# Patient Record
Sex: FEMALE | Race: BLACK OR AFRICAN AMERICAN | NOT HISPANIC OR LATINO | Employment: OTHER | URBAN - METROPOLITAN AREA
[De-identification: names, ages, dates, MRNs, and addresses within clinical notes are randomized per-mention and may not be internally consistent; named-entity substitution may affect disease eponyms.]

---

## 2017-01-17 ENCOUNTER — CLINICAL SUPPORT (OUTPATIENT)
Dept: CARDIOLOGY | Facility: CLINIC | Age: 67
End: 2017-01-17
Payer: MEDICARE

## 2017-01-17 DIAGNOSIS — I73.9 PERIPHERAL VASCULAR DISEASE, UNSPECIFIED: ICD-10-CM

## 2017-01-17 DIAGNOSIS — I73.9 PERIPHERAL VASCULAR DISEASE, UNSPECIFIED: Primary | ICD-10-CM

## 2017-01-17 LAB — VASCULAR ANKLE BRACHIAL INDEX (ABI) RIGHT: 1.11 (ref 0.9–1.2)

## 2017-01-17 PROCEDURE — 93925 LOWER EXTREMITY STUDY: CPT | Mod: S$GLB,,, | Performed by: INTERNAL MEDICINE

## 2017-01-17 PROCEDURE — 93922 UPR/L XTREMITY ART 2 LEVELS: CPT | Mod: S$GLB,,, | Performed by: INTERNAL MEDICINE

## 2017-01-18 ENCOUNTER — HOSPITAL ENCOUNTER (OUTPATIENT)
Dept: RADIOLOGY | Facility: HOSPITAL | Age: 67
Discharge: HOME | End: 2017-01-18
Attending: FAMILY MEDICINE

## 2017-01-18 ENCOUNTER — TELEPHONE (OUTPATIENT)
Dept: INTERNAL MEDICINE | Facility: CLINIC | Age: 67
End: 2017-01-18

## 2017-01-18 DIAGNOSIS — F17.200 SMOKER: ICD-10-CM

## 2017-01-18 DIAGNOSIS — R91.1 PULMONARY NODULE: Primary | ICD-10-CM

## 2017-01-18 PROCEDURE — 76497 UNLISTED CT PROCEDURE: CPT | Mod: TC,PO

## 2017-01-18 NOTE — TELEPHONE ENCOUNTER
Please notify her there is a new small lung nodule that will likely need a biopsy to make sure not a cancer  Refer pulm

## 2017-01-19 ENCOUNTER — TELEPHONE (OUTPATIENT)
Dept: CARDIOLOGY | Facility: CLINIC | Age: 67
End: 2017-01-19

## 2017-01-19 NOTE — TELEPHONE ENCOUNTER
----- Message from GLENDY Trivedi sent at 1/19/2017 12:29 PM CST -----  Please phone patient. Leg studies reviewed, AKHIL WNL. Good blow flow bilaterally. Continue current meds. Please make sure she has been placed in recall for 6 month f/u with Dr. Rosas    THanks

## 2017-01-19 NOTE — TELEPHONE ENCOUNTER
I have attempted without success to contact this patient by phone left message for pt to call back.

## 2017-01-25 ENCOUNTER — OFFICE VISIT (OUTPATIENT)
Dept: INTERNAL MEDICINE | Facility: CLINIC | Age: 67
End: 2017-01-25
Payer: MEDICARE

## 2017-01-25 VITALS
DIASTOLIC BLOOD PRESSURE: 62 MMHG | WEIGHT: 160.69 LBS | TEMPERATURE: 97 F | HEART RATE: 100 BPM | BODY MASS INDEX: 30.34 KG/M2 | HEIGHT: 61 IN | SYSTOLIC BLOOD PRESSURE: 132 MMHG

## 2017-01-25 DIAGNOSIS — I10 ESSENTIAL HYPERTENSION: Primary | Chronic | ICD-10-CM

## 2017-01-25 DIAGNOSIS — M81.0 OSTEOPOROSIS: ICD-10-CM

## 2017-01-25 DIAGNOSIS — I70.0 ATHEROSCLEROSIS OF AORTA: ICD-10-CM

## 2017-01-25 DIAGNOSIS — I70.219 ATHEROSCLEROTIC PVD WITH INTERMITTENT CLAUDICATION: ICD-10-CM

## 2017-01-25 PROCEDURE — 99499 UNLISTED E&M SERVICE: CPT | Mod: S$GLB,,, | Performed by: FAMILY MEDICINE

## 2017-01-25 PROCEDURE — 3075F SYST BP GE 130 - 139MM HG: CPT | Mod: S$GLB,,, | Performed by: FAMILY MEDICINE

## 2017-01-25 PROCEDURE — 99397 PER PM REEVAL EST PAT 65+ YR: CPT | Mod: S$GLB,,, | Performed by: FAMILY MEDICINE

## 2017-01-25 PROCEDURE — 3078F DIAST BP <80 MM HG: CPT | Mod: S$GLB,,, | Performed by: FAMILY MEDICINE

## 2017-01-25 PROCEDURE — 99999 PR PBB SHADOW E&M-EST. PATIENT-LVL III: CPT | Mod: PBBFAC,,, | Performed by: FAMILY MEDICINE

## 2017-01-25 RX ORDER — ALENDRONATE SODIUM 70 MG/1
TABLET ORAL
Qty: 12 TABLET | Refills: 3 | Status: SHIPPED | OUTPATIENT
Start: 2017-01-25 | End: 2018-02-16 | Stop reason: SDUPTHER

## 2017-01-25 RX ORDER — HYDROCODONE BITARTRATE AND ACETAMINOPHEN 10; 325 MG/1; MG/1
TABLET ORAL
COMMUNITY
Start: 2017-01-20 | End: 2018-08-22

## 2017-01-25 RX ORDER — MELOXICAM 7.5 MG/1
7.5 TABLET ORAL DAILY PRN
Qty: 90 TABLET | Refills: 3 | Status: SHIPPED | OUTPATIENT
Start: 2017-01-25 | End: 2017-02-15

## 2017-01-25 RX ORDER — MELOXICAM 15 MG/1
TABLET ORAL
Qty: 90 TABLET | Refills: 3 | Status: CANCELLED | OUTPATIENT
Start: 2017-01-25

## 2017-01-25 NOTE — MR AVS SNAPSHOT
[facility]  [address] [facility] [address] [place] [address]  Phone: [phone]  Fax: [phone]                  Lucia Barlwo   2017 10:20 AM   Office Visit    Description:  Female : 1950   Provider:  Rex Tejeda MD   Department:  [facility] - Internal Medicine           Diagnoses this Visit        Comments    Essential hypertension    -  Primary     Osteoporosis         Lung nodule < 6cm on CT         Atherosclerotic PVD with intermittent claudication         Atherosclerosis of aorta                To Do List           Future Appointments        Provider Department Dept Phone    2/15/2017 2:00 PM Ramiro Aleman MD [facility] [phone]    2018 10:40 AM Rex Tejeda MD [facility] [phone]      Goals (5 Years of Data)     None      Follow-Up and Disposition     Return in about 1 year (around 2018).    Follow-up and Disposition History       These Medications        Disp Refills Start End    alendronate (FOSAMAX) 70 MG tablet 12 tablet 3 2017     TAKE 1 TAB ONCE WEEKLY IN MORNING WITH FULL GLASS WATER ON EMPTY STOMACH. DO NOT EAT OR LIE DOWN X AT LEAST 30MIN AFTERWARDS    Pharmacy: [facility] - [facility] [address] [facility] Ph #: [phone]       meloxicam (MOBIC) 7.5 MG tablet 90 tablet 3 2017     Take 1 tablet (7.5 mg total) by mouth daily as needed for Pain. - Oral    Pharmacy: [facility] - [facility] [address] [facility] Ph #: [phone]         [facility]     [facility] On Call Nurse Care Line -  Assistance  Registered nurses in the [facility] On Call Center provide clinical advisement, health education, appointment booking, and other advisory services.  Call for this free service at [phone].             Medications           Message regarding Medications     Verify the changes and/or additions to your medication regime listed below are the same as  discussed with your clinician today.  If any of these changes or additions are incorrect, please notify your healthcare provider.        START taking these NEW medications        Refills    meloxicam (MOBIC) 7.5 MG tablet 3    Sig: Take 1 tablet (7.5 mg total) by mouth daily as needed for Pain.    Class: Normal    Route: Oral      STOP taking these medications     fluticasone (FLONASE) 50 mcg/actuation nasal spray USE 2 SPRAYS IN EACH NOSTRIL EVERY DAY           Verify that the below list of medications is an accurate representation of the medications you are currently taking.  If none reported, the list may be blank. If incorrect, please contact your healthcare provider. Carry this list with you in case of emergency.           Current Medications     alendronate (FOSAMAX) 70 MG tablet TAKE 1 TAB ONCE WEEKLY IN MORNING WITH FULL GLASS WATER ON EMPTY STOMACH. DO NOT EAT OR LIE DOWN X AT LEAST 30MIN AFTERWARDS    aspirin (ECOTRIN) 81 MG EC tablet Take 1 tablet (81 mg total) by mouth once daily.    ezetimibe (ZETIA) 10 mg tablet TAKE 1 TABLET ONE TIME DAILY SUBSTITUTED FOR EZETIMIBE    hydrocodone-acetaminophen 10-325mg (NORCO)  mg Tab     losartan-hydrochlorothiazide 50-12.5 mg (HYZAAR) 50-12.5 mg per tablet Take 1 tablet by mouth once daily.    methocarbamol (ROBAXIN) 750 MG Tab Take 750 mg by mouth.    rosuvastatin (CRESTOR) 20 MG tablet TAKE 1 TABLET ONE TIME DAILY    sodium,potassium,mag sulfates (SUPREP BOWEL PREP KIT) 17.5-3.13-1.6 gram SolR Take 1 Units by mouth as directed.    triamcinolone acetonide 0.1% (KENALOG) 0.1 % cream Apply topically 2 (two) times daily.    meloxicam (MOBIC) 7.5 MG tablet Take 1 tablet (7.5 mg total) by mouth daily as needed for Pain.           Clinical Reference Information           Vital Signs - Last Recorded  Most recent update: 1/25/2017 10:27 AM by Teresa Anaya LPN    BP Pulse Temp    132/62 (BP Location: Right arm, Patient Position: Sitting, BP Method: Manual) 100  "97.4 °F (36.3 °C) (Tympanic)    Ht Wt BMI    5' 1" (1.549 m) 72.9 kg (160 lb 11.5 oz) 30.37 kg/m2      Blood Pressure          Most Recent Value    BP  132/62      Allergies as of 1/25/2017     Skin Staples [Surgical Stainless Steel]    Egg Derived    Iodine And Iodide Containing Products    Latex, Natural Rubber    Nickel    Pravastatin    Shellfish Containing Products      Immunizations Administered on Date of Encounter - 1/25/2017     None      "

## 2017-01-25 NOTE — PROGRESS NOTES
Subjective:       Patient ID: Lucia Barlow is a . female.    Chief Complaint: Multiple issues see below        HPI Hypertension: blood pressures Home bps 130s; no med today;Tolerating medicine.   Hypercholesterolemia:  controlled. better Tolerating medicine.  pvd /Atherosclerosis of aorta utd cardiol  Smoker. hx Smoking at least 30 pk yrs; since youth; quit jan 16  osteopor on fosmax    New lung nodule concerning. Discussed. Has appt pulm    Past Medical History   Diagnosis Date    Hyperlipidemia     Hypertension     -exsmoker     PVD (peripheral vascular disease)     Osteoporosis     Atherosclerotic PVD with intermittent claudication 1/17/2014    S/P peripheral artery angioplasty 2/7/2014     Past Surgical History   Procedure Laterality Date    Colectomy      Carpal tunnel release  2003     Henrry    Hysterectomy       no cancer    Angioplasty       Family History   Problem Relation Age of Onset    Stroke Father     Stroke Sister     Asthma Daughter     Diabetes Daughter     Asthma Son            Review of Systems   Cardiovascular: no chest pain  Chest: no shortness of breath  Abd: no abd pain  Remainder review of systems negative        Objective:      Physical Exam   Constitutional: She is oriented to person, place, and time. She appears well-developed and well-nourished.   HENT: bilat nasal allison  Head: Normocephalic and atraumatic.   Eyes: Conjunctivae and EOM are normal. Pupils are equal, round, and reactive to light.   Neck: Normal range of motion. Neck supple. Carotid bruit is not present.   Cardiovascular: Normal rate and regular rhythm.    Pulmonary/Chest: Effort normal and breath sounds normal.   Abdominal: Soft. Bowel sounds are normal. She exhibits no distension and no mass. There is no tenderness. There is no rebound and no guarding.   Musculoskeletal: She exhibits no edema.   Neurological: She is alert and oriented to person, place, and time.   Skin: Skin is warm and dry.   Psychiatric:  She has a normal mood and affect. Her behavior is normal. Judgment and thought content normal.       Assessment:       htn  1. Hypercholesteremia      pvd  exSmoker     Lung nodule  Osteoporosis  Atherosclerosis of aorta   Plan:     F/u one yr  Declines flu shot  F/u cardiol when due    Dr jackson sched soon

## 2017-02-14 ENCOUNTER — TELEPHONE (OUTPATIENT)
Dept: PULMONOLOGY | Facility: CLINIC | Age: 67
End: 2017-02-14

## 2017-02-15 ENCOUNTER — OFFICE VISIT (OUTPATIENT)
Dept: PULMONOLOGY | Facility: CLINIC | Age: 67
End: 2017-02-15
Payer: MEDICARE

## 2017-02-15 VITALS
SYSTOLIC BLOOD PRESSURE: 136 MMHG | HEART RATE: 92 BPM | OXYGEN SATURATION: 96 % | DIASTOLIC BLOOD PRESSURE: 72 MMHG | WEIGHT: 161.81 LBS | HEIGHT: 61 IN | BODY MASS INDEX: 30.55 KG/M2

## 2017-02-15 DIAGNOSIS — R91.1 PULMONARY NODULE, LEFT: Primary | ICD-10-CM

## 2017-02-15 PROCEDURE — 1126F AMNT PAIN NOTED NONE PRSNT: CPT | Mod: S$GLB,,, | Performed by: INTERNAL MEDICINE

## 2017-02-15 PROCEDURE — 1160F RVW MEDS BY RX/DR IN RCRD: CPT | Mod: S$GLB,,, | Performed by: INTERNAL MEDICINE

## 2017-02-15 PROCEDURE — 1157F ADVNC CARE PLAN IN RCRD: CPT | Mod: S$GLB,,, | Performed by: INTERNAL MEDICINE

## 2017-02-15 PROCEDURE — 99205 OFFICE O/P NEW HI 60 MIN: CPT | Mod: S$GLB,,, | Performed by: INTERNAL MEDICINE

## 2017-02-15 PROCEDURE — 3078F DIAST BP <80 MM HG: CPT | Mod: S$GLB,,, | Performed by: INTERNAL MEDICINE

## 2017-02-15 PROCEDURE — 99499 UNLISTED E&M SERVICE: CPT | Mod: S$GLB,,, | Performed by: INTERNAL MEDICINE

## 2017-02-15 PROCEDURE — 1159F MED LIST DOCD IN RCRD: CPT | Mod: S$GLB,,, | Performed by: INTERNAL MEDICINE

## 2017-02-15 PROCEDURE — 99999 PR PBB SHADOW E&M-EST. PATIENT-LVL IV: CPT | Mod: PBBFAC,,, | Performed by: INTERNAL MEDICINE

## 2017-02-15 PROCEDURE — 3075F SYST BP GE 130 - 139MM HG: CPT | Mod: S$GLB,,, | Performed by: INTERNAL MEDICINE

## 2017-02-15 NOTE — MR AVS SNAPSHOT
[facility]  [address] [place] [address]  Phone: [phone]  Fax: [phone]                  Lucia Barlow   2/15/2017 2:00 PM   Office Visit    Description:  Female : 1950   Provider:  Ramiro Aleman MD   Department:  [facility] - Pulmonary Services           Reason for Visit     Pulmonary Nodules           Diagnoses this Visit        Comments    Pulmonary nodule, left    -  Primary            To Do List           Future Appointments        Provider Department Dept Phone    3/10/2017 8:00 AM [facility] CT1 LIMIT 500 LBS [facility] -  [phone]    3/16/2017 2:20 PM Ramiro Aleman MD [facility] [phone]    2018 10:40 AM Rex Tejeda MD [facility] [phone]      Goals (5 Years of Data)     None      Follow-Up and Disposition     Return in about 4 weeks (around 3/15/2017).      [facility]     [facility] Nurse Care Line - 24/7 Assistance  Registered nurses in the [facility] Center provide clinical advisement, health education, appointment booking, and other advisory services.  Call for this free service at [phone].             Medications           Message regarding Medications     Verify the changes and/or additions to your medication regime listed below are the same as discussed with your clinician today.  If any of these changes or additions are incorrect, please notify your healthcare provider.        STOP taking these medications     aspirin (ECOTRIN) 81 MG EC tablet Take 1 tablet (81 mg total) by mouth once daily.    meloxicam (MOBIC) 7.5 MG tablet Take 1 tablet (7.5 mg total) by mouth daily as needed for Pain.           Verify that the below list of medications is an accurate representation of the medications you are currently taking.  If none reported, the list may be blank. If incorrect, please contact your healthcare provider. Carry this list with you in case of emergency.          "  Current Medications     alendronate (FOSAMAX) 70 MG tablet TAKE 1 TAB ONCE WEEKLY IN MORNING WITH FULL GLASS WATER ON EMPTY STOMACH. DO NOT EAT OR LIE DOWN X AT LEAST 30MIN AFTERWARDS    ezetimibe (ZETIA) 10 mg tablet TAKE 1 TABLET ONE TIME DAILY SUBSTITUTED FOR EZETIMIBE    hydrocodone-acetaminophen 10-325mg (NORCO)  mg Tab     losartan-hydrochlorothiazide 50-12.5 mg (HYZAAR) 50-12.5 mg per tablet Take 1 tablet by mouth once daily.    methocarbamol (ROBAXIN) 750 MG Tab Take 750 mg by mouth.    rosuvastatin (CRESTOR) 20 MG tablet TAKE 1 TABLET ONE TIME DAILY    sodium,potassium,mag sulfates (SUPREP BOWEL PREP KIT) 17.5-3.13-1.6 gram SolR Take 1 Units by mouth as directed.    triamcinolone acetonide 0.1% (KENALOG) 0.1 % cream Apply topically 2 (two) times daily.           Clinical Reference Information           Your Vitals Were     BP Pulse Height Weight SpO2 BMI    136/72 92 5' 1" (1.549 m) 73.4 kg (161 lb 13.1 oz) 96% 30.58 kg/m2      Blood Pressure          Most Recent Value    BP  136/72      Allergies as of 2/15/2017     Skin Staples [Surgical Stainless Steel]    Egg Derived    Iodine And Iodide Containing Products    Latex, Natural Rubber    Nickel    Pravastatin    Shellfish Containing Products      Immunizations Administered on Date of Encounter - 2/15/2017     None      Orders Placed During Today's Visit     Future Labs/Procedures Expected by Expires    CT Biopsy Lung (xpd)  2/15/2017 2/15/2018      Instructions      Percutaneous Diagnosis of Chest, Lung Problems  Youve been told you need a percutaneous procedure to diagnose a problem in your chest or lung. This procedure allows the doctor to remove tissue or fluid (biopsy) from the chest or lung. For these procedures, the skin of the chest is numbed. Then a needle is passed through the skin.     With FNA, a thin needle is used to take a tissue sample from a mass.         Thoracentesis is used to remove fluid from the pleural space.      Fine " Needle Aspiration  Fine needle aspiration (FNA) is a procedure used for taking a tissue sample from a mass. First, a CT scan is done. This helps the doctor locate the mass. Then, a thin needle is inserted through the skin of the chest into the mass. Another CT scan is taken to ensure the needle is placed properly. Once the needle is in place, a small amount of tissue is drawn (aspirated) into the needle. The tissue sample is then sent for testing.   Thoracentesis  Thoracentesis is used to drain abnormal buildup of fluid between the lungs and chest wall (the pleural space). For the procedure, a needle is put through the skin of the chest into the pleural space. Fluid is then drawn into the needle and later tested for cancer and other problems.   Preparing for the Procedure  Before your procedure, do the following:  · Follow your doctors instructions about eating and drinking.  · Tell your doctor about the medications you take. You may need to stop taking certain medications before the procedure, especially aspirin, Coumadin, or other blood thinners.  · Talk with your doctor about any allergies and health problems you may have.  · Tell your doctor if you are pregnant.  During the Procedure  You receive local anesthesia (numbing medication) to keep you from feeling pain. The area where the needle goes in is numbed. If your doctor uses ultrasound to guide the needle, you will also have cool gel placed on your skin in the area to help the ultrasound probe move around. Medication to help you relax (sedation) may also be given through an intravenous (IV) line.  After the Procedure  You may have some pain after the procedure. You will be given medication to help ease any pain. You can go home after you recover from anesthesia, usually the same day as the procedure. If you received sedation, an adult family member or friend will need to drive you home. If a tube was placed in your chest to drain fluid, you will stay in the  hospital for 1 day(s). Your doctor will tell you more.   Risks and Complications  · Bleeding  · Infection  · Injury to other structures in the chest  · Pneumothorax (collapsed lung)       When to Call the Doctor  · Coughing up blood  · Shortness of breath  · Chest pain  · Fever of 100.4ºF or higher   Date Last Reviewed: 8/4/2014 © 2000-2016 [employer]. [address] [place] [address], [place] [address]. All rights reserved. This information is not intended as a substitute for professional medical care. Always follow your healthcare professional's instructions.      Please read Warning before using.    USE ONLY AS DIRECTED, IF SYMPTOMS PERSIST SEE YOUR DOCTOR/HEALTHCARE PROFESSIONAL. ALWAYS READ THE LABEL. IMPORTANT: [employer]® SINUS RINSE Mixture Packets should be used with [employer]Med® 240 mL (8 fl oz) NASAFLO® to achieve the best results. You may use NeilMed® packets with other irrigation devices, as long as you mix with the correct volume of water. Our recommendation is to replace Neti Pot every three months.  Step 1  Please wash your hands and rinse the device. Fill the NASAFLO® with 240 mL (8 fl oz) of lukewarm distilled, filtered or previously boiled water. Please do not use tap or faucet water to dissolve the mixture unless it has been previously boiled and cooled down. You may warm the water in a microwave, but we recommend that you warm it in increments of 5 to 10 seconds to avoid overheating, damaging the device or scalding your nasal passage. Step 2  Cut the SINUS RINSE mixture packet at the corner and pour contents into the pot. Tighten the lid on the device securely. Place one finger over the hole of the cap and shake the device gently to dissolve the mixture. Step 3  Standing in front of a sink, bend forward to your comfort level and tilt your head to one side. Keeping your mouth open, and without holding your breath, apply the tip of the device snugly against your nasal passage and ALLOW THE  SOLUTION TO GENTLY FLOW until the solution starts draining from the opposite nasal passage. Use roughly half the solution in the NASAFLO® (120 mL / 4 fl oz).  It should not enter your mouth unless you are tilting your head backwards. To adjust or stop the flow, you may place your finger over the hole of the cap, and depending on the seal, you may be able to control the flow. Step 4  Blow your nose very gently, without pinching nose completely to avoid pressure on eardrums. If tolerable, sniff in gently any residual solution remaining in the nasal passage once or twice, because this may clean out the posterior nasopharyngeal area, which is the area at the back of your nasal passage. At times, some solution will reach the back of your throat, so please spit it out.  For NASAFLO® users, to help drain any residual solution, blow your nose gently while tilting your head forward and to the same side of the nasal passage you just rinsed. Step 5  Now repeat steps 3 & 4 on your other nasal passage.  If there is any solution left over, please discard it. We recommend you make a fresh solution each time you rinse. Rinse once or twice daily OR as directed by your physician. Saline is considered safe.  The U.S. FDA is recommending that common cough and cold over the counter medicines not be given to babies and toddlers, and that antihistamines should not be given to children under age 6. NeilMed® NASAFLO®: Please clean with soap & water and let air dry Please read Warning before using.    Our recommendation is to replace Neti Pot every three months.                                                [facility]  [address] [place] [address]  (off [address])    Lung Biopsy Appointment      Is on (day) __________ (date) _____________ (time) ____________    This test is done in the Radiology Department on the 1st floor of [facility]    Lung Biopsy is performed to diagnose or  determine the level of lung disease that cant be determined by regular lab testing.    Prior to Lung Biopsy: (VERY IMPORTANT-PLEASE READ)   You must be fasting: nothing to eat or drink after midnight the night before the test.   You must have a responsible adult to drive you home after procedure and stay with you the rest of the day.   Plan to stay approximately 4 hours after procedure is completed.   If you are on Coumadin, Heparin, Plavix, Aspirin or NSAIDS must be stopped 7 days prior to the procedure. (YOU MUST LET YOUR PHYSICIAN KNOW)   The ordering physician should order lab work to be done within 7 days of the procedure, to determine your blood clotting factor.   Diabetic patients: If you take oral glucose pills, dont take it the morning of the biopsy.    If you take insulin, take your normal dose as usual.   Blood pressure medications are to be taken the morning of procedure with a small sip of water at least 2 hours before biopsy.   Based on your medical condition, your physician may request other specific preparation.     During the test you will lie on your back with your right hand behind your head. You will need to lie completely still while the best spot to insert the biopsy needle is determine. Ultrasound, MRI, or CT may also be used to locate a specific spot in the lung. Then, your skin will be cleaned and a local anesthetic (numbing) will be applied to the skin. You will also be given a light sedation. A biopsy needle will be inserted and advanced to the surface of the lung, where a tissue sample is obtained. After the biopsy you will remain lying on your right side for 2 hours. You may experience some pain around the site or in the shoulder. This isnt uncommon. Then, you will change position to lying on your back for 2-3 hours. You will be monitored closely during this time in recovery. You will be able to drink 2 hours and eat 4 hours after the procedure. The biopsy site will be  dressed. A small amount of blood on dressing is normal.    What to expect when home for Lung Biopsy:   Bed rest for the rest of the day and evening.   Blood thinning medications can be resumed when instructed by prescribing physician.   Avoid heavy lifting and strenuous exercise for the next 2-3 days following the procedure.   You can resume your normal diet.   You wont be able to travel by airplane for up to 5 days after biopsy.    Contact doctors office or go to ER if you experience:   Excessive bleeding saturated dressing or bleeding that will not stop by biopsy site   Fever or chills   Severe pain in the abdomen   Difficulty breathing    If your biopsy needs to be rescheduled, please contact the Radiology Department at [phone].         Language Assistance Services     ATTENTION: Language assistance services are available, free of charge. Please call [phone].      ATENCIÓN: Si habla español, tiene a rowland disposición servicios gratuitos de asistencia lingüística. Llame al [phone].     CHÚ Ý: N?u b?n nói Ti?ng Vi?t, có các d?ch v? h? tr? ngôn ng? mi?n phí dành cho b?n. G?i s? [phone].         [facility] complies with applicable Federal civil rights laws and does not discriminate on the basis of race, color, national origin, age, disability, or sex.

## 2017-02-15 NOTE — PROGRESS NOTES
Subjective:       Patient ID: Lucia Barlow is a 67 y.o. female.    Chief Complaint: She       Pulmonary Nodules    HPI       Lung Nodule  She presents for evaluation and treatment of a lung nodule. The patient had an abnormal imaging study but reports experiencing no current or past pulmonary symptoms. The patient denies other associated symptoms. She quit smoking approximately 1 year ago. The patient has no known exposure to tuberculosis and no history of PPD placement. The patient does not have a history of cancer.        Past Medical History   Diagnosis Date    Atherosclerosis of native coronary artery of native heart without angina pectoris 11/18/2016    Atherosclerotic PVD with intermittent claudication 1/17/2014    Ex-smoker     Hyperlipidemia     Hypertension     Osteoporosis 1/16 rosita 1/18    PVD (peripheral vascular disease)     S/P peripheral artery angioplasty 2/7/2014    Tobacco dependence      Past Surgical History   Procedure Laterality Date    Carpal tunnel release  2003     [facility]    Hysterectomy       no cancer    Angioplasty Bilateral 01/24/2014     aortoiliac stenting     Colonoscopy N/A 11/9/2016     Procedure: COLONOSCOPY;  Surgeon: Dmitri Sterling MD;  Location: [facility];  Service: Endoscopy;  Laterality: N/A;    Colectomy  approximate 2005     pt states 1in colon -dx with benign mass removed-states no colon cancer     Social History     Social History    Marital status:      Spouse name: N/A    Number of children: 4    Years of education: N/A     Occupational History          Social History Main Topics    Smoking status: Former Smoker     Packs/day: 1.00     Years: 50.00     Quit date: 1/11/2016    Smokeless tobacco: Never Used    Alcohol use No    Drug use: No    Sexual activity: No     Other Topics Concern    Not on file     Social History Narrative     Review of Systems   Constitutional: Negative for fever and fatigue.   HENT: Negative for  postnasal drip, rhinorrhea, sinus pressure and congestion.    Respiratory: Positive for shortness of breath. Negative for cough, hemoptysis, sputum production, chest tightness, wheezing, orthopnea, asthma nighttime symptoms and use of rescue inhaler.    Cardiovascular: Negative for chest pain and palpitations.   Musculoskeletal: Positive for arthralgias.   Skin: Negative for rash.   Gastrointestinal: Negative for nausea.   Neurological: Negative for dizziness and weakness.   Hematological: Negative for adenopathy.   Psychiatric/Behavioral: Negative for sleep disturbance. The patient is not nervous/anxious.        Objective:      Physical Exam   Constitutional: She is oriented to person, place, and time. She appears well-developed and well-nourished.   HENT:   Head: Normocephalic and atraumatic.   Eyes: Conjunctivae are normal. Pupils are equal, round, and reactive to light.   Neck: Neck supple. No JVD present. No tracheal deviation present. No thyromegaly present.   Cardiovascular: Normal rate, regular rhythm and normal heart sounds.    Pulmonary/Chest: Effort normal and breath sounds normal. No respiratory distress. She has no wheezes. She has no rales. She exhibits no tenderness.   Abdominal: Soft. Bowel sounds are normal.   Musculoskeletal: Normal range of motion. She exhibits edema.   Lymphadenopathy:     She has no cervical adenopathy.   Neurological: She is alert and oriented to person, place, and time.   Skin: Skin is warm and dry.   Nursing note and vitals reviewed.    Personal Diagnostic Review    Radiology Result       Name:  :  Patient MRN:   Lucia Barlow [id number]   Account Number: Room & Bed Accession Number:   [id number]   [id number]   Authorizing Physician: Patient Class: Diagnosis:   Rex Tejeda OP- Outpatient Diagnostic Testing Smoker [F17.200 (ICD-10-CM)]   Procedure: Exam Date: Reason for Exam:   CT Chest Lung Screening Low Dose 2017 None Specified           RESULTS:  Technique: CT of the thorax was performed with low dose, lung screening protocol. No contrast was administered. Sagittal and coronal reconstructions were obtained.     Comparison: Study from 01/08/2016.     Findings:     Lungs: There is a new minimally spiculated non-calcified nodule within the left lower lobe measures 9.7 mm. This nodule it appears to be minimally spiculated. This nodule is best seen on series 3 image 67 and series 604 image 112. There is a stable less than 4 mm subpleural density within the left lower lobe. The lungs demonstrate minimal emphysematous changes.     Pleura: No effusion.     Mediastinum: No definite pathologic mediastinal or hilar adenopathy.     Heart and pericardium: Normal size without effusion.     Aorta and vasculature: Atherosclerosis including coronary arteries.     Chest wall and skeletal structures: Unremarkable except age-appropriate degenerative changes.     Upper abdomen: Vascular calcifications noted in the upper abdomen..  IMPRESSION:         1. New 9.7 mm very minimally spiculated noncalcified pulmonary nodule within the left lower lobe. Biopsy is recommended..     2. Subpleural nodule within the left lower lobe measuring less than 4 mm stable when compared to the prior exam.     Other findings as above.        Electronically signed by: CAPO HICKS D.O.       No flowsheet data found.      Assessment:       1. Pulmonary nodule, left        Outpatient Encounter Prescriptions as of 2/15/2017   Medication Sig Dispense Refill    alendronate (FOSAMAX) 70 MG tablet TAKE 1 TAB ONCE WEEKLY IN MORNING WITH FULL GLASS WATER ON EMPTY STOMACH. DO NOT EAT OR LIE DOWN X AT LEAST 30MIN AFTERWARDS 12 tablet 3    ezetimibe (ZETIA) 10 mg tablet TAKE 1 TABLET ONE TIME DAILY SUBSTITUTED FOR EZETIMIBE 90 tablet 3    hydrocodone-acetaminophen 10-325mg (NORCO)  mg Tab       losartan-hydrochlorothiazide 50-12.5 mg (HYZAAR) 50-12.5 mg per tablet Take 1 tablet by mouth  once daily. 90 tablet 3    methocarbamol (ROBAXIN) 750 MG Tab Take 750 mg by mouth.      rosuvastatin (CRESTOR) 20 MG tablet TAKE 1 TABLET ONE TIME DAILY 90 tablet 3    sodium,potassium,mag sulfates (SUPREP BOWEL PREP KIT) 17.5-3.13-1.6 gram SolR Take 1 Units by mouth as directed. 1 Bottle 0    triamcinolone acetonide 0.1% (KENALOG) 0.1 % cream Apply topically 2 (two) times daily. 80 g 0    [DISCONTINUED] aspirin (ECOTRIN) 81 MG EC tablet Take 1 tablet (81 mg total) by mouth once daily. 30 tablet 6    [DISCONTINUED] meloxicam (MOBIC) 7.5 MG tablet Take 1 tablet (7.5 mg total) by mouth daily as needed for Pain. 90 tablet 3     No facility-administered encounter medications on file as of 2/15/2017.      Orders Placed This Encounter   Procedures    CT Biopsy Lung (xpd)     Standing Status:   Future     Standing Expiration Date:   2/15/2018     Scheduling Instructions:      Please call patient to confirm date and time     Order Specific Question:   Reason for Exam:     Answer:   pulmonary nodule     Plan:       Requested Prescriptions      No prescriptions requested or ordered in this encounter     Pulmonary nodule, left  -     CT Biopsy Lung (xpd); Future; Expected date: 2/15/17           Return in about 4 weeks (around 3/15/2017).    Risks, benefits of CT guided  lung biopsy discussed in detail with patient. Possible complications of procedure including but not limited to collapse of lung, bleeding into lung, respiratory failure and side effects discussed in detail. Alternatives to procedure discussed. Questions asked and answered.  Printed information and instructions given to patient.    Medical Decision Making: Moderate to High complexity.  Overall, the multiple problems listed are of moderate to high severity that may impact quality of life and activities of daily living. Side effects of medications, treatment plan as well as options and alternatives reviewed and discussed with patient. Orders for lung  biopsy initiated.There was counseling of patient concerning these issues. Time spent 60 minutes.        MEDICAL DECISION MAKING: Moderate to high complexity.  Overall, the multiple problems listed are of moderate to high severity that may impact quality of life and activities of daily living. Side effects of medications, treatment plan as well as options and alternatives reviewed and discussed with patient. There was counseling of patient concerning these issues.    Total time spent in face to face counseling and coordination of care - 60  minutes over 50% of time was used in discussion of prognosis, risks, benefits of treatment, instructions and compliance with regimen . Discussion with other physicians or health care providers (DME, NP, pharmacy, respiratory therapy) occurred.

## 2017-02-15 NOTE — PATIENT INSTRUCTIONS
Percutaneous Diagnosis of Chest, Lung Problems  Youve been told you need a percutaneous procedure to diagnose a problem in your chest or lung. This procedure allows the doctor to remove tissue or fluid (biopsy) from the chest or lung. For these procedures, the skin of the chest is numbed. Then a needle is passed through the skin.     With FNA, a thin needle is used to take a tissue sample from a mass.         Thoracentesis is used to remove fluid from the pleural space.      Fine Needle Aspiration  Fine needle aspiration (FNA) is a procedure used for taking a tissue sample from a mass. First, a CT scan is done. This helps the doctor locate the mass. Then, a thin needle is inserted through the skin of the chest into the mass. Another CT scan is taken to ensure the needle is placed properly. Once the needle is in place, a small amount of tissue is drawn (aspirated) into the needle. The tissue sample is then sent for testing.   Thoracentesis  Thoracentesis is used to drain abnormal buildup of fluid between the lungs and chest wall (the pleural space). For the procedure, a needle is put through the skin of the chest into the pleural space. Fluid is then drawn into the needle and later tested for cancer and other problems.   Preparing for the Procedure  Before your procedure, do the following:  · Follow your doctors instructions about eating and drinking.  · Tell your doctor about the medications you take. You may need to stop taking certain medications before the procedure, especially aspirin, Coumadin, or other blood thinners.  · Talk with your doctor about any allergies and health problems you may have.  · Tell your doctor if you are pregnant.  During the Procedure  You receive local anesthesia (numbing medication) to keep you from feeling pain. The area where the needle goes in is numbed. If your doctor uses ultrasound to guide the needle, you will also have cool gel placed on your skin in the area to help the  ultrasound probe move around. Medication to help you relax (sedation) may also be given through an intravenous (IV) line.  After the Procedure  You may have some pain after the procedure. You will be given medication to help ease any pain. You can go home after you recover from anesthesia, usually the same day as the procedure. If you received sedation, an adult family member or friend will need to drive you home. If a tube was placed in your chest to drain fluid, you will stay in the hospital for 1 day(s). Your doctor will tell you more.   Risks and Complications  · Bleeding  · Infection  · Injury to other structures in the chest  · Pneumothorax (collapsed lung)       When to Call the Doctor  · Coughing up blood  · Shortness of breath  · Chest pain  · Fever of 100.4ºF or higher   Date Last Reviewed: 8/4/2014  © 1904-4031 [employer]. [address] [place] [address]. All rights reserved. This information is not intended as a substitute for professional medical care. Always follow your healthcare professional's instructions.      Please read Warning before using.    USE ONLY AS DIRECTED, IF SYMPTOMS PERSIST SEE YOUR DOCTOR/HEALTHCARE PROFESSIONAL. ALWAYS READ THE LABEL. IMPORTANT: NeilMed® SINUS RINSE Mixture Packets should be used with NeilMed® 240 mL (8 fl oz) NASAFLO® to achieve the best results. You may use NeilMed® packets with other irrigation devices, as long as you mix with the correct volume of water. Our recommendation is to replace Neti Pot every three months.  Step 1  Please wash your hands and rinse the device. Fill the NASAFLO® with 240 mL (8 fl oz) of lukewarm distilled, filtered or previously boiled water. Please do not use tap or faucet water to dissolve the mixture unless it has been previously boiled and cooled down. You may warm the water in a microwave, but we recommend that you warm it in increments of 5 to 10 seconds to avoid overheating, damaging the device or scalding  your nasal passage. Step 2  Cut the SINUS RINSE mixture packet at the corner and pour contents into the pot. Tighten the lid on the device securely. Place one finger over the hole of the cap and shake the device gently to dissolve the mixture. Step 3  Standing in front of a sink, bend forward to your comfort level and tilt your head to one side. Keeping your mouth open, and without holding your breath, apply the tip of the device snugly against your nasal passage and ALLOW THE SOLUTION TO GENTLY FLOW until the solution starts draining from the opposite nasal passage. Use roughly half the solution in the NASAFLO® (120 mL / 4 fl oz).  It should not enter your mouth unless you are tilting your head backwards. To adjust or stop the flow, you may place your finger over the hole of the cap, and depending on the seal, you may be able to control the flow. Step 4  Blow your nose very gently, without pinching nose completely to avoid pressure on eardrums. If tolerable, sniff in gently any residual solution remaining in the nasal passage once or twice, because this may clean out the posterior nasopharyngeal area, which is the area at the back of your nasal passage. At times, some solution will reach the back of your throat, so please spit it out.  For NASAFLO® users, to help drain any residual solution, blow your nose gently while tilting your head forward and to the same side of the nasal passage you just rinsed. Step 5  Now repeat steps 3 & 4 on your other nasal passage.  If there is any solution left over, please discard it. We recommend you make a fresh solution each time you rinse. Rinse once or twice daily OR as directed by your physician. Saline is considered safe.  The [place] FDA is recommending that common cough and cold over the counter medicines not be given to babies and toddlers, and that antihistamines should not be given to children under age 6. NeilMed® NASAFLO®: Please clean with soap & water and let air dry  Please read Warning before using.    Our recommendation is to replace Neti Pot every three months.                                                [facility]  [address] [place] [address]  (off [address])    Lung Biopsy Appointment      Is on (day) __________ (date) _____________ (time) ____________    This test is done in the Radiology Department on the 1st floor of [facility]    Lung Biopsy is performed to diagnose or determine the level of lung disease that cant be determined by regular lab testing.    Prior to Lung Biopsy: (VERY IMPORTANT-PLEASE READ)   You must be fasting: nothing to eat or drink after midnight the night before the test.   You must have a responsible adult to drive you home after procedure and stay with you the rest of the day.   Plan to stay approximately 4 hours after procedure is completed.   If you are on Coumadin, Heparin, Plavix, Aspirin or NSAIDS must be stopped 7 days prior to the procedure. (YOU MUST LET YOUR PHYSICIAN KNOW)   The ordering physician should order lab work to be done within 7 days of the procedure, to determine your blood clotting factor.   Diabetic patients: If you take oral glucose pills, dont take it the morning of the biopsy.    If you take insulin, take your normal dose as usual.   Blood pressure medications are to be taken the morning of procedure with a small sip of water at least 2 hours before biopsy.   Based on your medical condition, your physician may request other specific preparation.     During the test you will lie on your back with your right hand behind your head. You will need to lie completely still while the best spot to insert the biopsy needle is determine. Ultrasound, MRI, or CT may also be used to locate a specific spot in the lung. Then, your skin will be cleaned and a local anesthetic (numbing) will be applied to the skin. You will also be given a light sedation. A biopsy needle  will be inserted and advanced to the surface of the lung, where a tissue sample is obtained. After the biopsy you will remain lying on your right side for 2 hours. You may experience some pain around the site or in the shoulder. This isnt uncommon. Then, you will change position to lying on your back for 2-3 hours. You will be monitored closely during this time in recovery. You will be able to drink 2 hours and eat 4 hours after the procedure. The biopsy site will be dressed. A small amount of blood on dressing is normal.    What to expect when home for Lung Biopsy:   Bed rest for the rest of the day and evening.   Blood thinning medications can be resumed when instructed by prescribing physician.   Avoid heavy lifting and strenuous exercise for the next 2-3 days following the procedure.   You can resume your normal diet.   You wont be able to travel by airplane for up to 5 days after biopsy.    Contact doctors office or go to ER if you experience:   Excessive bleeding saturated dressing or bleeding that will not stop by biopsy site   Fever or chills   Severe pain in the abdomen   Difficulty breathing    If your biopsy needs to be rescheduled, please contact the Radiology Department at [phone].

## 2017-03-09 ENCOUNTER — TELEPHONE (OUTPATIENT)
Dept: RADIOLOGY | Facility: HOSPITAL | Age: 67
End: 2017-03-09

## 2017-03-09 ENCOUNTER — TELEPHONE (OUTPATIENT)
Dept: PULMONOLOGY | Facility: CLINIC | Age: 67
End: 2017-03-09

## 2017-03-09 DIAGNOSIS — R91.1 PULMONARY NODULE, LEFT: Primary | ICD-10-CM

## 2017-03-09 DIAGNOSIS — R91.1 LUNG NODULE: ICD-10-CM

## 2017-03-09 DIAGNOSIS — I70.90 ATHEROSCLEROSIS OF ARTERY: ICD-10-CM

## 2017-03-09 DIAGNOSIS — Z01.818 PREOP TESTING: ICD-10-CM

## 2017-03-09 DIAGNOSIS — I25.10 CORONARY ATHEROSCLEROSIS OF UNSPECIFIED TYPE OF VESSEL, NATIVE OR GRAFT: ICD-10-CM

## 2017-03-09 DIAGNOSIS — I35.0 NONRHEUMATIC AORTIC VALVE STENOSIS: ICD-10-CM

## 2017-03-09 DIAGNOSIS — Z98.890 HISTORY OF LUNG BIOPSY: ICD-10-CM

## 2017-03-09 DIAGNOSIS — M81.0 OSTEOPOROSIS: ICD-10-CM

## 2017-03-09 DIAGNOSIS — Z86.010 HISTORY OF ADENOMATOUS POLYP OF COLON: ICD-10-CM

## 2017-03-09 DIAGNOSIS — I25.119 ATHEROSCLEROSIS OF NATIVE CORONARY ARTERY OF NATIVE HEART WITH ANGINA PECTORIS: ICD-10-CM

## 2017-03-09 DIAGNOSIS — I15.9 SECONDARY HYPERTENSION: ICD-10-CM

## 2017-03-09 DIAGNOSIS — R91.1 LUNG NODULE: Primary | ICD-10-CM

## 2017-03-09 DIAGNOSIS — E78.00 HYPERCHOLESTEREMIA: ICD-10-CM

## 2017-03-10 ENCOUNTER — HOSPITAL ENCOUNTER (OUTPATIENT)
Dept: RADIOLOGY | Facility: HOSPITAL | Age: 67
Discharge: HOME | End: 2017-03-10
Attending: RADIOLOGY
Payer: MEDICARE

## 2017-03-10 ENCOUNTER — HOSPITAL ENCOUNTER (OUTPATIENT)
Dept: RADIOLOGY | Facility: HOSPITAL | Age: 67
Discharge: HOME | End: 2017-03-10
Attending: INTERNAL MEDICINE
Payer: MEDICARE

## 2017-03-10 VITALS
SYSTOLIC BLOOD PRESSURE: 132 MMHG | HEART RATE: 76 BPM | RESPIRATION RATE: 16 BRPM | WEIGHT: 159 LBS | DIASTOLIC BLOOD PRESSURE: 45 MMHG | OXYGEN SATURATION: 98 % | HEIGHT: 62 IN | BODY MASS INDEX: 29.26 KG/M2

## 2017-03-10 DIAGNOSIS — R91.1 PULMONARY NODULE, LEFT: ICD-10-CM

## 2017-03-10 PROCEDURE — 88342 IMHCHEM/IMCYTCHM 1ST ANTB: CPT | Mod: 26,,, | Performed by: PATHOLOGY

## 2017-03-10 PROCEDURE — 71010 XR CHEST AP PORTABLE: CPT | Mod: TC

## 2017-03-10 PROCEDURE — 71010 XR CHEST 1 VIEW: CPT | Mod: TC

## 2017-03-10 PROCEDURE — 63600175 PHARM REV CODE 636 W HCPCS: Performed by: RADIOLOGY

## 2017-03-10 PROCEDURE — 88305 TISSUE EXAM BY PATHOLOGIST: CPT | Mod: 26,,, | Performed by: PATHOLOGY

## 2017-03-10 PROCEDURE — 88312 SPECIAL STAINS GROUP 1: CPT | Mod: 26,,, | Performed by: PATHOLOGY

## 2017-03-10 PROCEDURE — 77012 CT SCAN FOR NEEDLE BIOPSY: CPT | Mod: TC,LT

## 2017-03-10 PROCEDURE — 88305 TISSUE EXAM BY PATHOLOGIST: CPT | Performed by: PATHOLOGY

## 2017-03-10 RX ORDER — FENTANYL CITRATE 50 UG/ML
INJECTION, SOLUTION INTRAMUSCULAR; INTRAVENOUS CODE/TRAUMA/SEDATION MEDICATION
Status: COMPLETED | OUTPATIENT
Start: 2017-03-10 | End: 2017-03-10

## 2017-03-10 RX ORDER — MIDAZOLAM HYDROCHLORIDE 1 MG/ML
INJECTION, SOLUTION INTRAMUSCULAR; INTRAVENOUS CODE/TRAUMA/SEDATION MEDICATION
Status: COMPLETED | OUTPATIENT
Start: 2017-03-10 | End: 2017-03-10

## 2017-03-10 RX ADMIN — MIDAZOLAM HYDROCHLORIDE 1 MG: 1 INJECTION, SOLUTION INTRAMUSCULAR; INTRAVENOUS at 09:03

## 2017-03-10 RX ADMIN — FENTANYL CITRATE 50 MCG: 50 INJECTION, SOLUTION INTRAMUSCULAR; INTRAVENOUS at 09:03

## 2017-03-10 NOTE — SEDATION DOCUMENTATION
Pt in ct on table prone with bilateral arms above head.  Pt and daughter verbalized understanding of procedure.

## 2017-03-10 NOTE — IP AVS SNAPSHOT
[facility]  [id number] [place] [address] [address] [place] [address]           Patient Discharge Instructions     Our goal is to set you up for success. This packet includes information on your condition, medications, and your home care. It will help you to care for yourself so you don't get sicker and need to go back to the hospital.     Please ask your nurse if you have any questions.        There are many details to remember when preparing to leave the hospital. Here is what you will need to do:    1. Take your medicine. If you are prescribed medications, review your Medication List in the following pages. You may have new medications to  at the pharmacy and others that you'll need to stop taking. Review the instructions for how and when to take your medications. Talk with your doctor or nurses if you are unsure of what to do.     2. Go to your follow-up appointments. Specific follow-up information is listed in the following pages. Your may be contacted by a transition nurse or clinical provider about future appointments. Be sure we have all of the phone numbers to reach you, if needed. Please contact your provider's office if you are unable to make an appointment.     3. Watch for warning signs. Your doctor or nurse will give you detailed warning signs to watch for and when to call for assistance. These instructions may also include educational information about your condition. If you experience any of warning signs to your health, call your doctor.               [facility]  Unless otherwise directed by your provider, please contact [facility], our nurse care line that is available for 24/7 assistance.     [phone] (toll-free)    Registered nurses in the [facility] provide clinical advisement, health education, appointment booking, and other advisory services.                    ** Verify the list of medication(s) below is accurate and up to date. Carry this with you  in case of emergency. If your medications have changed, please notify your healthcare provider.             Medication List      TAKE these medications        Additional Info                      alendronate 70 MG tablet   Commonly known as:  FOSAMAX   Quantity:  12 tablet   Refills:  3    Instructions:  TAKE 1 TAB ONCE WEEKLY IN MORNING WITH FULL GLASS WATER ON EMPTY STOMACH. DO NOT EAT OR LIE DOWN X AT LEAST 30MIN AFTERWARDS     Begin Date    AM    Noon    PM    Bedtime       ezetimibe 10 mg tablet   Commonly known as:  ZETIA   Quantity:  90 tablet   Refills:  3    Instructions:  TAKE 1 TABLET ONE TIME DAILY SUBSTITUTED FOR EZETIMIBE     Begin Date    AM    Noon    PM    Bedtime       hydrocodone-acetaminophen 10-325mg  mg Tab   Commonly known as:  NORCO   Refills:  0      Begin Date    AM    Noon    PM    Bedtime       losartan-hydrochlorothiazide 50-12.5 mg 50-12.5 mg per tablet   Commonly known as:  HYZAAR   Quantity:  90 tablet   Refills:  3   Dose:  1 tablet    Instructions:  Take 1 tablet by mouth once daily.     Begin Date    AM    Noon    PM    Bedtime       methocarbamol 750 MG Tab   Commonly known as:  ROBAXIN   Refills:  0   Dose:  750 mg    Instructions:  Take 750 mg by mouth.     Begin Date    AM    Noon    PM    Bedtime       rosuvastatin 20 MG tablet   Commonly known as:  CRESTOR   Quantity:  90 tablet   Refills:  3    Instructions:  TAKE 1 TABLET ONE TIME DAILY     Begin Date    AM    Noon    PM    Bedtime       sodium,potassium,mag sulfates 17.5-3.13-1.6 gram Solr   Commonly known as:  SUPREP BOWEL PREP KIT   Quantity:  1 Bottle   Refills:  0   Dose:  1 Units    Instructions:  Take 1 Units by mouth as directed.     Begin Date    AM    Noon    PM    Bedtime       triamcinolone acetonide 0.1% 0.1 % cream   Commonly known as:  KENALOG   Quantity:  80 g   Refills:  0    Instructions:  Apply topically 2 (two) times daily.     Begin Date    AM    Noon    PM    Bedtime                  Please bring  to all follow up appointments:    1. A copy of your discharge instructions.  2. All medicines you are currently taking in their original bottles.  3. Identification and insurance card.    Please arrive 15 minutes ahead of scheduled appointment time.    Please call 24 hours in advance if you must reschedule your appointment and/or time.        Your Scheduled Appointments     Mar 16, 2017  2:20 PM CDT   Established Patient Visit with Ramiro Aleman MD   [facility]    [address] [facility] [address]   [phone]            Jan 25, 2018 10:40 AM CST   Established Patient Visit with Rex Tejeda MD   [facility] ([facility])    [address] [facility] [address]   [phone]                  Discharge Instructions         CT-Guided Lung Biopsy  CT-guided lung biopsy is a procedure to collect small tissue samples from an abnormal area in your lung. During the procedure, an imaging method called CT (computed tomography) is used to show live pictures of your lung. Then a thin needle is used to remove the tissue samples. The samples are tested in a lab for cancer and other problems.     For the biopsy, a thin needle is used to remove samples of tissue from an abnormal area in the lung.   Getting ready for your procedure  Follow any instructions from your healthcare provider.  Tell your provider about any medicines you are taking. It is important for your provider to know if you are taking any blood-thinning medicines or have a bleeding disorder.  You may need to stop taking all or some medicine before the procedure. This includes:  · All prescription medicines  · Blood-thinning medicines (anticoagulants)  · Over-the-counter medicines such as aspirin or ibuprofen  · Street drugs  · Herbs, vitamins, and other supplements  Also tell your provider if you:  · Are pregnant or think you may be pregnant  · Are breastfeeding  · Are allergic to or have  intolerances to any medicines  · Have a chronic cough, new cough, or other illness  · Use oxygen therapy at home  · Smoke or drink alcohol on a regular basis  Follow any directions youre given for not eating or drinking before the procedure.  The day of your procedure  The procedure takes about 60 minutes. The entire procedure (including time to prepare and recover) takes several hours. Youll likely go home the same day.  Before the procedure begins:  · An IV (intravenous) line may be put into a vein in your hand or arm. This line supplies fluids and medicines.  · To keep you free of pain during the procedure, you may be given anesthesia. Depending on the type of anesthesia used, you may be awake, drowsy, or in a deep sleep for the procedure.  During the procedure:  · Youll lie on a CT scan table. You may be on your back, side, or stomach. Pictures of your lung are then taken using the CT scanner. This helps your provider find the best place to position the needle in your lungs.  · A mickey is made on your skin where the needle will be inserted (biopsy site). The site is injected with numbing medicine.  · Using the CT pictures as a guide, the needle is passed through the numbed skin between your ribs and into your lung. Samples of tissue are then removed from the abnormal area in your lung. The samples are sent to a lab to be checked for problems.  · When the procedure is complete, the needle is removed. Pressure is applied to the biopsy site to help stop any bleeding. The site is then bandaged.  After the procedure:  · Youll be taken to a room to rest until the anesthesia wears off.  · A chest X-ray may be done. This is to make sure there was no damage to your lungs or the area where the needle was placed.  · When its time for you to go home, have an adult family member or friend ready to drive you.  Recovering at home  You may cough up a small amount of blood shortly after the procedure. Later, you may also  have some soreness around the biopsy site. Once at home, follow any instructions youre given. Be sure to:  · Take all medicines as directed.  · Care for the biopsy site as instructed.  · Check for signs of infection at the biopsy site (see below).  · Do not bathe or shower until your provider says it's OK. If you wish, you may wash with a sponge or washcloth.  · Avoid heavy lifting and other strenuous activities as directed.  · If you plan any air travel, ask your provider when you can do so. You may be told not to fly for a few weeks. This is because pressure changes may affect your lungs.  When to call your provider  Call 911 or your local emergency number if you have sudden, severe difficulty breathing. This could be a life-threatening emergency.  Call your healthcare provider for less severe symptoms that are still concerning. If you are unable to speak with your provider, go to the emergency room if you have any of the following symptoms:  · Fever of 100.4°F (38°C) or higher, or as directed by your provider  · Sudden chest pain, shortness of breath, or fainting  · Coughing up increasing amounts of blood  · Signs of infection at the biopsy site, such as increased redness or swelling, warmth, more pain, bleeding, or bad-smelling drainage   Follow-up  The provider who ordered your test will discuss the biopsy results with you during a follow-up visit. Results are usually ready within 1 to 2 weeks. If more tests or treatments are needed, your provider will discuss these with you.  Risks and possible complications  All procedures have some risk. Possible risks of this procedure include:  · An air leak in your lung (pneumothorax). This may require a stay in the hospital and treatment to re-inflate the lung.  · Bleeding into or around the lung  · Infection in the skin or lung  · Injury to other structures in the chest  · Risks of anesthesia. This will be discussed with you before the procedure.   Date Last Reviewed:  "6/11/2015  © 4615-2506 curated.by. [address] [place] [address], [place] [address]. All rights reserved. This information is not intended as a substitute for professional medical care. Always follow your healthcare professional's instructions.            Admission Information     Date & Time Provider Department CSN    3/10/2017  8:00 AM Ramiro Aleman MD [facility] -  [id number]      Care Providers     Provider Role Specialty Primary office phone    Ramiro Aleman MD Attending Provider Pulmonary Disease [phone]      Your Vitals Were     BP Pulse Resp Height Weight SpO2    125/57 81 14 5' 2" (1.575 m) 72.1 kg (159 lb) 100%    BMI                29.08 kg/m2          Recent Lab Values     No lab values to display.      Pending Labs     Order Current Status    Tissue Specimen To Pathology, Radiology Collected (03/10/17 0850)      Allergies as of 3/10/2017        Reactions    Skin Staples [Surgical Stainless Steel] Swelling    Egg Derived     Shortness breath, lip swelling    Iodine And Iodide Containing Products Swelling    Latex, Natural Rubber Swelling    Nickel     Pravastatin     40 mg causes nausea vomitting but 20 mg ok    Shellfish Containing Products Swelling      Advance Directives     An advance directive is a document which, in the event you are no longer able to make decisions for yourself, tells your healthcare team what kind of treatment you do or do not want to receive, or who you would like to make those decisions for you.  If you do not currently have an advance directive, [facility] encourages you to create one.  For more information call:  [phone] ([phone]), [phone] ([phone]),  or log on to [email or web address].        Language Assistance Services     ATTENTION: Language assistance services are available, free of charge. Please call [phone].      ATENCIÓN: Si habla español, tiene a rowland disposición servicios gratuitos de asistencia lingüística. " Nam turner [phone].     JAMES Ý: N?u b?n nói Ti?ng Vi?t, có các d?ch v? h? tr? ngôn ng? mi?n phí dành cho b?n. G?i s? [phone].         [facility] -  complies with applicable Federal civil rights laws and does not discriminate on the basis of race, color, national origin, age, disability, or sex.

## 2017-03-10 NOTE — PLAN OF CARE
Problem: Patient Care Overview  Goal: Plan of Care Review  Outcome: Outcome(s) achieved Date Met:  03/10/17  Ok to discharge to home with family per Dr. Frye.  Pt d/c home in stable condition via wheelchair with ride.  Verbalized understanding of d/c instructions.  Pt voiced no complaints at this time. Pt stood at side of bed, walked steps with no new motor or sensory deficits.  Neurologically intact.

## 2017-03-10 NOTE — PLAN OF CARE
Problem: Patient Care Overview  Goal: Individualization & Mutuality  Outcome: Ongoing (interventions implemented as appropriate)  Lung biopsy.  Pt daughter at bedside.

## 2017-03-10 NOTE — DISCHARGE INSTRUCTIONS
CT-Guided Lung Biopsy  CT-guided lung biopsy is a procedure to collect small tissue samples from an abnormal area in your lung. During the procedure, an imaging method called CT (computed tomography) is used to show live pictures of your lung. Then a thin needle is used to remove the tissue samples. The samples are tested in a lab for cancer and other problems.     For the biopsy, a thin needle is used to remove samples of tissue from an abnormal area in the lung.   Getting ready for your procedure  Follow any instructions from your healthcare provider.  Tell your provider about any medicines you are taking. It is important for your provider to know if you are taking any blood-thinning medicines or have a bleeding disorder.  You may need to stop taking all or some medicine before the procedure. This includes:  · All prescription medicines  · Blood-thinning medicines (anticoagulants)  · Over-the-counter medicines such as aspirin or ibuprofen  · Street drugs  · Herbs, vitamins, and other supplements  Also tell your provider if you:  · Are pregnant or think you may be pregnant  · Are breastfeeding  · Are allergic to or have intolerances to any medicines  · Have a chronic cough, new cough, or other illness  · Use oxygen therapy at home  · Smoke or drink alcohol on a regular basis  Follow any directions youre given for not eating or drinking before the procedure.  The day of your procedure  The procedure takes about 60 minutes. The entire procedure (including time to prepare and recover) takes several hours. Youll likely go home the same day.  Before the procedure begins:  · An IV (intravenous) line may be put into a vein in your hand or arm. This line supplies fluids and medicines.  · To keep you free of pain during the procedure, you may be given anesthesia. Depending on the type of anesthesia used, you may be awake, drowsy, or in a deep sleep for the procedure.  During the procedure:  · Youll lie on a CT scan  table. You may be on your back, side, or stomach. Pictures of your lung are then taken using the CT scanner. This helps your provider find the best place to position the needle in your lungs.  · A mickey is made on your skin where the needle will be inserted (biopsy site). The site is injected with numbing medicine.  · Using the CT pictures as a guide, the needle is passed through the numbed skin between your ribs and into your lung. Samples of tissue are then removed from the abnormal area in your lung. The samples are sent to a lab to be checked for problems.  · When the procedure is complete, the needle is removed. Pressure is applied to the biopsy site to help stop any bleeding. The site is then bandaged.  After the procedure:  · Youll be taken to a room to rest until the anesthesia wears off.  · A chest X-ray may be done. This is to make sure there was no damage to your lungs or the area where the needle was placed.  · When its time for you to go home, have an adult family member or friend ready to drive you.  Recovering at home  You may cough up a small amount of blood shortly after the procedure. Later, you may also have some soreness around the biopsy site. Once at home, follow any instructions youre given. Be sure to:  · Take all medicines as directed.  · Care for the biopsy site as instructed.  · Check for signs of infection at the biopsy site (see below).  · Do not bathe or shower until your provider says it's OK. If you wish, you may wash with a sponge or washcloth.  · Avoid heavy lifting and other strenuous activities as directed.  · If you plan any air travel, ask your provider when you can do so. You may be told not to fly for a few weeks. This is because pressure changes may affect your lungs.  When to call your provider  Call 911 or your local emergency number if you have sudden, severe difficulty breathing. This could be a life-threatening emergency.  Call your healthcare provider for less severe  symptoms that are still concerning. If you are unable to speak with your provider, go to the emergency room if you have any of the following symptoms:  · Fever of 100.4°F (38°C) or higher, or as directed by your provider  · Sudden chest pain, shortness of breath, or fainting  · Coughing up increasing amounts of blood  · Signs of infection at the biopsy site, such as increased redness or swelling, warmth, more pain, bleeding, or bad-smelling drainage   Follow-up  The provider who ordered your test will discuss the biopsy results with you during a follow-up visit. Results are usually ready within 1 to 2 weeks. If more tests or treatments are needed, your provider will discuss these with you.  Risks and possible complications  All procedures have some risk. Possible risks of this procedure include:  · An air leak in your lung (pneumothorax). This may require a stay in the hospital and treatment to re-inflate the lung.  · Bleeding into or around the lung  · Infection in the skin or lung  · Injury to other structures in the chest  · Risks of anesthesia. This will be discussed with you before the procedure.   Date Last Reviewed: 6/11/2015  © 9204-1814 [employer]. [address] [place] [address], [place] [address]. All rights reserved. This information is not intended as a substitute for professional medical care. Always follow your healthcare professional's instructions.

## 2017-03-10 NOTE — DISCHARGE SUMMARY
Procedure was performed by the radiologist.  Sterile technique was performed in the posterior left thorax, lidocaine was used as a local anesthetic.  Multiple samples taken from left lung nodule.  Pt tolerated the procedure well with small pneumothorax.  CXRs to follow.  Please see radiologist report for details. F/u with PCP and/or ordering physician.

## 2017-03-10 NOTE — H&P (VIEW-ONLY)
Subjective:       Patient ID: Lucia Barlow is a 67 y.o. female.    Chief Complaint: She       Pulmonary Nodules    HPI       Lung Nodule  She presents for evaluation and treatment of a lung nodule. The patient had an abnormal imaging study but reports experiencing no current or past pulmonary symptoms. The patient denies other associated symptoms. She quit smoking approximately 1 year ago. The patient has no known exposure to tuberculosis and no history of PPD placement. The patient does not have a history of cancer.        Past Medical History   Diagnosis Date    Atherosclerosis of native coronary artery of native heart without angina pectoris 11/18/2016    Atherosclerotic PVD with intermittent claudication 1/17/2014    Ex-smoker     Hyperlipidemia     Hypertension     Osteoporosis 1/16 rosita 1/18    PVD (peripheral vascular disease)     S/P peripheral artery angioplasty 2/7/2014    Tobacco dependence      Past Surgical History   Procedure Laterality Date    Carpal tunnel release  2003     [facility]    Hysterectomy       no cancer    Angioplasty Bilateral 01/24/2014     aortoiliac stenting     Colonoscopy N/A 11/9/2016     Procedure: COLONOSCOPY;  Surgeon: Dmitri Sterling MD;  Location: [facility];  Service: Endoscopy;  Laterality: N/A;    Colectomy  approximate 2005     pt states 1in colon -dx with benign mass removed-states no colon cancer     Social History     Social History    Marital status:      Spouse name: N/A    Number of children: 4    Years of education: N/A     Occupational History          Social History Main Topics    Smoking status: Former Smoker     Packs/day: 1.00     Years: 50.00     Quit date: 1/11/2016    Smokeless tobacco: Never Used    Alcohol use No    Drug use: No    Sexual activity: No     Other Topics Concern    Not on file     Social History Narrative     Review of Systems   Constitutional: Negative for fever and fatigue.   HENT: Negative for  postnasal drip, rhinorrhea, sinus pressure and congestion.    Respiratory: Positive for shortness of breath. Negative for cough, hemoptysis, sputum production, chest tightness, wheezing, orthopnea, asthma nighttime symptoms and use of rescue inhaler.    Cardiovascular: Negative for chest pain and palpitations.   Musculoskeletal: Positive for arthralgias.   Skin: Negative for rash.   Gastrointestinal: Negative for nausea.   Neurological: Negative for dizziness and weakness.   Hematological: Negative for adenopathy.   Psychiatric/Behavioral: Negative for sleep disturbance. The patient is not nervous/anxious.        Objective:      Physical Exam   Constitutional: She is oriented to person, place, and time. She appears well-developed and well-nourished.   HENT:   Head: Normocephalic and atraumatic.   Eyes: Conjunctivae are normal. Pupils are equal, round, and reactive to light.   Neck: Neck supple. No JVD present. No tracheal deviation present. No thyromegaly present.   Cardiovascular: Normal rate, regular rhythm and normal heart sounds.    Pulmonary/Chest: Effort normal and breath sounds normal. No respiratory distress. She has no wheezes. She has no rales. She exhibits no tenderness.   Abdominal: Soft. Bowel sounds are normal.   Musculoskeletal: Normal range of motion. She exhibits edema.   Lymphadenopathy:     She has no cervical adenopathy.   Neurological: She is alert and oriented to person, place, and time.   Skin: Skin is warm and dry.   Nursing note and vitals reviewed.    Personal Diagnostic Review    Radiology Result       Name:  :  Patient MRN:   Lucia Barlow [id number]   Account Number: Room & Bed Accession Number:   [id number]   [id number]   Authorizing Physician: Patient Class: Diagnosis:   [facility] OP- Outpatient Diagnostic Testing Smoker [F17.200 (ICD-10-CM)]   Procedure: Exam Date: Reason for Exam:   CT Chest Lung Screening Low Dose 2017 None Specified           RESULTS:  Technique: CT of the thorax was performed with low dose, lung screening protocol. No contrast was administered. Sagittal and coronal reconstructions were obtained.     Comparison: Study from 01/08/2016.     Findings:     Lungs: There is a new minimally spiculated non-calcified nodule within the left lower lobe measures 9.7 mm. This nodule it appears to be minimally spiculated. This nodule is best seen on series 3 image 67 and series 604 image 112. There is a stable less than 4 mm subpleural density within the left lower lobe. The lungs demonstrate minimal emphysematous changes.     Pleura: No effusion.     Mediastinum: No definite pathologic mediastinal or hilar adenopathy.     Heart and pericardium: Normal size without effusion.     Aorta and vasculature: Atherosclerosis including coronary arteries.     Chest wall and skeletal structures: Unremarkable except age-appropriate degenerative changes.     Upper abdomen: Vascular calcifications noted in the upper abdomen..  IMPRESSION:         1. New 9.7 mm very minimally spiculated noncalcified pulmonary nodule within the left lower lobe. Biopsy is recommended..     2. Subpleural nodule within the left lower lobe measuring less than 4 mm stable when compared to the prior exam.     Other findings as above.        Electronically signed by: CAPO HICKS D.O.       No flowsheet data found.      Assessment:       1. Pulmonary nodule, left        Outpatient Encounter Prescriptions as of 2/15/2017   Medication Sig Dispense Refill    alendronate (FOSAMAX) 70 MG tablet TAKE 1 TAB ONCE WEEKLY IN MORNING WITH FULL GLASS WATER ON EMPTY STOMACH. DO NOT EAT OR LIE DOWN X AT LEAST 30MIN AFTERWARDS 12 tablet 3    ezetimibe (ZETIA) 10 mg tablet TAKE 1 TABLET ONE TIME DAILY SUBSTITUTED FOR EZETIMIBE 90 tablet 3    hydrocodone-acetaminophen 10-325mg (NORCO)  mg Tab       losartan-hydrochlorothiazide 50-12.5 mg (HYZAAR) 50-12.5 mg per tablet Take 1 tablet by mouth  once daily. 90 tablet 3    methocarbamol (ROBAXIN) 750 MG Tab Take 750 mg by mouth.      rosuvastatin (CRESTOR) 20 MG tablet TAKE 1 TABLET ONE TIME DAILY 90 tablet 3    sodium,potassium,mag sulfates (SUPREP BOWEL PREP KIT) 17.5-3.13-1.6 gram SolR Take 1 Units by mouth as directed. 1 Bottle 0    triamcinolone acetonide 0.1% (KENALOG) 0.1 % cream Apply topically 2 (two) times daily. 80 g 0    [DISCONTINUED] aspirin (ECOTRIN) 81 MG EC tablet Take 1 tablet (81 mg total) by mouth once daily. 30 tablet 6    [DISCONTINUED] meloxicam (MOBIC) 7.5 MG tablet Take 1 tablet (7.5 mg total) by mouth daily as needed for Pain. 90 tablet 3     No facility-administered encounter medications on file as of 2/15/2017.      Orders Placed This Encounter   Procedures    CT Biopsy Lung (xpd)     Standing Status:   Future     Standing Expiration Date:   2/15/2018     Scheduling Instructions:      Please call patient to confirm date and time     Order Specific Question:   Reason for Exam:     Answer:   pulmonary nodule     Plan:       Requested Prescriptions      No prescriptions requested or ordered in this encounter     Pulmonary nodule, left  -     CT Biopsy Lung (xpd); Future; Expected date: 2/15/17           Return in about 4 weeks (around 3/15/2017).    Risks, benefits of CT guided  lung biopsy discussed in detail with patient. Possible complications of procedure including but not limited to collapse of lung, bleeding into lung, respiratory failure and side effects discussed in detail. Alternatives to procedure discussed. Questions asked and answered.  Printed information and instructions given to patient.    Medical Decision Making: Moderate to High complexity.  Overall, the multiple problems listed are of moderate to high severity that may impact quality of life and activities of daily living. Side effects of medications, treatment plan as well as options and alternatives reviewed and discussed with patient. Orders for lung  biopsy initiated.There was counseling of patient concerning these issues. Time spent 60 minutes.        MEDICAL DECISION MAKING: Moderate to high complexity.  Overall, the multiple problems listed are of moderate to high severity that may impact quality of life and activities of daily living. Side effects of medications, treatment plan as well as options and alternatives reviewed and discussed with patient. There was counseling of patient concerning these issues.    Total time spent in face to face counseling and coordination of care - 60  minutes over 50% of time was used in discussion of prognosis, risks, benefits of treatment, instructions and compliance with regimen . Discussion with other physicians or health care providers (DME, NP, pharmacy, respiratory therapy) occurred.

## 2017-03-16 ENCOUNTER — OFFICE VISIT (OUTPATIENT)
Dept: PULMONOLOGY | Facility: CLINIC | Age: 67
End: 2017-03-16
Payer: MEDICARE

## 2017-03-16 VITALS
BODY MASS INDEX: 29.66 KG/M2 | HEIGHT: 62 IN | OXYGEN SATURATION: 95 % | WEIGHT: 161.19 LBS | SYSTOLIC BLOOD PRESSURE: 138 MMHG | HEART RATE: 100 BPM | DIASTOLIC BLOOD PRESSURE: 80 MMHG

## 2017-03-16 DIAGNOSIS — J30.89 PERENNIAL ALLERGIC RHINITIS, UNSPECIFIED ALLERGIC RHINITIS TRIGGER: ICD-10-CM

## 2017-03-16 DIAGNOSIS — J44.1 COPD EXACERBATION: Primary | ICD-10-CM

## 2017-03-16 DIAGNOSIS — R91.1 PULMONARY NODULE, LEFT: ICD-10-CM

## 2017-03-16 PROCEDURE — 1126F AMNT PAIN NOTED NONE PRSNT: CPT | Mod: S$GLB,,, | Performed by: INTERNAL MEDICINE

## 2017-03-16 PROCEDURE — 1157F ADVNC CARE PLAN IN RCRD: CPT | Mod: S$GLB,,, | Performed by: INTERNAL MEDICINE

## 2017-03-16 PROCEDURE — 99499 UNLISTED E&M SERVICE: CPT | Mod: S$GLB,,, | Performed by: INTERNAL MEDICINE

## 2017-03-16 PROCEDURE — 3079F DIAST BP 80-89 MM HG: CPT | Mod: S$GLB,,, | Performed by: INTERNAL MEDICINE

## 2017-03-16 PROCEDURE — 99999 PR PBB SHADOW E&M-EST. PATIENT-LVL IV: CPT | Mod: PBBFAC,,, | Performed by: INTERNAL MEDICINE

## 2017-03-16 PROCEDURE — 3075F SYST BP GE 130 - 139MM HG: CPT | Mod: S$GLB,,, | Performed by: INTERNAL MEDICINE

## 2017-03-16 PROCEDURE — 99215 OFFICE O/P EST HI 40 MIN: CPT | Mod: 25,S$GLB,, | Performed by: INTERNAL MEDICINE

## 2017-03-16 PROCEDURE — 1159F MED LIST DOCD IN RCRD: CPT | Mod: S$GLB,,, | Performed by: INTERNAL MEDICINE

## 2017-03-16 PROCEDURE — 1160F RVW MEDS BY RX/DR IN RCRD: CPT | Mod: S$GLB,,, | Performed by: INTERNAL MEDICINE

## 2017-03-16 RX ORDER — ALBUTEROL SULFATE 90 UG/1
2 AEROSOL, METERED RESPIRATORY (INHALATION) EVERY 6 HOURS
Qty: 1 INHALER | Refills: 12 | Status: SHIPPED | OUTPATIENT
Start: 2017-03-16 | End: 2017-11-15 | Stop reason: SDUPTHER

## 2017-03-16 RX ORDER — METHYLPREDNISOLONE 4 MG/1
TABLET ORAL
Qty: 1 PACKAGE | Refills: 1 | Status: SHIPPED | OUTPATIENT
Start: 2017-03-16 | End: 2017-04-06

## 2017-03-16 RX ORDER — AZITHROMYCIN 250 MG/1
250 TABLET, FILM COATED ORAL DAILY
Qty: 6 TABLET | Refills: 1 | Status: SHIPPED | OUTPATIENT
Start: 2017-03-16 | End: 2017-08-02 | Stop reason: ALTCHOICE

## 2017-03-16 RX ORDER — LORATADINE 10 MG/1
10 TABLET ORAL DAILY
COMMUNITY
End: 2018-01-30

## 2017-03-16 RX ORDER — IPRATROPIUM BROMIDE 42 UG/1
2 SPRAY, METERED NASAL 4 TIMES DAILY
Qty: 15 ML | Refills: 11 | Status: SHIPPED | OUTPATIENT
Start: 2017-03-16 | End: 2018-11-07

## 2017-03-16 NOTE — PROGRESS NOTES
Subjective:       Patient ID: Lucia Barlow is a 67 y.o. female.    Chief Complaint: She       Pulmonary Nodules (review CT)    HPI     She   presents for evaluation and treatment of pulmonary nodule after being discharged from the hospital  6  days ago - following needle biopsy. Since discharge symptoms have gradually worsening course since that time. Patient denies fever or chills or chest pain . Symptoms are aggravated by aactivity. Symptoms improve with reast.  Respiratory: positive for cough, dyspnea on exertion and sputum; Cardiovascular: no chest pain or palpitations.  Patient currently is not on home oxygen therapy..      Lung Nodule  She presents for evaluation and treatment of a lung nodule. The patient had an abnormal imaging study but reports experiencing no current or past pulmonary symptoms. The patient denies other associated symptoms. She quit smoking approximately 1 year ago. The patient has no known exposure to tuberculosis and no history of PPD placement. The patient does not have a history of cancer.     COPD  She presents for evaluation and treatment of COPD. The patient is currently having symptoms / an exacerbation. Current symptoms include chronic dyspnea, cough productive of white sputum in small amounts and wheezing. Symptoms have been present since several months ago and have been unchanged. She denies chills and fever. Associated symptoms include fatigue and shortness of breath.  This episode appears to have been triggered by no identifiable factor. Treatments tried for the current exacerbation: none. The patient has been having similar episodes for approximately 1 years. She uses 1 pillows at night. Patient currently is not on home oxygen therapy.. The patient is having no constitutional symptoms, denying fever, chills, anorexia, or weight loss. The patient has not been hospitalized for this condition before. She quit smoking approximately 1 year ago. The patient is experiencing  exercise intolerance (difficulty walking 3 blocks on flat ground and difficulty climbing 1 flights of stairs).    Past Medical History:   Diagnosis Date    Atherosclerosis of native coronary artery of native heart without angina pectoris 11/18/2016    Atherosclerotic PVD with intermittent claudication 1/17/2014    Ex-smoker     Hyperlipidemia     Hypertension     Osteoporosis 1/16 rosita 1/18    PVD (peripheral vascular disease)     S/P peripheral artery angioplasty 2/7/2014    Tobacco dependence      Past Surgical History:   Procedure Laterality Date    ANGIOPLASTY Bilateral 01/24/2014    aortoiliac stenting     CARPAL TUNNEL RELEASE  2003    [facility]    COLECTOMY  approximate 2005    pt states 1in colon -dx with benign mass removed-states no colon cancer    COLONOSCOPY N/A 11/9/2016    Procedure: COLONOSCOPY;  Surgeon: Dmitri Sterling MD;  Location: [facility];  Service: Endoscopy;  Laterality: N/A;    HYSTERECTOMY      no cancer     Social History     Social History    Marital status:      Spouse name: N/A    Number of children: 4    Years of education: N/A     Occupational History          Social History Main Topics    Smoking status: Current Every Day Smoker     Packs/day: 0.25     Years: 50.00     Types: Cigarettes     Last attempt to quit: 1/11/2016    Smokeless tobacco: Never Used    Alcohol use No    Drug use: No    Sexual activity: No     Other Topics Concern    Not on file     Social History Narrative     Review of Systems   Constitutional: Positive for fatigue. Negative for fever.   HENT: Positive for postnasal drip and rhinorrhea. Negative for congestion.    Respiratory: Positive for cough, sputum production, shortness of breath, dyspnea on extertion, use of rescue inhaler and Paroxysmal Nocturnal Dyspnea.    Cardiovascular: Negative for chest pain, palpitations and leg swelling.   Skin: Negative for rash.   Gastrointestinal: Negative for nausea and abdominal pain.    Neurological: Negative for dizziness, syncope, weakness and light-headedness.   Hematological: Negative for adenopathy. Does not bruise/bleed easily.   Psychiatric/Behavioral: Negative for sleep disturbance. The patient is not nervous/anxious.        Objective:      Physical Exam   Constitutional: She is oriented to person, place, and time. She appears well-developed and well-nourished.   HENT:   Head: Normocephalic and atraumatic.   Mouth/Throat: Oropharyngeal exudate present.   Eyes: Conjunctivae are normal. Pupils are equal, round, and reactive to light.   Neck: Neck supple. No JVD present. No tracheal deviation present. No thyromegaly present.   Cardiovascular: Normal rate, regular rhythm and normal heart sounds.    Pulmonary/Chest: Effort normal. No respiratory distress. She has decreased breath sounds. She has wheezes in the right lower field and the left lower field. She has no rhonchi. She has no rales. She exhibits no tenderness.   Abdominal: Soft. Bowel sounds are normal.   Musculoskeletal: Normal range of motion. She exhibits no edema.   Lymphadenopathy:     She has no cervical adenopathy.   Neurological: She is alert and oriented to person, place, and time.   Skin: Skin is warm and dry.   Nursing note and vitals reviewed.    Personal Diagnostic Review  CT of chest performed on 2/2017 without contrast revealed pulmonary nodule.    No flowsheet data found.    Lung Biopsy - No malignancy  Verbal report from Dr. Jean  Chronic inflammation  Lymphocytic inflammation  Written report pending        Assessment:       1. COPD exacerbation    2. Perennial allergic rhinitis, unspecified allergic rhinitis trigger    3. Pulmonary nodule, left        Outpatient Encounter Prescriptions as of 3/16/2017   Medication Sig Dispense Refill    alendronate (FOSAMAX) 70 MG tablet TAKE 1 TAB ONCE WEEKLY IN MORNING WITH FULL GLASS WATER ON EMPTY STOMACH. DO NOT EAT OR LIE DOWN X AT LEAST 30MIN AFTERWARDS 12 tablet 3     ezetimibe (ZETIA) 10 mg tablet TAKE 1 TABLET ONE TIME DAILY SUBSTITUTED FOR EZETIMIBE 90 tablet 3    hydrocodone-acetaminophen 10-325mg (NORCO)  mg Tab       loratadine (CLARITIN) 10 mg tablet Take 10 mg by mouth once daily.      losartan-hydrochlorothiazide 50-12.5 mg (HYZAAR) 50-12.5 mg per tablet Take 1 tablet by mouth once daily. 90 tablet 3    methocarbamol (ROBAXIN) 750 MG Tab Take 750 mg by mouth.      rosuvastatin (CRESTOR) 20 MG tablet TAKE 1 TABLET ONE TIME DAILY 90 tablet 3    triamcinolone acetonide 0.1% (KENALOG) 0.1 % cream Apply topically 2 (two) times daily. 80 g 0    albuterol 90 mcg/actuation inhaler Inhale 2 puffs into the lungs every 6 (six) hours. 1 Inhaler 12    azithromycin (ZITHROMAX Z-TORRES) 250 MG tablet Take 1 tablet (250 mg total) by mouth once daily. 500 mg on day 1 (two tablets) followed by 250 mg once daily on days 2-5 6 tablet 1    ipratropium (ATROVENT) 0.06 % nasal spray 2 sprays by Nasal route 4 (four) times daily. As needed for rhinitis 15 mL 11    methylPREDNISolone (MEDROL DOSEPACK) 4 mg tablet use as directed 1 Package 1    [DISCONTINUED] sodium,potassium,mag sulfates (SUPREP BOWEL PREP KIT) 17.5-3.13-1.6 gram SolR Take 1 Units by mouth as directed. 1 Bottle 0     No facility-administered encounter medications on file as of 3/16/2017.      Orders Placed This Encounter   Procedures    CT Chest Without Contrast     Standing Status:   Future     Standing Expiration Date:   3/16/2018     Order Specific Question:   May the Radiologist modify the order per protocol to meet the clinical needs of the patient?     Answer:   Yes     Plan:       Requested Prescriptions     Signed Prescriptions Disp Refills    ipratropium (ATROVENT) 0.06 % nasal spray 15 mL 11     Si sprays by Nasal route 4 (four) times daily. As needed for rhinitis    azithromycin (ZITHROMAX Z-TORRES) 250 MG tablet 6 tablet 1     Sig: Take 1 tablet (250 mg total) by mouth once daily. 500 mg on day 1  (two tablets) followed by 250 mg once daily on days 2-5    methylPREDNISolone (MEDROL DOSEPACK) 4 mg tablet 1 Package 1     Sig: use as directed    albuterol 90 mcg/actuation inhaler 1 Inhaler 12     Sig: Inhale 2 puffs into the lungs every 6 (six) hours.     COPD exacerbation  -     azithromycin (ZITHROMAX Z-TORRES) 250 MG tablet; Take 1 tablet (250 mg total) by mouth once daily. 500 mg on day 1 (two tablets) followed by 250 mg once daily on days 2-5  Dispense: 6 tablet; Refill: 1  -     methylPREDNISolone (MEDROL DOSEPACK) 4 mg tablet; use as directed  Dispense: 1 Package; Refill: 1  -     albuterol 90 mcg/actuation inhaler; Inhale 2 puffs into the lungs every 6 (six) hours.  Dispense: 1 Inhaler; Refill: 12    Perennial allergic rhinitis, unspecified allergic rhinitis trigger  -     ipratropium (ATROVENT) 0.06 % nasal spray; 2 sprays by Nasal route 4 (four) times daily. As needed for rhinitis  Dispense: 15 mL; Refill: 11    Pulmonary nodule, left  -     CT Chest Without Contrast; Future; Expected date: 3/16/17           Return in about 6 months (around 9/16/2017) for CT scan in 6 months.    MEDICAL DECISION MAKING: Moderate to high complexity.  Overall, the multiple problems listed are of moderate to high severity that may impact quality of life and activities of daily living. Side effects of medications, treatment plan as well as options and alternatives reviewed and discussed with patient. There was counseling of patient concerning these issues.    Total time spent in face to face counseling and coordination of care - 40  minutes over 50% of time was used in discussion of prognosis, risks, benefits of treatment, instructions and compliance with regimen . Discussion with other physicians or health care providers (DME, NP, pharmacy, respiratory therapy) occurred.

## 2017-03-16 NOTE — PATIENT INSTRUCTIONS
Albuterol inhalation aerosol  What is this medicine?  ALBUTEROL (al BYOO ter ole) is a bronchodilator. It helps open up the airways in your lungs to make it easier to breathe. This medicine is used to treat and to prevent bronchospasm.  How should I use this medicine?  This medicine is for inhalation through the mouth. Follow the directions on your prescription label. Take your medicine at regular intervals. Do not use more often than directed. Make sure that you are using your inhaler correctly. Ask you doctor or health care provider if you have any questions.  Talk to your pediatrician regarding the use of this medicine in children. Special care may be needed.  What side effects may I notice from receiving this medicine?  Side effects that you should report to your doctor or health care professional as soon as possible:  · allergic reactions like skin rash, itching or hives, swelling of the face, lips, or tongue  · breathing problems  · chest pain  · feeling faint or lightheaded, falls  · high blood pressure  · irregular heartbeat  · fever  · muscle cramps or weakness  · pain, tingling, numbness in the hands or feet  · vomiting  Side effects that usually do not require medical attention (report to your doctor or health care professional if they continue or are bothersome):  · cough  · difficulty sleeping  · headache  · nervousness or trembling  · stomach upset  · stuffy or runny nose  · throat irritation  · unusual taste  What may interact with this medicine?  · anti-infectives like chloroquine and pentamidine  · caffeine  · cisapride  · diuretics  · medicines for colds  · medicines for depression or for emotional or psychotic conditions  · medicines for weight loss including some herbal products  · methadone  · some antibiotics like clarithromycin, erythromycin, levofloxacin, and linezolid  · some heart medicines  · steroid hormones like dexamethasone, cortisone, hydrocortisone  · theophylline  · thyroid  hormones  What if I miss a dose?  If you miss a dose, use it as soon as you can. If it is almost time for your next dose, use only that dose. Do not use double or extra doses.  Where should I keep my medicine?  Keep out of the reach of children.  Store at room temperature between 15 and 30 degrees C (59 and 86 degrees F). The contents are under pressure and may burst when exposed to heat or flame. Do not freeze. This medicine does not work as well if it is too cold. Throw away any unused medicine after the expiration date. Inhalers need to be thrown away after the labeled number of puffs have been used or by the expiration date; whichever comes first. Ventolin HFA should be thrown away 12 months after removing from foil pouch. Check the instructions that come with your medicine.  What should I tell my health care provider before I take this medicine?  They need to know if you have any of the following conditions:  · diabetes  · heart disease or irregular heartbeat  · high blood pressure  · pheochromocytoma  · seizures  · thyroid disease  · an unusual or allergic reaction to albuterol, levalbuterol, sulfites, other medicines, foods, dyes, or preservatives  · pregnant or trying to get pregnant  · breast-feeding  What should I watch for while using this medicine?  Tell your doctor or health care professional if your symptoms do not improve. Do not use extra albuterol. If your asthma or bronchitis gets worse while you are using this medicine, call your doctor right away.  If your mouth gets dry try chewing sugarless gum or sucking hard candy. Drink water as directed.  Date Last Reviewed:   NOTE:This sheet is a summary. It may not cover all possible information. If you have questions about this medicine, talk to your doctor, pharmacist, or health care provider. Copyright© 2016 [employer]        Using an Inhaler  Your healthcare provider may prescribe medicine that you breathe in using a metered-dose inhaler (MDI). An  "inhaler sends a measured amount of medicine in a fine mist.  Step 1:  · Shake the inhaler and remove the cap.  · Take a deep breath and let it out.  Step 2:  · Close your lips around the end of the inhaler mouthpiece. Or if you were told to use the "open-mouth" method, hold the inhaler 1 to 2 inches from your mouth.  Step 3:  · Breathe in slowly and deeply as you press down on the inhaler to release the medicine.  · Inhale fully.  Step 4:  · Hold your breath for a count of 10, or as long as you can comfortably.  · Then breathe out slowly through your mouth.  · Repeat these steps for each puff of medicine prescribed.             Important  · If the inhaler is being used for the first time or has not been used for a while, prime it as directed by the product maker. You can find important information about the medicine in the package insert. This is the paper that comes with the medicine.  · If you use more than one inhaler, make sure you know which one to use first.  · Your healthcare provider or pharmacist can show you how to use your inhaler the right way. Even if you think you are using it the right way, it is still a good idea to check.   Date Last Reviewed: 10/1/2016  © 6574-6359 [employer] [address] [place] [address], [place] [address]. All rights reserved. This information is not intended as a substitute for professional medical care. Always follow your healthcare professional's instructions.        Azithromycin tablets  What is this medicine?  AZITHROMYCIN (az ith [place] MYE sin) is a macrolide antibiotic. It is used to treat or prevent certain kinds of bacterial infections. It will not work for colds, flu, or other viral infections.  How should I use this medicine?  Take this medicine by mouth with a full glass of water. Follow the directions on the prescription label. The tablets can be taken with food or on an empty stomach. If the medicine upsets your stomach, take it with food. Take your medicine " at regular intervals. Do not take your medicine more often than directed. Take all of your medicine as directed even if you think your are better. Do not skip doses or stop your medicine early. Talk to your pediatrician regarding the use of this medicine in children. Special care may be needed.  What side effects may I notice from receiving this medicine?  Side effects that you should report to your doctor or health care professional as soon as possible:  · allergic reactions like skin rash, itching or hives, swelling of the face, lips, or tongue  · confusion, nightmares or hallucinations  · dark urine  · difficulty breathing  · hearing loss  · irregular heartbeat or chest pain  · pain or difficulty passing urine  · redness, blistering, peeling or loosening of the skin, including inside the mouth  · white patches or sores in the mouth  · yellowing of the eyes or skin  Side effects that usually do not require medical attention (report to your doctor or health care professional if they continue or are bothersome):  · diarrhea  · dizziness, drowsiness  · headache  · stomach upset or vomiting  · tooth discoloration  · vaginal irritation  What may interact with this medicine?  Do not take this medicine with any of the following medications:  · lincomycin  This medicine may also interact with the following medications:  · amiodarone  · antacids  · birth control pills  · cyclosporine  · digoxin  · magnesium  · nelfinavir  · phenytoin  · warfarin  What if I miss a dose?  If you miss a dose, take it as soon as you can. If it is almost time for your next dose, take only that dose. Do not take double or extra doses.  Where should I keep my medicine?  Keep out of the reach of children.  Store at room temperature between 15 and 30 degrees C (59 and 86 degrees F). Throw away any unused medicine after the expiration date.  What should I tell my health care provider before I take this medicine?  They need to know if you have any of  these conditions:  · kidney disease  · liver disease  · irregular heartbeat or heart disease  · an unusual or allergic reaction to azithromycin, erythromycin, other macrolide antibiotics, foods, dyes, or preservatives  · pregnant or trying to get pregnant  · breast-feeding  What should I watch for while using this medicine?  Tell your doctor or health care professional if your symptoms do not improve.  Do not treat diarrhea with over the counter products. Contact your doctor if you have diarrhea that lasts more than 2 days or if it is severe and watery.  This medicine can make you more sensitive to the sun. Keep out of the sun. If you cannot avoid being in the sun, wear protective clothing and use sunscreen. Do not use sun lamps or tanning beds/booths.  Date Last Reviewed:   NOTE:This sheet is a summary. It may not cover all possible information. If you have questions about this medicine, talk to your doctor, pharmacist, or health care provider. Copyright© 2016 [employer]        Methylprednisolone tablets  What is this medicine?  METHYLPREDNISOLONE (meth ill pred NISS oh lone) is a corticosteroid. It is commonly used to treat inflammation of the skin, joints, lungs, and other organs. Common conditions treated include asthma, allergies, and arthritis. It is also used for other conditions, such as blood disorders and diseases of the adrenal glands.  How should I use this medicine?  Take this medicine by mouth with a drink of water. Follow the directions on the prescription label. Take it with food or milk to avoid stomach upset. If you are taking this medicine once a day, take it in the morning. Do not take more medicine than you are told to take. Do not suddenly stop taking your medicine because you may develop a severe reaction. Your doctor will tell you how much medicine to take. If your doctor wants you to stop the medicine, the dose may be slowly lowered over time to avoid any side effects.  Talk to your  pediatrician regarding the use of this medicine in children. Special care may be needed.  What side effects may I notice from receiving this medicine?  Side effects that you should report to your doctor or health care professional as soon as possible:  · allergic reactions like skin rash, itching or hives, swelling of the face, lips, or tongue  · eye pain, decreased or blurred vision, or bulging eyes  · fever, sore throat, sneezing, cough, or other signs of infection, wounds that will not heal  · increased thirst  · mental depression, mood swings, mistaken feelings of self importance or of being mistreated  · pain in hips, back, ribs, arms, shoulders, or legs  · swelling of the ankles, feet, hands  · trouble passing urine or change in the amount of urine  Side effects that usually do not require medical attention (report to your doctor or health care professional if they continue or are bothersome):  · confusion, excitement, restlessness  · headache  · nausea, vomiting  · skin problems, acne, thin and shiny skin  · weight gain  What may interact with this medicine?  Do not take this medicine with any of the following medications:  · mifepristone  This medicine may also interact with the following medications:  · tacrolimus  · vaccines  · warfarin  What if I miss a dose?  If you miss a dose, take it as soon as you can. If it is almost time for your next dose, talk to your doctor or health care professional. You may need to miss a dose or take an extra dose. Do not take double or extra doses without advice.  Where should I keep my medicine?  Keep out of the reach of children.  Store at room temperature between 20 and 25 degrees C (68 and 77 degrees F). Throw away any unused medicine after the expiration date.  What should I tell my health care provider before I take this medicine?  They need to know if you have any of these conditions:  · Cushing's syndrome  · diabetes  · glaucoma  · heart problems or disease  · high  blood pressure  · infection such as herpes, measles, tuberculosis, or chickenpox  · kidney disease  · liver disease  · mental problems  · myasthenia gravis  · osteoporosis  · seizures  · stomach ulcer or intestine disease including colitis and diverticulitis  · thyroid problem  · an unusual or allergic reaction to lactose, methylprednisolone, other medicines, foods, dyes, or preservatives  · pregnant or trying to get pregnant  · breast-feeding  What should I watch for while using this medicine?  Visit your doctor or health care professional for regular checks on your progress. If you are taking this medicine for a long time, carry an identification card with your name and address, the type and dose of your medicine, and your doctor's name and address.  The medicine may increase your risk of getting an infection. Stay away from people who are sick. Tell your doctor or health care professional if you are around anyone with measles or chickenpox.  If you are going to have surgery, tell your doctor or health care professional that you have taken this medicine within the last twelve months.  Ask your doctor or health care professional about your diet. You may need to lower the amount of salt you eat.  The medicine can increase your blood sugar. If you are a diabetic check with your doctor if you need help adjusting the dose of your diabetic medicine.  Date Last Reviewed:   NOTE:This sheet is a summary. It may not cover all possible information. If you have questions about this medicine, talk to your doctor, pharmacist, or health care provider. Copyright© 2016 [employer]

## 2017-03-16 NOTE — MR AVS SNAPSHOT
[facility]  [address] [facility] [address]  Phone: [phone]  Fax: [phone]                  Lucia Barlow   3/16/2017 2:20 PM   Office Visit    Description:  Female : 1950   Provider:  Ramiro Aleman MD   Department:  [facility]           Reason for Visit     Pulmonary Nodules           Diagnoses this Visit        Comments    COPD exacerbation    -  Primary     Perennial allergic rhinitis, unspecified allergic rhinitis trigger         Pulmonary nodule, left                To Do List           Future Appointments        Provider Department Dept Phone    3/16/2017 2:20 PM Ramiro Aleman MD [facility] [phone]    2017 1:30 PM [facility] CT1 LIMIT [address] [facility] [phone]    2017 2:00 PM Ramiro Aleman MD [facility] [phone]    2018 10:40 AM Rex Tejeda MD [facility] - Internal Medicine [phone]      Goals (5 Years of Data)     None      Follow-Up and Disposition     Return in about 6 months (around 2017) for CT scan in 6 months.       These Medications        Disp Refills Start End    ipratropium (ATROVENT) 0.06 % nasal spray 15 mL 11 3/16/2017     2 sprays by Nasal route 4 (four) times daily. As needed for rhinitis - Nasal    Pharmacy: [facility] [address] [place] [address] [address] [place] [address] Ph #: [phone]       azithromycin (ZITHROMAX Z-TORRES) 250 MG tablet 6 tablet 1 3/16/2017     Take 1 tablet (250 mg total) by mouth once daily. 500 mg on day 1 (two tablets) followed by 250 mg once daily on days 2-5 - Oral    Pharmacy: [address] [facility] Ph #: [phone]       methylPREDNISolone (MEDROL DOSEPACK) 4 mg tablet 1 Package 1 3/16/2017 2017    use as directed    Pharmacy: [address] [facility] Ph #: [phone]       albuterol 90 mcg/actuation inhaler 1  Inhaler 12 3/16/2017 3/16/2018    Inhale 2 puffs into the lungs every 6 (six) hours. - Inhalation    Pharmacy: [address] #: [phone]         [facility] On Call     [facility] On Call Nurse Care Line -  Assistance  Registered nurses in the [facility] provide clinical advisement, health education, appointment booking, and other advisory services.  Call for this free service at [phone].             Medications           Message regarding Medications     Verify the changes and/or additions to your medication regime listed below are the same as discussed with your clinician today.  If any of these changes or additions are incorrect, please notify your healthcare provider.        START taking these NEW medications        Refills    ipratropium (ATROVENT) 0.06 % nasal spray 11    Si sprays by Nasal route 4 (four) times daily. As needed for rhinitis    Class: Normal    Route: Nasal    azithromycin (ZITHROMAX Z-TORRES) 250 MG tablet 1    Sig: Take 1 tablet (250 mg total) by mouth once daily. 500 mg on day 1 (two tablets) followed by 250 mg once daily on days 2-5    Class: Normal    Route: Oral    methylPREDNISolone (MEDROL DOSEPACK) 4 mg tablet 1    Sig: use as directed    Class: Normal    albuterol 90 mcg/actuation inhaler 12    Sig: Inhale 2 puffs into the lungs every 6 (six) hours.    Class: Normal    Route: Inhalation      STOP taking these medications     sodium,potassium,mag sulfates (SUPREP BOWEL PREP KIT) 17.5-3.13-1.6 gram SolR Take 1 Units by mouth as directed.           Verify that the below list of medications is an accurate representation of the medications you are currently taking.  If none reported, the list may be blank. If incorrect, please contact your healthcare provider. Carry this list with you in case of emergency.           Current Medications     alendronate (FOSAMAX) 70 MG tablet TAKE 1 TAB ONCE WEEKLY IN MORNING WITH FULL  "GLASS WATER ON EMPTY STOMACH. DO NOT EAT OR LIE DOWN X AT LEAST 30MIN AFTERWARDS    ezetimibe (ZETIA) 10 mg tablet TAKE 1 TABLET ONE TIME DAILY SUBSTITUTED FOR EZETIMIBE    hydrocodone-acetaminophen 10-325mg (NORCO)  mg Tab     loratadine (CLARITIN) 10 mg tablet Take 10 mg by mouth once daily.    losartan-hydrochlorothiazide 50-12.5 mg (HYZAAR) 50-12.5 mg per tablet Take 1 tablet by mouth once daily.    methocarbamol (ROBAXIN) 750 MG Tab Take 750 mg by mouth.    rosuvastatin (CRESTOR) 20 MG tablet TAKE 1 TABLET ONE TIME DAILY    triamcinolone acetonide 0.1% (KENALOG) 0.1 % cream Apply topically 2 (two) times daily.    albuterol 90 mcg/actuation inhaler Inhale 2 puffs into the lungs every 6 (six) hours.    azithromycin (ZITHROMAX Z-TORRES) 250 MG tablet Take 1 tablet (250 mg total) by mouth once daily. 500 mg on day 1 (two tablets) followed by 250 mg once daily on days 2-5    ipratropium (ATROVENT) 0.06 % nasal spray 2 sprays by Nasal route 4 (four) times daily. As needed for rhinitis    methylPREDNISolone (MEDROL DOSEPACK) 4 mg tablet use as directed           Clinical Reference Information           Your Vitals Were     BP Pulse Height Weight SpO2 BMI    138/80 100 5' 2" (1.575 m) 73.1 kg (161 lb 2.5 oz) 95% 29.48 kg/m2      Blood Pressure          Most Recent Value    BP  138/80      Allergies as of 3/16/2017     Skin Staples [Surgical Stainless Steel]    Egg Derived    Iodine And Iodide Containing Products    Latex, Natural Rubber    Nickel    Pravastatin    Shellfish Containing Products      Immunizations Administered on Date of Encounter - 3/16/2017     None      Orders Placed During Today's Visit     Future Labs/Procedures Expected by Expires    CT Chest Without Contrast  3/16/2017 3/16/2018      Instructions      Albuterol inhalation aerosol  What is this medicine?  ALBUTEROL (al BYOO ter ole) is a bronchodilator. It helps open up the airways in your lungs to make it easier to breathe. This medicine is " used to treat and to prevent bronchospasm.  How should I use this medicine?  This medicine is for inhalation through the mouth. Follow the directions on your prescription label. Take your medicine at regular intervals. Do not use more often than directed. Make sure that you are using your inhaler correctly. Ask you doctor or health care provider if you have any questions.  Talk to your pediatrician regarding the use of this medicine in children. Special care may be needed.  What side effects may I notice from receiving this medicine?  Side effects that you should report to your doctor or health care professional as soon as possible:  · allergic reactions like skin rash, itching or hives, swelling of the face, lips, or tongue  · breathing problems  · chest pain  · feeling faint or lightheaded, falls  · high blood pressure  · irregular heartbeat  · fever  · muscle cramps or weakness  · pain, tingling, numbness in the hands or feet  · vomiting  Side effects that usually do not require medical attention (report to your doctor or health care professional if they continue or are bothersome):  · cough  · difficulty sleeping  · headache  · nervousness or trembling  · stomach upset  · stuffy or runny nose  · throat irritation  · unusual taste  What may interact with this medicine?  · anti-infectives like chloroquine and pentamidine  · caffeine  · cisapride  · diuretics  · medicines for colds  · medicines for depression or for emotional or psychotic conditions  · medicines for weight loss including some herbal products  · methadone  · some antibiotics like clarithromycin, erythromycin, levofloxacin, and linezolid  · some heart medicines  · steroid hormones like dexamethasone, cortisone, hydrocortisone  · theophylline  · thyroid hormones  What if I miss a dose?  If you miss a dose, use it as soon as you can. If it is almost time for your next dose, use only that dose. Do not use double or extra doses.  Where should I keep my  medicine?  Keep out of the reach of children.  Store at room temperature between 15 and 30 degrees C (59 and 86 degrees F). The contents are under pressure and may burst when exposed to heat or flame. Do not freeze. This medicine does not work as well if it is too cold. Throw away any unused medicine after the expiration date. Inhalers need to be thrown away after the labeled number of puffs have been used or by the expiration date; whichever comes first. Ventolin HFA should be thrown away 12 months after removing from foil pouch. Check the instructions that come with your medicine.  What should I tell my health care provider before I take this medicine?  They need to know if you have any of the following conditions:  · diabetes  · heart disease or irregular heartbeat  · high blood pressure  · pheochromocytoma  · seizures  · thyroid disease  · an unusual or allergic reaction to albuterol, levalbuterol, sulfites, other medicines, foods, dyes, or preservatives  · pregnant or trying to get pregnant  · breast-feeding  What should I watch for while using this medicine?  Tell your doctor or health care professional if your symptoms do not improve. Do not use extra albuterol. If your asthma or bronchitis gets worse while you are using this medicine, call your doctor right away.  If your mouth gets dry try chewing sugarless gum or sucking hard candy. Drink water as directed.  Date Last Reviewed:   NOTE:This sheet is a summary. It may not cover all possible information. If you have questions about this medicine, talk to your doctor, pharmacist, or health care provider. Copyright© 2016 [employer]        Using an Inhaler  Your healthcare provider may prescribe medicine that you breathe in using a metered-dose inhaler (MDI). An inhaler sends a measured amount of medicine in a fine mist.  Step 1:  · Shake the inhaler and remove the cap.  · Take a deep breath and let it out.  Step 2:  · Close your lips around the end of the  "inhaler mouthpiece. Or if you were told to use the "open-mouth" method, hold the inhaler 1 to 2 inches from your mouth.  Step 3:  · Breathe in slowly and deeply as you press down on the inhaler to release the medicine.  · Inhale fully.  Step 4:  · Hold your breath for a count of 10, or as long as you can comfortably.  · Then breathe out slowly through your mouth.  · Repeat these steps for each puff of medicine prescribed.             Important  · If the inhaler is being used for the first time or has not been used for a while, prime it as directed by the product maker. You can find important information about the medicine in the package insert. This is the paper that comes with the medicine.  · If you use more than one inhaler, make sure you know which one to use first.  · Your healthcare provider or pharmacist can show you how to use your inhaler the right way. Even if you think you are using it the right way, it is still a good idea to check.   Date Last Reviewed: 10/1/2016  © 2451-9256 [employer]. [address] [place] [address]. All rights reserved. This information is not intended as a substitute for professional medical care. Always follow your healthcare professional's instructions.        Azithromycin tablets  What is this medicine?  AZITHROMYCIN (az ith [place] MYE sin) is a macrolide antibiotic. It is used to treat or prevent certain kinds of bacterial infections. It will not work for colds, flu, or other viral infections.  How should I use this medicine?  Take this medicine by mouth with a full glass of water. Follow the directions on the prescription label. The tablets can be taken with food or on an empty stomach. If the medicine upsets your stomach, take it with food. Take your medicine at regular intervals. Do not take your medicine more often than directed. Take all of your medicine as directed even if you think your are better. Do not skip doses or stop your medicine early. Talk " to your pediatrician regarding the use of this medicine in children. Special care may be needed.  What side effects may I notice from receiving this medicine?  Side effects that you should report to your doctor or health care professional as soon as possible:  · allergic reactions like skin rash, itching or hives, swelling of the face, lips, or tongue  · confusion, nightmares or hallucinations  · dark urine  · difficulty breathing  · hearing loss  · irregular heartbeat or chest pain  · pain or difficulty passing urine  · redness, blistering, peeling or loosening of the skin, including inside the mouth  · white patches or sores in the mouth  · yellowing of the eyes or skin  Side effects that usually do not require medical attention (report to your doctor or health care professional if they continue or are bothersome):  · diarrhea  · dizziness, drowsiness  · headache  · stomach upset or vomiting  · tooth discoloration  · vaginal irritation  What may interact with this medicine?  Do not take this medicine with any of the following medications:  · lincomycin  This medicine may also interact with the following medications:  · amiodarone  · antacids  · birth control pills  · cyclosporine  · digoxin  · magnesium  · nelfinavir  · phenytoin  · warfarin  What if I miss a dose?  If you miss a dose, take it as soon as you can. If it is almost time for your next dose, take only that dose. Do not take double or extra doses.  Where should I keep my medicine?  Keep out of the reach of children.  Store at room temperature between 15 and 30 degrees C (59 and 86 degrees F). Throw away any unused medicine after the expiration date.  What should I tell my health care provider before I take this medicine?  They need to know if you have any of these conditions:  · kidney disease  · liver disease  · irregular heartbeat or heart disease  · an unusual or allergic reaction to azithromycin, erythromycin, other macrolide antibiotics, foods,  dyes, or preservatives  · pregnant or trying to get pregnant  · breast-feeding  What should I watch for while using this medicine?  Tell your doctor or health care professional if your symptoms do not improve.  Do not treat diarrhea with over the counter products. Contact your doctor if you have diarrhea that lasts more than 2 days or if it is severe and watery.  This medicine can make you more sensitive to the sun. Keep out of the sun. If you cannot avoid being in the sun, wear protective clothing and use sunscreen. Do not use sun lamps or tanning beds/booths.  Date Last Reviewed:   NOTE:This sheet is a summary. It may not cover all possible information. If you have questions about this medicine, talk to your doctor, pharmacist, or health care provider. Copyright© 2016 [employer]        Methylprednisolone tablets  What is this medicine?  METHYLPREDNISOLONE (meth ill pred NISS oh lone) is a corticosteroid. It is commonly used to treat inflammation of the skin, joints, lungs, and other organs. Common conditions treated include asthma, allergies, and arthritis. It is also used for other conditions, such as blood disorders and diseases of the adrenal glands.  How should I use this medicine?  Take this medicine by mouth with a drink of water. Follow the directions on the prescription label. Take it with food or milk to avoid stomach upset. If you are taking this medicine once a day, take it in the morning. Do not take more medicine than you are told to take. Do not suddenly stop taking your medicine because you may develop a severe reaction. Your doctor will tell you how much medicine to take. If your doctor wants you to stop the medicine, the dose may be slowly lowered over time to avoid any side effects.  Talk to your pediatrician regarding the use of this medicine in children. Special care may be needed.  What side effects may I notice from receiving this medicine?  Side effects that you should report to your  doctor or health care professional as soon as possible:  · allergic reactions like skin rash, itching or hives, swelling of the face, lips, or tongue  · eye pain, decreased or blurred vision, or bulging eyes  · fever, sore throat, sneezing, cough, or other signs of infection, wounds that will not heal  · increased thirst  · mental depression, mood swings, mistaken feelings of self importance or of being mistreated  · pain in hips, back, ribs, arms, shoulders, or legs  · swelling of the ankles, feet, hands  · trouble passing urine or change in the amount of urine  Side effects that usually do not require medical attention (report to your doctor or health care professional if they continue or are bothersome):  · confusion, excitement, restlessness  · headache  · nausea, vomiting  · skin problems, acne, thin and shiny skin  · weight gain  What may interact with this medicine?  Do not take this medicine with any of the following medications:  · mifepristone  This medicine may also interact with the following medications:  · tacrolimus  · vaccines  · warfarin  What if I miss a dose?  If you miss a dose, take it as soon as you can. If it is almost time for your next dose, talk to your doctor or health care professional. You may need to miss a dose or take an extra dose. Do not take double or extra doses without advice.  Where should I keep my medicine?  Keep out of the reach of children.  Store at room temperature between 20 and 25 degrees C (68 and 77 degrees F). Throw away any unused medicine after the expiration date.  What should I tell my health care provider before I take this medicine?  They need to know if you have any of these conditions:  · Cushing's syndrome  · diabetes  · glaucoma  · heart problems or disease  · high blood pressure  · infection such as herpes, measles, tuberculosis, or chickenpox  · kidney disease  · liver disease  · mental problems  · myasthenia gravis  · osteoporosis  · seizures  · stomach  ulcer or intestine disease including colitis and diverticulitis  · thyroid problem  · an unusual or allergic reaction to lactose, methylprednisolone, other medicines, foods, dyes, or preservatives  · pregnant or trying to get pregnant  · breast-feeding  What should I watch for while using this medicine?  Visit your doctor or health care professional for regular checks on your progress. If you are taking this medicine for a long time, carry an identification card with your name and address, the type and dose of your medicine, and your doctor's name and address.  The medicine may increase your risk of getting an infection. Stay away from people who are sick. Tell your doctor or health care professional if you are around anyone with measles or chickenpox.  If you are going to have surgery, tell your doctor or health care professional that you have taken this medicine within the last twelve months.  Ask your doctor or health care professional about your diet. You may need to lower the amount of salt you eat.  The medicine can increase your blood sugar. If you are a diabetic check with your doctor if you need help adjusting the dose of your diabetic medicine.  Date Last Reviewed:   NOTE:This sheet is a summary. It may not cover all possible information. If you have questions about this medicine, talk to your doctor, pharmacist, or health care provider. Copyright© 2016 [employer]             Smoking Cessation     If you would like to quit smoking:   You may be eligible for free services if you are a [place] resident and started smoking cigarettes before September 1, 1988.  Call the Smoking Cessation [employer] (SCT) toll free at (254) 300-4108 or (213) 340-1330.   Call 8-800-QUIT-NOW if you do not meet the above criteria.            Language Assistance Services     ATTENTION: Language assistance services are available, free of charge. Please call [phone].      ATENCIÓN: mellissa Landis rowland disposición  servicios gratuitos de asistencia lingüística. Nam turner [phone].     [facility] Ý: N?u b?n nói Ti?ng Vi?t, có các d?ch v? h? tr? ngôn ng? mi?n phí dành cho b?n. G?i s? [phone].         [facility] complies with applicable Federal civil rights laws and does not discriminate on the basis of race, color, national origin, age, disability, or sex.

## 2017-06-12 ENCOUNTER — TELEPHONE (OUTPATIENT)
Dept: SMOKING CESSATION | Facility: CLINIC | Age: 67
End: 2017-06-12

## 2017-06-13 ENCOUNTER — TELEPHONE (OUTPATIENT)
Dept: SMOKING CESSATION | Facility: CLINIC | Age: 67
End: 2017-06-13

## 2017-06-14 ENCOUNTER — TELEPHONE (OUTPATIENT)
Dept: SMOKING CESSATION | Facility: CLINIC | Age: 67
End: 2017-06-14

## 2017-07-26 ENCOUNTER — LAB VISIT (OUTPATIENT)
Dept: LAB | Facility: HOSPITAL | Age: 67
End: 2017-07-26
Attending: INTERNAL MEDICINE
Payer: MEDICARE

## 2017-07-26 DIAGNOSIS — I10 ESSENTIAL HYPERTENSION: Chronic | ICD-10-CM

## 2017-07-26 DIAGNOSIS — I70.219 ATHEROSCLEROTIC PVD WITH INTERMITTENT CLAUDICATION: ICD-10-CM

## 2017-07-26 DIAGNOSIS — E78.00 HYPERCHOLESTEREMIA: Chronic | ICD-10-CM

## 2017-07-26 LAB
ALBUMIN SERPL BCP-MCNC: 3.3 G/DL
ALP SERPL-CCNC: 96 U/L
ALT SERPL W/O P-5'-P-CCNC: 12 U/L
ANION GAP SERPL CALC-SCNC: 10 MMOL/L
AST SERPL-CCNC: 16 U/L
BILIRUB SERPL-MCNC: 0.3 MG/DL
BUN SERPL-MCNC: 21 MG/DL
CALCIUM SERPL-MCNC: 9.3 MG/DL
CHLORIDE SERPL-SCNC: 101 MMOL/L
CHOLEST/HDLC SERPL: 2.6 {RATIO}
CO2 SERPL-SCNC: 28 MMOL/L
CREAT SERPL-MCNC: 1.1 MG/DL
EST. GFR  (AFRICAN AMERICAN): 59.2 ML/MIN/1.73 M^2
EST. GFR  (NON AFRICAN AMERICAN): 51.3 ML/MIN/1.73 M^2
GLUCOSE SERPL-MCNC: 103 MG/DL
HDL/CHOLESTEROL RATIO: 38.8 %
HDLC SERPL-MCNC: 152 MG/DL
HDLC SERPL-MCNC: 59 MG/DL
LDLC SERPL CALC-MCNC: 77.8 MG/DL
NONHDLC SERPL-MCNC: 93 MG/DL
POTASSIUM SERPL-SCNC: 4 MMOL/L
PROT SERPL-MCNC: 7.1 G/DL
SODIUM SERPL-SCNC: 139 MMOL/L
TRIGL SERPL-MCNC: 76 MG/DL

## 2017-07-26 PROCEDURE — 80053 COMPREHEN METABOLIC PANEL: CPT

## 2017-07-26 PROCEDURE — 36415 COLL VENOUS BLD VENIPUNCTURE: CPT | Mod: PO

## 2017-07-26 PROCEDURE — 80061 LIPID PANEL: CPT

## 2017-08-02 ENCOUNTER — CLINICAL SUPPORT (OUTPATIENT)
Dept: CARDIOLOGY | Facility: CLINIC | Age: 67
End: 2017-08-02
Payer: MEDICARE

## 2017-08-02 ENCOUNTER — OFFICE VISIT (OUTPATIENT)
Dept: CARDIOLOGY | Facility: CLINIC | Age: 67
End: 2017-08-02
Payer: MEDICARE

## 2017-08-02 VITALS
HEART RATE: 91 BPM | SYSTOLIC BLOOD PRESSURE: 150 MMHG | HEIGHT: 62 IN | DIASTOLIC BLOOD PRESSURE: 64 MMHG | BODY MASS INDEX: 28.71 KG/M2 | WEIGHT: 156 LBS

## 2017-08-02 DIAGNOSIS — Z87.891 HISTORY OF TOBACCO ABUSE: ICD-10-CM

## 2017-08-02 DIAGNOSIS — E78.00 HYPERCHOLESTEREMIA: Chronic | ICD-10-CM

## 2017-08-02 DIAGNOSIS — I25.10 ATHEROSCLEROSIS OF NATIVE CORONARY ARTERY OF NATIVE HEART WITHOUT ANGINA PECTORIS: ICD-10-CM

## 2017-08-02 DIAGNOSIS — I70.0 ATHEROSCLEROSIS OF AORTA: ICD-10-CM

## 2017-08-02 DIAGNOSIS — I10 ESSENTIAL HYPERTENSION: Primary | Chronic | ICD-10-CM

## 2017-08-02 DIAGNOSIS — I10 ESSENTIAL HYPERTENSION: Chronic | ICD-10-CM

## 2017-08-02 DIAGNOSIS — I70.219 ATHEROSCLEROTIC PVD WITH INTERMITTENT CLAUDICATION: ICD-10-CM

## 2017-08-02 DIAGNOSIS — Z98.62 S/P PERIPHERAL ARTERY ANGIOPLASTY: ICD-10-CM

## 2017-08-02 DIAGNOSIS — I35.0 NONRHEUMATIC AORTIC VALVE STENOSIS: ICD-10-CM

## 2017-08-02 PROCEDURE — 93000 ELECTROCARDIOGRAM COMPLETE: CPT | Mod: S$GLB,,, | Performed by: INTERNAL MEDICINE

## 2017-08-02 PROCEDURE — 99499 UNLISTED E&M SERVICE: CPT | Mod: S$GLB,,, | Performed by: NURSE PRACTITIONER

## 2017-08-02 PROCEDURE — 99214 OFFICE O/P EST MOD 30 MIN: CPT | Mod: S$GLB,,, | Performed by: NURSE PRACTITIONER

## 2017-08-02 PROCEDURE — 99999 PR PBB SHADOW E&M-EST. PATIENT-LVL III: CPT | Mod: PBBFAC,,, | Performed by: NURSE PRACTITIONER

## 2017-08-02 PROCEDURE — 1159F MED LIST DOCD IN RCRD: CPT | Mod: S$GLB,,, | Performed by: NURSE PRACTITIONER

## 2017-08-02 RX ORDER — EZETIMIBE 10 MG/1
TABLET ORAL
Qty: 90 TABLET | Refills: 4 | Status: SHIPPED | OUTPATIENT
Start: 2017-08-02 | End: 2018-08-04 | Stop reason: SDUPTHER

## 2017-08-02 RX ORDER — ROSUVASTATIN CALCIUM 20 MG/1
TABLET, COATED ORAL
Qty: 90 TABLET | Refills: 4 | Status: SHIPPED | OUTPATIENT
Start: 2017-08-02 | End: 2018-08-04 | Stop reason: SDUPTHER

## 2017-08-02 RX ORDER — LOSARTAN POTASSIUM AND HYDROCHLOROTHIAZIDE 12.5; 5 MG/1; MG/1
1 TABLET ORAL DAILY
Qty: 90 TABLET | Refills: 4 | Status: SHIPPED | OUTPATIENT
Start: 2017-08-02 | End: 2018-02-20

## 2017-08-02 NOTE — PROGRESS NOTES
Subjective:   Patient ID:  Lucia Barlow is a 67 y.o. female who presents for follow up of Peripheral Vascular Disease (Patient presents to clinic today for 6 month follow up.) and Hypertension      HPI  Patient presents to clinic today for follow up and management of HTN, HLP and PVD. Patient states that she has been doing well since last visit. Patient is  s/p aortoiliac stenting. She has no claudication symptoms. Has no chest pain or angina symptoms. She has no symptoms to suggest CHF . NO CNS symptoms to suggest CVA or TIA. Her only complaint today is allergy/sinus issues. Is a former smoker. Stopped over 1.5 years ago. Has good activity tolerance. Works in her yard cutting grass and in her garden and feels great. Her BP is up today in clinic, but hasn't had her meds today. CMP and lipids look good.       Past Medical History:   Diagnosis Date    Atherosclerosis of native coronary artery of native heart without angina pectoris 11/18/2016    Atherosclerotic PVD with intermittent claudication 1/17/2014    Ex-smoker     Hyperlipidemia     Hypertension     Osteoporosis 1/16 rosita 1/18    PVD (peripheral vascular disease)     S/P peripheral artery angioplasty 2/7/2014    Tobacco dependence        Past Surgical History:   Procedure Laterality Date    ANGIOPLASTY Bilateral 01/24/2014    aortoiliac stenting     CARPAL TUNNEL RELEASE  2003    [facility]    COLECTOMY  approximate 2005    pt states 1in colon -dx with benign mass removed-states no colon cancer    COLONOSCOPY N/A 11/9/2016    Procedure: COLONOSCOPY;  Surgeon: Dmitri Sterling MD;  Location: [facility];  Service: Endoscopy;  Laterality: N/A;    HYSTERECTOMY      no cancer       Social History   Substance Use Topics    Smoking status: Current Every Day Smoker     Packs/day: 0.25     Years: 50.00     Types: Cigarettes     Last attempt to quit: 1/11/2016    Smokeless tobacco: Never Used    Alcohol use No       Family History   Problem Relation Age  of Onset    Stroke Father     Stroke Sister     Asthma Daughter     Diabetes Daughter     Asthma Son        Current Outpatient Prescriptions   Medication Sig    albuterol 90 mcg/actuation inhaler Inhale 2 puffs into the lungs every 6 (six) hours.    alendronate (FOSAMAX) 70 MG tablet TAKE 1 TAB ONCE WEEKLY IN MORNING WITH FULL GLASS WATER ON EMPTY STOMACH. DO NOT EAT OR LIE DOWN X AT LEAST 30MIN AFTERWARDS    ezetimibe (ZETIA) 10 mg tablet TAKE 1 TABLET ONE TIME DAILY SUBSTITUTED FOR EZETIMIBE    hydrocodone-acetaminophen 10-325mg (NORCO)  mg Tab     ipratropium (ATROVENT) 0.06 % nasal spray 2 sprays by Nasal route 4 (four) times daily. As needed for rhinitis    loratadine (CLARITIN) 10 mg tablet Take 10 mg by mouth once daily.    losartan-hydrochlorothiazide 50-12.5 mg (HYZAAR) 50-12.5 mg per tablet Take 1 tablet by mouth once daily.    methocarbamol (ROBAXIN) 750 MG Tab Take 750 mg by mouth.    rosuvastatin (CRESTOR) 20 MG tablet TAKE 1 TABLET ONE TIME DAILY    triamcinolone acetonide 0.1% (KENALOG) 0.1 % cream Apply topically 2 (two) times daily.     No current facility-administered medications for this visit.      Current Outpatient Prescriptions on File Prior to Visit   Medication Sig    albuterol 90 mcg/actuation inhaler Inhale 2 puffs into the lungs every 6 (six) hours.    alendronate (FOSAMAX) 70 MG tablet TAKE 1 TAB ONCE WEEKLY IN MORNING WITH FULL GLASS WATER ON EMPTY STOMACH. DO NOT EAT OR LIE DOWN X AT LEAST 30MIN AFTERWARDS    ezetimibe (ZETIA) 10 mg tablet TAKE 1 TABLET ONE TIME DAILY SUBSTITUTED FOR EZETIMIBE    hydrocodone-acetaminophen 10-325mg (NORCO)  mg Tab     ipratropium (ATROVENT) 0.06 % nasal spray 2 sprays by Nasal route 4 (four) times daily. As needed for rhinitis    loratadine (CLARITIN) 10 mg tablet Take 10 mg by mouth once daily.    losartan-hydrochlorothiazide 50-12.5 mg (HYZAAR) 50-12.5 mg per tablet Take 1 tablet by mouth once daily.    methocarbamol  (ROBAXIN) 750 MG Tab Take 750 mg by mouth.    rosuvastatin (CRESTOR) 20 MG tablet TAKE 1 TABLET ONE TIME DAILY    triamcinolone acetonide 0.1% (KENALOG) 0.1 % cream Apply topically 2 (two) times daily.    [DISCONTINUED] azithromycin (ZITHROMAX Z-TORRES) 250 MG tablet Take 1 tablet (250 mg total) by mouth once daily. 500 mg on day 1 (two tablets) followed by 250 mg once daily on days 2-5     No current facility-administered medications on file prior to visit.        Review of Systems   Constitution: Negative for decreased appetite, weakness, malaise/fatigue, weight gain and weight loss.   HENT: Negative for nosebleeds.    Cardiovascular: Positive for leg swelling. Negative for chest pain, claudication, dyspnea on exertion, irregular heartbeat, near-syncope, orthopnea, palpitations, paroxysmal nocturnal dyspnea and syncope.   Respiratory: Negative for cough, shortness of breath, sleep disturbances due to breathing, snoring and wheezing.    Hematologic/Lymphatic: Negative for bleeding problem. Does not bruise/bleed easily.   Skin: Negative for rash.   Musculoskeletal: Negative for arthritis, back pain, falls, joint pain, joint swelling, muscle cramps, muscle weakness and myalgias.   Gastrointestinal: Negative for bloating, abdominal pain, constipation, diarrhea, heartburn, nausea and vomiting.   Genitourinary: Negative for dysuria, hematuria and nocturia.   Neurological: Negative for excessive daytime sleepiness, dizziness, light-headedness, loss of balance, numbness, paresthesias and vertigo.       Objective:   Physical Exam   Constitutional: She is oriented to person, place, and time. She appears well-developed and well-nourished. No distress.   HENT:   Head: Normocephalic and atraumatic.   Eyes: Pupils are equal, round, and reactive to light. Right eye exhibits no discharge. Left eye exhibits no discharge.   Neck: Neck supple. No JVD present. No tracheal deviation present. No thyromegaly present.   Cardiovascular:  "Normal rate, regular rhythm, S1 normal, S2 normal, normal heart sounds and intact distal pulses.  PMI is not displaced.  Exam reveals no gallop, no S3, no S4 and no friction rub.    No murmur heard.  Pulses:       Radial pulses are 2+ on the right side, and 2+ on the left side.        Femoral pulses are 3+ on the right side with bruit, and 3+ on the left side with bruit.       Popliteal pulses are 3+ on the right side, and 3+ on the left side.        Dorsalis pedis pulses are 3+ on the right side, and 3+ on the left side.        Posterior tibial pulses are 3+ on the right side, and 3+ on the left side.   Pulmonary/Chest: Effort normal and breath sounds normal. No respiratory distress. She has no wheezes. She has no rales.   Abdominal: Soft. She exhibits no distension. There is no tenderness. There is no rebound.   Musculoskeletal: She exhibits edema (1+ BLE +).   Neurological: She is alert and oriented to person, place, and time.   Skin: Skin is warm and dry. She is not diaphoretic. No erythema.   Psychiatric: She has a normal mood and affect. Her behavior is normal. Thought content normal.   Nursing note and vitals reviewed.    Vitals:    08/02/17 0930   BP: (!) 150/64   Pulse: 91   Weight: 70.8 kg (156 lb)   Height: 5' 2" (1.575 m)     Lab Results   Component Value Date    CHOL 152 07/26/2017    CHOL 238 (H) 11/18/2016    CHOL 168 06/08/2016     Lab Results   Component Value Date    HDL 59 07/26/2017    HDL 56 11/18/2016    HDL 60 06/08/2016     Lab Results   Component Value Date    LDLCALC 77.8 07/26/2017    LDLCALC 157.2 11/18/2016    LDLCALC 91.2 06/08/2016     Lab Results   Component Value Date    TRIG 76 07/26/2017    TRIG 124 11/18/2016    TRIG 84 06/08/2016     Lab Results   Component Value Date    CHOLHDL 38.8 07/26/2017    CHOLHDL 23.5 11/18/2016    CHOLHDL 35.7 06/08/2016       Chemistry        Component Value Date/Time     07/26/2017 0742    K 4.0 07/26/2017 0742     07/26/2017 0742    CO2 " 28 07/26/2017 0742    BUN 21 07/26/2017 0742    CREATININE 1.1 07/26/2017 0742     07/26/2017 0742        Component Value Date/Time    CALCIUM 9.3 07/26/2017 0742    ALKPHOS 96 07/26/2017 0742    AST 16 07/26/2017 0742    ALT 12 07/26/2017 0742    BILITOT 0.3 07/26/2017 0742    ESTGFRAFRICA 59.2 (A) 07/26/2017 0742    EGFRNONAA 51.3 (A) 07/26/2017 0742          Lab Results   Component Value Date    TSH 1.352 06/05/2013     Lab Results   Component Value Date    INR 0.9 03/10/2017    INR 1.0 01/17/2014     Lab Results   Component Value Date    WBC 6.90 03/10/2017    HGB 15.1 03/10/2017    HCT 45.3 03/10/2017    MCV 91 03/10/2017     03/10/2017     BMP  Sodium   Date Value Ref Range Status   07/26/2017 139 136 - 145 mmol/L Final     Potassium   Date Value Ref Range Status   07/26/2017 4.0 3.5 - 5.1 mmol/L Final     Chloride   Date Value Ref Range Status   07/26/2017 101 95 - 110 mmol/L Final     CO2   Date Value Ref Range Status   07/26/2017 28 23 - 29 mmol/L Final     BUN, Bld   Date Value Ref Range Status   07/26/2017 21 8 - 23 mg/dL Final     Creatinine   Date Value Ref Range Status   07/26/2017 1.1 0.5 - 1.4 mg/dL Final     Calcium   Date Value Ref Range Status   07/26/2017 9.3 8.7 - 10.5 mg/dL Final     Anion Gap   Date Value Ref Range Status   07/26/2017 10 8 - 16 mmol/L Final     eGFR if    Date Value Ref Range Status   07/26/2017 59.2 (A) >60 mL/min/1.73 m^2 Final     eGFR if non    Date Value Ref Range Status   07/26/2017 51.3 (A) >60 mL/min/1.73 m^2 Final     Comment:     Calculation used to obtain the estimated glomerular filtration  rate (eGFR) is the CKD-EPI equation. Since race is unknown   in our information system, the eGFR values for   -American and Non--American patients are given   for each creatinine result.       CrCl cannot be calculated (Patient's most recent sCr result is older than the maximum 7 days allowed.).    PRE-TEST DATA    Resting AKHIL:    The right ankle brachial index was 1.11 which is normal.   The left ankle brachial index was 1.14 which is normal.   The right TBI is 0.84.   The left TBI is 0.92.   Waveform analysis are compatible with the above findings.             This document has been electronically    SIGNED BY: Amrit Chavez MD On: 01/17/2017 14:47              The left AKHIL is 1.1      Triphasic waveforms B LE.  20 - 49% R ostial SFA.  20 - 49% L CFA, prox to mid SFA.    Normal AKHIL B LE.     Assessment:     1. Essential hypertension    2. Atherosclerotic PVD with intermittent claudication    3. Nonrheumatic aortic valve stenosis    4. Atherosclerosis of aorta    5. Atherosclerosis of native coronary artery of native heart without angina pectoris    6. Hypercholesteremia    7. S/P peripheral artery angioplasty    8. History of tobacco abuse      BP is up today is elevated, but hasn't had her meds today.   No CNS complaints to suggest  TIA or CVA  No evidence of CHF on exam  Not smoking   Stable PVD- no claudication    Normal leg studies    Plan:     Continue current medical management  Remain active  Heart healthy diet  RTC in 6 months with lipids and CMP

## 2017-09-11 DIAGNOSIS — Z12.31 OTHER SCREENING MAMMOGRAM: ICD-10-CM

## 2017-09-13 ENCOUNTER — TELEPHONE (OUTPATIENT)
Dept: RADIOLOGY | Facility: HOSPITAL | Age: 67
End: 2017-09-13

## 2017-11-15 ENCOUNTER — OFFICE VISIT (OUTPATIENT)
Dept: INTERNAL MEDICINE | Facility: CLINIC | Age: 67
End: 2017-11-15
Payer: MEDICARE

## 2017-11-15 VITALS
BODY MASS INDEX: 29.82 KG/M2 | WEIGHT: 162.06 LBS | OXYGEN SATURATION: 94 % | HEIGHT: 62 IN | SYSTOLIC BLOOD PRESSURE: 160 MMHG | HEART RATE: 102 BPM | TEMPERATURE: 98 F | DIASTOLIC BLOOD PRESSURE: 74 MMHG

## 2017-11-15 DIAGNOSIS — J01.00 ACUTE NON-RECURRENT MAXILLARY SINUSITIS: Primary | ICD-10-CM

## 2017-11-15 DIAGNOSIS — I10 ESSENTIAL HYPERTENSION: Chronic | ICD-10-CM

## 2017-11-15 DIAGNOSIS — J20.9 ACUTE BRONCHITIS, UNSPECIFIED ORGANISM: ICD-10-CM

## 2017-11-15 DIAGNOSIS — J44.1 COPD EXACERBATION: ICD-10-CM

## 2017-11-15 PROCEDURE — 99499 UNLISTED E&M SERVICE: CPT | Mod: S$GLB,,, | Performed by: INTERNAL MEDICINE

## 2017-11-15 PROCEDURE — 99999 PR PBB SHADOW E&M-EST. PATIENT-LVL III: CPT | Mod: PBBFAC,,, | Performed by: INTERNAL MEDICINE

## 2017-11-15 PROCEDURE — 99213 OFFICE O/P EST LOW 20 MIN: CPT | Mod: S$GLB,,, | Performed by: INTERNAL MEDICINE

## 2017-11-15 RX ORDER — PREDNISONE 20 MG/1
40 TABLET ORAL DAILY
Qty: 8 TABLET | Refills: 0 | Status: SHIPPED | OUTPATIENT
Start: 2017-11-15 | End: 2017-11-19

## 2017-11-15 RX ORDER — ALBUTEROL SULFATE 90 UG/1
2 AEROSOL, METERED RESPIRATORY (INHALATION) EVERY 6 HOURS
Qty: 1 INHALER | Refills: 12 | Status: SHIPPED | OUTPATIENT
Start: 2017-11-15 | End: 2018-11-15

## 2017-11-15 RX ORDER — AZITHROMYCIN 500 MG/1
500 TABLET, FILM COATED ORAL DAILY
Qty: 5 TABLET | Refills: 0 | Status: SHIPPED | OUTPATIENT
Start: 2017-11-15 | End: 2017-11-25

## 2017-11-15 NOTE — PROGRESS NOTES
"Subjective:       Patient ID: Lucia Barlow is a 67 y.o. female.    Chief Complaint: Shortness of Breath    Here for 2 days of worsening sinus congestion and coughing.  Hard coughing, wakes her up.  Fever and chills.  No n/v/d.  Frontal head pressure.  Coughing up green.  Not better with advil and claritin.      Review of Systems    Objective:   BP (!) 160/74 (BP Location: Right arm, Patient Position: Sitting)   Pulse 102   Temp 97.6 °F (36.4 °C) (Tympanic)   Ht 5' 2" (1.575 m)   Wt 73.5 kg (162 lb 0.6 oz)   SpO2 (!) 94%   BMI 29.64 kg/m²     Physical Exam   Constitutional: She is oriented to person, place, and time. She appears well-developed.   HENT:   Right Ear: External ear normal. Tympanic membrane is not injected.   Left Ear: External ear normal. Tympanic membrane is not injected.   Nose: Right sinus exhibits maxillary sinus tenderness and frontal sinus tenderness. Left sinus exhibits maxillary sinus tenderness and frontal sinus tenderness.   Mouth/Throat: Oropharynx is clear and moist.   Eyes: Conjunctivae are normal.   Neck: Neck supple. Carotid bruit is not present. No thyroid mass and no thyromegaly present.   Cardiovascular: Normal rate, regular rhythm and intact distal pulses.  Exam reveals no gallop and no friction rub.    No murmur heard.  Pulmonary/Chest: Effort normal. She has wheezes. She has no rales.   Abdominal: Soft. Bowel sounds are normal. She exhibits no mass. There is no hepatosplenomegaly. There is no tenderness.   Musculoskeletal: She exhibits no edema.   Lymphadenopathy:     She has no cervical adenopathy.   Neurological: She is alert and oriented to person, place, and time.   Psychiatric: She has a normal mood and affect.       Assessment:       1. Acute non-recurrent maxillary sinusitis    2. Acute bronchitis, unspecified organism    3. Essential hypertension        Plan:       Lucia was seen today for shortness of breath.    Diagnoses and all orders for this visit:    Acute " non-recurrent maxillary sinusitis/ Acute bronchitis, unspecified organism  -     predniSONE (DELTASONE) 20 MG tablet; Take 2 tablets (40 mg total) by mouth once daily.  -     azithromycin (ZITHROMAX) 500 MG tablet; Take 1 tablet (500 mg total) by mouth once daily.    Essential hypertension- take meds daily.    F/u with Dr. Tejeda.

## 2018-01-30 ENCOUNTER — OFFICE VISIT (OUTPATIENT)
Dept: INTERNAL MEDICINE | Facility: CLINIC | Age: 68
End: 2018-01-30
Payer: MEDICARE

## 2018-01-30 VITALS
OXYGEN SATURATION: 95 % | DIASTOLIC BLOOD PRESSURE: 56 MMHG | SYSTOLIC BLOOD PRESSURE: 142 MMHG | TEMPERATURE: 99 F | WEIGHT: 159.81 LBS | HEIGHT: 60 IN | HEART RATE: 99 BPM | BODY MASS INDEX: 31.38 KG/M2

## 2018-01-30 DIAGNOSIS — J44.9 CHRONIC OBSTRUCTIVE PULMONARY DISEASE, UNSPECIFIED COPD TYPE: ICD-10-CM

## 2018-01-30 DIAGNOSIS — I70.219 ATHEROSCLEROTIC PVD WITH INTERMITTENT CLAUDICATION: ICD-10-CM

## 2018-01-30 DIAGNOSIS — Z00.00 ROUTINE HEALTH MAINTENANCE: Primary | ICD-10-CM

## 2018-01-30 DIAGNOSIS — I70.0 ATHEROSCLEROSIS OF AORTA: ICD-10-CM

## 2018-01-30 DIAGNOSIS — M81.0 OSTEOPOROSIS, UNSPECIFIED OSTEOPOROSIS TYPE, UNSPECIFIED PATHOLOGICAL FRACTURE PRESENCE: ICD-10-CM

## 2018-01-30 DIAGNOSIS — I10 ESSENTIAL HYPERTENSION: Chronic | ICD-10-CM

## 2018-01-30 DIAGNOSIS — Z12.31 ENCOUNTER FOR SCREENING MAMMOGRAM FOR MALIGNANT NEOPLASM OF BREAST: ICD-10-CM

## 2018-01-30 PROCEDURE — 99999 PR PBB SHADOW E&M-EST. PATIENT-LVL III: CPT | Mod: PBBFAC,,, | Performed by: FAMILY MEDICINE

## 2018-01-30 PROCEDURE — 99397 PER PM REEVAL EST PAT 65+ YR: CPT | Mod: S$GLB,,, | Performed by: FAMILY MEDICINE

## 2018-01-30 PROCEDURE — 99499 UNLISTED E&M SERVICE: CPT | Mod: S$GLB,,, | Performed by: FAMILY MEDICINE

## 2018-01-30 RX ORDER — MONTELUKAST SODIUM 10 MG/1
10 TABLET ORAL DAILY PRN
Qty: 30 TABLET | Refills: 11 | Status: SHIPPED | OUTPATIENT
Start: 2018-01-30 | End: 2018-10-19 | Stop reason: SDUPTHER

## 2018-01-30 RX ORDER — PHENYLPROPANOLAMINE/CLEMASTINE 75-1.34MG
1 TABLET, EXTENDED RELEASE ORAL
COMMUNITY
Start: 2017-11-29 | End: 2018-12-28

## 2018-01-30 NOTE — PROGRESS NOTES
Subjective:       Patient ID: Lucia Barlow is a 68 y.o. female.    Chief Complaint: here for physical examination and issues  below              HPI Hypertension: blood pressures elev but doesn't take med on day visit. Tolerating medicine.   Copd Pulmonary nodule utd dr jackson  pvd atherosc aorta utd cad  osteopor fosmax due rosita 1/19  Chronic clear nasal drainage. flonase causes nose bleeds;claritin no help; allegra no help; declines allergist ent for now    Past Medical History:   Diagnosis Date    Atherosclerosis of native coronary artery of native heart without angina pectoris 11/18/2016    Atherosclerotic PVD with intermittent claudication 1/17/2014    COPD (chronic obstructive pulmonary disease)     Ex-smoker     Hyperlipidemia     Hypertension     Osteoporosis 1/16 rosita 1/18    PVD (peripheral vascular disease)     S/P peripheral artery angioplasty 2/7/2014    Tobacco dependence      Past Surgical History:   Procedure Laterality Date    ANGIOPLASTY Bilateral 01/24/2014    aortoiliac stenting     CARPAL TUNNEL RELEASE  2003    [facility]    COLECTOMY  approximate 2005    pt states 1in colon -dx with benign mass removed-states no colon cancer    COLONOSCOPY N/A 11/9/2016    Procedure: COLONOSCOPY;  Surgeon: Dmitri Sterling MD;  [facility];  Service: Endoscopy;  Laterality: N/A;    HYSTERECTOMY      no cancer     Family History   Problem Relation Age of Onset    Stroke Father     Stroke Sister     Asthma Daughter     Diabetes Daughter     Asthma Son      Social History     Social History    Marital status:      Spouse name: N/A    Number of children: 4    Years of education: N/A     Occupational History          Social History Main Topics    Smoking status: Current Every Day Smoker     Packs/day: 0.25     Years: 50.00     Types: Cigarettes     Last attempt to quit: 1/11/2016    Smokeless tobacco: Never Used    Alcohol use No    Drug use: No    Sexual  activity: No     Other Topics Concern    None     Social History Narrative    None       Review of Systems  Cardiovascular: no chest pain  Chest: chronc shortness of breath bit more [place]   Abd: no abd pain  Remainder review of systems negative    Objective:      Physical Exam   Constitutional: She is oriented to person, place, and time. She appears well-developed and well-nourished. No distress.   HENT:   Head: Atraumatic.   Right Ear: External ear normal.   Left Ear: External ear normal.   Nose: Nose normal.   Mouth/Throat: Oropharynx is clear and moist. No oropharyngeal exudate.   bilat tms nl   Eyes: Conjunctivae and EOM are normal. Pupils are equal, round, and reactive to light. No scleral icterus.   Neck: Normal range of motion. Neck supple. No thyromegaly present.   Cardiovascular: Normal rate, regular rhythm and normal heart sounds.    No murmur heard.  Pulmonary/Chest: Effort normal and breath sounds normal. No respiratory distress. She has no wheezes. She has no rales.   Abdominal: Soft. Bowel sounds are normal. She exhibits no distension and no mass. There is no hepatosplenomegaly. There is no tenderness. There is no rebound and no guarding.   Musculoskeletal: Normal range of motion. She exhibits no edema or tenderness.   Lymphadenopathy:     She has no cervical adenopathy.   Neurological: She is alert and oriented to person, place, and time. No cranial nerve deficit. She exhibits normal muscle tone. Coordination normal.   Skin: Skin is warm. No rash noted. No erythema. No pallor.   Psychiatric: She has a normal mood and affect. Her behavior is normal. Judgment and thought content normal.   Nursing note and vitals reviewed.      Assessment:       1. Routine health maintenance    2. Essential hypertension    3. Osteoporosis, unspecified osteoporosis type, unspecified pathological fracture presence    4. Atherosclerosis of aorta    5. Lung nodule < 6cm on CT    6. Atherosclerotic PVD with intermittent  claudication    7. Chronic obstructive pulmonary disease, unspecified COPD type        Plan:       *dexa due rosita 1/19**    F/u pulm , card when due  F/u one year  nurse visit another day soon when convenient for her  Try singular for all rhin instead of mucinex d   F/u card due  Routine health maintenance    Essential hypertension    Osteoporosis, unspecified osteoporosis type, unspecified pathological fracture presence    Atherosclerosis of aorta    Lung nodule < 6cm on CT    Atherosclerotic PVD with intermittent claudication    Chronic obstructive pulmonary disease, unspecified COPD type    Encounter for screening mammogram for malignant neoplasm of breast  -     Mammo Digital Screening Bilat with CAD; Future; Expected date: 01/30/2018    Other orders  -     montelukast (SINGULAIR) 10 mg tablet; Take 1 tablet (10 mg total) by mouth daily as needed.  Dispense: 30 tablet; Refill: 11

## 2018-02-14 ENCOUNTER — TELEPHONE (OUTPATIENT)
Dept: INTERNAL MEDICINE | Facility: CLINIC | Age: 68
End: 2018-02-14

## 2018-02-14 ENCOUNTER — HOSPITAL ENCOUNTER (OUTPATIENT)
Dept: RADIOLOGY | Facility: HOSPITAL | Age: 68
Discharge: HOME | End: 2018-02-14
Attending: FAMILY MEDICINE
Payer: MEDICARE

## 2018-02-14 ENCOUNTER — CLINICAL SUPPORT (OUTPATIENT)
Dept: INTERNAL MEDICINE | Facility: CLINIC | Age: 68
End: 2018-02-14
Payer: MEDICARE

## 2018-02-14 VITALS — BODY MASS INDEX: 31.22 KG/M2 | HEIGHT: 60 IN | WEIGHT: 159 LBS

## 2018-02-14 VITALS — OXYGEN SATURATION: 95 % | SYSTOLIC BLOOD PRESSURE: 132 MMHG | DIASTOLIC BLOOD PRESSURE: 32 MMHG | HEART RATE: 102 BPM

## 2018-02-14 DIAGNOSIS — Z12.31 ENCOUNTER FOR SCREENING MAMMOGRAM FOR MALIGNANT NEOPLASM OF BREAST: ICD-10-CM

## 2018-02-14 DIAGNOSIS — I10 ESSENTIAL HYPERTENSION: Primary | Chronic | ICD-10-CM

## 2018-02-14 PROCEDURE — 77067 SCR MAMMO BI INCL CAD: CPT | Mod: TC,PO

## 2018-02-14 PROCEDURE — 99499 UNLISTED E&M SERVICE: CPT | Mod: HCNC,S$GLB,, | Performed by: FAMILY MEDICINE

## 2018-02-14 PROCEDURE — 99999 PR PBB SHADOW E&M-EST. PATIENT-LVL II: ICD-10-PCS | Mod: PBBFAC,HCNC,,

## 2018-02-14 PROCEDURE — 99999 PR PBB SHADOW E&M-EST. PATIENT-LVL II: CPT | Mod: PBBFAC,HCNC,,

## 2018-02-14 PROCEDURE — 77067 SCR MAMMO BI INCL CAD: CPT | Mod: 26,,, | Performed by: RADIOLOGY

## 2018-02-14 PROCEDURE — 77063 BREAST TOMOSYNTHESIS BI: CPT | Mod: 26,,, | Performed by: RADIOLOGY

## 2018-02-14 PROCEDURE — 99499 NO LOS: ICD-10-PCS | Mod: HCNC,S$GLB,, | Performed by: FAMILY MEDICINE

## 2018-02-14 NOTE — PROGRESS NOTES
Subjective:       Patient ID: Lucia Barlow is a 69 y.o. female.    Chief Complaint: No chief complaint on file.    Patient here for blood pressure check.  Diastolic is low.  Not symptomatic.  Reports home blood pressure is 139/69 before taking blood pressure medications.  Will have patient come back in 2 days for blood pressure check.  Instructed to check blood pressure twice a day at home (before and after medications) and bring in readings.  Will have MA follow up with her today for afternoon blood pressure.        Review of Systems    Objective:      Physical Exam    Assessment:       1. Essential hypertension        Plan:

## 2018-02-16 ENCOUNTER — CLINICAL SUPPORT (OUTPATIENT)
Dept: INTERNAL MEDICINE | Facility: CLINIC | Age: 68
End: 2018-02-16
Payer: MEDICARE

## 2018-02-16 VITALS — SYSTOLIC BLOOD PRESSURE: 140 MMHG | DIASTOLIC BLOOD PRESSURE: 60 MMHG

## 2018-02-16 DIAGNOSIS — I10 HYPERTENSION, UNSPECIFIED TYPE: Primary | ICD-10-CM

## 2018-02-19 RX ORDER — ALENDRONATE SODIUM 70 MG/1
TABLET ORAL
Qty: 12 TABLET | Refills: 3 | Status: SHIPPED | OUTPATIENT
Start: 2018-02-19 | End: 2018-12-23 | Stop reason: SDUPTHER

## 2018-02-19 RX ORDER — MELOXICAM 7.5 MG/1
TABLET ORAL
Qty: 90 TABLET | Refills: 3 | Status: SHIPPED | OUTPATIENT
Start: 2018-02-19 | End: 2018-12-23 | Stop reason: SDUPTHER

## 2018-02-20 ENCOUNTER — TELEPHONE (OUTPATIENT)
Dept: INTERNAL MEDICINE | Facility: CLINIC | Age: 68
End: 2018-02-20

## 2018-02-20 DIAGNOSIS — I10 ESSENTIAL HYPERTENSION: Chronic | ICD-10-CM

## 2018-02-20 RX ORDER — LOSARTAN POTASSIUM AND HYDROCHLOROTHIAZIDE 12.5; 1 MG/1; MG/1
1 TABLET ORAL DAILY
Qty: 90 TABLET | Refills: 3 | Status: SHIPPED | OUTPATIENT
Start: 2018-02-20 | End: 2018-08-22

## 2018-02-20 NOTE — TELEPHONE ENCOUNTER
----- Message from Claudia Benson LPN sent at 2/16/2018  8:30 AM CST -----  Patient here for BP check.  Reading 140/60 right arm.

## 2018-02-21 NOTE — TELEPHONE ENCOUNTER
Spoke iwht pt and informed her bp running too high and Dr. Tejeda  Recommend increase losar/hctz from 50/12.5 to 100/12.5 daily (erxd to mail order)   notifyif any lightheadedness develops   F/u pauly few weeks for bp f/u   Pt verbalized understanding

## 2018-03-16 ENCOUNTER — TELEPHONE (OUTPATIENT)
Dept: CARDIOLOGY | Facility: CLINIC | Age: 68
End: 2018-03-16

## 2018-03-16 NOTE — TELEPHONE ENCOUNTER
----- Message from Ta Adorno sent at 3/16/2018  2:13 PM CDT -----  Contact: Pt   Please give pt a call at .[phone] (home) she was returning the nurse call.

## 2018-03-16 NOTE — TELEPHONE ENCOUNTER
----- Message from Patricia Thompson sent at 3/16/2018  3:00 PM CDT -----  Contact: pt  She's calling in regards to missed call pls call pt back at [phone] (home)

## 2018-03-16 NOTE — TELEPHONE ENCOUNTER
Returned pt call states she missed some lab work and needed to reschedule labs. I have rescheduled pt labs for next week pt notified.

## 2018-03-21 ENCOUNTER — LAB VISIT (OUTPATIENT)
Dept: LAB | Facility: HOSPITAL | Age: 68
End: 2018-03-21
Attending: NURSE PRACTITIONER
Payer: MEDICARE

## 2018-03-21 DIAGNOSIS — I25.10 ATHEROSCLEROSIS OF NATIVE CORONARY ARTERY OF NATIVE HEART WITHOUT ANGINA PECTORIS: ICD-10-CM

## 2018-03-21 DIAGNOSIS — I10 ESSENTIAL HYPERTENSION: Chronic | ICD-10-CM

## 2018-03-21 DIAGNOSIS — I70.219 ATHEROSCLEROTIC PVD WITH INTERMITTENT CLAUDICATION: ICD-10-CM

## 2018-03-21 DIAGNOSIS — I70.0 ATHEROSCLEROSIS OF AORTA: ICD-10-CM

## 2018-03-21 DIAGNOSIS — E78.00 HYPERCHOLESTEREMIA: Chronic | ICD-10-CM

## 2018-03-21 DIAGNOSIS — Z98.62 S/P PERIPHERAL ARTERY ANGIOPLASTY: ICD-10-CM

## 2018-03-21 LAB
ALBUMIN SERPL BCP-MCNC: 3.4 G/DL
ALP SERPL-CCNC: 101 U/L
ALT SERPL W/O P-5'-P-CCNC: 11 U/L
ANION GAP SERPL CALC-SCNC: 7 MMOL/L
AST SERPL-CCNC: 16 U/L
BILIRUB SERPL-MCNC: 0.4 MG/DL
BUN SERPL-MCNC: 21 MG/DL
CALCIUM SERPL-MCNC: 9.7 MG/DL
CHLORIDE SERPL-SCNC: 103 MMOL/L
CHOLEST SERPL-MCNC: 150 MG/DL
CHOLEST/HDLC SERPL: 2.5 {RATIO}
CO2 SERPL-SCNC: 29 MMOL/L
CREAT SERPL-MCNC: 1.1 MG/DL
EST. GFR  (AFRICAN AMERICAN): 58.8 ML/MIN/1.73 M^2
EST. GFR  (NON AFRICAN AMERICAN): 51 ML/MIN/1.73 M^2
GLUCOSE SERPL-MCNC: 176 MG/DL
HDLC SERPL-MCNC: 61 MG/DL
HDLC SERPL: 40.7 %
LDLC SERPL CALC-MCNC: 71.6 MG/DL
NONHDLC SERPL-MCNC: 89 MG/DL
POTASSIUM SERPL-SCNC: 4.5 MMOL/L
PROT SERPL-MCNC: 7.1 G/DL
SODIUM SERPL-SCNC: 139 MMOL/L
TRIGL SERPL-MCNC: 87 MG/DL

## 2018-03-21 PROCEDURE — 80053 COMPREHEN METABOLIC PANEL: CPT

## 2018-03-21 PROCEDURE — 80061 LIPID PANEL: CPT

## 2018-03-21 PROCEDURE — 36415 COLL VENOUS BLD VENIPUNCTURE: CPT | Mod: PO

## 2018-03-22 ENCOUNTER — PES CALL (OUTPATIENT)
Dept: ADMINISTRATIVE | Facility: CLINIC | Age: 68
End: 2018-03-22

## 2018-04-27 ENCOUNTER — TELEPHONE (OUTPATIENT)
Dept: INTERNAL MEDICINE | Facility: CLINIC | Age: 68
End: 2018-04-27

## 2018-04-27 ENCOUNTER — HOSPITAL ENCOUNTER (OUTPATIENT)
Dept: RADIOLOGY | Facility: HOSPITAL | Age: 68
Discharge: HOME | End: 2018-04-27
Attending: NURSE PRACTITIONER
Payer: MEDICARE

## 2018-04-27 ENCOUNTER — OFFICE VISIT (OUTPATIENT)
Dept: INTERNAL MEDICINE | Facility: CLINIC | Age: 68
End: 2018-04-27
Payer: MEDICARE

## 2018-04-27 VITALS
TEMPERATURE: 99 F | HEART RATE: 115 BPM | DIASTOLIC BLOOD PRESSURE: 62 MMHG | SYSTOLIC BLOOD PRESSURE: 140 MMHG | WEIGHT: 158.31 LBS | BODY MASS INDEX: 30.91 KG/M2

## 2018-04-27 DIAGNOSIS — J44.1 CHRONIC OBSTRUCTIVE PULMONARY DISEASE WITH ACUTE EXACERBATION: ICD-10-CM

## 2018-04-27 DIAGNOSIS — J02.9 SORE THROAT: Primary | ICD-10-CM

## 2018-04-27 DIAGNOSIS — Z87.891 HISTORY OF TOBACCO ABUSE: ICD-10-CM

## 2018-04-27 DIAGNOSIS — I10 ESSENTIAL HYPERTENSION: Chronic | ICD-10-CM

## 2018-04-27 DIAGNOSIS — E78.00 HYPERCHOLESTEREMIA: Chronic | ICD-10-CM

## 2018-04-27 LAB
DEPRECATED S PYO AG THROAT QL EIA: NEGATIVE
FLUAV AG SPEC QL IA: NEGATIVE
FLUBV AG SPEC QL IA: NEGATIVE
SPECIMEN SOURCE: NORMAL

## 2018-04-27 PROCEDURE — 99499 UNLISTED E&M SERVICE: CPT | Mod: S$PBB,,, | Performed by: NURSE PRACTITIONER

## 2018-04-27 PROCEDURE — 87400 INFLUENZA A/B EACH AG IA: CPT | Mod: PO

## 2018-04-27 PROCEDURE — 71046 X-RAY EXAM CHEST 2 VIEWS: CPT | Mod: TC,FY,PO

## 2018-04-27 PROCEDURE — 99214 OFFICE O/P EST MOD 30 MIN: CPT | Mod: S$GLB,,, | Performed by: NURSE PRACTITIONER

## 2018-04-27 PROCEDURE — 71046 X-RAY EXAM CHEST 2 VIEWS: CPT | Mod: 26,,, | Performed by: RADIOLOGY

## 2018-04-27 PROCEDURE — 99999 PR PBB SHADOW E&M-EST. PATIENT-LVL IV: CPT | Mod: PBBFAC,,, | Performed by: NURSE PRACTITIONER

## 2018-04-27 PROCEDURE — 87880 STREP A ASSAY W/OPTIC: CPT | Mod: PO

## 2018-04-27 PROCEDURE — 87081 CULTURE SCREEN ONLY: CPT

## 2018-04-27 RX ORDER — BENZONATATE 200 MG/1
200 CAPSULE ORAL 2 TIMES DAILY PRN
Qty: 20 CAPSULE | Refills: 0 | Status: SHIPPED | OUTPATIENT
Start: 2018-04-27 | End: 2018-05-04

## 2018-04-27 RX ORDER — PREDNISONE 20 MG/1
20 TABLET ORAL 2 TIMES DAILY
Qty: 10 TABLET | Refills: 0 | Status: SHIPPED | OUTPATIENT
Start: 2018-04-27 | End: 2018-05-02

## 2018-04-27 NOTE — TELEPHONE ENCOUNTER
----- Message from Timoteo Samuels sent at 4/27/2018 10:14 AM CDT -----  Contact: pt  She's calling in regards to a missed call, [phone] (home)

## 2018-04-27 NOTE — PROGRESS NOTES
Subjective:       Patient ID: Lucia Barlow is a 68 y.o. female.    Chief Complaint: Chills; Cough; and Generalized Body Aches    URI    This is a new problem. The current episode started yesterday. The problem has been rapidly worsening. The maximum temperature recorded prior to her arrival was 101 - 101.9 F. The fever has been present for less than 1 day. Associated symptoms include coughing, headaches and rhinorrhea. Pertinent negatives include no abdominal pain, chest pain, congestion, diarrhea, dysuria, ear pain, joint pain, joint swelling, nausea, neck pain, plugged ear sensation, rash, sinus pain, sneezing, sore throat, swollen glands, vomiting or wheezing. She has tried nothing for the symptoms. The treatment provided no relief.     Review of Systems   Constitutional: Negative for activity change, appetite change, chills, diaphoresis, fatigue, fever and unexpected weight change.   HENT: Positive for rhinorrhea. Negative for congestion, ear pain, postnasal drip, sinus pain, sinus pressure, sneezing, sore throat, tinnitus, trouble swallowing and voice change.    Eyes: Negative for photophobia, pain and visual disturbance.   Respiratory: Positive for cough and shortness of breath. Negative for chest tightness and wheezing.    Cardiovascular: Negative for chest pain, palpitations and leg swelling.   Gastrointestinal: Negative for abdominal distention, abdominal pain, blood in stool, constipation, diarrhea, nausea and vomiting.   Genitourinary: Negative for dysuria and frequency.   Musculoskeletal: Negative for arthralgias, back pain, joint pain, joint swelling, neck pain and neck stiffness.   Skin: Negative for rash.   Neurological: Positive for headaches. Negative for dizziness, tremors, seizures, syncope, facial asymmetry, speech difficulty, weakness, light-headedness and numbness.   Psychiatric/Behavioral: Negative for confusion and sleep disturbance.       Objective:      Physical Exam   Constitutional:  She is oriented to person, place, and time.   HENT:   Right Ear: Tympanic membrane normal.   Left Ear: Tympanic membrane normal.   Nose: Mucosal edema and rhinorrhea present.   Mouth/Throat: Uvula is midline, oropharynx is clear and moist and mucous membranes are normal.   Neck: Normal range of motion. Neck supple.   Cardiovascular: Normal rate, regular rhythm and normal heart sounds.    Pulmonary/Chest: No accessory muscle usage. No tachypnea and no bradypnea. No respiratory distress. She has decreased breath sounds in the right upper field, the right lower field, the left upper field and the left lower field. She has wheezes in the right upper field, the right lower field, the left upper field and the left lower field.   Musculoskeletal: Normal range of motion.   Neurological: She is alert and oriented to person, place, and time.   Skin: Skin is warm and dry.       Assessment:       1. Sore throat    2. Chronic obstructive pulmonary disease with acute exacerbation    3. Essential hypertension    4. History of tobacco abuse    5. Hypercholesteremia        Plan:   Sore throat  -     Influenza antigen Nasopharyngeal Swab; Future; Expected date: 04/27/2018  -     Throat Screen, Rapid    Chronic obstructive pulmonary disease with acute exacerbation  -     X-Ray Chest PA And Lateral; Future; Expected date: 04/27/2018    Essential hypertension    History of tobacco abuse    Hypercholesteremia    Other orders  -     Strep A culture, throat      As above  Will determine txment based on above results  Fluids, rest  Continue current regime for all other chronic issues

## 2018-04-29 LAB — BACTERIA THROAT CULT: NORMAL

## 2018-05-04 ENCOUNTER — OFFICE VISIT (OUTPATIENT)
Dept: INTERNAL MEDICINE | Facility: CLINIC | Age: 68
End: 2018-05-04
Payer: MEDICARE

## 2018-05-04 VITALS
DIASTOLIC BLOOD PRESSURE: 82 MMHG | BODY MASS INDEX: 31.55 KG/M2 | OXYGEN SATURATION: 95 % | TEMPERATURE: 99 F | HEIGHT: 60 IN | WEIGHT: 160.69 LBS | HEART RATE: 96 BPM | SYSTOLIC BLOOD PRESSURE: 180 MMHG

## 2018-05-04 DIAGNOSIS — J44.0 CHRONIC OBSTRUCTIVE PULMONARY DISEASE WITH ACUTE LOWER RESPIRATORY INFECTION: Primary | ICD-10-CM

## 2018-05-04 PROCEDURE — 3079F DIAST BP 80-89 MM HG: CPT | Mod: CPTII,S$GLB,, | Performed by: NURSE PRACTITIONER

## 2018-05-04 PROCEDURE — 99499 UNLISTED E&M SERVICE: CPT | Mod: S$PBB,,, | Performed by: NURSE PRACTITIONER

## 2018-05-04 PROCEDURE — 99213 OFFICE O/P EST LOW 20 MIN: CPT | Mod: S$GLB,,, | Performed by: NURSE PRACTITIONER

## 2018-05-04 PROCEDURE — 3077F SYST BP >= 140 MM HG: CPT | Mod: CPTII,S$GLB,, | Performed by: NURSE PRACTITIONER

## 2018-05-04 PROCEDURE — 99999 PR PBB SHADOW E&M-EST. PATIENT-LVL IV: CPT | Mod: PBBFAC,,, | Performed by: NURSE PRACTITIONER

## 2018-05-04 RX ORDER — DOXYCYCLINE HYCLATE 100 MG
100 TABLET ORAL 2 TIMES DAILY
Qty: 20 TABLET | Refills: 0 | Status: SHIPPED | OUTPATIENT
Start: 2018-05-04 | End: 2018-05-14

## 2018-05-04 RX ORDER — PROMETHAZINE HYDROCHLORIDE AND DEXTROMETHORPHAN HYDROBROMIDE 6.25; 15 MG/5ML; MG/5ML
5 SYRUP ORAL NIGHTLY PRN
Qty: 118 ML | Refills: 0 | Status: SHIPPED | OUTPATIENT
Start: 2018-05-04 | End: 2018-10-19

## 2018-05-04 NOTE — PROGRESS NOTES
Subjective:       Patient ID: Lucia Barlow is a 68 y.o. female.    Chief Complaint: Follow-up (states she is not better ); Sore Throat; Cough; and Nasal Congestion    Pt presents with c/o worsening symptoms- cough, sob, and fatigue. Was seen about a week ago- strep, flu, and cxr normal. Was given prednisone and tessalon perles. She has also been using her inhalers as ordered. No fevers, dizziness, severe headaches, abd pain, n/v/d, syncope, or cp.       Review of Systems   Constitutional: Negative for activity change, appetite change, chills, diaphoresis, fatigue, fever and unexpected weight change.   HENT: Positive for postnasal drip, sore throat and voice change. Negative for congestion, ear pain, rhinorrhea, sinus pain, sinus pressure, sneezing, tinnitus and trouble swallowing.    Eyes: Negative for photophobia, pain and visual disturbance.   Respiratory: Positive for cough, chest tightness, shortness of breath and wheezing.    Cardiovascular: Negative for chest pain, palpitations and leg swelling.   Gastrointestinal: Negative for abdominal distention, abdominal pain, blood in stool, constipation, diarrhea, nausea and vomiting.   Genitourinary: Negative for dysuria and frequency.   Musculoskeletal: Negative for arthralgias, back pain, joint swelling, neck pain and neck stiffness.   Neurological: Negative for dizziness, tremors, seizures, syncope, facial asymmetry, speech difficulty, weakness, light-headedness, numbness and headaches.   Psychiatric/Behavioral: Negative for confusion and sleep disturbance.       Objective:      Physical Exam   Constitutional: She is oriented to person, place, and time.   HENT:   Right Ear: Tympanic membrane is erythematous.   Left Ear: Tympanic membrane is erythematous.   Nose: Mucosal edema and rhinorrhea present.   Mouth/Throat: Uvula is midline and mucous membranes are normal. Posterior oropharyngeal erythema present.   Cardiovascular: Normal rate, regular rhythm and normal  heart sounds.    Pulmonary/Chest: She has decreased breath sounds in the right upper field, the right lower field, the left upper field and the left lower field. She has wheezes in the right upper field, the right lower field, the left upper field and the left lower field.   Musculoskeletal: Normal range of motion.   Neurological: She is alert and oriented to person, place, and time.   Skin: Skin is warm and dry. Capillary refill takes less than 2 seconds.       Assessment:       1. Chronic obstructive pulmonary disease with acute lower respiratory infection        Plan:   Chronic obstructive pulmonary disease with acute lower respiratory infection    Other orders  -     doxycycline (VIBRA-TABS) 100 MG tablet; Take 1 tablet (100 mg total) by mouth 2 (two) times daily.  Dispense: 20 tablet; Refill: 0  -     promethazine-dextromethorphan (PROMETHAZINE-DM) 6.25-15 mg/5 mL Syrp; Take 5 mLs by mouth nightly as needed (Cough).  Dispense: 118 mL; Refill: 0      txment failure with prednisone and tessalon  Start doxy BID x 10 days and cough syrup   F/u with PCP if no improvement

## 2018-08-04 RX ORDER — ROSUVASTATIN CALCIUM 20 MG/1
TABLET, COATED ORAL
Qty: 90 TABLET | Refills: 4 | Status: SHIPPED | OUTPATIENT
Start: 2018-08-04 | End: 2019-08-16 | Stop reason: SDUPTHER

## 2018-08-04 RX ORDER — EZETIMIBE 10 MG/1
TABLET ORAL
Qty: 90 TABLET | Refills: 4 | Status: SHIPPED | OUTPATIENT
Start: 2018-08-04 | End: 2020-01-16 | Stop reason: SDUPTHER

## 2018-08-22 ENCOUNTER — OFFICE VISIT (OUTPATIENT)
Dept: CARDIOLOGY | Facility: CLINIC | Age: 68
End: 2018-08-22
Payer: MEDICARE

## 2018-08-22 ENCOUNTER — LAB VISIT (OUTPATIENT)
Dept: LAB | Facility: HOSPITAL | Age: 68
End: 2018-08-22
Attending: INTERNAL MEDICINE
Payer: MEDICARE

## 2018-08-22 ENCOUNTER — CLINICAL SUPPORT (OUTPATIENT)
Dept: CARDIOLOGY | Facility: CLINIC | Age: 68
End: 2018-08-22
Payer: MEDICARE

## 2018-08-22 VITALS
HEART RATE: 102 BPM | SYSTOLIC BLOOD PRESSURE: 134 MMHG | BODY MASS INDEX: 31.02 KG/M2 | WEIGHT: 158 LBS | DIASTOLIC BLOOD PRESSURE: 66 MMHG | HEIGHT: 60 IN

## 2018-08-22 DIAGNOSIS — E78.00 HYPERCHOLESTEREMIA: Chronic | ICD-10-CM

## 2018-08-22 DIAGNOSIS — I73.9 PVD (PERIPHERAL VASCULAR DISEASE): ICD-10-CM

## 2018-08-22 DIAGNOSIS — I70.0 ATHEROSCLEROSIS OF AORTA: ICD-10-CM

## 2018-08-22 DIAGNOSIS — J44.0 CHRONIC OBSTRUCTIVE PULMONARY DISEASE WITH ACUTE LOWER RESPIRATORY INFECTION: ICD-10-CM

## 2018-08-22 DIAGNOSIS — Z87.891 HISTORY OF TOBACCO ABUSE: ICD-10-CM

## 2018-08-22 DIAGNOSIS — I10 ESSENTIAL HYPERTENSION, MALIGNANT: ICD-10-CM

## 2018-08-22 DIAGNOSIS — Z98.62 S/P PERIPHERAL ARTERY ANGIOPLASTY: ICD-10-CM

## 2018-08-22 DIAGNOSIS — R06.00 DYSPNEA, UNSPECIFIED TYPE: ICD-10-CM

## 2018-08-22 DIAGNOSIS — I10 ESSENTIAL HYPERTENSION: Chronic | ICD-10-CM

## 2018-08-22 DIAGNOSIS — R94.31 ABNORMAL ELECTROCARDIOGRAM: ICD-10-CM

## 2018-08-22 DIAGNOSIS — I35.0 NONRHEUMATIC AORTIC VALVE STENOSIS: ICD-10-CM

## 2018-08-22 DIAGNOSIS — I10 ESSENTIAL HYPERTENSION: Primary | Chronic | ICD-10-CM

## 2018-08-22 DIAGNOSIS — I10 ESSENTIAL HYPERTENSION, MALIGNANT: Primary | ICD-10-CM

## 2018-08-22 LAB — BNP SERPL-MCNC: 11 PG/ML

## 2018-08-22 PROCEDURE — 3078F DIAST BP <80 MM HG: CPT | Mod: CPTII,S$GLB,, | Performed by: INTERNAL MEDICINE

## 2018-08-22 PROCEDURE — 99214 OFFICE O/P EST MOD 30 MIN: CPT | Mod: S$GLB,,, | Performed by: INTERNAL MEDICINE

## 2018-08-22 PROCEDURE — 3075F SYST BP GE 130 - 139MM HG: CPT | Mod: CPTII,S$GLB,, | Performed by: INTERNAL MEDICINE

## 2018-08-22 PROCEDURE — 99999 PR PBB SHADOW E&M-EST. PATIENT-LVL III: CPT | Mod: PBBFAC,,, | Performed by: INTERNAL MEDICINE

## 2018-08-22 PROCEDURE — 36415 COLL VENOUS BLD VENIPUNCTURE: CPT | Mod: PO

## 2018-08-22 PROCEDURE — 83880 ASSAY OF NATRIURETIC PEPTIDE: CPT

## 2018-08-22 PROCEDURE — 93000 ELECTROCARDIOGRAM COMPLETE: CPT | Mod: S$GLB,,, | Performed by: INTERNAL MEDICINE

## 2018-08-22 RX ORDER — ASPIRIN 81 MG/1
81 TABLET ORAL EVERY OTHER DAY
COMMUNITY
End: 2018-11-07

## 2018-08-22 RX ORDER — DIPHENHYDRAMINE HCL 25 MG
25 CAPSULE ORAL 2 TIMES DAILY
COMMUNITY
End: 2018-11-07

## 2018-08-22 NOTE — PROGRESS NOTES
Subjective:   Patient ID:  Lucia Barlow is a 68 y.o. female who presents for follow up of Peripheral Vascular Disease; Hypertension; Shortness of Breath; and Foot Swelling (bilateral ankle)      HPI  A 67 yo female with h/o pvd htn hlp is here for f/u she has complaints of bilateral low back pain and  numbness not related to exercise she has no headaches blurred vision has however sinus congestion get eye paina dn frontal headaches. Has shortness of breath with exertion this has been occurring since April no chest pain no syncope near syncope. Has heart burn has no claudication. She snores at nite never evaluated . Has no daytime sleepiness no naps. No leg swelling. She ahs ilaica dn aortic stents placed,. In .  Past Medical History:   Diagnosis Date    Atherosclerosis of native coronary artery of native heart without angina pectoris 2016    Atherosclerotic PVD with intermittent claudication 2014    COPD (chronic obstructive pulmonary disease)     Ex-smoker     Hyperlipidemia     Hypertension     Osteoporosis  rosita     PVD (peripheral vascular disease)     S/P peripheral artery angioplasty 2014    Tobacco dependence        Past Surgical History:   Procedure Laterality Date    ANGIOPLASTY Bilateral 2014    aortoiliac stenting     CARPAL TUNNEL RELEASE      Henrry    COLECTOMY  approximate     pt states 1in colon -dx with benign mass removed-states no colon cancer    HYSTERECTOMY      no cancer       Social History     Tobacco Use    Smoking status: Current Every Day Smoker     Packs/day: 0.25     Years: 50.00     Pack years: 12.50     Types: Cigarettes     Last attempt to quit: 2016     Years since quittin.6    Smokeless tobacco: Never Used   Substance Use Topics    Alcohol use: No     Alcohol/week: 0.0 oz    Drug use: No       Family History   Problem Relation Age of Onset    Stroke Father     Stroke Sister     Asthma Daughter     Diabetes  Daughter     Breast cancer Daughter     Asthma Son        Current Outpatient Medications   Medication Sig    albuterol 90 mcg/actuation inhaler Inhale 2 puffs into the lungs every 6 (six) hours.    alendronate (FOSAMAX) 70 MG tablet TAKE 1 TAB ONCE WEEKLY IN MORNING WITH FULL GLASS WATER ON EMPTY STOMACH. DO NOT EAT OR LIE DOWN X AT LEAST 30MIN AFTERWARDS    aspirin (ECOTRIN) 81 MG EC tablet Take 81 mg by mouth every other day.    diphenhydrAMINE (BENADRYL) 25 mg capsule Take 25 mg by mouth 2 (two) times daily.    ezetimibe (ZETIA) 10 mg tablet TAKE 1 TABLET ONE TIME DAILY    ibuprofen 200 mg Cap Take 1 tablet by mouth as needed.    ipratropium (ATROVENT) 0.06 % nasal spray 2 sprays by Nasal route 4 (four) times daily. As needed for rhinitis    meloxicam (MOBIC) 7.5 MG tablet TAKE 1 TABLET (7.5 MG TOTAL) BY MOUTH DAILY AS NEEDED FOR PAIN.    montelukast (SINGULAIR) 10 mg tablet Take 1 tablet (10 mg total) by mouth daily as needed.    promethazine-dextromethorphan (PROMETHAZINE-DM) 6.25-15 mg/5 mL Syrp Take 5 mLs by mouth nightly as needed (Cough).    rosuvastatin (CRESTOR) 20 MG tablet TAKE 1 TABLET EVERY DAY    triamcinolone acetonide 0.1% (KENALOG) 0.1 % cream Apply topically 2 (two) times daily.     No current facility-administered medications for this visit.      Current Outpatient Medications on File Prior to Visit   Medication Sig    albuterol 90 mcg/actuation inhaler Inhale 2 puffs into the lungs every 6 (six) hours.    alendronate (FOSAMAX) 70 MG tablet TAKE 1 TAB ONCE WEEKLY IN MORNING WITH FULL GLASS WATER ON EMPTY STOMACH. DO NOT EAT OR LIE DOWN X AT LEAST 30MIN AFTERWARDS    aspirin (ECOTRIN) 81 MG EC tablet Take 81 mg by mouth every other day.    diphenhydrAMINE (BENADRYL) 25 mg capsule Take 25 mg by mouth 2 (two) times daily.    ezetimibe (ZETIA) 10 mg tablet TAKE 1 TABLET ONE TIME DAILY    ibuprofen 200 mg Cap Take 1 tablet by mouth as needed.    ipratropium (ATROVENT) 0.06 %  nasal spray 2 sprays by Nasal route 4 (four) times daily. As needed for rhinitis    meloxicam (MOBIC) 7.5 MG tablet TAKE 1 TABLET (7.5 MG TOTAL) BY MOUTH DAILY AS NEEDED FOR PAIN.    montelukast (SINGULAIR) 10 mg tablet Take 1 tablet (10 mg total) by mouth daily as needed.    promethazine-dextromethorphan (PROMETHAZINE-DM) 6.25-15 mg/5 mL Syrp Take 5 mLs by mouth nightly as needed (Cough).    rosuvastatin (CRESTOR) 20 MG tablet TAKE 1 TABLET EVERY DAY    triamcinolone acetonide 0.1% (KENALOG) 0.1 % cream Apply topically 2 (two) times daily.    [DISCONTINUED] hydrocodone-acetaminophen 10-325mg (NORCO)  mg Tab     [DISCONTINUED] losartan-hydrochlorothiazide 100-12.5 mg (HYZAAR) 100-12.5 mg Tab Take 1 tablet by mouth once daily.    [DISCONTINUED] methocarbamol (ROBAXIN) 750 MG Tab Take 750 mg by mouth.     No current facility-administered medications on file prior to visit.      ,  Review of patient's allergies indicates:   Allergen Reactions    Skin staples [surgical stainless steel] Swelling    Egg derived      Shortness breath, lip swelling    Fish containing products     Iodine and iodide containing products Swelling    Latex, natural rubber Swelling    Nickel     Pravastatin      40 mg causes nausea vomitting but 20 mg ok    Shellfish containing products Swelling       Review of Systems   Constitution: Negative for diaphoresis, weakness, malaise/fatigue and weight gain.   HENT: Negative for hoarse voice.    Eyes: Negative for double vision and visual disturbance.   Cardiovascular: Positive for dyspnea on exertion. Negative for chest pain, claudication, cyanosis, irregular heartbeat, leg swelling, near-syncope, orthopnea, palpitations, paroxysmal nocturnal dyspnea and syncope.   Respiratory: Positive for shortness of breath. Negative for cough, hemoptysis and snoring.    Hematologic/Lymphatic: Negative for bleeding problem. Does not bruise/bleed easily.   Skin: Negative for color change and  poor wound healing.   Musculoskeletal: Positive for arthritis and back pain. Negative for muscle cramps, muscle weakness and myalgias.   Gastrointestinal: Negative for bloating, abdominal pain, change in bowel habit, diarrhea, heartburn, hematemesis, hematochezia, melena and nausea.   Neurological: Negative for excessive daytime sleepiness, dizziness, headaches, light-headedness, loss of balance and numbness.   Psychiatric/Behavioral: Negative for memory loss. The patient does not have insomnia.    Allergic/Immunologic: Negative for hives.       Objective:   Physical Exam   Constitutional: She is oriented to person, place, and time. She appears well-developed and well-nourished. She does not appear ill. No distress.   HENT:   Head: Normocephalic and atraumatic.   Eyes: EOM are normal. Pupils are equal, round, and reactive to light. No scleral icterus.   Neck: Normal range of motion. Neck supple. Normal carotid pulses, no hepatojugular reflux and no JVD present. Carotid bruit is not present. No tracheal deviation present. No thyromegaly present.   Cardiovascular: Normal rate, regular rhythm, normal heart sounds, intact distal pulses and normal pulses. Exam reveals no gallop and no friction rub.   No murmur heard.  Pulses:       Carotid pulses are 2+ on the right side, and 2+ on the left side.       Radial pulses are 2+ on the right side, and 2+ on the left side.        Femoral pulses are 2+ on the right side, and 2+ on the left side.       Popliteal pulses are 2+ on the right side, and 2+ on the left side.        Dorsalis pedis pulses are 2+ on the right side, and 2+ on the left side.        Posterior tibial pulses are 2+ on the right side, and 2+ on the left side.   Pulmonary/Chest: Effort normal and breath sounds normal. No respiratory distress. She has no wheezes. She has no rhonchi. She has no rales. She exhibits no tenderness.   Abdominal: Soft. Normal appearance, normal aorta and bowel sounds are normal. She  exhibits no abdominal bruit, no ascites and no pulsatile midline mass. There is no hepatomegaly. There is no tenderness.   Musculoskeletal: She exhibits no edema.        Right shoulder: She exhibits no deformity.   Neurological: She is alert and oriented to person, place, and time. She has normal strength. No cranial nerve deficit. Coordination normal.   Skin: Skin is warm and dry. No rash noted. She is not diaphoretic. No cyanosis or erythema. Nails show no clubbing.   Psychiatric: She has a normal mood and affect. Her speech is normal and behavior is normal.   Nursing note and vitals reviewed.    Vitals:    08/22/18 1431 08/22/18 1433   BP: 124/60 134/66   BP Location: Right arm Left arm   Patient Position: Sitting Sitting   BP Method: Medium (Manual) Medium (Manual)   Pulse: 102    Weight: 71.7 kg (158 lb)    Height: 5' (1.524 m)      Lab Results   Component Value Date    CHOL 150 03/21/2018    CHOL 152 07/26/2017    CHOL 238 (H) 11/18/2016     Lab Results   Component Value Date    HDL 61 03/21/2018    HDL 59 07/26/2017    HDL 56 11/18/2016     Lab Results   Component Value Date    LDLCALC 71.6 03/21/2018    LDLCALC 77.8 07/26/2017    LDLCALC 157.2 11/18/2016     Lab Results   Component Value Date    TRIG 87 03/21/2018    TRIG 76 07/26/2017    TRIG 124 11/18/2016     Lab Results   Component Value Date    CHOLHDL 40.7 03/21/2018    CHOLHDL 38.8 07/26/2017    CHOLHDL 23.5 11/18/2016       Chemistry        Component Value Date/Time     03/21/2018 0852    K 4.5 03/21/2018 0852     03/21/2018 0852    CO2 29 03/21/2018 0852    BUN 21 03/21/2018 0852    CREATININE 1.1 03/21/2018 0852     (H) 03/21/2018 0852        Component Value Date/Time    CALCIUM 9.7 03/21/2018 0852    ALKPHOS 101 03/21/2018 0852    AST 16 03/21/2018 0852    ALT 11 03/21/2018 0852    BILITOT 0.4 03/21/2018 0852    ESTGFRAFRICA 58.8 (A) 03/21/2018 0852    EGFRNONAA 51.0 (A) 03/21/2018 0852        No results found for: LABA1C,  HGBA1C    Lab Results   Component Value Date    TSH 1.352 06/05/2013     Lab Results   Component Value Date    INR 0.9 03/10/2017    INR 1.0 01/17/2014     Lab Results   Component Value Date    WBC 6.90 03/10/2017    HGB 15.1 03/10/2017    HCT 45.3 03/10/2017    MCV 91 03/10/2017     03/10/2017     BMP  Sodium   Date Value Ref Range Status   03/21/2018 139 136 - 145 mmol/L Final     Potassium   Date Value Ref Range Status   03/21/2018 4.5 3.5 - 5.1 mmol/L Final     Chloride   Date Value Ref Range Status   03/21/2018 103 95 - 110 mmol/L Final     CO2   Date Value Ref Range Status   03/21/2018 29 23 - 29 mmol/L Final     BUN, Bld   Date Value Ref Range Status   03/21/2018 21 8 - 23 mg/dL Final     Creatinine   Date Value Ref Range Status   03/21/2018 1.1 0.5 - 1.4 mg/dL Final     Calcium   Date Value Ref Range Status   03/21/2018 9.7 8.7 - 10.5 mg/dL Final     Anion Gap   Date Value Ref Range Status   03/21/2018 7 (L) 8 - 16 mmol/L Final     eGFR if    Date Value Ref Range Status   03/21/2018 58.8 (A) >60 mL/min/1.73 m^2 Final     eGFR if non    Date Value Ref Range Status   03/21/2018 51.0 (A) >60 mL/min/1.73 m^2 Final     Comment:     Calculation used to obtain the estimated glomerular filtration  rate (eGFR) is the CKD-EPI equation.        CrCl cannot be calculated (Patient's most recent lab result is older than the maximum 7 days allowed.).    Assessment:     1. Essential hypertension    2. Hypercholesteremia    3. History of tobacco abuse    4. S/P peripheral artery angioplasty    5. Nonrheumatic aortic valve stenosis    6. Atherosclerosis of aorta    7. Lung nodule < 6cm on CT    8. Chronic obstructive pulmonary disease with acute lower respiratory infection    9. PVD (peripheral vascular disease)      Has still complaints of shortness of breath that started in April needing eval with echo lexiscan .seh ahs copd that needs follow up also. Never had sleep eval by  report.  Plan:   Echo   lexiscan   bnp   Continue current therapy low salt low fat diet.  F/u in 6 months with lipid cmp  Follow  With pulmonary

## 2018-08-23 ENCOUNTER — TELEPHONE (OUTPATIENT)
Dept: CARDIOLOGY | Facility: CLINIC | Age: 68
End: 2018-08-23

## 2018-08-29 ENCOUNTER — CLINICAL SUPPORT (OUTPATIENT)
Dept: CARDIOLOGY | Facility: CLINIC | Age: 68
End: 2018-08-29
Attending: INTERNAL MEDICINE
Payer: MEDICARE

## 2018-08-29 ENCOUNTER — HOSPITAL ENCOUNTER (OUTPATIENT)
Dept: RADIOLOGY | Facility: HOSPITAL | Age: 68
Discharge: HOME | End: 2018-08-29
Attending: INTERNAL MEDICINE
Payer: MEDICARE

## 2018-08-29 DIAGNOSIS — I10 ESSENTIAL HYPERTENSION: Chronic | ICD-10-CM

## 2018-08-29 DIAGNOSIS — I73.9 PVD (PERIPHERAL VASCULAR DISEASE): ICD-10-CM

## 2018-08-29 DIAGNOSIS — E78.00 HYPERCHOLESTEREMIA: Chronic | ICD-10-CM

## 2018-08-29 DIAGNOSIS — R06.00 DYSPNEA, UNSPECIFIED TYPE: ICD-10-CM

## 2018-08-29 DIAGNOSIS — R94.31 ABNORMAL ELECTROCARDIOGRAM: ICD-10-CM

## 2018-08-29 DIAGNOSIS — I70.0 ATHEROSCLEROSIS OF AORTA: ICD-10-CM

## 2018-08-29 LAB
AORTIC VALVE STENOSIS: ABNORMAL
DIASTOLIC DYSFUNCTION: NO
ESTIMATED PA SYSTOLIC PRESSURE: 39.72
RETIRED EF AND QEF - SEE NOTES: 55 (ref 55–65)

## 2018-08-29 PROCEDURE — 93306 TTE W/DOPPLER COMPLETE: CPT | Mod: S$GLB,,, | Performed by: INTERNAL MEDICINE

## 2018-08-29 PROCEDURE — 78452 HT MUSCLE IMAGE SPECT MULT: CPT | Mod: 26,,, | Performed by: NUCLEAR MEDICINE

## 2018-08-29 PROCEDURE — 93015 CV STRESS TEST SUPVJ I&R: CPT | Mod: S$GLB,,, | Performed by: NUCLEAR MEDICINE

## 2018-08-29 PROCEDURE — A9502 TC99M TETROFOSMIN: HCPCS | Mod: PO

## 2018-08-30 ENCOUNTER — TELEPHONE (OUTPATIENT)
Dept: CARDIOLOGY | Facility: CLINIC | Age: 68
End: 2018-08-30

## 2018-08-30 NOTE — TELEPHONE ENCOUNTER
I have attempted without success to contact this patient by phone to give ECHO results.  Will call again.

## 2018-08-30 NOTE — TELEPHONE ENCOUNTER
Spoke with patient regarding normal Stress Test and ECHO results.  Patient denies any questions/concerns.  Instructed to notify office should any questions/concerns arise.  Patient verbalized understanding.

## 2018-10-17 ENCOUNTER — PES CALL (OUTPATIENT)
Dept: ADMINISTRATIVE | Facility: CLINIC | Age: 68
End: 2018-10-17

## 2018-10-19 ENCOUNTER — OFFICE VISIT (OUTPATIENT)
Dept: INTERNAL MEDICINE | Facility: CLINIC | Age: 68
End: 2018-10-19
Payer: MEDICARE

## 2018-10-19 ENCOUNTER — TELEPHONE (OUTPATIENT)
Dept: INTERNAL MEDICINE | Facility: CLINIC | Age: 68
End: 2018-10-19

## 2018-10-19 ENCOUNTER — HOSPITAL ENCOUNTER (OUTPATIENT)
Dept: RADIOLOGY | Facility: HOSPITAL | Age: 68
Discharge: HOME | End: 2018-10-19
Attending: NURSE PRACTITIONER
Payer: MEDICARE

## 2018-10-19 VITALS
OXYGEN SATURATION: 96 % | WEIGHT: 160.25 LBS | BODY MASS INDEX: 31.46 KG/M2 | HEART RATE: 96 BPM | TEMPERATURE: 97 F | HEIGHT: 60 IN | SYSTOLIC BLOOD PRESSURE: 162 MMHG | DIASTOLIC BLOOD PRESSURE: 84 MMHG

## 2018-10-19 DIAGNOSIS — I73.9 PVD (PERIPHERAL VASCULAR DISEASE): ICD-10-CM

## 2018-10-19 DIAGNOSIS — I35.0 NONRHEUMATIC AORTIC VALVE STENOSIS: ICD-10-CM

## 2018-10-19 DIAGNOSIS — E78.00 HYPERCHOLESTEREMIA: Chronic | ICD-10-CM

## 2018-10-19 DIAGNOSIS — I10 ESSENTIAL HYPERTENSION: Chronic | ICD-10-CM

## 2018-10-19 DIAGNOSIS — Z98.62 S/P PERIPHERAL ARTERY ANGIOPLASTY: ICD-10-CM

## 2018-10-19 DIAGNOSIS — Z00.00 ENCOUNTER FOR PREVENTIVE HEALTH EXAMINATION: Primary | ICD-10-CM

## 2018-10-19 DIAGNOSIS — I70.0 ATHEROSCLEROSIS OF AORTA: ICD-10-CM

## 2018-10-19 DIAGNOSIS — M81.0 OSTEOPOROSIS, UNSPECIFIED OSTEOPOROSIS TYPE, UNSPECIFIED PATHOLOGICAL FRACTURE PRESENCE: ICD-10-CM

## 2018-10-19 DIAGNOSIS — Z86.010 HISTORY OF ADENOMATOUS POLYP OF COLON: ICD-10-CM

## 2018-10-19 DIAGNOSIS — I25.10 ATHEROSCLEROSIS OF NATIVE CORONARY ARTERY OF NATIVE HEART WITHOUT ANGINA PECTORIS: ICD-10-CM

## 2018-10-19 DIAGNOSIS — Z87.891 HISTORY OF TOBACCO ABUSE: ICD-10-CM

## 2018-10-19 DIAGNOSIS — J44.0 CHRONIC OBSTRUCTIVE PULMONARY DISEASE WITH ACUTE LOWER RESPIRATORY INFECTION: ICD-10-CM

## 2018-10-19 DIAGNOSIS — J44.9 CHRONIC OBSTRUCTIVE PULMONARY DISEASE, UNSPECIFIED COPD TYPE: Primary | ICD-10-CM

## 2018-10-19 PROCEDURE — 99215 OFFICE O/P EST HI 40 MIN: CPT | Mod: PBBFAC,25,PO | Performed by: NURSE PRACTITIONER

## 2018-10-19 PROCEDURE — 71046 X-RAY EXAM CHEST 2 VIEWS: CPT | Mod: TC,FY,PO

## 2018-10-19 PROCEDURE — 3079F DIAST BP 80-89 MM HG: CPT | Mod: CPTII,S$GLB,, | Performed by: NURSE PRACTITIONER

## 2018-10-19 PROCEDURE — 99999 PR PBB SHADOW E&M-EST. PATIENT-LVL V: CPT | Mod: PBBFAC,,, | Performed by: NURSE PRACTITIONER

## 2018-10-19 PROCEDURE — G0439 PPPS, SUBSEQ VISIT: HCPCS | Mod: S$GLB,,, | Performed by: NURSE PRACTITIONER

## 2018-10-19 PROCEDURE — 71046 X-RAY EXAM CHEST 2 VIEWS: CPT | Mod: 26,,, | Performed by: RADIOLOGY

## 2018-10-19 PROCEDURE — 3077F SYST BP >= 140 MM HG: CPT | Mod: CPTII,S$GLB,, | Performed by: NURSE PRACTITIONER

## 2018-10-19 RX ORDER — FLUTICASONE PROPIONATE 50 MCG
2 SPRAY, SUSPENSION (ML) NASAL DAILY
Qty: 16 G | Refills: 5 | Status: SHIPPED | OUTPATIENT
Start: 2018-10-19 | End: 2019-09-03 | Stop reason: SDUPTHER

## 2018-10-19 RX ORDER — PREDNISONE 20 MG/1
20 TABLET ORAL 2 TIMES DAILY
Qty: 10 TABLET | Refills: 0 | Status: SHIPPED | OUTPATIENT
Start: 2018-10-19 | End: 2018-10-24

## 2018-10-19 RX ORDER — MONTELUKAST SODIUM 10 MG/1
10 TABLET ORAL DAILY PRN
Qty: 30 TABLET | Refills: 11 | Status: SHIPPED | OUTPATIENT
Start: 2018-10-19 | End: 2018-11-07

## 2018-10-19 NOTE — TELEPHONE ENCOUNTER
----- Message from Anjelica Flynn sent at 10/19/2018  3:12 PM CDT -----  Contact: Patient   Patient would like a call back at [phone], Regards to her x-ray results and also what location her medication was sent to.    Thanks  Td

## 2018-10-19 NOTE — PATIENT INSTRUCTIONS
Counseling and Referral of Other Preventative  (Italic type indicates deductible and co-insurance are waived)    Patient Name: Lucia Barlow  Today's Date: 10/19/2018    Health Maintenance       Date Due Completion Date    TETANUS VACCINE 01/30/1966 ---    Influenza Vaccine 10/19/2019 (Originally 8/1/2018) 1/30/2018 (Declined)    Override on 1/30/2018: Declined    Override on 11/18/2016: Declined (pt states the last 3 shot make her sick)    Override on 12/16/2015: Declined    Override on 10/2/2009: Done    DEXA SCAN 01/08/2019 1/8/2016    High Dose Statin 10/19/2019 10/19/2018    Colonoscopy 11/09/2019 11/9/2016    Mammogram 02/14/2020 2/14/2018    Lipid Panel 03/21/2023 3/21/2018        No orders of the defined types were placed in this encounter.    The following information is provided to all patients.  This information is to help you find resources for any of the problems found today that may be affecting your health:                Living healthy guide: www.[facility].[place].gov      Understanding Diabetes: www.diabetes.org      Eating healthy: www.cdc.gov/healthyweight      CDC home safety checklist: www.cdc.gov/steadi/patient.html      Agency on Aging: www.goea.[place].gov      Alcoholics anonymous (AA): www.aa.org      Physical Activity: www.elmer.nih.gov/yu8bcyn      Tobacco use: www.quitwithusla.org

## 2018-10-22 NOTE — PROGRESS NOTES
Lucia Barlow presented for a  Medicare AWV and comprehensive Health Risk Assessment today. The following components were reviewed and updated:    · Medical history  · Family History  · Social history  · Allergies and Current Medications  · Health Risk Assessment  · Health Maintenance  · Care Team     ** See Completed Assessments for Annual Wellness Visit within the encounter summary.**       The following assessments were completed:  · Living Situation  · CAGE  · Depression Screening  · Timed Get Up and Go  · Whisper Test  · Cognitive Function Screening  · Nutrition Screening  · ADL Screening  · PAQ Screening    Vitals:    10/19/18 0838   BP: (!) 162/84   BP Location: Left arm   Patient Position: Sitting   BP Method: Medium (Manual)   Pulse: 96   Temp: 97.3 °F (36.3 °C)   TempSrc: Tympanic   SpO2: 96%   Weight: 72.7 kg (160 lb 4.4 oz)   Height: 5' (1.524 m)     Body mass index is 31.3 kg/m².  Physical Exam   Constitutional: She is oriented to person, place, and time. Vital signs are normal. She appears well-developed and well-nourished.   HENT:   Head: Normocephalic and atraumatic.   Neck: Normal range of motion.   Cardiovascular: Normal rate and regular rhythm.   Pulmonary/Chest: Effort normal and breath sounds normal.   Abdominal: Soft. Bowel sounds are normal.   Musculoskeletal: Normal range of motion.   Neurological: She is alert and oriented to person, place, and time.   Skin: Skin is warm.   Psychiatric: She has a normal mood and affect. Her behavior is normal.             Diagnoses and health risks identified today and associated recommendations/orders:    1. Encounter for preventive health examination      2. Chronic obstructive pulmonary disease with acute lower respiratory infection  Stable.  Reports using rescue inhaler more often.  Will get CXR today.  Continue current treatment plan.   - montelukast (SINGULAIR) 10 mg tablet; Take 1 tablet (10 mg total) by mouth daily as needed.  Dispense: 30 tablet;  Refill: 11  - X-Ray Chest PA And Lateral; Future    3. Lung nodule < 6cm on CT  Stable.  Noted on CT Lung Screening 11/2017.  Biopsy-negative.  Will continue current treatment plan.     4. Atherosclerosis of aorta  Stable.  Will continue hypertension and cholesterol management.  Follow up as scheduled.     5. Atherosclerosis of native coronary artery of native heart without angina pectoris  Stable.  Will continue hypertension and cholesterol management.     6. Essential hypertension  Stable.  Slight elevated today.  Check at home daily and has normal readings.  Will continue current treatment plan.     7. Hypercholesteremia  Stable.  Will continue current treatment plan.   Component      Latest Ref Rng & Units 3/21/2018 7/26/2017              Cholesterol      120 - 199 mg/dL 150 152   Triglycerides      30 - 150 mg/dL 87 76   HDL      40 - 75 mg/dL 61 59   LDL Cholesterol      63.0 - 159.0 mg/dL 71.6 77.8   HDL/Chol Ratio      20.0 - 50.0 % 40.7 38.8   Total Cholesterol/HDL Ratio      2.0 - 5.0 2.5 2.6   Non-HDL Cholesterol      mg/dL 89 93       8. Nonrheumatic aortic valve stenosis  Stable.  Last ECHO 08/29/2018-stable.  Will continue current treatment plan.     9. PVD (peripheral vascular disease)  Stable.  Had angioplasty.  Will continue current treatment plan.    10. S/P peripheral artery angioplasty      11. History of adenomatous polyp of colon  Stable.  Last colonoscopy 11/2016.  Recommend screening in 3 years.     12. Osteoporosis, unspecified osteoporosis type, unspecified pathological fracture presence  Stable.  Last DEXA 11/2016.  Will continue current treatment plan.  Recommend screening in 3 years.     13. History of tobacco abuse        Provided Lucia with a 5-10 year written screening schedule and personal prevention plan. Recommendations were developed using the USPSTF age appropriate recommendations. Education, counseling, and referrals were provided as needed. After Visit Summary printed and  given to patient which includes a list of additional screenings\tests needed.    Follow-up for HRA.    Lola White NP

## 2018-11-05 ENCOUNTER — TELEPHONE (OUTPATIENT)
Dept: PULMONOLOGY | Facility: CLINIC | Age: 68
End: 2018-11-05

## 2018-11-05 DIAGNOSIS — R91.1 PULMONARY NODULE, LEFT: Primary | ICD-10-CM

## 2018-11-05 NOTE — TELEPHONE ENCOUNTER
Pt contacted to reschedule appointment due to Ash BAUMAN being unavailable.  Message left stating intentions.

## 2018-11-07 ENCOUNTER — OFFICE VISIT (OUTPATIENT)
Dept: OTOLARYNGOLOGY | Facility: CLINIC | Age: 68
End: 2018-11-07
Payer: MEDICARE

## 2018-11-07 VITALS
DIASTOLIC BLOOD PRESSURE: 77 MMHG | SYSTOLIC BLOOD PRESSURE: 181 MMHG | WEIGHT: 160.5 LBS | TEMPERATURE: 99 F | BODY MASS INDEX: 31.34 KG/M2 | HEART RATE: 98 BPM

## 2018-11-07 DIAGNOSIS — R42 DIZZINESS: ICD-10-CM

## 2018-11-07 DIAGNOSIS — J01.90 ACUTE NON-RECURRENT SINUSITIS, UNSPECIFIED LOCATION: Primary | ICD-10-CM

## 2018-11-07 PROCEDURE — 1101F PT FALLS ASSESS-DOCD LE1/YR: CPT | Mod: CPTII,S$GLB,, | Performed by: PHYSICIAN ASSISTANT

## 2018-11-07 PROCEDURE — 99204 OFFICE O/P NEW MOD 45 MIN: CPT | Mod: S$GLB,,, | Performed by: PHYSICIAN ASSISTANT

## 2018-11-07 PROCEDURE — 99999 PR PBB SHADOW E&M-EST. PATIENT-LVL III: CPT | Mod: PBBFAC,,, | Performed by: PHYSICIAN ASSISTANT

## 2018-11-07 PROCEDURE — 3078F DIAST BP <80 MM HG: CPT | Mod: CPTII,S$GLB,, | Performed by: PHYSICIAN ASSISTANT

## 2018-11-07 PROCEDURE — 3077F SYST BP >= 140 MM HG: CPT | Mod: CPTII,S$GLB,, | Performed by: PHYSICIAN ASSISTANT

## 2018-11-07 RX ORDER — AMOXICILLIN AND CLAVULANATE POTASSIUM 875; 125 MG/1; MG/1
1 TABLET, FILM COATED ORAL 2 TIMES DAILY
Qty: 20 TABLET | Refills: 0 | Status: SHIPPED | OUTPATIENT
Start: 2018-11-07 | End: 2018-11-17

## 2018-11-07 NOTE — PROGRESS NOTES
Subjective:       Patient ID: Lucia Barlow is a 68 y.o. female.    Chief Complaint: Sinus Problem    Patient is a very pleasant 68 year old female here to see me today for the first time for evaluation of sinus pressure.  She has hx of COPD.  She notes recent postnasal drip triggering productive cough.  Worsening over past 3 months.      MAJOR SYMPTOMS:  No  Purulent anterior nasal discharge  Yes  Purulent or discolored posterior nasal discharge  No  Nasal congestion or obstruction  Yes  Facial Congestion or fullness - tenderness over midface  No  Hyposmia or anosmia  No  Fever    MINOR SYMPTOMS:  Yes  Headache  Yes  Ear pain, pressure, or fullness  No  Halitosis  No  Dental pain  Yes  Fatigue    The diagnosis of acute sinusitis is based on the presence of at least 2 major or 1 major and at least 2 minor symptoms.  Lucia does meet this criteria.  Clinical Practice Guideline for Acute Bacterial Rhinosinusitis in Children and Adults. Clin Infect Dis 2012; 54:e72    Onset:  3 months ago  Exacerbating factors: Yes - bending over  Relieving factors: No (unchanged with Singulair, Flonase or Prednisone)  Timing:  worsening      She also complains that the sinus pressure is making her dizzy.  Says she feels woozy and occasional brief room-spinning if she bends over.  Denies ear pain but has pressure AU.  Denies hearing changes or tinnitus.  Denies ear drainage.  Has hx of smoking (1 pack per day for 40 years; quit last year).  Denies dysphagia or hoarseness.      Review of Systems   Constitutional: Positive for fatigue. Negative for activity change, appetite change and fever.   HENT: Positive for congestion, ear pain (pressure), facial swelling (eyes in AM), postnasal drip, rhinorrhea, sinus pressure and sinus pain. Negative for ear discharge, hearing loss, nosebleeds, sneezing, sore throat, trouble swallowing and voice change.    Eyes: Negative for discharge and visual disturbance.   Respiratory: Positive for cough and  shortness of breath (with exertion). Negative for wheezing.    Cardiovascular: Negative for chest pain and palpitations.   Gastrointestinal: Negative for diarrhea, nausea and vomiting.   Musculoskeletal: Negative for gait problem.   Allergic/Immunologic: Negative for food allergies.   Neurological: Positive for dizziness and headaches. Negative for light-headedness.   Hematological: Negative for adenopathy.   Psychiatric/Behavioral: Negative for confusion.       Objective:      Physical Exam   Constitutional: She is oriented to person, place, and time. She appears well-developed and well-nourished. She is cooperative. No distress.   HENT:   Head: Normocephalic and atraumatic.   Right Ear: Tympanic membrane, external ear and ear canal normal. Tympanic membrane is not erythematous. No middle ear effusion.   Left Ear: Tympanic membrane, external ear and ear canal normal. Tympanic membrane is not erythematous.  No middle ear effusion.   Nose: No mucosal edema (able to see middle turbinates bilaterally; dry mucosa), rhinorrhea, nasal deformity or septal deviation. No epistaxis. Right sinus exhibits maxillary sinus tenderness. Right sinus exhibits no frontal sinus tenderness. Left sinus exhibits maxillary sinus tenderness. Left sinus exhibits no frontal sinus tenderness.   Mouth/Throat: Uvula is midline, oropharynx is clear and moist and mucous membranes are normal. Mucous membranes are not pale and not dry. No trismus in the jaw. Normal dentition. No uvula swelling. No oropharyngeal exudate or posterior oropharyngeal erythema.   Eyes: Conjunctivae, EOM and lids are normal. Pupils are equal, round, and reactive to light. Right eye exhibits no chemosis. Left eye exhibits no chemosis. Right conjunctiva is not injected. Left conjunctiva is not injected. No scleral icterus. Right eye exhibits normal extraocular motion and no nystagmus. Left eye exhibits normal extraocular motion and no nystagmus.   Neck: Trachea normal and  phonation normal. No tracheal tenderness present. No tracheal deviation present. No thyroid mass and no thyromegaly present.   Cardiovascular: Intact distal pulses.   Pulmonary/Chest: Effort normal. No stridor. No respiratory distress.   Abdominal: She exhibits no distension.   Lymphadenopathy:        Head (right side): No submental, no submandibular, no preauricular and no posterior auricular adenopathy present.        Head (left side): No submental, no submandibular, no preauricular and no posterior auricular adenopathy present.     She has no cervical adenopathy.   Neurological: She is alert and oriented to person, place, and time. No cranial nerve deficit.   Skin: Skin is warm and dry. No rash noted. No erythema.   Psychiatric: She has a normal mood and affect. Her behavior is normal.       Assessment:       1. Acute non-recurrent sinusitis, unspecified location    2. Dizziness        Plan:       Recommend Augmentin x 10 days; Mucinex BID and frequent nasal saline spray.  No additional oral steroids at this time.  Instructed her to call with her progress and if no improvement in her symptoms with Augmentin, would recommend CT sinus for further evaluation.  Recommend she continue Flonase daily.   We discussed in detail the proper mechanism of use directing the spray away from the nasal septum.  In addition, we also discussed that it will take two to three weeks of daily use to achieve maximal effectiveness.      I had a long discussion with the patient regarding their symptoms.  Dizziness, vertigo and disequilibrium are common symptoms reported by adults during visits to their doctors. They are all symptoms that can result from a peripheral vestibular disorder (a dysfunction of the balance organs of the inner ear) or central vestibular disorder (a dysfunction of one or more parts of the central nervous system that help process balance and spatial information).  There are also non-vestibular causes of dizziness,  and dizziness can be linked to a wide array of problems such as blood-flow irregularities from cardiovascular problems and blood pressure fluctuations.  She feels strongly that her dizziness is due to her worsening sinus pressure.  I would recommend a VNG with audiogram for further diagnostic testing if persists after treatment for sinusitis.        VNG if dizziness persists after sinuses cleared up

## 2018-11-27 ENCOUNTER — OFFICE VISIT (OUTPATIENT)
Dept: PULMONOLOGY | Facility: CLINIC | Age: 68
End: 2018-11-27
Payer: MEDICARE

## 2018-11-27 ENCOUNTER — HOSPITAL ENCOUNTER (OUTPATIENT)
Dept: RADIOLOGY | Facility: HOSPITAL | Age: 68
Discharge: HOME | End: 2018-11-27
Attending: INTERNAL MEDICINE
Payer: MEDICARE

## 2018-11-27 VITALS
RESPIRATION RATE: 18 BRPM | SYSTOLIC BLOOD PRESSURE: 142 MMHG | HEIGHT: 60 IN | DIASTOLIC BLOOD PRESSURE: 60 MMHG | WEIGHT: 159.38 LBS | HEART RATE: 104 BPM | BODY MASS INDEX: 31.29 KG/M2 | OXYGEN SATURATION: 93 %

## 2018-11-27 DIAGNOSIS — R91.1 PULMONARY NODULE, LEFT: ICD-10-CM

## 2018-11-27 DIAGNOSIS — J44.1 COPD EXACERBATION: Primary | ICD-10-CM

## 2018-11-27 DIAGNOSIS — J44.9 CHRONIC OBSTRUCTIVE PULMONARY DISEASE, UNSPECIFIED COPD TYPE: ICD-10-CM

## 2018-11-27 PROCEDURE — 99215 OFFICE O/P EST HI 40 MIN: CPT | Mod: HCNC,S$GLB,, | Performed by: INTERNAL MEDICINE

## 2018-11-27 PROCEDURE — 99999 PR PBB SHADOW E&M-EST. PATIENT-LVL V: CPT | Mod: PBBFAC,HCNC,, | Performed by: INTERNAL MEDICINE

## 2018-11-27 PROCEDURE — 71250 CT THORAX DX C-: CPT | Mod: TC,HCNC,PO

## 2018-11-27 PROCEDURE — 3078F DIAST BP <80 MM HG: CPT | Mod: CPTII,HCNC,S$GLB, | Performed by: INTERNAL MEDICINE

## 2018-11-27 PROCEDURE — 1101F PT FALLS ASSESS-DOCD LE1/YR: CPT | Mod: CPTII,HCNC,S$GLB, | Performed by: INTERNAL MEDICINE

## 2018-11-27 PROCEDURE — 71250 CT THORAX DX C-: CPT | Mod: 26,HCNC,, | Performed by: RADIOLOGY

## 2018-11-27 PROCEDURE — 3077F SYST BP >= 140 MM HG: CPT | Mod: CPTII,HCNC,S$GLB, | Performed by: INTERNAL MEDICINE

## 2018-11-27 RX ORDER — LEVOFLOXACIN 500 MG/1
500 TABLET, FILM COATED ORAL DAILY
Qty: 10 TABLET | Refills: 1 | Status: SHIPPED | OUTPATIENT
Start: 2018-11-27 | End: 2018-12-28

## 2018-11-27 RX ORDER — PREDNISONE 20 MG/1
TABLET ORAL
Qty: 20 TABLET | Refills: 1 | Status: SHIPPED | OUTPATIENT
Start: 2018-11-27 | End: 2018-12-28

## 2018-11-27 RX ORDER — ALBUTEROL SULFATE 0.83 MG/ML
2.5 SOLUTION RESPIRATORY (INHALATION)
Qty: 360 ML | Refills: 12 | Status: SHIPPED | OUTPATIENT
Start: 2018-11-27 | End: 2018-12-28

## 2018-11-27 RX ORDER — GUAIFENESIN 600 MG/1
1200 TABLET, EXTENDED RELEASE ORAL 2 TIMES DAILY
Qty: 60 TABLET | Refills: 11 | Status: SHIPPED | OUTPATIENT
Start: 2018-11-27 | End: 2018-12-28

## 2018-11-27 NOTE — PROGRESS NOTES
Subjective:       Patient ID: Lucia Barlow is a 68 y.o. female.    Chief Complaint: She       COPD    HPI     Lung Nodule  She presents for evaluation and treatment of a lung nodule. The patient had an abnormal imaging study but reports experiencing no current or past pulmonary symptoms. The patient denies other associated symptoms. She quit smoking approximately 1 year ago. The patient has no known exposure to tuberculosis and no history of PPD placement. The patient does not have a history of cancer.     COPD  She presents for evaluation and treatment of COPD. The patient is currently having symptoms / an exacerbation. Current symptoms include chronic dyspnea, cough productive of white sputum in small amounts and wheezing. Symptoms have been present since several months ago and have been unchanged. She denies chills and fever. Associated symptoms include fatigue and shortness of breath.  This episode appears to have been triggered by no identifiable factor. Treatments tried for the current exacerbation: none. The patient has been having similar episodes for approximately 1 years. She uses 1 pillows at night. Patient currently is not on home oxygen therapy.. The patient is having no constitutional symptoms, denying fever, chills, anorexia, or weight loss. The patient has not been hospitalized for this condition before. She quit smoking approximately 1 year ago. The patient is experiencing exercise intolerance (difficulty walking 3 blocks on flat ground and difficulty climbing 1 flights of stairs).          Past Medical History:           Past Medical History:   Diagnosis Date    Atherosclerosis of native coronary artery of native heart without angina pectoris 11/18/2016    Atherosclerotic PVD with intermittent claudication 1/17/2014    COPD (chronic obstructive pulmonary disease)     Ex-smoker     Hyperlipidemia     Hypertension     Osteoporosis 1/16 rosita 1/18    PVD (peripheral vascular disease)      S/P peripheral artery angioplasty 2014    Tobacco dependence      Past Surgical History:   Procedure Laterality Date    ANGIOPLASTY Bilateral 2014    aortoiliac stenting     AORTOGRAM, WITH RUNOFF Bilateral 2014    Performed by [facility]as [facility] at [facility]    CARPAL TUNNEL RELEASE      Henrry    COLECTOMY  approximate     pt states 1in colon -dx with benign mass removed-states no colon cancer    COLONOSCOPY N/A 2016    Procedure: COLONOSCOPY;  Surgeon: Dmitri Sterling MD;  Location: [facility];  Service: Endoscopy;  Laterality: N/A;    COLONOSCOPY N/A 2016    Performed by [facility] at [facility]    COLONOSCOPY N/A 10/16/2013    Performed by Praneeth Diaz MD at [facility]    HYSTERECTOMY      no cancer     Social History     Socioeconomic History    Marital status:      Spouse name: Not on file    Number of children: 4    Years of education: Not on file    Highest education level: Not on file   Social Needs    Financial resource strain: Not on file    Food insecurity - worry: Not on file    Food insecurity - inability: Not on file    Transportation needs - medical: Not on file    Transportation needs - non-medical: Not on file   Occupational History    Occupation:    Tobacco Use    Smoking status: Current Some Day Smoker     Packs/day: 0.25     Years: 50.00     Pack years: 12.50     Types: Cigarettes     Last attempt to quit: 2016     Years since quittin.8    Smokeless tobacco: Never Used   Substance and Sexual Activity    Alcohol use: No     Alcohol/week: 0.0 oz    Drug use: No    Sexual activity: No   Other Topics Concern    Not on file   Social History Narrative    Not on file     Review of Systems   Constitutional: Positive for fatigue. Negative for fever.   HENT: Positive for postnasal drip, rhinorrhea and congestion.    Eyes: Negative for redness and itching.   Respiratory: Positive for cough, sputum  production, shortness of breath, dyspnea on extertion, use of rescue inhaler and Paroxysmal Nocturnal Dyspnea.    Cardiovascular: Negative for chest pain, palpitations and leg swelling.   Genitourinary: Negative for difficulty urinating and hematuria.   Endocrine: Negative for cold intolerance and heat intolerance.    Skin: Negative for rash.   Gastrointestinal: Negative for nausea and abdominal pain.   Neurological: Negative for dizziness, syncope, weakness and light-headedness.   Hematological: Negative for adenopathy. Does not bruise/bleed easily.   Psychiatric/Behavioral: Negative for sleep disturbance. The patient is not nervous/anxious.        Objective:      Physical Exam   Constitutional: She is oriented to person, place, and time. She appears well-developed and well-nourished.   HENT:   Head: Normocephalic and atraumatic.   Mouth/Throat: Oropharyngeal exudate present.   Eyes: Conjunctivae are normal. Pupils are equal, round, and reactive to light.   Neck: Neck supple. No JVD present. No tracheal deviation present. No thyromegaly present.   Cardiovascular: Normal rate, regular rhythm and normal heart sounds.   Pulmonary/Chest: Effort normal. No respiratory distress. She has decreased breath sounds. She has wheezes in the right lower field and the left lower field. She has no rhonchi. She has no rales. She exhibits no tenderness.   Abdominal: Soft. Bowel sounds are normal.   Musculoskeletal: Normal range of motion. She exhibits no edema.   Lymphadenopathy:     She has no cervical adenopathy.   Neurological: She is alert and oriented to person, place, and time.   Skin: Skin is warm and dry.   Nursing note and vitals reviewed.    Personal Diagnostic Review  Chest x-ray: hyperinflation    Office Spirometry Results:     No flowsheet data found.  Pulmonary Studies Review 11/27/2018   SpO2 93   Height 60.000   Weight 2550.28   BMI (Calculated) 31.2   Predicted Distance 275.21   Predicted Distance Meters  (Calculated) 418.4         Assessment:       1. COPD exacerbation    2. Chronic obstructive pulmonary disease, unspecified COPD type        Outpatient Encounter Medications as of 11/27/2018   Medication Sig Dispense Refill    alendronate (FOSAMAX) 70 MG tablet TAKE 1 TAB ONCE WEEKLY IN MORNING WITH FULL GLASS WATER ON EMPTY STOMACH. DO NOT EAT OR LIE DOWN X AT LEAST 30MIN AFTERWARDS 12 tablet 3    ezetimibe (ZETIA) 10 mg tablet TAKE 1 TABLET ONE TIME DAILY 90 tablet 4    fluticasone (FLONASE) 50 mcg/actuation nasal spray 2 sprays (100 mcg total) by Each Nare route once daily. 16 g 5    meloxicam (MOBIC) 7.5 MG tablet TAKE 1 TABLET (7.5 MG TOTAL) BY MOUTH DAILY AS NEEDED FOR PAIN. 90 tablet 3    rosuvastatin (CRESTOR) 20 MG tablet TAKE 1 TABLET EVERY DAY 90 tablet 4    triamcinolone acetonide 0.1% (KENALOG) 0.1 % cream Apply topically 2 (two) times daily. 80 g 0    albuterol (PROVENTIL) 2.5 mg /3 mL (0.083 %) nebulizer solution Take 3 mLs (2.5 mg total) by nebulization every 6 (six) hours while awake. 360 mL 12    albuterol 90 mcg/actuation inhaler Inhale 2 puffs into the lungs every 6 (six) hours. 1 Inhaler 12    guaiFENesin (MUCINEX) 600 mg 12 hr tablet Take 2 tablets (1,200 mg total) by mouth 2 (two) times daily. 60 tablet 11    ibuprofen 200 mg Cap Take 1 tablet by mouth as needed.      levoFLOXacin (LEVAQUIN) 500 MG tablet Take 1 tablet (500 mg total) by mouth once daily. 10 tablet 1    predniSONE (DELTASONE) 20 MG tablet Prednisone 60 mg/ day for 3 days, 40 mg/day for 3 days,20 mg/ day for 3 days, (1/2 tablet )10 mg a day for 3 days. 20 tablet 1     No facility-administered encounter medications on file as of 11/27/2018.      Orders Placed This Encounter   Procedures    NEBULIZER FOR HOME USE     [facility]/Nebulizer supplies.  Customer Service: [phone]  Call: [phone]  Fax: [phone]  Billing Inquiries: [phone] or [phone]       Order Specific Question:    Height:     Answer:   5' (1.524 m)     Order Specific Question:   Weight:     Answer:   72.3 kg (159 lb 6.3 oz)     Order Specific Question:   Length of need (1-99 months):     Answer:   99    NEBULIZER KIT (SUPPLIES) FOR HOME USE     Order Specific Question:   Height:     Answer:   5' (1.524 m)     Order Specific Question:   Weight:     Answer:   72.3 kg (159 lb 6.3 oz)     Order Specific Question:   Length of need (1-99 months):     Answer:   99     Order Specific Question:   Mask or Mouthpiece?     Answer:   Mouthpiece    X-Ray Chest PA And Lateral     Standing Status:   Future     Standing Expiration Date:   5/27/2020     Order Specific Question:   Reason for Exam:     Answer:   SOB    Spirometry with/without bronchodilator     Standing Status:   Future     Standing Expiration Date:   5/27/2020     Plan:       Requested Prescriptions     Signed Prescriptions Disp Refills    predniSONE (DELTASONE) 20 MG tablet 20 tablet 1     Sig: Prednisone 60 mg/ day for 3 days, 40 mg/day for 3 days,20 mg/ day for 3 days, (1/2 tablet )10 mg a day for 3 days.    levoFLOXacin (LEVAQUIN) 500 MG tablet 10 tablet 1     Sig: Take 1 tablet (500 mg total) by mouth once daily.    albuterol (PROVENTIL) 2.5 mg /3 mL (0.083 %) nebulizer solution 360 mL 12     Sig: Take 3 mLs (2.5 mg total) by nebulization every 6 (six) hours while awake.    guaiFENesin (MUCINEX) 600 mg 12 hr tablet 60 tablet 11     Sig: Take 2 tablets (1,200 mg total) by mouth 2 (two) times daily.     COPD exacerbation  -     predniSONE (DELTASONE) 20 MG tablet; Prednisone 60 mg/ day for 3 days, 40 mg/day for 3 days,20 mg/ day for 3 days, (1/2 tablet )10 mg a day for 3 days.  Dispense: 20 tablet; Refill: 1  -     levoFLOXacin (LEVAQUIN) 500 MG tablet; Take 1 tablet (500 mg total) by mouth once daily.  Dispense: 10 tablet; Refill: 1  -     NEBULIZER FOR HOME USE  -     NEBULIZER KIT (SUPPLIES) FOR HOME USE  -     albuterol (PROVENTIL) 2.5 mg /3 mL (0.083 %) nebulizer  solution; Take 3 mLs (2.5 mg total) by nebulization every 6 (six) hours while awake.  Dispense: 360 mL; Refill: 12  -     guaiFENesin (MUCINEX) 600 mg 12 hr tablet; Take 2 tablets (1,200 mg total) by mouth 2 (two) times daily.  Dispense: 60 tablet; Refill: 11    Chronic obstructive pulmonary disease, unspecified COPD type  -     Spirometry with/without bronchodilator; Future; Expected date: 05/30/2019  -     X-Ray Chest PA And Lateral; Future; Expected date: 05/30/2019           Follow-up in about 1 year (around 11/27/2019) for Review rick and CXR.    MEDICAL DECISION MAKING: Moderate to high complexity.  Overall, the multiple problems listed are of moderate to high severity that may impact quality of life and activities of daily living. Side effects of medications, treatment plan as well as options and alternatives reviewed and discussed with patient. There was counseling of patient concerning these issues.    Total time spent in face to face counseling and coordination of care - 45  minutes over 50% of time was used in discussion of prognosis, risks, benefits of treatment, instructions and compliance with regimen . Discussion with other physicians or health care providers (DME, NP, pharmacy, respiratory therapy) occurred.

## 2018-11-27 NOTE — PATIENT INSTRUCTIONS
All appointments after 1/09/2018 will be conducted in our [facility] at  [id number] [address] [facility] [place] [address]?    Levofloxacin tablets  What is this medicine?  LEVOFLOXACIN (donald voe FLOX a sin) is a quinolone antibiotic. It is used to treat certain kinds of bacterial infections. It will not work for colds, flu, or other viral infections.  How should I use this medicine?  Take this medicine by mouth with a full glass of water. Follow the directions on the prescription label. This medicine can be taken with or without food. Take your medicine at regular intervals. Do not take your medicine more often than directed. Do not skip doses or stop your medicine early even if you feel better. Do not stop taking except on your doctor's advice.  A special MedGuide will be given to you by the pharmacist with each prescription and refill. Be sure to read this information carefully each time.  Talk to your pediatrician regarding the use of this medicine in children. While this drug may be prescribed for children as young as 6 months for selected conditions, precautions do apply.  What side effects may I notice from receiving this medicine?  Side effects that you should report to your doctor or health care professional as soon as possible:  · allergic reactions like skin rash or hives, swelling of the face, lips, or tongue  · anxious  · confusion  · depressed mood  · diarrhea  · fast, irregular heartbeat  · hallucination, loss of contact with reality  · joint, muscle, or tendon pain or swelling  · pain, tingling, numbness in the hands or feet  · suicidal thoughts or other mood changes  · sunburn  · unusually weak or tired  Side effects that usually do not require medical attention (report to your doctor or health care professional if they continue or are bothersome):  · dry mouth  · headache  · nausea  · trouble sleeping  What may interact with this medicine?  Do not take this medicine with any of the following  medications:  · arsenic trioxide  · chloroquine  · droperidol  · medicines for irregular heart rhythm like amiodarone, disopyramide, dofetilide, flecainide, quinidine, procainamide, sotalol  · some medicines for depression or mental problems like phenothiazines, pimozide, and ziprasidone  This medicine may also interact with the following medications:  · amoxapine  · antacids  · birth control pills  · cisapride  · dairy products  · didanosine (ddI) buffered tablets or powder  · haloperidol  · multivitamins  · NSAIDS, medicines for pain and inflammation, like ibuprofen or naproxen  · retinoid products like tretinoin or isotretinoin  · risperidone  · some other antibiotics like clarithromycin or erythromycin  · sucralfate  · theophylline  · warfarin  What if I miss a dose?  If you miss a dose, take it as soon as you remember. If it is almost time for your next dose, take only that dose. Do not take double or extra doses.  Where should I keep my medicine?  Keep out of the reach of children.  Store at room temperature between 15 and 30 degrees C (59 and 86 degrees F). Keep in a tightly closed container. Throw away any unused medicine after the expiration date.  What should I tell my health care provider before I take this medicine?  They need to know if you have any of these conditions:  · bone problems  · cerebral disease  · history of low levels of potassium in the blood  · irregular heartbeat  · joint problems  · kidney disease  · myasthenia gravis  · seizures  · tendon problems  · tingling of the fingers or toes, or other nerve disorder  · an unusual or allergic reaction to levofloxacin, other quinolone antibiotics, foods, dyes, or preservatives  · pregnant or trying to get pregnant  · breast-feeding  What should I watch for while using this medicine?  Tell your doctor or health care professional if your symptoms do not improve or if they get worse. Drink several glasses of water a day and cut down on drinks that  contain caffeine. You must not get dehydrated while taking this medicine.  You may get drowsy or dizzy. Do not drive, use machinery, or do anything that needs mental alertness until you know how this medicine affects you. Do not sit or stand up quickly, especially if you are an older patient. This reduces the risk of dizzy or fainting spells.  This medicine can make you more sensitive to the sun. Keep out of the sun. If you cannot avoid being in the sun, wear protective clothing and use a sunscreen. Do not use sun lamps or tanning beds/booths. Contact your doctor if you get a sunburn.  If you are a diabetic monitor your blood glucose carefully. If you get an unusual reading stop taking this medicine and call your doctor right away.  Do not treat diarrhea with over-the-counter products. Contact your doctor if you have diarrhea that lasts more than 2 days or if the diarrhea is severe and watery.  Avoid antacids, calcium, iron, and zinc products for 2 hours before and 2 hours after taking a dose of this medicine.  NOTE:This sheet is a summary. It may not cover all possible information. If you have questions about this medicine, talk to your doctor, pharmacist, or health care provider. Copyright© 2017 [employer]        Using a Nebulizer (Adult)    A nebulizer turns medicine into a mist. You breathe the mist in through a mask or a mouthpiece. To use your nebulizer, follow the steps below.  With a mask  · Put the correct dose of medicine in the cup. Attach the top as directed.  · Connect one end of the tubing to the cup and the other end to the nebulizer.  · Attach the mask to the cup.  · Place the mask over your nose and mouth. Make sure it fits securely and comfortably.  · Turn on the nebulizer.  · Take slow, deep breaths, keeping the nebulizer upright, until all the medicine is gone. This takes 10-15 minutes.  Be sure to follow directions you are given for cleaning the nebulizer and the mask.   With a  mouthpiece    · Put the correct dose of medicine in the cup. Attach the top as directed.  · Connect one end of the tubing to the cup and the other end to the nebulizer.  · Attach the mouthpiece to the cup.  · Put the mouthpiece between your teeth and close your lips around it. Keep your tongue below the mouthpiece.  · Turn on the nebulizer.  · Take slow, deep breaths through the mouthpiece, keeping the nebulizer upright, until all the medicine is gone. This takes 10-15 minutes.  Be sure to follow directions you are given for cleaning the nebulizer and the mouthpiece.   Date Last Reviewed: 10/1/2016  © 4478-7153 [employer]. [address] [place] [address]. All rights reserved. This information is not intended as a substitute for professional medical care. Always follow your healthcare professional's instructions.        Step-by-Step  Using a Nebulizer     11 steps in using a nebulizer with a face mask     Date Last Reviewed: 3/1/2017  © 9374-8620 [employer]. [address] [place] [address]. All rights reserved. This information is not intended as a substitute for professional medical care. Always follow your healthcare professional's instructions.        Step-by-Step  Using a Nebulizer with a Mouthpiece    Date Last Reviewed: 5/29/2015  © 0177-2032 [employer]. [address] [place] [address]. All rights reserved. This information is not intended as a substitute for professional medical care. Always follow your healthcare professional's instructions.        Albuterol inhalation solution  What is this medicine?  ALBUTEROL (al BYOO ter ole) is a bronchodilator. It helps to open up the airways in your lungs to make it easier to breathe. This medicine is used to treat and to prevent bronchospasm.  How should I use this medicine?  This medicine is used in a nebulizer. Nebulizers make a liquid into an aerosol that you breathe in through your mouth or  your mouth and nose into your lungs. You will be taught how to use your nebulizer. Follow the directions on your prescription label. Take your medicine at regular intervals. Do not use more often than directed.  Talk to your pediatrician regarding the use of this medicine in children. Special care may be needed.  What side effects may I notice from receiving this medicine?  Side effects that you should report to your doctor or health care professional as soon as possible:  · allergic reactions like skin rash, itching or hives, swelling of the face, lips, or tongue  · breathing problems  · chest pain  · feeling faint or lightheaded, falls  · high blood pressure  · irregular heartbeat  · fever  · muscle cramps or weakness  · pain, tingling, numbness in the hands or feet  · vomiting  Side effects that usually do not require medical attention (report to your doctor or health care professional if they continue or are bothersome):  · cough  · difficulty sleeping  · headache  · nervousness, trembling  · stomach upset  · stuffy or runny nose  · throat irritation  · unusual taste  What may interact with this medicine?  · anti-infectives like chloroquine and pentamidine  · caffeine  · cisapride  · diuretics  · medicines for colds  · medicines for depression or emotional or psychotic conditions  · medicines for weight loss including some herbal products  · methadone  · some antibiotics like clarithromycin, erythromycin, levofloxacin, and linezolid  · some heart medicines  · steroid hormones like dexamethasone, cortisone, hydrocortisone  · theophylline  · thyroid hormones  What if I miss a dose?  If you miss a dose, use it as soon as you can. If it is almost time for your next dose, use only that dose. Do not use double or extra doses.  Where should I keep my medicine?  Keep out of the reach of children.  Store between 2 and 25 degrees C (36 and 77 degrees F). Do not freeze. Protect from light. Throw away any unused medicine  after the expiration date. Most products are kept in the foil package until time of use. Some products can be used up to 1 week after they are removed from the foil pouch. Check the instructions that come with your medicine.  What should I tell my health care provider before I take this medicine?  They need to know if you have any of the following conditions:  · diabetes  · heart disease or irregular heartbeat  · high blood pressure  · pheochromocytoma  · seizures  · thyroid disease  · an unusual or allergic reaction to albuterol, levalbuterol, sulfites, other medicines, foods, dyes, or preservatives  · pregnant or trying to get pregnant  · breast-feeding  What should I watch for while using this medicine?  Tell your doctor or health care professional if your symptoms do not improve. Do not use extra albuterol. Call your doctor right away if your asthma or bronchitis gets worse while you are using this medicine.  If your mouth gets dry try chewing sugarless gum or sucking hard candy. Drink water as directed.  NOTE:This sheet is a summary. It may not cover all possible information. If you have questions about this medicine, talk to your doctor, pharmacist, or health care provider. Copyright© 2017 [employer]

## 2018-12-16 ENCOUNTER — HOSPITAL ENCOUNTER (EMERGENCY)
Facility: HOSPITAL | Age: 68
Discharge: HOME | End: 2018-12-16
Attending: FAMILY MEDICINE
Payer: MEDICARE

## 2018-12-16 VITALS
WEIGHT: 159 LBS | BODY MASS INDEX: 29.26 KG/M2 | TEMPERATURE: 98 F | HEIGHT: 62 IN | HEART RATE: 79 BPM | DIASTOLIC BLOOD PRESSURE: 75 MMHG | SYSTOLIC BLOOD PRESSURE: 168 MMHG | OXYGEN SATURATION: 94 % | RESPIRATION RATE: 22 BRPM

## 2018-12-16 DIAGNOSIS — J44.1 COPD EXACERBATION: Primary | ICD-10-CM

## 2018-12-16 DIAGNOSIS — R06.02 SOB (SHORTNESS OF BREATH): ICD-10-CM

## 2018-12-16 LAB
ALBUMIN SERPL BCP-MCNC: 2.8 G/DL
ALP SERPL-CCNC: 79 U/L
ALT SERPL W/O P-5'-P-CCNC: 21 U/L
ANION GAP SERPL CALC-SCNC: 8 MMOL/L
APTT BLDCRRT: 32.4 SEC
AST SERPL-CCNC: 22 U/L
BACTERIA #/AREA URNS HPF: NORMAL /HPF
BASOPHILS # BLD AUTO: 0.02 K/UL
BASOPHILS NFR BLD: 0.2 %
BILIRUB SERPL-MCNC: 0.4 MG/DL
BILIRUB UR QL STRIP: NEGATIVE
BNP SERPL-MCNC: 40 PG/ML
BUN SERPL-MCNC: 15 MG/DL
CALCIUM SERPL-MCNC: 9.5 MG/DL
CHLORIDE SERPL-SCNC: 103 MMOL/L
CLARITY UR: CLEAR
CO2 SERPL-SCNC: 24 MMOL/L
COLOR UR: YELLOW
CREAT SERPL-MCNC: 0.8 MG/DL
DIFFERENTIAL METHOD: ABNORMAL
EOSINOPHIL # BLD AUTO: 0.6 K/UL
EOSINOPHIL NFR BLD: 7.4 %
ERYTHROCYTE [DISTWIDTH] IN BLOOD BY AUTOMATED COUNT: 16 %
EST. GFR  (AFRICAN AMERICAN): >60 ML/MIN/1.73 M^2
EST. GFR  (NON AFRICAN AMERICAN): >60 ML/MIN/1.73 M^2
GLUCOSE SERPL-MCNC: 164 MG/DL
GLUCOSE UR QL STRIP: NEGATIVE
HCT VFR BLD AUTO: 40.5 %
HGB BLD-MCNC: 13.4 G/DL
HGB UR QL STRIP: NEGATIVE
HYALINE CASTS #/AREA URNS LPF: 0 /LPF
INFLUENZA A, MOLECULAR: NEGATIVE
INFLUENZA B, MOLECULAR: NEGATIVE
INR PPP: 0.9
KETONES UR QL STRIP: NEGATIVE
LEUKOCYTE ESTERASE UR QL STRIP: NEGATIVE
LYMPHOCYTES # BLD AUTO: 0.9 K/UL
LYMPHOCYTES NFR BLD: 10.8 %
MAGNESIUM SERPL-MCNC: 2.1 MG/DL
MCH RBC QN AUTO: 30.5 PG
MCHC RBC AUTO-ENTMCNC: 33.1 G/DL
MCV RBC AUTO: 92 FL
MICROSCOPIC COMMENT: NORMAL
MONOCYTES # BLD AUTO: 0.3 K/UL
MONOCYTES NFR BLD: 3.8 %
NEUTROPHILS # BLD AUTO: 6.6 K/UL
NEUTROPHILS NFR BLD: 77.8 %
NITRITE UR QL STRIP: NEGATIVE
PH UR STRIP: 7 [PH] (ref 5–8)
PHOSPHATE SERPL-MCNC: 3.6 MG/DL
PLATELET # BLD AUTO: 219 K/UL
PMV BLD AUTO: 10.6 FL
POTASSIUM SERPL-SCNC: 4.9 MMOL/L
PROCALCITONIN SERPL IA-MCNC: 0.05 NG/ML
PROT SERPL-MCNC: 6.5 G/DL
PROT UR QL STRIP: ABNORMAL
PROTHROMBIN TIME: 9.3 SEC
RBC # BLD AUTO: 4.39 M/UL
RBC #/AREA URNS HPF: 0 /HPF (ref 0–4)
SODIUM SERPL-SCNC: 135 MMOL/L
SP GR UR STRIP: 1.01 (ref 1–1.03)
SPECIMEN SOURCE: NORMAL
TROPONIN I SERPL DL<=0.01 NG/ML-MCNC: <0.006 NG/ML
URN SPEC COLLECT METH UR: ABNORMAL
UROBILINOGEN UR STRIP-ACNC: NEGATIVE EU/DL
WBC # BLD AUTO: 8.42 K/UL
WBC #/AREA URNS HPF: 0 /HPF (ref 0–5)

## 2018-12-16 PROCEDURE — 85610 PROTHROMBIN TIME: CPT | Mod: HCNC

## 2018-12-16 PROCEDURE — 85025 COMPLETE CBC W/AUTO DIFF WBC: CPT | Mod: HCNC

## 2018-12-16 PROCEDURE — 93005 ELECTROCARDIOGRAM TRACING: CPT

## 2018-12-16 PROCEDURE — 87502 INFLUENZA DNA AMP PROBE: CPT | Mod: HCNC

## 2018-12-16 PROCEDURE — 94640 AIRWAY INHALATION TREATMENT: CPT | Mod: HCNC

## 2018-12-16 PROCEDURE — 83880 ASSAY OF NATRIURETIC PEPTIDE: CPT | Mod: HCNC

## 2018-12-16 PROCEDURE — 25000242 PHARM REV CODE 250 ALT 637 W/ HCPCS: Mod: HCNC | Performed by: FAMILY MEDICINE

## 2018-12-16 PROCEDURE — 96366 THER/PROPH/DIAG IV INF ADDON: CPT

## 2018-12-16 PROCEDURE — 25000003 PHARM REV CODE 250: Mod: HCNC | Performed by: FAMILY MEDICINE

## 2018-12-16 PROCEDURE — 96367 TX/PROPH/DG ADDL SEQ IV INF: CPT

## 2018-12-16 PROCEDURE — 85730 THROMBOPLASTIN TIME PARTIAL: CPT | Mod: HCNC

## 2018-12-16 PROCEDURE — 96365 THER/PROPH/DIAG IV INF INIT: CPT

## 2018-12-16 PROCEDURE — 87040 BLOOD CULTURE FOR BACTERIA: CPT | Mod: 59,HCNC

## 2018-12-16 PROCEDURE — 99285 EMERGENCY DEPT VISIT HI MDM: CPT | Mod: 25

## 2018-12-16 PROCEDURE — 81000 URINALYSIS NONAUTO W/SCOPE: CPT | Mod: HCNC

## 2018-12-16 PROCEDURE — 36415 COLL VENOUS BLD VENIPUNCTURE: CPT | Mod: HCNC

## 2018-12-16 PROCEDURE — 83735 ASSAY OF MAGNESIUM: CPT | Mod: HCNC

## 2018-12-16 PROCEDURE — 93010 ELECTROCARDIOGRAM REPORT: CPT | Mod: HCNC,,, | Performed by: INTERNAL MEDICINE

## 2018-12-16 PROCEDURE — 80053 COMPREHEN METABOLIC PANEL: CPT | Mod: HCNC

## 2018-12-16 PROCEDURE — 96375 TX/PRO/DX INJ NEW DRUG ADDON: CPT

## 2018-12-16 PROCEDURE — 84484 ASSAY OF TROPONIN QUANT: CPT | Mod: HCNC

## 2018-12-16 PROCEDURE — 84100 ASSAY OF PHOSPHORUS: CPT | Mod: HCNC

## 2018-12-16 PROCEDURE — 84145 PROCALCITONIN (PCT): CPT | Mod: HCNC

## 2018-12-16 PROCEDURE — 63600175 PHARM REV CODE 636 W HCPCS: Mod: HCNC | Performed by: FAMILY MEDICINE

## 2018-12-16 RX ORDER — IPRATROPIUM BROMIDE AND ALBUTEROL SULFATE 2.5; .5 MG/3ML; MG/3ML
3 SOLUTION RESPIRATORY (INHALATION) ONCE
Status: COMPLETED | OUTPATIENT
Start: 2018-12-16 | End: 2018-12-16

## 2018-12-16 RX ORDER — IPRATROPIUM BROMIDE AND ALBUTEROL SULFATE 2.5; .5 MG/3ML; MG/3ML
3 SOLUTION RESPIRATORY (INHALATION)
Status: DISPENSED | OUTPATIENT
Start: 2018-12-16 | End: 2018-12-16

## 2018-12-16 RX ORDER — AMOXICILLIN AND CLAVULANATE POTASSIUM 875; 125 MG/1; MG/1
1 TABLET, FILM COATED ORAL 2 TIMES DAILY
Qty: 14 TABLET | Refills: 0 | Status: SHIPPED | OUTPATIENT
Start: 2018-12-16 | End: 2018-12-28

## 2018-12-16 RX ORDER — IPRATROPIUM BROMIDE AND ALBUTEROL SULFATE 2.5; .5 MG/3ML; MG/3ML
3 SOLUTION RESPIRATORY (INHALATION)
Status: COMPLETED | OUTPATIENT
Start: 2018-12-16 | End: 2018-12-16

## 2018-12-16 RX ORDER — VANCOMYCIN HCL IN 5 % DEXTROSE 1G/250ML
1000 PLASTIC BAG, INJECTION (ML) INTRAVENOUS
Status: COMPLETED | OUTPATIENT
Start: 2018-12-16 | End: 2018-12-16

## 2018-12-16 RX ORDER — DOXYCYCLINE 100 MG/1
100 CAPSULE ORAL 2 TIMES DAILY
Qty: 14 CAPSULE | Refills: 0 | Status: SHIPPED | OUTPATIENT
Start: 2018-12-16 | End: 2018-12-23

## 2018-12-16 RX ORDER — METHYLPREDNISOLONE SOD SUCC 125 MG
125 VIAL (EA) INJECTION
Status: COMPLETED | OUTPATIENT
Start: 2018-12-16 | End: 2018-12-16

## 2018-12-16 RX ADMIN — IPRATROPIUM BROMIDE AND ALBUTEROL SULFATE 3 ML: .5; 3 SOLUTION RESPIRATORY (INHALATION) at 12:12

## 2018-12-16 RX ADMIN — METHYLPREDNISOLONE SODIUM SUCCINATE 125 MG: 125 INJECTION, POWDER, FOR SOLUTION INTRAMUSCULAR; INTRAVENOUS at 10:12

## 2018-12-16 RX ADMIN — VANCOMYCIN 1000 MG: 1 INJECTION, SOLUTION INTRAVENOUS at 10:12

## 2018-12-16 RX ADMIN — IPRATROPIUM BROMIDE AND ALBUTEROL SULFATE 3 ML: .5; 3 SOLUTION RESPIRATORY (INHALATION) at 10:12

## 2018-12-16 RX ADMIN — CEFEPIME HYDROCHLORIDE 2 G: 2 INJECTION, POWDER, FOR SOLUTION INTRAVENOUS at 12:12

## 2018-12-16 NOTE — ED PROVIDER NOTES
SCRIBE #1 NOTE: I, Patricia Werner, am scribing for, and in the presence of, Toshia Mathur MD. I have scribed the entire note.      History      Chief Complaint   Patient presents with    Shortness of Breath     x several weeks, productive cough       Review of patient's allergies indicates:   Allergen Reactions    Skin staples [surgical stainless steel] Swelling    Egg derived      Shortness breath, lip swelling    Fish containing products     Iodine and iodide containing products Swelling    Latex, natural rubber Swelling    Nickel     Pravastatin      40 mg causes nausea vomitting but 20 mg ok    Shellfish containing products Swelling        HPI   HPI    12/16/2018, 10:15 AM   History obtained from the patient      History of Present Illness: Lucia Barlow is a 68 y.o. female patient with a PMHx of PVD, HTN, HLD, COPD who presents to the Emergency Department for SOB which onset gradually 2-3 weeks PTA. Symptoms are constant and moderate in severity. No mitigating or exacerbating factors reported. Associated sxs include productive cough with clear sputum. Patient denies any fever, chills, CP, BLE edema/pain, n/v, extremity weakness/numbness, dizziness, recent travel/long car rides, and all other sxs at this time. Pt was evaluated by Dr. Aleman (Pulmonology) on 11/27 for the same sxs. She was prescribed guaifenesin, Levaquin, and a prednisone taper. She reports she has finished these medications and has had no relief of her sxs. She also reports she has been doing albuterol breathing Tx's at home with no relief. She has not received her flu shot. Pt still smokes. No further complaints or concerns at this time.       Arrival mode: Personal vehicle     PCP: Rex Tejeda MD       Past Medical History:  Past Medical History:   Diagnosis Date    Atherosclerosis of native coronary artery of native heart without angina pectoris 11/18/2016    Atherosclerotic PVD with intermittent claudication 1/17/2014     COPD (chronic obstructive pulmonary disease)     Ex-smoker     Hyperlipidemia     Hypertension     Osteoporosis  rosita     PVD (peripheral vascular disease)     S/P peripheral artery angioplasty 2014    Tobacco dependence        Past Surgical History:  Past Surgical History:   Procedure Laterality Date    ANGIOPLASTY Bilateral 2014    aortoiliac stenting     AORTOGRAM, WITH RUNOFF Bilateral 2014    Performed by [facility] at [facility] CATH LAB    CARPAL TUNNEL RELEASE  2003    Hen[facility]    COLECTOMY  approximate 2005    pt states 1in colon -dx with benign mass removed-states no colon cancer    COLONOSCOPY N/A 2016    Performed by Dmitri Sterling MD at [facility] ENDO    COLONOSCOPY N/A 10/16/2013    Performed by Praneeth Diaz MD at [facility] ENDO    HYSTERECTOMY      no cancer         Family History:  Family History   Problem Relation Age of Onset    Stroke Father     Stroke Sister     Asthma Daughter     Diabetes Daughter     Breast cancer Daughter     Asthma Son        Social History:  Social History     Tobacco Use    Smoking status: Current Some Day Smoker     Packs/day: 0.25     Years: 50.00     Pack years: 12.50     Types: Cigarettes     Last attempt to quit: 2016     Years since quittin.9    Smokeless tobacco: Never Used   Substance and Sexual Activity    Alcohol use: No     Alcohol/week: 0.0 oz    Drug use: No    Sexual activity: No       ROS   Review of Systems   Constitutional: Negative for chills, diaphoresis and fever.   HENT: Negative for congestion and sore throat.    Eyes: Negative for visual disturbance.   Respiratory: Positive for cough (productive) and shortness of breath.    Cardiovascular: Negative for chest pain, palpitations and leg swelling.   Gastrointestinal: Negative for nausea and vomiting.   Genitourinary: Negative for dysuria.   Musculoskeletal: Negative for back pain and myalgias.   Skin: Negative for rash.   Neurological: Negative  "for dizziness, weakness and numbness.   Hematological: Does not bruise/bleed easily.   All other systems reviewed and are negative.    Physical Exam      Initial Vitals   BP Pulse Resp Temp SpO2   12/16/18 1011 12/16/18 1011 12/16/18 1029 12/16/18 1011 12/16/18 1011   (!) 182/81 86 (!) 22 98 °F (36.7 °C) 95 %      MAP       --                 Physical Exam  Nursing Notes and Vital Signs Reviewed.  Constitutional: Patient is in no acute distress. Well-developed and well-nourished.  Head: Atraumatic. Normocephalic.  Eyes: PERRL. EOM intact. Conjunctivae are not pale. No scleral icterus.  ENT: Mucous membranes are moist. Oropharynx is clear and symmetric.    Neck: Supple. Full ROM. No lymphadenopathy.  Cardiovascular: Regular rate. Regular rhythm. No murmurs, rubs, or gallops. Distal pulses are 2+ and symmetric.  Pulmonary/Chest: Tachypnea. Decreased air entry bilaterally. Conversational dyspnea.  Abdominal: Soft and non-distended.  There is no tenderness.  No rebound, guarding, or rigidity.   Musculoskeletal: Moves all extremities. No obvious deformities. No edema. No calf tenderness.  Skin: Warm and dry.  Neurological:  Alert, awake, and appropriate.  Normal speech.  No acute focal neurological deficits are appreciated.  Psychiatric: Normal affect. Good eye contact. Appropriate in content.    ED Course    Procedures  ED Vital Signs:  Vitals:    12/16/18 1011 12/16/18 1029 12/16/18 1034 12/16/18 1040   BP: (!) 182/81      Pulse: 86 89 79 75   Resp:  (!) 22 (!) 22 20   Temp: 98 °F (36.7 °C)      TempSrc: Oral      SpO2: 95% 95% 95% 99%   Weight: 72.1 kg (159 lb)      Height: 5' 2" (1.575 m)       12/16/18 1052 12/16/18 1101 12/16/18 1132 12/16/18 1202   BP:  (!) 188/83 (!) 181/88 134/70   Pulse: 81 78 72 82   Resp:  (!) 22 (!) 25 20   Temp:       TempSrc:       SpO2:  (!) 94% (!) 93% (!) 93%   Weight:       Height:        12/16/18 1225 12/16/18 1226 12/16/18 1232 12/16/18 1235   BP:   (!) 192/76    Pulse: 68 68 72 76 "   Resp: (!) 26 (!) 26 20 (!) 24   Temp:       TempSrc:       SpO2: 98% 99% 100% 100%   Weight:       Height:        12/16/18 1240 12/16/18 1302   BP:  (!) 168/75   Pulse: 74 79   Resp: (!) 23 (!) 22   Temp:     TempSrc:     SpO2: 100% (!) 94%   Weight:     Height:         Abnormal Lab Results:  Labs Reviewed   CBC W/ AUTO DIFFERENTIAL - Abnormal; Notable for the following components:       Result Value    RDW 16.0 (*)     Lymph # 0.9 (*)     Eos # 0.6 (*)     Gran% 77.8 (*)     Lymph% 10.8 (*)     Mono% 3.8 (*)     All other components within normal limits   URINALYSIS, REFLEX TO URINE CULTURE - Abnormal; Notable for the following components:    Protein, UA 1+ (*)     All other components within normal limits    Narrative:     Preferred Collection Type->Urine, Clean Catch   APTT - Abnormal; Notable for the following components:    aPTT 32.4 (*)     All other components within normal limits   COMPREHENSIVE METABOLIC PANEL - Abnormal; Notable for the following components:    Sodium 135 (*)     Glucose 164 (*)     Albumin 2.8 (*)     All other components within normal limits   CULTURE, BLOOD    Narrative:     Aerobic and anaerobic   CULTURE, BLOOD    Narrative:     Aerobic and anaerobic   INFLUENZA A & B BY MOLECULAR   B-TYPE NATRIURETIC PEPTIDE   PROTIME-INR   PROCALCITONIN   MAGNESIUM   PHOSPHORUS   TROPONIN I   URINALYSIS MICROSCOPIC    Narrative:     Preferred Collection Type->Urine, Clean Catch        All Lab Results:  Results for orders placed or performed during the hospital encounter of 12/16/18   Blood culture x two cultures. Draw prior to antibiotics.   Result Value Ref Range    Blood Culture, Routine No Growth to date     Blood Culture, Routine No Growth to date     Blood Culture, Routine No Growth to date     Blood Culture, Routine No Growth to date     Blood Culture, Routine No Growth to date    Blood culture x two cultures. Draw prior to antibiotics.   Result Value Ref Range    Blood Culture, Routine No  Growth to date     Blood Culture, Routine No Growth to date     Blood Culture, Routine No Growth to date     Blood Culture, Routine No Growth to date     Blood Culture, Routine No Growth to date    Influenza A & B by Molecular   Result Value Ref Range    Influenza A, Molecular Negative Negative    Influenza B, Molecular Negative Negative    Flu A & B Source NP    CBC auto differential   Result Value Ref Range    WBC 8.42 3.90 - 12.70 K/uL    RBC 4.39 4.00 - 5.40 M/uL    Hemoglobin 13.4 12.0 - 16.0 g/dL    Hematocrit 40.5 37.0 - 48.5 %    MCV 92 82 - 98 fL    MCH 30.5 27.0 - 31.0 pg    MCHC 33.1 32.0 - 36.0 g/dL    RDW 16.0 (H) 11.5 - 14.5 %    Platelets 219 150 - 350 K/uL    MPV 10.6 9.2 - 12.9 fL    Gran # (ANC) 6.6 1.8 - 7.7 K/uL    Lymph # 0.9 (L) 1.0 - 4.8 K/uL    Mono # 0.3 0.3 - 1.0 K/uL    Eos # 0.6 (H) 0.0 - 0.5 K/uL    Baso # 0.02 0.00 - 0.20 K/uL    Gran% 77.8 (H) 38.0 - 73.0 %    Lymph% 10.8 (L) 18.0 - 48.0 %    Mono% 3.8 (L) 4.0 - 15.0 %    Eosinophil% 7.4 0.0 - 8.0 %    Basophil% 0.2 0.0 - 1.9 %    Differential Method Automated    Urinalysis, Reflex to Urine Culture Urine, Clean Catch   Result Value Ref Range    Specimen UA Urine, Clean Catch     Color, UA Yellow Yellow, Straw, Felicia    Appearance, UA Clear Clear    pH, UA 7.0 5.0 - 8.0    Specific Gravity, UA 1.010 1.005 - 1.030    Protein, UA 1+ (A) Negative    Glucose, UA Negative Negative    Ketones, UA Negative Negative    Bilirubin (UA) Negative Negative    Occult Blood UA Negative Negative    Nitrite, UA Negative Negative    Urobilinogen, UA Negative <2.0 EU/dL    Leukocytes, UA Negative Negative   Brain natriuretic peptide   Result Value Ref Range    BNP 40 0 - 99 pg/mL   APTT   Result Value Ref Range    aPTT 32.4 (H) 21.0 - 32.0 sec   Protime-INR   Result Value Ref Range    Prothrombin Time 9.3 9.0 - 12.5 sec    INR 0.9 0.8 - 1.2   Procalcitonin   Result Value Ref Range    Procalcitonin 0.05 <0.25 ng/mL   Comprehensive metabolic panel   Result  Value Ref Range    Sodium 135 (L) 136 - 145 mmol/L    Potassium 4.9 3.5 - 5.1 mmol/L    Chloride 103 95 - 110 mmol/L    CO2 24 23 - 29 mmol/L    Glucose 164 (H) 70 - 110 mg/dL    BUN, Bld 15 8 - 23 mg/dL    Creatinine 0.8 0.5 - 1.4 mg/dL    Calcium 9.5 8.7 - 10.5 mg/dL    Total Protein 6.5 6.0 - 8.4 g/dL    Albumin 2.8 (L) 3.5 - 5.2 g/dL    Total Bilirubin 0.4 0.1 - 1.0 mg/dL    Alkaline Phosphatase 79 55 - 135 U/L    AST 22 10 - 40 U/L    ALT 21 10 - 44 U/L    Anion Gap 8 8 - 16 mmol/L    eGFR if African American >60 >60 mL/min/1.73 m^2    eGFR if non African American >60 >60 mL/min/1.73 m^2   Magnesium   Result Value Ref Range    Magnesium 2.1 1.6 - 2.6 mg/dL   Phosphorus   Result Value Ref Range    Phosphorus 3.6 2.7 - 4.5 mg/dL   Troponin I   Result Value Ref Range    Troponin I <0.006 0.000 - 0.026 ng/mL   Urinalysis Microscopic   Result Value Ref Range    RBC, UA 0 0 - 4 /hpf    WBC, UA 0 0 - 5 /hpf    Bacteria, UA None None-Occ /hpf    Hyaline Casts, UA 0 0-1/lpf /lpf    Microscopic Comment SEE COMMENT        Imaging Results:  Imaging Results          X-Ray Chest AP Portable (Final result)  Result time 12/16/18 11:10:23    Final result by Ayaan Denton MD (12/16/18 11:10:23)                 Impression:      No acute findings.      Electronically signed by: Ayaan Denton MD  Date:    12/16/2018  Time:    11:10             Narrative:    EXAMINATION:  XR CHEST AP PORTABLE    CLINICAL HISTORY:  Respiratory distress., Sepsis;    COMPARISON:  10/19/2018.    FINDINGS:  Heart size is normal. The lung fields are clear. No acute cardiopulmonary infiltrate.                               The EKG was ordered, reviewed, and independently interpreted by the ED provider.  Interpretation time: 1052  Rate: 81 BPM  Rhythm: normal sinus rhythm  Interpretation: Low voltage QRS. Septal infarct. No STEMI.         The Emergency Provider reviewed the vital signs and test results, which are outlined above.    ED Discussion      12:25 PM: Re-evaluated pt. Pt reports she feels better. There is more air entry bilaterally. Will order another continuous duoneb.     1:10 PM: Reassessed pt at this time. Offered pt admission for observation, pt declined stating she wants to go home. Pt is AAOx3, in NAD, and VSS. Pt states her condition has improved at this time. Discussed with pt all pertinent ED information and results. Discussed pt dx and plan of tx. Gave pt all f/u and return to the ED instructions. All questions and concerns were addressed at this time. Pt expresses understanding of information and instructions, and is comfortable with plan to discharge. Pt is stable for discharge.    I discussed with patient and/or family/caretaker that evaluation in the ED does not suggest any emergent or life threatening medical conditions requiring immediate intervention beyond what was provided in the ED, and I believe patient is safe for discharge.  Regardless, an unremarkable evaluation in the ED does not preclude the development or presence of a serious of life threatening condition. As such, patient was instructed to return immediately for any worsening or change in current symptoms.    ED Medication(s):  Medications   albuterol-ipratropium 2.5 mg-0.5 mg/3 mL nebulizer solution 3 mL ( Nebulization Canceled Entry 12/16/18 1140)   methylPREDNISolone sodium succinate injection 125 mg (125 mg Intravenous Given 12/16/18 1048)   cefepime 2 g in dextrose 5% 50 mL IVPB (ready to mix system) (0 g Intravenous Stopped 12/16/18 1315)   vancomycin in dextrose 5 % 1 gram/250 mL IVPB 1,000 mg (0 mg Intravenous Stopped 12/16/18 1235)   albuterol-ipratropium 2.5 mg-0.5 mg/3 mL nebulizer solution 3 mL ( Nebulization Canceled Entry 12/16/18 1145)   albuterol-ipratropium 2.5 mg-0.5 mg/3 mL nebulizer solution 3 mL ( Nebulization Canceled Entry 12/16/18 1330)       Follow-up Information     Rex Tejeda MD. Schedule an appointment as soon as possible for a visit  in 3 days.    Specialty:  Family Medicine  Contact information:  [address]  [place] [address] [phone]                   Current Discharge Medication List      START taking these medications    Details   amoxicillin-clavulanate 875-125mg (AUGMENTIN) 875-125 mg per tablet Take 1 tablet by mouth 2 (two) times daily.  Qty: 14 tablet, Refills: 0      doxycycline (VIBRAMYCIN) 100 MG Cap Take 1 capsule (100 mg total) by mouth 2 (two) times daily. for 7 days  Qty: 14 capsule, Refills: 0             Medical Decision Making    Medical Decision Making:   Clinical Tests:   Lab Tests: Ordered and Reviewed  Radiological Study: Ordered and Reviewed  Medical Tests: Reviewed and Ordered     Additional MDM:     Well's Criteria Score:  -Clinical symptoms of DVT (leg swelling, pain with palpation) = 0.0  -Other diagnosis less likely than pulmonary embolism =            0.0  -Heart Rate >100 =   0.0  -Immobilization (= or > than 3 days) or surgery in the previous 4 weeks = 0.0  -Previous DVT/PE = 0.0  -Hemoptysis =          0.0  -Malignancy =           0.0  Well's Probability Score =    0             Scribe Attestation:   Scribe #1: I performed the above scribed service and the documentation accurately describes the services I performed. I attest to the accuracy of the note.    Attending:   Physician Attestation Statement for Scribe #1: I, Toshia Mathur MD, personally performed the services described in this documentation, as scribed by Patricia Werner, in my presence, and it is both accurate and complete.          Clinical Impression       ICD-10-CM ICD-9-CM   1. COPD exacerbation J44.1 491.21   2. SOB (shortness of breath) R06.02 786.05       Disposition:   Disposition: Discharged  Condition: Stable         Toshia Mathur MD  12/21/18 2718

## 2018-12-17 ENCOUNTER — TELEPHONE (OUTPATIENT)
Dept: INTERNAL MEDICINE | Facility: CLINIC | Age: 68
End: 2018-12-17

## 2018-12-17 NOTE — TELEPHONE ENCOUNTER
----- Message from Corrie Espinal sent at 12/17/2018 11:22 AM CST -----  Contact: Debora - Daughter  States the pt needs to be worked in this week for a hospital f/u appt, can be reached at [phone]///[email or web address]

## 2018-12-21 LAB
BACTERIA BLD CULT: NORMAL
BACTERIA BLD CULT: NORMAL

## 2018-12-23 RX ORDER — MELOXICAM 7.5 MG/1
TABLET ORAL
Qty: 90 TABLET | Refills: 3 | Status: SHIPPED | OUTPATIENT
Start: 2018-12-23 | End: 2018-12-28

## 2018-12-23 RX ORDER — ALENDRONATE SODIUM 70 MG/1
TABLET ORAL
Qty: 12 TABLET | Refills: 3 | Status: SHIPPED | OUTPATIENT
Start: 2018-12-23 | End: 2019-01-25 | Stop reason: ALTCHOICE

## 2018-12-28 ENCOUNTER — PATIENT MESSAGE (OUTPATIENT)
Dept: ADMINISTRATIVE | Facility: OTHER | Age: 68
End: 2018-12-28

## 2018-12-28 ENCOUNTER — OFFICE VISIT (OUTPATIENT)
Dept: INTERNAL MEDICINE | Facility: CLINIC | Age: 68
End: 2018-12-28
Payer: MEDICARE

## 2018-12-28 VITALS
SYSTOLIC BLOOD PRESSURE: 156 MMHG | WEIGHT: 164.44 LBS | DIASTOLIC BLOOD PRESSURE: 70 MMHG | BODY MASS INDEX: 30.26 KG/M2 | HEART RATE: 110 BPM | HEIGHT: 62 IN | TEMPERATURE: 98 F | OXYGEN SATURATION: 97 %

## 2018-12-28 DIAGNOSIS — I10 ESSENTIAL HYPERTENSION: Chronic | ICD-10-CM

## 2018-12-28 DIAGNOSIS — R73.09 ABNORMAL GLUCOSE: ICD-10-CM

## 2018-12-28 DIAGNOSIS — J44.1 CHRONIC OBSTRUCTIVE PULMONARY DISEASE WITH ACUTE EXACERBATION: Primary | ICD-10-CM

## 2018-12-28 DIAGNOSIS — I25.10 ATHEROSCLEROSIS OF NATIVE CORONARY ARTERY OF NATIVE HEART WITHOUT ANGINA PECTORIS: ICD-10-CM

## 2018-12-28 DIAGNOSIS — M81.0 AGE RELATED OSTEOPOROSIS, UNSPECIFIED PATHOLOGICAL FRACTURE PRESENCE: ICD-10-CM

## 2018-12-28 DIAGNOSIS — I70.203 ATHEROSCLEROSIS OF ARTERY OF BOTH LOWER EXTREMITIES: Chronic | ICD-10-CM

## 2018-12-28 PROCEDURE — 99999 PR PBB SHADOW E&M-EST. PATIENT-LVL IV: CPT | Mod: PBBFAC,HCNC,, | Performed by: FAMILY MEDICINE

## 2018-12-28 PROCEDURE — 99499 UNLISTED E&M SERVICE: CPT | Mod: HCNC,S$GLB,, | Performed by: FAMILY MEDICINE

## 2018-12-28 PROCEDURE — 3078F DIAST BP <80 MM HG: CPT | Mod: CPTII,HCNC,S$GLB, | Performed by: FAMILY MEDICINE

## 2018-12-28 PROCEDURE — 3077F SYST BP >= 140 MM HG: CPT | Mod: CPTII,HCNC,S$GLB, | Performed by: FAMILY MEDICINE

## 2018-12-28 PROCEDURE — 96372 THER/PROPH/DIAG INJ SC/IM: CPT | Mod: HCNC,S$GLB,, | Performed by: FAMILY MEDICINE

## 2018-12-28 PROCEDURE — 1101F PT FALLS ASSESS-DOCD LE1/YR: CPT | Mod: CPTII,HCNC,S$GLB, | Performed by: FAMILY MEDICINE

## 2018-12-28 PROCEDURE — 99214 OFFICE O/P EST MOD 30 MIN: CPT | Mod: 25,HCNC,S$GLB, | Performed by: FAMILY MEDICINE

## 2018-12-28 RX ORDER — AMOXICILLIN 500 MG/1
1000 TABLET, FILM COATED ORAL EVERY 12 HOURS
Qty: 28 TABLET | Refills: 0 | Status: SHIPPED | OUTPATIENT
Start: 2018-12-28 | End: 2019-01-04

## 2018-12-28 RX ORDER — METHYLPREDNISOLONE ACETATE 40 MG/ML
40 INJECTION, SUSPENSION INTRA-ARTICULAR; INTRALESIONAL; INTRAMUSCULAR; SOFT TISSUE
Status: COMPLETED | OUTPATIENT
Start: 2018-12-28 | End: 2018-12-28

## 2018-12-28 RX ORDER — PREDNISONE 20 MG/1
40 TABLET ORAL DAILY
Qty: 8 TABLET | Refills: 0 | Status: SHIPPED | OUTPATIENT
Start: 2018-12-28 | End: 2019-01-01

## 2018-12-28 RX ORDER — CHLORTHALIDONE 25 MG/1
25 TABLET ORAL DAILY
Qty: 30 TABLET | Refills: 1 | Status: SHIPPED | OUTPATIENT
Start: 2018-12-28 | End: 2019-01-25 | Stop reason: SDUPTHER

## 2018-12-28 RX ADMIN — METHYLPREDNISOLONE ACETATE 40 MG: 40 INJECTION, SUSPENSION INTRA-ARTICULAR; INTRALESIONAL; INTRAMUSCULAR; SOFT TISSUE at 08:12

## 2018-12-28 NOTE — PROGRESS NOTES
"She is requesting that I take over as her primary care physician.    SEE SEPARATE NOTE for details of history of present illness, review of systems, and physical exam.    CHIEF COMPLAINT: Hospital Follow Up       Problem List Items Addressed This Visit        Pulmonary    COPD (chronic obstructive pulmonary disease) - Primary    Relevant Medications    methylPREDNISolone acetate injection 40 mg (Completed)    predniSONE (DELTASONE) 20 MG tablet    amoxicillin (AMOXIL) 500 MG Tab       Cardiac/Vascular    Atherosclerosis of native coronary artery of native heart without angina pectoris    Overview     CT of chest 1/8/ 16 showed -Aorta and vasculature: Atherosclerosis including coronary arteries.         Essential hypertension (Chronic)    Relevant Medications    chlorthalidone (HYGROTEN) 25 MG Tab    Other Relevant Orders    Basic metabolic panel    Hypertension Digital Medicine (HDMP) Enrollment Order (Completed)    Atherosclerosis of artery of both lower extremities (Chronic)    Overview     Had stents done around 2015.            Endocrine    Abnormal glucose    Relevant Orders    Hemoglobin A1c       Orthopedic    Osteoporosis          has a current medication list which includes the following prescription(s): alendronate, ezetimibe, fluticasone, guaifenesin, rosuvastatin, triamcinolone acetonide 0.1%, amoxicillin, chlorthalidone, and prednisone.    Vitals:    12/28/18 0743   BP: (!) 156/70   BP Location: Right arm   Patient Position: Sitting   BP Method: Medium (Manual)   Pulse: 110   Temp: 98.4 °F (36.9 °C)   TempSrc: Oral   SpO2: 97%   Weight: 74.6 kg (164 lb 7.4 oz)   Height: 5' 2" (1.575 m)       Chronic obstructive pulmonary disease with acute exacerbation  -     methylPREDNISolone acetate injection 40 mg  -     predniSONE (DELTASONE) 20 MG tablet; Take 2 tablets (40 mg total) by mouth once daily. (START 12/29/2018) for 4 days  Dispense: 8 tablet; Refill: 0  -     amoxicillin (AMOXIL) 500 MG Tab; Take 2 " tablets (1,000 mg total) by mouth every 12 (twelve) hours. for 7 days  Dispense: 28 tablet; Refill: 0    Atherosclerosis of native coronary artery of native heart without angina pectoris    Atherosclerosis of artery of both lower extremities    Essential hypertension  -     chlorthalidone (HYGROTEN) 25 MG Tab; Take 1 tablet (25 mg total) by mouth once daily.  Dispense: 30 tablet; Refill: 1  -     Basic metabolic panel; Future; Expected date: 01/28/2019  -     Hypertension Digital Medicine (HDMP) Enrollment Order    Age related osteoporosis, unspecified pathological fracture presence    Abnormal glucose  -     Hemoglobin A1c; Future; Expected date: 01/28/2019        Medications Ordered This Encounter   Medications    amoxicillin (AMOXIL) 500 MG Tab     Sig: Take 2 tablets (1,000 mg total) by mouth every 12 (twelve) hours. for 7 days     Dispense:  28 tablet     Refill:  0    chlorthalidone (HYGROTEN) 25 MG Tab     Sig: Take 1 tablet (25 mg total) by mouth once daily.     Dispense:  30 tablet     Refill:  1    methylPREDNISolone acetate injection 40 mg    predniSONE (DELTASONE) 20 MG tablet     Sig: Take 2 tablets (40 mg total) by mouth once daily. (START 12/29/2018) for 4 days     Dispense:  8 tablet     Refill:  0       Medications Discontinued During This Encounter   Medication Reason    albuterol (PROVENTIL) 2.5 mg /3 mL (0.083 %) nebulizer solution Patient no longer taking    amoxicillin-clavulanate 875-125mg (AUGMENTIN) 875-125 mg per tablet Patient no longer taking    ibuprofen 200 mg Cap Patient no longer taking    levoFLOXacin (LEVAQUIN) 500 MG tablet Patient no longer taking    meloxicam (MOBIC) 7.5 MG tablet Patient no longer taking    predniSONE (DELTASONE) 20 MG tablet Patient no longer taking       Follow-up in about 4 weeks (around 1/25/2019) for re-evaluate problem(s) discussed today.    There are no Patient Instructions on file for this visit.    ABOUT THIS DOCUMENTATION:  · The order of the  "conditions listed in the HPI is one of convenience and does not necessarily reflect the chronology of the appointment, nor the relative importance of a condition.  · Documentation entered by me for this encounter may have been done in part using speech-recognition technology. Although I have made an effort to ensure accuracy, "sound like" errors may exist and should be interpreted in context.                        -VICK Sandoval MD     "

## 2018-12-28 NOTE — PROGRESS NOTES
SEE SEPARATE NOTE FOR ASSESSMENT AND PLAN    --------------------------------  HISTORY OF PRESENT ILLNESS  --------------------------------    PROBLEM/CONDITION: She comes in with symptoms consistent with acute exacerbation of chronic obstructive pulmonary disease. QUALITY described as increased cough with the change in QUALITY of sputum. ONSET reported as 2-3 weeks ago. ASSOCIATED SYMPTOMS include dyspnea on exertion. SEVER ITY of symptoms described as MODERATE tomorrow at least SEVERE. She reports no fever or hemoptysis. She was treated at emergency department with doxycycline, but it appears that she was not given corticosteroids. She still has some wheezing on exam today. We discussed risks and benefits of treatment options.     PROBLEM/CONDITION: Hypertension is asymptomatic, untreated, and uncontrolled. She was taken off previous blood pressure me dications due to medication recalls. It appears that she has never been on chlorthalidone before.     PROBLEM/CONDITION: She has history of atherosclerotic coronary artery disease, with evidence of prior septal  MI on EKG, but she reports no angina or angina equivalent symptoms.     PROBLEM/CONDITION: She reports no claudication since she had her lower extremity angioplasty a few years ago.     PROBLEM/CONDITION: She reports  that she continues to take her Fosamax for treatment of her osteoporosis, without esophagitis symptoms. We discussed risks of corticosteroids and osteoporosis.     PROBLEM/CONDITION: Previous labs have shown elevated glucose, without prior diagnosis of diabetes.    Management of prescription drugs was part of medical decision making for this encounter.    --------------------------------  REVIEW OF SYSTEMS  ------------ --------------------    CONSTITUTIONAL: No fever reported.  CARDIOVASCULAR: No chest pain reported.  PULMONARY: No hemoptysis reported.    --------------------------------  PHYSICAL  EXAM  --------------------------------    CONSTITUTIONAL: Vital signs noted. No apparent distress. Does not appear acutely ill or septic. Appears adequately hydrated.  HEENT: External ENT grossly unremarkable. Hearing grossly intact. Or opharynx moist.  PULM: Lungs with mild scattered wheezing. Breathing unlabored.  HEART: Auscultation reveals regular rate and rhythm without murmur, gallop or rub.  DERM: Skin warm and moist with normal turgor.  NEURO: There are no gross focal motor deficits or gross deficits of cranial nerves III-XII.  PSYCHIATRIC: Alert and oriented x 3. Mood is grossly neutral. Affect appropriate. Judgment and insight not grossly compromise d.

## 2018-12-28 NOTE — PROGRESS NOTES
Patient was given prednisone 40 mg IM as ordered by MD. Patient was advised to wait in the clinic 15 minutes after injection. Patient tolerated injection well. GABO ENGEL

## 2019-01-02 ENCOUNTER — PATIENT OUTREACH (OUTPATIENT)
Dept: OTHER | Facility: OTHER | Age: 69
End: 2019-01-02

## 2019-01-02 NOTE — LETTER
Kelsi Schrader, PharmD  [address] [place] [address]     Dear Lucia Barlow,    Welcome to the [facility]!           My name is Kelsi Schrader PharmD and I am your dedicated Digital Medicine clinician.  As an expert in medication management, I will help ensure that the medications you are taking continue to provide you with the intended benefits.        I am Jessica Ortiz and I will be your health  for the duration of the program.  My  job is to help you identify lifestyle changes to improve your blood pressure control.  We will talk about nutrition, exercise, and other ways that you may be able to adjust your current habits to better your health. Together, we will work to improve your overall health and encourage you to meet your goals for a healthier lifestyle.    What we expect from YOU:    You will need to take blood pressure readings multiple times a week and no less than one reading per week.   It is important that you take your measurements at different times during the day, when possible.     What you should expect from your Digital Medicine Care Team:   We will provide you with education about high blood pressure, including lifestyle changes that could help you to control your blood pressure.   We will review your weekly readings and provide you with monthly blood pressure progress reports after you have been in the program for more than 30 days.   We will send monthly progress reports on your blood pressure control to your physician so they can follow along with your progress as well.    You will be able to reach me by phone at [phone] or through your [facility] account by clicking my name under Care Team on the right side of the home screen.    I look forward to working with you to achieve your blood pressure goals!    Sincerely,  Kelsi Schrader PharmD  Your personal clinician    Please visit  [email or web address] to learn more about high blood pressure and what you can do lower your blood pressure.                                                                                           Lucia Barlow  [address] [facility] [address]

## 2019-01-02 NOTE — PROGRESS NOTES
1st attempt for enrollment call. Left voicemail.         Last 5 Patient Entered Readings                                      Current 30 Day Average: 174/91     Recent Readings 12/31/2018 12/28/2018 12/28/2018 12/28/2018 12/28/2018    SBP (mmHg) 180 167 141 233 156    DBP (mmHg) 79 78 78 150 70    Pulse 103 91 82 90 -

## 2019-01-07 NOTE — PROGRESS NOTES
2nd attempt for enrollment call. Left voicemail. Sent My Chart message.          Last 5 Patient Entered Readings                                      Current 30 Day Average: 165/88     Recent Readings 1/2/2019 12/31/2018 12/28/2018 12/28/2018 12/28/2018    SBP (mmHg) 147 180 167 141 233    DBP (mmHg) 75 79 78 78 150    Pulse 104 103 91 82 90

## 2019-01-14 ENCOUNTER — PATIENT OUTREACH (OUTPATIENT)
Dept: ADMINISTRATIVE | Facility: HOSPITAL | Age: 69
End: 2019-01-14

## 2019-01-18 ENCOUNTER — LAB VISIT (OUTPATIENT)
Dept: LAB | Facility: HOSPITAL | Age: 69
End: 2019-01-18
Attending: FAMILY MEDICINE
Payer: MEDICARE

## 2019-01-18 DIAGNOSIS — I10 ESSENTIAL HYPERTENSION: Chronic | ICD-10-CM

## 2019-01-18 DIAGNOSIS — R73.09 ABNORMAL GLUCOSE: ICD-10-CM

## 2019-01-18 LAB
ANION GAP SERPL CALC-SCNC: 11 MMOL/L
BUN SERPL-MCNC: 26 MG/DL
CALCIUM SERPL-MCNC: 9.7 MG/DL
CHLORIDE SERPL-SCNC: 100 MMOL/L
CO2 SERPL-SCNC: 29 MMOL/L
CREAT SERPL-MCNC: 1 MG/DL
EST. GFR  (AFRICAN AMERICAN): >60 ML/MIN/1.73 M^2
EST. GFR  (NON AFRICAN AMERICAN): 57.2 ML/MIN/1.73 M^2
GLUCOSE SERPL-MCNC: 138 MG/DL
POTASSIUM SERPL-SCNC: 3.9 MMOL/L
SODIUM SERPL-SCNC: 140 MMOL/L

## 2019-01-18 PROCEDURE — 36415 COLL VENOUS BLD VENIPUNCTURE: CPT | Mod: HCNC

## 2019-01-18 PROCEDURE — 80048 BASIC METABOLIC PNL TOTAL CA: CPT | Mod: HCNC

## 2019-01-18 PROCEDURE — 83036 HEMOGLOBIN GLYCOSYLATED A1C: CPT | Mod: HCNC

## 2019-01-19 LAB
ESTIMATED AVG GLUCOSE: 180 MG/DL
HBA1C MFR BLD HPLC: 7.9 %

## 2019-01-21 NOTE — PROGRESS NOTES
Digital Medicine Enrollment Call    Introduced Mrs. Lucia Barlow to Digital Medicine.     Discussed program expectations and requirements.    Introduced digital medicine care team.     Reviewed the importance of self-monitoring for digital medicine participation.     Reviewed that the Digital Medicine team is not available for emergencies and instructed the patient to call 911 or [facility] ([phone] or [phone]) if one arises.    Pt reports she takes her BP readings while sitting on her couch with BP cuff placed over her long sleeved shirts. Pt advised of importance of proper BP technique. Pt states going forward she will sit at kitchen table and will place cuff on bare skin.               Last 5 Patient Entered Readings                                      Current 30 Day Average: 157/85     Recent Readings 1/18/2019 1/18/2019 1/17/2019 1/17/2019 1/11/2019    SBP (mmHg) 150 141 129 157 167    DBP (mmHg) 75 75 72 94 86    Pulse 96 100 91 98 94

## 2019-01-23 ENCOUNTER — PATIENT OUTREACH (OUTPATIENT)
Dept: OTHER | Facility: OTHER | Age: 69
End: 2019-01-23

## 2019-01-23 DIAGNOSIS — I10 ESSENTIAL HYPERTENSION: Primary | Chronic | ICD-10-CM

## 2019-01-23 RX ORDER — AMLODIPINE BESYLATE 5 MG/1
5 TABLET ORAL DAILY
Qty: 30 TABLET | Refills: 3 | Status: SHIPPED | OUTPATIENT
Start: 2019-01-23 | End: 2019-01-25 | Stop reason: SDUPTHER

## 2019-01-23 NOTE — PROGRESS NOTES
Last 5 Patient Entered Readings                                      Current 30 Day Average: 155/83     Recent Readings 1/23/2019 1/18/2019 1/18/2019 1/17/2019 1/17/2019    SBP (mmHg) 146 150 141 129 157    DBP (mmHg) 67 75 75 72 94    Pulse 107 96 100 91 98      67 yo female with a PMH listed below.  Past Medical History:   Diagnosis Date    Atherosclerosis of artery of both lower extremities 1/17/2014    Atherosclerosis of native coronary artery of native heart without angina pectoris 11/18/2016    Atherosclerotic PVD with intermittent claudication 1/17/2014    COPD (chronic obstructive pulmonary disease)     Ex-smoker     Hyperlipidemia     Hypertension     Osteoporosis 1/16 rosita 1/18    PVD (peripheral vascular disease)     S/P peripheral artery angioplasty 2/7/2014    Tobacco dependence    Called patient to do her initial pharmacist call. Patient endorses adherence to medication regimen. Patient denies hypotensive s/sx (lightheadedness, dizziness, nausea, fatigue); patient denies hypertensive s/sx (SOB, CP, severe headaches, changes in vision, dizziness, fatigue, confusion, anxiety, nosebleeds). Instructed patient to seek medical care if BP > 180/110 and is accompanied by hypertensive s/sx associated, patient confirms understanding.     Assessment:  Reviewed recent readings. Per 2017 ACC/ AHA HTN guidelines (goal of BP < 130/80), current 30-day average needs to be addressed more thoroughly today. Additional medications are required since she started chlorthalidone a month ago. Her labs returned WNL since started. Patient does not know what is driving her pressure. Patient's goal for the program to reduce her blood pressure to goal.    Plan:  Patient encouraged to fill out her on boarding questionnaires.   Patient will start amlodipine 5mg daily 1/24.  Continue current medication regimen. I will continue to monitor regularly and will follow-up in 2 to 3 weeks, sooner if blood pressure begins to trend  upward or downward.     Current medication regimen:  Hypertension Medications             chlorthalidone (HYGROTEN) 25 MG Tab Take 1 tablet (25 mg total) by mouth once daily.        Patient denies having questions or concerns. Patient has my contact information and knows to call with any concerns or clinical changes.

## 2019-01-25 ENCOUNTER — LAB VISIT (OUTPATIENT)
Dept: LAB | Facility: HOSPITAL | Age: 69
End: 2019-01-25
Attending: FAMILY MEDICINE
Payer: MEDICARE

## 2019-01-25 ENCOUNTER — OFFICE VISIT (OUTPATIENT)
Dept: INTERNAL MEDICINE | Facility: CLINIC | Age: 69
End: 2019-01-25
Payer: MEDICARE

## 2019-01-25 VITALS
HEIGHT: 61 IN | OXYGEN SATURATION: 95 % | BODY MASS INDEX: 29.43 KG/M2 | SYSTOLIC BLOOD PRESSURE: 138 MMHG | WEIGHT: 155.88 LBS | TEMPERATURE: 98 F | HEART RATE: 109 BPM | DIASTOLIC BLOOD PRESSURE: 60 MMHG

## 2019-01-25 DIAGNOSIS — J44.1 CHRONIC OBSTRUCTIVE PULMONARY DISEASE WITH ACUTE EXACERBATION: ICD-10-CM

## 2019-01-25 DIAGNOSIS — I70.0 ATHEROSCLEROSIS OF AORTA: ICD-10-CM

## 2019-01-25 DIAGNOSIS — E11.69 DYSLIPIDEMIA ASSOCIATED WITH TYPE 2 DIABETES MELLITUS: Chronic | ICD-10-CM

## 2019-01-25 DIAGNOSIS — I70.203 ATHEROSCLEROSIS OF ARTERY OF BOTH LOWER EXTREMITIES: Chronic | ICD-10-CM

## 2019-01-25 DIAGNOSIS — K21.9 GASTROESOPHAGEAL REFLUX DISEASE WITHOUT ESOPHAGITIS: Chronic | ICD-10-CM

## 2019-01-25 DIAGNOSIS — M81.0 AGE-RELATED OSTEOPOROSIS WITHOUT CURRENT PATHOLOGICAL FRACTURE: ICD-10-CM

## 2019-01-25 DIAGNOSIS — E78.5 DYSLIPIDEMIA ASSOCIATED WITH TYPE 2 DIABETES MELLITUS: Chronic | ICD-10-CM

## 2019-01-25 DIAGNOSIS — I10 ESSENTIAL HYPERTENSION: Chronic | ICD-10-CM

## 2019-01-25 DIAGNOSIS — E11.51 TYPE 2 DIABETES MELLITUS WITH PERIPHERAL VASCULAR DISEASE: Chronic | ICD-10-CM

## 2019-01-25 DIAGNOSIS — E11.51 TYPE 2 DIABETES MELLITUS WITH PERIPHERAL VASCULAR DISEASE: Primary | Chronic | ICD-10-CM

## 2019-01-25 DIAGNOSIS — I25.10 ATHEROSCLEROSIS OF NATIVE CORONARY ARTERY OF NATIVE HEART WITHOUT ANGINA PECTORIS: ICD-10-CM

## 2019-01-25 LAB
ALBUMIN/CREAT UR: 556.5 UG/MG
CREAT UR-MCNC: 161 MG/DL
MICROALBUMIN UR DL<=1MG/L-MCNC: 896 UG/ML

## 2019-01-25 PROCEDURE — 99999 PR PBB SHADOW E&M-EST. PATIENT-LVL IV: ICD-10-PCS | Mod: PBBFAC,HCNC,, | Performed by: FAMILY MEDICINE

## 2019-01-25 PROCEDURE — 82043 UR ALBUMIN QUANTITATIVE: CPT | Mod: HCNC

## 2019-01-25 PROCEDURE — 3075F PR MOST RECENT SYSTOLIC BLOOD PRESS GE 130-139MM HG: ICD-10-PCS | Mod: HCNC,CPTII,S$GLB, | Performed by: FAMILY MEDICINE

## 2019-01-25 PROCEDURE — 3045F PR MOST RECENT HEMOGLOBIN A1C LEVEL 7.0-9.0%: CPT | Mod: HCNC,CPTII,S$GLB, | Performed by: FAMILY MEDICINE

## 2019-01-25 PROCEDURE — 1101F PR PT FALLS ASSESS DOC 0-1 FALLS W/OUT INJ PAST YR: ICD-10-PCS | Mod: HCNC,CPTII,S$GLB, | Performed by: FAMILY MEDICINE

## 2019-01-25 PROCEDURE — 99214 OFFICE O/P EST MOD 30 MIN: CPT | Mod: HCNC,S$GLB,, | Performed by: FAMILY MEDICINE

## 2019-01-25 PROCEDURE — 99499 RISK ADDL DX/OHS AUDIT: ICD-10-PCS | Mod: HCNC,S$GLB,, | Performed by: FAMILY MEDICINE

## 2019-01-25 PROCEDURE — 1101F PT FALLS ASSESS-DOCD LE1/YR: CPT | Mod: HCNC,CPTII,S$GLB, | Performed by: FAMILY MEDICINE

## 2019-01-25 PROCEDURE — 99499 UNLISTED E&M SERVICE: CPT | Mod: HCNC,S$GLB,, | Performed by: FAMILY MEDICINE

## 2019-01-25 PROCEDURE — 3075F SYST BP GE 130 - 139MM HG: CPT | Mod: HCNC,CPTII,S$GLB, | Performed by: FAMILY MEDICINE

## 2019-01-25 PROCEDURE — 3078F DIAST BP <80 MM HG: CPT | Mod: HCNC,CPTII,S$GLB, | Performed by: FAMILY MEDICINE

## 2019-01-25 PROCEDURE — 3045F PR MOST RECENT HEMOGLOBIN A1C LEVEL 7.0-9.0%: ICD-10-PCS | Mod: HCNC,CPTII,S$GLB, | Performed by: FAMILY MEDICINE

## 2019-01-25 PROCEDURE — 99214 PR OFFICE/OUTPT VISIT, EST, LEVL IV, 30-39 MIN: ICD-10-PCS | Mod: HCNC,S$GLB,, | Performed by: FAMILY MEDICINE

## 2019-01-25 PROCEDURE — 3078F PR MOST RECENT DIASTOLIC BLOOD PRESSURE < 80 MM HG: ICD-10-PCS | Mod: HCNC,CPTII,S$GLB, | Performed by: FAMILY MEDICINE

## 2019-01-25 PROCEDURE — 99999 PR PBB SHADOW E&M-EST. PATIENT-LVL IV: CPT | Mod: PBBFAC,HCNC,, | Performed by: FAMILY MEDICINE

## 2019-01-25 RX ORDER — METFORMIN HYDROCHLORIDE 500 MG/1
500 TABLET, EXTENDED RELEASE ORAL
Qty: 90 TABLET | Refills: 0 | Status: SHIPPED | OUTPATIENT
Start: 2019-01-25 | End: 2019-03-29 | Stop reason: SDUPTHER

## 2019-01-25 RX ORDER — PANTOPRAZOLE SODIUM 40 MG/1
40 TABLET, DELAYED RELEASE ORAL DAILY
Qty: 90 TABLET | Refills: 3 | Status: SHIPPED | OUTPATIENT
Start: 2019-01-25 | End: 2020-01-21

## 2019-01-25 RX ORDER — MULTIVITAMIN
2 TABLET ORAL DAILY
Qty: 180 TABLET | Refills: 4 | Status: SHIPPED | OUTPATIENT
Start: 2019-01-25 | End: 2020-08-19 | Stop reason: SDUPTHER

## 2019-01-25 RX ORDER — ASPIRIN 81 MG/1
81 TABLET ORAL DAILY
Qty: 90 TABLET | Refills: 3 | Status: SHIPPED | OUTPATIENT
Start: 2019-01-25 | End: 2020-08-19 | Stop reason: SDUPTHER

## 2019-01-25 RX ORDER — AMLODIPINE BESYLATE 5 MG/1
5 TABLET ORAL DAILY
Qty: 90 TABLET | Refills: 3 | Status: SHIPPED | OUTPATIENT
Start: 2019-01-25 | End: 2019-02-12 | Stop reason: DRUGHIGH

## 2019-01-25 RX ORDER — CHLORTHALIDONE 25 MG/1
25 TABLET ORAL DAILY
Qty: 90 TABLET | Refills: 3 | Status: SHIPPED | OUTPATIENT
Start: 2019-01-25 | End: 2019-08-16 | Stop reason: ALTCHOICE

## 2019-01-25 NOTE — ASSESSMENT & PLAN NOTE
Lab Results   Component Value Date    HGBA1C 7.9 (H) 01/18/2019    ESTGFRAFRICA >60.0 01/18/2019    EGFRNONAA 57.2 (A) 01/18/2019     BP Readings from Last 1 Encounters:   01/25/19 138/60     Wt Readings from Last 3 Encounters:   01/25/19 70.7 kg (155 lb 13.8 oz)   12/28/18 74.6 kg (164 lb 7.4 oz)   12/16/18 72.1 kg (159 lb)     Body mass index is 29.45 kg/m².    HEALTH MAINTENANCE: Diabetic health maintenance interventions reviewed and are up to date except for:      Topic    Eye Exam     Urine Protein Check      Current on no treatment.  We discussed treatment options.    DIABETIC FOOT EXAM: Inspection of feet reveals no open wounds, ulcerations, significant calluses, or evidence of arterial insufficiency. 10g monofilament sensation is intact in all 5 locations tested.

## 2019-01-25 NOTE — PROGRESS NOTES
CHIEF COMPLAINT  Follow-up (4 week)      HISTORY OF PRESENT ILLNESS      Problem List Items Addressed This Visit        Pulmonary    Lung nodule < 6cm on CT    Overview     CT Chest Without Contrast     Details       Reading Physician Reading Date Result Priority  Marko Bhatti MD 11/27/2018     Narrative    EXAMINATION:  CT CHEST WITHOUT CONTRAST    CLINICAL HISTORY:  Pulmonary Nodule; Solitary pulmonary nodule    TECHNIQUE:  Low dose axial images, sagittal and coronal reformations were obtained from the thoracic inlet to the lung bases. Contrast was not administered.    COMPARISON:  01/18/2017    FINDINGS:  Heart and Great vessels: Scattered atherosclerotic plaque noted involving the thoracic aorta and aortic branch vessels, including the coronary arteries.  No pericardial effusion.    Thoracic Adenopathy: None demonstrated.    Lungs: Previously biopsied solid noncalcified 1 cm pulmonary nodule in the left lower lobe is unchanged as seen on coronal image 109, series 601.  Additional sub 4 mm subpleural pulmonary nodules in the left lower lobe are unchanged as seen on axial images 54 and 63, series 3.  No new pulmonary nodules demonstrated.  No parenchymal consolidations or pleural effusions.    Upper Abdomen: Calcifications at the bilateral renal amelia are favored to be vascular in nature and appear unchanged from prior.    Bones: No acute or suspicious osseous findings.    Miscellaneous: None    Impression      Unchanged appearance of left lower lobe pulmonary nodules with the largest previously biopsied nodule measuring up to 1 cm         Current Assessment & Plan     This problem appears stable.         COPD (chronic obstructive pulmonary disease)    Current Assessment & Plan     Appears well controlled, compensated.            Cardiac/Vascular    Atherosclerosis of aorta    Overview     CT of chest 1/8/ 16 showed -Aorta and vasculature: Atherosclerosis including coronary arteries.         Current  Assessment & Plan     This problem is asymptomatic, stable.         Relevant Medications    aspirin (ECOTRIN) 81 MG EC tablet    Atherosclerosis of native coronary artery of native heart without angina pectoris    Overview     CT of chest 1/8/ 16 showed -Aorta and vasculature: Atherosclerosis including coronary arteries.         Current Assessment & Plan     This problem is asymptomatic, compensated, clinically stable.         Relevant Medications    aspirin (ECOTRIN) 81 MG EC tablet    Essential hypertension (Chronic)    Current Assessment & Plan     Well controlled, improved, asymptomatic.         Relevant Medications    amLODIPine (NORVASC) 5 MG tablet    chlorthalidone (HYGROTEN) 25 MG Tab    Other Relevant Orders    Potassium    Dyslipidemia associated with type 2 diabetes mellitus (Chronic)    Current Assessment & Plan     Lab Results   Component Value Date    CHOL 150 03/21/2018    CHOL 152 07/26/2017    TRIG 87 03/21/2018    TRIG 76 07/26/2017    HDL 61 03/21/2018    HDL 59 07/26/2017    LDLCALC 71.6 03/21/2018    LDLCALC 77.8 07/26/2017    NONHDLCHOL 89 03/21/2018    NONHDLCHOL 93 07/26/2017    AST 22 12/16/2018    ALT 21 12/16/2018     Wt Readings from Last 2 Encounters:   01/25/19 70.7 kg (155 lb 13.8 oz)   12/28/18 74.6 kg (164 lb 7.4 oz)     Body mass index is 29.45 kg/m².  The 10-year ASCVD risk score ([place] NEWBY Jr., et al., 2013) is: 42%    Values used to calculate the score:      Age: 68 years      Sex: Female      Is Non- : Yes      Diabetic: Yes      Tobacco smoker: Yes      Systolic Blood Pressure: 138 mmHg      Is BP treated: Yes      HDL Cholesterol: 61 mg/dL      Total Cholesterol: 150 mg/dL  She appears to be tolerating statin for dyslipidemia without apparent symptoms of myositis or hepatic dysfunction.          Relevant Medications    metFORMIN (GLUCOPHAGE-XR) 500 MG 24 hr tablet    Atherosclerosis of artery of both lower extremities (Chronic)    Overview     Had stents  done around 2015.         Current Assessment & Plan     This problem is asymptomatic, compensated, clinically stable.         Relevant Medications    aspirin (ECOTRIN) 81 MG EC tablet       Endocrine    Type 2 diabetes mellitus with peripheral vascular disease - Primary (Chronic)    Current Assessment & Plan     Lab Results   Component Value Date    HGBA1C 7.9 (H) 01/18/2019    ESTGFRAFRICA >60.0 01/18/2019    EGFRNONAA 57.2 (A) 01/18/2019     BP Readings from Last 1 Encounters:   01/25/19 138/60     Wt Readings from Last 3 Encounters:   01/25/19 70.7 kg (155 lb 13.8 oz)   12/28/18 74.6 kg (164 lb 7.4 oz)   12/16/18 72.1 kg (159 lb)     Body mass index is 29.45 kg/m².    HEALTH MAINTENANCE: Diabetic health maintenance interventions reviewed and are up to date except for:      Topic    Eye Exam     Urine Protein Check      Current on no treatment.  We discussed treatment options.    DIABETIC FOOT EXAM: Inspection of feet reveals no open wounds, ulcerations, significant calluses, or evidence of arterial insufficiency. 10g monofilament sensation is intact in all 5 locations tested.         Relevant Medications    metFORMIN (GLUCOPHAGE-XR) 500 MG 24 hr tablet    Other Relevant Orders    Ambulatory referral to Optometry    Microalbumin/creatinine urine ratio    Ambulatory Referral to Diabetes Education    Hemoglobin A1c       GI    Gastroesophageal reflux disease without esophagitis (Chronic)    Current Assessment & Plan     She reports typical GERD symptoms as follows. ONSET reported as several years ago. QUALITY described as burning dyspepsia. LOCATION reported as epigastric with radiation upwards. SEVERITY described as moderate. EXACERBATING FACTORS include recumbent positioning, certain foods, and aspirin. ALLEVIATING FACTORS include OTC antacids, which provide transient symptom relief. She reports no vomiting, blood in stool, or dark tarry stool.          Relevant Medications    pantoprazole (PROTONIX) 40 MG  "tablet       Orthopedic    Osteoporosis    Overview     Alendronate therapy from 1/8/2014-1/25/2019.         Current Assessment & Plan     She has been on alendronate for five years now, so we are discontinuing the medication.         Relevant Medications    calcium-vitamin D (CALCIUM WITH VITAMIN D) 600 mg(1,500mg) -400 unit Tab            REVIEW OF SYSTEMS  CONSTITUTIONAL: No fever reported.  CARDIOVASCULAR: No chest pain reported.  PULMONARY: No trouble breathing reported.     PHYSICAL EXAM  Vitals:    01/25/19 0748   BP: 138/60   BP Location: Right arm   Patient Position: Sitting   BP Method: Large (Manual)   Pulse: 109   Temp: 97.6 °F (36.4 °C)   TempSrc: Tympanic   SpO2: 95%   Weight: 70.7 kg (155 lb 13.8 oz)   Height: 5' 1" (1.549 m)     CONSTITUTIONAL: Vital signs noted. No apparent distress. Does not appear acutely ill or septic. Appears adequately hydrated.  ENT: Oropharynx moist.  PULM: Breathing unlabored.  CV: Heart regular.  DERM: Skin normothermic.  PSYCHIATRIC: Alert and conversant. Grossly oriented. Mood is grossly neutral. Affect appropriate. Judgment and insight not grossly compromised.     PRESCRIPTION MEDICATION MANAGEMENT  Medications Ordered This Encounter   Medications    amLODIPine (NORVASC) 5 MG tablet     Sig: Take 1 tablet (5 mg total) by mouth once daily.     Dispense:  90 tablet     Refill:  3    aspirin (ECOTRIN) 81 MG EC tablet     Sig: Take 1 tablet (81 mg total) by mouth once daily. (FOR HEART HEALTH)     Dispense:  90 tablet     Refill:  3    calcium-vitamin D (CALCIUM WITH VITAMIN D) 600 mg(1,500mg) -400 unit Tab     Sig: Take 2 tablets by mouth once daily.     Dispense:  180 tablet     Refill:  4     IMPORTANT!  This REPLACES alendronate. DISCONTINUE any existing prescription for alendronate.    chlorthalidone (HYGROTEN) 25 MG Tab     Sig: Take 1 tablet (25 mg total) by mouth once daily.     Dispense:  90 tablet     Refill:  3    metFORMIN (GLUCOPHAGE-XR) 500 MG 24 hr tablet " "    Sig: Take 1 tablet (500 mg total) by mouth daily with breakfast.     Dispense:  90 tablet     Refill:  0    pantoprazole (PROTONIX) 40 MG tablet     Sig: Take 1 tablet (40 mg total) by mouth once daily.     Dispense:  90 tablet     Refill:  3     Medications Discontinued During This Encounter   Medication Reason    amLODIPine (NORVASC) 5 MG tablet Reorder    chlorthalidone (HYGROTEN) 25 MG Tab Reorder    alendronate (FOSAMAX) 70 MG tablet Therapy completed       Follow-up in about 2 months (around 3/25/2019) for re-evaluate problem(s) discussed today.    Documentation entered by me for this encounter may have been done in part using speech-recognition technology. Although I have made an effort to ensure accuracy, "sound like" errors may exist and should be interpreted in context. -VICK Sandoval MD      There are no Patient Instructions on file for this visit.   "

## 2019-01-25 NOTE — ASSESSMENT & PLAN NOTE
She reports typical GERD symptoms as follows. ONSET reported as several years ago. QUALITY described as burning dyspepsia. LOCATION reported as epigastric with radiation upwards. SEVERITY described as moderate. EXACERBATING FACTORS include recumbent positioning, certain foods, and aspirin. ALLEVIATING FACTORS include OTC antacids, which provide transient symptom relief. She reports no vomiting, blood in stool, or dark tarry stool.

## 2019-01-25 NOTE — ASSESSMENT & PLAN NOTE
Lab Results   Component Value Date    CHOL 150 03/21/2018    CHOL 152 07/26/2017    TRIG 87 03/21/2018    TRIG 76 07/26/2017    HDL 61 03/21/2018    HDL 59 07/26/2017    LDLCALC 71.6 03/21/2018    LDLCALC 77.8 07/26/2017    NONHDLCHOL 89 03/21/2018    NONHDLCHOL 93 07/26/2017    AST 22 12/16/2018    ALT 21 12/16/2018     Wt Readings from Last 2 Encounters:   01/25/19 70.7 kg (155 lb 13.8 oz)   12/28/18 74.6 kg (164 lb 7.4 oz)     Body mass index is 29.45 kg/m².  The 10-year ASCVD risk score ([place] NEWBY Jr., et al., 2013) is: 42%    Values used to calculate the score:      Age: 68 years      Sex: Female      Is Non- : Yes      Diabetic: Yes      Tobacco smoker: Yes      Systolic Blood Pressure: 138 mmHg      Is BP treated: Yes      HDL Cholesterol: 61 mg/dL      Total Cholesterol: 150 mg/dL  She appears to be tolerating statin for dyslipidemia without apparent symptoms of myositis or hepatic dysfunction.

## 2019-01-28 ENCOUNTER — PATIENT OUTREACH (OUTPATIENT)
Dept: OTHER | Facility: OTHER | Age: 69
End: 2019-01-28

## 2019-01-28 NOTE — PROGRESS NOTES
Last 5 Patient Entered Readings                                      Current 30 Day Average: 154/77     Recent Readings 1/27/2019 1/24/2019 1/23/2019 1/18/2019 1/18/2019    SBP (mmHg) 160 153 146 150 141    DBP (mmHg) 73 71 67 75 75    Pulse 103 97 107 96 100          1/28: CNA LVM. Will try again later.

## 2019-01-29 NOTE — PROGRESS NOTES
"Last 5 Patient Entered Readings                                      Current 30 Day Average: 154/77     Recent Readings 1/27/2019 1/24/2019 1/23/2019 1/18/2019 1/18/2019    SBP (mmHg) 160 153 146 150 141    DBP (mmHg) 73 71 67 75 75    Pulse 103 97 107 96 100            Digital Medicine: Health  Introduction    Introduced Ms. Lucia Barlow to Digital Medicine. Discussed health  role and recommended lifestyle modifications.    Ms. Myers is very pleasant and happy to be in the HTN program.     Lifestyle Assessment:    Current Dietary Habits(i.e. low sodium, food labels, dining out):    Patient reports she eats "toast, sausage and juice" for breakfast, "Honey Baked ham sandwich and Coke" for lunch, and "rice and baked chicken" for dinner. Patient has a piece of fruit for snack every night at 8 PM. Patient drinks 2 Cokes every day. Informed patient of high sodium content in sausage, ham, and chicken as well as high levels of sugar in Coke. Reports that no one has every really explained sodium restriction to her. Will e-mail resources.     Exercise:    Patient reports that she stays "busy all day". Reports that she does not watch TV during the day and walks 1.5 miles every day. Patient has young great-grandchildren that "keep her busy". Enjoys gardening and cleaning house.    Alcohol/Tobacco: None.     Medication Adherence: has been compliant with the medicaiton regimen.  Reviewed proper BP technique. Patient has been taking her BP readings at the same time she takes her BP meds. Reinforced importance of waiting at least 1 hour and resting for 5 mins prior to taking readings. Patient verbalized understanding.     Reviewed AHA/AACE recommendations:  Limit sodium intake to <2000mg/day  Perform 150 minutes of physical activity per week    Reviewed the importance of self-monitoring, medication adherence, and that the health  can be used as a resource for lifestyle modifications to help reduce or maintain a " healthy lifestyle.  Reviewed that the Digital Medicine team is not available for emergencies and instructed the patient to call 911 or [facility] ([phone] or [phone]) if one arises.

## 2019-01-29 NOTE — PROGRESS NOTES
Last 5 Patient Entered Readings                                      Current 30 Day Average: 154/77     Recent Readings 1/27/2019 1/24/2019 1/23/2019 1/18/2019 1/18/2019    SBP (mmHg) 160 153 146 150 141    DBP (mmHg) 73 71 67 75 75    Pulse 103 97 107 96 100      1/29: Returning patient's call, no answer, LVM. Will try again later.

## 2019-02-01 ENCOUNTER — OFFICE VISIT (OUTPATIENT)
Dept: OPHTHALMOLOGY | Facility: CLINIC | Age: 69
End: 2019-02-01
Payer: MEDICARE

## 2019-02-01 DIAGNOSIS — H52.4 MYOPIA WITH ASTIGMATISM AND PRESBYOPIA, BILATERAL: ICD-10-CM

## 2019-02-01 DIAGNOSIS — H25.013 CORTICAL AGE-RELATED CATARACT OF BOTH EYES: ICD-10-CM

## 2019-02-01 DIAGNOSIS — H25.13 NUCLEAR SCLEROSIS, BILATERAL: ICD-10-CM

## 2019-02-01 DIAGNOSIS — E11.9 TYPE 2 DIABETES MELLITUS WITHOUT RETINOPATHY: Primary | ICD-10-CM

## 2019-02-01 DIAGNOSIS — H52.203 MYOPIA WITH ASTIGMATISM AND PRESBYOPIA, BILATERAL: ICD-10-CM

## 2019-02-01 DIAGNOSIS — H52.13 MYOPIA WITH ASTIGMATISM AND PRESBYOPIA, BILATERAL: ICD-10-CM

## 2019-02-01 PROCEDURE — 92015 DETERMINE REFRACTIVE STATE: CPT | Mod: HCNC,S$GLB,, | Performed by: OPTOMETRIST

## 2019-02-01 PROCEDURE — 92004 COMPRE OPH EXAM NEW PT 1/>: CPT | Mod: HCNC,S$GLB,, | Performed by: OPTOMETRIST

## 2019-02-01 PROCEDURE — 92004 PR EYE EXAM, NEW PATIENT,COMPREHESV: ICD-10-PCS | Mod: HCNC,S$GLB,, | Performed by: OPTOMETRIST

## 2019-02-01 PROCEDURE — 99999 PR PBB SHADOW E&M-EST. PATIENT-LVL II: ICD-10-PCS | Mod: PBBFAC,HCNC,, | Performed by: OPTOMETRIST

## 2019-02-01 PROCEDURE — 92015 PR REFRACTION: ICD-10-PCS | Mod: HCNC,S$GLB,, | Performed by: OPTOMETRIST

## 2019-02-01 PROCEDURE — 99999 PR PBB SHADOW E&M-EST. PATIENT-LVL II: CPT | Mod: PBBFAC,HCNC,, | Performed by: OPTOMETRIST

## 2019-02-01 NOTE — PROGRESS NOTES
HPI     Pts last exam was 2 years ago. PT c/o overall blurred va and wears otc   readers prn.   Diabetic eye exam  Diagnosed with diabetes in Jan 2019  Recent vision fluctuations no  Blood sugar: 7.9  HPI    Any vision changes since last exam: decreased overall va  Eye pain: no  Other ocular symptoms: watering OU    Do you wear currently wear glasses or contacts? otc readers    Interested in contacts today? no    Do you plan on getting new glasses today? If needed          Last edited by Kristen Manriquez MA on 2/1/2019  8:44 AM. (History)            Assessment /Plan     For exam results, see Encounter Report.    Type 2 diabetes mellitus without retinopathy  No diabetic retinopathy OD, OS  Continue close care with PCP  Monitor 12 months    Nuclear sclerosis, bilateral  Cortical age-related cataract of both eyes  Cataract accounts for change in vision. Patient is at the option stage. Cataract surgery will improve vision, but necessity is dependent on patient's symptoms. Patient prefers to wait on surgery at this time. Pt to call back if symptoms worsen before next appointment.    Myopia with astigmatism and presbyopia, bilateral  Eyeglass Final Rx     Eyeglass Final Rx       Sphere Cylinder Axis Dist VA Add    Right -1.00 +1.50 010 20/30 +2.50    Left -0.25 +1.00 170 20/30 +2.50    Expiration Date:  2/2/2020              RTC 1 yr for dilated eye exam or PRN if any problems.   Discussed above and answered questions.

## 2019-02-01 NOTE — LETTER
February 1, 2019      VICK Sandoval MD  [address] [facility] [address]           [facility]  [address]  [place] [address]  Phone: [phone]  Fax: [phone]          Patient: Lucia Barlow   MR Number: [id number]   YOB: 1950   Date of Visit: 2/1/2019       Dear Dr. VICK Sandoval:    Thank you for referring Lucia Barlow to me for evaluation. Attached you will find relevant portions of my assessment and plan of care.    If you have questions, please do not hesitate to call me. I look forward to following Lucia Barlow along with you.    Sincerely,    Keith Bhatti, OD    Enclosure  CC:  No Recipients    If you would like to receive this communication electronically, please contact [email or web address] or [phone] to request more information on [facility] Link access.    For providers and/or their staff who would like to refer a patient to [facility], please contact us through our one-stop-shop provider referral line, [facility], at [phone].    If you feel you have received this communication in error or would no longer like to receive these types of communications, please e-mail [email or web address]

## 2019-02-07 ENCOUNTER — PATIENT OUTREACH (OUTPATIENT)
Dept: OTHER | Facility: OTHER | Age: 69
End: 2019-02-07

## 2019-02-07 DIAGNOSIS — I10 ESSENTIAL HYPERTENSION: Primary | Chronic | ICD-10-CM

## 2019-02-07 NOTE — PROGRESS NOTES
Last 5 Patient Entered Readings                                      Current 30 Day Average: 150/77     Recent Readings 2/7/2019 2/4/2019 1/27/2019 1/24/2019 1/23/2019    SBP (mmHg) 151 144 160 153 146    DBP (mmHg) 71 76 73 71 67    Pulse 103 101 103 97 107          LVCB. Will call back next week

## 2019-02-07 NOTE — PROGRESS NOTES
Last 5 Patient Entered Readings                                      Current 30 Day Average: 150/77     Recent Readings 2/4/2019 1/27/2019 1/24/2019 1/23/2019 1/18/2019    SBP (mmHg) 144 160 153 146 150    DBP (mmHg) 76 73 71 67 75    Pulse 101 103 97 107 96          Patient picked up but unavailable at the time. Will try to call back today. May benefit from addition of ARB for kidney protection in DM. Previously on losartan/HCTZ but discontinued as patient no longer taking.

## 2019-02-12 RX ORDER — AMLODIPINE BESYLATE 10 MG/1
10 TABLET ORAL DAILY
Qty: 30 TABLET | Refills: 2 | Status: SHIPPED | OUTPATIENT
Start: 2019-02-12 | End: 2019-03-29 | Stop reason: SDUPTHER

## 2019-02-12 NOTE — PROGRESS NOTES
Last 5 Patient Entered Readings                                      Current 30 Day Average: 148/75     Recent Readings 2/7/2019 2/4/2019 1/27/2019 1/24/2019 1/23/2019    SBP (mmHg) 151 144 160 153 146    DBP (mmHg) 71 76 73 71 67    Pulse 103 101 103 97 107      I agree with the plan of care given by CATHERINE Castaneda

## 2019-02-12 NOTE — PROGRESS NOTES
Last 5 Patient Entered Readings                                      Current 30 Day Average: 148/75     Recent Readings 2/7/2019 2/4/2019 1/27/2019 1/24/2019 1/23/2019    SBP (mmHg) 151 144 160 153 146    DBP (mmHg) 71 76 73 71 67    Pulse 103 101 103 97 107          HPI:  Called patient to follow up on blood pressure readings. Patient endorses adherence to medication regimen; takes all medication at 7AM. Patient reports that she has to take care of her daughter, which has created additional stress. Patient would not like to add additional medication at this time. Patient reports itching with past use of losartan as well. Patient denies hypotensive s/sx (lightheadedness, dizziness, nausea, fatigue); patient denies hypertensive s/sx (SOB, CP, severe headaches, changes in vision).    Patient states there is 20-25 mmHg variation between her blood pressure measurements with different devices.     Assessment:  Reviewed recent readings. Per 2017 ACC/ AHA HTN guidelines (goal of BP < 130/80), current 30-day average needs to be addressed more thoroughly today.     Plan:  Increase amlodipine to 10 mg and continue chlorthalidone.  I will continue to monitor regularly and will follow-up in 2 to 3 weeks, sooner if blood pressure begins to trend upward or downward.     Current medication regimen:  Hypertension Medications             amLODIPine (NORVASC) 5 MG tablet Take 1 tablet (5 mg total) by mouth once daily.    chlorthalidone (HYGROTEN) 25 MG Tab Take 1 tablet (25 mg total) by mouth once daily.          Patient denies having questions or concerns. Patient has my contact information and knows to call with any concerns or clinical changes.

## 2019-02-21 ENCOUNTER — PATIENT OUTREACH (OUTPATIENT)
Dept: OTHER | Facility: OTHER | Age: 69
End: 2019-02-21

## 2019-02-21 NOTE — PROGRESS NOTES
Last 5 Patient Entered Readings                                      Current 30 Day Average: 149/72     Recent Readings 2/14/2019 2/7/2019 2/4/2019 1/27/2019 1/24/2019    SBP (mmHg) 141 151 144 160 153    DBP (mmHg) 71 71 76 73 71    Pulse 96 103 101 103 97          Patient recently had two family members pass away, requested hiatus for 1 week.

## 2019-02-27 ENCOUNTER — CLINICAL SUPPORT (OUTPATIENT)
Dept: DIABETES | Facility: CLINIC | Age: 69
End: 2019-02-27
Payer: MEDICARE

## 2019-02-27 ENCOUNTER — OFFICE VISIT (OUTPATIENT)
Dept: CARDIOLOGY | Facility: CLINIC | Age: 69
End: 2019-02-27
Payer: MEDICARE

## 2019-02-27 VITALS — WEIGHT: 154 LBS | BODY MASS INDEX: 29.07 KG/M2 | HEIGHT: 61 IN

## 2019-02-27 VITALS
HEART RATE: 96 BPM | HEIGHT: 61 IN | DIASTOLIC BLOOD PRESSURE: 62 MMHG | WEIGHT: 153 LBS | SYSTOLIC BLOOD PRESSURE: 140 MMHG | BODY MASS INDEX: 28.89 KG/M2

## 2019-02-27 DIAGNOSIS — J44.1 CHRONIC OBSTRUCTIVE PULMONARY DISEASE WITH ACUTE EXACERBATION: ICD-10-CM

## 2019-02-27 DIAGNOSIS — I10 ESSENTIAL HYPERTENSION: Chronic | ICD-10-CM

## 2019-02-27 DIAGNOSIS — E11.9 TYPE 2 DIABETES MELLITUS WITHOUT RETINOPATHY: ICD-10-CM

## 2019-02-27 DIAGNOSIS — I35.0 NONRHEUMATIC AORTIC VALVE STENOSIS: ICD-10-CM

## 2019-02-27 DIAGNOSIS — I70.203 ATHEROSCLEROSIS OF ARTERY OF BOTH LOWER EXTREMITIES: Primary | Chronic | ICD-10-CM

## 2019-02-27 DIAGNOSIS — I70.0 ATHEROSCLEROSIS OF AORTA: ICD-10-CM

## 2019-02-27 DIAGNOSIS — E11.69 DYSLIPIDEMIA ASSOCIATED WITH TYPE 2 DIABETES MELLITUS: Primary | Chronic | ICD-10-CM

## 2019-02-27 DIAGNOSIS — Z98.62 S/P PERIPHERAL ARTERY ANGIOPLASTY: ICD-10-CM

## 2019-02-27 DIAGNOSIS — I25.10 ATHEROSCLEROSIS OF NATIVE CORONARY ARTERY OF NATIVE HEART WITHOUT ANGINA PECTORIS: ICD-10-CM

## 2019-02-27 DIAGNOSIS — E11.51 TYPE 2 DIABETES MELLITUS WITH PERIPHERAL VASCULAR DISEASE: Chronic | ICD-10-CM

## 2019-02-27 DIAGNOSIS — E11.69 DYSLIPIDEMIA ASSOCIATED WITH TYPE 2 DIABETES MELLITUS: Chronic | ICD-10-CM

## 2019-02-27 DIAGNOSIS — E78.5 DYSLIPIDEMIA ASSOCIATED WITH TYPE 2 DIABETES MELLITUS: Chronic | ICD-10-CM

## 2019-02-27 DIAGNOSIS — E78.5 DYSLIPIDEMIA ASSOCIATED WITH TYPE 2 DIABETES MELLITUS: Primary | Chronic | ICD-10-CM

## 2019-02-27 DIAGNOSIS — Z87.891 HISTORY OF TOBACCO ABUSE: ICD-10-CM

## 2019-02-27 DIAGNOSIS — K21.9 GASTROESOPHAGEAL REFLUX DISEASE WITHOUT ESOPHAGITIS: Chronic | ICD-10-CM

## 2019-02-27 PROCEDURE — 3077F SYST BP >= 140 MM HG: CPT | Mod: HCNC,CPTII,S$GLB, | Performed by: INTERNAL MEDICINE

## 2019-02-27 PROCEDURE — 3078F DIAST BP <80 MM HG: CPT | Mod: HCNC,CPTII,S$GLB, | Performed by: INTERNAL MEDICINE

## 2019-02-27 PROCEDURE — 99214 OFFICE O/P EST MOD 30 MIN: CPT | Mod: HCNC,S$GLB,, | Performed by: INTERNAL MEDICINE

## 2019-02-27 PROCEDURE — G0108 DIAB MANAGE TRN  PER INDIV: HCPCS | Mod: HCNC,S$GLB,, | Performed by: DIETITIAN, REGISTERED

## 2019-02-27 PROCEDURE — 3045F PR MOST RECENT HEMOGLOBIN A1C LEVEL 7.0-9.0%: CPT | Mod: HCNC,CPTII,S$GLB, | Performed by: INTERNAL MEDICINE

## 2019-02-27 PROCEDURE — 99214 PR OFFICE/OUTPT VISIT, EST, LEVL IV, 30-39 MIN: ICD-10-PCS | Mod: HCNC,S$GLB,, | Performed by: INTERNAL MEDICINE

## 2019-02-27 PROCEDURE — G0108 PR DIAB MANAGE TRN  PER INDIV: ICD-10-PCS | Mod: HCNC,S$GLB,, | Performed by: DIETITIAN, REGISTERED

## 2019-02-27 PROCEDURE — 3078F PR MOST RECENT DIASTOLIC BLOOD PRESSURE < 80 MM HG: ICD-10-PCS | Mod: HCNC,CPTII,S$GLB, | Performed by: INTERNAL MEDICINE

## 2019-02-27 PROCEDURE — 3045F PR MOST RECENT HEMOGLOBIN A1C LEVEL 7.0-9.0%: ICD-10-PCS | Mod: HCNC,CPTII,S$GLB, | Performed by: INTERNAL MEDICINE

## 2019-02-27 PROCEDURE — 99999 PR PBB SHADOW E&M-EST. PATIENT-LVL III: CPT | Mod: PBBFAC,HCNC,, | Performed by: INTERNAL MEDICINE

## 2019-02-27 PROCEDURE — 1101F PT FALLS ASSESS-DOCD LE1/YR: CPT | Mod: HCNC,CPTII,S$GLB, | Performed by: INTERNAL MEDICINE

## 2019-02-27 PROCEDURE — 99999 PR PBB SHADOW E&M-EST. PATIENT-LVL III: CPT | Mod: PBBFAC,HCNC,, | Performed by: DIETITIAN, REGISTERED

## 2019-02-27 PROCEDURE — 99999 PR PBB SHADOW E&M-EST. PATIENT-LVL III: ICD-10-PCS | Mod: PBBFAC,HCNC,, | Performed by: DIETITIAN, REGISTERED

## 2019-02-27 PROCEDURE — 1101F PR PT FALLS ASSESS DOC 0-1 FALLS W/OUT INJ PAST YR: ICD-10-PCS | Mod: HCNC,CPTII,S$GLB, | Performed by: INTERNAL MEDICINE

## 2019-02-27 PROCEDURE — 99999 PR PBB SHADOW E&M-EST. PATIENT-LVL III: ICD-10-PCS | Mod: PBBFAC,HCNC,, | Performed by: INTERNAL MEDICINE

## 2019-02-27 PROCEDURE — 3077F PR MOST RECENT SYSTOLIC BLOOD PRESSURE >= 140 MM HG: ICD-10-PCS | Mod: HCNC,CPTII,S$GLB, | Performed by: INTERNAL MEDICINE

## 2019-02-27 NOTE — PROGRESS NOTES
Diabetes Education  Author: Tami Galvez RD, CDE  Date: 2/27/2019    Diabetes Care Management Summary  Diabetes Education Record Assessment/Progress: Initial  Current Diabetes Risk Level: Moderate     Last A1c:   Lab Results   Component Value Date    HGBA1C 7.9 (H) 01/18/2019     Last visit with Diabetes Educator: Last Education Visit: Not Found    Diabetes Type  Diabetes Type : Type II    Diabetes History  Diabetes Diagnosis: 0-1 year  Current Treatment: Oral Medication, Diet, Exercise(metformin 500mg 1tab bfst)  Reviewed Problem List with Patient: Yes  Noted pt active in digital BP medicine    Health Maintenance was reviewed today with patient. Discussed with patient importance of routine eye exams, foot exams/foot care, blood work (i.e.: A1c, microalbumin, and lipid), dental visits, yearly flu vaccine, and pneumonia vaccine as indicated by PCP. Patient verbalized understanding.     Health Maintenance Topics with due status: Not Due       Topic Last Completion Date    Colonoscopy 11/09/2016    Mammogram 02/14/2018    Lipid Panel 03/21/2018    Hemoglobin A1c 01/18/2019    Foot Exam 01/25/2019    Urine Microalbumin 01/25/2019    Eye Exam 02/01/2019    High Dose Statin 02/27/2019    Aspirin/Antiplatelet Therapy 02/27/2019     Health Maintenance Due   Topic Date Due    TETANUS VACCINE  01/30/1966    DEXA SCAN  01/08/2019       Nutrition  Meal Planning: (3032-8686 cals/d - excess carb, fat and sodium from regular gilda, white starch, snacks. Inadequate nonstarchy veg. )  Meal Plan 24 Hour Recall - Breakfast: scr egg, toast, apple jelly - milk  Meal Plan 24 Hour Recall - Lunch: bkd potato, chix nuggets, bbq - coke  Meal Plan 24 Hour Recall - Dinner: white beans, liver, rice - coke   Meal Plan 24 Hour Recall - Snack: candy    Monitoring   Self Monitoring : needs meter    Exercise   Exercise Type: use exercise equipment  Intensity: Moderate  Frequency: Daily  Duration: 30 min    Current Diabetes Treatment    Current Treatment: Oral Medication, Diet, Exercise(metformin 500mg 1tab bfst)    Social History  Preferred Learning Method: Face to Face  Primary Support: Self  Smoking Status: Current Smoker  Alcohol Use: Never      Barriers to Change  Barriers to Change: None  Learning Challenges : None    Readiness to Learn   Readiness to Learn : Eager    Cultural Influences  Cultural Influences: No    Diabetes Education Assessment/Progress  Diabetes Disease Process (diabetes disease process and treatment options): Discussion, Individual Session, Demonstrates Understanding/Competency(verbalizes/demonstrates), Written Materials Provided  Nutrition (Incorporating nutritional management into one's lifestyle): Discussion, Individual Session, Demonstrates Understanding/Competency (verbalizes/demonstrates), Written Materials Provided  Physical Activity (incorporating physical activity into one's lifestyle): Discussion, Individual Session, Demonstrates Understanding/Competency (verbalizes/demonstrates), Written Materials Provided  Medications (states correct name, dose, onset, peak, duration, side effects & timing of meds): Discussion, Individual Session, Demonstrates Understanding/Competency(verbalizes/demonstrates), Written Materials Provided  Monitoring (monitoring blood glucose/other parameters & using results): Discussion, Individual Session, Demonstrates Understanding/Competency (verbalizes/demonstrates), Written Materials Provided  Acute Complications (preventing, detecting, and treating acute complications): Discussion, Individual Session, Demonstrates Understanding/Competency (verbalizes/demonstrates), Written Materials Provided  Chronic Complications (preventing, detecting, and treating chronic complications): Discussion, Individual Session, Demonstrates Understanding/Competency (verbalizes/demonstrates), Written Materials Provided  Clinical (diabetes, other pertinent medical history, and relevant comorbidities reviewed  during visit): Discussion, Individual Session, Demonstrates Understanding/Competency (verbalizes/demonstrates)  Cognitive (knowledge of self-management skills, functional health literacy): Discussion, Individual Session, Demonstrates Understanding/Competency (verbalizes/demonstrates)  Psychosocial (emotional response to diabetes): Discussion, Individual Session, Demonstrates Understanding/Competency (verbalizes/demonstrates)  Diabetes Distress and Support Systems: Discussion, Individual Session, Demonstrates Understanding/Competency (verbalizes/demonstrates)  Behavioral (readiness for change, lifestyle practices, self-care behaviors): Discussion, Individual Session, Demonstrates Understanding/Competency (verbalizes/demonstrates)    Goals  Patient has selected/evaluated goals during today's session: Yes, selected  Healthy Eating: Set(use meal plan 90% - carb portions/spacing)  Start Date: 02/27/19  Target Date: 03/20/19  Physical Activity: Set(150min/wk)  Start Date: 02/27/19  Target Date: 03/20/19  Monitoring: Set(test BG 2x/d -fst, 2hr pp)  Start Date: 02/27/19  Target Date: 03/20/19     Diabetes Care Plan/Intervention  Education Plan/Intervention: Individual Follow-Up DSMT Will complete distress scale next visit; pt reports feeling A1C inaccurate and that she doesn't have diabetes, so time needed for counseling.    Diabetes Meal Plan  Restrictions: Low Fat, Low Sodium  Calories: 1200  Carbohydrate Per Meal: 20-30g  Carbohydrate Per Snack : 7-15g    Today's Self-Management Care Plan was developed with the patient's input and is based on barriers identified during today's assessment.    The long and short-term goals in the care plan were written with the patient/caregiver's input. The patient has agreed to work toward these goals to improve her overall diabetes control.      The patient received a copy of today's self-management plan and verbalized understanding of the care plan, goals, and all of today's  instructions.      The patient was encouraged to communicate with her physician and care team regarding her condition(s) and treatment.  I provided the patient with my contact information today and encouraged her to contact me via phone or patient portal as needed.     Education Units of Time   Time Spent: 60 min

## 2019-02-27 NOTE — PROGRESS NOTES
Subjective:   Patient ID:  Lucia Barlow is a 69 y.o. female who presents for follow up of Peripheral Vascular Disease; Hyperlipidemia; and Hypertension      HPI  A 68 yo female with copd shortness of breath pvd s/p aortoiliac stents in 2014 htn diabetes with retinopathy is here for f/u she is not smoking her shortness of breath is stable has seen pulmonary has cardiac w/u that was negative for ischemia has no definite claudication no discoloration in feet. Compliance with diet is an issue no regular exercise. No chf no headaches or blurred vision.no angina no tia. Her last a1c is 7.9 has no recent lipids.   Past Medical History:   Diagnosis Date    Atherosclerosis of artery of both lower extremities 1/17/2014    Atherosclerosis of native coronary artery of native heart without angina pectoris 11/18/2016    Atherosclerotic PVD with intermittent claudication 1/17/2014    COPD (chronic obstructive pulmonary disease)     Dyslipidemia associated with type 2 diabetes mellitus 6/14/2013    Dyslipidemia associated with type 2 diabetes mellitus     Ex-smoker     Gastroesophageal reflux disease without esophagitis 1/25/2019    Hyperlipidemia     Hypertension     Osteoporosis 1/16 rosita 1/18    PVD (peripheral vascular disease)     S/P peripheral artery angioplasty 2/7/2014    Tobacco dependence     Type 2 diabetes mellitus with peripheral vascular disease     Type 2 diabetes mellitus without retinopathy 2/1/2019       Past Surgical History:   Procedure Laterality Date    ANGIOPLASTY Bilateral 01/24/2014    aortoiliac stenting     AORTOGRAM, WITH RUNOFF Bilateral 1/24/2014    [facility] CATH LAB    CARPAL TUNNEL RELEASE  2003    [facility]    COLECTOMY  approximate 2005    pt states 1in colon -dx with benign mass removed-states no colon cancer    COLONOSCOPY N/A 11/9/2016    Performed by Dmitri Sterling [facility] at [facility] ENDO    COLONOSCOPY N/A 10/16/2013    [facility] Praeneth JOINER  [facility] END[facility]    HYSTERECTOMY      no cancer       Social History     Tobacco Use    Smoking status: Current Some Day Smoker     Packs/day: 0.25     Years: 50.00     Pack years: 12.50     Types: Cigarettes     Last attempt to quit: 1/11/2016     Years since quitting: 3.1    Smokeless tobacco: Never Used   Substance Use Topics    Alcohol use: No     Alcohol/week: 0.0 oz    Drug use: No       Family History   Problem Relation Age of Onset    Stroke Father     Stroke Sister     Asthma Daughter     Diabetes Daughter     Breast cancer Daughter     Asthma Son        Current Outpatient Medications   Medication Sig    amLODIPine (NORVASC) 10 MG tablet Take 1 tablet (10 mg total) by mouth once daily.    aspirin (ECOTRIN) 81 MG EC tablet Take 1 tablet (81 mg total) by mouth once daily. (FOR HEART HEALTH)    calcium-vitamin D (CALCIUM WITH VITAMIN D) 600 mg(1,500mg) -400 unit Tab Take 2 tablets by mouth once daily.    chlorthalidone (HYGROTEN) 25 MG Tab Take 1 tablet (25 mg total) by mouth once daily.    ezetimibe (ZETIA) 10 mg tablet TAKE 1 TABLET ONE TIME DAILY    fluticasone (FLONASE) 50 mcg/actuation nasal spray 2 sprays (100 mcg total) by Each Nare route once daily.    metFORMIN (GLUCOPHAGE-XR) 500 MG 24 hr tablet Take 1 tablet (500 mg total) by mouth daily with breakfast.    pantoprazole (PROTONIX) 40 MG tablet Take 1 tablet (40 mg total) by mouth once daily.    rosuvastatin (CRESTOR) 20 MG tablet TAKE 1 TABLET EVERY DAY    triamcinolone acetonide 0.1% (KENALOG) 0.1 % cream Apply topically 2 (two) times daily.     No current facility-administered medications for this visit.      Current Outpatient Medications on File Prior to Visit   Medication Sig    amLODIPine (NORVASC) 10 MG tablet Take 1 tablet (10 mg total) by mouth once daily.    aspirin (ECOTRIN) 81 MG EC tablet Take 1 tablet (81 mg total) by mouth once daily. (FOR HEART HEALTH)    calcium-vitamin D (CALCIUM WITH VITAMIN D) 600  mg(1,500mg) -400 unit Tab Take 2 tablets by mouth once daily.    chlorthalidone (HYGROTEN) 25 MG Tab Take 1 tablet (25 mg total) by mouth once daily.    ezetimibe (ZETIA) 10 mg tablet TAKE 1 TABLET ONE TIME DAILY    fluticasone (FLONASE) 50 mcg/actuation nasal spray 2 sprays (100 mcg total) by Each Nare route once daily.    metFORMIN (GLUCOPHAGE-XR) 500 MG 24 hr tablet Take 1 tablet (500 mg total) by mouth daily with breakfast.    pantoprazole (PROTONIX) 40 MG tablet Take 1 tablet (40 mg total) by mouth once daily.    rosuvastatin (CRESTOR) 20 MG tablet TAKE 1 TABLET EVERY DAY    triamcinolone acetonide 0.1% (KENALOG) 0.1 % cream Apply topically 2 (two) times daily.     No current facility-administered medications on file prior to visit.      Review of patient's allergies indicates:   Allergen Reactions    Skin staples [surgical stainless steel] Swelling    Egg derived      Shortness breath, lip swelling    Fish containing products     Iodine and iodide containing products Swelling    Latex, natural rubber Swelling    Losartan Itching    Nickel     Pravastatin      40 mg causes nausea vomitting but 20 mg ok    Shellfish containing products Swelling       Review of Systems   Constitution: Negative for diaphoresis, weakness, malaise/fatigue and weight gain.   HENT: Negative for hoarse voice.    Eyes: Negative for double vision and visual disturbance.   Cardiovascular: Positive for dyspnea on exertion. Negative for chest pain, claudication, cyanosis, irregular heartbeat, leg swelling, near-syncope, orthopnea, palpitations, paroxysmal nocturnal dyspnea and syncope.   Respiratory: Positive for shortness of breath. Negative for cough, hemoptysis and snoring.    Hematologic/Lymphatic: Negative for bleeding problem. Does not bruise/bleed easily.   Skin: Negative for color change and poor wound healing.   Musculoskeletal: Positive for arthritis and back pain. Negative for muscle cramps, muscle weakness and  myalgias.   Gastrointestinal: Negative for bloating, abdominal pain, change in bowel habit, diarrhea, heartburn, hematemesis, hematochezia, melena and nausea.   Neurological: Negative for excessive daytime sleepiness, dizziness, headaches, light-headedness, loss of balance and numbness.   Psychiatric/Behavioral: Negative for memory loss. The patient does not have insomnia.    Allergic/Immunologic: Negative for hives.       Objective:   Physical Exam   Constitutional: She is oriented to person, place, and time. She appears well-developed and well-nourished. She does not appear ill. No distress.   HENT:   Head: Normocephalic and atraumatic.   Eyes: EOM are normal. Pupils are equal, round, and reactive to light. No scleral icterus.   Neck: Normal range of motion. Neck supple. Normal carotid pulses, no hepatojugular reflux and no JVD present. Carotid bruit is not present. No tracheal deviation present. No thyromegaly present.   Cardiovascular: Normal rate, regular rhythm, normal heart sounds, intact distal pulses and normal pulses. Exam reveals no gallop and no friction rub.   No murmur heard.  Pulses:       Carotid pulses are 2+ on the right side, and 2+ on the left side.       Radial pulses are 2+ on the right side, and 2+ on the left side.        Femoral pulses are 2+ on the right side, and 2+ on the left side.       Popliteal pulses are 2+ on the right side, and 2+ on the left side.        Dorsalis pedis pulses are 2+ on the right side, and 2+ on the left side.        Posterior tibial pulses are 2+ on the right side, and 2+ on the left side.   Pulmonary/Chest: Effort normal and breath sounds normal. No respiratory distress. She has no wheezes. She has no rhonchi. She has no rales. She exhibits no tenderness.   Abdominal: Soft. Normal appearance, normal aorta and bowel sounds are normal. She exhibits no abdominal bruit, no ascites and no pulsatile midline mass. There is no hepatomegaly. There is no tenderness.  "  Musculoskeletal: She exhibits no edema.        Right shoulder: She exhibits no deformity.   Neurological: She is alert and oriented to person, place, and time. She has normal strength. No cranial nerve deficit. Coordination normal.   Skin: Skin is warm and dry. No rash noted. She is not diaphoretic. No cyanosis or erythema. Nails show no clubbing.   Psychiatric: She has a normal mood and affect. Her speech is normal and behavior is normal.   Nursing note and vitals reviewed.    Vitals:    02/27/19 0825   BP: (!) 140/62   BP Location: Left arm   Patient Position: Sitting   BP Method: Medium (Manual)   Pulse: 96   Weight: 69.4 kg (152 lb 16 oz)   Height: 5' 1" (1.549 m)     Lab Results   Component Value Date    CHOL 150 03/21/2018    CHOL 152 07/26/2017    CHOL 238 (H) 11/18/2016     Lab Results   Component Value Date    HDL 61 03/21/2018    HDL 59 07/26/2017    HDL 56 11/18/2016     Lab Results   Component Value Date    LDLCALC 71.6 03/21/2018    LDLCALC 77.8 07/26/2017    LDLCALC 157.2 11/18/2016     Lab Results   Component Value Date    TRIG 87 03/21/2018    TRIG 76 07/26/2017    TRIG 124 11/18/2016     Lab Results   Component Value Date    CHOLHDL 40.7 03/21/2018    CHOLHDL 38.8 07/26/2017    CHOLHDL 23.5 11/18/2016       Chemistry        Component Value Date/Time     01/18/2019 0848    K 3.9 01/18/2019 0848     01/18/2019 0848    CO2 29 01/18/2019 0848    BUN 26 (H) 01/18/2019 0848    CREATININE 1.0 01/18/2019 0848     (H) 01/18/2019 0848        Component Value Date/Time    CALCIUM 9.7 01/18/2019 0848    ALKPHOS 79 12/16/2018 1119    AST 22 12/16/2018 1119    ALT 21 12/16/2018 1119    BILITOT 0.4 12/16/2018 1119    ESTGFRAFRICA >60.0 01/18/2019 0848    EGFRNONAA 57.2 (A) 01/18/2019 0848        Lab Results   Component Value Date    HGBA1C 7.9 (H) 01/18/2019       Lab Results   Component Value Date    TSH 1.352 06/05/2013     Lab Results   Component Value Date    INR 0.9 12/16/2018    INR 0.9 " 03/10/2017    INR 1.0 01/17/2014     Lab Results   Component Value Date    WBC 8.42 12/16/2018    HGB 13.4 12/16/2018    HCT 40.5 12/16/2018    MCV 92 12/16/2018     12/16/2018     BMP  Sodium   Date Value Ref Range Status   01/18/2019 140 136 - 145 mmol/L Final     Potassium   Date Value Ref Range Status   01/18/2019 3.9 3.5 - 5.1 mmol/L Final     Chloride   Date Value Ref Range Status   01/18/2019 100 95 - 110 mmol/L Final     CO2   Date Value Ref Range Status   01/18/2019 29 23 - 29 mmol/L Final     BUN, Bld   Date Value Ref Range Status   01/18/2019 26 (H) 8 - 23 mg/dL Final     Creatinine   Date Value Ref Range Status   01/18/2019 1.0 0.5 - 1.4 mg/dL Final     Calcium   Date Value Ref Range Status   01/18/2019 9.7 8.7 - 10.5 mg/dL Final     Anion Gap   Date Value Ref Range Status   01/18/2019 11 8 - 16 mmol/L Final     eGFR if    Date Value Ref Range Status   01/18/2019 >60.0 >60 mL/min/1.73 m^2 Final     eGFR if non    Date Value Ref Range Status   01/18/2019 57.2 (A) >60 mL/min/1.73 m^2 Final     Comment:     Calculation used to obtain the estimated glomerular filtration  rate (eGFR) is the CKD-EPI equation.        CrCl cannot be calculated (Patient's most recent lab result is older than the maximum 7 days allowed.).    Assessment:     1. Atherosclerosis of artery of both lower extremities    2. Essential hypertension    3. Dyslipidemia associated with type 2 diabetes mellitus    4. Type 2 diabetes mellitus with peripheral vascular disease    5. Gastroesophageal reflux disease without esophagitis    6. S/P peripheral artery angioplasty    7. History of tobacco abuse    8. Nonrheumatic aortic valve stenosis    9. Atherosclerosis of aorta    10. Atherosclerosis of native coronary artery of native heart without angina pectoris    11. Chronic obstructive pulmonary disease with acute exacerbation    12. Type 2 diabetes mellitus without retinopathy    stable clinically however  compliance with diet exercise is an issue affecting bp and diabetes. She was counseled she understands. Will need repeat lipids./     Plan:   Lipids  Continue current therapy  Cardiac low salt diet.  Risk factor modification and excercise program.  F/u in 6 months with lipid cmp a1c.

## 2019-02-27 NOTE — LETTER
February 27, 2019        VICK Sandoval MD  [address]  [place] [address]          [facility]  [address] [place] [address]  Phone: [phone]  Fax: [phone]   Patient: Lucia Barlow   MR Number: [id number]   YOB: 1950   Date of Visit: 2/27/2019       Dear Dr. Sandoval:    Thank you for referring Lucia Barlow to me for evaluation. Below are the relevant portions of my assessment and plan of care.    If you have questions, please do not hesitate to call me. I look forward to following Lucia along with you.    Sincerely,      Tami Galvez RD, CDE           CC  No Recipients

## 2019-02-28 ENCOUNTER — PATIENT OUTREACH (OUTPATIENT)
Dept: OTHER | Facility: OTHER | Age: 69
End: 2019-02-28

## 2019-02-28 RX ORDER — INSULIN PUMP SYRINGE, 3 ML
EACH MISCELLANEOUS
Qty: 1 EACH | Refills: 0 | Status: SHIPPED | OUTPATIENT
Start: 2019-02-28 | End: 2023-04-19

## 2019-02-28 NOTE — LETTER
March 20, 2019     Lucia Barlow  [address] [address]       Dear Lucia,    Thank you for enrolling in [facility]s Digital Medicine Program. To participate, we ask that you submit information at least once weekly through your [facility] account and maintain regular contact with your Care Team. We have not received any data or heard from you in some time.     The Digital Medicine Care Team has attempted to reach you on multiple occasions to determine if you would like to continue participating in the program. While we encourage you to continue participating fully, we understand that circumstances may change.     To continue participating in the program, please contact me at [phone]. If we do not hear back, you will be un-enrolled, and your physician will be notified of your decision.    If you have submitted data and believe you are receiving this letter in error, please call the Digital Medicine Patient Support Line at [phone] for troubleshooting.      We look forward to hearing from you soon.    Sincerely,     Jessica Ortiz  [facility]

## 2019-02-28 NOTE — PROGRESS NOTES
Last 5 Patient Entered Readings                                      Current 30 Day Average: 145/71     Recent Readings 2/27/2019 2/14/2019 2/7/2019 2/4/2019 1/27/2019    SBP (mmHg) 142 141 151 144 160    DBP (mmHg) 66 71 71 76 73    Pulse 95 96 103 101 103        LMFCB to discuss how she is tolerating amlodipine and discuss adding an ARB.

## 2019-03-01 ENCOUNTER — LAB VISIT (OUTPATIENT)
Dept: LAB | Facility: HOSPITAL | Age: 69
End: 2019-03-01
Attending: INTERNAL MEDICINE
Payer: MEDICARE

## 2019-03-01 ENCOUNTER — PATIENT OUTREACH (OUTPATIENT)
Dept: OTHER | Facility: OTHER | Age: 69
End: 2019-03-01

## 2019-03-01 DIAGNOSIS — E11.51 TYPE 2 DIABETES MELLITUS WITH PERIPHERAL VASCULAR DISEASE: Chronic | ICD-10-CM

## 2019-03-01 DIAGNOSIS — E78.5 DYSLIPIDEMIA ASSOCIATED WITH TYPE 2 DIABETES MELLITUS: Chronic | ICD-10-CM

## 2019-03-01 DIAGNOSIS — E11.69 DYSLIPIDEMIA ASSOCIATED WITH TYPE 2 DIABETES MELLITUS: Chronic | ICD-10-CM

## 2019-03-01 LAB
CHOLEST SERPL-MCNC: 186 MG/DL
CHOLEST/HDLC SERPL: 2.4 {RATIO}
HDLC SERPL-MCNC: 76 MG/DL
HDLC SERPL: 40.9 %
LDLC SERPL CALC-MCNC: 94.4 MG/DL
NONHDLC SERPL-MCNC: 110 MG/DL
TRIGL SERPL-MCNC: 78 MG/DL

## 2019-03-01 PROCEDURE — 36415 COLL VENOUS BLD VENIPUNCTURE: CPT | Mod: HCNC

## 2019-03-01 PROCEDURE — 80061 LIPID PANEL: CPT | Mod: HCNC

## 2019-03-01 NOTE — PROGRESS NOTES
Last 5 Patient Entered Readings                                      Current 30 Day Average: 145/71     Recent Readings 2/27/2019 2/14/2019 2/7/2019 2/4/2019 1/27/2019    SBP (mmHg) 142 141 151 144 160    DBP (mmHg) 66 71 71 76 73    Pulse 95 96 103 101 103          Digital Medicine: Health  Follow Up    Left voicemail to follow up with Ms. Lucia Barlow.  Current BP average 145/71 mmHg is not at goal, 130/80.

## 2019-03-08 NOTE — PROGRESS NOTES
Last 5 Patient Entered Readings                                      Current 30 Day Average: 145/69     Recent Readings 2/27/2019 2/14/2019 2/7/2019 2/4/2019 1/27/2019    SBP (mmHg) 142 141 151 144 160    DBP (mmHg) 66 71 71 76 73    Pulse 95 96 103 101 103          3/15: DM pharmacist attempting to reach patient. Pushed next HC outreach 1 week.       Digital Medicine: Health  Follow Up    Left voicemail to follow up with Ms. Lucia Barlow.  Current BP average 145/69 mmHg is not at goal, 130/80.

## 2019-03-15 NOTE — PROGRESS NOTES
"Last 5 Patient Entered Readings                                      Current 30 Day Average: 145/70     Recent Readings 3/13/2019 3/12/2019 3/11/2019 2/27/2019 2/14/2019    SBP (mmHg) 142 149 152 142 141    DBP (mmHg) 73 71 69 66 71    Pulse 95 102 97 95 96        Digital Medicine: Health  Follow Up    Lifestyle Modifications:    1.Dietary Modifications (Sodium intake <2,000mg/day, food labels, dining out): Patient is currently working on reducing soda intake. Typical meals consist of the following:    Breakfast: peanut butter with milk   Lunch: chicken salad sandwich with chips and Coke   Dinner: baked chicken with green beans and broccoli and cheese    Snacks: gum   Beverages: milk, 2 cokes, 6 bottles of water     2.Physical Activity: Patient reports she has been exercising "too much", walking with friends 9 miles per day, and riding her home bike while watching TV. Encouraged patient to continue active lifestyle.     3.Medication Therapy: Patient has been compliant with the medication regimen.    4.Patient has the following medication side effects/concerns: None    Follow up with Ms. Lucia Barlow completed. No further questions or concerns. Will continue to follow up to achieve health goals.    "

## 2019-03-27 ENCOUNTER — LAB VISIT (OUTPATIENT)
Dept: LAB | Facility: HOSPITAL | Age: 69
End: 2019-03-27
Attending: FAMILY MEDICINE
Payer: MEDICARE

## 2019-03-27 DIAGNOSIS — E11.51 TYPE 2 DIABETES MELLITUS WITH PERIPHERAL VASCULAR DISEASE: Chronic | ICD-10-CM

## 2019-03-27 DIAGNOSIS — I10 ESSENTIAL HYPERTENSION: Chronic | ICD-10-CM

## 2019-03-27 LAB
ESTIMATED AVG GLUCOSE: 171 MG/DL (ref 68–131)
HBA1C MFR BLD HPLC: 7.6 % (ref 4–5.6)
POTASSIUM SERPL-SCNC: 3.7 MMOL/L (ref 3.5–5.1)

## 2019-03-27 PROCEDURE — 84132 ASSAY OF SERUM POTASSIUM: CPT | Mod: HCNC

## 2019-03-27 PROCEDURE — 83036 HEMOGLOBIN GLYCOSYLATED A1C: CPT | Mod: HCNC

## 2019-03-27 PROCEDURE — 36415 COLL VENOUS BLD VENIPUNCTURE: CPT | Mod: HCNC

## 2019-03-29 ENCOUNTER — OFFICE VISIT (OUTPATIENT)
Dept: INTERNAL MEDICINE | Facility: CLINIC | Age: 69
End: 2019-03-29
Payer: MEDICARE

## 2019-03-29 ENCOUNTER — PATIENT MESSAGE (OUTPATIENT)
Dept: ADMINISTRATIVE | Facility: OTHER | Age: 69
End: 2019-03-29

## 2019-03-29 VITALS
BODY MASS INDEX: 28.84 KG/M2 | SYSTOLIC BLOOD PRESSURE: 122 MMHG | TEMPERATURE: 98 F | HEART RATE: 102 BPM | OXYGEN SATURATION: 96 % | WEIGHT: 152.75 LBS | DIASTOLIC BLOOD PRESSURE: 60 MMHG | HEIGHT: 61 IN

## 2019-03-29 DIAGNOSIS — R80.9 TYPE 2 DIABETES MELLITUS WITH MICROALBUMINURIA, WITHOUT LONG-TERM CURRENT USE OF INSULIN: Chronic | ICD-10-CM

## 2019-03-29 DIAGNOSIS — M94.9 DISORDER OF BONE AND ARTICULAR CARTILAGE: ICD-10-CM

## 2019-03-29 DIAGNOSIS — J41.0 SIMPLE CHRONIC BRONCHITIS: ICD-10-CM

## 2019-03-29 DIAGNOSIS — E11.9 TYPE 2 DIABETES MELLITUS: ICD-10-CM

## 2019-03-29 DIAGNOSIS — M89.9 DISORDER OF BONE AND ARTICULAR CARTILAGE: ICD-10-CM

## 2019-03-29 DIAGNOSIS — I10 ESSENTIAL HYPERTENSION: Chronic | ICD-10-CM

## 2019-03-29 DIAGNOSIS — E11.51 TYPE 2 DIABETES MELLITUS WITH PERIPHERAL VASCULAR DISEASE: Primary | Chronic | ICD-10-CM

## 2019-03-29 DIAGNOSIS — E11.22 TYPE 2 DIABETES MELLITUS WITH STAGE 3 CHRONIC KIDNEY DISEASE, WITHOUT LONG-TERM CURRENT USE OF INSULIN: Chronic | ICD-10-CM

## 2019-03-29 DIAGNOSIS — N18.30 TYPE 2 DIABETES MELLITUS WITH STAGE 3 CHRONIC KIDNEY DISEASE, WITHOUT LONG-TERM CURRENT USE OF INSULIN: Chronic | ICD-10-CM

## 2019-03-29 DIAGNOSIS — E11.29 TYPE 2 DIABETES MELLITUS WITH MICROALBUMINURIA, WITHOUT LONG-TERM CURRENT USE OF INSULIN: Chronic | ICD-10-CM

## 2019-03-29 PROCEDURE — 3078F PR MOST RECENT DIASTOLIC BLOOD PRESSURE < 80 MM HG: ICD-10-PCS | Mod: HCNC,CPTII,S$GLB, | Performed by: FAMILY MEDICINE

## 2019-03-29 PROCEDURE — 3045F PR MOST RECENT HEMOGLOBIN A1C LEVEL 7.0-9.0%: ICD-10-PCS | Mod: HCNC,CPTII,S$GLB, | Performed by: FAMILY MEDICINE

## 2019-03-29 PROCEDURE — 99214 PR OFFICE/OUTPT VISIT, EST, LEVL IV, 30-39 MIN: ICD-10-PCS | Mod: HCNC,S$GLB,, | Performed by: FAMILY MEDICINE

## 2019-03-29 PROCEDURE — 3078F DIAST BP <80 MM HG: CPT | Mod: HCNC,CPTII,S$GLB, | Performed by: FAMILY MEDICINE

## 2019-03-29 PROCEDURE — 99999 PR PBB SHADOW E&M-EST. PATIENT-LVL IV: ICD-10-PCS | Mod: PBBFAC,HCNC,, | Performed by: FAMILY MEDICINE

## 2019-03-29 PROCEDURE — 3045F PR MOST RECENT HEMOGLOBIN A1C LEVEL 7.0-9.0%: CPT | Mod: HCNC,CPTII,S$GLB, | Performed by: FAMILY MEDICINE

## 2019-03-29 PROCEDURE — 3074F SYST BP LT 130 MM HG: CPT | Mod: HCNC,CPTII,S$GLB, | Performed by: FAMILY MEDICINE

## 2019-03-29 PROCEDURE — 99999 PR PBB SHADOW E&M-EST. PATIENT-LVL IV: CPT | Mod: PBBFAC,HCNC,, | Performed by: FAMILY MEDICINE

## 2019-03-29 PROCEDURE — 1101F PT FALLS ASSESS-DOCD LE1/YR: CPT | Mod: HCNC,CPTII,S$GLB, | Performed by: FAMILY MEDICINE

## 2019-03-29 PROCEDURE — 99499 RISK ADDL DX/OHS AUDIT: ICD-10-PCS | Mod: HCNC,S$GLB,, | Performed by: FAMILY MEDICINE

## 2019-03-29 PROCEDURE — 99499 UNLISTED E&M SERVICE: CPT | Mod: HCNC,S$GLB,, | Performed by: FAMILY MEDICINE

## 2019-03-29 PROCEDURE — 99214 OFFICE O/P EST MOD 30 MIN: CPT | Mod: HCNC,S$GLB,, | Performed by: FAMILY MEDICINE

## 2019-03-29 PROCEDURE — 1101F PR PT FALLS ASSESS DOC 0-1 FALLS W/OUT INJ PAST YR: ICD-10-PCS | Mod: HCNC,CPTII,S$GLB, | Performed by: FAMILY MEDICINE

## 2019-03-29 PROCEDURE — 3074F PR MOST RECENT SYSTOLIC BLOOD PRESSURE < 130 MM HG: ICD-10-PCS | Mod: HCNC,CPTII,S$GLB, | Performed by: FAMILY MEDICINE

## 2019-03-29 RX ORDER — AMLODIPINE BESYLATE 10 MG/1
10 TABLET ORAL DAILY
Qty: 90 TABLET | Refills: 3 | Status: SHIPPED | OUTPATIENT
Start: 2019-03-29 | End: 2019-08-16 | Stop reason: ALTCHOICE

## 2019-03-29 RX ORDER — METFORMIN HYDROCHLORIDE 500 MG/1
1000 TABLET, EXTENDED RELEASE ORAL 2 TIMES DAILY WITH MEALS
Qty: 90 TABLET | Refills: 0 | Status: SHIPPED | OUTPATIENT
Start: 2019-03-29 | End: 2019-05-02 | Stop reason: SDUPTHER

## 2019-03-29 RX ORDER — LANCETS 33 GAUGE
EACH MISCELLANEOUS
COMMUNITY
Start: 2019-03-05 | End: 2021-05-21

## 2019-03-29 NOTE — ASSESSMENT & PLAN NOTE
Diabetes Management Status    Statin: Taking  ACE/ARB: Not taking    Screening or Prevention Patient's value Goal Complete/Controlled?   HgA1C Testing and Control   Lab Results   Component Value Date    HGBA1C 7.6 (H) 03/27/2019      Annually/Less than 8% Yes   Lipid profile : 03/01/2019 Annually Yes   LDL control Lab Results   Component Value Date    LDLCALC 94.4 03/01/2019    Annually/Less than 100 mg/dl  Yes   Nephropathy screening Lab Results   Component Value Date    LABMICR 896.0 01/25/2019     Lab Results   Component Value Date    PROTEINUA 1+ (A) 12/16/2018    Annually Yes   Blood pressure BP Readings from Last 1 Encounters:   03/29/19 122/60    Less than 140/90 Yes   Dilated retinal exam : 02/01/2019 Annually Yes   Foot exam   : 01/25/2019 Annually Yes     Lab Results   Component Value Date    HGBA1C 7.6 (H) 03/27/2019    HGBA1C 7.9 (H) 01/18/2019    ESTGFRAFRICA >60.0 01/18/2019    EGFRNONAA 57.2 (A) 01/18/2019    MICALBCREAT 556.5 (H) 01/25/2019

## 2019-03-29 NOTE — PROGRESS NOTES
CHIEF COMPLAINT  Hypertension and Diabetes      ENCOUNTER DIAGNOSES  1. Type 2 diabetes mellitus with peripheral vascular disease    2. Essential hypertension    3. Type 2 diabetes mellitus with stage 3 chronic kidney disease, without long-term current use of insulin    4. Type 2 diabetes mellitus with microalbuminuria, without long-term current use of insulin    5. Disorder of bone and articular cartilage    6. Simple chronic bronchitis        HISTORY, ASSESSMENT, and PLAN    Type 2 diabetes mellitus is improved but still not optimally controlled.  No symptomatic hypoglycemia reported.  We discussed treatment options.  It was agreed that she would continue working on therapeutic lifestyle changes and we would increase her dose of metformin.  (Risks discussed regarding chronic kidney disease and metformin.  Still believed best option for her.)  We will recheck A1c in two months.    Previous labs showed proteinuria.  I have ordering repeat lab testing to evaluate renal function and proteinuria in two months.    Chronic bronchitis is stable/compensated.    Hypertension is controlled based on measurement in office today, but home blood pressure readings are mildly elevated.  She recently increased her amlodipine dose from 5 mg to 10 mg.  She is tolerating her medications well without adverse effect.    Problem List Items Addressed This Visit        Pulmonary    Simple chronic bronchitis       Cardiac/Vascular    Essential hypertension (Chronic)    Relevant Medications    amLODIPine (NORVASC) 10 MG tablet    Other Relevant Orders    Ambulatory Referral to Outpatient Case Management       Endocrine    Type 2 diabetes mellitus with peripheral vascular disease - Primary (Chronic)    Current Assessment & Plan     Diabetes Management Status    Statin: Taking  ACE/ARB: Not taking    Screening or Prevention Patient's value Goal Complete/Controlled?   HgA1C Testing and Control   Lab Results   Component Value Date    HGBA1C 7.6  (H) 03/27/2019      Annually/Less than 8% Yes   Lipid profile : 03/01/2019 Annually Yes   LDL control Lab Results   Component Value Date    LDLCALC 94.4 03/01/2019    Annually/Less than 100 mg/dl  Yes   Nephropathy screening Lab Results   Component Value Date    LABMICR 896.0 01/25/2019     Lab Results   Component Value Date    PROTEINUA 1+ (A) 12/16/2018    Annually Yes   Blood pressure BP Readings from Last 1 Encounters:   03/29/19 122/60    Less than 140/90 Yes   Dilated retinal exam : 02/01/2019 Annually Yes   Foot exam   : 01/25/2019 Annually Yes     Lab Results   Component Value Date    HGBA1C 7.6 (H) 03/27/2019    HGBA1C 7.9 (H) 01/18/2019    ESTGFRAFRICA >60.0 01/18/2019    EGFRNONAA 57.2 (A) 01/18/2019    MICALBCREAT 556.5 (H) 01/25/2019            Relevant Medications    metFORMIN (GLUCOPHAGE-XR) 500 MG 24 hr tablet    Other Relevant Orders    Diabetes Digital Medicine (DSTLD) Enrollment Order (Completed)    Diabetes Just Between Friends Medicine (DDeZWay): Assign Onboarding Questionnaires (Completed)    Diabetes Digital Medicine (DDeZWay) Enrollment Order (Completed)    Ambulatory Referral to Outpatient Case Management    Renal function panel    Protein / creatinine ratio, urine    Hemoglobin A1c    Type 2 diabetes mellitus with stage 3 chronic kidney disease, without long-term current use of insulin (Chronic)    Current Assessment & Plan     Diabetes Management Status    Statin: Taking  ACE/ARB: Not taking    Screening or Prevention Patient's value Goal Complete/Controlled?   HgA1C Testing and Control   Lab Results   Component Value Date    HGBA1C 7.6 (H) 03/27/2019      Annually/Less than 8% Yes   Lipid profile : 03/01/2019 Annually Yes   LDL control Lab Results   Component Value Date    LDLCALC 94.4 03/01/2019    Annually/Less than 100 mg/dl  Yes   Nephropathy screening Lab Results   Component Value Date    LABMICR 896.0 01/25/2019     Lab Results   Component Value Date    PROTEINUA 1+ (A) 12/16/2018    Annually Yes    Blood pressure BP Readings from Last 1 Encounters:   03/29/19 122/60    Less than 140/90 Yes   Dilated retinal exam : 02/01/2019 Annually Yes   Foot exam   : 01/25/2019 Annually Yes     Lab Results   Component Value Date    HGBA1C 7.6 (H) 03/27/2019    HGBA1C 7.9 (H) 01/18/2019    ESTGFRAFRICA >60.0 01/18/2019    EGFRNONAA 57.2 (A) 01/18/2019    MICALBCREAT 556.5 (H) 01/25/2019              Relevant Medications    metFORMIN (GLUCOPHAGE-XR) 500 MG 24 hr tablet    Other Relevant Orders    Diabetes Digital Medicine (DDMP) Enrollment Order (Completed)    Ambulatory Referral to Outpatient Case Management    Renal function panel    Protein / creatinine ratio, urine    Hemoglobin A1c    Type 2 diabetes mellitus with microalbuminuria, without long-term current use of insulin (Chronic)    Current Assessment & Plan     Diabetes Management Status    Statin: Taking  ACE/ARB: Not taking    Screening or Prevention Patient's value Goal Complete/Controlled?   HgA1C Testing and Control   Lab Results   Component Value Date    HGBA1C 7.6 (H) 03/27/2019      Annually/Less than 8% Yes   Lipid profile : 03/01/2019 Annually Yes   LDL control Lab Results   Component Value Date    LDLCALC 94.4 03/01/2019    Annually/Less than 100 mg/dl  Yes   Nephropathy screening Lab Results   Component Value Date    LABMICR 896.0 01/25/2019     Lab Results   Component Value Date    PROTEINUA 1+ (A) 12/16/2018    Annually Yes   Blood pressure BP Readings from Last 1 Encounters:   03/29/19 122/60    Less than 140/90 Yes   Dilated retinal exam : 02/01/2019 Annually Yes   Foot exam   : 01/25/2019 Annually Yes     Lab Results   Component Value Date    HGBA1C 7.6 (H) 03/27/2019    HGBA1C 7.9 (H) 01/18/2019    ESTGFRAFRICA >60.0 01/18/2019    EGFRNONAA 57.2 (A) 01/18/2019    MICALBCREAT 556.5 (H) 01/25/2019              Relevant Medications    metFORMIN (GLUCOPHAGE-XR) 500 MG 24 hr tablet    Other Relevant Orders    Diabetes Digital Medicine (DDMP) Enrollment  "Order (Completed)    Ambulatory Referral to Outpatient Case Management    Renal function panel    Protein / creatinine ratio, urine    Hemoglobin A1c      Other Visit Diagnoses     Disorder of bone and articular cartilage        Relevant Orders    DXA Bone Density Spine And Hip          Medications Discontinued During This Encounter   Medication Reason    metFORMIN (GLUCOPHAGE-XR) 500 MG 24 hr tablet Reorder    amLODIPine (NORVASC) 10 MG tablet Reorder      COMMENT: Unless ndicated otherwise, current treatments (including prescription medications) are to be continued.    REVIEW OF SYSTEMS  CONSTITUTIONAL: No fever reported.  CARDIOVASCULAR: No chest pain reported.  PULMONARY: No trouble breathing reported.   ENDOCRINE: No polyuria or polydipsia reported.     PHYSICAL EXAM  Vitals:    03/29/19 0734   BP: 122/60   BP Location: Left arm   Patient Position: Sitting   Pulse: 102   Temp: 97.7 °F (36.5 °C)   TempSrc: Tympanic   SpO2: 96%   Weight: 69.3 kg (152 lb 12.5 oz)   Height: 5' 0.75" (1.543 m)     CONSTITUTIONAL: Vital signs noted. No apparent distress. Does not appear acutely ill or septic. Appears adequately hydrated.  HEENT: External ENT grossly unremarkable. Hearing grossly intact. Oropharynx moist.  PULM: Lungs clear. Breathing unlabored.  HEART: Auscultation reveals regular rate and rhythm without murmur, gallop or rub.  DERM: Skin warm and moist with normal turgor.  NEURO: There are no gross focal motor deficits or gross deficits of cranial nerves III-XII.  PSYCHIATRIC: Alert and conversant. Mood grossly neutral. Judgment and insight not grossly compromised.     Follow up in about 2 months (around 6/7/2019) for review test results and discuss treatment plan, re-evaluate problem(s) discussed today.    Documentation entered by me for this encounter may have been done in part using speech-recognition technology. Although I have made an effort to ensure accuracy, "sound like" errors may exist and should be " interpreted in context. ELIAN Sandoval MD

## 2019-03-29 NOTE — PATIENT INSTRUCTIONS
To get help with your digital medicine diabetes equipment, stop by the O-bar in the 1st floor lobby of [facility]

## 2019-04-03 NOTE — PROGRESS NOTES
HPI:  Patient contacted to discuss how she is tolerating amlodipine and welcome her to the DM program.  Patient is tolerating amlodipine and denies edema.  Patient was diagnosed with DM in January 2019.  Last 5 Patient Entered Readings                                      Current 30 Day Average: 149/72     Recent Readings 4/3/2019 4/3/2019 4/2/2019 4/2/2019 4/1/2019    SBP (mmHg) 152 152 157 157 149    DBP (mmHg) 72 72 72 72 67    Pulse 88 88 95 95 100        Patient denies s/s of hypotension (lightheadedness, dizziness, nausea, fatigue) associated with low readings. Instructed patient to inform me if this occurs, patient confirms understanding.    Patient denies s/s of hypertension (SOB, CP, severe headaches, changes in vision) associated with high readings. Instructed patient to go to the ED if BP >180/110 and accompanied by hypertensive s/s, patient confirms understanding.    Last 6 Patient Entered Readings                                          Most Recent A1c: 7.6% on 3/27/2019  (Goal: 7%)     Recent Readings 4/3/2019 4/1/2019 3/30/2019 3/29/2019 3/29/2019    Blood Glucose (mg/dL) 113 153 199 185 339        Patient denies s/s or episodes of hypoglycemia (weakness, dizziness, hunger, shakiness, nausea, headache, heart palpitations, sweating, fatigue, anxiety, etc.).    Patient denies s/s of hyperglycemia (headache, increased thirst, increased urination, fatigue, blurred vision).    Assessment:  Patient's current 30-day average is not goal of <130/80 mmHg based on ACC/AHA HTN guidelines. She stated she feels technique isn't accurate.    Diabetes is not based on patient's last A1C. SMBG readings reviewed and are elevated. She increased metformin to 1000mg BID 3/29.    Health maintenance is up to date.    Patient mentioned not utilizing the appropriate technique.     Plan:  Continue current regimen.   Patient will go to the O-bar sometime this week to confirm the accuracy of her technique.  Patients health  , Jessica Ortiz, will be following up every 3-4 weeks.   I will continue to monitor regularly and will follow-up in 2 weeks, sooner if blood pressure or blood glucose begins to trend upward or downward.     Current medication regimen:  Hypertension Medications             amLODIPine (NORVASC) 10 MG tablet Take 1 tablet (10 mg total) by mouth once daily.    chlorthalidone (HYGROTEN) 25 MG Tab Take 1 tablet (25 mg total) by mouth once daily.        Diabetes Medications             metFORMIN (GLUCOPHAGE-XR) 500 MG 24 hr tablet Take 2 tablets (1,000 mg total) by mouth 2 (two) times daily with meals.        Patient has my contact information and knows to call with any concerns or clinical changes.

## 2019-04-08 ENCOUNTER — OUTPATIENT CASE MANAGEMENT (OUTPATIENT)
Dept: ADMINISTRATIVE | Facility: OTHER | Age: 69
End: 2019-04-08

## 2019-04-08 NOTE — LETTER
April 11, 2019    Lucia Barlow  [address] [place] [address]             [facility] [address] [place] [address]        Welcome to [facility]s Complex Care Management Program.  It was a pleasure talking with you today.  My name is Herlinda Zamorano and I look forward to being your Care Manager.  My goal is to help you function at the healthiest and highest level possible.  You can contact me directly at [phone].    As an [facility] patient with [employer], some of the services we may be able to provide include:      Development of an individualized care plan with a Registered Nurse    Connection with a    Connection with available resources and services     Coordinate communication among your care team members    Provide coaching and education    Help you understand your doctors treatment plan   Help you obtain information about your insurance coverage.     All services provided by [facility]s Complex Care Managers and other care team members are coordinated with and communicated to your primary care team.    As part of your enrollment, you will be receiving education materials and more information about these services in your [facility] account, by phone or through the mail.  If you do not wish to participate or receive information, please contact our office at [phone].      Sincerely,    Herlinda Zamorano, RN   [facility]   Out-patient RN Complex Care Manager

## 2019-04-10 ENCOUNTER — PATIENT OUTREACH (OUTPATIENT)
Dept: OTHER | Facility: OTHER | Age: 69
End: 2019-04-10

## 2019-04-10 NOTE — PROGRESS NOTES
Last 5 Patient Entered Readings                                      Current 30 Day Average: 142/71     Recent Readings 4/10/2019 4/10/2019 4/9/2019 4/9/2019 4/7/2019    SBP (mmHg) 138 138 146 146 137    DBP (mmHg) 71 71 63 63 69    Pulse 101 101 96 96 92          Last 6 Patient Entered Readings                                          Most Recent A1c: 7.6% on 3/27/2019  (Goal: 7%)     Recent Readings 4/10/2019 4/9/2019 4/7/2019 4/5/2019 4/4/2019    Blood Glucose (mg/dL) 190 210 136 125 121        Digital Medicine: Health  Follow Up    Lifestyle Modifications:    1.Dietary Modifications (Sodium intake <2,000mg/day, food labels, dining out): Reports no change to overall eating habits. Still drinking 1-2 sodas per day, verbalized intent to reduce intake. Drinking about 5 bottles of water per day. Will discuss sodium intake next call, current diastolic average at goal.     2.Physical Activity: Reduction in exercise due to daily diarrhea. Will discuss further next call.     3.Medication Therapy: Patient has been compliant with the medication regimen.    4.Patient has the following medication side effects/concerns: Reports experiencing daily diarrhea due to metformin. Instructed patient to increase water intake. Reports she is waiting 6 weeks to allow body to adjust. Will inform DM clinician.     Follow up with Mrs. Lucia Barlow completed. No further questions or concerns. Will continue to follow up to achieve health goals.

## 2019-04-11 NOTE — PROGRESS NOTES
Summary:  Initial and RN Assessments completed with patient on the phone today. Patient has PMH of Diabetes, HTN, Hyperlipidemia, PVD and GERD. Patient states she is totally independent with all of her ADLs. Patient states she lives alone in the house she owns. Patient states she does drive but does not have a car at this time so one of her children brings her to her MD appointment and other errands. Patient states she gets most of her medications through [facility] mail order pharmacy and takes all of her medications as directed. Patient states she is ambulatory without any assistive devices and has not fallen in the last year. We did however discuss fall prevention and home safety like good lighting, clear pathways, no throw rugs and no electrical cords and patient voiced understanding. Patient states she has recently signed up for the [facility] Digital HTN and Diabetic programs. Patient states she checks her blood sugar daily as this is what her MD instructed her to do. Patient states here CBG averages between 100 and 200 daily. Patient states she does not want to attend DM education classes. Patient states she does not eat cakes, cookies or candy. Patient states she does drink a regular coke daily and drinks fruit juices. We had discussions on counting carbohydrates and portions sizes and eliminating sugary liquids. We discussed preferred DM foods like whole wheat cereals, breads, pasta, brown rice and fresh fruits and vegetables. We discussed protein choices like lean meats, chicken, fish, yogurt, beans and limiting luncheon meats, butter and fast foods and patient voiced understanding/agreement. Patient states she would like information on Commodities in her area and the lunches at the [facility] in her area. Encouraged patient to communicate with physician and call this RN if having any questions/concerns. OPCM will continue to follow. Patient is agreeable to follow up call in 2 weeks at same time. GALO Manzano  OPCM     Interventions:  Mailed educational materials on Diabetic food choices, Heart Healthy food choices  Referral to LCSW for COA/Commodities, [facility] meals information, Advance Directives and Advanced Care Planning  Referral to Financial assistance for help with co pays    Plan:  F/u on receipt of educational materials. Continue education on Diabetic diet, Heart Healthy food choices  F/u on Referral to LCSW for COA/Commodities, [facility] meals information, Advance Directives and Advanced Care Planning  F/u on Referral to Financial assistance for help with co pays  Discuss A1c goal    Todays OPCM Self-Management Care Plan was developed with the patients/caregivers input and was based on identified barriers from todays assessment.  Goals were written today with the patient/caregiver and the patient has agreed to work towards these goals to improve his/her overall well-being. Patient verbalized understanding of the care plan, goals, and all of today's instructions. Encouraged patient/caregiver to communicate with his/her physician and health care team about health conditions and the treatment plan.  Provided my contact information today and encouraged patient/caregiver to call me with any questions as needed.

## 2019-04-11 NOTE — PATIENT INSTRUCTIONS
Established High Blood Pressure    High blood pressure (hypertension) is a chronic disease. Often, healthcare providers dont know what causes it. But it can be caused by certain health conditions and medicines.  If you have high blood pressure, you may not have any symptoms. If you do have symptoms, they may include headache, dizziness, changes in your vision, chest pain, and shortness of breath. But even without symptoms, high blood pressure thats not treated raises your risk for heart attack and stroke. High blood pressure is a serious health risk and shouldnt be ignored.  A blood pressure reading is made up of two numbers: a higher number over a lower number. The top number is the systolic pressure. The bottom number is the diastolic pressure. A normal blood pressure is a systolic pressure of  less than 120 over a diastolic pressure of less than 80. You will see your blood pressure readings written together. For example, a person with a systolic pressure of 188 and a diastolic pressure of 78 will have 118/78 written in the medical record.  High blood pressure is when either the top number is 140 or higher, or the bottom number is 90 or higher. This must be the result when taking your blood pressure a number of times. The blood pressures between normal and high are called prehypertension.  Home care  If you have high blood pressure, you should do what is listed below to lower your blood pressure. If you are taking medicines for high blood pressure, these methods may reduce or end your need for medicines in the future.  · Begin a weight-loss program if you are overweight.  · Cut back on how much salt you get in your diet. Heres how to do this:  ¨ Dont eat foods that have a lot of salt. These include olives, pickles, smoked meats, and salted potato chips.  ¨ Dont add salt to your food at the table.  ¨ Use only small amounts of salt when cooking.  · Start an exercise program. Talk with your healthcare  provider about the type of exercise program that would be best for you. It doesn't have to be hard. Even brisk walking for 20 minutes 3 times a week is a good form of exercise.  · Dont take medicines that stimulate the heart. This includes many over-the-counter cold and sinus decongestant pills and sprays, as well as diet pills. Check the warnings about hypertension on the label. Before buying any over-the-counter medicines or supplements, always ask the pharmacist about the product's potential interaction with your high blood pressure and your high blood pressure medicines.  · Stimulants such as amphetamine or cocaine could be deadly for someone with high blood pressure. Never take these.  · Limit how much caffeine you get in your diet. Switch to caffeine-free products.  · Stop smoking. If you are a long-time smoker, this can be hard. Talk to your healthcare provider about medicines and nicotine replacement options to help you. Also, enroll in a stop-smoking program to make it more likely that you will quit for good.  · Learn how to handle stress. This is an important part of any program to lower blood pressure. Learn about relaxation methods like meditation, yoga, or biofeedback.  · If your provider prescribed medicines, take them exactly as directed. Missing doses may cause your blood pressure get out of control.  · If you miss a dose or doses, check with your healthcare provider or pharmacist about what to do.  · Consider buying an automatic blood pressure machine. Ask your provider for a recommendation. You can get one of these at most pharmacies.     The American Heart Association recommends the following guidelines for home blood pressure monitoring:  · Don't smoke or drink coffee for 30 minutes before taking your blood pressure.  · Go to the bathroom before the test.  · Relax for 5 minutes before taking the measurement.  · Sit with your back supported (don't sit on a couch or soft chair); keep your feet on  the floor uncrossed. Place your arm on a solid flat surface (like a table) with the upper part of the arm at heart level. Place the middle of the cuff directly above the eye of the elbow. Check the monitor's instruction manual for an illustration.  · Take multiple readings. When you measure, take 2 to 3 readings one minute apart and record all of the results.  · Take your blood pressure at the same time every day, or as your healthcare provider recommends.  · Record the date, time, and blood pressure reading.  · Take the record with you to your next medical appointment. If your blood pressure monitor has a built-in memory, simply take the monitor with you to your next appointment.  · Call your provider if you have several high readings. Don't be frightened by a single high blood pressure reading, but if you get several high readings, check in with your healthcare provider.  · Note: When blood pressure reaches a systolic (top number) of 180 or higher OR diastolic (bottom number) of 110 or higher, seek emergency medical treatment.  Follow-up care  You will need to see your healthcare provider regularly. This is to check your blood pressure and to make changes to your medicines. Make a follow-up appointment as directed. Bring the record of your home blood pressure readings to the appointment.  When to seek medical advice  Call your healthcare provider right away if any of these occur:  · Blood pressure reaches a systolic (upper number) of 180 or higher OR a diastolic (bottom number) of 110 or higher  · Chest pain or shortness of breath  · Severe headache  · Throbbing or rushing sound in the ears  · Nosebleed  · Sudden severe pain in your belly (abdomen)  · Extreme drowsiness, confusion, or fainting  · Dizziness or spinning sensation (vertigo)  · Weakness of an arm or leg or one side of the face  · You have problems speaking or seeing   Date Last Reviewed: 12/1/2016  © 6467-3277 [employer]. [address] [place] [address]  [facility], [place] [address]. All rights reserved. This information is not intended as a substitute for professional medical care. Always follow your healthcare professional's instructions.        Hypoglycemia (Low Blood Sugar)     Fast-acting sugar includes a cup of nonfat milk.     Too little sugar (glucose) in your blood is called hypoglycemia or low blood sugar. Low blood sugar usually means anything lower than 70 mg/dL. Talk with your healthcare provider about your target range and what level is too low for you. Diabetes itself doesnt cause low blood sugar. But some of the treatments for diabetes, such as pills or insulin, may raise your risk for it. Low blood sugar may cause you to pass out or have a seizure. So always treat low blood sugar right away, but don't overeat.  Special note: Always carry a source of fast-acting sugar and a snack in case of hypoglycemia.   What you may notice  If you have low blood sugar, you may have one or more of these symptoms:  · Shakiness or dizziness  · Cold, clammy skin or sweating  · Feelings of hunger  · Headache  · Nervousness  · A hard, fast heartbeat  · Weakness  · Confusion or irritability  · Blurred vision  · Having nightmares or waking up confused or sweating  · Numbness or tingling in the lips or tongue  What you should do  Here are tips to follow if you have hypoglycemia:   · First check your blood sugar. If it is too low (out of your target range), eat or drink 15 to 20 grams of fast-acting sugar. This may be 3 to 4 glucose tablets, 4 ounces (half a cup) of fruit juice or regular (nondiet) soda, 8 ounces (1 cup) of fat-free milk, or 1 tablespoon of honey. Dont take more than this, or your blood sugar may go too high.  · Wait 15 minutes. Then recheck your blood sugar if you can.  · If your blood sugar is still too low, repeat the steps above and check your blood sugar again. If your blood sugar still has not returned to your target range, contact your healthcare  provider or seek emergency care.  · Once your blood sugar returns to target range, eat a snack or meal.  Preventing low blood sugar  Things you can do include the following:   · If your condition needs a strict treatment plan, eat your meals and snacks at the same times each day. Dont skip meals!  · If your treatment plan lets you change when you eat and what you eat, learn how to change the time and dose of your rapid-acting insulin to match this.   · Ask your healthcare provider if it is safe for you to drink alcohol. Never drink on an empty stomach.  · Take your medicine at the prescribed times.  · Always carry a source of fast-acting sugar and a snack when youre away from home.  Other things to do  Additional tips include the following:  · Carry a medical ID card, a compact USB drive, or wear a medical alert bracelet or necklace. It should say that you have diabetes. It should also say what to do if you pass out or have a seizure.  · Make sure your family, friends, and coworkers know the signs of low blood sugar. Tell them what to do if your blood sugar falls very low and you cant treat yourself.  · Keep a glucagon emergency kit handy. Be sure your family, friends, and coworkers know how and when to use it. Check it regularly and replace the glucagon before it expires.  · Talk with your health care team about other things you can do to prevent low blood sugar.     If you have unexplained hypoglycemia or hypoglycemia several times, call your healthcare provider.   Date Last Reviewed: 5/1/2016  © 2331-9018 [employer]. [address] [place] [address], [place] [address]. All rights reserved. This information is not intended as a substitute for professional medical care. Always follow your healthcare professional's instructions.        Eating Out When You Have Diabetes  Eating right is an important part of keeping your blood sugar in your target range. You just need to make healthy choices.    Tips for  restaurant meals  When you eat away from home try these tips:  · Try to schedule your dining-out meal at your normal meal time. Make a reservation if possible, so you don't have to wait to eat. If you can't make a reservation, try to arrive at the restaurant at a less-busy time to cut down your wait time. Eat a small fruit or starch snack at your regular mealtime if your restaurant meal is going to be later than usual.   · Call ahead to see if the restaurant can meet your dietary needs if you've never been there before. Or you can go online to see the menu ahead of time.  · Carry some crackers with you in case the restaurant needs you to wait until you can be served.  · Ask how foods are prepared before you order.  · Instead of fried, sautéed, or breaded foods, choose ones that are broiled, steamed, grilled, or baked.  · Ask for sauces, gravies, and dressings on the side.  · Only eat an amount that fits your meal plan. Remember: You can take home the leftovers.  · Save dessert for special occasions. Then choose a small dessert or share one with a friend or family member.  Make healthy choices  Fast food  · Garden salad with light dressing on the side  · Baked potato with vegetables or herbs  · Broiled, roasted, or grilled chicken sandwich  · Sliced turkey or lean roast beef sandwich  Mexican  · Chicken enchilada, without cheese or sour cream   · Small burrito with whole beans and chicken  · Whole beans (not refried) and rice  · Chicken or fish fajitas  Steakhouse  · Grilled or broiled lean cuts of beef  · Baked potato with vegetables or herbs  · Broiled or baked chicken. Dont eat the skin.  · Steamed vegetables  Asian  · Steamed dumplings or potstickers  · Broiled, boiled, or steamed meats or fish  · Sushi or sashimi  · Steamed rice or boiled noodles. One serving is equal to 1/3 cup.  Date Last Reviewed: 6/1/2016  © 6101-8203 [employer]. [address] [place] [address], [place] [address]. All rights reserved.  This information is not intended as a substitute for professional medical care. Always follow your healthcare professional's instructions.        Healthy Meals for Diabetes     A healthcare provider will help you develop a meal plan that fits your needs.   Ask your healthcare team to help you make a meal plan that fits your needs. Your meal plan tells you when to eat your meals and snacks, what kinds of foods to eat, and how much of each food to eat. You dont have to give up all the foods you like. But you do need to follow some guidelines.  Choose healthy carbohydrates  Starches, sugars, and fiber are all types of carbohydrates. Fiber can help lower your cholesterol and triglycerides. Fiber is also healthy for your heart. You should have 20 to 35 grams of total fiber each day. Fiber-rich foods include:  · Whole-grain breads and cereals  · Bulgur wheat  · Brown rice     · Whole-wheat pasta  · Fruits and vegetables  · Dry beans, and peas   Keep track of the amount of carbohydrates you eat. This can help you keep the right balance of physical activity and medicine. The amount of carbohydrates needed will vary for each person. It depends on many things such as your health, the medicines you take, and how active you are. Your healthcare team will help you figure out the right amount of carbohydrates for you. You may start with around 45 to 60 grams of carbohydrates per meal, depending on your situation.   Here are some examples of foods containing about 15 grams of carbohydrates (1 serving of carbohydrates):  · 1/2 cup of canned or frozen fruit  · A small piece of fresh fruit (4 ounces)  · 1 slice of bread  · 1/2 cup of oatmeal  · 1/3 cup of rice  · 4 to 6 crackers  · 1/2 English muffin  · 1/2 cup of black beans  · 1/4 of a large baked potato (3 ounces)  · 2/3 cup of plain fat-free yogurt  · 1 cup of soup  · 1/2 cup of casserole  · 6 chicken nuggets  · 2-inch-square brownie or cake without frosting  · 2 small  cookies  · 1/2 cup of ice cream or sherbet  Choose healthy protein foods  Eating protein that is low in fat can help you control your weight. It also helps keep your heart healthy. Low-fat protein foods include:  · Fish  · Plant proteins, such as dry beans and peas, nuts, and soy products like tofu and soymilk  · Lean meat with all visible fat removed  · Poultry with the skin removed  · Low-fat or nonfat milk, cheese, and yogurt  Limit unhealthy fats and sugar  Saturated and trans fats are unhealthy for your heart. They raise LDL (bad) cholesterol. Fat is also high in calories, so it can make you gain weight. To cut down on unhealthy fats and sugar, limit these foods:  · Butter or margarine  · Palm and palm kernel oils and coconut oil  · Cream  · Cheese  · Lewis  · Lunch meats     · Ice cream  · Sweet bakery goods such as pies, muffins, and donuts  · Jams and jellies  · Candy bars  · Regular sodas   How much to eat  The amount of food you eat affects your blood sugar. It also affects your weight. Your healthcare team will tell you how much of each type of food you should eat.  · Use measuring cups and spoons and a food scale to measure serving sizes.  · Learn what a correct serving size looks like on your plate. This will help when you are away from home and cant measure your servings.  · Eat only the number of servings given on your meal plan for each food. Dont take seconds.  · Learn to read food labels. Be sure to look at serving size, total carbohydrates, fiber, calories, sugar, and saturated and trans fats. Look for healthier alternatives to foods that have added sugar.  · Plan ahead for parties so you can still have a good time without going overboard with unhealthy food choices. Set a good example yourself by bringing a healthy dish to pot bennettks.   Choose healthy snacks  When it comes to snacks, we usually think about foods with added sugar and fats. But there are many other options for healthier snack  choices. Here are a few snack ideas to choose from:  Snacks with less than 5 grams of carbohydrates  · 1 piece of string cheese  · 3 celery sticks plus 1 tablespoon of peanut butter  · 5 cherry tomatoes plus 1 tablespoon of ranch dressing  · 1 hard-boiled egg  · 1/4 cup of fresh blueberries  ·  5 baby carrots  · 1 cup of light popcorn  · 1/2 cup of sugar-free gelatin  · 15 almonds  Snacks with about 10 to 20 grams of carbohydrates  · 1/3 cup of hummus plus 1 cup of fresh cut nonstarchy vegetables (carrots, green peppers, broccoli, celery, or a combination)  · 1/2 cup of fresh or canned fruit plus 1/4 cup of cottage cheese  · 1/2 cup of tuna salad with 4 crackers  · 2 rice cakes and a tablespoon of peanut butter  · 1 small apple or orange  · 3 cups light popcorn  · 1/2 of a turkey sandwich (1 slice of whole-wheat bread, 2 ounces of turkey, and mustard)  Portion sizes are important to controlling your blood sugar and staying at a healthy weight. Stock up on healthy snack items so you always have them on hand.  When to eat  Your meal plan will likely include breakfast, lunch, dinner, and some snacks.  · Try to eat your meals and snacks at about the same times each day.  · Eat all your meals and snacks. Skipping a meal or snack can make your blood sugar drop too low. It can also cause you to eat too much at the next meal or snack. Then your blood sugar could get too high.  Date Last Reviewed: 7/1/2016  © 5641-3976 [employer]. [address] [place] [address]. All rights reserved. This information is not intended as a substitute for professional medical care. Always follow your healthcare professional's instructions.        Long-Term Complications of Diabetes    Diabetes can cause health problems over time. These are called complications. They are more likely to happen if your blood sugar is often too high. Over time, high blood sugar can damage blood vessels in your body. It is important to keep  your blood sugar in your target range. This can help prevent or delay complications from diabetes.  Possible complications  Complications of diabetes include:  · Eye problems, including damage to the blood vessels in the eyes (retinopathy), pressure in the eye (glaucoma), and clouding of the eyes lens (a cataract). Eye problems can eventually lead to irreversible blindness.   · Tooth and gum problems (periodontal disease), causing loss of teeth and bone  · Blood vessel (vascular) disease leading to circulation problems, heart attack or stroke, or a need for amputation of a limb   · Problems with sexual function leading to erectile dysfunction in men and sexual discomfort in women   · Kidney disease (nephropathy) can eventually lead to kidney failure, which may require dialysis or kidney transplant   · Nerve problems (neuropathy), causing pain or loss of feeling in your feet and other parts of your body, potentially leading to an amputation of a limb   · High blood pressure (hypertension), putting strain on your heart and blood vessels  · Serious infections, possibly leading to loss of toes, feet, or limbs  How to avoid complications  The serious consequences of these complications may be avoidable for most people with diabetes by managing your blood glucose, blood pressure, and cholesterol levels. This can help you feel better and stay healthy. You can manage diabetes by tracking your blood sugar. You can also eat healthy and exercise to avoid gaining weight. And you should take medicine if directed by your healthcare provider.  Date Last Reviewed: 5/1/2016 © 2000-2017 [employer]. [address] [place] [id number]. All rights reserved. This information is not intended as a substitute for professional medical care. Always follow your healthcare professional's instructions.        Diabetes: Meal Planning    You can help keep your blood sugar level in your target range by eating healthy foods.  Your healthcare team can help you create a low-fat, nutritious meal plan. Take an active role in your diabetes management by following your meal plan and working with your healthcare team.  Make your meal plan  A meal plan gives guidelines for the types and amounts of food you should eat. The goal is to balance food and insulin (or other diabetes medications) so your blood sugars will be in your target range. Your dietitian will help you make a flexible meal plan that includes many foods that you like.  Watch serving sizes  Your meal plan will group foods by servings. To learn how much a serving is, start by measuring food portions at each meal. Soon youll know what a serving looks like on your plate. Ask your healthcare provider about how to balance servings of different foods.  Eat from all the food groups  The basis of a healthy meal plan is variety (eating lots of different foods). Choose lean meats, fresh fruits and vegetables, whole grains, and low-fat or nonfat dairy products. Eating a wide variety of foods provides the nutrients your body needs. It can also keep you from getting bored with your meal plan.  Learn about carbohydrates, fats, and protein  · Carbohydrates are starches, sugars, and fiber. They are found in many foods, including fruit, bread, pasta, milk, and sweets. Of all the foods you eat, carbohydrates have the most effect on your blood sugar. Your dietitian may teach you about carb counting, a way to figure out the number of carbohydrates in a meal.  · Fats have the most calories. They also have the most effect on your weight and your risk of heart disease. When you have diabetes, its important to control your weight and protect your heart. Foods that are high in fat include whole milk, cheese, snack foods, and desserts.  · Protein is important for building and repairing muscles and bones. Choose low-fat protein sources, such as fish, egg whites, and skinless chicken.  Reduce liquid  sugars  Extra calories from sodas, sports drinks, and fruit drinks make it hard to keep blood sugar in range. Cut as many liquid sugars from your meal plan as you can.  This includes most fruit juices, which are often high in natural or added sugar. Instead, drink plenty of water and other sugar-free beverages.  Eat less fat  If you need to lose weight, try to reduce the amount of fat in your diet. This can also help lower your cholesterol level to keep blood vessels healthier. Cut fat by using only small amounts of liquid oil for cooking. Read food labels carefully to avoid foods with unhealthy trans fats.  Timing your meals  When it comes to blood sugar control, when you eat is as important as what you eat. You may need to eat several small meals spaced evenly throughout the day to stay in your target range. So dont skip breakfast or wait until late in the day to get most of your calories. Doing so can cause your blood sugar to rise too high or fall too low.   Date Last Reviewed: 3/1/2016  © 6214-0408 [employer]. [address] [place] [address]. All rights reserved. This information is not intended as a substitute for professional medical care. Always follow your healthcare professional's instructions.        Diabetes: Learning About Serving and Portion Sizes     A good rule of thumb: Devote half your plate to vegetables and green salad. Split the other half between protein and starchy carbohydrates. Fruit makes a good dessert.     Servings and portions. Whats the difference? These terms can be very confusing. But learning to measure serving sizes can help you figure out how many carbohydrates (carbs) and other foods you eat each day. They are also powerful tools for managing your weight.  Servings and portions  Many different words are used to describe amounts of food. If your health care provider uses a term youre not sure of, dont be afraid to ask. It helps to know the difference  between servings and portions:  · A serving size is a fixed size. Food producers use this term to describe their products. For example, the label on a cereal box could say that 1 cup of dry cereal = 1 serving.  · A portion (also called a helping) is how much you eat or how much you put on your plate at a meal. For example, you might eat 2 cups of cereal at breakfast.  Using serving information  The portion you choose to eat (such as 2 cups of cereal) may be more than one serving as listed on the food label (such as 1 cup of cereal). Thats why it helps to measure or weigh the food you eat. Because the food label values are based on servings, youll need to know how many servings you eat at one sitting.     Ounces: 2 to 3 ounces is about the size of your palm.       1 Cup: 1 cup (or a medium-sized piece) is about the size of your fist.       1/2 Cup: 1/2 cup is about the size of your cupped hand.      One tablespoon is about the size of your thumb.  One teaspoon is about the size of the tip of your thumb.  Keeping track of serving sizes  When youre planning for a snack or a meal, keep servings in mind. If you dont have measuring cups or a scale handy, there are ways to eyeball serving sizes, such as comparing your food to the size of your hand (see pictures above).  Managing portion sizes  If your weight is a concern, reducing your portions can help. You can eat more than one serving of a food at once. But to keep from eating too much at one meal, learn how to manage your portions. A portion is the amount of each type of food on your plate. See the plate diagram for an example of balanced portions.  Date Last Reviewed: 3/1/2016  © 4285-8247 [employer]. [address] [place] [address], [place] [address]. All rights reserved. This information is not intended as a substitute for professional medical care. Always follow your healthcare professional's instructions.        Diabetes: Caring for Your Body    When  you have diabetes, your body needs special care. This care helps you stay healthy and prevent complications. Exercise and healthy eating are a part of this. You can also protect yourself by taking special care of your feet, skin, and teeth.  Caring for your feet  Follow these tips to help keep your feet healthy:  · Check your feet every day for redness, blisters, cracks, dry skin, or numbness. Use a mirror to inspect the bottoms of your feet, if needed. Or, ask for help.  · Wash your feet in warm (not hot) water. Dont soak them.  · Use an emery board to keep your toenails even with the ends of your toes. File away sharp edges. A podiatrist (foot doctor) may need to cut your toenails for you.  · Keep your skin soft and smooth by placing a thin layer of skin lotion on the tops and bottoms of your feet. Do not put lotion in between your toes.  · Always wear shoes or slippers, even inside your home. Make sure that shoes are properly fitted. Change your socks daily.  · Call your healthcare provider right away if your feet are numb or painful, or if a cut or sore doesnt heal within a few days.  Preventing skin infections  To prevent skin infections, bathe every day. Dry yourself well, especially between your toes. Wash any cuts with warm, soapy water and cover with a sterile bandage. Call your healthcare provider if a cut or sore doesn't heal in a few days, feels warm, itches, or has a bad odor.  Caring for your teeth  Follow these guidelines for healthy teeth:  · Brush your teeth twice daily.  · Floss your teeth daily.  · See your dentist at least twice yearly.  If you smoke, quit  Smoking is dangerous for everyone, especially people with diabetes. It can harm the blood vessels in your eyes, kidneys, and heart. It raises blood pressure. Smoking can also slow healing, so infections are more likely. Ask your healthcare provider about programs to help you stop smoking.   Date Last Reviewed: 3/1/2016  © 4062-8548 [employer] [address] [place] [address], [place] [address]. All rights reserved. This information is not intended as a substitute for professional medical care. Always follow your healthcare professional's instructions.        Diabetes: Keeping Feet Healthy     Have your feet checked every time you see your healthcare provider, and at least once a year.     Diabetes can damage nerves in your feet and cause neuropathy. This condition makes it hard for you to feel injuries or sore spots. Diabetes can also change blood flow, making it harder for small problems, like a blister, to heal properly. In fact, minor injuries can quickly become serious infections that send you to the hospital. Practice self-care to protect your feet and keep them healthy.   Take special care  Here are tips for taking special care of your feet:  · Inspect your feet daily for problems such as redness, blisters, cracks, dry skin, or numbness. Use a mirror to see the bottoms of your feet. Or, ask for help.  · Manage your diabetes. Monitor and control your blood sugar. Take all your medicines as prescribed.  · Avoid walking barefoot, even indoors. Always wear socks inside your shoes.   · Wash your feet with warm water and mild soap. Dry well, especially between toes.  · Dont treat corns or calluses yourself. Talk to your healthcare provider or podiatrist (a healthcare provider who specializes in foot care) if you need assistance trimming your toenails.  · Use moisturizing cream or lotion if you have dry skin, but dont use it between toes.  · Dont use heating pads on your feet. If you have neuropathy, you could get a burn and not feel it.  · Stop smoking. Smoking restricts blood flow and can make it harder for wounds to heal.  · Don't use sharp blades to trim your nails. Use a nail clipper and file instead.   Have regular checkups  Foot problems can develop quickly. So be sure to follow your healthcare teams schedule for regular checkups.  During office visits, take off your shoes and socks as soon as you get in the exam room. Ask your healthcare provider to examine your feet for problems. This will make it easier to find and treat small skin irritations before they get worse. Regular checkups can also help keep track of the blood flow and feeling in your feet. If you have neuropathy, you may need to have checkups more often.  Wear proper footwear  Wearing proper footwear is very important. If areas of your feet have been damaged by too much pressure, your healthcare provider may recommend changing your footwear. In some cases, avoiding high heels or tight work boots may be all thats needed. Or, your healthcare provider may recommend special shoes or custom inserts. These help protect your feet and keep existing irritations from getting worse. If you need special footwear, ask your healthcare provider if you qualify for Medicare's custom-molded and extra-depth diabetic shoe and insert program.   Make sure shoes and socks fit  Any pair of shoes--new or old--should feel comfortable as soon as you put them on. There shouldnt be any rubbing when you walk. Wear the right shoe for any activity. For instance, a running shoe is designed to keep your feet injury-free while jogging. Buy shoes at the end of the day, when your feet are larger. Make sure they provide support without feeling too loose. Make sure your socks fit, too. Wear soft, seamless, well-padded socks for activity. Cotton or microfiber socks are best to help to absorb sweat. To protect your feet, avoid shoes that are open-toed or open-heeled. If you have questions about what kinds of shoes and socks are best, talk to your healthcare team.  Get regular exercise  Regular exercise improves blood flow in your feet. It also increases foot strength and flexibility. Gentle exercises, like walking or riding a stationary bicycle, are best. You can also do special foot exercises. Just be sure to talk  "with your healthcare provider before starting any exercise program. Also mention if any exercise causes pain, redness, or other signs of foot problems.     Note: If you have any kind of break in the skin of your foot or ankle, keep the area clean. Then call your healthcare provider--especially if the area doesnt appear to be healing.   Date Last Reviewed: 6/1/2016  © 7318-8311 [employer]. [address] [place] [address], [place] [address]. All rights reserved. This information is not intended as a substitute for professional medical care. Always follow your healthcare professional's instructions.        Diabetes: Shopping for and Preparing Meals    Having diabetes doesnt mean you have to shop in a special aisle or look for special foods. But you will need to make choices. By comparing items and reading food labels, you can find the healthiest foods for you and your family.  Comparing items  When you shop, compare items to find the best ones for your needs. Keep these facts in mind:  · No sugar added does not mean a product is sugar-free.  · "Sugar-free" means less than 1/2 gram (g) of sugar per serving.  · Fat free means less than 1/2 g of fat per serving. This does not necessarily mean the product is low in calories.  · Low fat means 3 g fat or less per serving. Reduced fat or less fat means 25% less fat than the regular version. Some of this fat may be saturated or trans fat. And calories per serving may be similar to the regular version.  Making small changes  Dont try to change all of your eating habits at once. Here are some ideas to start with:  · Try fat-free or low-fat cheese, milk, and yogurt. Also try leaner cuts of meat. This will help you cut down on saturated fat.  · Try whole-grain breads, brown rice, and whole-wheat pasta.  · Load up on fresh or frozen vegetables. If you buy canned, choose low-sodium vegetables.  · Avoid processed foods as much as possible. They tend to be low in " fiber and high in trans fats and sodium.  · Try tofu, soymilk, or meat substitutes.?They can help you cut cholesterol and saturated fat out of your diet.  Learning to read food labels  To find healthy foods that help you control blood sugar, learn how to read food labels. Look for the Nutrition Facts label on packaged foods. It will tell you how much carbohydrate, sugar, fat, and fiber is in each serving. Then, you can decide whether or not the food fits into your meal plan.  Using the food label  So, once you have the food label, what do you do with it? The food label helps in many ways. Use it to:  · Compare items and decide which is the best for your health needs.  · Track the number of carbohydrates in your portions.  · Figure out how many servings of a food you can have and still stay within the number of carbohydrates for that meal.  Planning meals  For good blood sugar control, plan what and when youll eat. Start by creating a meal plan that includes all the food groups. Then, time your meals to help keep your blood sugar level steady. You may need to adjust your plan for special situations.  Eat from all the food groups  The basis of a healthy meal plan is variety (eating many different types of foods). Look for lean meats, fresh fruits and vegetables, whole grains, and low-fat or non-fat dairy products. Eating a wide variety of foods provides the nutrients your body needs. It can also keep you from getting bored with your meal plan.  Reduce liquid sugars  Extra calories from sodas, sports drinks, and fruit drinks make it hard to keep blood sugar in range. Cut as many liquid sugars from your meal plan as you can. This includes most fruit juices, which are often high in natural or added sugar. Instead, drink plenty of water and other sugar-free beverages.  Eat less fat  If you need to lose some weight, try to reduce the amount of fat in your diet. This can also help lower your cholesterol level to keep blood  vessels healthier. Cut fat by using only small amounts of liquid oil for cooking. Read food labels carefully to avoid foods with unhealthy trans fats.  Timing your meals  When it comes to blood sugar control, when you eat is as important as what you eat. You may need to eat several small meals spaced evenly throughout the day to stay in your target range. So dont skip breakfast or wait until late in the day to get most of your calories. Doing so can cause your blood sugar to rise too high or fall too low.  Cooking wisely  · Broil, steam, bake, or grill meats and vegetables, instead of frying.  · Instead of cream-based sauces or sugary glazes, flavor foods with vegetable purée, lemon or lime juice, or herb seasonings.  · Remove skin from chicken and turkey before serving.  · Look in cookbooks for easy, low-fat, low-sugar recipes. When making your usual recipes, cut sugar by 1/2 and fat by 1/3.  Date Last Reviewed: 8/1/2016 © 2000-2017 [employer]. [address] [place] [address]. All rights reserved. This information is not intended as a substitute for professional medical care. Always follow your healthcare professional's instructions.        Diabetes: Sick-Day Plan  Infections, the flu, and even a cold, can cause your blood sugar to rise. And, eating less, nausea, and vomiting may cause your blood glucose to fall (hypoglycemia). Ask your healthcare provider to help you develop a sick-day plan. The following information can help.    Donts  Don'ts include the following:  · Diabetes medicines. Dont stop taking your diabetes medicine.  · Other medicines. Dont take other medicines, such as those for colds or the flu, without checking with your healthcare provider.  Dos  Do's include the following:  · Eating. Stick to your meal plan. If you cant eat, try fruit juice, regular gelatin, or frozen juice bars as directed by your healthcare provider.  · Drinking. Drink at least 1 glass of liquid  every hour. If youre eating, these liquids should be sugar-free.  · Blood glucose. Check your blood sugar as often as directed by your healthcare provider. You may need to check it more often than usual.  · Ketones. Check your blood or urine for ketones. Ketones are the waste from burning fat instead of glucose for energy. Ketones are a warning sign of ketoacidosis. Ketoacidosis is a medical emergency. Ketoacidosis can happen to anyone with diabetes, but it's very rare in type 2 diabetes. It is usually only an issue if you have type 1 diabetes.  · Diabetes medicines.  ¨ Adjust your insulin according to your sick-day plan. Don't skip insulin. You need insulin even if you can't eat your normal meals.  ¨ If you take pills for diabetes (oral medicines), take your normal dose unless your healthcare provider tells you something different.  · Sugar-free medicines. Look for sugar-free cough drops and other medicines. Ask your healthcare provider if its OK for you to take these.  · Getting help. If you're alone, ask someone to check on you several times a day.  Try to get all these supplies together before you need them.  Call your healthcare provider  Call your healthcare provider if you have any of the following:  · You vomit or have diarrhea for more than 6 hours.  · Your blood glucose level is higher than usual or more than 250 mg/dL after you have taken extra insulin (if recommended in your sick-day plan).  · You take oral medicine for diabetes, and your blood sugar is higher than usual or over 250 mg/dL, before a meal and stays that high for more than 24 hours.  · Your blood glucose is lower than usual or less than 70 mg/dL  · You have moderate to large amounts of ketones in your blood or urine.  · You arent better after 2 days.  Date Last Reviewed: 6/1/2016  © 5942-7170 [employer]. [address] [place] [address], [place] [address]. All rights reserved. This information is not intended as a substitute for  professional medical care. Always follow your healthcare professional's instructions.        Diabetic Retinopathy: Evaluating Your Eyes     Your eye healthcare provider examines your eyes with a slit lamp.   Diabetic retinopathy is a condition that happens when diabetes damages blood vessels in the rear of the eye. It can lead to vision loss. To help catch it early, have a complete dilated eye exam at least once a year. During the exam, the eye healthcare provider will review your medical history, examine your eyes, and check your vision.  Women who are pregnant and have pre-existing type 1 or type 2 diabetes have an increased risk of retinopathy. Women with diabetes should have an eye exam before pregnancy or in the first trimester. They should continue to be monitored every trimester and for 1 year after delivery, depending on the severity of the retinopathy.  Your medical history  Your eye healthcare provider may ask about the following:  · Your diabetes type, history, treatments (such as insulin), and how you monitor your blood sugar level  · Your familys health, including whether any relative has had diabetes or diabetic retinopathy  · Any diseases, surgeries, or other medical procedures youve had  · Any medicines, herbs, or supplements you use, including those you buy over the counter  · Any problems with your vision you may be having, such as blurred or double vision, problems seeing at night, or flashes or floaters   Your eye exam  Your eye healthcare provider uses an eye chart and other tools to check your vision. Then he or she examines your eyes for signs of disease. You are given eye drops to widen (dilate) your pupils. You may have one or more of the following tests:  · Tonometry to measure fluid pressure inside the eye.  · Slit lamp exam to allow the healthcare provider to view the structures of your eye.  · Ultrasound to create an image of the eye using sound waves. Ultrasound may be used if blood  is found in the clear gel that fills the eye (vitreous).  · Ocular coherence tomography (OCT) to create an image of the retina using light waves. This shows if there is fluid leaking into certain parts of the eye. It can also measure the thickness of the retina.  Fluorescein angiography  This test may be done to check the health of the inside lining of the eye (retina). It also checks the tiny blood vessels (capillaries) that carry blood to the retina. During the test:  · Photographs are taken of the retina.  · A dye is then injected into the bloodstream through the arm or hand. The dye travels to the capillaries in the eye.  · More photographs are taken of the retina. The dye causes the capillaries to stand out on the photographs.  You may feel brief nausea during the procedure. For a few hours after the test, your skin, eyes, and urine may appear yellow. Talk with your healthcare provider for more information about this test.   Date Last Reviewed: 6/1/2016  © 1040-0353 [employer]. [address] [place] [address]. All rights reserved. This information is not intended as a substitute for professional medical care. Always follow your healthcare professional's instructions.        High Blood Sugar (Hyperglycemia)     When you have hyperglycemia, drink plenty of water or other sugar-free, caffeine-free liquids.   Too much glucose (sugar) in your blood is called hyperglycemia or high blood sugar. High blood sugar can lead to a dangerous condition called ketoacidosis. In severe cases, it can lead to dehydration and coma.  Possible causes of hyperglycemia  · Inadequate treatment plan for diabetes   · Being sick  · Being under stress  · Taking certain medicines, such as steroids  · Eating too much food, especially carbohydrates  · Being less active than usual  · Not taking enough diabetes medicine  Symptoms of hyperglycemia  Hyperglycemia may not cause symptoms. If you do have symptoms, they may  include:  · Thirst  · Frequent need to urinate  · Feeling tired  · Nausea and vomiting  · Itchy, dry skin  · Blurry vision  · Fast breathing and breath that smells fruity   · Weakness  · Dizziness  · Wounds or skin infections that dont heal  · Unexplained weight loss if hyperglycemia lasts for more than a few days   What you should do  Make sure you do the following:  · Check your blood sugar.  · Drink plenty of sugar-free, caffeine-free liquids such as water. Dont drink fruit juice.  · Check your blood sugar again every 4 hours. If you take insulin or diabetes medicines, follow your sick-day plan for taking medicine. Call your healthcare provider if you are not able to eat.  · Check your blood or urine for ketones as directed.  · Call your healthcare provider if your blood sugar and ketones do not return to your target range.  Preventing high blood sugar  To help keep your blood sugar from getting too high:  · Control stress.  · When you're ill, follow your sick-day plan.   · Follow your meal plan. Eat only the amount of food on your meal plan  · Follow your exercise plan.  · Take your insulin or diabetes medicines as directed by your healthcare team. Also test your blood sugar as directed. If the plan is not working for you, discuss it with your healthcare provider.  Other things to do  · Carry a medical ID card, a compact USB drive, or wear a medical alert bracelet or necklace. It should say that you have diabetes. It should also say what to do in case you pass out or go into a coma.  · Make sure family, friends, and coworkers know the signs of high blood sugar. Tell them what to do if your blood sugar gets very high and you cant help yourself.  · Talk to your healthcare team about other things you can do to prevent high blood sugar.   Special note: Drink plenty of sugar-free and caffeine-free liquids when you feel symptoms of hyperglycemia. Call your healthcare provider if you keep having episodes of  hyperglycemia.   Date Last Reviewed: 5/1/2016  © 8992-3543 [employer]. [address] [place] [address], [place] [address]. All rights reserved. This information is not intended as a substitute for professional medical care. Always follow your healthcare professional's instructions.        Understanding Carbohydrates    A car needs the right type of fuel to run. And you need the right kind of food to function. To keep your energy level up, your body needs food that has carbohydrates. But carbohydrates raise blood sugar levels higher and faster than other kinds of food. Your dietitian will work with you to figure out the amount of carbohydrates you need.  Starches  Starches are found in grains, some vegetables, and beans. Grain products include bread, pasta, cereal, and tortillas. Starchy vegetables include potatoes, peas, corn, lima beans, yams, and squash. Kidney beans, salvador beans, black beans, garbanzo beans, and lentils also contain starches.  Sugars  Sugars are found naturally in many foods. Or sugar can be added. Foods that contain natural sugar include fruits and fruit juices, dairy products, honey, and molasses. Added sugars are found in most desserts, processed foods, candy, regular soda, and fruit drinks. These are very helpful for treating low blood sugar, or hypoglycemia. They provide sugar quickly. Try to keep at least 15 to 20 grams of these simple sugars with you at all times. Eat this if you begin to have low blood sugar symptoms.  Fiber  Fiber comes from plant foods. Most fiber isnt digested by the body. Instead of raising blood sugar levels like other carbohydrates, it actually stops blood sugar from rising too fast. Fiber is found in fruits, vegetables, whole grains, beans, peas, and many nuts.  Carb counting  Keep track of the amount of carbohydrates you eat. This can help you keep the right balance of physical activity and medicine. The amount of carbohydrates needed will vary for each  person. It depends on many things such as your health, the medicines you take, and how active you are. Your healthcare team will help you figure out the right amount of carbohydrates for you. You may start with around 45 to 60 grams of carbohydrate per meal, depending on your situation. Carb counting is a system that helps you keep track of the carbohydrates you eat at each meal.  Carbohydrates come from a variety of foods. These include grains, starchy vegetables, fruit, milk, beans, and snack foods. You can either count carbohydrate grams or carbohydrate servings. When you count carbohydrate servings, 1 carbohydrate serving = 15 grams of carbohydrates.  Here are some examples of foods containing about 15 grams of carbohydrates (1 serving of carbohydrates):  · 1/2 cup of canned or frozen fruit  · A small piece of fresh fruit (4 ounces)  · 1 slice of bread  · 1/2 cup of oatmeal  · 1/3 cup of rice  · 4 to 6 crackers  · 1/2 English muffin  · 1/2 cup of black beans  · 1/4 of a large baked potato (3 ounces)  · 2/3 cup of plain fat-free yogurt  · 1 cup of soup  · 1/2 cup of casserole  · 6 chicken nuggets  · 2-inch-square brownie or cake without frosting  · 2 small cookies  · 1/2 cup of ice cream or sherbet  Carb counting is easier when food labels are available. Look at the label to see how many grams of total carbohydrates the food contains. Then you can figure out how much you should eat.  Two very important lines to look at on the label are the serving size and the total carbohydrate amount. Here are some tips for using food labels to count your carbohydrate intake:  · Check the serving size. The information on the label is based on that serving size. If you eat more than the listed serving size, you may have to double or triple the other information on the label.   · Check the total grams of carbohydrates. Total carbohydrate from the label includes sugar, starch, and fiber. Be sure to use the total carbohydrate  number and not sugar alone.  · Know how many grams of carbohydrates you can have.  Be familiar with the matching portion sizes.  · Compare labels. Compare the labels of different products, looking at serving sizes and total carbohydrates to find the products that work best for you.   · Don't forget protein and fat. With all the focus on carb counting, it might be easy to forget protein and fat in your meals. Don't forget to include sources of protein and healthy fat to balance your meals.  Its also important to be consistent with the amount and time you eat when taking a fixed dose of diabetes medicine. Work with your healthcare provider or dietitian if you need additional help. He or she can help you keep track of your carbohydrate intake. He or she can also help you figure out how many grams of carbohydrates you should have.  Date Last Reviewed: 7/1/2016 © 2000-2017 [employer]. [address] [place] [address]. All rights reserved. This information is not intended as a substitute for professional medical care. Always follow your healthcare professional's instructions.        4 Steps for Eating Healthier  Changing the way you eat can improve your health. It can lower your cholesterol and blood pressure, and help you stay at a healthy weight. Your diet doesnt have to be bland and boring to be healthy. Just watch your calories and follow these steps:    1. Eat fewer unhealthy fats  · Choose more fish and lean meats instead of fatty cuts of meat.  · Skip butter and lard, and use less margarine.  · Pass on foods that have palm, coconut, or hydrogenated oils.  · Eat fewer high-fat dairy foods like cheese, ice cream, and whole milk.  · Get a heart-healthy cookbook and try some low-fat recipes.  2. Go light on salt  · Keep the saltshaker off the table.  · Limit high-salt ingredients, such as soy sauce, bouillon, and garlic salt.  · Instead of adding salt when cooking, season your food with herbs  and flavorings. Try lemon, garlic, and onion.  · Limit convenience foods, such as boxed or canned foods and restaurant food.  · Read food labels and choose lower-sodium options.  3. Limit sugar  · Pause before you add sugars to pancakes, cereal, coffee, or tea. This includes white and brown table sugar, syrup, honey, and molasses. Cut your usual amount by half.  · Use non-sugar sweeteners. Stevia, aspartame, and sucralose can satisfy a sweet tooth without adding calories.  · Swap out sugar-filled soda and other drinks. Buy sugar-free or low-calorie beverages. Remember water is always the best choice.  · Read labels and choose foods with less added sugar. Keep in mind that dairy foods and foods with fruit will have some natural sugar.  · Cut the sugar in recipes by 1/3 to 1/2. Boost the flavor with extracts like almond, vanilla, or orange. Or add spices such as cinnamon or nutmeg.  4. Eat more fiber  · Eat fresh fruits and vegetables every day.  · Boost your diet with whole grains. Go for oats, whole-grain rice, and bran.  · Add beans and lentils to your meals.  · Drink more water to match your fiber increase. This is to help prevent constipation.  Date Last Reviewed: 5/11/2015  © 3385-4005 [employer]. [address] [place] [address]. All rights reserved. This information is not intended as a substitute for professional medical care. Always follow your healthcare professional's instructions.        4 Steps for Eating Healthier  Changing the way you eat can improve your health. It can lower your cholesterol and blood pressure, and help you stay at a healthy weight. Your diet doesnt have to be bland and boring to be healthy. Just watch your calories and follow these steps:    1. Eat fewer unhealthy fats  · Choose more fish and lean meats instead of fatty cuts of meat.  · Skip butter and lard, and use less margarine.  · Pass on foods that have palm, coconut, or hydrogenated oils.  · Eat fewer  high-fat dairy foods like cheese, ice cream, and whole milk.  · Get a heart-healthy cookbook and try some low-fat recipes.  2. Go light on salt  · Keep the saltshaker off the table.  · Limit high-salt ingredients, such as soy sauce, bouillon, and garlic salt.  · Instead of adding salt when cooking, season your food with herbs and flavorings. Try lemon, garlic, and onion.  · Limit convenience foods, such as boxed or canned foods and restaurant food.  · Read food labels and choose lower-sodium options.  3. Limit sugar  · Pause before you add sugars to pancakes, cereal, coffee, or tea. This includes white and brown table sugar, syrup, honey, and molasses. Cut your usual amount by half.  · Use non-sugar sweeteners. Stevia, aspartame, and sucralose can satisfy a sweet tooth without adding calories.  · Swap out sugar-filled soda and other drinks. Buy sugar-free or low-calorie beverages. Remember water is always the best choice.  · Read labels and choose foods with less added sugar. Keep in mind that dairy foods and foods with fruit will have some natural sugar.  · Cut the sugar in recipes by 1/3 to 1/2. Boost the flavor with extracts like almond, vanilla, or orange. Or add spices such as cinnamon or nutmeg.  4. Eat more fiber  · Eat fresh fruits and vegetables every day.  · Boost your diet with whole grains. Go for oats, whole-grain rice, and bran.  · Add beans and lentils to your meals.  · Drink more water to match your fiber increase. This is to help prevent constipation.  Date Last Reviewed: 5/11/2015  © 6519-4818 [employer]. [address] [place] [address], [place] [address]. All rights reserved. This information is not intended as a substitute for professional medical care. Always follow your healthcare professional's instructions.        Cholesterol  Does this test have other names?  Total blood cholesterol, serum cholesterol  What is this test?  This test measures the amount of cholesterol in your blood. This  helps your healthcare provider figure out your risk for heart disease.  Cholesterol is a substance found in all of your body's cells, where it plays an important role. But your body can have too much cholesterol if you eat the wrong types of foods. These include fried foods and foods with saturated or trans fats. Some health conditions can also make your cholesterol level too high.  If you have too much cholesterol in your blood, it can stick to the walls of the arteries in your heart. This can cause heart disease. Cholesterol can also stick to the walls of arteries elsewhere in your body. This can cause other artery diseases. The extra cholesterol can make your blood vessels narrower (atherosclerosis). This narrowing makes it harder to get enough blood through your blood vessels. If your heart muscles don't get enough blood, you may be at risk for a heart attack.  The American Heart Association recommends that all adults ages 20 and older have their cholesterol checked every 4 to 6 years.  Why do I need this test?  You may have this test as part of your regular health checkup. You may have this test done more often if you are at risk for heart disease or have other health problems caused by high cholesterol.  Here are some common reasons for the test:  · You have risk factors for heart disease. These include older age, obesity, family history of heart disease, high blood pressure, smoking, and diabetes.  · You have a condition that may be closely linked to high cholesterol. This includes diabetes, alcoholism, and thyroid, liver, or kidney disease.  · You eat a diet high in cholesterol and fats.  You may also have this test if you had high or borderline cholesterol on an earlier blood test. Your healthcare provider may want to check to see whether medicine, diet, exercise, and other lifestyle changes are helping lower your cholesterol.  What other tests might I have along with this test?  Your healthcare provider  "may also order other blood tests to find out your HDL ("good") cholesterol, LDL ("bad") cholesterol, and triglyceride levels. This combination of blood tests is called a lipoprotein profile or a lipid profile.  What do my test results mean?  Many things may affect your lab test results. These include the method each lab uses to do the test. Even if your test results are different from the normal value, you may not have a problem. To learn what the results mean for you, talk with your healthcare provider.  Total cholesterol is measured in milligrams per deciliter (mg/dL). This is what your cholesterol number may mean:  · Less than 200 mg/dL is a good number. Your risk of heart disease may be low.  · 200 mg/dL to 239 mg/dL is borderline high. You may be at some risk for heart disease.  · 240 mg/dL or higher means your cholesterol is high. Your risk for heart disease may be higher than that of a person with normal cholesterol.  Keep in mind that your risk for heart disease based on your total cholesterol greatly depends on your HDL and LDL cholesterol levels and other risk factors.  High cholesterol may be linked to these conditions:  · Inherited diseases that cause high cholesterol  · Gallbladder stones  · Kidney failure  · Cancer of the pancreas or prostate  · Low thyroid hormone  · Diabetes  · Obesity  Cholesterol levels below 140 mg/dL may happen with:  · Very healthy lifestyle and diet  · Severe liver disease  · High thyroid hormone  · Severe burns  · White blood cell cancers  · Poor nutrition  · Some types of anemia  · Short-term illness or infection  · Chronic lung disease  How is this test done?  The test requires a blood sample, which is drawn through a needle from a vein in your arm.  Does this test pose any risks?  Taking a blood sample with a needle carries risks that include bleeding, infection, bruising, or feeling dizzy. When the needle pricks your arm, you may feel a slight stinging sensation or pain. " Afterward, the site may be slightly sore.  What might affect my test results?  Your diet, age, alcohol use, and other lifestyle choices may affect your results. Many medicines may also affect your results. Pregnancy may also affect your results. Having a heart attack in the last 3 months will also affect your results.  How do I get ready for this test?  You should keep to your regular diet and avoid alcohol for at least 2 days before this test. You will be asked to not eat or drink anything but water for a certain amount of time before the test. This test is usually done in the morning after you fast overnight. If you take medicines in the morning, ask your healthcare provider whether you should take your pills.  In addition, be sure your healthcare provider knows about all medicines, herbs, vitamins, and supplements you are taking. This includes medicines that don't need a prescription and any illicit drugs you may use.  © 1782-7472 [employer]. [address] [place] [address]. All rights reserved. This information is not intended as a substitute for professional medical care. Always follow your healthcare professional's instructions.        Quick, Healthy Ways to Cook  Here are some tips for quick and nutritious food preparation. These methods will save you time and help to cut down on fat.  Stir-frying  If you dont have a wok, use a cast-iron or non-stick skillet. Most dishes can be cooked with just a tablespoon of oil if you heat the pan first. Buy pre-cut vegetables to reduce preparation time. Try stir-frying sliced lean beef or boneless, skinless chicken and ready-cut broccoli. Add a dash of soy sauce and some slices of fresh baljinder root.  Microwaving  Microwaves cook very quickly. So most of the nutrients in the foods youre cooking dont have time to escape. Read the cooking directions carefully. Its easy to overcook foods. Use the microwave to cook baked potatoes or winter  squash. You can also reheat leftovers and soup. Fish filets can be microwaved in minutes. Just add seasoning and a dash of milk. Then cover with wax paper and cook.  Crock pots  This handy kitchen appliance cooks food slowly at low temperatures. Set it up in the morning. Dinner will be ready and waiting for you when you get home. Soups, stews, and pot roasts all make great crock pot meals. Be sure to trim fat from meat before cooking. Extra-lean, less marbled cuts of meat become tender and juicy when cooked in a crock pot. Add more flavor by using different types of canned tomatoes, herbs, and spices.  Baking, broiling, and grilling  Bake, broil, or grill foods on a rack to drain fats away during cooking. This is a healthier way to eat. And its delicious as well. [place] meat and vegetables, too. Add bell peppers, tomatoes, onions, and mushrooms to kebobs. Or put vegetables in foil-wrapped packets.  Steaming  Steaming can be done in a microwave or on the stove top. Either way, it keeps in nutrients and flavor without adding fat. Ready-cut broccoli, cauliflower, and baby carrots can go right from the bag to the steamer.  Pressure cooking  By using steam, pressure cookers can cook a pound of potatoes in just 4 minutes. Or a chicken stew in less than half an hour. A pressure cooker can also turn the toughest cut of meat into a tender main course. Dont over-season foods. Pressure cooking uses very little liquid, so flavors are stronger.  Poaching  In poaching, the food is covered with liquid. This could be broth, milk, or wine. The food is then gently simmered until done. Poaching uses less liquid than steaming or boiling. This means that delicate flavors are less diluted. Poaching works well for fish or eggs.  Date Last Reviewed: 6/8/2015  © 0135-0459 [employer]. [address] [place] [address], [place] [address]. All rights reserved. This information is not intended as a substitute for professional medical care.  Always follow your healthcare professional's instructions.        Low-Cholesterol Diet  Your body needs cholesterol to build new cells and create certain hormones. There are 2 kinds of cholesterol in your blood:     · HDL (good) cholesterol. This prevents fat deposits (plaque) from building up in your arteries. In this way it protects against heart disease and stroke.  · LDL (bad) cholesterol. This stays in your body and sticks to artery walls. Over time it may block blood flow to the heart and brain. This can cause a heart attack or stroke.  The cholesterol in your blood comes from 2 sources: cholesterol in food that you eat and cholesterol that your liver makes. You should limit the amount of cholesterol in your diet. But the cholesterol that your body makes has the greatest disease risk. And your body makes more cholesterol when your diet is high in bad fats (saturated and trans fats). There are 2 kinds of fats you can eat:  · Good fats, or unsaturated fats (mono-unsaturated and poly-unsaturated). They raise the level of good cholesterol and lower the level of bad cholesterol. Good fats are found in vegetable oils such as olive, sunflower, corn, and soybean oils, and in nuts and seeds.  · Bad fats, or saturated fats (including foods high in cholesterol) and trans fats. These raise your risk of disease. They lower the good cholesterol and raise the level of bad cholesterol. Bad fats are found in animal products, including meat, whole-milk dairy products, and butter. Some plants are also high in bad fats (coconut and palm plants). Trans fats are found in hard (stick) margarines. They are also in many fast foods and commercially baked goods. Soft margarine sold in tubs has fewer trans fats and is safer to use.  High blood cholesterol is usually due to a diet high in saturated fat, along with not being physically active. In some cases, genetics plays a role in causing high cholesterol. The tips below will help  you create healthy eating habits that will help lower your blood cholesterol level.  Create a diet high in good fats, low in bad fats (and low in cholesterol)  The following steps will help you create a diet high in good fats and low in bad fats:  · Talk with your doctor before starting a low cholesterol diet or weight loss program.  · Learn to read nutrition labels and select appropriate portion sizes.  · When cooking, use plant-based unsaturated vegetable oils (sunflower, corn, soybean, canola, peanut, and olive oils).  · Avoid saturated fats found in animal products such as meat, dairy (whole-milk, cheese and ice cream), poultry skin, and egg yolks. Plants high in saturated oils include coconut oil, palm oil, and palm kernel oil.  · If you eat meat, choose smaller portions and lean cuts, such as round, alexis, sirloin, or loin. Eat more meatless meals.  · Replace meat with fish at least 2 times a week. Fish is an important source of the unsaturated fat called omega-3 fatty acids. This fat has potential to lower the risk of heart disease.  · Replace whole-milk dairy products with low-fat or nonfat products. Try soy products. Soy helps to reduce total cholesterol.  · Supplement your diet with protective fibers. Eat nuts, seeds, and whole grains rather than white rice and bread. These foods lower both cholesterol and triglyceride levels. (Triglycerides are another fat found in the blood.) Walnuts are one of the best sources of omega-3 fatty acids.  · Eat plenty of fresh fruits and vegetables daily.  · Avoid fast foods and commercial baked goods. Assume they contain saturated fat unless labeled otherwise.  Date Last Reviewed: 8/1/2016 © 2000-2017 [employer]. [address] [place] [id number]. All rights reserved. This information is not intended as a substitute for professional medical care. Always follow your healthcare professional's instructions.        Facts About Dietary Fat     Olive oil  "is a good source of unsaturated fat.     Eating less saturated and trans fat is one of the best things you can do for your heart. Start by finding out which fats are better to use. Then always try to use as little "bad" fat as you can.  Why eat less fat?  · Cutting down on the fat you eat can lower your blood cholesterol levels. This may help prevent clogged arteries from buildup of plaque.  · A low-fat diet can help you lose excess weight. Doing so can lower your blood pressure and reduce your chances of getting diabetes.  · A low-fat diet reduces your risk for stroke and for some cancers.  Unsaturated fat is most healthy  · When you must add fat, use unsaturated fat.  · Unsaturated fats come from plants. They include olive, canola, peanut, corn, avocado, safflower, and sunflower oils.  · Liquid (squeezable) margarine is also mostly unsaturated fat.  · In moderate amounts, unsaturated fat can even be good for your heart.  Saturated fat is less healthy  · Avoid eating saturated fat because it raises your blood cholesterol levels.  · Most saturated fat comes from animals. Foods such as butter, lard, cheese, cream, whole milk, and fatty cuts of meat are high in saturated fat.  · Some oils, such as palm and coconut oils, are also saturated fats.  Trans fat is least healthy  · Also avoid trans fat whenever possible. Even if it's not listed on the food label, look for it in the ingredients in the form of hydrogenated or partially hydrogenated oils.  · This is found in snack foods, shortening, french fries, and stick margarines.  Add flavor without fat  · Sprinkle herbs on fish, chicken, and meat, and in soups.  · Try herbs, lemon juice, or flavored vinegar on vegetables.  · Add chopped onions, garlic, and peppers to flavor beans and rice.   Date Last Reviewed: 5/11/2015  © 1424-9226 [employer]. [address] [place] [address], [place] [address]. All rights reserved. This information is not intended as a substitute " for professional medical care. Always follow your healthcare professional's instructions.        Eating Heart-Healthy Foods  Eating has a big impact on your heart health. In fact, eating healthier can improve several of your heart risks at once. For instance, it helps you manage weight, cholesterol, and blood pressure. Here are ideas to help you make heart-healthy changes without giving up all the foods and flavors you love.  Getting started  · Talk with your health care provider about eating plans, such as the DASH or Mediterranean diet. You may also be referred to a dietitian.  · Change a few things at a time. Give yourself time to get used to a few eating changes before adding more.  · Work to create a tasty, healthy eating plan that you can stick to for the rest of your life.    Goals for healthy eating  Below are some tips to improve your eating habits:  · Limit saturated fats and trans fats. Saturated fats raise your levels of cholesterol, so keep these fats to a minimum. They are found in foods such as fatty meats, whole milk, cheese, and palm and coconut oils. Avoid trans fats because they lower good cholesterol as well as raise bad cholesterol. Trans fats are most often found in processed foods.  · Reduce sodium (salt) intake. Eating too much salt may increase your blood pressure. Limit your sodium intake to 2,300 milligrams (mg) per day, or less if your health care provider recommends it. Dining out less often and eating fewer processed foods are two great ways to decrease the amount of salt you consume.  · Managing calories. A calorie is a unit of energy. Your body burns calories for fuel, but if you eat more calories than your body burns, the extras are stored as fat. Your health care provider can help you create a diet plan to manage your calories. This will likely include eating healthier foods as well as exercising regularly. To help you track your progress, keep a diary to record what you eat and how  often you exercise.  Choose the right foods  Aim to make these foods staples of your diet. If you have diabetes, you may have different recommendations than what is listed here:  · Fruits and vegetable provide plenty of nutrients without a lot of calories. At meals, fill half your plate with these foods. Split the other half of your plate between whole grains and lean protein.  · Whole grains are high in fiber and rich in vitamins and nutrients. Good choices include whole-wheat bread, pasta, and brown rice.  · Lean proteins give you nutrition with less fat. Good choices include fish, skinless chicken, and beans.  · Low-fat or nonfat dairy provides nutrients without a lot of fat. Try low-fat or nonfat milk, cheese, or yogurt.  · Healthy fats can be good for you in small amounts. These are unsaturated fats, such as olive oil, nuts, and fish. Try to have at least 2 servings per week of fatty fish such as salmon, sardines, mackerel, rainbow trout, and albacore tuna. These contain omega-3 fatty acids, which are good for your heart. Flaxseed is another source of a heart-healthy fat.  More on heart healthy eating    Read food labels  Healthy eating starts at the grocery store. Be sure to pay attention to food labels on packaged foods. Look for products that are high in fiber and protein, and low in saturated fat, cholesterol, and sodium. Avoid products that contain trans fat. And pay close attention to serving size. For instance, if you plan to eat two servings, double all the numbers on the label.  Prepare food right  A key part of healthy cooking is cutting down on added fat and salt. Look on the internet for lower-fat, lower-sodium recipes. Also, try these tips:  · Remove fat from meat and skin from poultry before cooking.  · Skim fat from the surface of soups and sauces.  · Broil, boil, bake, steam, grill, and microwave food without added fats.  · Choose ingredients that spice up your food without adding calories, fat,  or sodium. Try these items: horseradish, hot sauce, lemon, mustard, nonfat salad dressings, and vinegar. For salt-free herbs and spices, try basil, cilantro, cinnamon, pepper, and rosemary.  Date Last Reviewed: 6/25/2015  © 7865-2633 [employer]. [address] [place] [address], [place] [address]. All rights reserved. This information is not intended as a substitute for professional medical care. Always follow your healthcare professional's instructions.        Eating Heart-Healthy Food: Using the DASH Plan    Eating for your heart doesnt have to be hard or boring. You just need to know how to make healthier choices. The DASH eating plan has been developed to help you do just that. DASH stands for Dietary Approaches to Stop Hypertension. It is a plan that has been proven to be healthier for your heart and to lower your risk for high blood pressure. It can also help lower your risk for cancer, heart disease, osteoporosis, and diabetes.  Choosing from each food group  Choose foods from each of the food groups below each day. Try to get the recommended number of servings for each food group. The serving numbers are based on a diet of 2,000 calories a day. Talk to your doctor if youre unsure about your calorie needs. Along with getting the correct servings, the DASH plan also recommends a sodium intake less than 2,300 mg per day.        Grains  Servings: 6 to 8 a day  A serving is:  · 1 slice bread  · 1 ounce dry cereal  · Half a cup cooked rice, pasta or cereal  Best choices: Whole grains and any grains high in fiber. Vegetables  Servings: 4 to 5 a day  A serving is:  · 1 cup raw leafy vegetable  · Half a cup cut-up raw or cooked vegetable  · Half a cup vegetable juice  Best choices: Fresh or frozen vegetables prepared without added salt or fat.   Fruits  Servings: 4 to 5 a day  A serving is:  · 1 medium fruit  · One-quarter cup dried fruit  · Half a cup fresh, frozen, or canned fruit  · Half a cup of 100% fruit  juices  Best choices: A variety of fresh fruits of different colors. Whole fruits are a better choice than fruit juices. Low-fat or fat-free dairy  Servings: 2 to 3 a day  A serving is:  · 1 cup milk  · 1 cup yogurt  · One and a half ounces cheese  Best choices: Skim or 1% milk, low-fat or fat-free yogurt or buttermilk, and low-fat cheeses.         Lean meats, poultry, fish  Servings: 6 or fewer a day  A serving is:  · 1 ounce cooked meats, poultry, or fish  · 1 egg  Best choices: Lean poultry and fish. Trim away visible fat. Broil, grill, roast, or boil instead of frying. Remove skin from poultry before eating. Limit how much red meat you eat.  Nuts, seeds, beans  Servings: 4 to 5 a week  A serving is:  · One-third cup nuts (one and a half ounces)  · 2 tablespoons nut butter or seeds  · Half a cup cooked dry beans or legumes  Best choices: Dry roasted nuts with no salt added, lentils, kidney beans, garbanzo beans, and whole salvador beans.   Fats and oils  Servings: 2 to 3 a day  A serving is:  · 1 teaspoon vegetable oil  · 1 teaspoon soft margarine  · 1 tablespoon mayonnaise  · 2 tablespoons salad dressing  Best choices: Nut and vegetable oils (nontropical vegetable oils), such as olive and canola oil. Sweets  Servings: 5 a week or fewer  A serving is:  · 1 tablespoon sugar, maple syrup, or honey  · 1 tablespoon jam or jelly  · 1 half-ounce jelly beans (about 15)  · 1 cup lemonade  Best choices: Dried fruit can be a satisfying sweet. Choose low-fat sweets. And watch your serving sizes!      For more on the DASH eating plan, visit:  www.nhlbi.nih.gov/health/health-topics/topics/dash   Date Last Reviewed: 6/1/2016  © 7332-3281 [employer]. [address] [place] [address], [place] [address]. All rights reserved. This information is not intended as a substitute for professional medical care. Always follow your healthcare professional's instructions.

## 2019-04-17 ENCOUNTER — PATIENT OUTREACH (OUTPATIENT)
Dept: OTHER | Facility: OTHER | Age: 69
End: 2019-04-17

## 2019-04-17 DIAGNOSIS — E11.01 TYPE 2 DIABETES MELLITUS WITH HYPEROSMOLAR COMA, WITHOUT LONG-TERM CURRENT USE OF INSULIN: Primary | ICD-10-CM

## 2019-04-17 DIAGNOSIS — I10 ESSENTIAL HYPERTENSION: ICD-10-CM

## 2019-04-17 RX ORDER — LISINOPRIL 10 MG/1
10 TABLET ORAL DAILY
Qty: 90 TABLET | Refills: 0 | Status: SHIPPED | OUTPATIENT
Start: 2019-04-17 | End: 2019-06-29 | Stop reason: SDUPTHER

## 2019-04-17 NOTE — PROGRESS NOTES
Last 5 Patient Entered Readings                                      Current 30 Day Average: 143/70     Recent Readings 4/17/2019 4/16/2019 4/14/2019 4/13/2019 4/12/2019    SBP (mmHg) 149 147 147 144 146    DBP (mmHg) 71 65 68 71 74    Pulse 90 87 94 104 90        HPI:  Called patient to follow up. Patient endorses adherence to medication regimen. Patient denies hypotensive s/sx (lightheadedness, dizziness, nausea, fatigue); patient denies hypertensive s/sx (SOB, CP, severe headaches, changes in vision). Instructed patient to seek medical care if BP > 180/110 and is accompanied by hypertensive s/sx associated, patient confirms understanding. Confirmed with patient that losartan made her breakout and made her itchy.       Assessment:  Reviewed recent readings. Per 2017 ACC/ AHA HTN guidelines (goal of BP < 130/80), current 30-day average needs to be addressed more thoroughly today.   Plan:  Initiate lisinopril 10 mg po daily and vary timing of readings. I will continue to monitor regularly and will follow-up in 2 to 3 weeks, sooner if blood pressure begins to trend upward or downward.     Current medication regimen:  Hypertension Medications             amLODIPine (NORVASC) 10 MG tablet Take 1 tablet (10 mg total) by mouth once daily.    chlorthalidone (HYGROTEN) 25 MG Tab Take 1 tablet (25 mg total) by mouth once daily.    lisinopril 10 MG tablet Take 1 tablet (10 mg total) by mouth once daily.          Patient denies having questions or concerns. Patient has my contact information and knows to call with any concerns or clinical changes.    Last 6 Patient Entered Readings                                          Most Recent A1c: 7.6% on 3/27/2019  (Goal: 7%)     Recent Readings 4/17/2019 4/16/2019 4/13/2019 4/12/2019 4/11/2019    Blood Glucose (mg/dL) 119 87 154 134 111        HPI:  Called patient to follow up regarding the DDMP (Diabetes Digital Medicine Program).  Patient endorses adherence to medication regimen.  Patient denies hypoglycemic s/sx (dizziness, extreme hunger, headaches, confusion, trouble concentrating, sweating, shaking, blurred vision, personality changes); patient denies hyperglycemic s/sx (increased thirst, increased urination, headaches, trouble concentrating, blurred vision, fatigue).      Assessment:  Reviewed recent readings. Per AACE/ACE guidelines (goal A1C < 7%), DM needs to be addressed more thoroughly today. Asked Mrs. Barlow about elevated readings of 154 on 4/13, 190 on 4/10, and 210 on 4/9. She cannot recall what caused her reading on 4/13 but she attributes her reading on 4/10  to having strawberries dusted with powdered sugar and her reading on 4/13 to chocolate candy her grandchild gave her. Counseled her on the importance of monitoring and limiting carbohydrate intake when you have diabetes. She also reports loose bowel movements with metformin, she is willing to wait 2 more weeks to see if this will improve. Verified with patient that she is taking metformin with food. Counseled her on the importance of staying hydrated if she is having diarrhea.        Plan:  Continue current medication regimen. During next call, ask about loose bowel movements and plan to initiate dulaglutide 0.75 mg SQ q7days. I will continue to monitor regularly and will follow-up in 3 to 4  weeks, sooner if blood sugar begins to trend upward or downward.     Current Medication Regimen:  Diabetes Medications             metFORMIN (GLUCOPHAGE-XR) 500 MG 24 hr tablet Take 2 tablets (1,000 mg total) by mouth 2 (two) times daily with meals.            Patient has my contact information and knows to call with any concerns or clinical changes.

## 2019-04-24 NOTE — PROGRESS NOTES
Last 5 Patient Entered Readings                                      Current 30 Day Average: 142/70     Recent Readings 4/19/2019 4/17/2019 4/16/2019 4/14/2019 4/13/2019    SBP (mmHg) 141 149 147 147 144    DBP (mmHg) 71 71 65 68 71    Pulse 92 90 87 94 104          Last 6 Patient Entered Readings                                          Most Recent A1c: 7.6% on 3/27/2019  (Goal: 7%)     Recent Readings 4/19/2019 4/17/2019 4/16/2019 4/13/2019 4/12/2019    Blood Glucose (mg/dL) 93 119 87 154 134        I agree with the plan of care given by CATERINA Frances.

## 2019-04-25 ENCOUNTER — OUTPATIENT CASE MANAGEMENT (OUTPATIENT)
Dept: ADMINISTRATIVE | Facility: OTHER | Age: 69
End: 2019-04-25

## 2019-05-02 ENCOUNTER — TELEPHONE (OUTPATIENT)
Dept: INTERNAL MEDICINE | Facility: CLINIC | Age: 69
End: 2019-05-02

## 2019-05-02 ENCOUNTER — OUTPATIENT CASE MANAGEMENT (OUTPATIENT)
Dept: ADMINISTRATIVE | Facility: OTHER | Age: 69
End: 2019-05-02

## 2019-05-02 DIAGNOSIS — E11.51 TYPE 2 DIABETES MELLITUS WITH PERIPHERAL VASCULAR DISEASE: Chronic | ICD-10-CM

## 2019-05-02 RX ORDER — METFORMIN HYDROCHLORIDE 500 MG/1
1000 TABLET, EXTENDED RELEASE ORAL 2 TIMES DAILY WITH MEALS
Qty: 120 TABLET | Refills: 1 | Status: SHIPPED | OUTPATIENT
Start: 2019-05-02 | End: 2019-07-09 | Stop reason: SDUPTHER

## 2019-05-02 RX ORDER — METFORMIN HYDROCHLORIDE 500 MG/1
1000 TABLET, EXTENDED RELEASE ORAL 2 TIMES DAILY WITH MEALS
Qty: 360 TABLET | Refills: 3 | Status: SHIPPED | OUTPATIENT
Start: 2019-05-02 | End: 2019-08-16 | Stop reason: DRUGHIGH

## 2019-05-02 NOTE — TELEPHONE ENCOUNTER
----- Message from Herlinda Zamorano RN sent at 5/2/2019  3:40 PM CDT -----  Hi, this is Herlinda Zamorano RN with [facility] team. I spoke with patient today on the telephone.    Patient states yo recently increased her Metformin dose to 500 mg 2 BID. Patient states she is getting low on the amount she has. Patient gets her medications through mail order from [facility]. Was a new prescription sent to [facility] pharmacy with the increased dosage?     Please notify patient of your recommendations.     Thank you,  Herlinda Zamorano RN

## 2019-05-02 NOTE — PROGRESS NOTES
Summary:  Contacted patient by phone today for follow up visit. Patient states her blood sugar today was 132. Patient is participating in the digital DM and HTN programs. We continued our discussions on counting carbohydrates and portions sizes and eliminating sugary liquids. Patient states she did received educational materials on the same. Patient did receive a call from [facility] with information on Commodities in her area. We discussed limiting fast foods, fried foods and foods high in sodium as most of patients recent blood pressure readings have been systolic 140s. We discussed the importance of reading food labels for sodium content of foods and patient voiced understanding/agreement. Reminded patient of upcoming appointment on 5/29 for her repeat A1c and she is aware. We discussed the goal of 7 for her future A1c. Encouraged patient to communicate with physician and call this RN if having any questions/concerns. OPCM will continue to follow. Patient is agreeable to follow up call in 2 weeks at same time. GALO Manzano OPCM      Interventions:  In basket message to Dr. Brodie Sandoval regarding if new prescription for patients Metformin were sent to [facility] pharmacy as he increased the dose at her last visit.   Continued education on Diabetic food choices, Heart Healthy food choices     Plan:  Continue education on Diabetic diet, Heart Healthy food choices  F/u on Referral to Financial assistance for help with co pays    OPCM Self-Management Care Plan reviewed with patient/Caregiver. Goals were reviewed with patient/Caregiver and the patient/Caregiver has agreed to work towards these goals to improve his/her overall well-being. Patient/Caregiver verbalized understanding of the care plan, goals, and all of today's instructions. Encouraged patient/Caregiver to communicate with his/her physician and health care team about health conditions and the treatment plan. Provided my contact information today and encouraged  patient/Caregiver to call me with any questions as needed.

## 2019-05-02 NOTE — PROGRESS NOTES
Summary: LCSW received referral from OPCM RN for commodities, senior center, and advanced directives. Phoned patient, completed social assessment, PHQ9 (score = 0), and plan of care. Patient is a 68 yo  with primary diagnoses of DM, CKD, and HTN. Patient's   8 years ago. She has 4 children who have moved back home to be near her and assist her. Patient's monthly income is $1200. She stated that she had received commodities in the past and would like to reapply for the service. She owns her home and stated that she has learned to care for her home; however, she stated that she needs to replace her windows.   Patient identified that she has dental needs. Discussed resources for dentists.   Patient does not have a written advanced care plan; however, she reports that she has discussed her wishes in detail with her children and have identified two of her daughters as her decision makers. Patient's youngest daughter is deaf. She lives with her sister. Patient stated that her family are very supportive of her needs.   Discussed going to Senior Center. She stated that she attended a local senior center and found the activities to not be stimulating. She remains active, enjoys her family and gardening.     Interventions:  · Completed social assessment, PHQ9, and plan of care.   · Collaborated with OPCM RN for delivery of services.   · Provided dental resources.   · Provided home weatherization services. Mailed [facility] weatherization program information.   · Provided food resources. Mailed commodity information.   · Provided advanced care planning information. Mailed Advanced Directive forms.     Plan:Follow up in two weeks to ensure receipt of information and to assist with applications, as needed.     Today[facility] Self-Management Care Plan was developed with the patients/caregivers input and was based on identified barriers from todays assessment.  Goals were written today with the  patient/caregiver and the patient has agreed to work towards these goals to improve his/her overall well-being. Patient verbalized understanding of the care plan, goals, and all of today's instructions. Encouraged patient/caregiver to communicate with his/her physician and health care team about health conditions and the treatment plan.  Provided my contact information today and encouraged patient/caregiver to call me with any questions as needed.

## 2019-05-02 NOTE — LETTER
May 2, 2019    Lucia Barlow  [address] [place] [address]             [facility] [address] [place] [address] Dear Mrs. Barlow:    I have enclosed dental resource, weatherization resource, commodities resource and advanced directives as discussed via telephone today. I am also including Senior Services Guide.  I hope this will be helpful.    If any additional needs arise, please contact me at [phone].    Sincerely,      Lizbet Mckeon LCSW    Outpatient Complex Care Management

## 2019-05-02 NOTE — TELEPHONE ENCOUNTER
Please notify patient:      Medications Ordered This Encounter    metFORMIN (GLUCOPHAGE-XR) 500 MG 24 hr tablet  Sig: Take 2 tablets (1,000 mg total) by mouth 2 (two) times daily with meals.  Dispense:  120 tablet  Refill:  1  SENT TO: [facility]      metFORMIN (GLUCOPHAGE-XR) 500 MG 24 hr tablet  Sig: Take 2 tablets (1,000 mg total) by mouth 2 (two) times daily with meals.  Dispense:  360 tablet  Refill:  3  SENT TO: [facility]

## 2019-05-03 ENCOUNTER — PATIENT OUTREACH (OUTPATIENT)
Dept: OTHER | Facility: OTHER | Age: 69
End: 2019-05-03

## 2019-05-03 NOTE — PROGRESS NOTES
HPI:  Last 5 Patient Entered Readings                                      Current 30 Day Average: 139/69     Recent Readings 4/29/2019 4/19/2019 4/17/2019 4/16/2019 4/14/2019    SBP (mmHg) 124 141 149 147 147    DBP (mmHg) 62 71 71 65 68    Pulse 98 92 90 87 94        Patient denies s/s of hypotension (lightheadedness, dizziness, nausea, fatigue) associated with low readings. Instructed patient to inform me if this occurs, patient confirms understanding.    Patient denies s/s of hypertension (SOB, CP, severe headaches, changes in vision) associated with high readings. Instructed patient to go to the ED if BP >180/110 and accompanied by hypertensive s/s, patient confirms understanding.    Patient is tolerating lisinopril without any issues.    Last 6 Patient Entered Readings                                          Most Recent A1c: 7.6% on 3/27/2019  (Goal: 7%)     Recent Readings 4/29/2019 4/19/2019 4/17/2019 4/16/2019 4/13/2019    Blood Glucose (mg/dL) 132 93 119 87 154        Patient denies s/s or episodes of hypoglycemia (weakness, dizziness, hunger, shakiness, nausea, headache, heart palpitations, sweating, fatigue, anxiety, etc.).    Patient denies s/s of hyperglycemia (headache, increased thirst, increased urination, fatigue, blurred vision).    Assessment:  Patient's current 30-day average is at goal of <130/80 mmHg based on ACC/AHA HTN guidelines.     Diabetes is not controlled based on patient's last A1C. SMBG readings reviewed and are controlled.     Health maintenance is up to date.    Plan:  Continue current regimen.  Increase the frequency of your blood pressure readings.  Patients health , Jessica Ortiz, will be following up every 3-4 weeks.   I will continue to monitor regularly and will follow-up in 2 weeks, sooner if blood pressure or blood glucose begins to trend upward or downward.     Current medication regimen:  Hypertension Medications             amLODIPine (NORVASC) 10 MG tablet Take 1  tablet (10 mg total) by mouth once daily.    chlorthalidone (HYGROTEN) 25 MG Tab Take 1 tablet (25 mg total) by mouth once daily.    lisinopril 10 MG tablet Take 1 tablet (10 mg total) by mouth once daily.        Diabetes Medications             metFORMIN (GLUCOPHAGE-XR) 500 MG 24 hr tablet Take 2 tablets (1,000 mg total) by mouth 2 (two) times daily with meals.             Patient has my contact information and knows to call with any concerns or clinical changes.

## 2019-05-03 NOTE — TELEPHONE ENCOUNTER
Called and informed patient of her Metformin refills per Dr. Sandoval, she verbalized understanding.

## 2019-05-16 ENCOUNTER — OUTPATIENT CASE MANAGEMENT (OUTPATIENT)
Dept: ADMINISTRATIVE | Facility: OTHER | Age: 69
End: 2019-05-16

## 2019-05-16 ENCOUNTER — PATIENT MESSAGE (OUTPATIENT)
Dept: ADMINISTRATIVE | Facility: OTHER | Age: 69
End: 2019-05-16

## 2019-05-16 NOTE — LETTER
May 16, 2019    Lucia Barlow  [address] [place] [address]             [facility] [address] [place] [address] Dear MsJohn Cain:    I am writing from the Outpatient Complex Care Management Department at [facility].  I have been unsuccessful at reaching you to follow up with you to see how you have been doing. I hope you find the resources previously provided to you helpful. Please contact me for further assistance.    I can be reached at [phone] Monday thru Friday from 8:00am to 4:30pm.  [facility] also has a program with a nurse available 24/7 to answer questions or provide medical advice.  [facility] can be reached at [phone].    Sincerely,       Lizbet Mckeon, KHUSHBUW

## 2019-05-16 NOTE — PROGRESS NOTES
SUMMARY  1st Attempt to complete SW follow-up for Outpatient Care Management.    INTERVENTION  Left message requesting return call. Message sent via patient portal.     PLAN  LCSW will reattempt at a later date.

## 2019-05-16 NOTE — LETTER
May 21, 2019    Lucia J Cain  [address] [place] [address]             [facility] [address] [place] [address] Dear Ms. Barlow,     I work with [facility] Outpatient Case Management Department. I have been unsuccessful at reaching you to follow-up to see how you have been doing. Please call me back at your earliest convenience to discuss your health care needs.      I can be reached at [phone] from 8:00AM to 4:30 PM on Monday thru Friday. [facility] is a program offered through [facility] where a nurse is available 24/7 to answer questions or provide medical advice, their number is [phone].    Thanks,    Herlinda Zamorano RN   Outpatient Case Management

## 2019-05-21 ENCOUNTER — PATIENT OUTREACH (OUTPATIENT)
Dept: OTHER | Facility: OTHER | Age: 69
End: 2019-05-21

## 2019-05-21 NOTE — PROGRESS NOTES
Last 5 Patient Entered Readings                                      Current 30 Day Average: 128/65     Recent Readings 5/16/2019 5/9/2019 5/3/2019 4/29/2019 4/19/2019    SBP (mmHg) 123 134 132 124 141    DBP (mmHg) 61 72 63 62 71    Pulse 110 104 107 98 92          Last 6 Patient Entered Readings                                          Most Recent A1c: 7.6% on 3/27/2019  (Goal: 7%)     Recent Readings 5/17/2019 5/16/2019 5/9/2019 5/3/2019 4/29/2019    Blood Glucose (mg/dL) 133 98 182 113 132            Digital Medicine: Health  Follow Up    Left voicemail to follow up with Ms. Lucia Barlow.  Current BP average 128/65 mmHg is at goal, 130/80

## 2019-05-23 ENCOUNTER — OUTPATIENT CASE MANAGEMENT (OUTPATIENT)
Dept: ADMINISTRATIVE | Facility: OTHER | Age: 69
End: 2019-05-23

## 2019-05-23 NOTE — PROGRESS NOTES
Summary: Phoned patient. Verified receipt of resources. Patient stated that she has been able to access dental service and is having a number of dental appointments. She stated that she has reviewed the other resources and has no questions about accessing the services.     Interventions: Review of resources/needs. No new needs identified.     Plan: Case closure. Informed OPCM RN.     Todays OPCM Self-Management Care Plan was developed with the patients/caregivers input and was based on identified barriers from todays assessment.  Goals were written today with the patient/caregiver and the patient has agreed to work towards these goals to improve his/her overall well-being. Patient verbalized understanding of the care plan, goals, and all of today's instructions. Encouraged patient/caregiver to communicate with his/her physician and health care team about health conditions and the treatment plan.  Provided my contact information today and encouraged patient/caregiver to call me with any questions as needed.

## 2019-05-24 NOTE — PROGRESS NOTES
Summary:  Contacted patient by phone today for follow up visit. Patient states she takes all of her medications as directed. Patient is participating in the Digital HTN and DM programs. Patient reports she continues to check her blood sugar daily. Patient states CBG continues to average between 100 and 200, but has not gone > 200. We continued discussions on preferred DM foods like whole wheat cereals, breads, fresh fruits and vegetables. Patient states she did received educational materials. We discussed future goal of 7 for A1c and patient voiced understanding/agreement. Patient states she did receive a call from [facility] Lizbet Mckeon regarding information on Commodities in her area and the lunches at the [facility] in her area. Encouraged patient to communicate with physician and call this RN if having any questions/concerns. OPCM will continue to follow. Patient is agreeable to follow up call in 2 weeks at same time. GALO Manzano OPCM      Interventions:  Continued education on Diabetic food choices, Heart Healthy food choices  Sent another in basket message as well as email to Financial assistance for help with co pays     Plan:  Continue education on Diabetic diet, Heart Healthy food choices  F/u on Referral to Financial assistance for help with co pays

## 2019-05-30 ENCOUNTER — LAB VISIT (OUTPATIENT)
Dept: LAB | Facility: HOSPITAL | Age: 69
End: 2019-05-30
Attending: FAMILY MEDICINE
Payer: MEDICARE

## 2019-05-30 DIAGNOSIS — E11.22 TYPE 2 DIABETES MELLITUS WITH STAGE 3 CHRONIC KIDNEY DISEASE, WITHOUT LONG-TERM CURRENT USE OF INSULIN: Chronic | ICD-10-CM

## 2019-05-30 DIAGNOSIS — N18.30 TYPE 2 DIABETES MELLITUS WITH STAGE 3 CHRONIC KIDNEY DISEASE, WITHOUT LONG-TERM CURRENT USE OF INSULIN: Chronic | ICD-10-CM

## 2019-05-30 DIAGNOSIS — E11.29 TYPE 2 DIABETES MELLITUS WITH MICROALBUMINURIA, WITHOUT LONG-TERM CURRENT USE OF INSULIN: Chronic | ICD-10-CM

## 2019-05-30 DIAGNOSIS — R80.9 TYPE 2 DIABETES MELLITUS WITH MICROALBUMINURIA, WITHOUT LONG-TERM CURRENT USE OF INSULIN: Chronic | ICD-10-CM

## 2019-05-30 DIAGNOSIS — E11.51 TYPE 2 DIABETES MELLITUS WITH PERIPHERAL VASCULAR DISEASE: Chronic | ICD-10-CM

## 2019-05-30 LAB
ALBUMIN SERPL BCP-MCNC: 3 G/DL (ref 3.5–5.2)
ANION GAP SERPL CALC-SCNC: 12 MMOL/L (ref 8–16)
BUN SERPL-MCNC: 23 MG/DL (ref 8–23)
CALCIUM SERPL-MCNC: 9.4 MG/DL (ref 8.7–10.5)
CHLORIDE SERPL-SCNC: 102 MMOL/L (ref 95–110)
CO2 SERPL-SCNC: 25 MMOL/L (ref 23–29)
CREAT SERPL-MCNC: 1.1 MG/DL (ref 0.5–1.4)
EST. GFR  (AFRICAN AMERICAN): 58.4 ML/MIN/1.73 M^2
EST. GFR  (NON AFRICAN AMERICAN): 50.6 ML/MIN/1.73 M^2
ESTIMATED AVG GLUCOSE: 157 MG/DL (ref 68–131)
GLUCOSE SERPL-MCNC: 203 MG/DL (ref 70–110)
HBA1C MFR BLD HPLC: 7.1 % (ref 4–5.6)
PHOSPHATE SERPL-MCNC: 3.3 MG/DL (ref 2.7–4.5)
POTASSIUM SERPL-SCNC: 3.8 MMOL/L (ref 3.5–5.1)
SODIUM SERPL-SCNC: 139 MMOL/L (ref 136–145)

## 2019-05-30 PROCEDURE — 83036 HEMOGLOBIN GLYCOSYLATED A1C: CPT | Mod: HCNC

## 2019-05-30 PROCEDURE — 36415 COLL VENOUS BLD VENIPUNCTURE: CPT | Mod: HCNC

## 2019-05-30 PROCEDURE — 80069 RENAL FUNCTION PANEL: CPT | Mod: HCNC

## 2019-06-04 NOTE — PROGRESS NOTES
Last 5 Patient Entered Readings                                      Current 30 Day Average: 130/67     Recent Readings 6/4/2019 5/26/2019 5/23/2019 5/22/2019 5/16/2019    SBP (mmHg) 132 128 153 110 123    DBP (mmHg) 78 66 69 54 61    Pulse 99 104 91 102 110          Last 6 Patient Entered Readings                                          Most Recent A1c: 7.1% on 5/30/2019  (Goal: 7%)     Recent Readings 6/4/2019 5/27/2019 5/26/2019 5/23/2019 5/22/2019    Blood Glucose (mg/dL) 99 147 95 147 175          Patient not available, will f/u next week.

## 2019-06-10 ENCOUNTER — OUTPATIENT CASE MANAGEMENT (OUTPATIENT)
Dept: ADMINISTRATIVE | Facility: OTHER | Age: 69
End: 2019-06-10

## 2019-06-11 NOTE — PROGRESS NOTES
"Last 5 Patient Entered Readings                                      Current 30 Day Average: 127/66     Recent Readings 6/10/2019 6/9/2019 6/8/2019 6/6/2019 6/5/2019    SBP (mmHg) 125 119 134 126 124    DBP (mmHg) 67 67 74 59 60    Pulse 100 101 106 97 99          Last 6 Patient Entered Readings                                          Most Recent A1c: 7.1% on 5/30/2019  (Goal: 7%)     Recent Readings 6/10/2019 6/9/2019 6/6/2019 6/5/2019 6/4/2019    Blood Glucose (mg/dL) 159 139 141 77 99        Reports feeling well, no complaints.     Digital Medicine: Health  Follow Up    Lifestyle Modifications:    1.Dietary Modifications (Sodium intake <2,000mg/day, food labels, dining out): Reports "occasionally" skipping meals. Reports she has been eating grilled chicken salad with ranch dressing, cucumbers and carrots. Reports efforts to eat vegetables 3 times per day. Reports limiting soda intake, only drinking when blood sugar is low. Reports drinking 5 bottles of water every day. Reports decreased appetite. Reports losing weight, down 2 dress sizes.     2.Physical Activity: Patient enjoys walking and bike riding. Walks every day in her neighborhood, early in the morning. Rides bike twice per week. Encouraged patient to continue active lifestyle.     3.Medication Therapy: Patient has been compliant with the medication regimen.    4.Patient has the following medication side effects/concerns: Patient reports still experiencing diarrhea on metformin. Will inform DM clinician.     Follow up with  Lucia GWEN Barlow completed. No further questions or concerns. Will continue to follow up to achieve health goals.    "

## 2019-06-21 NOTE — PROGRESS NOTES
Summary:  Contacted patient by phone today for follow up visit. Patient states she takes all of her medications as directed. Patient continues participating in the Digital HTN and DM programs. Patient reports she continues to check her blood sugar daily and it averages between 100 and 150.  We continued discussions preferred Dm and heart healthy foods like lean meats, chicken and fish. We discussed eating more fresh friuts and vegetables and using whole wheat products and patient voiced understanding/agreement. Patient had repeat A1 c on 5/30 and it was 7.1. Patient states she did receive a call from [facility] regarding Financial assistance and Commodities in her area. Encouraged patient to communicate with physician and call this RN if having any questions/concerns. GALO Manzano OPCM      Interventions:  Continued education on Diabetic food choices, Heart Healthy food choices    Plan:   This patient will be closed by the OPCM RN, as the nursing centered patient goals have been met

## 2019-06-25 ENCOUNTER — PATIENT OUTREACH (OUTPATIENT)
Dept: OTHER | Facility: OTHER | Age: 69
End: 2019-06-25

## 2019-06-25 NOTE — PROGRESS NOTES
Last 5 Patient Entered Readings                                      Current 30 Day Average: 129/70     Recent Readings 6/25/2019 6/21/2019 6/18/2019 6/17/2019 6/13/2019    SBP (mmHg) 143 111 130 136 137    DBP (mmHg) 64 90 67 76 71    Pulse 98 97 97 99 97          Last 6 Patient Entered Readings                                          Most Recent A1c: 7.1% on 5/30/2019  (Goal: 7%)     Recent Readings 6/21/2019 6/18/2019 6/13/2019 6/10/2019 6/9/2019    Blood Glucose (mg/dL) 111 103 109 159 139      LMFCB to assess how she is tolerating metformin since she mentioned having GI complications.

## 2019-06-29 DIAGNOSIS — I10 ESSENTIAL HYPERTENSION: ICD-10-CM

## 2019-07-01 RX ORDER — LISINOPRIL 10 MG/1
10 TABLET ORAL DAILY
Qty: 90 TABLET | Refills: 0 | Status: SHIPPED | OUTPATIENT
Start: 2019-07-01 | End: 2019-08-16 | Stop reason: ALTCHOICE

## 2019-07-01 NOTE — TELEPHONE ENCOUNTER
ACTION NEEDED: Please contact her to schedule follow-up appointment with me in about two months to readdress her diabetes, blood pressure, and kidney function.    REFILL REQUEST APPROVED  Medications Ordered This Encounter   Medications    lisinopril 10 MG tablet     Sig: Take 1 tablet (10 mg total) by mouth once daily. - APPOINTMENT REQUIRED FOR MORE REFILLS     Dispense:  90 tablet     Refill:  0

## 2019-07-09 NOTE — PROGRESS NOTES
HPI:  Called Ms. Lucia Barlow for hypertension and diabetes digital medicine follow-up. Patient reports adherence to her medication regimen and continues to have diarrhea with metformin. She does not want to change medications at this time.  Last 5 Patient Entered Readings                                      Current 30 Day Average: 126/69     Recent Readings 7/8/2019 7/6/2019 7/4/2019 7/2/2019 6/25/2019    SBP (mmHg) 145 113 136 95 143    DBP (mmHg) 76 59 65 60 64    Pulse 96 105 93 98 98        Patient denies s/s of hypotension (lightheadedness, dizziness, nausea, fatigue) associated with low readings. Instructed patient to inform me if this occurs, patient confirms understanding.    Patient denies s/s of hypertension (SOB, CP, severe headaches, changes in vision) associated with high readings. Instructed patient to go to the ED if BP >180/110 and accompanied by hypertensive s/s, patient confirms understanding.    Last 6 Patient Entered Readings                                          Most Recent A1c: 7.1% on 5/30/2019  (Goal: 7%)     Recent Readings 7/8/2019 7/6/2019 7/4/2019 7/2/2019 6/21/2019    Blood Glucose (mg/dL) 107 146 114 148 111        Patient denies s/s or episodes of hypoglycemia (weakness, dizziness, hunger, shakiness, nausea, headache, heart palpitations, sweating, fatigue, anxiety, etc.).    Patient denies s/s of hyperglycemia (headache, increased thirst, increased urination, fatigue, blurred vision).    Assessment:  Patient's current 30-day average is at goal of <130/80 mmHg based on ACC/AHA HTN guidelines.     Diabetes is close to goal based on patient's last A1C. SMBG readings reviewed and are at goal.    Health maintenance is up to date.    Plan:  Continue current regimen.  A1C due at the end of August.   Patients health , Jessica Ortiz, will follow-up as scheduled.    I will continue to monitor regularly and will follow-up in 4 weeks, sooner if blood pressure or blood glucose begins to  trend upward or downward.     Current medication regimen:  Hypertension Medications             amLODIPine (NORVASC) 10 MG tablet Take 1 tablet (10 mg total) by mouth once daily.    chlorthalidone (HYGROTEN) 25 MG Tab Take 1 tablet (25 mg total) by mouth once daily.    lisinopril 10 MG tablet Take 1 tablet (10 mg total) by mouth once daily. - APPOINTMENT REQUIRED FOR MORE REFILLS        Diabetes Medications             metFORMIN (GLUCOPHAGE-XR) 500 MG 24 hr tablet Take 2 tablets (1,000 mg total) by mouth 2 (two) times daily with meals.        Patient has my contact information and knows to call with any concerns or clinical changes.

## 2019-07-23 ENCOUNTER — PATIENT OUTREACH (OUTPATIENT)
Dept: OTHER | Facility: OTHER | Age: 69
End: 2019-07-23

## 2019-07-23 NOTE — PROGRESS NOTES
"Last 5 Patient Entered Readings                                      Current 30 Day Average: 127/65     Recent Readings 7/23/2019 7/19/2019 7/16/2019 7/11/2019 7/8/2019    SBP (mmHg) 123 125 132 127 145    DBP (mmHg) 73 60 64 68 76    Pulse 103 83 99 87 96          Last 6 Patient Entered Readings                                          Most Recent A1c: 7.1% on 5/30/2019  (Goal: 7%)     Recent Readings 7/23/2019 7/19/2019 7/16/2019 7/11/2019 7/8/2019    Blood Glucose (mg/dL) 112 105 120 127 107        Patient currently eating steamed spinach with "a little" salt, pepper, Ms. Dash, and butter.     Digital Medicine: Health  Follow Up    Lifestyle Modifications:    1.Dietary Modifications (Sodium intake <2,000mg/day, food labels, dining out): Reports making efforts not to skip meals. Boiled egg, toast (sometimes with peanut butter) and milk for breakfast this morning, spinach for lunch, 1/2 cup rice with chicken and gravy for dinner. No snacks. Went from size 14 to 12, but doesn't want to lose any more weight because her friends say she looks "sick". Working with patient to maintain healthy eating habits.     2.Physical Activity: Patient enjoys walking and bike riding. Walks every day in her neighborhood, early in the morning. Rides bike every other day, depending on weather. Encouraged patient to continue active lifestyle.     3.Medication Therapy: Patient has been compliant with the medication regimen.    4.Patient has the following medication side effects/concerns: None    Follow up with Mr. Lucia Barlow completed. No further questions or concerns. Will continue to follow up to achieve health goals.    "

## 2019-08-06 ENCOUNTER — PATIENT OUTREACH (OUTPATIENT)
Dept: OTHER | Facility: OTHER | Age: 69
End: 2019-08-06

## 2019-08-06 NOTE — PROGRESS NOTES
HPI:  Called Ms. Lucia Barlow for hypertension and diabetes digital medicine follow-up. Patient reports adherence  to medication regimen with no complaints/complaints.   Last 5 Patient Entered Readings                                      Current 30 Day Average: 132/67     Recent Readings 8/6/2019 8/4/2019 7/30/2019 7/26/2019 7/23/2019    SBP (mmHg) 146 133 133 128 123    DBP (mmHg) 75 64 60 67 73    Pulse 99 107 95 95 103        Patient denies s/s of hypotension (lightheadedness, dizziness, nausea, fatigue) associated with low readings. Instructed patient to inform me if this occurs, patient confirms understanding.    Patient denies s/s of hypertension (SOB, CP, severe headaches, changes in vision) associated with high readings. Instructed patient to go to the ED if BP >180/110 and accompanied by hypertensive s/s, patient confirms understanding.    Last 6 Patient Entered Readings                                          Most Recent A1c: 7.1% on 5/30/2019  (Goal: 7%)     Recent Readings 8/6/2019 8/4/2019 7/30/2019 7/26/2019 7/23/2019    Blood Glucose (mg/dL) 108 128 135 185 112        Patient denies s/s or episodes of hypoglycemia (weakness, dizziness, hunger, shakiness, nausea, headache, heart palpitations, sweating, fatigue, anxiety, etc.).    Patient denies s/s of hyperglycemia (headache, increased thirst, increased urination, fatigue, blurred vision).    Assessment:  Patient's current 30-day average is close to goal of <130/80 mmHg based on ACC/AHA HTN guidelines. Her elevated readings are after exercise.    Diabetes is at goal based on patient's last A1C. SMBG readings reviewed and are controlled.    Health maintenance is up to date.    Plan:  Continue current regimen for both HTN and DM.  Test your blood pressure 30 minutes after exercise.  A1C is scheduled 8/12.  Patients health , Jessica Ortiz, will follow-up as scheduled.    I will continue to monitor regularly and will follow-up in 2 weeks, sooner if  blood pressure or blood glucose begins to trend upward or downward.     Current medication regimen:  Hypertension Medications             amLODIPine (NORVASC) 10 MG tablet Take 1 tablet (10 mg total) by mouth once daily.    chlorthalidone (HYGROTEN) 25 MG Tab Take 1 tablet (25 mg total) by mouth once daily.    lisinopril 10 MG tablet Take 1 tablet (10 mg total) by mouth once daily. - APPOINTMENT REQUIRED FOR MORE REFILLS        Diabetes Medications             metFORMIN (GLUCOPHAGE-XR) 500 MG 24 hr tablet Take 2 tablets (1,000 mg total) by mouth 2 (two) times daily with meals.        Patient has my contact information and knows to call with any concerns or clinical changes.

## 2019-08-10 ENCOUNTER — HOSPITAL ENCOUNTER (EMERGENCY)
Facility: HOSPITAL | Age: 69
Discharge: HOME | End: 2019-08-10
Attending: EMERGENCY MEDICINE
Payer: MEDICARE

## 2019-08-10 VITALS
HEIGHT: 60 IN | OXYGEN SATURATION: 94 % | WEIGHT: 152.31 LBS | TEMPERATURE: 98 F | HEART RATE: 104 BPM | DIASTOLIC BLOOD PRESSURE: 70 MMHG | SYSTOLIC BLOOD PRESSURE: 148 MMHG | RESPIRATION RATE: 22 BRPM | BODY MASS INDEX: 29.9 KG/M2

## 2019-08-10 DIAGNOSIS — J44.1 ACUTE EXACERBATION OF CHRONIC OBSTRUCTIVE PULMONARY DISEASE (COPD): Primary | ICD-10-CM

## 2019-08-10 DIAGNOSIS — J41.0 SIMPLE CHRONIC BRONCHITIS: ICD-10-CM

## 2019-08-10 DIAGNOSIS — R07.9 CHEST PAIN: ICD-10-CM

## 2019-08-10 DIAGNOSIS — E87.6 HYPOKALEMIA: ICD-10-CM

## 2019-08-10 LAB
ALBUMIN SERPL BCP-MCNC: 3.1 G/DL (ref 3.5–5.2)
ALP SERPL-CCNC: 90 U/L (ref 55–135)
ALT SERPL W/O P-5'-P-CCNC: 8 U/L (ref 10–44)
ANION GAP SERPL CALC-SCNC: 14 MMOL/L (ref 8–16)
APTT BLDCRRT: 31.3 SEC (ref 21–32)
AST SERPL-CCNC: 16 U/L (ref 10–40)
BASOPHILS # BLD AUTO: 0.02 K/UL (ref 0–0.2)
BASOPHILS NFR BLD: 0.3 % (ref 0–1.9)
BILIRUB SERPL-MCNC: 0.2 MG/DL (ref 0.1–1)
BNP SERPL-MCNC: 13 PG/ML (ref 0–99)
BUN SERPL-MCNC: 12 MG/DL (ref 8–23)
CALCIUM SERPL-MCNC: 9.1 MG/DL (ref 8.7–10.5)
CHLORIDE SERPL-SCNC: 102 MMOL/L (ref 95–110)
CO2 SERPL-SCNC: 25 MMOL/L (ref 23–29)
CREAT SERPL-MCNC: 1 MG/DL (ref 0.5–1.4)
DIFFERENTIAL METHOD: ABNORMAL
EOSINOPHIL # BLD AUTO: 1.1 K/UL (ref 0–0.5)
EOSINOPHIL NFR BLD: 13.7 % (ref 0–8)
ERYTHROCYTE [DISTWIDTH] IN BLOOD BY AUTOMATED COUNT: 16.2 % (ref 11.5–14.5)
EST. GFR  (AFRICAN AMERICAN): >60 ML/MIN/1.73 M^2
EST. GFR  (NON AFRICAN AMERICAN): 57 ML/MIN/1.73 M^2
GLUCOSE SERPL-MCNC: 120 MG/DL (ref 70–110)
HCT VFR BLD AUTO: 35.9 % (ref 37–48.5)
HGB BLD-MCNC: 12 G/DL (ref 12–16)
INR PPP: 0.9 (ref 0.8–1.2)
LYMPHOCYTES # BLD AUTO: 1.5 K/UL (ref 1–4.8)
LYMPHOCYTES NFR BLD: 19.3 % (ref 18–48)
MAGNESIUM SERPL-MCNC: 1.6 MG/DL (ref 1.6–2.6)
MCH RBC QN AUTO: 29.8 PG (ref 27–31)
MCHC RBC AUTO-ENTMCNC: 33.4 G/DL (ref 32–36)
MCV RBC AUTO: 89 FL (ref 82–98)
MONOCYTES # BLD AUTO: 0.5 K/UL (ref 0.3–1)
MONOCYTES NFR BLD: 6.1 % (ref 4–15)
NEUTROPHILS # BLD AUTO: 4.6 K/UL (ref 1.8–7.7)
NEUTROPHILS NFR BLD: 60.7 % (ref 38–73)
PLATELET # BLD AUTO: 282 K/UL (ref 150–350)
PMV BLD AUTO: 9.6 FL (ref 9.2–12.9)
POTASSIUM SERPL-SCNC: 3.2 MMOL/L (ref 3.5–5.1)
PROT SERPL-MCNC: 6.9 G/DL (ref 6–8.4)
PROTHROMBIN TIME: 9.7 SEC (ref 9–12.5)
RBC # BLD AUTO: 4.03 M/UL (ref 4–5.4)
SODIUM SERPL-SCNC: 141 MMOL/L (ref 136–145)
TROPONIN I SERPL DL<=0.01 NG/ML-MCNC: 0.01 NG/ML (ref 0–0.03)
WBC # BLD AUTO: 7.66 K/UL (ref 3.9–12.7)

## 2019-08-10 PROCEDURE — 83735 ASSAY OF MAGNESIUM: CPT | Mod: HCNC

## 2019-08-10 PROCEDURE — 85610 PROTHROMBIN TIME: CPT | Mod: HCNC

## 2019-08-10 PROCEDURE — 36415 COLL VENOUS BLD VENIPUNCTURE: CPT | Mod: HCNC

## 2019-08-10 PROCEDURE — 63600175 PHARM REV CODE 636 W HCPCS: Mod: HCNC | Performed by: EMERGENCY MEDICINE

## 2019-08-10 PROCEDURE — 83880 ASSAY OF NATRIURETIC PEPTIDE: CPT | Mod: HCNC

## 2019-08-10 PROCEDURE — 93010 EKG 12-LEAD: ICD-10-PCS | Mod: HCNC,,, | Performed by: INTERNAL MEDICINE

## 2019-08-10 PROCEDURE — 85025 COMPLETE CBC W/AUTO DIFF WBC: CPT | Mod: HCNC

## 2019-08-10 PROCEDURE — 85730 THROMBOPLASTIN TIME PARTIAL: CPT | Mod: HCNC

## 2019-08-10 PROCEDURE — 93010 ELECTROCARDIOGRAM REPORT: CPT | Mod: HCNC,,, | Performed by: INTERNAL MEDICINE

## 2019-08-10 PROCEDURE — 99285 EMERGENCY DEPT VISIT HI MDM: CPT | Mod: 25,HCNC

## 2019-08-10 PROCEDURE — 80053 COMPREHEN METABOLIC PANEL: CPT | Mod: HCNC

## 2019-08-10 PROCEDURE — 25000242 PHARM REV CODE 250 ALT 637 W/ HCPCS: Mod: HCNC | Performed by: EMERGENCY MEDICINE

## 2019-08-10 PROCEDURE — 94640 AIRWAY INHALATION TREATMENT: CPT | Mod: HCNC

## 2019-08-10 PROCEDURE — 96374 THER/PROPH/DIAG INJ IV PUSH: CPT | Mod: HCNC

## 2019-08-10 PROCEDURE — 25000003 PHARM REV CODE 250: Mod: HCNC | Performed by: EMERGENCY MEDICINE

## 2019-08-10 PROCEDURE — 93005 ELECTROCARDIOGRAM TRACING: CPT | Mod: HCNC

## 2019-08-10 PROCEDURE — 84484 ASSAY OF TROPONIN QUANT: CPT | Mod: HCNC

## 2019-08-10 RX ORDER — ACETAMINOPHEN 325 MG/1
650 TABLET ORAL
Status: COMPLETED | OUTPATIENT
Start: 2019-08-10 | End: 2019-08-10

## 2019-08-10 RX ORDER — PREDNISONE 20 MG/1
40 TABLET ORAL DAILY
Qty: 10 TABLET | Refills: 0 | Status: SHIPPED | OUTPATIENT
Start: 2019-08-10 | End: 2019-08-16 | Stop reason: ALTCHOICE

## 2019-08-10 RX ORDER — GUAIFENESIN/DEXTROMETHORPHAN 100-10MG/5
5 SYRUP ORAL 4 TIMES DAILY PRN
Qty: 120 ML | Refills: 0 | Status: SHIPPED | OUTPATIENT
Start: 2019-08-10 | End: 2019-08-20

## 2019-08-10 RX ORDER — IPRATROPIUM BROMIDE AND ALBUTEROL SULFATE 2.5; .5 MG/3ML; MG/3ML
3 SOLUTION RESPIRATORY (INHALATION)
Status: DISPENSED | OUTPATIENT
Start: 2019-08-10 | End: 2019-08-10

## 2019-08-10 RX ORDER — POTASSIUM CHLORIDE 20 MEQ/15ML
40 SOLUTION ORAL
Status: COMPLETED | OUTPATIENT
Start: 2019-08-10 | End: 2019-08-10

## 2019-08-10 RX ORDER — METHYLPREDNISOLONE SOD SUCC 125 MG
125 VIAL (EA) INJECTION
Status: COMPLETED | OUTPATIENT
Start: 2019-08-10 | End: 2019-08-10

## 2019-08-10 RX ADMIN — POTASSIUM CHLORIDE 40 MEQ: 20 SOLUTION ORAL at 01:08

## 2019-08-10 RX ADMIN — ACETAMINOPHEN 650 MG: 325 TABLET ORAL at 11:08

## 2019-08-10 RX ADMIN — IPRATROPIUM BROMIDE AND ALBUTEROL SULFATE 3 ML: .5; 3 SOLUTION RESPIRATORY (INHALATION) at 11:08

## 2019-08-10 RX ADMIN — METHYLPREDNISOLONE SODIUM SUCCINATE 125 MG: 125 INJECTION, POWDER, FOR SOLUTION INTRAMUSCULAR; INTRAVENOUS at 11:08

## 2019-08-10 NOTE — ED PROVIDER NOTES
"SCRIBE #1 NOTE: I, Nohemi White, am scribing for, and in the presence of, Trice Bar MD. I have scribed the entire note.       History     Chief Complaint   Patient presents with    Shortness of Breath     x 2 weeks, reports extreme "windedness" on exertion, c/o productive cough with thick mucous, + chills, no appetetie, recent weight loss    Chest Pain     midsternal, non radiating      Review of patient's allergies indicates:   Allergen Reactions    Skin staples [surgical stainless steel] Swelling    Egg derived      Shortness breath, lip swelling    Fish containing products     Iodine and iodide containing products Swelling    Latex, natural rubber Swelling    Losartan Itching    Nickel     Pravastatin      40 mg causes nausea vomitting but 20 mg ok    Shellfish containing products Swelling         History of Present Illness     HPI    8/10/2019, 10:56 AM  History obtained from the patient, family member      History of Present Illness: Lucia Barlow is a 69 y.o. female patient with a PMHx of HTN, DM, HLD, COPD- former smoker who presents to the Emergency Department for evaluation of SOB for the past 2 weeks. Sxs are constant, moderate in severity, and worse with movement. Associated sxs include productive cough with thick phlegm, midsternal chest wall pain. Patient had a breathing tx this morning and family notes that she has been taking breathing txs x3/day secondary to feeling SOB. No mitigating factors reported. Pt sleeps on 1 pillow at night, which is normal for her. Pt has no c/o fever, chills, leg pain/swelling, abd pain, N/V, dizziness, lightheadedness extremity weakness/numbness. She denies any recent hospital admissions. Patient notes that she was started on Metformin by her PCP about 1 month ago and has been having diarrhea since. She also reports decreased appetite.       Arrival mode: Personal vehicle    PCP: ZELDA Sandoval MD        Past Medical History:  Past Medical " History:   Diagnosis Date    Atherosclerosis of artery of both lower extremities 1/17/2014    Atherosclerosis of native coronary artery of native heart without angina pectoris 11/18/2016    Atherosclerotic PVD with intermittent claudication 1/17/2014    Chronic bronchitis     COPD (chronic obstructive pulmonary disease)     Dyslipidemia associated with type 2 diabetes mellitus 6/14/2013    Dyslipidemia associated with type 2 diabetes mellitus     Ex-smoker     Gastroesophageal reflux disease without esophagitis 1/25/2019    Hyperlipidemia     Hypertension     Osteoporosis 1/16 rosita 1/18    PVD (peripheral vascular disease)     S/P peripheral artery angioplasty 2/7/2014    Simple chronic bronchitis     Tobacco dependence     Type 2 diabetes mellitus with microalbuminuria, without long-term current use of insulin 3/29/2019    Type 2 diabetes mellitus with peripheral vascular disease     Type 2 diabetes mellitus with stage 3 chronic kidney disease, without long-term current use of insulin 2/1/2019    Type 2 diabetes mellitus without retinopathy 2/1/2019       Past Surgical History:  Past Surgical History:   Procedure Laterality Date    ANGIOPLASTY Bilateral 01/24/2014    aortoiliac stenting     AORTOGRAM, WITH RUNOFF Bilateral 1/24/2014    Performed by [facility] at [facility] CATH LAB    CARPAL TUNNEL RELEASE  2003    [facility]    COLECTOMY  approximate 2005    pt states 1in colon -dx with benign mass removed-states no colon cancer    COLONOSCOPY N/A 11/9/2016    Performed by Dmitri Sterling MD at [facility] ENDO    COLONOSCOPY N/A 10/16/2013    Performed by Praneeth Diaz MD at [facility] ENDO    HYSTERECTOMY      no cancer         Family History:  Family History   Problem Relation Age of Onset    Stroke Father     Stroke Sister     Asthma Daughter     Diabetes Daughter     Breast cancer Daughter     Asthma Son        Social History:  Social History     Tobacco Use    Smoking status: Former  Smoker     Packs/day: 0.25     Years: 50.00     Pack years: 12.50     Types: Cigarettes     Last attempt to quit: 1/11/2016     Years since quitting: 3.5    Smokeless tobacco: Never Used   Substance and Sexual Activity    Alcohol use: No     Alcohol/week: 0.0 oz    Drug use: No    Sexual activity: Never        Review of Systems     Review of Systems   Constitutional: Positive for appetite change (decreased). Negative for chills and fever.   HENT: Negative for sore throat.    Respiratory: Positive for cough and shortness of breath. Negative for chest tightness and wheezing.    Cardiovascular: Negative for chest pain, palpitations and leg swelling.   Gastrointestinal: Positive for diarrhea. Negative for abdominal pain, nausea and vomiting.   Genitourinary: Negative for dysuria.   Musculoskeletal: Negative for back pain.        (+) midsternal chest wall pain   Skin: Negative for rash.   Neurological: Negative for dizziness, syncope, weakness and light-headedness.   Hematological: Does not bruise/bleed easily.   All other systems reviewed and are negative.     Physical Exam     Initial Vitals [08/10/19 1046]   BP Pulse Resp Temp SpO2   (!) 140/65 101 20 98.5 °F (36.9 °C) (!) 93 %      MAP       --          Physical Exam  Nursing Notes and Vital Signs Reviewed.  Constitutional: Patient is in no acute distress. Well-developed and well-nourished.  Head: Atraumatic. Normocephalic.  Eyes: PERRL. EOM intact. Conjunctivae are not pale. No scleral icterus.  ENT: Mucous membranes are moist. Oropharynx is clear and symmetric.    Neck: Supple. Full ROM. No lymphadenopathy.  Cardiovascular: Tachycardic. Regular rhythm. No murmurs, rubs, or gallops. Distal pulses are 2+ and symmetric.  Pulmonary/Chest: No respiratory distress. Diminished breath sounds bilaterally. No wheezing or rales. TTP to midsternal chest.  Abdominal: Soft and non-distended.  There is no tenderness.  No rebound, guarding, or rigidity. Good bowel  sounds.  Musculoskeletal: Moves all extremities. No obvious deformities. No edema. No calf tenderness.  Skin: Warm and dry.  Neurological:  Alert, awake, and appropriate.  Normal speech.  No acute focal neurological deficits are appreciated.  Psychiatric: Normal affect. Good eye contact. Appropriate in content.     ED Course   Procedures  ED Vital Signs:  Vitals:    08/10/19 1046 08/10/19 1119 08/10/19 1124 08/10/19 1125   BP: (!) 140/65      Pulse: 101 100 94    Resp: 20 18 18    Temp: 98.5 °F (36.9 °C)      TempSrc: Oral      SpO2: (!) 93% 95%     Weight:    69.1 kg (152 lb 4.8 oz)   Height: 5' (1.524 m)       08/10/19 1129 08/10/19 1150 08/10/19 1201 08/10/19 1202   BP:    (!) 146/68   Pulse: 100 100 94    Resp: 19  (!) 23    Temp:       TempSrc:       SpO2:    95%   Weight:       Height:        08/10/19 1231 08/10/19 1232 08/10/19 1314   BP:  138/67 (!) 148/70   Pulse: 100  104   Resp: (!) 28  (!) 22   Temp:   98.2 °F (36.8 °C)   TempSrc:   Oral   SpO2:  95% (!) 94%   Weight:      Height:          Abnormal Lab Results:  Labs Reviewed   CBC W/ AUTO DIFFERENTIAL - Abnormal; Notable for the following components:       Result Value    Hematocrit 35.9 (*)     RDW 16.2 (*)     Eos # 1.1 (*)     Eosinophil% 13.7 (*)     All other components within normal limits   COMPREHENSIVE METABOLIC PANEL - Abnormal; Notable for the following components:    Potassium 3.2 (*)     Glucose 120 (*)     Albumin 3.1 (*)     ALT 8 (*)     eGFR if non  57 (*)     All other components within normal limits   TROPONIN I   B-TYPE NATRIURETIC PEPTIDE   PROTIME-INR   APTT   MAGNESIUM   MAGNESIUM        All Lab Results:  Results for orders placed or performed during the hospital encounter of 08/10/19   CBC auto differential   Result Value Ref Range    WBC 7.66 3.90 - 12.70 K/uL    RBC 4.03 4.00 - 5.40 M/uL    Hemoglobin 12.0 12.0 - 16.0 g/dL    Hematocrit 35.9 (L) 37.0 - 48.5 %    Mean Corpuscular Volume 89 82 - 98 fL    Mean  Corpuscular Hemoglobin 29.8 27.0 - 31.0 pg    Mean Corpuscular Hemoglobin Conc 33.4 32.0 - 36.0 g/dL    RDW 16.2 (H) 11.5 - 14.5 %    Platelets 282 150 - 350 K/uL    MPV 9.6 9.2 - 12.9 fL    Gran # (ANC) 4.6 1.8 - 7.7 K/uL    Lymph # 1.5 1.0 - 4.8 K/uL    Mono # 0.5 0.3 - 1.0 K/uL    Eos # 1.1 (H) 0.0 - 0.5 K/uL    Baso # 0.02 0.00 - 0.20 K/uL    Gran% 60.7 38.0 - 73.0 %    Lymph% 19.3 18.0 - 48.0 %    Mono% 6.1 4.0 - 15.0 %    Eosinophil% 13.7 (H) 0.0 - 8.0 %    Basophil% 0.3 0.0 - 1.9 %    Differential Method Automated    Comprehensive metabolic panel   Result Value Ref Range    Sodium 141 136 - 145 mmol/L    Potassium 3.2 (L) 3.5 - 5.1 mmol/L    Chloride 102 95 - 110 mmol/L    CO2 25 23 - 29 mmol/L    Glucose 120 (H) 70 - 110 mg/dL    BUN, Bld 12 8 - 23 mg/dL    Creatinine 1.0 0.5 - 1.4 mg/dL    Calcium 9.1 8.7 - 10.5 mg/dL    Total Protein 6.9 6.0 - 8.4 g/dL    Albumin 3.1 (L) 3.5 - 5.2 g/dL    Total Bilirubin 0.2 0.1 - 1.0 mg/dL    Alkaline Phosphatase 90 55 - 135 U/L    AST 16 10 - 40 U/L    ALT 8 (L) 10 - 44 U/L    Anion Gap 14 8 - 16 mmol/L    eGFR if African American >60 >60 mL/min/1.73 m^2    eGFR if non African American 57 (A) >60 mL/min/1.73 m^2   Troponin I #1   Result Value Ref Range    Troponin I 0.012 0.000 - 0.026 ng/mL   B-Type natriuretic peptide (BNP)   Result Value Ref Range    BNP 13 0 - 99 pg/mL   Protime-INR   Result Value Ref Range    Prothrombin Time 9.7 9.0 - 12.5 sec    INR 0.9 0.8 - 1.2   APTT   Result Value Ref Range    aPTT 31.3 21.0 - 32.0 sec   Magnesium   Result Value Ref Range    Magnesium 1.6 1.6 - 2.6 mg/dL     Imaging Results:  Imaging Results          X-Ray Chest AP Portable (Final result)  Result time 08/10/19 11:55:22    Final result by Evan Enciso Jr., MD (08/10/19 11:55:22)                 Impression:      No acute findings.      Electronically signed by: Evan Enciso Jr., MD  Date:    08/10/2019  Time:    11:55             Narrative:    EXAMINATION:  XR CHEST AP  PORTABLE    CLINICAL HISTORY:  Chest Pain;    COMPARISON:  None    FINDINGS:  Lungs are clear.  Heart size within normal limits.No significant bony findings.                                 The EKG was ordered, reviewed, and independently interpreted by the ED provider.  Interpretation time: 10:59  Rate: 98 BPM  Rhythm: normal sinus rhythm  Interpretation: Possible left atrial enlargement. Low voltage QRS. Septal infarct. No STEMI.  When compared to EKG performed 12/16/18, there are no significant changes.           The Emergency Provider reviewed the vital signs and test results, which are outlined above.     ED Discussion     12:17 PM: Re-evaluated pt. Pt is resting comfortably and is in no acute distress.  Pt states that she is feeling better. Has chronic COPD and chronic bronchitis. Stable for discharge home. I do not think she has a PE.  Discussed with pt all pertinent ED information and results. Discussed pt dx and plan of tx. Gave pt all f/u and return to the ED instructions. Informed patient to f/u with her PCP to discuss possible medication change since Metformin is causing side effects. All questions and concerns were addressed at this time. Pt expresses understanding of information and instructions, and is comfortable with plan to discharge. Pt is stable for discharge.    I discussed with patient and/or family/caretaker that evaluation in the ED does not suggest any emergent or life threatening medical conditions requiring immediate intervention beyond what was provided in the ED, and I believe patient is safe for discharge.  Regardless, an unremarkable evaluation in the ED does not preclude the development or presence of a serious of life threatening condition. As such, patient was instructed to return immediately for any worsening or change in current symptoms.    ED Medication(s):  Medications   albuterol-ipratropium 2.5 mg-0.5 mg/3 mL nebulizer solution 3 mL (3 mLs Nebulization Given by Other 8/10/19  1129)   methylPREDNISolone sodium succinate injection 125 mg (125 mg Intravenous Given 8/10/19 1126)   acetaminophen tablet 650 mg (650 mg Oral Given 8/10/19 1151)   potassium chloride 10% oral solution 40 mEq (40 mEq Oral Given 8/10/19 1308)       Discharge Medication List as of 8/10/2019 12:19 PM      START taking these medications    Details   dextromethorphan-guaifenesin  mg/5 ml (ROBITUSSIN-DM)  mg/5 mL liquid Take 5 mLs by mouth 4 (four) times daily as needed (cough)., Starting Sat 8/10/2019, Until Tue 8/20/2019, Print      predniSONE (DELTASONE) 20 MG tablet Take 2 tablets (40 mg total) by mouth once daily. for 5 days, Starting Sat 8/10/2019, Until Thu 8/15/2019, Print             Follow-up Information     ZELDA Sandoval MD. Schedule an appointment as soon as possible for a visit in 2 days.    Specialty:  Family Medicine  Why:  Return to the Emergency Room, If symptoms worsen  Contact information:  [address]  [place] [address]  [phone]                         Medical Decision Making:   Clinical Tests:   Lab Tests: Reviewed and Ordered  Radiological Study: Reviewed and Ordered  Medical Tests: Ordered and Reviewed             Scribe Attestation:   Scribe #1: I performed the above scribed service and the documentation accurately describes the services I performed. I attest to the accuracy of the note.     Attending:   Physician Attestation Statement for Scribe #1: I, Trice Bar MD, personally performed the services described in this documentation, as scribed by Nohemi White, in my presence, and it is both accurate and complete.           Clinical Impression       ICD-10-CM ICD-9-CM   1. Acute exacerbation of chronic obstructive pulmonary disease (COPD) J44.1 491.21   2. Chest pain R07.9 786.50   3. Simple chronic bronchitis J41.0 491.0   4. Hypokalemia E87.6 276.8       Disposition:   Disposition: Discharged  Condition: Stable         Trice Bar  MD  08/10/19 [id number]

## 2019-08-12 ENCOUNTER — LAB VISIT (OUTPATIENT)
Dept: LAB | Facility: HOSPITAL | Age: 69
End: 2019-08-12
Attending: INTERNAL MEDICINE
Payer: MEDICARE

## 2019-08-12 DIAGNOSIS — E11.51 TYPE 2 DIABETES MELLITUS WITH PERIPHERAL VASCULAR DISEASE: Chronic | ICD-10-CM

## 2019-08-12 DIAGNOSIS — E11.69 DYSLIPIDEMIA ASSOCIATED WITH TYPE 2 DIABETES MELLITUS: Chronic | ICD-10-CM

## 2019-08-12 DIAGNOSIS — E78.5 DYSLIPIDEMIA ASSOCIATED WITH TYPE 2 DIABETES MELLITUS: Chronic | ICD-10-CM

## 2019-08-12 LAB
ALBUMIN SERPL BCP-MCNC: 3.2 G/DL (ref 3.5–5.2)
ALP SERPL-CCNC: 82 U/L (ref 55–135)
ALT SERPL W/O P-5'-P-CCNC: 13 U/L (ref 10–44)
ANION GAP SERPL CALC-SCNC: 14 MMOL/L (ref 8–16)
AST SERPL-CCNC: 17 U/L (ref 10–40)
BILIRUB SERPL-MCNC: 0.2 MG/DL (ref 0.1–1)
BUN SERPL-MCNC: 18 MG/DL (ref 8–23)
CALCIUM SERPL-MCNC: 9.3 MG/DL (ref 8.7–10.5)
CHLORIDE SERPL-SCNC: 102 MMOL/L (ref 95–110)
CHOLEST SERPL-MCNC: 165 MG/DL (ref 120–199)
CHOLEST/HDLC SERPL: 2 {RATIO} (ref 2–5)
CO2 SERPL-SCNC: 26 MMOL/L (ref 23–29)
CREAT SERPL-MCNC: 1 MG/DL (ref 0.5–1.4)
EST. GFR  (AFRICAN AMERICAN): >60 ML/MIN/1.73 M^2
EST. GFR  (NON AFRICAN AMERICAN): 56.8 ML/MIN/1.73 M^2
ESTIMATED AVG GLUCOSE: 140 MG/DL (ref 68–131)
GLUCOSE SERPL-MCNC: 122 MG/DL (ref 70–110)
HBA1C MFR BLD HPLC: 6.5 % (ref 4–5.6)
HDLC SERPL-MCNC: 82 MG/DL (ref 40–75)
HDLC SERPL: 49.7 % (ref 20–50)
LDLC SERPL CALC-MCNC: 73.4 MG/DL (ref 63–159)
NONHDLC SERPL-MCNC: 83 MG/DL
POTASSIUM SERPL-SCNC: 4 MMOL/L (ref 3.5–5.1)
PROT SERPL-MCNC: 6.9 G/DL (ref 6–8.4)
SODIUM SERPL-SCNC: 142 MMOL/L (ref 136–145)
TRIGL SERPL-MCNC: 48 MG/DL (ref 30–150)

## 2019-08-12 PROCEDURE — 80061 LIPID PANEL: CPT | Mod: HCNC

## 2019-08-12 PROCEDURE — 36415 COLL VENOUS BLD VENIPUNCTURE: CPT | Mod: HCNC

## 2019-08-12 PROCEDURE — 80053 COMPREHEN METABOLIC PANEL: CPT | Mod: HCNC

## 2019-08-12 PROCEDURE — 83036 HEMOGLOBIN GLYCOSYLATED A1C: CPT | Mod: HCNC

## 2019-08-14 ENCOUNTER — PATIENT OUTREACH (OUTPATIENT)
Dept: OTHER | Facility: OTHER | Age: 69
End: 2019-08-14

## 2019-08-14 NOTE — PROGRESS NOTES
Last 5 Patient Entered Readings                                      Current 30 Day Average: 135/67     Recent Readings 8/13/2019 8/11/2019 8/10/2019 8/9/2019 8/7/2019    SBP (mmHg) 147 147 139 127 136    DBP (mmHg) 71 68 67 70 66    Pulse 83 87 103 117 93          Last 6 Patient Entered Readings                                          Most Recent A1c: 6.5% on 8/12/2019  (Goal: 7%)     Recent Readings 8/13/2019 8/10/2019 8/9/2019 8/7/2019 8/6/2019    Blood Glucose (mg/dL) 110 80 198 133 108        LMB to congratulate the patient on her at goal A1C, labs in ED showing hypokalemia and discuss her upward trend in BP.

## 2019-08-16 ENCOUNTER — OFFICE VISIT (OUTPATIENT)
Dept: INTERNAL MEDICINE | Facility: CLINIC | Age: 69
End: 2019-08-16
Payer: MEDICARE

## 2019-08-16 ENCOUNTER — IMMUNIZATION (OUTPATIENT)
Dept: PHARMACY | Facility: CLINIC | Age: 69
End: 2019-08-16
Payer: MEDICARE

## 2019-08-16 VITALS
TEMPERATURE: 98 F | OXYGEN SATURATION: 96 % | BODY MASS INDEX: 27.71 KG/M2 | SYSTOLIC BLOOD PRESSURE: 130 MMHG | DIASTOLIC BLOOD PRESSURE: 72 MMHG | HEIGHT: 60 IN | RESPIRATION RATE: 16 BRPM | WEIGHT: 141.13 LBS | HEART RATE: 82 BPM

## 2019-08-16 DIAGNOSIS — Z23 NEED FOR SHINGLES VACCINE: ICD-10-CM

## 2019-08-16 DIAGNOSIS — Z12.12 SCREENING FOR COLORECTAL CANCER: ICD-10-CM

## 2019-08-16 DIAGNOSIS — Z86.010 HISTORY OF ADENOMATOUS POLYP OF COLON: ICD-10-CM

## 2019-08-16 DIAGNOSIS — Z12.11 SCREENING FOR COLORECTAL CANCER: ICD-10-CM

## 2019-08-16 DIAGNOSIS — E11.69 DYSLIPIDEMIA ASSOCIATED WITH TYPE 2 DIABETES MELLITUS: Chronic | ICD-10-CM

## 2019-08-16 DIAGNOSIS — I10 ESSENTIAL HYPERTENSION: Primary | Chronic | ICD-10-CM

## 2019-08-16 DIAGNOSIS — E11.22 TYPE 2 DIABETES MELLITUS WITH STAGE 3 CHRONIC KIDNEY DISEASE, WITHOUT LONG-TERM CURRENT USE OF INSULIN: Chronic | ICD-10-CM

## 2019-08-16 DIAGNOSIS — R01.1 SYSTOLIC MURMUR: ICD-10-CM

## 2019-08-16 DIAGNOSIS — N18.30 TYPE 2 DIABETES MELLITUS WITH STAGE 3 CHRONIC KIDNEY DISEASE, WITHOUT LONG-TERM CURRENT USE OF INSULIN: Chronic | ICD-10-CM

## 2019-08-16 DIAGNOSIS — J44.89 CHRONIC OBSTRUCTIVE BRONCHITIS: ICD-10-CM

## 2019-08-16 DIAGNOSIS — I35.0 NONRHEUMATIC AORTIC VALVE STENOSIS: ICD-10-CM

## 2019-08-16 DIAGNOSIS — Z12.39 SCREENING FOR BREAST CANCER: ICD-10-CM

## 2019-08-16 DIAGNOSIS — Z23 NEED FOR TD VACCINE: ICD-10-CM

## 2019-08-16 DIAGNOSIS — E78.5 DYSLIPIDEMIA ASSOCIATED WITH TYPE 2 DIABETES MELLITUS: Chronic | ICD-10-CM

## 2019-08-16 PROCEDURE — 3075F SYST BP GE 130 - 139MM HG: CPT | Mod: HCNC,CPTII,S$GLB, | Performed by: FAMILY MEDICINE

## 2019-08-16 PROCEDURE — 3075F PR MOST RECENT SYSTOLIC BLOOD PRESS GE 130-139MM HG: ICD-10-PCS | Mod: HCNC,CPTII,S$GLB, | Performed by: FAMILY MEDICINE

## 2019-08-16 PROCEDURE — 99214 OFFICE O/P EST MOD 30 MIN: CPT | Mod: HCNC,S$GLB,, | Performed by: FAMILY MEDICINE

## 2019-08-16 PROCEDURE — 99214 PR OFFICE/OUTPT VISIT, EST, LEVL IV, 30-39 MIN: ICD-10-PCS | Mod: HCNC,S$GLB,, | Performed by: FAMILY MEDICINE

## 2019-08-16 PROCEDURE — 99999 PR PBB SHADOW E&M-EST. PATIENT-LVL IV: CPT | Mod: PBBFAC,HCNC,, | Performed by: FAMILY MEDICINE

## 2019-08-16 PROCEDURE — 3044F PR MOST RECENT HEMOGLOBIN A1C LEVEL <7.0%: ICD-10-PCS | Mod: HCNC,CPTII,S$GLB, | Performed by: FAMILY MEDICINE

## 2019-08-16 PROCEDURE — 3078F PR MOST RECENT DIASTOLIC BLOOD PRESSURE < 80 MM HG: ICD-10-PCS | Mod: HCNC,CPTII,S$GLB, | Performed by: FAMILY MEDICINE

## 2019-08-16 PROCEDURE — 3044F HG A1C LEVEL LT 7.0%: CPT | Mod: HCNC,CPTII,S$GLB, | Performed by: FAMILY MEDICINE

## 2019-08-16 PROCEDURE — 3078F DIAST BP <80 MM HG: CPT | Mod: HCNC,CPTII,S$GLB, | Performed by: FAMILY MEDICINE

## 2019-08-16 PROCEDURE — 1101F PT FALLS ASSESS-DOCD LE1/YR: CPT | Mod: HCNC,CPTII,S$GLB, | Performed by: FAMILY MEDICINE

## 2019-08-16 PROCEDURE — 1101F PR PT FALLS ASSESS DOC 0-1 FALLS W/OUT INJ PAST YR: ICD-10-PCS | Mod: HCNC,CPTII,S$GLB, | Performed by: FAMILY MEDICINE

## 2019-08-16 PROCEDURE — 99999 PR PBB SHADOW E&M-EST. PATIENT-LVL IV: ICD-10-PCS | Mod: PBBFAC,HCNC,, | Performed by: FAMILY MEDICINE

## 2019-08-16 RX ORDER — GLIMEPIRIDE 2 MG/1
2 TABLET ORAL
Qty: 90 TABLET | Refills: 3 | Status: SHIPPED | OUTPATIENT
Start: 2019-08-16 | End: 2020-02-19 | Stop reason: SDUPTHER

## 2019-08-16 RX ORDER — ROSUVASTATIN CALCIUM 20 MG/1
20 TABLET, COATED ORAL DAILY
Qty: 90 TABLET | Refills: 3 | Status: SHIPPED | OUTPATIENT
Start: 2019-08-16 | End: 2020-02-19 | Stop reason: SDUPTHER

## 2019-08-16 RX ORDER — AMLODIPINE AND BENAZEPRIL HYDROCHLORIDE 5; 20 MG/1; MG/1
1 CAPSULE ORAL DAILY
Qty: 90 CAPSULE | Refills: 2 | Status: SHIPPED | OUTPATIENT
Start: 2019-08-16 | End: 2020-02-19 | Stop reason: SDUPTHER

## 2019-08-16 RX ORDER — METFORMIN HYDROCHLORIDE 500 MG/1
500 TABLET, EXTENDED RELEASE ORAL 2 TIMES DAILY WITH MEALS
Qty: 180 TABLET | Refills: 3 | Status: SHIPPED | OUTPATIENT
Start: 2019-08-16 | End: 2020-02-19 | Stop reason: SDUPTHER

## 2019-08-16 RX ORDER — CHLORTHALIDONE 25 MG/1
25 TABLET ORAL DAILY
Qty: 90 TABLET | Refills: 2 | Status: SHIPPED | OUTPATIENT
Start: 2019-08-16 | End: 2020-02-19 | Stop reason: SDUPTHER

## 2019-08-16 RX ORDER — PREDNISONE 10 MG/1
TABLET ORAL
Qty: 15 TABLET | Refills: 0 | Status: SHIPPED | OUTPATIENT
Start: 2019-08-16 | End: 2019-08-28

## 2019-08-16 RX ORDER — ALBUTEROL SULFATE 0.83 MG/ML
SOLUTION RESPIRATORY (INHALATION) EVERY 6 HOURS PRN
Refills: 12 | COMMUNITY
Start: 2019-08-10 | End: 2020-08-19

## 2019-08-16 NOTE — PROGRESS NOTES
Future Appointments   Date Time Provider Department Center   8/28/2019  8:15 AM Amalia Rosas MD [facility] CARDIO [facility]   8/28/2019  9:00 AM ANNUAL WELLNESS VISIT-NURSE PRACTITIONER ROSALES  IM [facility]   11/27/2019  7:45 AM [facility] XR2 [facility] XRAY [facility]   11/27/2019  8:00 AM PULMONARY LAB, [place] [facility]   11/27/2019  8:20 AM Ramiro Aleman MD [facility] [place]

## 2019-08-16 NOTE — PROGRESS NOTES
CHIEF COMPLAINT  Hospital Follow Up      HISTORY, ASSESSMENT, and PLAN    1. Essential hypertension    2. Type 2 diabetes mellitus with stage 3 chronic kidney disease, without long-term current use of insulin    3. Chronic obstructive bronchitis    4. Dyslipidemia associated with type 2 diabetes mellitus    5. Systolic murmur    6. Nonrheumatic aortic valve stenosis    7. History of adenomatous polyp of colon    8. Screening for colorectal cancer    9. Need for Td vaccine    10. Need for shingles vaccine    11. Screening for breast cancer      Her diabetes is well-controlled but she says that the metformin is calling in causing in olerable G.I. side effects, particularly nausea and diarrhea. She says that she is having to force herself to eat and it appears that she has lost around 11 pounds over the last few weeks. She says that when she was taking only 500 mg metformin once a day, it was tolerable. Her current dose of 1000 mg twice daily is intolerable.      She had recent ER visit for acute exacerbation of chronic bronchitis. She is much better on the steroids. She is requesting that I taper down the steroids while she awaits her next available appointment with pulmonology.    Blood pressure is controlled based on today's measurement, although home blood pressure readings are typically higher.    She is noted to have a murmur today, previously not noted on exam. She has follow up with cardiologist later this month. We discussed differential diagnosis.    She appears to be tolerating statin for dyslipidemia without apparent symptoms of myositis or hepatic dysfunction.     Orders Placed This Encounter    Mammo Digital Screening Bilat    Lipid panel    Comprehensive metabolic panel    Hemoglobin A1c    Ambulatory referral to Optometry    tetanus and diphther. tox, PF, (TENIVAC, PF,) 5-2 Lf unit/0.5 mL Syrg    varicella-zoster gE-AS01B, PF, (SHINGRIX) 50 mcg/0.5 mL injection    metFORMIN (GLUCOPHAGE-XR) 500  MG 24 hr tablet    glimepiride (AMARYL) 2 MG tablet    rosuvastatin (CRESTOR) 20 MG tablet    chlorthalidone (HYGROTEN) 25 MG Tab    amlodipine-benazepril 5-20 mg (LOTREL) 5-20 mg per capsule    Case request GI: COLONOSCOPY       Problem List Items Addressed This Visit        Cardiac/Vascular    Dyslipidemia associated with type 2 diabetes mellitus (Chronic)    Relevant Medications    metFORMIN (GLUCOPHAGE-XR) 500 MG 24 hr tablet    glimepiride (AMARYL) 2 MG tablet    rosuvastatin (CRESTOR) 20 MG tablet    Other Relevant Orders    Lipid panel    Comprehensive metabolic panel    Essential hypertension - Primary (Chronic)    Relevant Medications    chlorthalidone (HYGROTEN) 25 MG Tab    amlodipine-benazepril 5-20 mg (LOTREL) 5-20 mg per capsule    Other Relevant Orders    Case request GI: COLONOSCOPY (Completed)    Comprehensive metabolic panel    Nonrheumatic aortic valve stenosis       Endocrine    Type 2 diabetes mellitus with stage 3 chronic kidney disease, without long-term current use of insulin (Chronic)    Current Assessment & Plan     Diabetes Management Status    Statin: Taking  ACE/ARB: Taking    Screening or Prevention Patient's value Goal Complete/Controlled?   HgA1C Testing and Control   Lab Results   Component Value Date    HGBA1C 6.5 (H) 08/12/2019      Annually/Less than 8% Yes   Lipid profile : 08/12/2019 Annually Yes   LDL control Lab Results   Component Value Date    LDLCALC 73.4 08/12/2019    Annually/Less than 100 mg/dl  Yes   Nephropathy screening Lab Results   Component Value Date    LABMICR 896.0 01/25/2019     Lab Results   Component Value Date    PROTEINUA 1+ (A) 12/16/2018    Annually Yes   Blood pressure BP Readings from Last 1 Encounters:   08/16/19 130/72    Less than 140/90 Yes   Dilated retinal exam : 02/01/2019 Annually Yes   Foot exam   : 01/25/2019 Annually Yes              Relevant Medications    metFORMIN (GLUCOPHAGE-XR) 500 MG 24 hr tablet    glimepiride (AMARYL) 2 MG tablet     Other Relevant Orders    Comprehensive metabolic panel    Hemoglobin A1c    Ambulatory referral to Optometry       GI    History of adenomatous polyp of colon      Other Visit Diagnoses     Chronic obstructive bronchitis        Systolic murmur        Screening for colorectal cancer        Relevant Orders    Case request GI: COLONOSCOPY (Completed)    Need for Td vaccine        Need for shingles vaccine        Relevant Medications    varicella-zoster gE-AS01B, PF, (SHINGRIX) 50 mcg/0.5 mL injection    Screening for breast cancer        Relevant Orders    Mammo Digital Screening Bilat           Outpatient Medications Prior to Visit   Medication Sig Dispense Refill    albuterol (PROVENTIL) 2.5 mg /3 mL (0.083 %) nebulizer solution every 6 (six) hours as needed.  12    aspirin (ECOTRIN) 81 MG EC tablet Take 1 tablet (81 mg total) by mouth once daily. (FOR HEART HEALTH) 90 tablet 3    blood-glucose meter kit Insurance preferred 1 each 0    calcium-vitamin D (CALCIUM WITH VITAMIN D) 600 mg(1,500mg) -400 unit Tab Take 2 tablets by mouth once daily. 180 tablet 4    dextromethorphan-guaifenesin  mg/5 ml (ROBITUSSIN-DM)  mg/5 mL liquid Take 5 mLs by mouth 4 (four) times daily as needed (cough). 120 mL 0    ezetimibe (ZETIA) 10 mg tablet TAKE 1 TABLET ONE TIME DAILY 90 tablet 4    fluticasone (FLONASE) 50 mcg/actuation nasal spray 2 sprays (100 mcg total) by Each Nare route once daily. 16 g 5    lancets 32 gauge Misc 1 lancet by Misc.(Non-Drug; Combo Route) route 3 (three) times daily. Insurance preferred 100 each 11    pantoprazole (PROTONIX) 40 MG tablet Take 1 tablet (40 mg total) by mouth once daily. 90 tablet 3    phenylephrine-DM-guaifenesin 5- mg/5 mL Liqd Take by mouth as needed.      potassium 99 mg Tab Take 1 tablet by mouth once daily.      TRUEPLUS LANCETS 33 gauge Misc       amLODIPine (NORVASC) 10 MG tablet Take 1 tablet (10 mg total) by mouth once daily. 90 tablet 3    blood  sugar diagnostic Strp 1 strip by Misc.(Non-Drug; Combo Route) route 3 (three) times daily. Insurance preferred test strips 100 strip 11    chlorthalidone (HYGROTEN) 25 MG Tab Take 1 tablet (25 mg total) by mouth once daily. 90 tablet 3    lisinopril 10 MG tablet Take 1 tablet (10 mg total) by mouth once daily. - APPOINTMENT REQUIRED FOR MORE REFILLS 90 tablet 0    metFORMIN (GLUCOPHAGE-XR) 500 MG 24 hr tablet Take 2 tablets (1,000 mg total) by mouth 2 (two) times daily with meals. 360 tablet 3    predniSONE (DELTASONE) 20 MG tablet Take 2 tablets (40 mg total) by mouth once daily. for 5 days 10 tablet 0    rosuvastatin (CRESTOR) 20 MG tablet TAKE 1 TABLET EVERY DAY 90 tablet 4    triamcinolone acetonide 0.1% (KENALOG) 0.1 % cream Apply topically 2 (two) times daily. 80 g 0     No facility-administered medications prior to visit.         Medications Ordered This Encounter   Medications    amlodipine-benazepril 5-20 mg (LOTREL) 5-20 mg per capsule     Sig: Take 1 capsule by mouth once daily.     Dispense:  90 capsule     Refill:  2     IMPORTANT!  This REPLACES amlodipine and lisinopril. DISCONTINUE any existing prescription for amlodipine and lisinopril.    chlorthalidone (HYGROTEN) 25 MG Tab     Sig: Take 1 tablet (25 mg total) by mouth once daily.     Dispense:  90 tablet     Refill:  2     This REPLACES any prior prescription for this drug. DISCONTINUE any prior prescription for this drug.    glimepiride (AMARYL) 2 MG tablet     Sig: Take 1 tablet (2 mg total) by mouth before breakfast.     Dispense:  90 tablet     Refill:  3    metFORMIN (GLUCOPHAGE-XR) 500 MG 24 hr tablet     Sig: Take 1 tablet (500 mg total) by mouth 2 (two) times daily with meals.     Dispense:  180 tablet     Refill:  3     NOTE DOSE CHANGE. This REPLACES any prior prescription for this drug. DISCONTINUE any prior prescription for this drug.    rosuvastatin (CRESTOR) 20 MG tablet     Sig: Take 1 tablet (20 mg total) by mouth once  daily.     Dispense:  90 tablet     Refill:  3    tetanus and diphther. tox, PF, (TENIVAC, PF,) 5-2 Lf unit/0.5 mL Syrg     Sig: Inject 0.5 mLs into the muscle once. Inject 0.5 mL IM for 1 dose     Dispense:  0.5 mL     Refill:  0    varicella-zoster gE-AS01B, PF, (SHINGRIX) 50 mcg/0.5 mL injection     Sig: Inject 0.5 mL (one dose) into muscle now; give second dose at least 2 months later     Dispense:  0.5 mL     Refill:  1       Medications Discontinued During This Encounter   Medication Reason    triamcinolone acetonide 0.1% (KENALOG) 0.1 % cream Patient no longer taking    predniSONE (DELTASONE) 20 MG tablet Therapy completed    metFORMIN (GLUCOPHAGE-XR) 500 MG 24 hr tablet Dose adjustment    amLODIPine (NORVASC) 10 MG tablet Alternate therapy    chlorthalidone (HYGROTEN) 25 MG Tab Alternate therapy    lisinopril 10 MG tablet Alternate therapy    rosuvastatin (CRESTOR) 20 MG tablet Reorder        Follow up in about 6 months (around 2/3/2020) for review test results and discuss treatment plan, re-evaluate problem(s) discussed today.    REVIEW OF SYSTEMS  CONSTITUTIONAL: No objective fever reported.  PULMONARY: No hemoptysis or difficulty breathing reported.  CARDIOVASCULAR: No chest pain or orthopnea reported.     PHYSICAL EXAM  Vitals:    08/16/19 0823   BP: 130/72   BP Location: Left arm   Patient Position: Sitting   BP Method: Medium (Manual)   Pulse: 82   Resp: 16   Temp: 98.2 °F (36.8 °C)   TempSrc: Tympanic   SpO2: 96%   Weight: 64 kg (141 lb 1.5 oz)   Height: 5' (1.524 m)     CONSTITUTIONAL: Vital signs noted. No apparent distress. Does not appear acutely ill or septic. Appears adequately hydrated.  HEENT: External ENT grossly unremarkable. Hearing grossly intact. Oropharynx moist.  PULM: Lungs clear. Breathing unlabored.  HEART: Auscultation reveals regular rate and rhythm with systolic murmur.  DERM: Skin warm and moist with normal turgor.  NEURO: There are no gross focal motor deficits or gross  "deficits of cranial nerves III-XII.  PSYCHIATRIC: Alert and conversant. Mood grossly neutral. Judgment and insight not grossly compromised.     Documentation entered by me for this encounter may have been done in part using speech-recognition technology. Although I have made an effort to ensure accuracy, "sound like" errors may exist and should be interpreted in context. -VICK Sandoval MD.   "

## 2019-08-16 NOTE — ASSESSMENT & PLAN NOTE
Diabetes Management Status    Statin: Taking  ACE/ARB: Taking    Screening or Prevention Patient's value Goal Complete/Controlled?   HgA1C Testing and Control   Lab Results   Component Value Date    HGBA1C 6.5 (H) 08/12/2019      Annually/Less than 8% Yes   Lipid profile : 08/12/2019 Annually Yes   LDL control Lab Results   Component Value Date    LDLCALC 73.4 08/12/2019    Annually/Less than 100 mg/dl  Yes   Nephropathy screening Lab Results   Component Value Date    LABMICR 896.0 01/25/2019     Lab Results   Component Value Date    PROTEINUA 1+ (A) 12/16/2018    Annually Yes   Blood pressure BP Readings from Last 1 Encounters:   08/16/19 130/72    Less than 140/90 Yes   Dilated retinal exam : 02/01/2019 Annually Yes   Foot exam   : 01/25/2019 Annually Yes

## 2019-08-20 ENCOUNTER — TELEPHONE (OUTPATIENT)
Dept: ENDOSCOPY | Facility: HOSPITAL | Age: 69
End: 2019-08-20

## 2019-08-20 NOTE — TELEPHONE ENCOUNTER
Endoscopy Scheduling Questionnaire:    1. Have you been admitted overnight to the hospital in the past 3 months? no  2. Do you get chest pain and SOB while walking up a flight of stairs? no  3. Have you had a cardiac stent placed in the past 12 months? no  4. Have you had a stroke or heart attack in the past 6 months? no  5. Have you had any chest pain in the past 3 months? no      If so, have you been evaluated by your PCP or Cardiologist? no  6. Do you take medication for weight loss? no  7. Have you been diagnosed with Diverticulitis within the past 3 months? no  8. Are you having any GI symptoms that you feel need to be evaluated prior to your procedure? no  9. Are you on dialysis? no  10. Are you diabetic? Yes. Advised to hold diabetes medication the morning of procedure.    11. Do you have any other health issues that you feel might limit your ability to safely have the procedure and/or sedation? no  12. Is the patient over 79 yo? no        If so, has the patient been seen by their PCP or GI in the last 3 months? N/A       -I have reviewed the last colonoscopy for recommendations regarding surveillance and bowel prep  Yes  -I have reviewed the patient's medications and allergies. She is not on high risk medication, N/A A clearance request NA  -I have verified the pharmacy information. The prep being used is Miralax. The patient's prep instructions were sent via mail..    Date Endoscopy Scheduled: Date: 11/15/19

## 2019-08-20 NOTE — TELEPHONE ENCOUNTER
----- Message from Ta Adorno sent at 8/20/2019 10:00 AM CDT -----  Contact: Pt  .Type:  Patient Returning Call    Who Called: Pt   Who Left Message for Patient: Tammie  Does the patient know what this is regarding?: return call   Would the patient rather a call back or a response via [facility]? Call back  Best Call Back Number: .[phone] (home)   Additional Information:

## 2019-08-21 NOTE — PROGRESS NOTES
HPI:  Called Ms. Lucia Barlow for hypertension and diabetes digital medicine follow-up. Patient reports adherence to medication regimen with no complaints/complaints.   Last 5 Patient Entered Readings                                      Current 30 Day Average: 136/69     Recent Readings 8/17/2019 8/13/2019 8/11/2019 8/10/2019 8/9/2019    SBP (mmHg) 139 147 147 139 127    DBP (mmHg) 73 71 68 67 70    Pulse 98 83 87 103 117        Patient denies s/s of hypotension (lightheadedness, dizziness, nausea, fatigue) associated with low readings. Instructed patient to inform me if this occurs, patient confirms understanding.    Patient denies s/s of hypertension (SOB, CP, severe headaches, changes in vision) associated with high readings. Instructed patient to go to the ED if BP >180/110 and accompanied by hypertensive s/s, patient confirms understanding.    Last 6 Patient Entered Readings                                          Most Recent A1c: 6.5% on 8/12/2019  (Goal: 7%)     Recent Readings 8/20/2019 8/17/2019 8/13/2019 8/10/2019 8/9/2019    Blood Glucose (mg/dL) 134 151 110 80 198        Patient denies s/s or episodes of hypoglycemia (weakness, dizziness, hunger, shakiness, nausea, headache, heart palpitations, sweating, fatigue, anxiety, etc.).    Patient denies s/s of hyperglycemia (headache, increased thirst, increased urination, fatigue, blurred vision).    Assessment:  Patient's current 30-day average is above goal of <130/80 mmHg based on ACC/AHA HTN guidelines. She is elevated over family stress. She lost her sister and brother. She would like time to heal from their deaths before making medication changes.    Diabetes is at goal based on patient's last A1C. SMBG readings reviewed and are slightly elevated.    Health maintenance is up to date.    Plan:  Continue current regimen.  Patients health , Jessica Ortiz, will follow-up as scheduled.    I will continue to monitor regularly and will follow-up in 2  weeks, sooner if blood pressure or blood glucose begins to trend upward or downward.     Current medication regimen:  Hypertension Medications             amlodipine-benazepril 5-20 mg (LOTREL) 5-20 mg per capsule Take 1 capsule by mouth once daily.    chlorthalidone (HYGROTEN) 25 MG Tab Take 1 tablet (25 mg total) by mouth once daily.        Diabetes Medications             glimepiride (AMARYL) 2 MG tablet Take 1 tablet (2 mg total) by mouth before breakfast.    metFORMIN (GLUCOPHAGE-XR) 500 MG 24 hr tablet Take 1 tablet (500 mg total) by mouth 2 (two) times daily with meals.        Patient has my contact information and knows to call with any concerns or clinical changes.

## 2019-08-28 ENCOUNTER — OFFICE VISIT (OUTPATIENT)
Dept: CARDIOLOGY | Facility: CLINIC | Age: 69
End: 2019-08-28
Payer: MEDICARE

## 2019-08-28 ENCOUNTER — OFFICE VISIT (OUTPATIENT)
Dept: INTERNAL MEDICINE | Facility: CLINIC | Age: 69
End: 2019-08-28
Payer: MEDICARE

## 2019-08-28 VITALS
BODY MASS INDEX: 27.83 KG/M2 | HEART RATE: 84 BPM | WEIGHT: 141.75 LBS | DIASTOLIC BLOOD PRESSURE: 56 MMHG | SYSTOLIC BLOOD PRESSURE: 118 MMHG | HEIGHT: 60 IN

## 2019-08-28 VITALS
SYSTOLIC BLOOD PRESSURE: 124 MMHG | TEMPERATURE: 98 F | BODY MASS INDEX: 27.79 KG/M2 | HEART RATE: 96 BPM | WEIGHT: 141.56 LBS | OXYGEN SATURATION: 97 % | DIASTOLIC BLOOD PRESSURE: 50 MMHG | HEIGHT: 60 IN

## 2019-08-28 DIAGNOSIS — M81.0 AGE-RELATED OSTEOPOROSIS WITHOUT CURRENT PATHOLOGICAL FRACTURE: ICD-10-CM

## 2019-08-28 DIAGNOSIS — Z00.00 ENCOUNTER FOR PREVENTIVE HEALTH EXAMINATION: Primary | ICD-10-CM

## 2019-08-28 DIAGNOSIS — K21.9 GASTROESOPHAGEAL REFLUX DISEASE WITHOUT ESOPHAGITIS: Chronic | ICD-10-CM

## 2019-08-28 DIAGNOSIS — E78.5 DYSLIPIDEMIA ASSOCIATED WITH TYPE 2 DIABETES MELLITUS: Chronic | ICD-10-CM

## 2019-08-28 DIAGNOSIS — E11.69 DYSLIPIDEMIA ASSOCIATED WITH TYPE 2 DIABETES MELLITUS: Chronic | ICD-10-CM

## 2019-08-28 DIAGNOSIS — E11.51 TYPE 2 DIABETES MELLITUS WITH PERIPHERAL VASCULAR DISEASE: Chronic | ICD-10-CM

## 2019-08-28 DIAGNOSIS — I73.9 PVD (PERIPHERAL VASCULAR DISEASE): ICD-10-CM

## 2019-08-28 DIAGNOSIS — I25.10 ATHEROSCLEROSIS OF NATIVE CORONARY ARTERY OF NATIVE HEART WITHOUT ANGINA PECTORIS: ICD-10-CM

## 2019-08-28 DIAGNOSIS — I70.203 ATHEROSCLEROSIS OF ARTERY OF BOTH LOWER EXTREMITIES: Chronic | ICD-10-CM

## 2019-08-28 DIAGNOSIS — I35.0 NONRHEUMATIC AORTIC VALVE STENOSIS: ICD-10-CM

## 2019-08-28 DIAGNOSIS — Z98.62 S/P PERIPHERAL ARTERY ANGIOPLASTY: ICD-10-CM

## 2019-08-28 DIAGNOSIS — R80.9 TYPE 2 DIABETES MELLITUS WITH MICROALBUMINURIA, WITHOUT LONG-TERM CURRENT USE OF INSULIN: Chronic | ICD-10-CM

## 2019-08-28 DIAGNOSIS — I70.0 ATHEROSCLEROSIS OF AORTA: ICD-10-CM

## 2019-08-28 DIAGNOSIS — E11.29 TYPE 2 DIABETES MELLITUS WITH MICROALBUMINURIA, WITHOUT LONG-TERM CURRENT USE OF INSULIN: Chronic | ICD-10-CM

## 2019-08-28 DIAGNOSIS — I10 ESSENTIAL HYPERTENSION: Chronic | ICD-10-CM

## 2019-08-28 DIAGNOSIS — J41.0 SIMPLE CHRONIC BRONCHITIS: ICD-10-CM

## 2019-08-28 DIAGNOSIS — I70.203 ATHEROSCLEROSIS OF ARTERY OF BOTH LOWER EXTREMITIES: Primary | Chronic | ICD-10-CM

## 2019-08-28 DIAGNOSIS — N18.30 TYPE 2 DIABETES MELLITUS WITH STAGE 3 CHRONIC KIDNEY DISEASE, WITHOUT LONG-TERM CURRENT USE OF INSULIN: Chronic | ICD-10-CM

## 2019-08-28 DIAGNOSIS — E11.22 TYPE 2 DIABETES MELLITUS WITH STAGE 3 CHRONIC KIDNEY DISEASE, WITHOUT LONG-TERM CURRENT USE OF INSULIN: Chronic | ICD-10-CM

## 2019-08-28 DIAGNOSIS — Z87.891 HISTORY OF TOBACCO ABUSE: ICD-10-CM

## 2019-08-28 PROCEDURE — 99499 RISK ADDL DX/OHS AUDIT: ICD-10-PCS | Mod: HCNC,S$GLB,, | Performed by: INTERNAL MEDICINE

## 2019-08-28 PROCEDURE — 1101F PR PT FALLS ASSESS DOC 0-1 FALLS W/OUT INJ PAST YR: ICD-10-PCS | Mod: HCNC,CPTII,S$GLB, | Performed by: INTERNAL MEDICINE

## 2019-08-28 PROCEDURE — G0439 PR MEDICARE ANNUAL WELLNESS SUBSEQUENT VISIT: ICD-10-PCS | Mod: HCNC,S$GLB,, | Performed by: NURSE PRACTITIONER

## 2019-08-28 PROCEDURE — 99499 UNLISTED E&M SERVICE: CPT | Mod: HCNC,S$GLB,, | Performed by: INTERNAL MEDICINE

## 2019-08-28 PROCEDURE — 3044F PR MOST RECENT HEMOGLOBIN A1C LEVEL <7.0%: ICD-10-PCS | Mod: HCNC,CPTII,S$GLB, | Performed by: NURSE PRACTITIONER

## 2019-08-28 PROCEDURE — 3078F DIAST BP <80 MM HG: CPT | Mod: HCNC,CPTII,S$GLB, | Performed by: INTERNAL MEDICINE

## 2019-08-28 PROCEDURE — 1101F PT FALLS ASSESS-DOCD LE1/YR: CPT | Mod: HCNC,CPTII,S$GLB, | Performed by: INTERNAL MEDICINE

## 2019-08-28 PROCEDURE — G0439 PPPS, SUBSEQ VISIT: HCPCS | Mod: HCNC,S$GLB,, | Performed by: NURSE PRACTITIONER

## 2019-08-28 PROCEDURE — 3044F HG A1C LEVEL LT 7.0%: CPT | Mod: HCNC,CPTII,S$GLB, | Performed by: INTERNAL MEDICINE

## 2019-08-28 PROCEDURE — 3074F SYST BP LT 130 MM HG: CPT | Mod: HCNC,CPTII,S$GLB, | Performed by: NURSE PRACTITIONER

## 2019-08-28 PROCEDURE — 3074F PR MOST RECENT SYSTOLIC BLOOD PRESSURE < 130 MM HG: ICD-10-PCS | Mod: HCNC,CPTII,S$GLB, | Performed by: NURSE PRACTITIONER

## 2019-08-28 PROCEDURE — 3074F PR MOST RECENT SYSTOLIC BLOOD PRESSURE < 130 MM HG: ICD-10-PCS | Mod: HCNC,CPTII,S$GLB, | Performed by: INTERNAL MEDICINE

## 2019-08-28 PROCEDURE — 3044F HG A1C LEVEL LT 7.0%: CPT | Mod: HCNC,CPTII,S$GLB, | Performed by: NURSE PRACTITIONER

## 2019-08-28 PROCEDURE — 99999 PR PBB SHADOW E&M-EST. PATIENT-LVL III: CPT | Mod: PBBFAC,HCNC,, | Performed by: INTERNAL MEDICINE

## 2019-08-28 PROCEDURE — 3078F DIAST BP <80 MM HG: CPT | Mod: HCNC,CPTII,S$GLB, | Performed by: NURSE PRACTITIONER

## 2019-08-28 PROCEDURE — 3078F PR MOST RECENT DIASTOLIC BLOOD PRESSURE < 80 MM HG: ICD-10-PCS | Mod: HCNC,CPTII,S$GLB, | Performed by: NURSE PRACTITIONER

## 2019-08-28 PROCEDURE — 3078F PR MOST RECENT DIASTOLIC BLOOD PRESSURE < 80 MM HG: ICD-10-PCS | Mod: HCNC,CPTII,S$GLB, | Performed by: INTERNAL MEDICINE

## 2019-08-28 PROCEDURE — 99214 OFFICE O/P EST MOD 30 MIN: CPT | Mod: HCNC,S$GLB,, | Performed by: INTERNAL MEDICINE

## 2019-08-28 PROCEDURE — 99999 PR PBB SHADOW E&M-EST. PATIENT-LVL III: ICD-10-PCS | Mod: PBBFAC,HCNC,, | Performed by: INTERNAL MEDICINE

## 2019-08-28 PROCEDURE — 3074F SYST BP LT 130 MM HG: CPT | Mod: HCNC,CPTII,S$GLB, | Performed by: INTERNAL MEDICINE

## 2019-08-28 PROCEDURE — 3044F PR MOST RECENT HEMOGLOBIN A1C LEVEL <7.0%: ICD-10-PCS | Mod: HCNC,CPTII,S$GLB, | Performed by: INTERNAL MEDICINE

## 2019-08-28 PROCEDURE — 99999 PR PBB SHADOW E&M-EST. PATIENT-LVL V: ICD-10-PCS | Mod: PBBFAC,HCNC,, | Performed by: NURSE PRACTITIONER

## 2019-08-28 PROCEDURE — 99214 PR OFFICE/OUTPT VISIT, EST, LEVL IV, 30-39 MIN: ICD-10-PCS | Mod: HCNC,S$GLB,, | Performed by: INTERNAL MEDICINE

## 2019-08-28 PROCEDURE — 99999 PR PBB SHADOW E&M-EST. PATIENT-LVL V: CPT | Mod: PBBFAC,HCNC,, | Performed by: NURSE PRACTITIONER

## 2019-08-28 NOTE — PROGRESS NOTES
Subjective:   Patient ID:  Lucia Barlow is a 69 y.o. female who presents for follow up of Follow-up; Shortness of Breath; Chest Pain; and Tingling (right hand)      HPI  A 70 yo female with pvd s/p bilateral iliacs tents htn hlp diabetes h/o smoking copd is here for f/u. She is having issues with metformin side effects with diarrhea heart burn. Has still shortness of breath and wheezing has not been eating well stays hydrated as much. However still is thirsty. Ha sno new issues clinically cardiac wise or vascular wise. No leg swelling tia claudication chf angina. No bleeding.lipids on target a1c on target. Tries to walk for exercise stays active clinically.  Past Medical History:   Diagnosis Date    Atherosclerosis of artery of both lower extremities 1/17/2014    Atherosclerosis of native coronary artery of native heart without angina pectoris 11/18/2016    Atherosclerotic PVD with intermittent claudication 1/17/2014    Chronic bronchitis     COPD (chronic obstructive pulmonary disease)     Dyslipidemia associated with type 2 diabetes mellitus 6/14/2013    Dyslipidemia associated with type 2 diabetes mellitus     Ex-smoker     Gastroesophageal reflux disease without esophagitis 1/25/2019    Hyperlipidemia     Hypertension     Osteoporosis 1/16 rosita 1/18    PVD (peripheral vascular disease)     S/P peripheral artery angioplasty 2/7/2014    Simple chronic bronchitis     Tobacco dependence     Type 2 diabetes mellitus with microalbuminuria, without long-term current use of insulin 3/29/2019    Type 2 diabetes mellitus with peripheral vascular disease     Type 2 diabetes mellitus with stage 3 chronic kidney disease, without long-term current use of insulin 2/1/2019    Type 2 diabetes mellitus without retinopathy 2/1/2019       Past Surgical History:   Procedure Laterality Date    ANGIOPLASTY Bilateral 01/24/2014    aortoiliac stenting     AORTOGRAM, WITH RUNOFF Bilateral 1/24/2014    Performed  by [facility]alia [facility]as [facility] at [facility] CATH LAB    CARPAL TUNNEL RELEASE  2003    [place]    COLECTOMY  approximate 2005    pt states 1in colon -dx with benign mass removed-states no colon cancer    COLONOSCOPY N/A 11/9/2016    Performed by Dmitri Sterling MD at [facility] ENDO    COLONOSCOPY N/A 10/16/2013    Performed by Praneeth Diaz MD at [facility] ENDO    HYSTERECTOMY      no cancer       Social History     Tobacco Use    Smoking status: Former Smoker     Packs/day: 0.25     Years: 50.00     Pack years: 12.50     Types: Cigarettes     Last attempt to quit: 1/11/2016     Years since quitting: 3.6    Smokeless tobacco: Never Used   Substance Use Topics    Alcohol use: No     Alcohol/week: 0.0 oz    Drug use: No       Family History   Problem Relation Age of Onset    Stroke Father     Stroke Sister     Asthma Daughter     Diabetes Daughter     Breast cancer Daughter     Asthma Son        Current Outpatient Medications   Medication Sig    albuterol (PROVENTIL) 2.5 mg /3 mL (0.083 %) nebulizer solution every 6 (six) hours as needed.    amlodipine-benazepril 5-20 mg (LOTREL) 5-20 mg per capsule Take 1 capsule by mouth once daily.    aspirin (ECOTRIN) 81 MG EC tablet Take 1 tablet (81 mg total) by mouth once daily. (FOR HEART HEALTH)    blood sugar diagnostic Strp 1 strip by Misc.(Non-Drug; Combo Route) route 3 (three) times daily. Insurance preferred test strips    blood-glucose meter kit Insurance preferred    calcium-vitamin D (CALCIUM WITH VITAMIN D) 600 mg(1,500mg) -400 unit Tab Take 2 tablets by mouth once daily.    chlorthalidone (HYGROTEN) 25 MG Tab Take 1 tablet (25 mg total) by mouth once daily.    ezetimibe (ZETIA) 10 mg tablet TAKE 1 TABLET ONE TIME DAILY    fluticasone (FLONASE) 50 mcg/actuation nasal spray 2 sprays (100 mcg total) by Each Nare route once daily.    glimepiride (AMARYL) 2 MG tablet Take 1 tablet (2 mg total) by mouth before breakfast.    lancets 32 gauge Misc 1 lancet by  Misc.(Non-Drug; Combo Route) route 3 (three) times daily. Insurance preferred    metFORMIN (GLUCOPHAGE-XR) 500 MG 24 hr tablet Take 1 tablet (500 mg total) by mouth 2 (two) times daily with meals.    pantoprazole (PROTONIX) 40 MG tablet Take 1 tablet (40 mg total) by mouth once daily.    phenylephrine-DM-guaifenesin 5- mg/5 mL Liqd Take by mouth as needed.    potassium 99 mg Tab Take 1 tablet by mouth once daily.    rosuvastatin (CRESTOR) 20 MG tablet Take 1 tablet (20 mg total) by mouth once daily.    TRUEPLUS LANCETS 33 gauge Misc     predniSONE (DELTASONE) 10 MG tablet Take 2 tablets by mouth once daily for 5 days, then 1 tablet by mouth once daily for 5 days.    varicella-zoster gE-AS01B, PF, (SHINGRIX) 50 mcg/0.5 mL injection Inject 0.5 mL (one dose) into muscle now; give second dose at least 2 months later     No current facility-administered medications for this visit.      Current Outpatient Medications on File Prior to Visit   Medication Sig    albuterol (PROVENTIL) 2.5 mg /3 mL (0.083 %) nebulizer solution every 6 (six) hours as needed.    amlodipine-benazepril 5-20 mg (LOTREL) 5-20 mg per capsule Take 1 capsule by mouth once daily.    aspirin (ECOTRIN) 81 MG EC tablet Take 1 tablet (81 mg total) by mouth once daily. (FOR HEART HEALTH)    blood sugar diagnostic Strp 1 strip by Misc.(Non-Drug; Combo Route) route 3 (three) times daily. Insurance preferred test strips    blood-glucose meter kit Insurance preferred    calcium-vitamin D (CALCIUM WITH VITAMIN D) 600 mg(1,500mg) -400 unit Tab Take 2 tablets by mouth once daily.    chlorthalidone (HYGROTEN) 25 MG Tab Take 1 tablet (25 mg total) by mouth once daily.    ezetimibe (ZETIA) 10 mg tablet TAKE 1 TABLET ONE TIME DAILY    fluticasone (FLONASE) 50 mcg/actuation nasal spray 2 sprays (100 mcg total) by Each Nare route once daily.    glimepiride (AMARYL) 2 MG tablet Take 1 tablet (2 mg total) by mouth before breakfast.    lancets 32  gauge Misc 1 lancet by Misc.(Non-Drug; Combo Route) route 3 (three) times daily. Insurance preferred    metFORMIN (GLUCOPHAGE-XR) 500 MG 24 hr tablet Take 1 tablet (500 mg total) by mouth 2 (two) times daily with meals.    pantoprazole (PROTONIX) 40 MG tablet Take 1 tablet (40 mg total) by mouth once daily.    phenylephrine-DM-guaifenesin 5- mg/5 mL Liqd Take by mouth as needed.    potassium 99 mg Tab Take 1 tablet by mouth once daily.    rosuvastatin (CRESTOR) 20 MG tablet Take 1 tablet (20 mg total) by mouth once daily.    TRUEPLUS LANCETS 33 gauge Misc     predniSONE (DELTASONE) 10 MG tablet Take 2 tablets by mouth once daily for 5 days, then 1 tablet by mouth once daily for 5 days.    varicella-zoster gE-AS01B, PF, (SHINGRIX) 50 mcg/0.5 mL injection Inject 0.5 mL (one dose) into muscle now; give second dose at least 2 months later     No current facility-administered medications on file prior to visit.      Review of patient's allergies indicates:   Allergen Reactions    Skin staples [surgical stainless steel] Swelling    Egg derived      Shortness breath, lip swelling    Fish containing products     Iodine and iodide containing products Swelling    Latex, natural rubber Swelling    Losartan Itching    Nickel     Pravastatin      40 mg causes nausea vomitting but 20 mg ok    Shellfish containing products Swelling     Review of Systems   Constitution: Negative for diaphoresis, malaise/fatigue and weight gain.   HENT: Negative for hoarse voice.    Eyes: Negative for double vision and visual disturbance.   Cardiovascular: Negative for chest pain, claudication, cyanosis, dyspnea on exertion, irregular heartbeat, leg swelling, near-syncope, orthopnea, palpitations, paroxysmal nocturnal dyspnea and syncope.   Respiratory: Positive for shortness of breath and wheezing. Negative for cough, hemoptysis and snoring.    Hematologic/Lymphatic: Negative for bleeding problem. Does not bruise/bleed  easily.   Skin: Negative for color change and poor wound healing.   Musculoskeletal: Negative for muscle cramps, muscle weakness and myalgias.   Gastrointestinal: Positive for diarrhea and heartburn. Negative for bloating, abdominal pain, change in bowel habit, hematemesis, hematochezia, melena and nausea.   Neurological: Negative for excessive daytime sleepiness, dizziness, headaches, light-headedness, loss of balance, numbness and weakness.   Psychiatric/Behavioral: Negative for memory loss. The patient does not have insomnia.    Allergic/Immunologic: Negative for hives.       Objective:   Physical Exam   Constitutional: She is oriented to person, place, and time. She appears well-developed and well-nourished. She does not appear ill. No distress.   HENT:   Head: Normocephalic and atraumatic.   Eyes: Pupils are equal, round, and reactive to light. EOM are normal. No scleral icterus.   Neck: Normal range of motion. Neck supple. Normal carotid pulses, no hepatojugular reflux and no JVD present. Carotid bruit is not present. No tracheal deviation present. No thyromegaly present.   Cardiovascular: Normal rate, regular rhythm, intact distal pulses and normal pulses. Exam reveals no gallop and no friction rub.   Murmur heard.   Harsh midsystolic murmur is present with a grade of 1/6 at the upper right sternal border radiating to the neck.  Pulses:       Carotid pulses are 2+ on the right side, and 2+ on the left side.       Radial pulses are 2+ on the right side, and 2+ on the left side.        Femoral pulses are 2+ on the right side, and 2+ on the left side.       Popliteal pulses are 2+ on the right side, and 2+ on the left side.        Dorsalis pedis pulses are 2+ on the right side, and 2+ on the left side.        Posterior tibial pulses are 2+ on the right side, and 2+ on the left side.   Pulmonary/Chest: Effort normal and breath sounds normal. No respiratory distress. She has no wheezes. She has no rhonchi. She has  no rales. She exhibits no tenderness.   Abdominal: Soft. Normal appearance, normal aorta and bowel sounds are normal. She exhibits no abdominal bruit, no ascites and no pulsatile midline mass. There is no hepatomegaly. There is no tenderness.   Musculoskeletal: She exhibits no edema.        Right shoulder: She exhibits no deformity.   Neurological: She is alert and oriented to person, place, and time. She has normal strength. No cranial nerve deficit. Coordination normal.   Skin: Skin is warm and dry. No rash noted. She is not diaphoretic. No cyanosis or erythema. Nails show no clubbing.   Psychiatric: She has a normal mood and affect. Her speech is normal and behavior is normal.   Nursing note and vitals reviewed.    Vitals:    08/28/19 0747   BP: (!) 118/56   BP Location: Right arm   Patient Position: Sitting   BP Method: Medium (Manual)   Pulse: 84   Weight: 64.3 kg (141 lb 12.1 oz)   Height: 5' (1.524 m)     Lab Results   Component Value Date    CHOL 165 08/12/2019    CHOL 186 03/01/2019    CHOL 150 03/21/2018     Lab Results   Component Value Date    HDL 82 (H) 08/12/2019    HDL 76 (H) 03/01/2019    HDL 61 03/21/2018     Lab Results   Component Value Date    LDLCALC 73.4 08/12/2019    LDLCALC 94.4 03/01/2019    LDLCALC 71.6 03/21/2018     Lab Results   Component Value Date    TRIG 48 08/12/2019    TRIG 78 03/01/2019    TRIG 87 03/21/2018     Lab Results   Component Value Date    CHOLHDL 49.7 08/12/2019    CHOLHDL 40.9 03/01/2019    CHOLHDL 40.7 03/21/2018       Chemistry        Component Value Date/Time     08/12/2019 0707    K 4.0 08/12/2019 0707     08/12/2019 0707    CO2 26 08/12/2019 0707    BUN 18 08/12/2019 0707    CREATININE 1.0 08/12/2019 0707     (H) 08/12/2019 0707        Component Value Date/Time    CALCIUM 9.3 08/12/2019 0707    ALKPHOS 82 08/12/2019 0707    AST 17 08/12/2019 0707    ALT 13 08/12/2019 0707    BILITOT 0.2 08/12/2019 0707    ESTGFRAFRICA >60.0 08/12/2019 0707     EGFRNONAA 56.8 (A) 08/12/2019 0707        Lab Results   Component Value Date    HGBA1C 6.5 (H) 08/12/2019       Lab Results   Component Value Date    TSH 1.352 06/05/2013     Lab Results   Component Value Date    INR 0.9 08/10/2019    INR 0.9 12/16/2018    INR 0.9 03/10/2017     Lab Results   Component Value Date    WBC 7.66 08/10/2019    HGB 12.0 08/10/2019    HCT 35.9 (L) 08/10/2019    MCV 89 08/10/2019     08/10/2019     BMP  Sodium   Date Value Ref Range Status   08/12/2019 142 136 - 145 mmol/L Final     Potassium   Date Value Ref Range Status   08/12/2019 4.0 3.5 - 5.1 mmol/L Final     Chloride   Date Value Ref Range Status   08/12/2019 102 95 - 110 mmol/L Final     CO2   Date Value Ref Range Status   08/12/2019 26 23 - 29 mmol/L Final     BUN, Bld   Date Value Ref Range Status   08/12/2019 18 8 - 23 mg/dL Final     Creatinine   Date Value Ref Range Status   08/12/2019 1.0 0.5 - 1.4 mg/dL Final     Calcium   Date Value Ref Range Status   08/12/2019 9.3 8.7 - 10.5 mg/dL Final     Anion Gap   Date Value Ref Range Status   08/12/2019 14 8 - 16 mmol/L Final     eGFR if    Date Value Ref Range Status   08/12/2019 >60.0 >60 mL/min/1.73 m^2 Final     eGFR if non    Date Value Ref Range Status   08/12/2019 56.8 (A) >60 mL/min/1.73 m^2 Final     Comment:     Calculation used to obtain the estimated glomerular filtration  rate (eGFR) is the CKD-EPI equation.        CrCl cannot be calculated (Patient's most recent lab result is older than the maximum 7 days allowed.).    Assessment:     1. Atherosclerosis of artery of both lower extremities    2. Essential hypertension    3. Dyslipidemia associated with type 2 diabetes mellitus    4. Type 2 diabetes mellitus with peripheral vascular disease    5. Gastroesophageal reflux disease without esophagitis    6. Type 2 diabetes mellitus with stage 3 chronic kidney disease, without long-term current use of insulin    7. Type 2 diabetes  mellitus with microalbuminuria, without long-term current use of insulin    8. History of tobacco abuse    9. Age-related osteoporosis without current pathological fracture    10. S/P peripheral artery angioplasty    11. Nonrheumatic aortic valve stenosis    12. Atherosclerosis of aorta    13. Atherosclerosis of native coronary artery of native heart without angina pectoris    vascular wise stable asymptomatic a1c lipids on target./   Needs to have her side effects for metformin addressed affecting lifestyle.    Plan:   Continue current therapy  Cardiac low salt diet.  Risk factor modification and excercise program.  F/u in 6 months with lipid cmp a1c

## 2019-08-28 NOTE — PROGRESS NOTES
Lucia Barlow presented for a  Medicare AWV and comprehensive Health Risk Assessment today. The following components were reviewed and updated:    · Medical history  · Family History  · Social history  · Allergies and Current Medications  · Health Risk Assessment  · Health Maintenance  · Care Team     ** See Completed Assessments for Annual Wellness Visit within the encounter summary.**       The following assessments were completed:  · Living Situation  · CAGE  · Depression Screening  · Timed Get Up and Go  · Whisper Test  · Cognitive Function Screening  · Nutrition Screening  · ADL Screening  · PAQ Screening    Vitals:    08/28/19 0852   BP: (!) 124/50   BP Location: Left arm   Patient Position: Sitting   BP Method: Medium (Automatic)   Pulse: 96   Temp: 98.4 °F (36.9 °C)   TempSrc: Tympanic   SpO2: 97%   Weight: 64.2 kg (141 lb 8.6 oz)   Height: 5' (1.524 m)     Body mass index is 27.64 kg/m².  Physical Exam   Constitutional: She is oriented to person, place, and time.   HENT:   Head: Normocephalic and atraumatic.   Right Ear: Tympanic membrane normal.   Left Ear: Tympanic membrane normal.   Eyes: Conjunctivae and EOM are normal.   Neck: Normal range of motion. Neck supple.   Cardiovascular: Normal rate, regular rhythm, normal heart sounds and intact distal pulses.   Pulmonary/Chest: Effort normal and breath sounds normal.   Abdominal: Soft. Bowel sounds are normal.   Increased BS    Musculoskeletal: Normal range of motion.   Neurological: She is alert and oriented to person, place, and time.   Skin: Skin is warm and dry.         Diagnoses and health risks identified today and associated recommendations/orders:    1. Encounter for preventive health examination  Completed today    2. Chronic obstructive pulmonary disease with acute lower respiratory infection  Stable.  Continue current treatment plan. Follow up with Dr. Aleman     3. Lung nodule < 6cm on CT  Stable.  Noted on CT Lung Screening 11/2017.   Biopsy-negative.  Will continue current treatment plan.      4. Atherosclerosis of aorta  Stable.  Will continue hypertension and cholesterol management.  Follow up as scheduled.      5. Atherosclerosis of native coronary artery of native heart without angina pectoris  Stable.  Will continue hypertension and cholesterol management.      6. Essential hypertension  Stable. Check at home daily and has normal readings.  Will continue current treatment plan.      7. Hypercholesteremia  Stable.  Will continue current treatment plan.         Component      Latest Ref Rng & Units 8/12/2019   Cholesterol      120 - 199 mg/dL 165   Triglycerides      30 - 150 mg/dL 48   HDL      40 - 75 mg/dL 82 (H)   LDL Cholesterol External      63.0 - 159.0 mg/dL 73.4   Hdl/Cholesterol Ratio      20.0 - 50.0 % 49.7   Total Cholesterol/HDL Ratio      2.0 - 5.0 2.0   Non-HDL Cholesterol      mg/dL 83     8. Nonrheumatic aortic valve stenosis  Stable.  Last ECHO 08/29/2018-stable.  Will continue current treatment plan.      9. PVD (peripheral vascular disease)/ 10. S/P peripheral artery angioplasty  Stable.  Had angioplasty.  Will continue current treatment plan.     11. History of adenomatous polyp of colon  Stable.  Last colonoscopy 11/2016.  Recommend screening in 3 years. Scheduled for 11/2019     12. Osteoporosis, unspecified osteoporosis type, unspecified pathological fracture presence  Stable.  Last DEXA 11/2016.  Will continue current treatment plan.  Recommend screening in 3 years.      13 GERD- stable. No issues     14. Diabetes with microalbuminuria, CKD, and peripheral vasc disease   Stable - A1C at 6.5. Pt is not tolerating metformin - reports diarrhea all day despite decreased dose per PCP. Wants to switch to another medication.      Provided Lucia with a 5-10 year written screening schedule and personal prevention plan. Recommendations were developed using the USPSTF age appropriate recommendations. Education, counseling, and  referrals were provided as needed. After Visit Summary printed and given to patient which includes a list of additional screenings\tests needed.    Follow up with PCP and specialists as scheduled  HRA follow up in 1 year    Shirley Lay NP

## 2019-08-28 NOTE — Clinical Note
Pt states that her metformin dose was cut in half but she is still having constant, all day diarrhea. She wants to get off metformin permanently due to this side effect. She wants to switch to an alternative.

## 2019-08-28 NOTE — PATIENT INSTRUCTIONS
Counseling and Referral of Other Preventative  (Italic type indicates deductible and co-insurance are waived)    Patient Name: Lucia Barlow  Today's Date: 8/28/2019    Health Maintenance       Date Due Completion Date    Shingles Vaccine (1 of 2) 01/30/1998 ---    DEXA SCAN 01/08/2019 1/8/2016    Influenza Vaccine (1) 09/01/2019 1/30/2018 (Declined)    Override on 1/30/2018: Declined    Override on 11/18/2016: Declined (pt states the last 3 shot make her sick)    Override on 12/16/2015: Declined    Override on 10/2/2009: Done    Colonoscopy 11/09/2019 11/9/2016    Foot Exam 01/25/2020 1/25/2019 (Done)    Override on 1/25/2019: Done    Eye Exam 02/01/2020 2/1/2019    Override on 2/1/2019: Done    Hemoglobin A1c 02/12/2020 8/12/2019    Mammogram 02/14/2020 2/14/2018    Lipid Panel 08/12/2020 8/12/2019    High Dose Statin 08/28/2020 8/28/2019    Aspirin/Antiplatelet Therapy 08/28/2020 8/28/2019    TETANUS VACCINE 08/16/2029 8/16/2019        No orders of the defined types were placed in this encounter.    The following information is provided to all patients.  This information is to help you find resources for any of the problems found today that may be affecting your health:                Living healthy guide: www.[facility].[place].gov      Understanding Diabetes: www.diabetes.org      Eating healthy: www.cdc.gov/healthyweight      CDC home safety checklist: www.cdc.gov/steadi/patient.html      Agency on Aging: www.goea.[place].[facility]      Alcoholics anonymous (AA): www.aa.org      Physical Activity: www.elmer.nih.gov/vm1gaeo      Tobacco use: www.quitwithusla.org

## 2019-09-04 ENCOUNTER — APPOINTMENT (OUTPATIENT)
Dept: RADIOLOGY | Facility: HOSPITAL | Age: 69
End: 2019-09-04
Attending: FAMILY MEDICINE
Payer: MEDICARE

## 2019-09-04 ENCOUNTER — PATIENT MESSAGE (OUTPATIENT)
Dept: INTERNAL MEDICINE | Facility: CLINIC | Age: 69
End: 2019-09-04

## 2019-09-04 DIAGNOSIS — M89.9 DISORDER OF BONE AND ARTICULAR CARTILAGE: ICD-10-CM

## 2019-09-04 DIAGNOSIS — M94.9 DISORDER OF BONE AND ARTICULAR CARTILAGE: ICD-10-CM

## 2019-09-04 PROCEDURE — 77080 DEXA BONE DENSITY SPINE HIP: ICD-10-PCS | Mod: 26,HCNC,, | Performed by: RADIOLOGY

## 2019-09-04 PROCEDURE — 77080 DXA BONE DENSITY AXIAL: CPT | Mod: TC,HCNC

## 2019-09-04 PROCEDURE — 77080 DXA BONE DENSITY AXIAL: CPT | Mod: 26,HCNC,, | Performed by: RADIOLOGY

## 2019-09-04 RX ORDER — FLUTICASONE PROPIONATE 50 MCG
2 SPRAY, SUSPENSION (ML) NASAL DAILY
Qty: 16 G | Refills: 5 | Status: SHIPPED | OUTPATIENT
Start: 2019-09-04 | End: 2020-01-21

## 2019-09-04 NOTE — TELEPHONE ENCOUNTER
ACTION NEEDED:  Please read Patient Portal Message (below), then verify her receipt and understanding. Thanks.         Lucia Flores.    Your bone density tests showed that your bone density is STABLE. You have already completed at least 3 years of alendronate (Fosamax), so you don't need more prescription medicines for osteoporosis.    Here is what you need to do:    1. Eat healthy.  2. Continue taking calcium and vitamin D supplements.  3. Minimize alcohol, caffeine, and salt in your diet.  4. Get in the habit of exercising regularly, especially weight-bearing exercises.  5. Don't smoke.  6. Take precautions to lower your risk of falling.    Thanks for letting me care for you, thanks for trusting us with your healthcare needs, and thanks for using [facility].    Sincerely,    VICK Sandoval MD

## 2019-09-04 NOTE — PROGRESS NOTES
Lucia Flores.    Your bone density tests showed that your bone density is STABLE. You have already completed at least 3 years of alendronate (Fosamax), so you don't need more prescription medicines for osteoporosis.    Here is what you need to do:    1. Eat healthy.  2. Continue taking calcium and vitamin D supplements.  3. Minimize alcohol, caffeine, and salt in your diet.  4. Get in the habit of exercising regularly, especially weight-bearing exercises.  5. Don't smoke.  6.Take precautions to lower your risk of falling.    Thanks for letting me care for you, thanks for trusting us with your healthcare needs, and thanks for using [facility].    Sincerely,    VICK Sandoval MD

## 2019-09-05 NOTE — TELEPHONE ENCOUNTER
Spoke with patient and read Dr. Sandoval's My Chart message to her regarding bone density test result. She verbalized understanding.

## 2019-09-06 ENCOUNTER — PATIENT OUTREACH (OUTPATIENT)
Dept: OTHER | Facility: OTHER | Age: 69
End: 2019-09-06

## 2019-09-06 NOTE — PROGRESS NOTES
Last 5 Patient Entered Readings                                      Current 30 Day Average: 135/66     Recent Readings 9/6/2019 9/5/2019 9/3/2019 8/27/2019 8/26/2019    SBP (mmHg) 138 149 149 128 130    DBP (mmHg) 65 71 69 68 73    Pulse 90 95 98 97 97          Last 6 Patient Entered Readings                                          Most Recent A1c: 6.5% on 8/12/2019  (Goal: 7%)     Recent Readings 9/6/2019 9/5/2019 9/3/2019 8/29/2019 8/27/2019    Blood Glucose (mg/dL) 105 125 97 151 101        LMFCB to discuss her higher BP readings and consider increasing Lotrel to two capsules daily

## 2019-09-13 ENCOUNTER — CLINICAL SUPPORT (OUTPATIENT)
Dept: CARDIOLOGY | Facility: CLINIC | Age: 69
End: 2019-09-13
Payer: MEDICARE

## 2019-09-13 ENCOUNTER — OFFICE VISIT (OUTPATIENT)
Dept: INTERNAL MEDICINE | Facility: CLINIC | Age: 69
End: 2019-09-13
Payer: MEDICARE

## 2019-09-13 ENCOUNTER — OFFICE VISIT (OUTPATIENT)
Dept: PULMONOLOGY | Facility: CLINIC | Age: 69
End: 2019-09-13
Payer: MEDICARE

## 2019-09-13 ENCOUNTER — HOSPITAL ENCOUNTER (OUTPATIENT)
Dept: RADIOLOGY | Facility: HOSPITAL | Age: 69
Discharge: HOME | End: 2019-09-13
Attending: PHYSICIAN ASSISTANT
Payer: MEDICARE

## 2019-09-13 VITALS
SYSTOLIC BLOOD PRESSURE: 132 MMHG | HEART RATE: 98 BPM | HEIGHT: 60 IN | DIASTOLIC BLOOD PRESSURE: 78 MMHG | RESPIRATION RATE: 17 BRPM | OXYGEN SATURATION: 95 % | BODY MASS INDEX: 28.31 KG/M2 | WEIGHT: 144.19 LBS

## 2019-09-13 VITALS
HEIGHT: 60 IN | DIASTOLIC BLOOD PRESSURE: 58 MMHG | BODY MASS INDEX: 28.31 KG/M2 | WEIGHT: 144.19 LBS | HEART RATE: 107 BPM | SYSTOLIC BLOOD PRESSURE: 162 MMHG | TEMPERATURE: 97 F | OXYGEN SATURATION: 93 %

## 2019-09-13 DIAGNOSIS — E11.22 TYPE 2 DIABETES MELLITUS WITH STAGE 3 CHRONIC KIDNEY DISEASE, WITHOUT LONG-TERM CURRENT USE OF INSULIN: Chronic | ICD-10-CM

## 2019-09-13 DIAGNOSIS — J41.0 SIMPLE CHRONIC BRONCHITIS: ICD-10-CM

## 2019-09-13 DIAGNOSIS — I10 ESSENTIAL HYPERTENSION: Chronic | ICD-10-CM

## 2019-09-13 DIAGNOSIS — R06.09 DYSPNEA ON EXERTION: ICD-10-CM

## 2019-09-13 DIAGNOSIS — R06.09 DYSPNEA ON EXERTION: Primary | ICD-10-CM

## 2019-09-13 DIAGNOSIS — J44.9 CHRONIC OBSTRUCTIVE PULMONARY DISEASE, UNSPECIFIED COPD TYPE: ICD-10-CM

## 2019-09-13 DIAGNOSIS — I35.0 NONRHEUMATIC AORTIC VALVE STENOSIS: ICD-10-CM

## 2019-09-13 DIAGNOSIS — J44.1 COPD WITH ACUTE EXACERBATION: ICD-10-CM

## 2019-09-13 DIAGNOSIS — I70.0 ATHEROSCLEROSIS OF AORTA: ICD-10-CM

## 2019-09-13 DIAGNOSIS — N18.30 TYPE 2 DIABETES MELLITUS WITH STAGE 3 CHRONIC KIDNEY DISEASE, WITHOUT LONG-TERM CURRENT USE OF INSULIN: Chronic | ICD-10-CM

## 2019-09-13 DIAGNOSIS — J01.90 ACUTE SINUSITIS, RECURRENCE NOT SPECIFIED, UNSPECIFIED LOCATION: Primary | ICD-10-CM

## 2019-09-13 PROCEDURE — 1101F PT FALLS ASSESS-DOCD LE1/YR: CPT | Mod: HCNC,CPTII,S$GLB, | Performed by: NURSE PRACTITIONER

## 2019-09-13 PROCEDURE — 1101F PR PT FALLS ASSESS DOC 0-1 FALLS W/OUT INJ PAST YR: ICD-10-PCS | Mod: HCNC,CPTII,S$GLB, | Performed by: NURSE PRACTITIONER

## 2019-09-13 PROCEDURE — 99999 PR PBB SHADOW E&M-EST. PATIENT-LVL V: CPT | Mod: PBBFAC,HCNC,, | Performed by: NURSE PRACTITIONER

## 2019-09-13 PROCEDURE — 3044F PR MOST RECENT HEMOGLOBIN A1C LEVEL <7.0%: ICD-10-PCS | Mod: HCNC,CPTII,S$GLB, | Performed by: PHYSICIAN ASSISTANT

## 2019-09-13 PROCEDURE — 96372 THER/PROPH/DIAG INJ SC/IM: CPT | Mod: HCNC,S$GLB,, | Performed by: NURSE PRACTITIONER

## 2019-09-13 PROCEDURE — 3078F DIAST BP <80 MM HG: CPT | Mod: HCNC,CPTII,S$GLB, | Performed by: PHYSICIAN ASSISTANT

## 2019-09-13 PROCEDURE — 93010 EKG 12-LEAD: ICD-10-PCS | Mod: HCNC,S$GLB,, | Performed by: INTERNAL MEDICINE

## 2019-09-13 PROCEDURE — 93005 EKG 12-LEAD: ICD-10-PCS | Mod: HCNC,S$GLB,, | Performed by: PHYSICIAN ASSISTANT

## 2019-09-13 PROCEDURE — 3075F PR MOST RECENT SYSTOLIC BLOOD PRESS GE 130-139MM HG: ICD-10-PCS | Mod: HCNC,CPTII,S$GLB, | Performed by: NURSE PRACTITIONER

## 2019-09-13 PROCEDURE — 99214 OFFICE O/P EST MOD 30 MIN: CPT | Mod: 25,HCNC,S$GLB, | Performed by: NURSE PRACTITIONER

## 2019-09-13 PROCEDURE — 3078F DIAST BP <80 MM HG: CPT | Mod: HCNC,CPTII,S$GLB, | Performed by: NURSE PRACTITIONER

## 2019-09-13 PROCEDURE — 71046 X-RAY EXAM CHEST 2 VIEWS: CPT | Mod: TC,HCNC

## 2019-09-13 PROCEDURE — 93005 ELECTROCARDIOGRAM TRACING: CPT | Mod: HCNC,S$GLB,, | Performed by: PHYSICIAN ASSISTANT

## 2019-09-13 PROCEDURE — 99999 PR PBB SHADOW E&M-EST. PATIENT-LVL IV: ICD-10-PCS | Mod: PBBFAC,HCNC,, | Performed by: PHYSICIAN ASSISTANT

## 2019-09-13 PROCEDURE — 99214 OFFICE O/P EST MOD 30 MIN: CPT | Mod: HCNC,S$GLB,, | Performed by: PHYSICIAN ASSISTANT

## 2019-09-13 PROCEDURE — 99999 PR PBB SHADOW E&M-EST. PATIENT-LVL V: ICD-10-PCS | Mod: PBBFAC,HCNC,, | Performed by: NURSE PRACTITIONER

## 2019-09-13 PROCEDURE — 99999 PR PBB SHADOW E&M-EST. PATIENT-LVL IV: CPT | Mod: PBBFAC,HCNC,, | Performed by: PHYSICIAN ASSISTANT

## 2019-09-13 PROCEDURE — 3077F SYST BP >= 140 MM HG: CPT | Mod: HCNC,CPTII,S$GLB, | Performed by: PHYSICIAN ASSISTANT

## 2019-09-13 PROCEDURE — 3078F PR MOST RECENT DIASTOLIC BLOOD PRESSURE < 80 MM HG: ICD-10-PCS | Mod: HCNC,CPTII,S$GLB, | Performed by: PHYSICIAN ASSISTANT

## 2019-09-13 PROCEDURE — 3077F PR MOST RECENT SYSTOLIC BLOOD PRESSURE >= 140 MM HG: ICD-10-PCS | Mod: HCNC,CPTII,S$GLB, | Performed by: PHYSICIAN ASSISTANT

## 2019-09-13 PROCEDURE — 1101F PT FALLS ASSESS-DOCD LE1/YR: CPT | Mod: HCNC,CPTII,S$GLB, | Performed by: PHYSICIAN ASSISTANT

## 2019-09-13 PROCEDURE — 99214 PR OFFICE/OUTPT VISIT, EST, LEVL IV, 30-39 MIN: ICD-10-PCS | Mod: 25,HCNC,S$GLB, | Performed by: NURSE PRACTITIONER

## 2019-09-13 PROCEDURE — 1101F PR PT FALLS ASSESS DOC 0-1 FALLS W/OUT INJ PAST YR: ICD-10-PCS | Mod: HCNC,CPTII,S$GLB, | Performed by: PHYSICIAN ASSISTANT

## 2019-09-13 PROCEDURE — 3044F HG A1C LEVEL LT 7.0%: CPT | Mod: HCNC,CPTII,S$GLB, | Performed by: PHYSICIAN ASSISTANT

## 2019-09-13 PROCEDURE — 99214 PR OFFICE/OUTPT VISIT, EST, LEVL IV, 30-39 MIN: ICD-10-PCS | Mod: HCNC,S$GLB,, | Performed by: PHYSICIAN ASSISTANT

## 2019-09-13 PROCEDURE — 71046 X-RAY EXAM CHEST 2 VIEWS: CPT | Mod: 26,HCNC,, | Performed by: RADIOLOGY

## 2019-09-13 PROCEDURE — 93010 ELECTROCARDIOGRAM REPORT: CPT | Mod: HCNC,S$GLB,, | Performed by: INTERNAL MEDICINE

## 2019-09-13 PROCEDURE — 3075F SYST BP GE 130 - 139MM HG: CPT | Mod: HCNC,CPTII,S$GLB, | Performed by: NURSE PRACTITIONER

## 2019-09-13 PROCEDURE — 71046 XR CHEST PA AND LATERAL: ICD-10-PCS | Mod: 26,HCNC,, | Performed by: RADIOLOGY

## 2019-09-13 PROCEDURE — 3078F PR MOST RECENT DIASTOLIC BLOOD PRESSURE < 80 MM HG: ICD-10-PCS | Mod: HCNC,CPTII,S$GLB, | Performed by: NURSE PRACTITIONER

## 2019-09-13 PROCEDURE — 96372 PR INJECTION,THERAP/PROPH/DIAG2ST, IM OR SUBCUT: ICD-10-PCS | Mod: HCNC,S$GLB,, | Performed by: NURSE PRACTITIONER

## 2019-09-13 RX ORDER — PREDNISONE 10 MG/1
TABLET ORAL
Qty: 21 TABLET | Refills: 0 | Status: SHIPPED | OUTPATIENT
Start: 2019-09-13 | End: 2019-11-05 | Stop reason: SDUPTHER

## 2019-09-13 RX ORDER — ALBUTEROL SULFATE 90 UG/1
2 AEROSOL, METERED RESPIRATORY (INHALATION) EVERY 4 HOURS PRN
Qty: 18 G | Refills: 3 | Status: SHIPPED | OUTPATIENT
Start: 2019-09-13 | End: 2020-08-19

## 2019-09-13 RX ORDER — TRIAMCINOLONE ACETONIDE 40 MG/ML
40 INJECTION, SUSPENSION INTRA-ARTICULAR; INTRAMUSCULAR
Status: COMPLETED | OUTPATIENT
Start: 2019-09-13 | End: 2019-09-13

## 2019-09-13 RX ORDER — AMOXICILLIN AND CLAVULANATE POTASSIUM 875; 125 MG/1; MG/1
1 TABLET, FILM COATED ORAL 2 TIMES DAILY
Qty: 20 TABLET | Refills: 0 | Status: SHIPPED | OUTPATIENT
Start: 2019-09-13 | End: 2019-09-23

## 2019-09-13 RX ORDER — FLUTICASONE FUROATE AND VILANTEROL 100; 25 UG/1; UG/1
1 POWDER RESPIRATORY (INHALATION) DAILY
Qty: 60 EACH | Refills: 11 | Status: SHIPPED | OUTPATIENT
Start: 2019-09-13 | End: 2020-08-19 | Stop reason: SINTOL

## 2019-09-13 RX ADMIN — TRIAMCINOLONE ACETONIDE 40 MG: 40 INJECTION, SUSPENSION INTRA-ARTICULAR; INTRAMUSCULAR at 11:09

## 2019-09-13 NOTE — PROGRESS NOTES
Subjective:      Patient ID: Lucia Barlow is a 69 y.o. female.    Chief Complaint: Shortness of Breath    HPI  Patient presents to the office today for evaluation of COPD exacerbation.  She presented to the emergency room on 08/10/2019.  She has a sinus headache and sinus pressure.  Cough.  Postnasal drip.  Rhinitis.  She has chest tightness and wheezing.  Dyspnea.  Symptoms started approximately 1 month ago.  Symptoms improved with prednisone, but returned quickly after she finishes her dosing.    Patient Active Problem List   Diagnosis    History of tobacco abuse    Essential hypertension    Dyslipidemia associated with type 2 diabetes mellitus    Osteoporosis    Atherosclerosis of artery of both lower extremities    S/P peripheral artery angioplasty    History of adenomatous polyp of colon    Nonrheumatic aortic valve stenosis    Atherosclerosis of aorta    Lung nodule < 6cm on CT    Atherosclerosis of native coronary artery of native heart without angina pectoris    Simple chronic bronchitis    PVD (peripheral vascular disease)    Type 2 diabetes mellitus with peripheral vascular disease    Gastroesophageal reflux disease without esophagitis    Type 2 diabetes mellitus with stage 3 chronic kidney disease, without long-term current use of insulin    Type 2 diabetes mellitus with microalbuminuria, without long-term current use of insulin          /78   Pulse 98   Resp 17   Ht 5' (1.524 m)   Wt 65.4 kg (144 lb 2.9 oz)   SpO2 95%   BMI 28.16 kg/m²   Body mass index is 28.16 kg/m².    Review of Systems   Constitutional: Positive for fatigue.   HENT: Positive for postnasal drip, rhinorrhea, sinus pressure and congestion.    Respiratory: Positive for cough, shortness of breath, wheezing, dyspnea on extertion and use of rescue inhaler.    All other systems reviewed and are negative.    Objective:      Physical Exam   Constitutional: She is oriented to person, place, and time. She appears  well-developed and well-nourished.   HENT:   Head: Normocephalic and atraumatic.   Nose: Mucosal edema present. Right sinus exhibits maxillary sinus tenderness and frontal sinus tenderness. Left sinus exhibits maxillary sinus tenderness and frontal sinus tenderness.   Neck: Normal range of motion. Neck supple.   Cardiovascular: Normal rate and regular rhythm.   Pulmonary/Chest: She has wheezes.   Abdominal: Soft. There is no tenderness.   Musculoskeletal: Normal range of motion. She exhibits no edema.   Neurological: She is alert and oriented to person, place, and time.   Skin: Skin is warm and dry.   Psychiatric: She has a normal mood and affect.     Personal Diagnostic Review      Results for orders placed during the hospital encounter of 09/13/19   X-Ray Chest PA And Lateral    Narrative EXAMINATION:  XR CHEST PA AND LATERAL    CLINICAL HISTORY:  Other forms of dyspnea    TECHNIQUE:  PA and lateral views of the chest were performed.    COMPARISON:  August 10, 2019    FINDINGS:  Symmetric hyperinflation and mild prominence interstitial markings without consolidation or effusion.  No pneumothorax.  Heart size within normal limits.  Aorta minimally tortuous and trachea midline allowing for positioning.  Osteopenia, spondylosis and accentuated kyphosis.  Minimal underlying scoliosis versus positioning.      Impression Mild COPD changes without acute infiltrate or area of consolidation.  See above.      Electronically signed by: Hugo Grossman MD  Date:    09/13/2019  Time:    09:39         Assessment:       1. Acute sinusitis, recurrence not specified, unspecified location    2. Chronic obstructive pulmonary disease, unspecified COPD type        Outpatient Encounter Medications as of 9/13/2019   Medication Sig Dispense Refill    albuterol (PROVENTIL) 2.5 mg /3 mL (0.083 %) nebulizer solution every 6 (six) hours as needed.  12    amlodipine-benazepril 5-20 mg (LOTREL) 5-20 mg per capsule Take 1 capsule by mouth once  daily. 90 capsule 2    aspirin (ECOTRIN) 81 MG EC tablet Take 1 tablet (81 mg total) by mouth once daily. (FOR HEART HEALTH) 90 tablet 3    blood sugar diagnostic Strp 1 each by Misc.(Non-Drug; Combo Route) route 3 (three) times daily. [employer] test strips 100 each 3    blood-glucose meter kit Insurance preferred 1 each 0    calcium-vitamin D (CALCIUM WITH VITAMIN D) 600 mg(1,500mg) -400 unit Tab Take 2 tablets by mouth once daily. 180 tablet 4    chlorthalidone (HYGROTEN) 25 MG Tab Take 1 tablet (25 mg total) by mouth once daily. 90 tablet 2    ezetimibe (ZETIA) 10 mg tablet TAKE 1 TABLET ONE TIME DAILY 90 tablet 4    fluticasone propionate (FLONASE) 50 mcg/actuation nasal spray 2 sprays (100 mcg total) by Each Nostril route once daily. 16 g 5    glimepiride (AMARYL) 2 MG tablet Take 1 tablet (2 mg total) by mouth before breakfast. 90 tablet 3    lancets 32 gauge Misc 1 lancet by Misc.(Non-Drug; Combo Route) route 3 (three) times daily. Insurance preferred 100 each 11    metFORMIN (GLUCOPHAGE-XR) 500 MG 24 hr tablet Take 1 tablet (500 mg total) by mouth 2 (two) times daily with meals. 180 tablet 3    pantoprazole (PROTONIX) 40 MG tablet Take 1 tablet (40 mg total) by mouth once daily. 90 tablet 3    phenylephrine-DM-guaifenesin 5- mg/5 mL Liqd Take by mouth as needed.      potassium 99 mg Tab Take 1 tablet by mouth once daily.      rosuvastatin (CRESTOR) 20 MG tablet Take 1 tablet (20 mg total) by mouth once daily. 90 tablet 3    TRUEPLUS LANCETS 33 gauge Misc       varicella-zoster gE-AS01B, PF, (SHINGRIX) 50 mcg/0.5 mL injection Inject 0.5 mL (one dose) into muscle now; give second dose at least 2 months later 0.5 mL 1    albuterol (VENTOLIN HFA) 90 mcg/actuation inhaler Inhale 2 puffs into the lungs every 4 (four) hours as needed for Wheezing. 18 g 3    amoxicillin-clavulanate 875-125mg (AUGMENTIN) 875-125 mg per tablet Take 1 tablet by mouth 2 (two) times daily. for 10 days 20 tablet 0     fluticasone furoate-vilanterol (BREO ELLIPTA) 100-25 mcg/dose diskus inhaler Inhale 1 puff into the lungs once daily. 60 each 11    predniSONE (DELTASONE) 10 MG tablet take 4 tablets by mouth for 3 days,then 2 tablets for 3 days, then 1 tablet for 3 days 21 tablet 0     Facility-Administered Encounter Medications as of 9/13/2019   Medication Dose Route Frequency Provider Last Rate Last Dose    triamcinolone acetonide injection 40 mg  40 mg Intramuscular 1 time in Clinic/HOD Elizabeth Lejeune, NP         Orders Placed This Encounter   Procedures    X-Ray Sinuses 3 or more views     Standing Status:   Future     Standing Expiration Date:   9/13/2020     Order Specific Question:   May the Radiologist modify the order per protocol to meet the clinical needs of the patient?     Answer:   Yes     Plan:        Problem List Items Addressed This Visit     None      Visit Diagnoses     Acute sinusitis, recurrence not specified, unspecified location    -  Primary    Relevant Medications    amoxicillin-clavulanate 875-125mg (AUGMENTIN) 875-125 mg per tablet    Other Relevant Orders    X-Ray Sinuses 3 or more views    Chronic obstructive pulmonary disease, unspecified COPD type        Relevant Medications    albuterol (VENTOLIN HFA) 90 mcg/actuation inhaler    triamcinolone acetonide injection 40 mg    amoxicillin-clavulanate 875-125mg (AUGMENTIN) 875-125 mg per tablet    predniSONE (DELTASONE) 10 MG tablet    fluticasone furoate-vilanterol (BREO ELLIPTA) 100-25 mcg/dose diskus inhaler      will extend Augmentin if sinus x-ray positive.  ED if symptoms worsen

## 2019-09-13 NOTE — PROGRESS NOTES
Subjective:      Patient ID: Lucia Barlow is a 69 y.o. female.    Chief Complaint: Shortness of Breath    Shortness of Breath   This is a new problem. The current episode started 1 to 4 weeks ago. The problem occurs constantly. The problem has been waxing and waning. Associated symptoms include coryza, rhinorrhea, a sore throat, sputum production and wheezing. Pertinent negatives include no abdominal pain, chest pain, claudication, ear pain, fever, headaches, hemoptysis, leg pain, leg swelling, neck pain, orthopnea, PND, rash, swollen glands, syncope or vomiting. The symptoms are aggravated by any activity. She has tried beta agonist inhalers, prescription cough suppressants and oral steroids (prednisone, mucinex, albuterol neb 3x a day) for the symptoms. Her past medical history is significant for CAD, chronic lung disease and COPD. There is no history of allergies, aspirin allergies, asthma, bronchiolitis, DVT, a heart failure, PE, pneumonia or a recent surgery.       Review of Systems   Constitutional: Positive for activity change, appetite change, chills and fatigue. Negative for diaphoresis, fever and unexpected weight change.   HENT: Positive for congestion, postnasal drip, rhinorrhea, sinus pressure, sinus pain and sore throat. Negative for dental problem, drooling, ear discharge, ear pain, facial swelling, hearing loss, mouth sores, nosebleeds, sneezing and trouble swallowing.    Eyes: Negative for pain, discharge, redness, itching and visual disturbance.   Respiratory: Positive for cough, sputum production, chest tightness, shortness of breath and wheezing. Negative for apnea, hemoptysis, choking and stridor.    Cardiovascular: Negative for chest pain, palpitations, orthopnea, claudication, leg swelling, syncope and PND.   Gastrointestinal: Negative for abdominal pain, constipation, diarrhea, nausea and vomiting.   Endocrine: Negative.    Genitourinary: Negative.  Negative for difficulty urinating.    Musculoskeletal: Positive for myalgias. Negative for neck pain and neck stiffness.   Skin: Negative for rash.   Allergic/Immunologic: Negative for environmental allergies, food allergies and immunocompromised state.   Neurological: Negative for dizziness, weakness and headaches.     Objective:   BP (!) 162/58 (BP Location: Left arm, Patient Position: Sitting, BP Method: Small (Manual))   Pulse 107   Temp 97.3 °F (36.3 °C) (Tympanic)   Ht 5' (1.524 m)   Wt 65.4 kg (144 lb 2.9 oz)   SpO2 (!) 93%   BMI 28.16 kg/m²     Physical Exam   Constitutional: She is oriented to person, place, and time. She appears well-developed and well-nourished.   HENT:   Head: Normocephalic and atraumatic.   Right Ear: Tympanic membrane, external ear and ear canal normal.   Left Ear: Tympanic membrane, external ear and ear canal normal.   Nose: Mucosal edema and rhinorrhea present. Right sinus exhibits maxillary sinus tenderness and frontal sinus tenderness. Left sinus exhibits maxillary sinus tenderness and frontal sinus tenderness.   Mouth/Throat: Uvula is midline and mucous membranes are normal. Posterior oropharyngeal erythema present.   Eyes: Pupils are equal, round, and reactive to light. Conjunctivae and EOM are normal.   Neck: Normal range of motion. Neck supple.   Cardiovascular: Normal rate, regular rhythm and normal heart sounds. Exam reveals no gallop and no friction rub.   No murmur heard.  Pulmonary/Chest: Accessory muscle usage (with exertion) present. Tachypnea noted. No apnea and no bradypnea. No respiratory distress. She has decreased breath sounds. She has wheezes. She has rhonchi. She has no rales. She exhibits no tenderness.   Abdominal: Soft. She exhibits no distension. There is no tenderness.   Musculoskeletal: Normal range of motion.   Lymphadenopathy:     She has no cervical adenopathy.   Neurological: She is alert and oriented to person, place, and time.   Skin: Skin is warm and dry.   Psychiatric: She has a  normal mood and affect. Her behavior is normal. Judgment and thought content normal.   Vitals reviewed.      Assessment:     1. Dyspnea on exertion    2. COPD with acute exacerbation    3. Essential hypertension    4. Type 2 diabetes mellitus with stage 3 chronic kidney disease, without long-term current use of insulin    5. Atherosclerosis of aorta    6. Simple chronic bronchitis    7. Nonrheumatic aortic valve stenosis      Plan:   Dyspnea on exertion  -     X-Ray Chest PA And Lateral; Future; Expected date: 09/13/2019  -     EKG 12-lead; Future  -failed tx with steroids and abx  -follow up with pulm today    COPD with acute exacerbation  -     X-Ray Chest PA And Lateral; Future; Expected date: 09/13/2019  -will get patient scheduled for a follow up with pulmonology    Essential hypertension  -elevated today. Likely due to OTC cold medications.   -will schedule follow up next week with PCP.     Type 2 diabetes mellitus with stage 3 chronic kidney disease, without long-term current use of insulin  -Stable and controlled. Continue current treatment plan as previously prescribed with your PCP.   Lab Results   Component Value Date    HGBA1C 6.5 (H) 08/12/2019     Atherosclerosis of aorta  -stable on asa    Simple chronic bronchitis  -follow up with pulm today    Nonrheumatic aortic valve stenosis  -normal stress test last year  -may need follow up with cardiology if SOB persists despite treatment.  -follow up with pcp in 1 week    Follow up in about 1 week (around 9/20/2019), or if symptoms worsen or fail to improve.

## 2019-09-17 ENCOUNTER — PATIENT OUTREACH (OUTPATIENT)
Dept: OTHER | Facility: OTHER | Age: 69
End: 2019-09-17

## 2019-09-17 NOTE — PROGRESS NOTES
"Digital Medicine: Health  Follow-Up    Reports she is well, no complaints.     The history is provided by the patient.     Follow Up  Follow-up reason(s): reading review      Alert received.   Care Team received low BG alert.  Patient is not experiencing symptoms.          Diet:   Patient reports eating or drinking the following: fruit, fresh vegetables and broccoli, cheese and chicken breast for lunch.     Physical Activity:   When asked if exercising, patient responded: no    Reports she cannot walk due to "lose bowels" from metformin. Reports she is doing "much better" now and plans to resume walking routine "soon". Offered to transfer call to DM clinician, patient declined.     Medication Adherence:   She misses doses: never      Tobacco and Alcohol:       Patient is not eligible for referral to smoking cessation.        SDOH    INTERVENTION(S)  recommended diet modifications, recommend physical activity and encouragement/support    PLAN  patient verbalizes understanding and patient amenable to changes      There are no preventive care reminders to display for this patient.    Last 5 Patient Entered Readings                                      Current 30 Day Average: 140/69     Recent Readings 9/17/2019 9/16/2019 9/16/2019 9/14/2019 9/12/2019    SBP (mmHg) 130 167 149 151 166    DBP (mmHg) 54 108 81 68 79    Pulse 91 100 89 95 86        Last 6 Patient Entered Readings                                          Most Recent A1c: 6.5% on 8/12/2019  (Goal: 7%)     Recent Readings 9/17/2019 9/16/2019 9/12/2019 9/10/2019 9/6/2019    Blood Glucose (mg/dL) 72 111 99 99 105              "

## 2019-10-03 NOTE — PROGRESS NOTES
BP has trended back down. No need to make HTN medication adjustments. LMFCB to discuss this and her hypoglycemia episode on 10/1.

## 2019-10-07 ENCOUNTER — PATIENT OUTREACH (OUTPATIENT)
Dept: OTHER | Facility: OTHER | Age: 69
End: 2019-10-07

## 2019-10-09 NOTE — PROGRESS NOTES
"Digital Medicine: Health  Follow-Up    Patient reports she is well, no complaints.   Patient attributes recent improvements in readings to new BP medication.     The history is provided by the patient.     Follow Up  Follow-up reason(s): reading review      Alert received.   Care Team received low BG alert.  Patient is not experiencing symptoms.  Reading was invalid because Reports she had not yet eaten, but deied symptoms of hypoglycemia          Diet:   Patient reports eating or drinking the following: juice and water    The patient drinks 85 ounces of water per day.          Physical Activity:   When asked if exercising, patient responded: yes    Patient participates in the following activities: walking    Reports she cannot walk due to "lose bowels" from metformin. Reports she is doing "much better" now and plans to resume walking routine "soon".     Walked this morning    Medication Adherence:   She misses doses: never        SDOH    INTERVENTION(S)  encouragement/support    PLAN  patient verbalizes understanding and patient amenable to changes    Declined additional health coaching/ resources at this time.       There are no preventive care reminders to display for this patient.    Last 5 Patient Entered Readings                                      Current 30 Day Average: 140/75     Recent Readings 10/7/2019 10/6/2019 10/1/2019 9/28/2019 9/26/2019    SBP (mmHg) 129 118 127 123 126    DBP (mmHg) 60 62 61 60 67    Pulse 94 93 90 95 90        Last 6 Patient Entered Readings                                          Most Recent A1c: 6.5% on 8/12/2019  (Goal: 7%)     Recent Readings 10/7/2019 10/6/2019 10/1/2019 9/28/2019 9/24/2019    Blood Glucose (mg/dL) 132 108 63 208 209              "

## 2019-10-24 NOTE — PROGRESS NOTES
Digital Medicine: Clinician Follow-Up    The history is provided by the patient.     Follow Up  Follow-up reason(s): reading review    Patient remains controlled for both HTN and DM disease states.     Patient denies symptoms of hypotension or hypoglycemia.                   Medication Adherence:   She misses doses: never        INTERVENTION(S)  reviewed appropriate dose schedule    PLAN  patient verbalizes understanding    Patient will continue her current HTN and DM medications.      There are no preventive care reminders to display for this patient.    Last 5 Patient Entered Readings                                      Current 30 Day Average: 128/65     Recent Readings 10/23/2019 10/21/2019 10/14/2019 10/10/2019 10/7/2019    SBP (mmHg) 144 128 132 124 129    DBP (mmHg) 63 68 73 63 60    Pulse 90 91 89 94 94        Last 6 Patient Entered Readings                                          Most Recent A1c: 6.5% on 8/12/2019  (Goal: 7%)     Recent Readings 10/23/2019 10/21/2019 10/14/2019 10/10/2019 10/7/2019    Blood Glucose (mg/dL) 110 104 86 109 132           Hypertension Medications             amlodipine-benazepril 5-20 mg (LOTREL) 5-20 mg per capsule Take 1 capsule by mouth once daily.    chlorthalidone (HYGROTEN) 25 MG Tab Take 1 tablet (25 mg total) by mouth once daily.        Diabetes Medications             glimepiride (AMARYL) 2 MG tablet Take 1 tablet (2 mg total) by mouth before breakfast.    metFORMIN (GLUCOPHAGE-XR) 500 MG 24 hr tablet Take 1 tablet (500 mg total) by mouth 2 (two) times daily with meals.

## 2019-11-05 DIAGNOSIS — J44.9 CHRONIC OBSTRUCTIVE PULMONARY DISEASE, UNSPECIFIED COPD TYPE: ICD-10-CM

## 2019-11-05 RX ORDER — SODIUM, POTASSIUM,MAG SULFATES 17.5-3.13G
SOLUTION, RECONSTITUTED, ORAL ORAL
Qty: 354 ML | Refills: 0 | Status: ON HOLD | OUTPATIENT
Start: 2019-11-05 | End: 2019-11-15 | Stop reason: ALTCHOICE

## 2019-11-05 RX ORDER — PREDNISONE 10 MG/1
TABLET ORAL
Qty: 21 TABLET | Refills: 0 | Status: SHIPPED | OUTPATIENT
Start: 2019-11-05 | End: 2019-11-27 | Stop reason: DRUGHIGH

## 2019-11-06 ENCOUNTER — IMMUNIZATION (OUTPATIENT)
Dept: PHARMACY | Facility: CLINIC | Age: 69
End: 2019-11-06
Payer: MEDICARE

## 2019-11-07 ENCOUNTER — PATIENT MESSAGE (OUTPATIENT)
Dept: ADMINISTRATIVE | Facility: OTHER | Age: 69
End: 2019-11-07

## 2019-11-08 ENCOUNTER — TELEPHONE (OUTPATIENT)
Dept: ENDOSCOPY | Facility: HOSPITAL | Age: 69
End: 2019-11-08

## 2019-11-13 ENCOUNTER — PATIENT OUTREACH (OUTPATIENT)
Dept: OTHER | Facility: OTHER | Age: 69
End: 2019-11-13

## 2019-11-13 ENCOUNTER — IMMUNIZATION (OUTPATIENT)
Dept: PHARMACY | Facility: CLINIC | Age: 69
End: 2019-11-13
Payer: MEDICARE

## 2019-11-15 ENCOUNTER — HOSPITAL ENCOUNTER (OUTPATIENT)
Facility: HOSPITAL | Age: 69
Discharge: HOME | End: 2019-11-15
Attending: COLON & RECTAL SURGERY | Admitting: COLON & RECTAL SURGERY
Payer: MEDICARE

## 2019-11-15 ENCOUNTER — ANESTHESIA (OUTPATIENT)
Dept: ENDOSCOPY | Facility: HOSPITAL | Age: 69
End: 2019-11-15
Payer: MEDICARE

## 2019-11-15 ENCOUNTER — ANESTHESIA EVENT (OUTPATIENT)
Dept: ENDOSCOPY | Facility: HOSPITAL | Age: 69
End: 2019-11-15
Payer: MEDICARE

## 2019-11-15 VITALS
TEMPERATURE: 97 F | BODY MASS INDEX: 28.26 KG/M2 | OXYGEN SATURATION: 98 % | HEIGHT: 60 IN | SYSTOLIC BLOOD PRESSURE: 129 MMHG | HEART RATE: 94 BPM | WEIGHT: 143.94 LBS | RESPIRATION RATE: 18 BRPM | DIASTOLIC BLOOD PRESSURE: 57 MMHG

## 2019-11-15 DIAGNOSIS — Z12.11 SCREENING FOR COLON CANCER: ICD-10-CM

## 2019-11-15 LAB — POCT GLUCOSE: 110 MG/DL (ref 70–110)

## 2019-11-15 PROCEDURE — 37000009 HC ANESTHESIA EA ADD 15 MINS: Mod: HCNC | Performed by: COLON & RECTAL SURGERY

## 2019-11-15 PROCEDURE — 45380 COLONOSCOPY AND BIOPSY: CPT | Mod: HCNC | Performed by: COLON & RECTAL SURGERY

## 2019-11-15 PROCEDURE — 27201012 HC FORCEPS, HOT/COLD, DISP: Mod: HCNC | Performed by: COLON & RECTAL SURGERY

## 2019-11-15 PROCEDURE — 88305 TISSUE EXAM BY PATHOLOGIST: CPT | Mod: 59,HCNC | Performed by: PATHOLOGY

## 2019-11-15 PROCEDURE — 45380 PR COLONOSCOPY,BIOPSY: ICD-10-PCS | Mod: 59,HCNC,, | Performed by: COLON & RECTAL SURGERY

## 2019-11-15 PROCEDURE — 63600175 PHARM REV CODE 636 W HCPCS: Mod: HCNC | Performed by: NURSE ANESTHETIST, CERTIFIED REGISTERED

## 2019-11-15 PROCEDURE — 88305 TISSUE EXAM BY PATHOLOGIST: CPT | Mod: 26,HCNC,, | Performed by: PATHOLOGY

## 2019-11-15 PROCEDURE — 37000008 HC ANESTHESIA 1ST 15 MINUTES: Mod: HCNC | Performed by: COLON & RECTAL SURGERY

## 2019-11-15 PROCEDURE — 25000003 PHARM REV CODE 250: Mod: HCNC | Performed by: NURSE ANESTHETIST, CERTIFIED REGISTERED

## 2019-11-15 PROCEDURE — 45385 PR COLONOSCOPY,REMV LESN,SNARE: ICD-10-PCS | Mod: PT,HCNC,, | Performed by: COLON & RECTAL SURGERY

## 2019-11-15 PROCEDURE — 63600175 PHARM REV CODE 636 W HCPCS: Mod: HCNC | Performed by: COLON & RECTAL SURGERY

## 2019-11-15 PROCEDURE — 45385 COLONOSCOPY W/LESION REMOVAL: CPT | Mod: HCNC | Performed by: COLON & RECTAL SURGERY

## 2019-11-15 PROCEDURE — 88305 TISSUE EXAM BY PATHOLOGIST: ICD-10-PCS | Mod: 26,HCNC,, | Performed by: PATHOLOGY

## 2019-11-15 PROCEDURE — 45380 COLONOSCOPY AND BIOPSY: CPT | Mod: 59,HCNC,, | Performed by: COLON & RECTAL SURGERY

## 2019-11-15 PROCEDURE — 27201089 HC SNARE, DISP (ANY): Mod: HCNC | Performed by: COLON & RECTAL SURGERY

## 2019-11-15 PROCEDURE — 45385 COLONOSCOPY W/LESION REMOVAL: CPT | Mod: PT,HCNC,, | Performed by: COLON & RECTAL SURGERY

## 2019-11-15 RX ORDER — PROPOFOL 10 MG/ML
VIAL (ML) INTRAVENOUS
Status: DISCONTINUED | OUTPATIENT
Start: 2019-11-15 | End: 2019-11-15

## 2019-11-15 RX ORDER — LIDOCAINE HYDROCHLORIDE 10 MG/ML
INJECTION, SOLUTION EPIDURAL; INFILTRATION; INTRACAUDAL; PERINEURAL
Status: DISCONTINUED | OUTPATIENT
Start: 2019-11-15 | End: 2019-11-15

## 2019-11-15 RX ORDER — SODIUM CHLORIDE, SODIUM LACTATE, POTASSIUM CHLORIDE, CALCIUM CHLORIDE 600; 310; 30; 20 MG/100ML; MG/100ML; MG/100ML; MG/100ML
INJECTION, SOLUTION INTRAVENOUS CONTINUOUS
Status: DISCONTINUED | OUTPATIENT
Start: 2019-11-15 | End: 2019-11-15 | Stop reason: HOSPADM

## 2019-11-15 RX ADMIN — PROPOFOL 50 MG: 10 INJECTION, EMULSION INTRAVENOUS at 07:11

## 2019-11-15 RX ADMIN — SODIUM CHLORIDE, SODIUM LACTATE, POTASSIUM CHLORIDE, AND CALCIUM CHLORIDE: 600; 310; 30; 20 INJECTION, SOLUTION INTRAVENOUS at 07:11

## 2019-11-15 RX ADMIN — PROPOFOL 20 MG: 10 INJECTION, EMULSION INTRAVENOUS at 08:11

## 2019-11-15 RX ADMIN — PROPOFOL 40 MG: 10 INJECTION, EMULSION INTRAVENOUS at 07:11

## 2019-11-15 RX ADMIN — PROPOFOL 20 MG: 10 INJECTION, EMULSION INTRAVENOUS at 07:11

## 2019-11-15 RX ADMIN — LIDOCAINE HYDROCHLORIDE 50 MG: 10 INJECTION, SOLUTION EPIDURAL; INFILTRATION; INTRACAUDAL; PERINEURAL at 07:11

## 2019-11-15 NOTE — ANESTHESIA PREPROCEDURE EVALUATION
11/15/2019  Lucia Barlow is a 69 y.o., female.    Anesthesia Evaluation    I have reviewed the Patient Summary Reports.        Review of Systems  Cardiovascular:   Hypertension CAD    Hypertension    Pulmonary:  Chronic Obstructive Pulmonary Disease (COPD):    Renal/:  Kidney Function/Disease    Endocrine:  Diabetes        Physical Exam  General:  Well nourished    Airway/Jaw/Neck:  Airway Findings: Mouth Opening: Normal Tongue: Normal  General Airway Assessment: Adult  Mallampati: II  TM Distance: Normal, at least 6 cm      Dental:  Dental Findings: In tact        Mental Status:  Mental Status Findings:  Cooperative, Alert and Oriented         Anesthesia Plan  Type of Anesthesia, risks & benefits discussed:  Anesthesia Type:  MAC  Patient's Preference:   Intra-op Monitoring Plan:   Intra-op Monitoring Plan Comments:   Post Op Pain Control Plan:   Post Op Pain Control Plan Comments:   Induction:   IV  Beta Blocker:  Patient is not currently on a Beta-Blocker (No further documentation required).       Informed Consent: Patient understands risks and agrees with Anesthesia plan.  Questions answered.   ASA Score: 3     Day of Surgery Review of History & Physical: I have interviewed and examined the patient. I have reviewed the patient's H&P dated:            Ready For Surgery From Anesthesia Perspective.

## 2019-11-15 NOTE — ANESTHESIA POSTPROCEDURE EVALUATION
Anesthesia Post Evaluation    Patient: Lucia Barlow    Procedure(s) Performed: Procedure(s) (LRB):  COLONOSCOPY (N/A)        Patient location during evaluation: PACU  Patient participation: Yes- Able to Participate  Level of consciousness: awake and alert  Post-procedure vital signs: reviewed and stable  Pain management: adequate  Airway patency: patent    PONV status at discharge: No PONV      Cardiovascular status: blood pressure returned to baseline  Respiratory status: unassisted  Hydration status: euvolemic            Vitals Value Taken Time   /58 11/15/2019  8:27 AM   Temp 36.2 °C (97.2 °F) 11/15/2019  8:17 AM   Pulse 96 11/15/2019  8:27 AM   Resp 18 11/15/2019  8:27 AM   SpO2 100 % 11/15/2019  8:27 AM         No case tracking events are documented in the log.      Pain/Ada Score: Ada Score: 10 (11/15/2019  8:27 AM)

## 2019-11-15 NOTE — H&P
[facility]  Colon and Rectal Surgery  History & Physical    Patient Name: Lucia Barlow  MRN: [id number]  Admission Date: 11/15/2019  Attending Physician: Marko Vicente MD  Primary Care Provider: ZELDA Sandoval MD    Patient information was obtained from patient and medical records.    Subjective:     Chief Complaint/Reason for Admission: Here for Colonoscopy    History of Present Illness:  Patient is a 69 y.o. female presents for colonoscopy. Last cscope 3yrs ago with multiple TAs removed. No current hematochezia, melena or change in bowel habits. No personal or fam hx of CRC.    No current facility-administered medications on file prior to encounter.      Current Outpatient Medications on File Prior to Encounter   Medication Sig    albuterol (PROVENTIL) 2.5 mg /3 mL (0.083 %) nebulizer solution every 6 (six) hours as needed.    amlodipine-benazepril 5-20 mg (LOTREL) 5-20 mg per capsule Take 1 capsule by mouth once daily.    aspirin (ECOTRIN) 81 MG EC tablet Take 1 tablet (81 mg total) by mouth once daily. (FOR HEART HEALTH)    blood-glucose meter kit Insurance preferred    calcium-vitamin D (CALCIUM WITH VITAMIN D) 600 mg(1,500mg) -400 unit Tab Take 2 tablets by mouth once daily.    chlorthalidone (HYGROTEN) 25 MG Tab Take 1 tablet (25 mg total) by mouth once daily.    ezetimibe (ZETIA) 10 mg tablet TAKE 1 TABLET ONE TIME DAILY    glimepiride (AMARYL) 2 MG tablet Take 1 tablet (2 mg total) by mouth before breakfast.    lancets 32 gauge Misc 1 lancet by Misc.(Non-Drug; Combo Route) route 3 (three) times daily. Insurance preferred    metFORMIN (GLUCOPHAGE-XR) 500 MG 24 hr tablet Take 1 tablet (500 mg total) by mouth 2 (two) times daily with meals.    pantoprazole (PROTONIX) 40 MG tablet Take 1 tablet (40 mg total) by mouth once daily.    phenylephrine-DM-guaifenesin 5- mg/5 mL Liqd Take by mouth as needed.    potassium 99 mg Tab Take 1 tablet by mouth once daily.     rosuvastatin (CRESTOR) 20 MG tablet Take 1 tablet (20 mg total) by mouth once daily.    TRUEPLUS LANCETS 33 gauge Misc     varicella-zoster gE-AS01B, PF, (SHINGRIX) 50 mcg/0.5 mL injection Inject 0.5 mL (one dose) into muscle now; give second dose at least 2 months later       Review of patient's allergies indicates:   Allergen Reactions    Skin staples [surgical stainless steel] Swelling    Egg derived      Shortness breath, lip swelling    Fish containing products     Iodine and iodide containing products Swelling    Latex, natural rubber Swelling    Losartan Itching    Nickel     Pravastatin      40 mg causes nausea vomitting but 20 mg ok    Shellfish containing products Swelling       Past Medical History:   Diagnosis Date    Atherosclerosis of artery of both lower extremities 1/17/2014    Atherosclerosis of native coronary artery of native heart without angina pectoris 11/18/2016    Atherosclerotic PVD with intermittent claudication 1/17/2014    Chronic bronchitis     COPD (chronic obstructive pulmonary disease)     Dyslipidemia associated with type 2 diabetes mellitus 6/14/2013    Dyslipidemia associated with type 2 diabetes mellitus     Ex-smoker     Gastroesophageal reflux disease without esophagitis 1/25/2019    Hyperlipidemia     Hypertension     Osteoporosis 1/16 rosita 1/18    PVD (peripheral vascular disease)     S/P peripheral artery angioplasty 2/7/2014    Simple chronic bronchitis     Tobacco dependence     Type 2 diabetes mellitus with microalbuminuria, without long-term current use of insulin 3/29/2019    Type 2 diabetes mellitus with peripheral vascular disease     Type 2 diabetes mellitus with stage 3 chronic kidney disease, without long-term current use of insulin 2/1/2019    Type 2 diabetes mellitus without retinopathy 2/1/2019     Past Surgical History:   Procedure Laterality Date    ANGIOPLASTY Bilateral 01/24/2014    aortoiliac stenting     CARPAL TUNNEL  RELEASE  2003    Henrry    COLECTOMY  approximate 2005    pt states 1in colon -dx with benign mass removed-states no colon cancer    COLONOSCOPY N/A 11/9/2016    Procedure: COLONOSCOPY;  Surgeon: Dmitri Sterling MD;  Location: [facility];  Service: Endoscopy;  Laterality: N/A;    HYSTERECTOMY      no cancer     Family History     Problem Relation (Age of Onset)    Asthma Daughter, Son    Breast cancer Daughter    Diabetes Daughter    Stroke Father, Sister        Tobacco Use    Smoking status: Former Smoker     Packs/day: 0.25     Years: 50.00     Pack years: 12.50     Types: Cigarettes     Last attempt to quit: 1/11/2016     Years since quitting: 3.8    Smokeless tobacco: Never Used   Substance and Sexual Activity    Alcohol use: No     Alcohol/week: 0.0 standard drinks    Drug use: No    Sexual activity: Never     Review of Systems   Constitutional: Negative for activity change, appetite change, chills, fatigue, fever and unexpected weight change.   HENT: Negative for congestion, ear pain, sore throat and trouble swallowing.    Eyes: Negative for pain, redness and itching.   Respiratory: Negative for cough, shortness of breath and wheezing.    Cardiovascular: Negative for chest pain, palpitations and leg swelling.   Gastrointestinal: Negative for abdominal distention, abdominal pain, anal bleeding, blood in stool, constipation, diarrhea, nausea, rectal pain and vomiting.   Endocrine: Negative for cold intolerance, heat intolerance and polyuria.   Genitourinary: Negative for dysuria, flank pain, frequency and hematuria.   Musculoskeletal: Negative for gait problem, joint swelling and neck pain.   Skin: Negative for color change, rash and wound.   Allergic/Immunologic: Negative for environmental allergies and immunocompromised state.   Neurological: Negative for dizziness, speech difficulty, weakness and numbness.   Psychiatric/Behavioral: Negative for agitation, confusion and hallucinations.     Objective:      Vital Signs (Most Recent):  Temp: 97 °F (36.1 °C) (11/15/19 0655)  Pulse: 102 (11/15/19 0655)  Resp: 20 (11/15/19 0655)  BP: 132/63 (11/15/19 0655)  SpO2: 96 % (11/15/19 0655) Vital Signs (24h Range):  Temp:  [97 °F (36.1 °C)] 97 °F (36.1 °C)  Pulse:  [102] 102  Resp:  [20] 20  SpO2:  [96 %] 96 %  BP: (132)/(63) 132/63     Weight: 65.3 kg (143 lb 15.4 oz)  Body mass index is 28.12 kg/m².    Physical Exam   Constitutional: She is oriented to person, place, and time. She appears well-developed.   HENT:   Head: Normocephalic and atraumatic.   Eyes: Conjunctivae and EOM are normal.   Neck: Normal range of motion. No thyromegaly present.   Cardiovascular: Normal rate and regular rhythm.   Pulmonary/Chest: Effort normal. No respiratory distress.   Abdominal: Soft. She exhibits no distension and no mass. There is no tenderness.   Musculoskeletal: Normal range of motion. She exhibits no edema or tenderness.   Neurological: She is alert and oriented to person, place, and time.   Skin: Skin is warm and dry. Capillary refill takes less than 2 seconds. No rash noted.   Psychiatric: She has a normal mood and affect.         Assessment/Plan:     Patient is a 69 y.o. female who presents for colonoscopy     - Ok to proceed to endoscopy suite for colonoscopy  - Consent obtained. All risks, benefits and alternatives fully explained to patient, including but not limited to bleeding, infection, perforation, and missed polyps. All questions appropriately answered to patient's satisfaction. Consent signed and placed on chart.    Active Diagnoses:    Diagnosis Date Noted POA    Screening for colon cancer [Z12.11] 11/15/2019 Not Applicable      Problems Resolved During this Admission:     VTE Risk Mitigation (From admission, onward)    None          Marko Vicente MD  Colon and Rectal Surgery  [facility]

## 2019-11-15 NOTE — [employer] PATIENT INSTRUCTIONS
Discharge Summary/Instructions after an Endoscopic Procedure  Patient Name: Lucia Barlow  Patient MRN: [id number]  Patient YOB: 1950  Friday, November 15, 2019 Marko Vicente MD  RESTRICTIONS:  During your procedure today, you received medications for sedation.  These   medications may affect your judgment, balance and coordination.  Therefore,   for 24 hours, you have the following restrictions:   - DO NOT drive a car, operate machinery, make legal/financial decisions,   sign important papers or drink alcohol.    ACTIVITY:  Today: no heavy lifting, straining or running due to procedural   sedation/anesthesia.  The following day: return to full activity including work.  DIET:  Eat and drink normally unless instructed otherwise.     TREATMENT FOR COMMON SIDE EFFECTS:  - Mild abdominal pain, nausea, belching, bloating or excessive gas:  rest,   eat lightly and use a heating pad.  - Sore Throat: treat with throat lozenges and/or gargle with warm salt   water.  - Because air was used during the procedure, expelling large amounts of air   from your rectum or belching is normal.  - If a bowel prep was taken, you may not have a bowel movement for 1-3 days.    This is normal.  SYMPTOMS TO WATCH FOR AND REPORT TO YOUR PHYSICIAN:  1. Abdominal pain or bloating, other than gas cramps.  2. Chest pain.  3. Back pain.  4. Signs of infection such as: chills or fever occurring within 24 hours   after the procedure.  5. Rectal bleeding, which would show as bright red, maroon, or black stools.   (A tablespoon of blood from the rectum is not serious, especially if   hemorrhoids are present.)  6. Vomiting.  7. Weakness or dizziness.  GO DIRECTLY TO THE NEAREST EMERGENCY ROOM IF YOU HAVE ANY OF THE FOLLOWING:      Difficulty breathing              Chills and/or fever over 101 F   Persistent vomiting and/or vomiting blood   Severe abdominal pain   Severe chest pain   Black, tarry stools   Bleeding- more than one  tablespoon   Any other symptom or condition that you feel may need urgent attention  Your doctor recommends these additional instructions:  If any biopsies were taken, your doctors clinic will contact you in 1 to 2   weeks with any results.  - Discharge patient to home.   - High fiber diet.   - Continue present medications.   - Await pathology results.   - Repeat colonoscopy in 5 years for surveillance.   - Return to primary care physician PRN.  For questions, problems or results please call your physician Marko Vicente MD at Work:  [phone]  If you have any questions about the above instructions, call the GI   department at [phone] or call the endoscopy unit at [phone]   from 7am until 3 pm.  [facility], EMERGENCY ROOM PHONE NUMBER:   [phone]  IF A COMPLICATION OR EMERGENCY SITUATION ARISES AND YOU ARE UNABLE TO REACH   YOUR PHYSICIAN - GO DIRECTLY TO THE EMERGENCY ROOM.  I have read or have had read to me these discharge instructions for my   procedure and have received a written copy.  I understand these   instructions and will follow-up with my physician if I have any questions.     __________________________________       _____________________________________  Nurse Signature                                          Patient/Designated   Responsible Party Signature  MD Marko Solis MD  11/15/2019 8:17:37 AM  This report has been verified and signed electronically.  [employer]

## 2019-11-15 NOTE — TRANSFER OF CARE
Anesthesia Transfer of Care Note    Patient: Lucia Barlow    Procedure(s) Performed: Procedure(s) (LRB):  COLONOSCOPY (N/A)    Patient location: PACU    Anesthesia Type: MAC    Transport from OR: Transported from OR on room air with adequate spontaneous ventilation    Post pain: adequate analgesia    Post assessment: no apparent anesthetic complications    Post vital signs: stable    Level of consciousness: awake    Nausea/Vomiting: no nausea/vomiting    Complications: none    Transfer of care protocol was followed      Last vitals:   Visit Vitals  BP (!) 121/58 (BP Location: Left arm, Patient Position: Lying)   Pulse 96   Temp 36.2 °C (97.2 °F) (Temporal)   Resp 18   Ht 5' (1.524 m)   Wt 65.3 kg (143 lb 15.4 oz)   SpO2 100%   Breastfeeding? No   BMI 28.12 kg/m²

## 2019-11-15 NOTE — DISCHARGE INSTRUCTIONS
Diverticulosis    Diverticulosis means that small pouches have formed in the wall of your large intestine (colon). Most often, this problem causes no symptoms and is common as people age. But the pouches in the colon are at risk of becoming infected. When this happens, the condition is called diverticulitis. Although most people with diverticulosis never develop diverticulitis, it is still not uncommon. Rectal bleeding can also occur and in less common situations, a type of colon inflammation called colitis.  While most people do not have symptoms, some people with diverticulosis may have:  · Abdominal cramps and pain  · Bloating  · Constipation  · Change in bowel habits  Causes  The exact cause of diverticulosis (and diverticulitis) has not been proved, but a few things are associated with the condition:  · Low-fiber diet  · Constipation  · Lack of exercise  Your healthcare provider will talk with you about how to manage your condition. Diet changes may be all that are needed to help control diverticulosis and prevent progression to diverticulitis. If you develop diverticulitis, you will likely need other treatments.  Home care  You may be told to take fiber supplements daily. Fiber adds bulk to the stool so that it passes through the colon more easily. Stool softeners may be recommended. You may also be given medications for pain relief. Be sure to take all medications as directed.  In the past, people were told to avoid corn, nuts, and seeds. This is no longer necessary.  Follow these guidelines when caring for yourself at home:  · Eat unprocessed foods that are high in fiber. Whole grains, fruits, and vegetables are good choices.  · Drink 6 to 8 glasses of water every day unless your healthcare provider has you limit how much fluid you should have.  · Watch for changes in your bowel movements. Tell your provider if you notice any changes.  · Begin an exercise program. Ask your provider how to get started.  Generally, walking is the best.  · Get plenty of rest and sleep.  Follow-up care  Follow up with your healthcare provider, or as advised. Regular visits may be needed to check on your health. Sometimes special procedures such as colonoscopy, are needed after an episode of diverticulitis or blooding. Be sure to keep all your appointments.  If a stool sample was taken, or cultures were done, you should be told if they are positive, or if your treatment needs to be changed. You can call as directed for the results.  If X-rays were done, a radiologist will look at them. You will be told if there is a change in your treatment.  If antibiotics were prescribed, be sure to finish them all.  When to seek medical advice  Call your healthcare provider right away if any of these occur:  · Fever of 100.4°F (38°C) or higher, or as directed by your healthcare provider  · Severe cramps in the lower left side of the abdomen or pain that is getting worse  · Tenderness in the lower left side of the abdomen or worsening pain throughout the abdomen  · Diarrhea or constipation that doesn't get better within 24 hours  · Nausea and vomiting  · Bleeding from the rectum  Call 911  Call emergency services if any of the following occur:  · Trouble breathing  · Confusion  · Very drowsy or trouble awakening  · Fainting or loss of consciousness  · Rapid heart rate  · Chest pain  Date Last Reviewed: 12/30/2015 © 2000-2017 [employer]. [address] [place] [address]. All rights reserved. This information is not intended as a substitute for professional medical care. Always follow your healthcare professional's instructions.        Understanding Colon and Rectal Polyps    The colon (also called the large intestine) is a muscular tube that forms the last part of the digestive tract. It absorbs water and stores food waste. The colon is about 4 to 6 feet long. The rectum is the last 6 inches of the colon. The colon and rectum  have a smooth lining composed of millions of cells. Changes in these cells can lead to growths in the colon that can become cancerous and should be removed. Multiple tests are available to screen for colon cancer, but the colonoscopy is the most recommended test. During colonoscopy, these polyps can be removed. How often you need this test depends on many things including your condition, your family history, symptoms, and what the findings were at the previous colonoscopy.   When the colon lining changes  Changes that happen in the cells that line the colon or rectum can lead to growths called polyps. Over a period of years, polyps can turn cancerous. Removing polyps early may prevent cancer from ever forming.  Polyps  Polyps are fleshy clumps of tissue that form on the lining of the colon or rectum. Small polyps are usually benign (not cancerous). However, over time, cells in a polyp can change and become cancerous. Certain types of polyps known as adenomatous polyps are premalignant. The risk for invasive cancer increases with the size of the polyp and certain cell and gene features. This means that they can become cancerous if they're not removed. Hyperplastic polyps are benign. They can grow quite large and not turn cancerous.   Cancer  Almost all colorectal cancers start when polyp cells begin growing abnormally. As a cancerous tumor grows, it may involve more and more of the colon or rectum. In time, cancer can also grow beyond the colon or rectum and spread to nearby organs or to glands called lymph nodes. The cells can also travel to other parts of the body. This is known as metastasis. The earlier a cancerous tumor is removed, the better the chance of preventing its spread.    Date Last Reviewed: 8/1/2016  © 7137-1449 [employer]. [address] [place] [address], [place] [address]. All rights reserved. This information is not intended as a substitute for professional medical care. Always follow your  healthcare professional's instructions.

## 2019-11-15 NOTE — ANESTHESIA POSTPROCEDURE EVALUATION
Anesthesia Post Evaluation    Patient: Lucia Barlow    Procedure(s) Performed: Procedure(s) (LRB):  COLONOSCOPY (N/A)    Final Anesthesia Type: MAC    Patient location during evaluation: PACU  Patient participation: Yes- Able to Participate  Level of consciousness: awake and alert, oriented and awake  Post-procedure vital signs: reviewed and stable  Pain management: adequate  Airway patency: patent    PONV status at discharge: No PONV  Anesthetic complications: no      Cardiovascular status: blood pressure returned to baseline  Respiratory status: unassisted and spontaneous ventilation  Hydration status: euvolemic  Follow-up not needed.          Vitals Value Taken Time   /57 11/15/2019  8:37 AM   Temp 36.2 °C (97.2 °F) 11/15/2019  8:17 AM   Pulse 94 11/15/2019  8:37 AM   Resp 18 11/15/2019  8:37 AM   SpO2 98 % 11/15/2019  8:37 AM         Event Time     Out of Recovery 08:47:36          Pain/Ada Score: Ada Score: 10 (11/15/2019  8:37 AM)

## 2019-11-15 NOTE — ANESTHESIA RELEASE NOTE
Anesthesia Release from PACU Note    Patient: Lucia Barlow    Procedure(s) Performed: Procedure(s) (LRB):  COLONOSCOPY (N/A)    Anesthesia type: MAC    Post pain: Adequate analgesia    Post assessment: no apparent anesthetic complications    Last Vitals:   Visit Vitals  BP (!) 121/58 (BP Location: Left arm, Patient Position: Lying)   Pulse 96   Temp 36.2 °C (97.2 °F) (Temporal)   Resp 18   Ht 5' (1.524 m)   Wt 65.3 kg (143 lb 15.4 oz)   SpO2 100%   Breastfeeding? No   BMI 28.12 kg/m²       Post vital signs: stable    Level of consciousness: awake    Nausea/Vomiting: no nausea/no vomiting    Complications: none    Airway Patency: patent    Respiratory: unassisted    Cardiovascular: stable and blood pressure at baseline    Hydration: euvolemic

## 2019-11-15 NOTE — ANESTHESIA POSTPROCEDURE EVALUATION
Anesthesia Post Evaluation    Patient: Lucia Barlow    Procedure(s) Performed: Procedure(s) (LRB):  COLONOSCOPY (N/A)    Final Anesthesia Type: MAC    Patient location during evaluation: PACU  Patient participation: Yes- Able to Participate  Level of consciousness: awake and alert  Post-procedure vital signs: reviewed and stable  Pain management: adequate  Airway patency: patent    PONV status at discharge: No PONV      Cardiovascular status: blood pressure returned to baseline  Respiratory status: unassisted  Hydration status: euvolemic            Vitals Value Taken Time   /58 11/15/2019  8:27 AM   Temp 36.2 °C (97.2 °F) 11/15/2019  8:17 AM   Pulse 96 11/15/2019  8:27 AM   Resp 18 11/15/2019  8:27 AM   SpO2 100 % 11/15/2019  8:27 AM         No case tracking events are documented in the log.      Pain/Ada Score: Ada Score: 10 (11/15/2019  8:27 AM)

## 2019-11-15 NOTE — BRIEF OP NOTE
[facility]  Brief Operative Note     SUMMARY     Surgery Date: 11/15/2019     Surgeon(s) and Role:     * Marko Vicente MD - Primary    Assisting Surgeon: None    Pre-op Diagnosis:  Screening [Z13.9]    Post-op Diagnosis:  Post-Op Diagnosis Codes:     * Screening [Z13.9]    Procedure(s) (LRB):  COLONOSCOPY (N/A)    Anesthesia: Choice    Description of the findings of the procedure: See Op Note    Findings/Key Components: See Op Note    Estimated Blood Loss: * No values recorded between 11/15/2019 12:00 AM and 11/15/2019  8:17 AM *         Specimens:   Specimen (12h ago, onward)    None          Discharge Note    SUMMARY     Admit Date: 11/15/2019    Discharge Date and Time: 11/15/2019 8:17 AM    Hospital Course Patient was seen in the preoperative area by both myself and anesthesia. All consents were verified and all questions appropriately answered. All risks, benefits and alternatives explained to patient. Patient proceeded to endoscopy suite for colonoscopy and was discharged home postoperative once cleared by anesthesia.    Final Diagnosis: Post-Op Diagnosis Codes:     * Screening [Z13.9]    Disposition: Home or Self Care    Follow Up/Patient Instructions: See Provation report    Medications:  Reconciled Home Medications:      Medication List      CONTINUE taking these medications    * albuterol 2.5 mg /3 mL (0.083 %) nebulizer solution  Commonly known as:  PROVENTIL  every 6 (six) hours as needed.     * VENTOLIN HFA 90 mcg/actuation inhaler  Generic drug:  albuterol  Inhale 2 puffs into the lungs every 4 (four) hours as needed for Wheezing.     amlodipine-benazepril 5-20 mg 5-20 mg per capsule  Commonly known as:  LOTREL  Take 1 capsule by mouth once daily.     aspirin 81 MG EC tablet  Commonly known as:  ECOTRIN  Take 1 tablet (81 mg total) by mouth once daily. (FOR HEART HEALTH)     blood sugar diagnostic Strp  1 each by Misc.(Non-Drug; Combo Route) route 3 (three) times daily. [facility]  test strips     blood-glucose meter kit  Insurance preferred     BREO ELLIPTA 100-25 mcg/dose diskus inhaler  Generic drug:  fluticasone furoate-vilanterol  Inhale 1 puff into the lungs once daily.     calcium-vitamin D 600 mg(1,500mg) -400 unit Tab  Commonly known as:  CALCIUM WITH VITAMIN D  Take 2 tablets by mouth once daily.     chlorthalidone 25 MG Tab  Commonly known as:  HYGROTEN  Take 1 tablet (25 mg total) by mouth once daily.     ezetimibe 10 mg tablet  Commonly known as:  ZETIA  TAKE 1 TABLET ONE TIME DAILY     fluticasone propionate 50 mcg/actuation nasal spray  Commonly known as:  FLONASE  2 sprays (100 mcg total) by Each Nostril route once daily.     glimepiride 2 MG tablet  Commonly known as:  AMARYL  Take 1 tablet (2 mg total) by mouth before breakfast.     * lancets 32 gauge Misc  1 lancet by Misc.(Non-Drug; Combo Route) route 3 (three) times daily. Insurance preferred     * TRUEPLUS LANCETS 33 gauge Misc  Generic drug:  lancets     metFORMIN 500 MG 24 hr tablet  Commonly known as:  GLUCOPHAGE-XR  Take 1 tablet (500 mg total) by mouth 2 (two) times daily with meals.     pantoprazole 40 MG tablet  Commonly known as:  PROTONIX  Take 1 tablet (40 mg total) by mouth once daily.     phenylephrine-DM-guaifenesin 5- mg/5 mL Liqd  Take by mouth as needed.     potassium 99 mg Tab  Take 1 tablet by mouth once daily.     predniSONE 10 MG tablet  Commonly known as:  DELTASONE  Take 4 tablets by mouth daily for 3 days, then 2 tablets by mouth daily for 3 days, then 1 tablet by mouth daily for 3 days     rosuvastatin 20 MG tablet  Commonly known as:  CRESTOR  Take 1 tablet (20 mg total) by mouth once daily.     SHINGRIX (PF) 50 mcg/0.5 mL injection  Generic drug:  varicella-zoster gE-AS01B (PF)  Inject 0.5 mL (one dose) into muscle now; give second dose at least 2 months later         * This list has 4 medication(s) that are the same as other medications prescribed for you. Read the directions carefully,  and ask your doctor or other care provider to review them with you.              Discharge Procedure Orders   Diet general     Call MD for:  temperature >100.4     Call MD for:  persistent nausea and vomiting     Call MD for:  severe uncontrolled pain     Call MD for:  difficulty breathing, headache or visual disturbances     Call MD for:  redness, tenderness, or signs of infection (pain, swelling, redness, odor or green/yellow discharge around incision site)     Call MD for:  hives     Call MD for:  persistent dizziness or light-headedness     Call MD for:  extreme fatigue     Activity as tolerated     Follow-up Information     L Brodie Sandoval MD.    Specialty:  Family Medicine  Why:  As needed  Contact information:  [id number]  [place] [id number] [phone]

## 2019-11-19 LAB
FINAL PATHOLOGIC DIAGNOSIS: NORMAL
GROSS: NORMAL

## 2019-11-27 ENCOUNTER — CLINICAL SUPPORT (OUTPATIENT)
Dept: PULMONOLOGY | Facility: CLINIC | Age: 69
End: 2019-11-27
Payer: MEDICARE

## 2019-11-27 ENCOUNTER — TELEPHONE (OUTPATIENT)
Dept: PULMONOLOGY | Facility: CLINIC | Age: 69
End: 2019-11-27

## 2019-11-27 ENCOUNTER — OFFICE VISIT (OUTPATIENT)
Dept: PULMONOLOGY | Facility: CLINIC | Age: 69
End: 2019-11-27
Payer: MEDICARE

## 2019-11-27 ENCOUNTER — HOSPITAL ENCOUNTER (OUTPATIENT)
Dept: RADIOLOGY | Facility: HOSPITAL | Age: 69
Discharge: HOME | End: 2019-11-27
Attending: NURSE PRACTITIONER
Payer: MEDICARE

## 2019-11-27 ENCOUNTER — HOSPITAL ENCOUNTER (OUTPATIENT)
Dept: RADIOLOGY | Facility: HOSPITAL | Age: 69
Discharge: HOME | End: 2019-11-27
Attending: INTERNAL MEDICINE
Payer: MEDICARE

## 2019-11-27 VITALS
BODY MASS INDEX: 28.86 KG/M2 | OXYGEN SATURATION: 98 % | DIASTOLIC BLOOD PRESSURE: 50 MMHG | HEART RATE: 89 BPM | WEIGHT: 147 LBS | HEIGHT: 60 IN | SYSTOLIC BLOOD PRESSURE: 122 MMHG | RESPIRATION RATE: 18 BRPM

## 2019-11-27 DIAGNOSIS — J01.81 OTHER ACUTE RECURRENT SINUSITIS: ICD-10-CM

## 2019-11-27 DIAGNOSIS — J01.90 ACUTE SINUSITIS, RECURRENCE NOT SPECIFIED, UNSPECIFIED LOCATION: ICD-10-CM

## 2019-11-27 DIAGNOSIS — J44.9 CHRONIC OBSTRUCTIVE PULMONARY DISEASE, UNSPECIFIED COPD TYPE: Primary | ICD-10-CM

## 2019-11-27 DIAGNOSIS — J44.9 CHRONIC OBSTRUCTIVE PULMONARY DISEASE, UNSPECIFIED COPD TYPE: ICD-10-CM

## 2019-11-27 LAB
BRPFT: ABNORMAL
FEF 25 75 CHG: 75.5 %
FEF 25 75 LLN: 0.54
FEF 25 75 POST REF: 56.2 %
FEF 25 75 PRE REF: 32 %
FEF 25 75 REF: 1.45
FET100 CHG: -17.9 %
FEV1 CHG: 9.7 %
FEV1 FVC CHG: 10.4 %
FEV1 FVC LLN: 66
FEV1 FVC POST REF: 87.3 %
FEV1 FVC PRE REF: 79 %
FEV1 FVC REF: 79
FEV1 LLN: 1.12
FEV1 POST REF: 78.2 %
FEV1 PRE REF: 71.3 %
FEV1 REF: 1.61
FVC CHG: -0.7 %
FVC LLN: 1.44
FVC POST REF: 89.2 %
FVC PRE REF: 89.9 %
FVC REF: 2.05
PEF CHG: -18.5 %
PEF LLN: 2.35
PEF POST REF: 68.6 %
PEF PRE REF: 84.2 %
PEF REF: 4.11
POST FEF 25 75: 0.82 L/S (ref 0.54–2.37)
POST FET 100: 7.52 SEC
POST FEV1 FVC: 68.87 % (ref 65.67–92.15)
POST FEV1: 1.26 L (ref 1.12–2.11)
POST FVC: 1.83 L (ref 1.44–2.66)
POST PEF: 2.82 L/S (ref 2.35–5.86)
PRE FEF 25 75: 0.47 L/S (ref 0.54–2.37)
PRE FET 100: 9.16 SEC
PRE FEV1 FVC: 62.36 % (ref 65.67–92.15)
PRE FEV1: 1.15 L (ref 1.12–2.11)
PRE FVC: 1.84 L (ref 1.44–2.66)
PRE PEF: 3.46 L/S (ref 2.35–5.86)

## 2019-11-27 PROCEDURE — 1159F PR MEDICATION LIST DOCUMENTED IN MEDICAL RECORD: ICD-10-PCS | Mod: HCNC,S$GLB,, | Performed by: INTERNAL MEDICINE

## 2019-11-27 PROCEDURE — 70220 X-RAY EXAM OF SINUSES: CPT | Mod: TC,HCNC

## 2019-11-27 PROCEDURE — 70220 X-RAY EXAM OF SINUSES: CPT | Mod: 26,HCNC,, | Performed by: RADIOLOGY

## 2019-11-27 PROCEDURE — 1159F MED LIST DOCD IN RCRD: CPT | Mod: HCNC,S$GLB,, | Performed by: INTERNAL MEDICINE

## 2019-11-27 PROCEDURE — 70220 XR SINUSES MIN 3 VIEWS: ICD-10-PCS | Mod: 26,HCNC,, | Performed by: RADIOLOGY

## 2019-11-27 PROCEDURE — 3074F SYST BP LT 130 MM HG: CPT | Mod: HCNC,CPTII,S$GLB, | Performed by: INTERNAL MEDICINE

## 2019-11-27 PROCEDURE — 71046 X-RAY EXAM CHEST 2 VIEWS: CPT | Mod: 26,HCNC,, | Performed by: RADIOLOGY

## 2019-11-27 PROCEDURE — 1101F PR PT FALLS ASSESS DOC 0-1 FALLS W/OUT INJ PAST YR: ICD-10-PCS | Mod: HCNC,CPTII,S$GLB, | Performed by: INTERNAL MEDICINE

## 2019-11-27 PROCEDURE — 3074F PR MOST RECENT SYSTOLIC BLOOD PRESSURE < 130 MM HG: ICD-10-PCS | Mod: HCNC,CPTII,S$GLB, | Performed by: INTERNAL MEDICINE

## 2019-11-27 PROCEDURE — 99999 PR PBB SHADOW E&M-EST. PATIENT-LVL V: ICD-10-PCS | Mod: PBBFAC,HCNC,, | Performed by: INTERNAL MEDICINE

## 2019-11-27 PROCEDURE — 99215 PR OFFICE/OUTPT VISIT, EST, LEVL V, 40-54 MIN: ICD-10-PCS | Mod: 25,HCNC,S$GLB, | Performed by: INTERNAL MEDICINE

## 2019-11-27 PROCEDURE — 3078F PR MOST RECENT DIASTOLIC BLOOD PRESSURE < 80 MM HG: ICD-10-PCS | Mod: HCNC,CPTII,S$GLB, | Performed by: INTERNAL MEDICINE

## 2019-11-27 PROCEDURE — 99215 OFFICE O/P EST HI 40 MIN: CPT | Mod: 25,HCNC,S$GLB, | Performed by: INTERNAL MEDICINE

## 2019-11-27 PROCEDURE — 71046 X-RAY EXAM CHEST 2 VIEWS: CPT | Mod: TC,HCNC

## 2019-11-27 PROCEDURE — 94060 EVALUATION OF WHEEZING: CPT | Mod: HCNC,S$GLB,, | Performed by: INTERNAL MEDICINE

## 2019-11-27 PROCEDURE — 99999 PR PBB SHADOW E&M-EST. PATIENT-LVL V: CPT | Mod: PBBFAC,HCNC,, | Performed by: INTERNAL MEDICINE

## 2019-11-27 PROCEDURE — 94060 PR EVAL OF BRONCHOSPASM: ICD-10-PCS | Mod: HCNC,S$GLB,, | Performed by: INTERNAL MEDICINE

## 2019-11-27 PROCEDURE — 3078F DIAST BP <80 MM HG: CPT | Mod: HCNC,CPTII,S$GLB, | Performed by: INTERNAL MEDICINE

## 2019-11-27 PROCEDURE — 1101F PT FALLS ASSESS-DOCD LE1/YR: CPT | Mod: HCNC,CPTII,S$GLB, | Performed by: INTERNAL MEDICINE

## 2019-11-27 PROCEDURE — 71046 XR CHEST PA AND LATERAL: ICD-10-PCS | Mod: 26,HCNC,, | Performed by: RADIOLOGY

## 2019-11-27 RX ORDER — PREDNISONE 20 MG/1
TABLET ORAL
Qty: 20 TABLET | Refills: 1 | Status: SHIPPED | OUTPATIENT
Start: 2019-11-27 | End: 2019-12-13

## 2019-11-27 RX ORDER — AMOXICILLIN AND CLAVULANATE POTASSIUM 875; 125 MG/1; MG/1
1 TABLET, FILM COATED ORAL EVERY 12 HOURS
Qty: 28 TABLET | Refills: 0 | Status: SHIPPED | OUTPATIENT
Start: 2019-11-27 | End: 2019-12-11

## 2019-11-27 NOTE — TELEPHONE ENCOUNTER
----- Message from Ramiro Aleman MD sent at 11/27/2019 11:28 AM CST -----  Contact: RADIOLOGY  Not medically urgent, please reschedule the patient.  CT of sinuses needs to be performed prior to ENT appointment  ----- Message -----  From: Emmett Wang LPN  Sent: 11/27/2019  10:29 AM CST  To: Ramiro Aleman MD        ----- Message -----  From: RT Abram  Sent: 11/27/2019  10:16 AM CST  To: [facility] Staff    Good Morning, patient has a CT Scan scheduled for tomorrow, and we need to allow time for insurance approval, is the test medically urgent? if not medically urgent we may need to re-schedule to allow Insurance time to approve test. If you have any questions call ext [phone].  Thanks  [facility]    IF TEST IS NOT MEDICALLY URGENT, PLEASE RE-SCHEDULE PATIENT AT LEAST 3-4 DAYS OUT TO ALLOW TIME FOR INSURANCE TO PROCESS APPROVAL

## 2019-11-27 NOTE — PROGRESS NOTES
Subjective:       Patient ID: Lucia Barlow is a 69 y.o. female.    Chief Complaint: She         Sinus Pain  Patient complains of congestion, cough, itchy eyes, nasal congestion, post nasal drip, puffiness of the eyes and sinus pressure. Onset of symptoms was 1 month ago. Symptoms have been gradually worsening since that time. She is drinking plenty of fluids.  Past history is significant for COPD. Patient is former smoker, quit 3 years ago.    COPD    COPD   Associated symptoms include congestion, coughing and fatigue. Pertinent negatives include no abdominal pain, chest pain, fever, nausea, rash or weakness.        Lung Nodule  She presents for evaluation and treatment of a lung nodule. The patient had an abnormal imaging study but reports experiencing no current or past pulmonary symptoms. The patient denies other associated symptoms. She quit smoking approximately 1 year ago. The patient has no known exposure to tuberculosis and no history of PPD placement. The patient does not have a history of cancer.     COPD  She presents for evaluation and treatment of COPD. The patient is currently having symptoms / an exacerbation. Current symptoms include chronic dyspnea, cough productive of white sputum in small amounts and wheezing. Symptoms have been present since several months ago and have been unchanged. She denies chills and fever. Associated symptoms include fatigue and shortness of breath.  This episode appears to have been triggered by no identifiable factor. Treatments tried for the current exacerbation: none. The patient has been having similar episodes for approximately 1 years. She uses 1 pillows at night. Patient currently is not on home oxygen therapy.. The patient is having no constitutional symptoms, denying fever, chills, anorexia, or weight loss. The patient has not been hospitalized for this condition before. She quit smoking approximately 1 year ago. The patient is experiencing exercise  intolerance (difficulty walking 3 blocks on flat ground and difficulty climbing 1 flights of stairs).          Past Medical History:     Past Medical History:   Diagnosis Date    Atherosclerosis of artery of both lower extremities 1/17/2014    Atherosclerosis of native coronary artery of native heart without angina pectoris 11/18/2016    Atherosclerotic PVD with intermittent claudication 1/17/2014    Chronic bronchitis     COPD (chronic obstructive pulmonary disease)     Dyslipidemia associated with type 2 diabetes mellitus 6/14/2013    Dyslipidemia associated with type 2 diabetes mellitus     Ex-smoker     Gastroesophageal reflux disease without esophagitis 1/25/2019    Hyperlipidemia     Hypertension     Osteoporosis 1/16 rosita 1/18    PVD (peripheral vascular disease)     S/P peripheral artery angioplasty 2/7/2014    Simple chronic bronchitis     Tobacco dependence     Type 2 diabetes mellitus with microalbuminuria, without long-term current use of insulin 3/29/2019    Type 2 diabetes mellitus with peripheral vascular disease     Type 2 diabetes mellitus with stage 3 chronic kidney disease, without long-term current use of insulin 2/1/2019    Type 2 diabetes mellitus without retinopathy 2/1/2019     Past Surgical History:   Procedure Laterality Date    ANGIOPLASTY Bilateral 01/24/2014    aortoiliac stenting     CARPAL TUNNEL RELEASE  2003    Henrry    COLECTOMY  approximate 2005    pt states 1in colon -dx with benign mass removed-states no colon cancer    COLONOSCOPY N/A 11/9/2016    Procedure: COLONOSCOPY;  Surgeon: Dmitri Sterling MD;  Location: [facility] END[facility];  Service: Endoscopy;  Laterality: N/A;    COLONOSCOPY N/A 11/15/2019    Procedure: COLONOSCOPY;  Surgeon: Marko Vicente MD;  Location: [facility] ENDO;  Service: Endoscopy;  Laterality: N/A;    HYSTERECTOMY      no cancer     Social History     Socioeconomic History    Marital status:      Spouse name: Not on file    Number  of children: 4    Years of education: Not on file    Highest education level: Not on file   Occupational History    Occupation:    Social Needs    Financial resource strain: Not on file    Food insecurity:     Worry: Not on file     Inability: Not on file    Transportation needs:     Medical: Not on file     Non-medical: Not on file   Tobacco Use    Smoking status: Former Smoker     Packs/day: 0.25     Years: 50.00     Pack years: 12.50     Types: Cigarettes     Last attempt to quit: 1/11/2016     Years since quitting: 3.8    Smokeless tobacco: Never Used   Substance and Sexual Activity    Alcohol use: No     Alcohol/week: 0.0 standard drinks    Drug use: No    Sexual activity: Never   Lifestyle    Physical activity:     Days per week: Not on file     Minutes per session: Not on file    Stress: Not on file   Relationships    Social connections:     Talks on phone: Not on file     Gets together: Not on file     Attends [employer] service: Not on file     Active member of club or organization: Not on file     Attends meetings of clubs or organizations: Not on file     Relationship status: Not on file   Other Topics Concern    Not on file   Social History Narrative    Son smoker, no pets in household.     Review of Systems   Constitutional: Positive for fatigue. Negative for fever.   HENT: Positive for postnasal drip, rhinorrhea and congestion.    Eyes: Negative for redness and itching.   Respiratory: Positive for cough, sputum production, shortness of breath, dyspnea on extertion, use of rescue inhaler and Paroxysmal Nocturnal Dyspnea.    Cardiovascular: Negative for chest pain, palpitations and leg swelling.   Genitourinary: Negative for difficulty urinating and hematuria.   Endocrine: Negative for cold intolerance and heat intolerance.    Skin: Negative for rash.   Gastrointestinal: Negative for nausea and abdominal pain.   Neurological: Negative for dizziness, syncope, weakness and  light-headedness.   Hematological: Negative for adenopathy. Does not bruise/bleed easily.   Psychiatric/Behavioral: Negative for sleep disturbance. The patient is not nervous/anxious.        Objective:      Physical Exam   Constitutional: She is oriented to person, place, and time. She appears well-developed and well-nourished.   HENT:   Head: Normocephalic and atraumatic.   Mouth/Throat: Oropharyngeal exudate present.   Eyes: Pupils are equal, round, and reactive to light. Conjunctivae are normal.   Neck: Neck supple. No JVD present. No tracheal deviation present. No thyromegaly present.   Cardiovascular: Normal rate, regular rhythm and normal heart sounds.   Pulmonary/Chest: Effort normal. No respiratory distress. She has decreased breath sounds. She has wheezes in the right lower field and the left lower field. She has no rhonchi. She has no rales. She exhibits no tenderness.   Abdominal: Soft. Bowel sounds are normal.   Musculoskeletal: Normal range of motion. She exhibits no edema.   Lymphadenopathy:     She has no cervical adenopathy.   Neurological: She is alert and oriented to person, place, and time.   Skin: Skin is warm and dry.   Nursing note and vitals reviewed.    Personal Diagnostic Review  Chest x-ray: hyperinflation  X-Ray Chest PA And Lateral  Narrative: EXAMINATION:  XR CHEST PA AND LATERAL    CLINICAL HISTORY:  SOB; Chronic obstructive pulmonary disease, unspecified    TECHNIQUE:  PA and lateral views of the chest were performed.    COMPARISON:  September 13, 2019, September 27, 2013    FINDINGS:  Allowing for rotation to the left the heart and pulmonary vasculature are stable and symmetric in appearance.  Trachea normal in position.  Nodular opacity again noted within the left base consistent with known pulmonary nodule which is undergone previous biopsy.  Remainder of the lungs free of acute infiltrate.  No effusion.  CP angles are sharp.  Osteopenia and spondylosis with minimal underlying  scoliosis.  Mild accentuated kyphosis again noted.  Impression: Stable exam without acute infiltrate.    Electronically signed by: Hugo Grossman MD  Date:    11/27/2019  Time:    07:45  X-Ray Sinuses 3 or more views  Narrative: EXAMINATION:  XR SINUSES MIN 3 VIEWS    CLINICAL HISTORY:  Acute sinusitis, unspecified    TECHNIQUE:  AP, lateral, and Choi views of the paranasal sinuses were performed    COMPARISON:  None.    FINDINGS:  Sinuses appear well developed.  There is vague increased density in the left frontal sinus region.  Cannot exclude underlying sinus disease.  No complete opacification or air-fluid level noted.  Midline bony septum.  Impression: Equivocal findings possible frontal sinus disease.    If the clinically warranted consideration should be given for CT.    Electronically signed by: Hugo Grossman MD  Date:    11/27/2019  Time:    07:41      Office Spirometry Results:     No flowsheet data found.  Pulmonary Studies Review 11/27/2019   SpO2 98   Height 60.000   Weight 2352   BMI (Calculated) 28.7   Predicted Distance 284.98   Predicted Distance Meters (Calculated) 425.49         Assessment:       1. Chronic obstructive pulmonary disease, unspecified COPD type    2. Other acute recurrent sinusitis          Lung nodule < 6cm on CT  No further follow-up needed.      Outpatient Encounter Medications as of 11/27/2019   Medication Sig Dispense Refill    albuterol (PROVENTIL) 2.5 mg /3 mL (0.083 %) nebulizer solution every 6 (six) hours as needed.  12    albuterol (VENTOLIN HFA) 90 mcg/actuation inhaler Inhale 2 puffs into the lungs every 4 (four) hours as needed for Wheezing. 18 g 3    amlodipine-benazepril 5-20 mg (LOTREL) 5-20 mg per capsule Take 1 capsule by mouth once daily. 90 capsule 2    aspirin (ECOTRIN) 81 MG EC tablet Take 1 tablet (81 mg total) by mouth once daily. (FOR HEART HEALTH) 90 tablet 3    blood sugar diagnostic Strp 1 each by Misc.(Non-Drug; Combo Route) route 3 (three) times  daily. [facility] test strips 100 each 3    blood-glucose meter kit Insurance preferred 1 each 0    calcium-vitamin D (CALCIUM WITH VITAMIN D) 600 mg(1,500mg) -400 unit Tab Take 2 tablets by mouth once daily. 180 tablet 4    chlorthalidone (HYGROTEN) 25 MG Tab Take 1 tablet (25 mg total) by mouth once daily. 90 tablet 2    ezetimibe (ZETIA) 10 mg tablet TAKE 1 TABLET ONE TIME DAILY 90 tablet 4    fluticasone furoate-vilanterol (BREO ELLIPTA) 100-25 mcg/dose diskus inhaler Inhale 1 puff into the lungs once daily. 60 each 11    fluticasone propionate (FLONASE) 50 mcg/actuation nasal spray 2 sprays (100 mcg total) by Each Nostril route once daily. 16 g 5    glimepiride (AMARYL) 2 MG tablet Take 1 tablet (2 mg total) by mouth before breakfast. 90 tablet 3    lancets 32 gauge Misc 1 lancet by Misc.(Non-Drug; Combo Route) route 3 (three) times daily. Insurance preferred 100 each 11    metFORMIN (GLUCOPHAGE-XR) 500 MG 24 hr tablet Take 1 tablet (500 mg total) by mouth 2 (two) times daily with meals. 180 tablet 3    pantoprazole (PROTONIX) 40 MG tablet Take 1 tablet (40 mg total) by mouth once daily. 90 tablet 3    phenylephrine-DM-guaifenesin 5- mg/5 mL Liqd Take by mouth as needed.      potassium 99 mg Tab Take 1 tablet by mouth once daily.      rosuvastatin (CRESTOR) 20 MG tablet Take 1 tablet (20 mg total) by mouth once daily. 90 tablet 3    TRUEPLUS LANCETS 33 gauge Misc       varicella-zoster gE-AS01B, PF, (SHINGRIX) 50 mcg/0.5 mL injection Inject 0.5 mL (one dose) into muscle now; give second dose at least 2 months later 0.5 mL 1    [DISCONTINUED] predniSONE (DELTASONE) 10 MG tablet Take 4 tablets by mouth daily for 3 days, then 2 tablets by mouth daily for 3 days, then 1 tablet by mouth daily for 3 days 21 tablet 0    amoxicillin-clavulanate 875-125mg (AUGMENTIN) 875-125 mg per tablet Take 1 tablet by mouth every 12 (twelve) hours. for 14 days 28 tablet 0    predniSONE (DELTASONE) 20 MG tablet  Prednisone 60 mg/ day for 3 days, 40 mg/day for 3 days,20 mg/ day for 3 days, (1/2 tablet )10 mg a day for 3 days. 20 tablet 1     No facility-administered encounter medications on file as of 11/27/2019.      Orders Placed This Encounter   Procedures    CT Sinuses without Contrast     NO CONTRAST     Standing Status:   Future     Standing Expiration Date:   11/26/2020     Order Specific Question:   Is the patient allergic to iodine or contrast? Has a steroid / antihistamine prep been administered?     Answer:   Yes     Order Specific Question:   Is the patient on ANY Metformin drug such as Glucophage/Glucovance?           Should be off drug 48 hours after contrast. Check renal function before restart.     Answer:   Yes     Order Specific Question:   Does the patient have any of the following risk factors?     Answer:   Diabetes Mellitus    X-Ray Chest PA And Lateral     Standing Status:   Future     Standing Expiration Date:   5/27/2021     Order Specific Question:   Reason for Exam:     Answer:   SOB    Ambulatory Referral to ENT     Referral Priority:   Routine     Referral Type:   Consultation     Referral Reason:   Specialty Services Required     Requested Specialty:   Otolaryngology     Number of Visits Requested:   1    Spirometry without Bronchodilator     Standing Status:   Future     Standing Expiration Date:   5/27/2021     Plan:       Requested Prescriptions     Signed Prescriptions Disp Refills    predniSONE (DELTASONE) 20 MG tablet 20 tablet 1     Sig: Prednisone 60 mg/ day for 3 days, 40 mg/day for 3 days,20 mg/ day for 3 days, (1/2 tablet )10 mg a day for 3 days.    amoxicillin-clavulanate 875-125mg (AUGMENTIN) 875-125 mg per tablet 28 tablet 0     Sig: Take 1 tablet by mouth every 12 (twelve) hours. for 14 days     Chronic obstructive pulmonary disease, unspecified COPD type  -     X-Ray Chest PA And Lateral; Future; Expected date: 11/27/2019  -     Spirometry without Bronchodilator; Future;  Expected date: 11/27/2019    Other acute recurrent sinusitis  -     predniSONE (DELTASONE) 20 MG tablet; Prednisone 60 mg/ day for 3 days, 40 mg/day for 3 days,20 mg/ day for 3 days, (1/2 tablet )10 mg a day for 3 days.  Dispense: 20 tablet; Refill: 1  -     amoxicillin-clavulanate 875-125mg (AUGMENTIN) 875-125 mg per tablet; Take 1 tablet by mouth every 12 (twelve) hours. for 14 days  Dispense: 28 tablet; Refill: 0  -     CT Sinuses without Contrast; Future; Expected date: 11/27/2019  -     Ambulatory Referral to ENT           Follow up in about 9 months (around 9/1/2020) for Review rick and CXR.    MEDICAL DECISION MAKING: Moderate to high complexity.  Overall, the multiple problems listed are of moderate to high severity that may impact quality of life and activities of daily living. Side effects of medications, treatment plan as well as options and alternatives reviewed and discussed with patient. There was counseling of patient concerning these issues.    Total time spent in face to face counseling and coordination of care - 45  minutes over 50% of time was used in discussion of prognosis, risks, benefits of treatment, instructions and compliance with regimen . Discussion with other physicians or health care providers (DME, NP, pharmacy, respiratory therapy) occurred.

## 2019-11-27 NOTE — PATIENT INSTRUCTIONS
[employer] True HEPA Air Purifier with Allergen Remover - Black, [id number]    [employer] Air Purifiers are the #1 brand recommended by allergists.  Captures up to 99.97% of microscopic allergens, 0.3 microns or larger from the air that passes through the filter.  Helps reduce odors, VOCs, and certain germs.  Activated carbon pre-filter helps reduce unpleasant odors.  Easy tap controls, with 4 air cleaning levels, including a Turbo Power Cleaning Level.  [employer] qualified.  2, 4, or 8 hour automatic shut-off timer.  Quiet operation.  Control panel light dimmer.

## 2019-11-29 ENCOUNTER — HOSPITAL ENCOUNTER (OUTPATIENT)
Dept: RADIOLOGY | Facility: HOSPITAL | Age: 69
Discharge: HOME | End: 2019-11-29
Attending: INTERNAL MEDICINE
Payer: MEDICARE

## 2019-11-29 DIAGNOSIS — J01.81 OTHER ACUTE RECURRENT SINUSITIS: ICD-10-CM

## 2019-11-29 PROCEDURE — 70486 CT MAXILLOFACIAL W/O DYE: CPT | Mod: TC,HCNC

## 2019-11-29 PROCEDURE — 70486 CT SINUSES WITHOUT CONTRAST: ICD-10-PCS | Mod: 26,HCNC,, | Performed by: RADIOLOGY

## 2019-11-29 PROCEDURE — 70486 CT MAXILLOFACIAL W/O DYE: CPT | Mod: 26,HCNC,, | Performed by: RADIOLOGY

## 2019-12-04 NOTE — PROGRESS NOTES
Digital Medicine: Health  Follow-Up    Patient reports she is well. Reports she is dealing with sinus issues, will be having xray tomorrow. Recently purchased an air purifier. Spent majority of conversation discussing this. Will assess lifestyle again next call.     BP in the office on 11/27/19 was 122/50.     The history is provided by the patient.     Follow Up  Follow-up reason(s): reading review and routine education      Readings are trending up due to steroid pack and antibiotics for sinus infection.        INTERVENTION(S)  encouragement/support, denied further coaching, denied resources and denied questions    PLAN  patient verbalizes understanding, demonstrates understanding via teach back, patient unable to make changes at this time and continue monitoring      There are no preventive care reminders to display for this patient.    Last 5 Patient Entered Readings                                      Current 30 Day Average: 144/70     Recent Readings 12/3/2019 11/30/2019 11/28/2019 11/25/2019 11/15/2019    SBP (mmHg) 162 141 141 145 157    DBP (mmHg) 74 77 66 69 75    Pulse 86 92 100 84 109        Last 6 Patient Entered Readings                                          Most Recent A1c: 6.5% on 8/12/2019  (Goal: 7%)     Recent Readings 12/3/2019 11/30/2019 11/25/2019 11/20/2019 11/15/2019    Blood Glucose (mg/dL) 96 76 100 106 140                    Diet Screening   No change to diet.      Physical Activity Screening   No change to exercise routine.        Medication Adherence Screening   She misses doses: never        SDOH

## 2019-12-05 NOTE — PROGRESS NOTES
Subjective:   Patient: Lucia Barlow [id number], :1950   Visit date:2019 3:40 PM    Chief Complaint:  Other (pt states she has sinus issues and headaches.)    HPI:  Lucia is a 69 y.o. female who presented to clinic on 19 for sinusitis. She was tx with Augmentin in September and again in November. Underwent CT sinus on 19.     Primary complaint is headaches  Facial pressure and pain  Uses Flonase  C/o facial swelling  Eyes watering      Review of Systems:  -     Allergic/Immunologic: is allergic to skin staples [surgical stainless steel]; egg derived; fish containing products; iodine and iodide containing products; latex, natural rubber; losartan; nickel; pravastatin; and shellfish containing products..  -     Constitutional: Current temp:      Her meds, allergies, medical, surgical, social & family histories were reviewed & updated:  -     She has a current medication list which includes the following prescription(s): albuterol, albuterol, amlodipine-benazepril 5-20 mg, amoxicillin-clavulanate 875-125mg, aspirin, blood sugar diagnostic, blood-glucose meter, calcium-vitamin d, chlorthalidone, ezetimibe, fluticasone furoate-vilanterol, fluticasone propionate, glimepiride, lancets, metformin, pantoprazole, phenylephrine-dm-guaifenesin, potassium, prednisone, rosuvastatin, trueplus lancets, and varicella-zoster ge-as01b (pf).  -     She  has a past medical history of Atherosclerosis of artery of both lower extremities (2014), Atherosclerosis of native coronary artery of native heart without angina pectoris (2016), Atherosclerotic PVD with intermittent claudication (2014), Chronic bronchitis, COPD (chronic obstructive pulmonary disease), Dyslipidemia associated with type 2 diabetes mellitus (2013), Dyslipidemia associated with type 2 diabetes mellitus, Ex-smoker, Gastroesophageal reflux disease without esophagitis (2019), Hyperlipidemia, Hypertension, Osteoporosis (  "rosita 1/18), PVD (peripheral vascular disease), S/P peripheral artery angioplasty (2/7/2014), Simple chronic bronchitis, Tobacco dependence, Type 2 diabetes mellitus with microalbuminuria, without long-term current use of insulin (3/29/2019), Type 2 diabetes mellitus with peripheral vascular disease, Type 2 diabetes mellitus with stage 3 chronic kidney disease, without long-term current use of insulin (2/1/2019), and Type 2 diabetes mellitus without retinopathy (2/1/2019).   -     She does not have any pertinent problems on file.   -     She  has a past surgical history that includes Carpal tunnel release (2003); Hysterectomy; Angioplasty (Bilateral, 01/24/2014); Colonoscopy (N/A, 11/9/2016); Colectomy (approximate 2005); and Colonoscopy (N/A, 11/15/2019).  -     She  reports that she quit smoking about 3 years ago. Her smoking use included cigarettes. She has a 12.50 pack-year smoking history. She has never used smokeless tobacco. She reports that she does not drink alcohol or use drugs.  -     Her family history includes Asthma in her daughter and son; Breast cancer in her daughter; Diabetes in her daughter; Stroke in her father and sister.  -     She is allergic to skin staples [surgical stainless steel]; egg derived; fish containing products; iodine and iodide containing products; latex, natural rubber; losartan; nickel; pravastatin; and shellfish containing products.    Objective:     Physical Exam:  Vitals:  Ht 5' 2" (1.575 m)   Wt 68.2 kg (150 lb 5.7 oz)   BMI 27.50 kg/m²   Appearance:  Well-developed, well-nourished.  Communication:  Able to communicate, no hoarseness.  Head & Face:  Normocephalic, atraumatic, no sinus tenderness, normal facial strength.  Eyes:  Extraocular motions intact.  Ears:  Otoscopy of external auditory canals and tympanic membranes was normal, clinical speech reception thresholds grossly intact, no mass/lesion of auricle.  Nose:  No masses/lesions of external nose, nasal mucosa, " septum, and turbinates were within normal limits.  Mouth:  No mass/lesion of lips, teeth, gums, hard/soft palate, tongue, tonsils, or oropharynx.  Neck & Lymphatics:  No cervical lymphadenopathy, no neck mass/crepitus/ asymmetry, trachea is midline, no thyroid enlargement/tenderness/mass.  Neuro/Psych: Alert with normal mood and affect.   Abdominal: Normal appearance.   Respiration/Chest:  Symmetric expansion during respiration, normal respiratory effort.  Skin:  Warm and intact  Cardiovascular:  No peripheral vascular edema or varicosities.    CT SINUSES WITHOUT CONTRAST    CLINICAL HISTORY:  Sinusitis, <=12w, uncomplicated;opacified left maxillary sinus;  Other acute recurrent sinusitis    TECHNIQUE:  Axial low-dose images, coronal and sagittal reformations were performed through the paranasal sinuses.  Contrast was not administered.    COMPARISON:  None.    FINDINGS:  No significant nasal septal deviation.  No large rabia bullosa.  Prominent left-sided Jose cell noted.  Ostiomeatal complexes are patent bilaterally.  Paranasal sinuses are clear without significant mucosal thickening.  No sinus wall destructive findings or significant hypertrophy noted.    Visualized mastoid air cells clear.    Orbits and visualized brain base unremarkable.  Facial soft tissues are unremarkable.  Poor dentition noted.      Impression       No significant sinus disease with findings as above..         Assessment & Plan:   Lucia was seen today for other.    Diagnoses and all orders for this visit:    Non-seasonal allergic rhinitis, unspecified trigger  -     Aspergillus fumagatus IgE; Future  -     Bermuda grass IgE; Future  -     Cat epithelium IgE; Future  -     Cladosporium IgE; Future  -     Cockroach, American IgE; Future  -     Kila, bald IgE; Future  -     D. farinae IgE; Future  -     D. pteronyssinus IgE; Future  -     Dog dander IgE; Future  -     Plantain, English IgE; Future  -     Merlin grass IgE; Future  -      Brown elder, rough IgE; Future  -     Mugwort IgE; Future  -     Nettle IgE; Future  -     Oak, white IgE; Future  -     Penicillium IgE; Future  -     Ragweed, short, common IgE; Future  -     Eduardo IgE; Future  -     Allergen, Cocklebur; Future  -     Allergen, Elm Cedar; Future  -     Allergen, Meadow Grass ([facility]); Future  -     Allergen, Mucor Racemosus; Future  -     Allergen, Pecan Winn IgE; Future  -     Allergen, White Dayday; Future  -     Allergen-Alternaria Alternata; Future  -     Allergen-Cedar; Future  -     Allergen-Common Pigweed; Future  -     Allergen-Silver Birch; Future  -     RAST Allergen for Eastern [place]; Future  -     RAST Allergen Maple ([place]); Future  -     RAST Allergen Fair Oaks; Future  -     RAST Allergen, Lamb's Quarters; Future  -     RAST Allergen, Sheep [place]); Future  -     FEATHER PANEL #2; Future  -     CBC auto differential; Future          No evidence CRS.  RAST and CBC. We discussed her medical conditions, treatments and plan.  Lucia should return to clinic if any issues arise (symptoms worsen or persist), otherwise we will see her back in the clinic only as needed.

## 2019-12-06 ENCOUNTER — LAB VISIT (OUTPATIENT)
Dept: LAB | Facility: HOSPITAL | Age: 69
End: 2019-12-06
Attending: OTOLARYNGOLOGY
Payer: MEDICARE

## 2019-12-06 ENCOUNTER — OFFICE VISIT (OUTPATIENT)
Dept: OTOLARYNGOLOGY | Facility: CLINIC | Age: 69
End: 2019-12-06
Payer: MEDICARE

## 2019-12-06 ENCOUNTER — PATIENT MESSAGE (OUTPATIENT)
Dept: INTERNAL MEDICINE | Facility: CLINIC | Age: 69
End: 2019-12-06

## 2019-12-06 VITALS — BODY MASS INDEX: 27.67 KG/M2 | HEIGHT: 62 IN | WEIGHT: 150.38 LBS

## 2019-12-06 DIAGNOSIS — J30.89 NON-SEASONAL ALLERGIC RHINITIS, UNSPECIFIED TRIGGER: ICD-10-CM

## 2019-12-06 DIAGNOSIS — J30.89 NON-SEASONAL ALLERGIC RHINITIS, UNSPECIFIED TRIGGER: Primary | ICD-10-CM

## 2019-12-06 LAB
BASOPHILS # BLD AUTO: 0.01 K/UL (ref 0–0.2)
BASOPHILS NFR BLD: 0.1 % (ref 0–1.9)
DIFFERENTIAL METHOD: ABNORMAL
EOSINOPHIL # BLD AUTO: 0 K/UL (ref 0–0.5)
EOSINOPHIL NFR BLD: 0 % (ref 0–8)
ERYTHROCYTE [DISTWIDTH] IN BLOOD BY AUTOMATED COUNT: 16.1 % (ref 11.5–14.5)
HCT VFR BLD AUTO: 38.1 % (ref 37–48.5)
HGB BLD-MCNC: 11.7 G/DL (ref 12–16)
IMM GRANULOCYTES # BLD AUTO: 0.03 K/UL (ref 0–0.04)
IMM GRANULOCYTES NFR BLD AUTO: 0.3 % (ref 0–0.5)
LYMPHOCYTES # BLD AUTO: 0.8 K/UL (ref 1–4.8)
LYMPHOCYTES NFR BLD: 8.1 % (ref 18–48)
MCH RBC QN AUTO: 29.4 PG (ref 27–31)
MCHC RBC AUTO-ENTMCNC: 30.7 G/DL (ref 32–36)
MCV RBC AUTO: 96 FL (ref 82–98)
MONOCYTES # BLD AUTO: 0.2 K/UL (ref 0.3–1)
MONOCYTES NFR BLD: 1.8 % (ref 4–15)
NEUTROPHILS # BLD AUTO: 8.4 K/UL (ref 1.8–7.7)
NEUTROPHILS NFR BLD: 89.7 % (ref 38–73)
NRBC BLD-RTO: 0 /100 WBC
PLATELET # BLD AUTO: 281 K/UL (ref 150–350)
PMV BLD AUTO: 11.3 FL (ref 9.2–12.9)
RBC # BLD AUTO: 3.98 M/UL (ref 4–5.4)
WBC # BLD AUTO: 9.34 K/UL (ref 3.9–12.7)

## 2019-12-06 PROCEDURE — 1125F AMNT PAIN NOTED PAIN PRSNT: CPT | Mod: HCNC,S$GLB,, | Performed by: OTOLARYNGOLOGY

## 2019-12-06 PROCEDURE — 86003 ALLG SPEC IGE CRUDE XTRC EA: CPT | Mod: HCNC

## 2019-12-06 PROCEDURE — 36415 COLL VENOUS BLD VENIPUNCTURE: CPT | Mod: HCNC

## 2019-12-06 PROCEDURE — 99213 OFFICE O/P EST LOW 20 MIN: CPT | Mod: HCNC,S$GLB,, | Performed by: OTOLARYNGOLOGY

## 2019-12-06 PROCEDURE — 99213 PR OFFICE/OUTPT VISIT, EST, LEVL III, 20-29 MIN: ICD-10-PCS | Mod: HCNC,S$GLB,, | Performed by: OTOLARYNGOLOGY

## 2019-12-06 PROCEDURE — 99999 PR PBB SHADOW E&M-EST. PATIENT-LVL III: ICD-10-PCS | Mod: PBBFAC,HCNC,, | Performed by: OTOLARYNGOLOGY

## 2019-12-06 PROCEDURE — 86003 ALLG SPEC IGE CRUDE XTRC EA: CPT | Mod: 59,HCNC

## 2019-12-06 PROCEDURE — 85025 COMPLETE CBC W/AUTO DIFF WBC: CPT | Mod: HCNC

## 2019-12-06 PROCEDURE — 1125F PR PAIN SEVERITY QUANTIFIED, PAIN PRESENT: ICD-10-PCS | Mod: HCNC,S$GLB,, | Performed by: OTOLARYNGOLOGY

## 2019-12-06 PROCEDURE — 1101F PR PT FALLS ASSESS DOC 0-1 FALLS W/OUT INJ PAST YR: ICD-10-PCS | Mod: HCNC,CPTII,S$GLB, | Performed by: OTOLARYNGOLOGY

## 2019-12-06 PROCEDURE — 1101F PT FALLS ASSESS-DOCD LE1/YR: CPT | Mod: HCNC,CPTII,S$GLB, | Performed by: OTOLARYNGOLOGY

## 2019-12-06 PROCEDURE — 1159F PR MEDICATION LIST DOCUMENTED IN MEDICAL RECORD: ICD-10-PCS | Mod: HCNC,S$GLB,, | Performed by: OTOLARYNGOLOGY

## 2019-12-06 PROCEDURE — 1159F MED LIST DOCD IN RCRD: CPT | Mod: HCNC,S$GLB,, | Performed by: OTOLARYNGOLOGY

## 2019-12-06 PROCEDURE — 99999 PR PBB SHADOW E&M-EST. PATIENT-LVL III: CPT | Mod: PBBFAC,HCNC,, | Performed by: OTOLARYNGOLOGY

## 2019-12-06 NOTE — LETTER
December 6, 2019      Ramiro Aleman MD  [address] [facility] [address]           [facility]  [address]  [place] [address]  Phone: [phone]  Fax: [phone]          Patient: Lucia Barlow   MR Number: [id number]   YOB: 1950   Date of Visit: 12/6/2019       Dear Dr. Ramiro Aleman:    Thank you for referring Lucia Barlow to me for evaluation. Attached you will find relevant portions of my assessment and plan of care.    If you have questions, please do not hesitate to call me. I look forward to following Lucia Barlow along with you.    Sincerely,    Geovani Lala MD    Enclosure  CC:  No Recipients    If you would like to receive this communication electronically, please contact [email or web address] or [phone] to request more information on [employer] access.    For providers and/or their staff who would like to refer a patient to [facility], please contact us through our one-stop-shop provider referral line, [facility] , at [phone].    If you feel you have received this communication in error or would no longer like to receive these types of communications, please e-mail [email or web address]

## 2019-12-06 NOTE — TELEPHONE ENCOUNTER
ACTION NEEDED:  Please read Patient Portal Message (below). You may POSTPONE this message/task until December 14, 2019. Please check on or around that date to see if she has scheduled her appointment. If not, please contact her to verify her receipt and understanding of the message and to help her schedule her appointment. Thanks.    Lucia Flores.    I read your message about you needing evaluation for migraines and allergies.    Please schedule an appointment with me at your earliest convenience. I'll properly evaluate you and get you the treatment you need.    You can schedule the appointment yourself from your [facility] account or from the [employer] lalo on your smartphone.    Thanks for letting me care for you, thanks for trusting us with your healthcare needs, and thanks for using [facility].    Sincerely,    VICK Sandoval MD

## 2019-12-09 ENCOUNTER — TELEPHONE (OUTPATIENT)
Dept: OTOLARYNGOLOGY | Facility: CLINIC | Age: 69
End: 2019-12-09

## 2019-12-09 LAB
A ALTERNATA IGE QN: <0.1 KU/L
A FUMIGATUS IGE QN: <0.1 KU/L
ALLERGEN BOXELDER MAPLE TREE IGE: <0.1 KU/L
ALLERGEN MAPLE (BOX ELDER) CLASS: NORMAL
ALLERGEN MULBERRY CLASS: NORMAL
ALLERGEN MULBERRY TREE IGE: <0.1 KU/L
ALLERGEN PIGWEED IGE: <0.1 KU/L
ALLERGEN WHITE ASH TREE IGE: <0.1 KU/L
AMER SYCAMORE IGE QN: <0.35 KU/L
BALD CYPRESS IGE QN: <0.1 KU/L
BERMUDA GRASS IGE QN: <0.1 KU/L
C HERBARUM IGE QN: <0.1 KU/L
CAT DANDER IGE QN: <0.1 KU/L
CEDAR IGE QN: <0.1 KU/L
COCKLEBUR IGE QN: <0.1 KU/L
COMMON PIGWEED CLASS: NORMAL
COMMON RAGWEED IGE QN: <0.1 KU/L
D FARINAE IGE QN: <0.1 KU/L
D PTERONYSS IGE QN: <0.1 KU/L
DEPRECATED A ALTERNATA IGE RAST QL: NORMAL
DEPRECATED A FUMIGATUS IGE RAST QL: NORMAL
DEPRECATED BALD CYPRESS IGE RAST QL: NORMAL
DEPRECATED BERMUDA GRASS IGE RAST QL: NORMAL
DEPRECATED C HERBARUM IGE RAST QL: NORMAL
DEPRECATED CAT DANDER IGE RAST QL: NORMAL
DEPRECATED CEDAR IGE RAST QL: NORMAL
DEPRECATED COCKLEBUR IGE RAST QL: NORMAL
DEPRECATED COMMON RAGWEED IGE RAST QL: NORMAL
DEPRECATED D FARINAE IGE RAST QL: NORMAL
DEPRECATED D PTERONYSS IGE RAST QL: NORMAL
DEPRECATED DOG DANDER IGE RAST QL: NORMAL
DEPRECATED ELDER IGE RAST QL: NORMAL
DEPRECATED ENGL PLANTAIN IGE RAST QL: NORMAL
DEPRECATED GOOSEFOOT IGE RAST QL: NORMAL
DEPRECATED JOHNSON GRASS IGE RAST QL: NORMAL
DEPRECATED KENT BLUE GRASS IGE RAST QL: NORMAL
DEPRECATED M RACEMOSUS IGE RAST QL: NORMAL
DEPRECATED MUGWORT IGE RAST QL: NORMAL
DEPRECATED NETTLE IGE RAST QL: NORMAL
DEPRECATED P NOTATUM IGE RAST QL: NORMAL
DEPRECATED PECAN/HICK TREE IGE RAST QL: NORMAL
DEPRECATED ROACH IGE RAST QL: NORMAL
DEPRECATED SHEEP SORREL IGE RAST QL: NORMAL
DEPRECATED SILVER BIRCH IGE RAST QL: NORMAL
DEPRECATED TIMOTHY IGE RAST QL: NORMAL
DEPRECATED WHITE OAK IGE RAST QL: NORMAL
DOG DANDER IGE QN: <0.1 KU/L
ELDER IGE QN: <0.1 KU/L
ELM CEDAR CLASS: NORMAL
ELM CEDAR, IGE: <0.1 KU/L
ENGL PLANTAIN IGE QN: <0.1 KU/L
FEATHER PANEL #2: <0.35 KU/L
GOOSEFOOT IGE QN: <0.1 KU/L
JOHNSON GRASS IGE QN: <0.1 KU/L
KENT BLUE GRASS IGE QN: <0.1 KU/L
M RACEMOSUS IGE QN: <0.1 KU/L
MUGWORT IGE QN: <0.1 KU/L
NETTLE IGE QN: <0.1 KU/L
P NOTATUM IGE QN: <0.1 KU/L
PECAN/HICK TREE IGE QN: <0.1 KU/L
ROACH IGE QN: <0.1 KU/L
SHEEP SORREL IGE QN: <0.1 KU/L
SILVER BIRCH IGE QN: <0.1 KU/L
TIMOTHY IGE QN: <0.1 KU/L
WHITE ASH CLASS: NORMAL
WHITE OAK IGE QN: <0.1 KU/L

## 2019-12-09 NOTE — TELEPHONE ENCOUNTER
Pt didn't answer left a detailed message informing that blood work and Ct were clear and that if she is still having h/a's she needs to follow up with a PCP or neurologist. Left call back number to return call with any questions.        ----- Message from Michelle Manriquez PA-C sent at 12/9/2019 12:45 PM CST -----  Please let pt know her RAST is insignificant for allergies. If she continues to have headaches, I would recommend she see her primary care physician or a consult with a neurologist. Recent CT of sinuses was also clear. Thank you!

## 2019-12-12 ENCOUNTER — PATIENT OUTREACH (OUTPATIENT)
Dept: OTHER | Facility: OTHER | Age: 69
End: 2019-12-12

## 2019-12-13 ENCOUNTER — OFFICE VISIT (OUTPATIENT)
Dept: INTERNAL MEDICINE | Facility: CLINIC | Age: 69
End: 2019-12-13
Payer: MEDICARE

## 2019-12-13 VITALS
HEIGHT: 62 IN | HEART RATE: 113 BPM | BODY MASS INDEX: 27.43 KG/M2 | OXYGEN SATURATION: 98 % | TEMPERATURE: 98 F | SYSTOLIC BLOOD PRESSURE: 118 MMHG | WEIGHT: 149.06 LBS | DIASTOLIC BLOOD PRESSURE: 56 MMHG

## 2019-12-13 DIAGNOSIS — Z09 NEED FOR CASE MANAGEMENT FOLLOW-UP: ICD-10-CM

## 2019-12-13 DIAGNOSIS — R80.9 TYPE 2 DIABETES MELLITUS WITH MICROALBUMINURIA, WITHOUT LONG-TERM CURRENT USE OF INSULIN: Chronic | ICD-10-CM

## 2019-12-13 DIAGNOSIS — J30.1 SEASONAL ALLERGIC RHINITIS DUE TO POLLEN: ICD-10-CM

## 2019-12-13 DIAGNOSIS — J41.0 SIMPLE CHRONIC BRONCHITIS: ICD-10-CM

## 2019-12-13 DIAGNOSIS — G44.209 TENSION HEADACHE: Primary | ICD-10-CM

## 2019-12-13 DIAGNOSIS — I10 ESSENTIAL HYPERTENSION: Chronic | ICD-10-CM

## 2019-12-13 DIAGNOSIS — F17.210 LIGHT SMOKER: Chronic | ICD-10-CM

## 2019-12-13 DIAGNOSIS — E11.29 TYPE 2 DIABETES MELLITUS WITH MICROALBUMINURIA, WITHOUT LONG-TERM CURRENT USE OF INSULIN: Chronic | ICD-10-CM

## 2019-12-13 PROCEDURE — 3078F PR MOST RECENT DIASTOLIC BLOOD PRESSURE < 80 MM HG: ICD-10-PCS | Mod: HCNC,CPTII,S$GLB, | Performed by: FAMILY MEDICINE

## 2019-12-13 PROCEDURE — 3044F PR MOST RECENT HEMOGLOBIN A1C LEVEL <7.0%: ICD-10-PCS | Mod: HCNC,CPTII,S$GLB, | Performed by: FAMILY MEDICINE

## 2019-12-13 PROCEDURE — 1125F PR PAIN SEVERITY QUANTIFIED, PAIN PRESENT: ICD-10-PCS | Mod: HCNC,S$GLB,, | Performed by: FAMILY MEDICINE

## 2019-12-13 PROCEDURE — 99999 PR PBB SHADOW E&M-EST. PATIENT-LVL IV: CPT | Mod: PBBFAC,HCNC,, | Performed by: FAMILY MEDICINE

## 2019-12-13 PROCEDURE — 1159F MED LIST DOCD IN RCRD: CPT | Mod: HCNC,S$GLB,, | Performed by: FAMILY MEDICINE

## 2019-12-13 PROCEDURE — 1125F AMNT PAIN NOTED PAIN PRSNT: CPT | Mod: HCNC,S$GLB,, | Performed by: FAMILY MEDICINE

## 2019-12-13 PROCEDURE — 3078F DIAST BP <80 MM HG: CPT | Mod: HCNC,CPTII,S$GLB, | Performed by: FAMILY MEDICINE

## 2019-12-13 PROCEDURE — 1101F PT FALLS ASSESS-DOCD LE1/YR: CPT | Mod: HCNC,CPTII,S$GLB, | Performed by: FAMILY MEDICINE

## 2019-12-13 PROCEDURE — 3074F SYST BP LT 130 MM HG: CPT | Mod: HCNC,CPTII,S$GLB, | Performed by: FAMILY MEDICINE

## 2019-12-13 PROCEDURE — 1101F PR PT FALLS ASSESS DOC 0-1 FALLS W/OUT INJ PAST YR: ICD-10-PCS | Mod: HCNC,CPTII,S$GLB, | Performed by: FAMILY MEDICINE

## 2019-12-13 PROCEDURE — 99214 OFFICE O/P EST MOD 30 MIN: CPT | Mod: HCNC,S$GLB,, | Performed by: FAMILY MEDICINE

## 2019-12-13 PROCEDURE — 1159F PR MEDICATION LIST DOCUMENTED IN MEDICAL RECORD: ICD-10-PCS | Mod: HCNC,S$GLB,, | Performed by: FAMILY MEDICINE

## 2019-12-13 PROCEDURE — 99999 PR PBB SHADOW E&M-EST. PATIENT-LVL IV: ICD-10-PCS | Mod: PBBFAC,HCNC,, | Performed by: FAMILY MEDICINE

## 2019-12-13 PROCEDURE — 3074F PR MOST RECENT SYSTOLIC BLOOD PRESSURE < 130 MM HG: ICD-10-PCS | Mod: HCNC,CPTII,S$GLB, | Performed by: FAMILY MEDICINE

## 2019-12-13 PROCEDURE — 3044F HG A1C LEVEL LT 7.0%: CPT | Mod: HCNC,CPTII,S$GLB, | Performed by: FAMILY MEDICINE

## 2019-12-13 PROCEDURE — 99214 PR OFFICE/OUTPT VISIT, EST, LEVL IV, 30-39 MIN: ICD-10-PCS | Mod: HCNC,S$GLB,, | Performed by: FAMILY MEDICINE

## 2019-12-13 RX ORDER — MONTELUKAST SODIUM 10 MG/1
10 TABLET ORAL NIGHTLY
Qty: 90 TABLET | Refills: 4 | Status: SHIPPED | OUTPATIENT
Start: 2019-12-13 | End: 2020-01-12

## 2019-12-13 RX ORDER — BUTALBITAL, ACETAMINOPHEN AND CAFFEINE 50; 325; 40 MG/1; MG/1; MG/1
1 TABLET ORAL 3 TIMES DAILY PRN
Qty: 30 TABLET | Refills: 2 | Status: SHIPPED | OUTPATIENT
Start: 2019-12-13 | End: 2020-08-19 | Stop reason: SDUPTHER

## 2019-12-13 NOTE — PROGRESS NOTES
CHIEF COMPLAINT  Headache      HISTORY, ASSESSMENT, and PLAN    1. Tension headache        ASSESSMENT: Recurrent, intermittent, typical bi-temporal band squeezing. Not changing in quality or severity. OTC analgesics help but provide unsatisfactory symptom relief.    2. Type 2 diabetes mellitus with microalbuminuria, without long-term current use of insulin       ASSESSMENT: Well controlled.    3. Essential hypertension        ASSESSMENT: Well controlled    4. Simple chronic bronchitis        ASSESSMENT: Compensated/controlled and stable.    5. Currently smokes a few cigarettes per day; former heavy smoker        ASSESSMENT: She is currently at the pre-contemplation stage of smoking cessation (not thinking about quitting). Smoking cessation encouraged.     6. Seasonal allergic rhinitis due to pollen        ASSESSMENT: She describes typical chronic seasonal nasal congestion and rhinorrhea exacerbated by pollen and changes in weather.    7. Need for case management follow-up        ASSESSMENT: ORDERED       Orders Placed This Encounter    Ambulatory Referral to Outpatient Case Management    montelukast (SINGULAIR) 10 mg tablet    butalbital-acetaminophen-caffeine -40 mg (FIORICET, ESGIC) -40 mg per tablet        Outpatient Medications Prior to Visit   Medication Sig Dispense Refill    albuterol (PROVENTIL) 2.5 mg /3 mL (0.083 %) nebulizer solution every 6 (six) hours as needed.  12    albuterol (VENTOLIN HFA) 90 mcg/actuation inhaler Inhale 2 puffs into the lungs every 4 (four) hours as needed for Wheezing. 18 g 3    amlodipine-benazepril 5-20 mg (LOTREL) 5-20 mg per capsule Take 1 capsule by mouth once daily. 90 capsule 2    aspirin (ECOTRIN) 81 MG EC tablet Take 1 tablet (81 mg total) by mouth once daily. (FOR HEART HEALTH) 90 tablet 3    blood sugar diagnostic Strp 1 each by Misc.(Non-Drug; Combo Route) route 3 (three) times daily. IHEALTH test strips 100 each 3    blood-glucose meter kit  Insurance preferred 1 each 0    calcium-vitamin D (CALCIUM WITH VITAMIN D) 600 mg(1,500mg) -400 unit Tab Take 2 tablets by mouth once daily. 180 tablet 4    chlorthalidone (HYGROTEN) 25 MG Tab Take 1 tablet (25 mg total) by mouth once daily. 90 tablet 2    ezetimibe (ZETIA) 10 mg tablet TAKE 1 TABLET ONE TIME DAILY 90 tablet 4    fluticasone furoate-vilanterol (BREO ELLIPTA) 100-25 mcg/dose diskus inhaler Inhale 1 puff into the lungs once daily. 60 each 11    fluticasone propionate (FLONASE) 50 mcg/actuation nasal spray 2 sprays (100 mcg total) by Each Nostril route once daily. 16 g 5    glimepiride (AMARYL) 2 MG tablet Take 1 tablet (2 mg total) by mouth before breakfast. 90 tablet 3    lancets 32 gauge Misc 1 lancet by Misc.(Non-Drug; Combo Route) route 3 (three) times daily. Insurance preferred 100 each 11    metFORMIN (GLUCOPHAGE-XR) 500 MG 24 hr tablet Take 1 tablet (500 mg total) by mouth 2 (two) times daily with meals. 180 tablet 3    pantoprazole (PROTONIX) 40 MG tablet Take 1 tablet (40 mg total) by mouth once daily. 90 tablet 3    potassium 99 mg Tab Take 1 tablet by mouth once daily.      rosuvastatin (CRESTOR) 20 MG tablet Take 1 tablet (20 mg total) by mouth once daily. 90 tablet 3    TRUEPLUS LANCETS 33 gauge Misc       phenylephrine-DM-guaifenesin 5- mg/5 mL Liqd Take by mouth as needed.      varicella-zoster gE-AS01B, PF, (SHINGRIX) 50 mcg/0.5 mL injection Inject 0.5 mL (one dose) into muscle now; give second dose at least 2 months later 0.5 mL 1    predniSONE (DELTASONE) 20 MG tablet Prednisone 60 mg/ day for 3 days, 40 mg/day for 3 days,20 mg/ day for 3 days, (1/2 tablet )10 mg a day for 3 days. 20 tablet 1     No facility-administered medications prior to visit.         Medications Ordered This Encounter   Medications    butalbital-acetaminophen-caffeine -40 mg (FIORICET, ESGIC) -40 mg per tablet     Sig: Take 1 tablet by mouth 3 (three) times daily as needed for  "Headaches. (MAXIMUM OF 15 TABLET PER MONTH)     Dispense:  30 tablet     Refill:  2    montelukast (SINGULAIR) 10 mg tablet     Sig: Take 1 tablet (10 mg total) by mouth every evening.     Dispense:  90 tablet     Refill:  4        Medications Discontinued During This Encounter   Medication Reason    predniSONE (DELTASONE) 20 MG tablet Patient no longer taking    phenylephrine-DM-guaifenesin 5- mg/5 mL Liqd Patient no longer taking        Follow up if symptoms worsen or fail to improve.    Problem List Items Addressed This Visit        Pulmonary    Simple chronic bronchitis       Cardiac/Vascular    Essential hypertension (Chronic)       Endocrine    Type 2 diabetes mellitus with microalbuminuria, without long-term current use of insulin (Chronic)       Other    Currently smokes a few cigarettes per day; former heavy smoker (Chronic)    Seasonal allergic rhinitis due to pollen    Current Assessment & Plan     She describes typical chronic seasonal nasal congestion and rhinorrhea exacerbated by pollen and changes in weather.         Relevant Medications    montelukast (SINGULAIR) 10 mg tablet      Other Visit Diagnoses     Tension headache    -  Primary    Relevant Medications    butalbital-acetaminophen-caffeine -40 mg (FIORICET, ESGIC) -40 mg per tablet    Need for case management follow-up        Relevant Orders    Ambulatory Referral to Outpatient Case Management           REVIEW OF SYSTEMS  CARDIOVASCULAR: No angina or orthopnea reported.   ENDOCRINE: No polyuria or polydipsia reported.   NEUROLOGIC: No seizures or syncope reported.     PHYSICAL EXAM  Vitals:    12/13/19 0954   BP: (!) 118/56   BP Location: Left arm   Patient Position: Sitting   BP Method: Large (Manual)   Pulse: (!) 113   Temp: 98.4 °F (36.9 °C)   TempSrc: Oral   SpO2: 98%   Weight: 67.6 kg (149 lb 0.5 oz)   Height: 5' 2" (1.575 m)     CONSTITUTIONAL: Vital signs noted. No apparent distress. Does not appear acutely ill or " "septic. Appears adequately hydrated.  HEENT: External ENT grossly unremarkable. Hearing grossly intact. Oropharynx moist.  PULM: Lungs clear. Breathing unlabored.  HEART: Auscultation reveals regular rate and rhythm without murmur, gallop or rub.  DERM: Skin warm and moist with normal turgor.  NEURO: There are no gross focal motor deficits or gross deficits of cranial nerves III-XII.  PSYCHIATRIC: Alert and conversant. Mood grossly neutral. Judgment and insight not grossly compromised.   DIABETIC FOOT EXAM: Inspection of feet reveals no open wounds, ulcerations, significant calluses, or evidence of arterial insufficiency. 10g monofilament sensation is intact in all 5 locations tested.     Documentation entered by me for this encounter may have been done in part using speech-recognition technology. Although I have made an effort to ensure accuracy, "sound like" errors may exist and should be interpreted in context. -VICK Sandoval MD.   "

## 2019-12-13 NOTE — ASSESSMENT & PLAN NOTE
She describes typical chronic seasonal nasal congestion and rhinorrhea exacerbated by pollen and changes in weather.

## 2019-12-23 ENCOUNTER — OUTPATIENT CASE MANAGEMENT (OUTPATIENT)
Dept: ADMINISTRATIVE | Facility: OTHER | Age: 69
End: 2019-12-23

## 2019-12-23 NOTE — LETTER
December 23, 2019    Lucia Barlow  [address] [place] [address]             [facility] [address] [place] [address] Dear Mrs Barlow,     I work with [facility] Outpatient Case Management Department. We received a referral to call you to discuss your medical history.These services are free of charge and are offered to [facility] patients who have recently been discharged from any of our facilities or who have complex medical conditions that may require the skill of a nurse to assist with management.             I am a Registered Nurse who specializes in connecting patients with available medical and financial resources as well as addressing any educational needs that may be indicated.      I attempted to reach you by telephone, but I was unsuccessful. Please call our department so that we can go over some questions with you regarding your health.    The Outpatient Case Management Department can be reached at [phone] from 8:00AM to 4:30 PM on Monday thru Friday. [facility] also has a program where a nurse is available 24/7 to answer questions or provide medical advice. Their number is [phone].      Thank you,      Lilliam Puente RN, [facility]  Outpatient Case Management  [phone]

## 2019-12-23 NOTE — PROGRESS NOTES
Outpatient Care Management  Initial Patient Assessment    Patient: Lucia Barlow  MRN: [id number]  Date of Service: 12/23/19  Completed by: Lilliam Rasmussen RN  Referral Date: 12/13/2019  Program: Case Management (High Risk)    Reason for Visit   Patient presents with    OPCM RN First Assessment Attempt     Declined    Case Closure       Brief Summary: Phone contact attempted with no answer. Pt returned call and OPCM program explained. She declined screening, stating she is doing good and med Dr Sandoval recently prescribed for HA's has been helping a lot. Letter indicating pt was unable to be reached had been printed, but will not be mailed. Will make no further attempts to contact.     Assessment Documentation     OPCM Initial Assessment    Involvement of Care  Patient Reported Insurance  Current Health Status  Patient Health Rating  Patient Reported Labs & Vitals  Social Determinants  Advanced Care Planning  Support  Housing  Access to [employer]  Clinical Assessment  Medication Adherence  *Active medication list was reviewed and reconciled with patient and/or caregiver:    Nutritional Status  Labs  PHQ Depression Screen  Cognitive/Behavioral Health  Culture/Caodaism  Communication  Health Literacy  Activities of Daily Living  Fall Risk  Equipment/Current Services  Community & Government Programs  Community Resource Assessment  Completion of Initial Assessment         Problem List and History     Patient Active Problem List   Diagnosis    Currently smokes a few cigarettes per day; former heavy smoker    Essential hypertension    Dyslipidemia associated with type 2 diabetes mellitus    Osteoporosis    Atherosclerosis of artery of both lower extremities    S/P peripheral artery angioplasty    History of adenomatous polyp of colon    Nonrheumatic aortic valve stenosis    Atherosclerosis of aorta    Lung nodule < 6cm on CT    Atherosclerosis of native coronary artery of native heart  without angina pectoris    Simple chronic bronchitis    PVD (peripheral vascular disease)    Type 2 diabetes mellitus with peripheral vascular disease    Gastroesophageal reflux disease without esophagitis    Type 2 diabetes mellitus with stage 3 chronic kidney disease, without long-term current use of insulin    Type 2 diabetes mellitus with microalbuminuria, without long-term current use of insulin    Screening for colon cancer    Chronic obstructive pulmonary disease    Seasonal allergic rhinitis due to pollen       Reviewed Active Problem List with patient and/or Caregiver. The following were identified as areas of need: N/A    Medical History:  Reviewed medical history with patient and/or caregiver    Social History:  Reviewed social history with patient and/or caregiver    Complex Care Plan    Care plan was discussed and completed today with input from patient and/or caregiver.    Goals        Patient Stated     80 <= Glucose <= 180 (pt-stated)      No Sodas (pt-stated)      Keep up the great work!         Other     Blood Pressure < 130/80      Exercise at least 150 minutes per week.      Take at least one BP reading per week at various times of the day           Patient Instructions     Instructions were provided via the [employer] patient resources and are available for the patient to view on the patient portal, if active.    Next steps: D/C from OPCM. Lilliam Rasmussen RN    No follow-ups on file.    Today[facility] Self-Management Care Plan was developed with the patients/caregivers input and was based on identified barriers from todays assessment.  Goals were written today with the patient/caregiver and the patient has agreed to work towards these goals to improve his/her overall well-being. Patient verbalized understanding of the care plan, goals, and all of today's instructions. Encouraged patient/caregiver to communicate with his/her physician and health care team about health conditions and the  treatment plan.  Provided my contact information today and encouraged patient/caregiver to call me with any questions as needed.

## 2020-01-02 ENCOUNTER — PATIENT OUTREACH (OUTPATIENT)
Dept: OTHER | Facility: OTHER | Age: 70
End: 2020-01-02

## 2020-01-02 NOTE — PROGRESS NOTES
"Digital Medicine: Health  Follow-Up    Patient reports she is well, no complaints. Denies symptoms of hypertension, including HA, chest pains, SOB and changes in vision.     Reports she sometimes has a "slight" headache, but resolves after she takes pain medication. Reports this is unrelated to blood pressure.    The history is provided by the patient.     Follow Up  Follow-up reason(s): reading review and routine education          INTERVENTION(S)  recommended diet modifications, recommend physical activity, encouragement/support, denied further coaching, denied resources and denied questions    PLAN  patient verbalizes understanding, demonstrates understanding via teach back, patient amenable to changes and continue monitoring          Topic    Eye Exam        Last 5 Patient Entered Readings                                      Current 30 Day Average: 142/67     Recent Readings 12/30/2019 12/26/2019 12/20/2019 12/11/2019 12/10/2019    SBP (mmHg) 154 117 149 127 136    DBP (mmHg) 69 60 72 68 66    Pulse 91 96 87 101 90        Last 6 Patient Entered Readings                                          Most Recent A1c: 6.5% on 8/12/2019  (Goal: 7%)     Recent Readings 12/30/2019 12/26/2019 12/20/2019 12/17/2019 12/11/2019    Blood Glucose (mg/dL) 117 95 98 103 103                    Diet Screening   No change to diet.      Reports maintained healthy eating habits during holiday season. Declined to set new health goal today.    Physical Activity Screening   No change to exercise routine.    When asked if exercising, patient responded: yes    She exercises for 40 minutes per day 7 day(s) a week.      Patient participates in the following activities: walking    She identified the following barriers to physical activity: weather    Medication Adherence Screening   She misses doses: never        SDOH  "

## 2020-01-16 DIAGNOSIS — J30.1 SEASONAL ALLERGIC RHINITIS DUE TO POLLEN: Primary | ICD-10-CM

## 2020-01-16 DIAGNOSIS — K21.9 GASTROESOPHAGEAL REFLUX DISEASE WITHOUT ESOPHAGITIS: Chronic | ICD-10-CM

## 2020-01-16 RX ORDER — EZETIMIBE 10 MG/1
TABLET ORAL
Qty: 90 TABLET | Refills: 4 | Status: SHIPPED | OUTPATIENT
Start: 2020-01-16 | End: 2020-02-19 | Stop reason: SDUPTHER

## 2020-01-21 RX ORDER — PANTOPRAZOLE SODIUM 40 MG/1
TABLET, DELAYED RELEASE ORAL
Qty: 90 TABLET | Refills: 4 | Status: SHIPPED | OUTPATIENT
Start: 2020-01-21 | End: 2021-03-10 | Stop reason: SDUPTHER

## 2020-01-21 RX ORDER — FLUTICASONE PROPIONATE 50 MCG
SPRAY, SUSPENSION (ML) NASAL
Qty: 48 G | Refills: 4 | Status: SHIPPED | OUTPATIENT
Start: 2020-01-21 | End: 2020-09-30 | Stop reason: SDUPTHER

## 2020-01-21 NOTE — TELEPHONE ENCOUNTER
REFILL APPROVED      Requested Prescriptions     Signed Prescriptions Disp Refills    pantoprazole (PROTONIX) 40 MG tablet 90 tablet 4     Sig: TAKE 1 TABLET EVERY DAY     Authorizing Provider: VICK ADAME    fluticasone propionate (FLONASE) 50 mcg/actuation nasal spray 48 g 4     Sig: USE 2 SPRAYS IN EACH NOSTRIL ONE TIME DAILY     Authorizing Provider: VICK ADAME

## 2020-02-12 ENCOUNTER — HOSPITAL ENCOUNTER (OUTPATIENT)
Dept: RADIOLOGY | Facility: HOSPITAL | Age: 70
Discharge: HOME | End: 2020-02-12
Attending: FAMILY MEDICINE
Payer: MEDICARE

## 2020-02-12 VITALS — WEIGHT: 149.06 LBS | BODY MASS INDEX: 27.43 KG/M2 | HEIGHT: 62 IN

## 2020-02-12 DIAGNOSIS — Z12.31 BREAST CANCER SCREENING BY MAMMOGRAM: ICD-10-CM

## 2020-02-12 DIAGNOSIS — Z12.39 SCREENING FOR BREAST CANCER: ICD-10-CM

## 2020-02-12 PROCEDURE — 77067 MAMMO DIGITAL SCREENING BILAT WITH TOMOSYNTHESIS_CAD: ICD-10-PCS | Mod: 26,HCNC,, | Performed by: RADIOLOGY

## 2020-02-12 PROCEDURE — 77067 SCR MAMMO BI INCL CAD: CPT | Mod: TC,HCNC

## 2020-02-12 PROCEDURE — 77063 BREAST TOMOSYNTHESIS BI: CPT | Mod: 26,HCNC,, | Performed by: RADIOLOGY

## 2020-02-12 PROCEDURE — 77067 SCR MAMMO BI INCL CAD: CPT | Mod: 26,HCNC,, | Performed by: RADIOLOGY

## 2020-02-12 PROCEDURE — 77063 MAMMO DIGITAL SCREENING BILAT WITH TOMOSYNTHESIS_CAD: ICD-10-PCS | Mod: 26,HCNC,, | Performed by: RADIOLOGY

## 2020-02-13 DIAGNOSIS — I10 ESSENTIAL HYPERTENSION: Primary | ICD-10-CM

## 2020-02-18 DIAGNOSIS — I10 ESSENTIAL HYPERTENSION: Chronic | ICD-10-CM

## 2020-02-19 ENCOUNTER — OFFICE VISIT (OUTPATIENT)
Dept: INTERNAL MEDICINE | Facility: CLINIC | Age: 70
End: 2020-02-19
Payer: MEDICARE

## 2020-02-19 VITALS
DIASTOLIC BLOOD PRESSURE: 58 MMHG | HEIGHT: 62 IN | WEIGHT: 148.13 LBS | OXYGEN SATURATION: 95 % | TEMPERATURE: 97 F | SYSTOLIC BLOOD PRESSURE: 130 MMHG | BODY MASS INDEX: 27.26 KG/M2 | HEART RATE: 110 BPM

## 2020-02-19 DIAGNOSIS — E78.5 DYSLIPIDEMIA ASSOCIATED WITH TYPE 2 DIABETES MELLITUS: Chronic | ICD-10-CM

## 2020-02-19 DIAGNOSIS — I10 ESSENTIAL HYPERTENSION: Chronic | ICD-10-CM

## 2020-02-19 DIAGNOSIS — N18.30 TYPE 2 DIABETES MELLITUS WITH STAGE 3 CHRONIC KIDNEY DISEASE, WITHOUT LONG-TERM CURRENT USE OF INSULIN: Primary | Chronic | ICD-10-CM

## 2020-02-19 DIAGNOSIS — E11.22 TYPE 2 DIABETES MELLITUS WITH STAGE 3 CHRONIC KIDNEY DISEASE, WITHOUT LONG-TERM CURRENT USE OF INSULIN: Primary | Chronic | ICD-10-CM

## 2020-02-19 DIAGNOSIS — G44.209 TENSION HEADACHE: ICD-10-CM

## 2020-02-19 DIAGNOSIS — J41.0 SIMPLE CHRONIC BRONCHITIS: ICD-10-CM

## 2020-02-19 DIAGNOSIS — R80.9 TYPE 2 DIABETES MELLITUS WITH MICROALBUMINURIA, WITHOUT LONG-TERM CURRENT USE OF INSULIN: ICD-10-CM

## 2020-02-19 DIAGNOSIS — I25.10 ATHEROSCLEROSIS OF NATIVE CORONARY ARTERY OF NATIVE HEART WITHOUT ANGINA PECTORIS: ICD-10-CM

## 2020-02-19 DIAGNOSIS — E11.29 TYPE 2 DIABETES MELLITUS WITH MICROALBUMINURIA, WITHOUT LONG-TERM CURRENT USE OF INSULIN: ICD-10-CM

## 2020-02-19 DIAGNOSIS — E11.69 DYSLIPIDEMIA ASSOCIATED WITH TYPE 2 DIABETES MELLITUS: Chronic | ICD-10-CM

## 2020-02-19 DIAGNOSIS — I77.1 TORTUOUS AORTA: ICD-10-CM

## 2020-02-19 DIAGNOSIS — I70.0 ATHEROSCLEROSIS OF AORTA: ICD-10-CM

## 2020-02-19 PROCEDURE — 3078F DIAST BP <80 MM HG: CPT | Mod: HCNC,CPTII,S$GLB, | Performed by: FAMILY MEDICINE

## 2020-02-19 PROCEDURE — 3044F HG A1C LEVEL LT 7.0%: CPT | Mod: HCNC,CPTII,S$GLB, | Performed by: FAMILY MEDICINE

## 2020-02-19 PROCEDURE — 1159F MED LIST DOCD IN RCRD: CPT | Mod: HCNC,S$GLB,, | Performed by: FAMILY MEDICINE

## 2020-02-19 PROCEDURE — 99214 PR OFFICE/OUTPT VISIT, EST, LEVL IV, 30-39 MIN: ICD-10-PCS | Mod: HCNC,S$GLB,, | Performed by: FAMILY MEDICINE

## 2020-02-19 PROCEDURE — 99999 PR PBB SHADOW E&M-EST. PATIENT-LVL IV: CPT | Mod: PBBFAC,HCNC,, | Performed by: FAMILY MEDICINE

## 2020-02-19 PROCEDURE — 1101F PR PT FALLS ASSESS DOC 0-1 FALLS W/OUT INJ PAST YR: ICD-10-PCS | Mod: HCNC,CPTII,S$GLB, | Performed by: FAMILY MEDICINE

## 2020-02-19 PROCEDURE — 1126F PR PAIN SEVERITY QUANTIFIED, NO PAIN PRESENT: ICD-10-PCS | Mod: HCNC,S$GLB,, | Performed by: FAMILY MEDICINE

## 2020-02-19 PROCEDURE — 99499 UNLISTED E&M SERVICE: CPT | Mod: HCNC,S$GLB,, | Performed by: FAMILY MEDICINE

## 2020-02-19 PROCEDURE — 1126F AMNT PAIN NOTED NONE PRSNT: CPT | Mod: HCNC,S$GLB,, | Performed by: FAMILY MEDICINE

## 2020-02-19 PROCEDURE — 1159F PR MEDICATION LIST DOCUMENTED IN MEDICAL RECORD: ICD-10-PCS | Mod: HCNC,S$GLB,, | Performed by: FAMILY MEDICINE

## 2020-02-19 PROCEDURE — 3075F PR MOST RECENT SYSTOLIC BLOOD PRESS GE 130-139MM HG: ICD-10-PCS | Mod: HCNC,CPTII,S$GLB, | Performed by: FAMILY MEDICINE

## 2020-02-19 PROCEDURE — 3075F SYST BP GE 130 - 139MM HG: CPT | Mod: HCNC,CPTII,S$GLB, | Performed by: FAMILY MEDICINE

## 2020-02-19 PROCEDURE — 99999 PR PBB SHADOW E&M-EST. PATIENT-LVL IV: ICD-10-PCS | Mod: PBBFAC,HCNC,, | Performed by: FAMILY MEDICINE

## 2020-02-19 PROCEDURE — 99214 OFFICE O/P EST MOD 30 MIN: CPT | Mod: HCNC,S$GLB,, | Performed by: FAMILY MEDICINE

## 2020-02-19 PROCEDURE — 3078F PR MOST RECENT DIASTOLIC BLOOD PRESSURE < 80 MM HG: ICD-10-PCS | Mod: HCNC,CPTII,S$GLB, | Performed by: FAMILY MEDICINE

## 2020-02-19 PROCEDURE — 99499 RISK ADDL DX/OHS AUDIT: ICD-10-PCS | Mod: HCNC,S$GLB,, | Performed by: FAMILY MEDICINE

## 2020-02-19 PROCEDURE — 1101F PT FALLS ASSESS-DOCD LE1/YR: CPT | Mod: HCNC,CPTII,S$GLB, | Performed by: FAMILY MEDICINE

## 2020-02-19 PROCEDURE — 3044F PR MOST RECENT HEMOGLOBIN A1C LEVEL <7.0%: ICD-10-PCS | Mod: HCNC,CPTII,S$GLB, | Performed by: FAMILY MEDICINE

## 2020-02-19 RX ORDER — METFORMIN HYDROCHLORIDE 500 MG/1
500 TABLET, EXTENDED RELEASE ORAL 2 TIMES DAILY WITH MEALS
Qty: 180 TABLET | Refills: 4 | Status: SHIPPED | OUTPATIENT
Start: 2020-02-19 | End: 2020-02-19 | Stop reason: SDUPTHER

## 2020-02-19 RX ORDER — ROSUVASTATIN CALCIUM 20 MG/1
20 TABLET, COATED ORAL DAILY
Qty: 90 TABLET | Refills: 4 | Status: SHIPPED | OUTPATIENT
Start: 2020-02-19 | End: 2021-03-10 | Stop reason: SDUPTHER

## 2020-02-19 RX ORDER — AMLODIPINE AND BENAZEPRIL HYDROCHLORIDE 5; 20 MG/1; MG/1
CAPSULE ORAL
Qty: 90 CAPSULE | Refills: 2 | OUTPATIENT
Start: 2020-02-19

## 2020-02-19 RX ORDER — GLIMEPIRIDE 2 MG/1
2 TABLET ORAL
Qty: 90 TABLET | Refills: 4 | Status: SHIPPED | OUTPATIENT
Start: 2020-02-19 | End: 2020-02-19 | Stop reason: SDUPTHER

## 2020-02-19 RX ORDER — ROSUVASTATIN CALCIUM 20 MG/1
20 TABLET, COATED ORAL DAILY
Qty: 90 TABLET | Refills: 4 | Status: SHIPPED | OUTPATIENT
Start: 2020-02-19 | End: 2020-02-19 | Stop reason: SDUPTHER

## 2020-02-19 RX ORDER — AMLODIPINE AND BENAZEPRIL HYDROCHLORIDE 5; 20 MG/1; MG/1
1 CAPSULE ORAL DAILY
Qty: 90 CAPSULE | Refills: 4 | Status: SHIPPED | OUTPATIENT
Start: 2020-02-19 | End: 2020-02-19 | Stop reason: SDUPTHER

## 2020-02-19 RX ORDER — EZETIMIBE 10 MG/1
10 TABLET ORAL DAILY
Qty: 90 TABLET | Refills: 4 | Status: SHIPPED | OUTPATIENT
Start: 2020-02-19 | End: 2021-03-10 | Stop reason: SDUPTHER

## 2020-02-19 RX ORDER — METFORMIN HYDROCHLORIDE 500 MG/1
500 TABLET, EXTENDED RELEASE ORAL 2 TIMES DAILY WITH MEALS
Qty: 180 TABLET | Refills: 4 | Status: SHIPPED | OUTPATIENT
Start: 2020-02-19 | End: 2020-08-19 | Stop reason: SINTOL

## 2020-02-19 RX ORDER — CHLORTHALIDONE 25 MG/1
25 TABLET ORAL DAILY
Qty: 90 TABLET | Refills: 4 | Status: SHIPPED | OUTPATIENT
Start: 2020-02-19 | End: 2020-02-19 | Stop reason: SDUPTHER

## 2020-02-19 RX ORDER — GLIMEPIRIDE 2 MG/1
2 TABLET ORAL
Qty: 90 TABLET | Refills: 4 | Status: SHIPPED | OUTPATIENT
Start: 2020-02-19 | End: 2021-03-10 | Stop reason: SDUPTHER

## 2020-02-19 RX ORDER — CHLORTHALIDONE 25 MG/1
25 TABLET ORAL DAILY
Qty: 90 TABLET | Refills: 4 | Status: SHIPPED | OUTPATIENT
Start: 2020-02-19 | End: 2020-03-24

## 2020-02-19 RX ORDER — AMLODIPINE AND BENAZEPRIL HYDROCHLORIDE 5; 20 MG/1; MG/1
1 CAPSULE ORAL DAILY
Qty: 90 CAPSULE | Refills: 4 | Status: SHIPPED | OUTPATIENT
Start: 2020-02-19 | End: 2021-02-23 | Stop reason: SDUPTHER

## 2020-02-19 NOTE — PATIENT INSTRUCTIONS
Once or twice a week after you clean your ears in the shower, fill each ear canal with mineral oil for 5 seconds, then let it drain out. This will lubricate and protect your ear canal.

## 2020-02-19 NOTE — ASSESSMENT & PLAN NOTE
Diabetes Management Status    Statin: Taking  ACE/ARB: Taking    Screening or Prevention Patient's value Goal Complete/Controlled?   HgA1C Testing and Control   Lab Results   Component Value Date    HGBA1C 6.3 (H) 02/12/2020      Annually/Less than 8% Yes   Lipid profile : 02/12/2020 Annually Yes   LDL control Lab Results   Component Value Date    LDLCALC 66.6 02/12/2020    LDLCALC 67.8 02/12/2020    Annually/Less than 100 mg/dl  Yes   Nephropathy screening Lab Results   Component Value Date    LABMICR 896.0 01/25/2019     Lab Results   Component Value Date    PROTEINUA 1+ (A) 12/16/2018    Annually No   Blood pressure BP Readings from Last 1 Encounters:   02/19/20 (!) 130/58    Less than 140/90 Yes   Dilated retinal exam : 02/01/2019 Annually Yes   Foot exam   : 12/13/2019 Annually Yes     Lab Results   Component Value Date    HGBA1C 6.3 (H) 02/12/2020    HGBA1C 6.5 (H) 08/12/2019    HGBA1C 7.1 (H) 05/30/2019    ESTGFRAFRICA 58.0 (A) 02/12/2020    ESTGFRAFRICA 58.0 (A) 02/12/2020    EGFRNONAA 50.3 (A) 02/12/2020    EGFRNONAA 50.3 (A) 02/12/2020    MICALBCREAT 556.5 (H) 01/25/2019    LDLCALC 66.6 02/12/2020    LDLCALC 67.8 02/12/2020       HEALTH MAINTENANCE: Diabetic health maintenance interventions reviewed and are up to date except for:      Topic    Eye Exam        Well controlled, stable.

## 2020-02-19 NOTE — ASSESSMENT & PLAN NOTE
Lab Results   Component Value Date    CHOL 171 02/12/2020    CHOL 173 02/12/2020    TRIG 137 02/12/2020    TRIG 136 02/12/2020    HDL 77 (H) 02/12/2020    HDL 78 (H) 02/12/2020    LDLCALC 66.6 02/12/2020    LDLCALC 67.8 02/12/2020    NONHDLCHOL 94 02/12/2020    NONHDLCHOL 95 02/12/2020    AST 14 02/12/2020    AST 16 02/12/2020    ALT 14 02/12/2020    ALT 13 02/12/2020     The 10-year ASCVD risk score (Grace NEWBY Jr., et al., 2013) is: 48.2%    Values used to calculate the score:      Age: 70 years      Sex: Female      Is Non- : Yes      Diabetic: Yes      Tobacco smoker: Yes      Systolic Blood Pressure: 130 mmHg      Is BP treated: Yes      HDL Cholesterol: 78 mg/dL      Total Cholesterol: 173 mg/dL  Controlled. She appears to be tolerating statin for dyslipidemia without apparent symptoms of myositis or hepatic dysfunction.

## 2020-02-19 NOTE — PROGRESS NOTES
Hi, Lucia.    I am happy to report that your recent breast imaging did NOT show evidence of cancer.    An annual mammogram is the best test to screen for breast cancer, but it is not perfect, and it can miss some cancers. So, even though your mammogram was normal, if you notice any lump or change in one of your breasts, please schedule an appointment with me for a proper evaluation.    Thanks for letting me care for you, thanks for trusting us with your healthcare needs, and thanks for using [facility].    Sincerely,    VICK Sandoval MD    P.S. - Please tell me how I'm doing and review me at [email or web address].

## 2020-02-19 NOTE — ASSESSMENT & PLAN NOTE
BP Readings from Last 6 Encounters:   02/19/20 (!) 130/58   12/13/19 (!) 118/56   11/27/19 (!) 122/50   11/15/19 (!) 129/57   09/13/19 132/78   09/13/19 (!) 162/58   Well controlled, stable.

## 2020-02-19 NOTE — PROGRESS NOTES
CHIEF COMPLAINT  Follow-up (6 month)      DIAGNOSES, HISTORY, ASSESSMENT, AND PLAN    1. Type 2 diabetes mellitus with stage 3 chronic kidney disease, without long-term current use of insulin    2. Type 2 diabetes mellitus with microalbuminuria, without long-term current use of insulin    3. Atherosclerosis of aorta    4. Simple chronic bronchitis    5. Essential hypertension    6. Tension headache    7. Dyslipidemia associated with type 2 diabetes mellitus    8. Tortuous aorta    9. Atherosclerosis of native coronary artery of native heart without angina pectoris        Problem List Items Addressed This Visit        Neuro    Tension headache    Current Assessment & Plan     Takes Fioricet a couple times per week, averaging 4 tablets per week.            Pulmonary    Simple chronic bronchitis    Current Assessment & Plan     Compensated/controlled and stable.             Cardiac/Vascular    Atherosclerosis of aorta    Overview     CT CHEST WITHOUT CONTRAST 11/27/2018  FINDINGS: Scattered atherosclerotic plaque noted involving the thoracic aorta and aortic branch vessels, including the coronary arteries.         Current Assessment & Plan     Asymptomatic. Clinically stable.         Atherosclerosis of native coronary artery of native heart without angina pectoris    Overview     CT CHEST WITHOUT CONTRAST 11/27/2018  FINDINGS: Scattered atherosclerotic plaque noted involving the thoracic aorta and aortic branch vessels, including the coronary arteries.         Current Assessment & Plan     Asymptomatic. Clinically stable.          Dyslipidemia associated with type 2 diabetes mellitus (Chronic)    Current Assessment & Plan     Lab Results   Component Value Date    CHOL 171 02/12/2020    CHOL 173 02/12/2020    TRIG 137 02/12/2020    TRIG 136 02/12/2020    HDL 77 (H) 02/12/2020    HDL 78 (H) 02/12/2020    LDLCALC 66.6 02/12/2020    LDLCALC 67.8 02/12/2020    NONHDLCHOL 94 02/12/2020    NONHDLCHOL 95 02/12/2020    AST 14  02/12/2020    AST 16 02/12/2020    ALT 14 02/12/2020    ALT 13 02/12/2020     The 10-year ASCVD risk score (Grace NEWBY Jr., et al., 2013) is: 48.2%    Values used to calculate the score:      Age: 70 years      Sex: Female      Is Non- : Yes      Diabetic: Yes      Tobacco smoker: Yes      Systolic Blood Pressure: 130 mmHg      Is BP treated: Yes      HDL Cholesterol: 78 mg/dL      Total Cholesterol: 173 mg/dL  Controlled. She appears to be tolerating statin for dyslipidemia without apparent symptoms of myositis or hepatic dysfunction.            Relevant Medications    glimepiride (AMARYL) 2 MG tablet    metFORMIN (GLUCOPHAGE-XR) 500 MG XR 24hr tablet    rosuvastatin (CRESTOR) 20 MG tablet    ezetimibe (ZETIA) 10 mg tablet    Other Relevant Orders    Comprehensive metabolic panel    Essential hypertension (Chronic)    Current Assessment & Plan     BP Readings from Last 6 Encounters:   02/19/20 (!) 130/58   12/13/19 (!) 118/56   11/27/19 (!) 122/50   11/15/19 (!) 129/57   09/13/19 132/78   09/13/19 (!) 162/58   Well controlled, stable.         Relevant Medications    amlodipine-benazepril 5-20 mg (LOTREL) 5-20 mg per capsule    chlorthalidone (HYGROTEN) 25 MG Tab    Other Relevant Orders    Comprehensive metabolic panel    Tortuous aorta    Overview     XR CHEST PA AND LATERAL 09/13/2019  FINDINGS:  Aorta minimally tortuous.           Current Assessment & Plan     Asymptomatic. Clinically stable.            Endocrine    Type 2 diabetes mellitus with microalbuminuria, without long-term current use of insulin (Chronic)    Current Assessment & Plan     Well controlled, stable.         Relevant Medications    glimepiride (AMARYL) 2 MG tablet    metFORMIN (GLUCOPHAGE-XR) 500 MG XR 24hr tablet    Other Relevant Orders    Microalbumin/creatinine urine ratio    Ambulatory referral/consult to Optometry    Type 2 diabetes mellitus with stage 3 chronic kidney disease, without long-term current use of  insulin - Primary (Chronic)    Current Assessment & Plan     Diabetes Management Status    Statin: Taking  ACE/ARB: Taking    Screening or Prevention Patient's value Goal Complete/Controlled?   HgA1C Testing and Control   Lab Results   Component Value Date    HGBA1C 6.3 (H) 02/12/2020      Annually/Less than 8% Yes   Lipid profile : 02/12/2020 Annually Yes   LDL control Lab Results   Component Value Date    LDLCALC 66.6 02/12/2020    LDLCALC 67.8 02/12/2020    Annually/Less than 100 mg/dl  Yes   Nephropathy screening Lab Results   Component Value Date    LABMICR 896.0 01/25/2019     Lab Results   Component Value Date    PROTEINUA 1+ (A) 12/16/2018    Annually No   Blood pressure BP Readings from Last 1 Encounters:   02/19/20 (!) 130/58    Less than 140/90 Yes   Dilated retinal exam : 02/01/2019 Annually Yes   Foot exam   : 12/13/2019 Annually Yes     Lab Results   Component Value Date    HGBA1C 6.3 (H) 02/12/2020    HGBA1C 6.5 (H) 08/12/2019    HGBA1C 7.1 (H) 05/30/2019    ESTGFRAFRICA 58.0 (A) 02/12/2020    ESTGFRAFRICA 58.0 (A) 02/12/2020    EGFRNONAA 50.3 (A) 02/12/2020    EGFRNONAA 50.3 (A) 02/12/2020    MICALBCREAT 556.5 (H) 01/25/2019    LDLCALC 66.6 02/12/2020    LDLCALC 67.8 02/12/2020       HEALTH MAINTENANCE: Diabetic health maintenance interventions reviewed and are up to date except for:      Topic    Eye Exam        Well controlled, stable.           Relevant Medications    glimepiride (AMARYL) 2 MG tablet    metFORMIN (GLUCOPHAGE-XR) 500 MG XR 24hr tablet    Other Relevant Orders    Microalbumin/creatinine urine ratio    Ambulatory referral/consult to Optometry    Hemoglobin A1c    Comprehensive metabolic panel          MEDICATION MANAGMENT    MEDICATIONS SUMMARY: I have changed Lucia Barlow's metFORMIN and ezetimibe. I am also having her maintain her aspirin, calcium-vitamin D, blood-glucose meter, lancets, TRUEplus Lancets, albuterol, potassium, varicella-zoster gE-AS01B (PF), blood sugar  diagnostic, albuterol, fluticasone furoate-vilanterol, butalbital-acetaminophen-caffeine -40 mg, pantoprazole, fluticasone propionate, amlodipine-benazepril 5-20 mg, chlorthalidone, glimepiride, and rosuvastatin.    Outpatient Medications Prior to Visit   Medication Sig Dispense Refill    albuterol (PROVENTIL) 2.5 mg /3 mL (0.083 %) nebulizer solution every 6 (six) hours as needed.  12    albuterol (VENTOLIN HFA) 90 mcg/actuation inhaler Inhale 2 puffs into the lungs every 4 (four) hours as needed for Wheezing. 18 g 3    aspirin (ECOTRIN) 81 MG EC tablet Take 1 tablet (81 mg total) by mouth once daily. (FOR HEART HEALTH) 90 tablet 3    blood sugar diagnostic Strp 1 each by Misc.(Non-Drug; Combo Route) route 3 (three) times daily. [facility] test strips 100 each 3    blood-glucose meter kit Insurance preferred 1 each 0    butalbital-acetaminophen-caffeine -40 mg (FIORICET, ESGIC) -40 mg per tablet Take 1 tablet by mouth 3 (three) times daily as needed for Headaches. (MAXIMUM OF 15 TABLET PER MONTH) 30 tablet 2    calcium-vitamin D (CALCIUM WITH VITAMIN D) 600 mg(1,500mg) -400 unit Tab Take 2 tablets by mouth once daily. 180 tablet 4    fluticasone furoate-vilanterol (BREO ELLIPTA) 100-25 mcg/dose diskus inhaler Inhale 1 puff into the lungs once daily. 60 each 11    fluticasone propionate (FLONASE) 50 mcg/actuation nasal spray USE 2 SPRAYS IN EACH NOSTRIL ONE TIME DAILY  48 g 4    lancets 32 gauge Misc 1 lancet by Misc.(Non-Drug; Combo Route) route 3 (three) times daily. Insurance preferred 100 each 11    pantoprazole (PROTONIX) 40 MG tablet TAKE 1 TABLET EVERY DAY 90 tablet 4    potassium 99 mg Tab Take 1 tablet by mouth once daily.      TRUEPLUS LANCETS 33 gauge Misc       varicella-zoster gE-AS01B, PF, (SHINGRIX) 50 mcg/0.5 mL injection Inject 0.5 mL (one dose) into muscle now; give second dose at least 2 months later 0.5 mL 1    amlodipine-benazepril 5-20 mg (LOTREL) 5-20 mg per  capsule Take 1 capsule by mouth once daily. 90 capsule 2    chlorthalidone (HYGROTEN) 25 MG Tab Take 1 tablet (25 mg total) by mouth once daily. 90 tablet 2    ezetimibe (ZETIA) 10 mg tablet TAKE 1 TABLET EVERY DAY 90 tablet 4    glimepiride (AMARYL) 2 MG tablet Take 1 tablet (2 mg total) by mouth before breakfast. 90 tablet 3    metFORMIN (GLUCOPHAGE-XR) 500 MG 24 hr tablet Take 1 tablet (500 mg total) by mouth 2 (two) times daily with meals. 180 tablet 3    rosuvastatin (CRESTOR) 20 MG tablet Take 1 tablet (20 mg total) by mouth once daily. 90 tablet 3     No facility-administered medications prior to visit.         Medications Discontinued During This Encounter   Medication Reason    amlodipine-benazepril 5-20 mg (LOTREL) 5-20 mg per capsule Reorder    chlorthalidone (HYGROTEN) 25 MG Tab Reorder    glimepiride (AMARYL) 2 MG tablet Reorder    metFORMIN (GLUCOPHAGE-XR) 500 MG 24 hr tablet Reorder    rosuvastatin (CRESTOR) 20 MG tablet Reorder    ezetimibe (ZETIA) 10 mg tablet Reorder        Medications Ordered This Encounter   Medications    amlodipine-benazepril 5-20 mg (LOTREL) 5-20 mg per capsule     Sig: Take 1 capsule by mouth once daily.     Dispense:  90 capsule     Refill:  4          chlorthalidone (HYGROTEN) 25 MG Tab     Sig: Take 1 tablet (25 mg total) by mouth once daily.     Dispense:  90 tablet     Refill:  4          ezetimibe (ZETIA) 10 mg tablet     Sig: Take 1 tablet (10 mg total) by mouth once daily.     Dispense:  90 tablet     Refill:  4          glimepiride (AMARYL) 2 MG tablet     Sig: Take 1 tablet (2 mg total) by mouth before breakfast.     Dispense:  90 tablet     Refill:  4          metFORMIN (GLUCOPHAGE-XR) 500 MG XR 24hr tablet     Sig: Take 1 tablet (500 mg total) by mouth 2 (two) times daily with meals.     Dispense:  180 tablet     Refill:  4          rosuvastatin (CRESTOR) 20 MG tablet     Sig: Take 1 tablet (20 mg total) by mouth once daily.     Dispense:  90  "tablet     Refill:  4             FOLLOW-UP  Follow up in about 6 months (around 8/19/2020) for review test results, discuss treatment plan, re-evaluate problem(s) discussed today.     REVIEW OF SYSTEMS  CARDIOVASCULAR: No angina or claudication reported.   ENDOCRINE: No polyuria or polydipsia reported.  PULMONARY: No hemoptysis reported.     PHYSICAL EXAM  Vitals:    02/19/20 0744   BP: (!) 130/58   BP Location: Left arm   Patient Position: Sitting   BP Method: Medium (Manual)   Pulse: 110   Temp: 97.2 °F (36.2 °C)   TempSrc: Tympanic   SpO2: 95%   Weight: 67.2 kg (148 lb 2.4 oz)   Height: 5' 2" (1.575 m)     CONSTITUTIONAL: Vital signs noted. No apparent distress. Does not appear acutely ill or septic. Appears adequately hydrated.  ENT: External ENT unremarkable. Oropharynx moist. Ear canals and visualized tympanic membranes normal. Hearing grossly intact.   PULM: Lungs clear. Breathing unlabored.  HEART: Auscultation reveals regular rate and rhythm without murmur, gallop or rub.  DERM: Skin warm and moist with normal turgor.  NEURO: There are no gross focal motor deficits or gross deficits of cranial nerves III-XII.  PSYCHIATRIC: Alert and conversant. Mood grossly neutral. Judgment and insight not grossly compromised.   DIABETIC FOOT EXAM: Inspection of feet reveals no open wounds, ulcerations, significant calluses, or evidence of arterial insufficiency. 10g monofilament sensation is intact in all 5 locations tested.     Documentation entered by me for this encounter may have been done in part using speech-recognition technology. Although I have made an effort to ensure accuracy, "sound like" errors may exist and should be interpreted in context. -VICK Sandoval MD.   "

## 2020-02-20 NOTE — PROGRESS NOTES
"Lucia Barlow presented for a  Medicare AWV and comprehensive Health Risk Assessment today. The following components were reviewed and updated:    · Medical history  · Family History  · Social history  · Allergies and Current Medications  · Health Risk Assessment  · Health Maintenance  · Care Team     ** See Completed Assessments for Annual Wellness Visit within the encounter summary.**       The following assessments were completed:  · Living Situation  · CAGE  · Depression Screening  · Timed Get Up and Go  · Whisper Test  · Cognitive Function Screening  · Nutrition Screening  · ADL Screening  · PAQ Screening    Patient Care Team:  VICK Sandoval MD as PCP - General (Family Medicine)  Elyssa Boyer LPN as Care Coordinator (Internal Medicine)  VICK Sandoval MD as Hypertension Digital Medicine Responsible Provider (Family Medicine)  Jessica Ortiz as Digital Medicine Health   Kelsi Schrader, PharmD as Hypertension Digital Medicine Clinician (Pharmacist)  Tami Galvez RD, CDE as Dietitian (Diabetes)  Kelsi Schrader, PharmD as Diabetes Digital Medicine Clinician (Pharmacist)  VICK Sandoval MD as Diabetes Digital Medicine Responsible Provider (Family Medicine)  [employer] as Hypertension Digital Medicine Contract  [employer] as Diabetes Digital Medicine Contract  Amalia Rosas MD as Consulting Physician (Cardiology)  Keith Bhatti OD as Consulting Physician (Optometry)  Geovani Lala MD as Consulting Physician (Otolaryngology)  Ramiro Aleman MD as Consulting Physician (Pulmonary Disease)    Vitals:    02/26/20 0810   BP: 116/60   BP Location: Right arm   Temp: 96.8 °F (36 °C)   TempSrc: Tympanic   Weight: 68.1 kg (150 lb 2.1 oz)   Height: 5' 2" (1.575 m)     Body mass index is 27.46 kg/m².     Physical Exam   Constitutional: She is oriented to person, place, and time. She appears well-developed and well-nourished. No distress.   HENT:   Head: " Normocephalic and atraumatic.   Eyes: EOM are normal. No scleral icterus.   Neck: Neck supple.   Cardiovascular: Normal rate and regular rhythm.   Pulmonary/Chest: Effort normal and breath sounds normal. No respiratory distress. She has no decreased breath sounds. She has no wheezes. She has no rhonchi. She has no rales.   Musculoskeletal: Normal range of motion.   Neurological: She is alert and oriented to person, place, and time.   Skin: Skin is warm and dry.   Psychiatric: She has a normal mood and affect. Her speech is normal and behavior is normal. Thought content normal.         Diagnoses and health risks identified today and associated recommendations/orders:    1. Encounter for preventive health examination    2. Atherosclerosis of aorta  Stable. CT chest 11/27/18. Pt taking aspirin and Crestor. Continue current treatment plan as prescribed by your PCP and cardiologist and f/u with them for further management.    3. Tortuous aorta  Stable. CXR 9/13/19. Continue current treatment plan as prescribed by your PCP and cardiologist and f/u with them for further management.    4. Chronic obstructive pulmonary disease, unspecified COPD type  Stable. Most recent PFT 11/27/19. Pt using Breo Ellipta and albuterol. Continue current treatment plan as prescribed by your PCP and pulmonologist and f/u with themfor further management.    5. Dyslipidemia associated with type 2 diabetes mellitus  Controlled. Pt taking Crestor, Zetia, and aspirin. Continue current treatment plan as prescribed by your PCP and cardiologist and f/u with them for further management.    6. Type 2 diabetes mellitus with microalbuminuria, without long-term current use of insulin; 7. Type 2 diabetes mellitus with stage 3 chronic kidney disease, without long-term current use of insulin; 8. Type 2 diabetes mellitus with peripheral vascular disease  DM controlled. Microalbuminuria stable. Pt taking metformin and glimepiride. Continue current treatment  plan as prescribed by your PCP and f/u with your PCP for further management.    9. CKD (chronic kidney disease) stage 3, GFR 30-59 ml/min  Stable. Continue current treatment plan as prescribed by your PCP and f/u with your PCP for further management.    10. PVD (peripheral vascular disease), 11. Atherosclerosis of artery of both lower extremities, 12. S/P peripheral artery angioplasty  Stable. BLE arterial U/S 1/17/17. Pt taking aspirin and Crestor. Continue current treatment plan as prescribed by your PCP and cardiologist and f/u with them for further management.    13. Essential hypertension  Stable. Pt taking amlodipine-benazepril and chlorthalidone. Continue current treatment plan as prescribed by your PCP and cardiologist and f/u with them for further management.    14. Nonrheumatic aortic valve stenosis  Stable. 2D Echo 8/29/18. Pt taking aspirin and Crestor. Continue current treatment plan as prescribed by your PCP and cardiologist and f/u with them for further management.    15. Atherosclerosis of native coronary artery of native heart without angina pectoris  Stable. CT chest 11/27/18. Pt taking aspirin and Crestor. Continue current treatment plan as prescribed by your PCP and cardiologist and f/u with them for further management.    16. Lung nodule < 6cm on CT  Stable. Pt s/p biopsy. CT chest 11/27/18. As per pulm note 11/27/19, no further f/u needed. Continue current treatment plan as prescribed by your PCP and pulmonologist and f/u with them for further management.    17. Tension headache  Stable. Pt taking Fioricet. Continue current treatment plan as prescribed by your PCP and f/u with your PCP for further management.    18. Gastroesophageal reflux disease without esophagitis  Stable. Pt taking Protonix. Continue current treatment plan as prescribed by your PCP and f/u with your PCP for further management.    19. Age-related osteoporosis without current pathological fracture  Stable. DEXA 9/4/19. Pt  taking Ca and vit D suppl. Continue current treatment plan as prescribed by your PCP and f/u with your PCP for further management.    20. Seasonal allergic rhinitis due to pollen  Stable. Pt using Flonase. Continue current treatment plan as prescribed by your PCP and f/u with your PCP for further management.    21. Diverticulosis, 22. History of colon polyps  Stable. Colonoscopy 11/15/19 with recommended repeat in 5 years. Continue current treatment plan as prescribed by your PCP and f/u with your PCP for further management.    23. Currently smokes a few cigarettes per day; former heavy smoker  Not currently controlled. F/u with your PCP to discuss adjustments to your treatment plan.    Please obtain any medical records from past / present outside medical providers and give to PCP for review and further medical recommendations.    Provided Lucia with a 5-10 year written screening schedule and personal prevention plan. Recommendations were developed using the USPSTF age appropriate recommendations. Education, counseling, and referrals were provided as needed. After Visit Summary printed and given to patient which includes a list of additional screenings\tests needed.    Follow up in about 1 year (around 2/26/2021) for HRA. F/u with PCP Dr. Sandoval as scheduled 8/19/2020 and specialists as recommended for health management.    GLENDY Lentz     I offered to discuss end of life issues, including information on how to make advance directives that the patient could use to name someone who would make medical decisions on their behalf if they became too ill to make themselves.  _X_Patient declined - pt reports already done.  ___Patient is interested, I provided paper work and offered to discuss.

## 2020-02-20 NOTE — PROGRESS NOTES
Digital Medicine: Clinician Follow-Up    Called patient to discuss her higher blood pressure.     The history is provided by the patient.     Follow Up  Follow-up reason(s): reading review      Readings are trending up Patient hasn't charged her blood pressure cuff within the past two months. She denies any changes besides a lack of charge to cause her higher readings.     Patient's diabetes is well controlled.      INTERVENTION(S)  reviewed appropriate dose schedule, reviewed monitoring technique and encouragement/support    PLAN  patient verbalizes understanding, patient amenable to changes and continue monitoring    Encouraged patient to increase her post-prandial readings. She will maintain her current diabetic medication regimen at this time.    Patient will charge her blood pressure cuff to confirm the accuracy of her blood pressure readings.     Patient will maintain her current hypertension medication regimen at this time. If she remains elevated in one week, I will increase Lotrel to two capsules daily.          Topic    Eye Exam        Last 5 Patient Entered Readings                                      Current 30 Day Average: 138/68     Recent Readings 2/17/2020 2/10/2020 2/7/2020 2/4/2020 1/31/2020    SBP (mmHg) 158 131 146 130 138    DBP (mmHg) 70 68 71 65 64    Pulse 103 91 98 101 106        Last 6 Patient Entered Readings                                          Most Recent A1c: 6.3% on 2/12/2020  (Goal: 7%)     Recent Readings 2/17/2020 2/10/2020 2/7/2020 2/4/2020 1/31/2020    Blood Glucose (mg/dL) 117 86 90 90 120           Hypertension Medications             amlodipine-benazepril 5-20 mg (LOTREL) 5-20 mg per capsule Take 1 capsule by mouth once daily.    chlorthalidone (HYGROTEN) 25 MG Tab Take 1 tablet (25 mg total) by mouth once daily.        Diabetes Medications             glimepiride (AMARYL) 2 MG tablet Take 1 tablet (2 mg total) by mouth before breakfast.    metFORMIN (GLUCOPHAGE-XR) 500  MG XR 24hr tablet Take 1 tablet (500 mg total) by mouth 2 (two) times daily with meals.               Screenings

## 2020-02-21 ENCOUNTER — PATIENT MESSAGE (OUTPATIENT)
Dept: ADMINISTRATIVE | Facility: OTHER | Age: 70
End: 2020-02-21

## 2020-02-26 ENCOUNTER — OFFICE VISIT (OUTPATIENT)
Dept: INTERNAL MEDICINE | Facility: CLINIC | Age: 70
End: 2020-02-26
Payer: MEDICARE

## 2020-02-26 VITALS
TEMPERATURE: 97 F | HEIGHT: 62 IN | WEIGHT: 150.13 LBS | BODY MASS INDEX: 27.63 KG/M2 | DIASTOLIC BLOOD PRESSURE: 60 MMHG | SYSTOLIC BLOOD PRESSURE: 116 MMHG

## 2020-02-26 DIAGNOSIS — I25.10 ATHEROSCLEROSIS OF NATIVE CORONARY ARTERY OF NATIVE HEART WITHOUT ANGINA PECTORIS: ICD-10-CM

## 2020-02-26 DIAGNOSIS — E11.29 TYPE 2 DIABETES MELLITUS WITH MICROALBUMINURIA, WITHOUT LONG-TERM CURRENT USE OF INSULIN: Chronic | ICD-10-CM

## 2020-02-26 DIAGNOSIS — R80.9 TYPE 2 DIABETES MELLITUS WITH MICROALBUMINURIA, WITHOUT LONG-TERM CURRENT USE OF INSULIN: Chronic | ICD-10-CM

## 2020-02-26 DIAGNOSIS — I70.203 ATHEROSCLEROSIS OF ARTERY OF BOTH LOWER EXTREMITIES: Chronic | ICD-10-CM

## 2020-02-26 DIAGNOSIS — I10 ESSENTIAL HYPERTENSION: Chronic | ICD-10-CM

## 2020-02-26 DIAGNOSIS — N18.30 TYPE 2 DIABETES MELLITUS WITH STAGE 3 CHRONIC KIDNEY DISEASE, WITHOUT LONG-TERM CURRENT USE OF INSULIN: Chronic | ICD-10-CM

## 2020-02-26 DIAGNOSIS — K57.90 DIVERTICULOSIS: ICD-10-CM

## 2020-02-26 DIAGNOSIS — Z98.62 S/P PERIPHERAL ARTERY ANGIOPLASTY: ICD-10-CM

## 2020-02-26 DIAGNOSIS — E11.69 DYSLIPIDEMIA ASSOCIATED WITH TYPE 2 DIABETES MELLITUS: Chronic | ICD-10-CM

## 2020-02-26 DIAGNOSIS — E11.51 TYPE 2 DIABETES MELLITUS WITH PERIPHERAL VASCULAR DISEASE: Chronic | ICD-10-CM

## 2020-02-26 DIAGNOSIS — F17.210 LIGHT SMOKER: Chronic | ICD-10-CM

## 2020-02-26 DIAGNOSIS — G44.209 TENSION HEADACHE: ICD-10-CM

## 2020-02-26 DIAGNOSIS — J44.9 CHRONIC OBSTRUCTIVE PULMONARY DISEASE, UNSPECIFIED COPD TYPE: ICD-10-CM

## 2020-02-26 DIAGNOSIS — I77.1 TORTUOUS AORTA: ICD-10-CM

## 2020-02-26 DIAGNOSIS — I70.0 ATHEROSCLEROSIS OF AORTA: ICD-10-CM

## 2020-02-26 DIAGNOSIS — E78.5 DYSLIPIDEMIA ASSOCIATED WITH TYPE 2 DIABETES MELLITUS: Chronic | ICD-10-CM

## 2020-02-26 DIAGNOSIS — I73.9 PVD (PERIPHERAL VASCULAR DISEASE): ICD-10-CM

## 2020-02-26 DIAGNOSIS — Z86.010 HISTORY OF COLON POLYPS: ICD-10-CM

## 2020-02-26 DIAGNOSIS — E11.22 TYPE 2 DIABETES MELLITUS WITH STAGE 3 CHRONIC KIDNEY DISEASE, WITHOUT LONG-TERM CURRENT USE OF INSULIN: Chronic | ICD-10-CM

## 2020-02-26 DIAGNOSIS — I35.0 NONRHEUMATIC AORTIC VALVE STENOSIS: ICD-10-CM

## 2020-02-26 DIAGNOSIS — M81.0 AGE-RELATED OSTEOPOROSIS WITHOUT CURRENT PATHOLOGICAL FRACTURE: ICD-10-CM

## 2020-02-26 DIAGNOSIS — N18.30 CKD (CHRONIC KIDNEY DISEASE) STAGE 3, GFR 30-59 ML/MIN: ICD-10-CM

## 2020-02-26 DIAGNOSIS — Z00.00 ENCOUNTER FOR PREVENTIVE HEALTH EXAMINATION: Primary | ICD-10-CM

## 2020-02-26 DIAGNOSIS — J30.1 SEASONAL ALLERGIC RHINITIS DUE TO POLLEN: ICD-10-CM

## 2020-02-26 DIAGNOSIS — K21.9 GASTROESOPHAGEAL REFLUX DISEASE WITHOUT ESOPHAGITIS: Chronic | ICD-10-CM

## 2020-02-26 PROCEDURE — G0439 PR MEDICARE ANNUAL WELLNESS SUBSEQUENT VISIT: ICD-10-PCS | Mod: HCNC,S$GLB,, | Performed by: PHYSICIAN ASSISTANT

## 2020-02-26 PROCEDURE — 3044F PR MOST RECENT HEMOGLOBIN A1C LEVEL <7.0%: ICD-10-PCS | Mod: HCNC,CPTII,S$GLB, | Performed by: PHYSICIAN ASSISTANT

## 2020-02-26 PROCEDURE — 99999 PR PBB SHADOW E&M-EST. PATIENT-LVL III: CPT | Mod: PBBFAC,HCNC,, | Performed by: PHYSICIAN ASSISTANT

## 2020-02-26 PROCEDURE — 3078F PR MOST RECENT DIASTOLIC BLOOD PRESSURE < 80 MM HG: ICD-10-PCS | Mod: HCNC,CPTII,S$GLB, | Performed by: PHYSICIAN ASSISTANT

## 2020-02-26 PROCEDURE — 3074F PR MOST RECENT SYSTOLIC BLOOD PRESSURE < 130 MM HG: ICD-10-PCS | Mod: HCNC,CPTII,S$GLB, | Performed by: PHYSICIAN ASSISTANT

## 2020-02-26 PROCEDURE — 99999 PR PBB SHADOW E&M-EST. PATIENT-LVL III: ICD-10-PCS | Mod: PBBFAC,HCNC,, | Performed by: PHYSICIAN ASSISTANT

## 2020-02-26 PROCEDURE — G0439 PPPS, SUBSEQ VISIT: HCPCS | Mod: HCNC,S$GLB,, | Performed by: PHYSICIAN ASSISTANT

## 2020-02-26 PROCEDURE — 3074F SYST BP LT 130 MM HG: CPT | Mod: HCNC,CPTII,S$GLB, | Performed by: PHYSICIAN ASSISTANT

## 2020-02-26 PROCEDURE — 3044F HG A1C LEVEL LT 7.0%: CPT | Mod: HCNC,CPTII,S$GLB, | Performed by: PHYSICIAN ASSISTANT

## 2020-02-26 PROCEDURE — 3078F DIAST BP <80 MM HG: CPT | Mod: HCNC,CPTII,S$GLB, | Performed by: PHYSICIAN ASSISTANT

## 2020-02-26 NOTE — PATIENT INSTRUCTIONS
Counseling and Referral of Other Preventative  (Italic type indicates deductible and co-insurance are waived)    Patient Name: Lucia Barlow  Today's Date: 2/26/2020    Health Maintenance       Date Due Completion Date    Aspirin/Antiplatelet Therapy 01/30/1966 ---    Shingles Vaccine (2 of 2) 01/08/2020 11/13/2019    Eye Exam 02/01/2020 2/1/2019    Override on 2/1/2019: Done    Hemoglobin A1c 08/12/2020 2/12/2020    Lipid Panel 02/12/2021 2/12/2020    High Dose Statin 02/19/2021 2/19/2020    Foot Exam 02/19/2021 2/19/2020 (Done)    Override on 2/19/2020: Done    Override on 12/13/2019: Done    Override on 1/25/2019: Done    Mammogram 02/12/2022 2/12/2020    DEXA SCAN 09/04/2022 9/4/2019    Colonoscopy 11/15/2022 11/15/2019    TETANUS VACCINE 08/16/2029 8/16/2019        No orders of the defined types were placed in this encounter.    The following information is provided to all patients.  This information is to help you find resources for any of the problems found today that may be affecting your health:                Living healthy guide: www.[facility].[place].gov      Understanding Diabetes: www.diabetes.org      Eating healthy: www.cdc.gov/healthyweight      CDC home safety checklist: www.cdc.gov/steadi/patient.html      Agency on Aging: www.goea.[place].[facility]      Alcoholics anonymous (AA): www.aa.org      Physical Activity: www.elmer.nih.gov/tn5bscn      Tobacco use: www.quitwithusla.org

## 2020-02-27 ENCOUNTER — PATIENT OUTREACH (OUTPATIENT)
Dept: OTHER | Facility: OTHER | Age: 70
End: 2020-02-27

## 2020-02-28 ENCOUNTER — PATIENT OUTREACH (OUTPATIENT)
Dept: ADMINISTRATIVE | Facility: OTHER | Age: 70
End: 2020-02-28

## 2020-03-04 ENCOUNTER — OFFICE VISIT (OUTPATIENT)
Dept: CARDIOLOGY | Facility: CLINIC | Age: 70
End: 2020-03-04
Payer: MEDICARE

## 2020-03-04 ENCOUNTER — HOSPITAL ENCOUNTER (OUTPATIENT)
Dept: CARDIOLOGY | Facility: HOSPITAL | Age: 70
Discharge: HOME | End: 2020-03-04
Attending: INTERNAL MEDICINE
Payer: MEDICARE

## 2020-03-04 VITALS
DIASTOLIC BLOOD PRESSURE: 52 MMHG | HEIGHT: 62 IN | BODY MASS INDEX: 27.34 KG/M2 | WEIGHT: 148.56 LBS | SYSTOLIC BLOOD PRESSURE: 138 MMHG | OXYGEN SATURATION: 95 % | HEART RATE: 116 BPM

## 2020-03-04 DIAGNOSIS — I70.0 ATHEROSCLEROSIS OF AORTA: ICD-10-CM

## 2020-03-04 DIAGNOSIS — I70.203 ATHEROSCLEROSIS OF ARTERY OF BOTH LOWER EXTREMITIES: Primary | Chronic | ICD-10-CM

## 2020-03-04 DIAGNOSIS — E11.51 TYPE 2 DIABETES MELLITUS WITH PERIPHERAL VASCULAR DISEASE: Chronic | ICD-10-CM

## 2020-03-04 DIAGNOSIS — E78.5 DYSLIPIDEMIA ASSOCIATED WITH TYPE 2 DIABETES MELLITUS: Chronic | ICD-10-CM

## 2020-03-04 DIAGNOSIS — I35.0 NONRHEUMATIC AORTIC VALVE STENOSIS: ICD-10-CM

## 2020-03-04 DIAGNOSIS — K21.9 GASTROESOPHAGEAL REFLUX DISEASE WITHOUT ESOPHAGITIS: Chronic | ICD-10-CM

## 2020-03-04 DIAGNOSIS — Z98.62 S/P PERIPHERAL ARTERY ANGIOPLASTY: ICD-10-CM

## 2020-03-04 DIAGNOSIS — E11.22 TYPE 2 DIABETES MELLITUS WITH STAGE 3 CHRONIC KIDNEY DISEASE, WITHOUT LONG-TERM CURRENT USE OF INSULIN: Chronic | ICD-10-CM

## 2020-03-04 DIAGNOSIS — N18.30 TYPE 2 DIABETES MELLITUS WITH STAGE 3 CHRONIC KIDNEY DISEASE, WITHOUT LONG-TERM CURRENT USE OF INSULIN: Chronic | ICD-10-CM

## 2020-03-04 DIAGNOSIS — I10 ESSENTIAL HYPERTENSION: ICD-10-CM

## 2020-03-04 DIAGNOSIS — N18.30 CKD (CHRONIC KIDNEY DISEASE) STAGE 3, GFR 30-59 ML/MIN: ICD-10-CM

## 2020-03-04 DIAGNOSIS — E11.69 DYSLIPIDEMIA ASSOCIATED WITH TYPE 2 DIABETES MELLITUS: Chronic | ICD-10-CM

## 2020-03-04 DIAGNOSIS — I25.10 ATHEROSCLEROSIS OF NATIVE CORONARY ARTERY OF NATIVE HEART WITHOUT ANGINA PECTORIS: ICD-10-CM

## 2020-03-04 DIAGNOSIS — R80.9 TYPE 2 DIABETES MELLITUS WITH MICROALBUMINURIA, WITHOUT LONG-TERM CURRENT USE OF INSULIN: Chronic | ICD-10-CM

## 2020-03-04 DIAGNOSIS — F17.210 LIGHT SMOKER: Chronic | ICD-10-CM

## 2020-03-04 DIAGNOSIS — I10 ESSENTIAL HYPERTENSION: Chronic | ICD-10-CM

## 2020-03-04 DIAGNOSIS — E11.29 TYPE 2 DIABETES MELLITUS WITH MICROALBUMINURIA, WITHOUT LONG-TERM CURRENT USE OF INSULIN: Chronic | ICD-10-CM

## 2020-03-04 DIAGNOSIS — J44.9 CHRONIC OBSTRUCTIVE PULMONARY DISEASE, UNSPECIFIED COPD TYPE: ICD-10-CM

## 2020-03-04 PROCEDURE — 99214 PR OFFICE/OUTPT VISIT, EST, LEVL IV, 30-39 MIN: ICD-10-PCS | Mod: HCNC,25,S$GLB, | Performed by: INTERNAL MEDICINE

## 2020-03-04 PROCEDURE — 1159F MED LIST DOCD IN RCRD: CPT | Mod: HCNC,S$GLB,, | Performed by: INTERNAL MEDICINE

## 2020-03-04 PROCEDURE — 93005 ELECTROCARDIOGRAM TRACING: CPT | Mod: HCNC

## 2020-03-04 PROCEDURE — 3044F PR MOST RECENT HEMOGLOBIN A1C LEVEL <7.0%: ICD-10-PCS | Mod: HCNC,CPTII,S$GLB, | Performed by: INTERNAL MEDICINE

## 2020-03-04 PROCEDURE — 93010 EKG 12-LEAD: ICD-10-PCS | Mod: HCNC,,, | Performed by: INTERNAL MEDICINE

## 2020-03-04 PROCEDURE — 3075F PR MOST RECENT SYSTOLIC BLOOD PRESS GE 130-139MM HG: ICD-10-PCS | Mod: HCNC,CPTII,S$GLB, | Performed by: INTERNAL MEDICINE

## 2020-03-04 PROCEDURE — 3044F HG A1C LEVEL LT 7.0%: CPT | Mod: HCNC,CPTII,S$GLB, | Performed by: INTERNAL MEDICINE

## 2020-03-04 PROCEDURE — 1101F PT FALLS ASSESS-DOCD LE1/YR: CPT | Mod: HCNC,CPTII,S$GLB, | Performed by: INTERNAL MEDICINE

## 2020-03-04 PROCEDURE — 3075F SYST BP GE 130 - 139MM HG: CPT | Mod: HCNC,CPTII,S$GLB, | Performed by: INTERNAL MEDICINE

## 2020-03-04 PROCEDURE — 1159F PR MEDICATION LIST DOCUMENTED IN MEDICAL RECORD: ICD-10-PCS | Mod: HCNC,S$GLB,, | Performed by: INTERNAL MEDICINE

## 2020-03-04 PROCEDURE — 99999 PR PBB SHADOW E&M-EST. PATIENT-LVL III: CPT | Mod: PBBFAC,HCNC,, | Performed by: INTERNAL MEDICINE

## 2020-03-04 PROCEDURE — 99999 PR PBB SHADOW E&M-EST. PATIENT-LVL III: ICD-10-PCS | Mod: PBBFAC,HCNC,, | Performed by: INTERNAL MEDICINE

## 2020-03-04 PROCEDURE — 1101F PR PT FALLS ASSESS DOC 0-1 FALLS W/OUT INJ PAST YR: ICD-10-PCS | Mod: HCNC,CPTII,S$GLB, | Performed by: INTERNAL MEDICINE

## 2020-03-04 PROCEDURE — 99214 OFFICE O/P EST MOD 30 MIN: CPT | Mod: HCNC,25,S$GLB, | Performed by: INTERNAL MEDICINE

## 2020-03-04 PROCEDURE — 3078F PR MOST RECENT DIASTOLIC BLOOD PRESSURE < 80 MM HG: ICD-10-PCS | Mod: HCNC,CPTII,S$GLB, | Performed by: INTERNAL MEDICINE

## 2020-03-04 PROCEDURE — 93010 ELECTROCARDIOGRAM REPORT: CPT | Mod: HCNC,,, | Performed by: INTERNAL MEDICINE

## 2020-03-04 PROCEDURE — 3078F DIAST BP <80 MM HG: CPT | Mod: HCNC,CPTII,S$GLB, | Performed by: INTERNAL MEDICINE

## 2020-03-04 RX ORDER — MONTELUKAST SODIUM 10 MG/1
10 TABLET ORAL NIGHTLY
COMMUNITY
Start: 2020-02-20 | End: 2020-12-09 | Stop reason: SDUPTHER

## 2020-03-04 NOTE — PROGRESS NOTES
Subjective:   Patient ID:  Lucia Barlow is a 70 y.o. female who presents for follow up of Peripheral Vascular Disease (6 month f/u)      HPI  A 71 yo female with diabetes htn ckd pvd is here for f/u. She has not taken her bp meds. She has been a lot in the hospital with her son. She had no other complaints of chest pain or claudication she has shortness of breath unchanged from baseline from copd. Has no claudication has no tia no palpitation syncope near syncope. still smokes few cigarettes.  Past Medical History:   Diagnosis Date    Atherosclerosis of artery of both lower extremities 1/17/2014    Atherosclerosis of native coronary artery of native heart without angina pectoris 11/18/2016    Atherosclerotic PVD with intermittent claudication 1/17/2014    Chronic bronchitis     COPD (chronic obstructive pulmonary disease)     Dyslipidemia associated with type 2 diabetes mellitus 6/14/2013    Dyslipidemia associated with type 2 diabetes mellitus     Ex-smoker     Gastroesophageal reflux disease without esophagitis 1/25/2019    Hyperlipidemia     Hypertension     Osteoporosis 1/16 rosita 1/18    PVD (peripheral vascular disease)     S/P peripheral artery angioplasty 2/7/2014    Simple chronic bronchitis     Tobacco dependence     Type 2 diabetes mellitus with microalbuminuria, without long-term current use of insulin 3/29/2019    Type 2 diabetes mellitus with peripheral vascular disease     Type 2 diabetes mellitus with stage 3 chronic kidney disease, without long-term current use of insulin 2/1/2019    Type 2 diabetes mellitus without retinopathy 2/1/2019    Urinary incontinence        Past Surgical History:   Procedure Laterality Date    ANGIOPLASTY Bilateral 01/24/2014    aortoiliac stenting     CARPAL TUNNEL RELEASE  2003    [facility]    COLECTOMY  approximate 2005    pt states 1in colon -dx with benign mass removed-states no colon cancer    COLONOSCOPY N/A 11/9/2016    Procedure: COLONOSCOPY;   Surgeon: Dmitri Sterling MD;  [facility];  Service: Endoscopy;  Laterality: N/A;    COLONOSCOPY N/A 11/15/2019    Procedure: COLONOSCOPY;  Surgeon: Marko Vicente MD;  [facility] ENDO;  Service: Endoscopy;  Laterality: N/A;    HYSTERECTOMY      no cancer    OOPHORECTOMY         Social History     Tobacco Use    Smoking status: Current Some Day Smoker     Packs/day: 0.10     Types: Cigarettes     Last attempt to quit: 2016     Years since quittin.1    Smokeless tobacco: Never Used   Substance Use Topics    Alcohol use: No     Alcohol/week: 0.0 standard drinks    Drug use: No       Family History   Problem Relation Age of Onset    Stroke Father     Stroke Sister     Asthma Daughter     Diabetes Daughter     Breast cancer Daughter     Asthma Son        Current Outpatient Medications   Medication Sig    albuterol (PROVENTIL) 2.5 mg /3 mL (0.083 %) nebulizer solution every 6 (six) hours as needed.    albuterol (VENTOLIN HFA) 90 mcg/actuation inhaler Inhale 2 puffs into the lungs every 4 (four) hours as needed for Wheezing.    amlodipine-benazepril 5-20 mg (LOTREL) 5-20 mg per capsule Take 1 capsule by mouth once daily.    aspirin (ECOTRIN) 81 MG EC tablet Take 1 tablet (81 mg total) by mouth once daily. (FOR HEART HEALTH)    blood sugar diagnostic Strp 1 each by Misc.(Non-Drug; Combo Route) route 3 (three) times daily. EAL test strips    blood-glucose meter kit Insurance preferred    butalbital-acetaminophen-caffeine -40 mg (FIORICET, ESGIC) -40 mg per tablet Take 1 tablet by mouth 3 (three) times daily as needed for Headaches. (MAXIMUM OF 15 TABLET PER MONTH)    calcium-vitamin D (CALCIUM WITH VITAMIN D) 600 mg(1,500mg) -400 unit Tab Take 2 tablets by mouth once daily.    chlorthalidone (HYGROTEN) 25 MG Tab Take 1 tablet (25 mg total) by mouth once daily.    ezetimibe (ZETIA) 10 mg tablet Take 1 tablet (10 mg total) by mouth once daily.    fluticasone  furoate-vilanterol (BREO ELLIPTA) 100-25 mcg/dose diskus inhaler Inhale 1 puff into the lungs once daily.    fluticasone propionate (FLONASE) 50 mcg/actuation nasal spray USE 2 SPRAYS IN EACH NOSTRIL ONE TIME DAILY     glimepiride (AMARYL) 2 MG tablet Take 1 tablet (2 mg total) by mouth before breakfast.    lancets 32 gauge Misc 1 lancet by Misc.(Non-Drug; Combo Route) route 3 (three) times daily. Insurance preferred    metFORMIN (GLUCOPHAGE-XR) 500 MG XR 24hr tablet Take 1 tablet (500 mg total) by mouth 2 (two) times daily with meals.    montelukast (SINGULAIR) 10 mg tablet     pantoprazole (PROTONIX) 40 MG tablet TAKE 1 TABLET EVERY DAY    potassium 99 mg Tab Take 1 tablet by mouth once daily.    rosuvastatin (CRESTOR) 20 MG tablet Take 1 tablet (20 mg total) by mouth once daily.    TRUEPLUS LANCETS 33 gauge Misc     varicella-zoster gE-AS01B, PF, (SHINGRIX) 50 mcg/0.5 mL injection Inject 0.5 mL (one dose) into muscle now; give second dose at least 2 months later     No current facility-administered medications for this visit.      Current Outpatient Medications on File Prior to Visit   Medication Sig    albuterol (PROVENTIL) 2.5 mg /3 mL (0.083 %) nebulizer solution every 6 (six) hours as needed.    albuterol (VENTOLIN HFA) 90 mcg/actuation inhaler Inhale 2 puffs into the lungs every 4 (four) hours as needed for Wheezing.    amlodipine-benazepril 5-20 mg (LOTREL) 5-20 mg per capsule Take 1 capsule by mouth once daily.    aspirin (ECOTRIN) 81 MG EC tablet Take 1 tablet (81 mg total) by mouth once daily. (FOR HEART HEALTH)    blood sugar diagnostic Strp 1 each by Misc.(Non-Drug; Combo Route) route 3 (three) times daily. [facility]HE[facility] test strips    blood-glucose meter kit Insurance preferred    butalbital-acetaminophen-caffeine -40 mg (FIORICET, ESGIC) -40 mg per tablet Take 1 tablet by mouth 3 (three) times daily as needed for Headaches. (MAXIMUM OF 15 TABLET PER MONTH)    calcium-vitamin D  (CALCIUM WITH VITAMIN D) 600 mg(1,500mg) -400 unit Tab Take 2 tablets by mouth once daily.    chlorthalidone (HYGROTEN) 25 MG Tab Take 1 tablet (25 mg total) by mouth once daily.    ezetimibe (ZETIA) 10 mg tablet Take 1 tablet (10 mg total) by mouth once daily.    fluticasone furoate-vilanterol (BREO ELLIPTA) 100-25 mcg/dose diskus inhaler Inhale 1 puff into the lungs once daily.    fluticasone propionate (FLONASE) 50 mcg/actuation nasal spray USE 2 SPRAYS IN EACH NOSTRIL ONE TIME DAILY     glimepiride (AMARYL) 2 MG tablet Take 1 tablet (2 mg total) by mouth before breakfast.    lancets 32 gauge Misc 1 lancet by Misc.(Non-Drug; Combo Route) route 3 (three) times daily. Insurance preferred    metFORMIN (GLUCOPHAGE-XR) 500 MG XR 24hr tablet Take 1 tablet (500 mg total) by mouth 2 (two) times daily with meals.    montelukast (SINGULAIR) 10 mg tablet     pantoprazole (PROTONIX) 40 MG tablet TAKE 1 TABLET EVERY DAY    potassium 99 mg Tab Take 1 tablet by mouth once daily.    rosuvastatin (CRESTOR) 20 MG tablet Take 1 tablet (20 mg total) by mouth once daily.    TRUEPLUS LANCETS 33 gauge Misc     varicella-zoster gE-AS01B, PF, (SHINGRIX) 50 mcg/0.5 mL injection Inject 0.5 mL (one dose) into muscle now; give second dose at least 2 months later     No current facility-administered medications on file prior to visit.      Review of patient's allergies indicates:   Allergen Reactions    Skin staples [surgical stainless steel] Swelling    Egg derived      Shortness breath, lip swelling    Fish containing products     Iodine and iodide containing products Swelling    Latex, natural rubber Swelling    Losartan Itching    Nickel     Pravastatin      40 mg causes nausea vomitting but 20 mg ok    Shellfish containing products Swelling     Review of Systems   Constitution: Positive for malaise/fatigue. Negative for diaphoresis and weight gain.   HENT: Negative for hoarse voice.    Eyes: Negative for double  vision and visual disturbance.   Cardiovascular: Positive for dyspnea on exertion. Negative for chest pain, claudication, cyanosis, irregular heartbeat, leg swelling, near-syncope, orthopnea, palpitations, paroxysmal nocturnal dyspnea and syncope.   Respiratory: Positive for shortness of breath. Negative for cough, hemoptysis and snoring.    Hematologic/Lymphatic: Negative for bleeding problem. Does not bruise/bleed easily.   Skin: Negative for color change and poor wound healing.   Musculoskeletal: Negative for muscle cramps, muscle weakness and myalgias.   Gastrointestinal: Negative for bloating, abdominal pain, change in bowel habit, diarrhea, heartburn, hematemesis, hematochezia, melena and nausea.   Neurological: Negative for excessive daytime sleepiness, dizziness, headaches, light-headedness, loss of balance, numbness and weakness.   Psychiatric/Behavioral: Negative for memory loss. The patient does not have insomnia.    Allergic/Immunologic: Negative for hives.       Objective:   Physical Exam   Constitutional: She is oriented to person, place, and time. She appears well-developed and well-nourished. She does not appear ill. No distress.   HENT:   Head: Normocephalic and atraumatic.   Eyes: Pupils are equal, round, and reactive to light. EOM are normal. No scleral icterus.   Neck: Normal range of motion. Neck supple. Normal carotid pulses, no hepatojugular reflux and no JVD present. Carotid bruit is not present. No tracheal deviation present. No thyromegaly present.   Cardiovascular: Regular rhythm, intact distal pulses and normal pulses.  No extrasystoles are present. Tachycardia present. Exam reveals no gallop and no friction rub.   Murmur heard.   Harsh midsystolic murmur is present with a grade of 1/6 at the upper right sternal border radiating to the neck.  Pulses:       Carotid pulses are 2+ on the right side, and 2+ on the left side.       Radial pulses are 2+ on the right side, and 2+ on the left  "side.        Femoral pulses are 2+ on the right side, and 2+ on the left side.       Popliteal pulses are 2+ on the right side, and 2+ on the left side.        Dorsalis pedis pulses are 2+ on the right side, and 2+ on the left side.        Posterior tibial pulses are 2+ on the right side, and 2+ on the left side.   Pulmonary/Chest: Effort normal and breath sounds normal. No respiratory distress. She has no wheezes. She has no rhonchi. She has no rales. She exhibits no tenderness.   Abdominal: Soft. Normal appearance, normal aorta and bowel sounds are normal. She exhibits no distension, no abdominal bruit, no ascites and no pulsatile midline mass. There is no hepatomegaly. There is no tenderness.   Musculoskeletal: She exhibits no edema.        Right shoulder: She exhibits no deformity.   Neurological: She is alert and oriented to person, place, and time. She has normal strength. No cranial nerve deficit. Coordination normal.   Skin: Skin is warm and dry. No rash noted. She is not diaphoretic. No cyanosis or erythema. Nails show no clubbing.   Psychiatric: She has a normal mood and affect. Her speech is normal and behavior is normal.   Nursing note and vitals reviewed.    Vitals:    03/04/20 0806 03/04/20 0808   BP: (!) 144/60 (!) 138/52   BP Location: Left arm Right arm   Patient Position: Sitting Sitting   BP Method: Small (Manual) Small (Manual)   Pulse: (!) 116    SpO2: 95%    Weight: 67.4 kg (148 lb 9.4 oz)    Height: 5' 2" (1.575 m)      Lab Results   Component Value Date    CHOL 171 02/12/2020    CHOL 173 02/12/2020    CHOL 165 08/12/2019     Lab Results   Component Value Date    HDL 77 (H) 02/12/2020    HDL 78 (H) 02/12/2020    HDL 82 (H) 08/12/2019     Lab Results   Component Value Date    LDLCALC 66.6 02/12/2020    LDLCALC 67.8 02/12/2020    LDLCALC 73.4 08/12/2019     Lab Results   Component Value Date    TRIG 137 02/12/2020    TRIG 136 02/12/2020    TRIG 48 08/12/2019     Lab Results   Component Value " Date    CHOLHDL 45.0 02/12/2020    CHOLHDL 45.1 02/12/2020    CHOLHDL 49.7 08/12/2019       Chemistry        Component Value Date/Time     02/12/2020 0806     02/12/2020 0806    K 3.7 02/12/2020 0806    K 3.9 02/12/2020 0806     02/12/2020 0806     02/12/2020 0806    CO2 26 02/12/2020 0806    CO2 27 02/12/2020 0806    BUN 20 02/12/2020 0806    BUN 20 02/12/2020 0806    CREATININE 1.1 02/12/2020 0806    CREATININE 1.1 02/12/2020 0806    GLU 69 (L) 02/12/2020 0806    GLU 55 (L) 02/12/2020 0806        Component Value Date/Time    CALCIUM 9.3 02/12/2020 0806    CALCIUM 9.2 02/12/2020 0806    ALKPHOS 79 02/12/2020 0806    ALKPHOS 85 02/12/2020 0806    AST 14 02/12/2020 0806    AST 16 02/12/2020 0806    ALT 14 02/12/2020 0806    ALT 13 02/12/2020 0806    BILITOT 0.2 02/12/2020 0806    BILITOT 0.2 02/12/2020 0806    ESTGFRAFRICA 58.0 (A) 02/12/2020 0806    ESTGFRAFRICA 58.0 (A) 02/12/2020 0806    EGFRNONAA 50.3 (A) 02/12/2020 0806    EGFRNONAA 50.3 (A) 02/12/2020 0806        Lab Results   Component Value Date    HGBA1C 6.3 (H) 02/12/2020       Lab Results   Component Value Date    TSH 1.352 06/05/2013     Lab Results   Component Value Date    INR 0.9 08/10/2019    INR 0.9 12/16/2018    INR 0.9 03/10/2017     Lab Results   Component Value Date    WBC 9.34 12/06/2019    HGB 11.7 (L) 12/06/2019    HCT 38.1 12/06/2019    MCV 96 12/06/2019     12/06/2019     BMP  Sodium   Date Value Ref Range Status   02/12/2020 140 136 - 145 mmol/L Final   02/12/2020 139 136 - 145 mmol/L Final     Potassium   Date Value Ref Range Status   02/12/2020 3.7 3.5 - 5.1 mmol/L Final   02/12/2020 3.9 3.5 - 5.1 mmol/L Final     Chloride   Date Value Ref Range Status   02/12/2020 101 95 - 110 mmol/L Final   02/12/2020 101 95 - 110 mmol/L Final     CO2   Date Value Ref Range Status   02/12/2020 26 23 - 29 mmol/L Final   02/12/2020 27 23 - 29 mmol/L Final     BUN, Bld   Date Value Ref Range Status   02/12/2020 20 8 - 23  mg/dL Final   02/12/2020 20 8 - 23 mg/dL Final     Creatinine   Date Value Ref Range Status   02/12/2020 1.1 0.5 - 1.4 mg/dL Final   02/12/2020 1.1 0.5 - 1.4 mg/dL Final     Calcium   Date Value Ref Range Status   02/12/2020 9.3 8.7 - 10.5 mg/dL Final   02/12/2020 9.2 8.7 - 10.5 mg/dL Final     Anion Gap   Date Value Ref Range Status   02/12/2020 13 8 - 16 mmol/L Final   02/12/2020 11 8 - 16 mmol/L Final     eGFR if    Date Value Ref Range Status   02/12/2020 58.0 (A) >60 mL/min/1.73 m^2 Final   02/12/2020 58.0 (A) >60 mL/min/1.73 m^2 Final     eGFR if non    Date Value Ref Range Status   02/12/2020 50.3 (A) >60 mL/min/1.73 m^2 Final     Comment:     Calculation used to obtain the estimated glomerular filtration  rate (eGFR) is the CKD-EPI equation.      02/12/2020 50.3 (A) >60 mL/min/1.73 m^2 Final     Comment:     Calculation used to obtain the estimated glomerular filtration  rate (eGFR) is the CKD-EPI equation.        CrCl cannot be calculated (Patient's most recent lab result is older than the maximum 7 days allowed.).    Assessment:     1. Atherosclerosis of artery of both lower extremities    2. Currently smokes a few cigarettes per day; former heavy smoker    3. Essential hypertension    4. Dyslipidemia associated with type 2 diabetes mellitus    5. Type 2 diabetes mellitus with peripheral vascular disease    6. Gastroesophageal reflux disease without esophagitis    7. Type 2 diabetes mellitus with stage 3 chronic kidney disease, without long-term current use of insulin    8. Type 2 diabetes mellitus with microalbuminuria, without long-term current use of insulin    9. S/P peripheral artery angioplasty    10. Nonrheumatic aortic valve stenosis    11. Atherosclerosis of aorta    12. Lung nodule < 6cm on CT    13. Atherosclerosis of native coronary artery of native heart without angina pectoris    14. Chronic obstructive pulmonary disease, unspecified COPD type    15. CKD  (chronic kidney disease) stage 3, GFR 30-59 ml/min      Asymptomatic pvd wise.  htn usually controlled has not taken her meds  Diabetes and lipids good control  AS asymptomatic   Copd stable.  Plan:   Continue current therapy  Cardiac low salt diet.  Risk factor modification and excercise program.  Smoking cessation counseling done.   F/u in 6 months with lipid cmp a1c.

## 2020-03-12 ENCOUNTER — PATIENT OUTREACH (OUTPATIENT)
Dept: OTHER | Facility: OTHER | Age: 70
End: 2020-03-12

## 2020-03-12 NOTE — PROGRESS NOTES
"Digital Medicine: Clinician Follow-Up    Called patient to discuss her high blood pressure and after meal glucose readings.    The history is provided by the patient.     Follow Up  Follow-up reason(s): reading review    Patient's blood pressure is elevated due to life stressors. She is not sitting for 5-10 minutes prior to taking her blood pressure readings.    Patient's blood glucose readings are elevated on 2/28 and 3/2 due to consuming candy after [employer]. She has not consumed candy in "a couple weeks."      INTERVENTION(S)  reviewed appropriate dose schedule, reviewed monitoring technique, recommended med change - patient refuses and encouragement/support    PLAN  patient verbalizes understanding, patient refuses additional therapy and continue monitoring    Patient will increase her after meal SMBG readings to confirm if further adjustments in diabetic medications are needed.    Patient will use the proper blood pressure technique and will focus on stress technique to improve her readings. If no adjustments are made in two weeks, I will consider increasing Lotrel to two capsules daily. We discussed this change today however she wants to focus on technique.     Patient will contact us with any changes or problems.          Topic    Eye Exam        Last 5 Patient Entered Readings                                      Current 30 Day Average: 139/70     Recent Readings 3/12/2020 3/9/2020 3/5/2020 3/3/2020 3/2/2020    SBP (mmHg) 132 141 136 159 149    DBP (mmHg) 68 71 78 87 81    Pulse 97 109 99 99 112        Last 6 Patient Entered Readings                                          Most Recent A1c: 6.3% on 2/12/2020  (Goal: 7%)     Recent Readings 3/12/2020 3/9/2020 3/5/2020 3/3/2020 3/2/2020    Blood Glucose (mg/dL) 150 115 127 124 127           Hypertension Medications             amlodipine-benazepril 5-20 mg (LOTREL) 5-20 mg per capsule Take 1 capsule by mouth once daily.    chlorthalidone (HYGROTEN) 25 MG " Tab Take 1 tablet (25 mg total) by mouth once daily.        Diabetes Medications             glimepiride (AMARYL) 2 MG tablet Take 1 tablet (2 mg total) by mouth before breakfast.    metFORMIN (GLUCOPHAGE-XR) 500 MG XR 24hr tablet Take 1 tablet (500 mg total) by mouth 2 (two) times daily with meals.                           Medication Adherence Screening   She did not miss a dose this month.

## 2020-03-12 NOTE — PROGRESS NOTES
"Digital Medicine: Health  Follow-Up    Patient reports she has been at the hospital with her son for over a week, son was recently involved in an accident. Reports her son is currently in critical condition. Reports seeing her son in this condition has been very hard on her. Drinking coke and not taking medication "on time", but not skipping medications. Denies symptoms of hypertension.     Last office BP: 138/52.     The history is provided by the patient.     Follow Up  Follow-up reason(s): reading review and routine education          INTERVENTION(S)  encouragement/support, denied further coaching, denied resources and denied questions    PLAN  patient verbalizes understanding, demonstrates understanding via teach back, patient amenable to changes and continue monitoring          Topic    Eye Exam        Last 5 Patient Entered Readings                                      Current 30 Day Average: 139/70     Recent Readings 3/12/2020 3/9/2020 3/5/2020 3/3/2020 3/2/2020    SBP (mmHg) 132 141 136 159 149    DBP (mmHg) 68 71 78 87 81    Pulse 97 109 99 99 112        Last 6 Patient Entered Readings                                          Most Recent A1c: 6.3% on 2/12/2020  (Goal: 7%)     Recent Readings 3/12/2020 3/9/2020 3/5/2020 3/3/2020 3/2/2020    Blood Glucose (mg/dL) 150 115 127 124 127                    Diet Screening   No change to diet.  Patient reports eating or drinking the following: soda and coke    Will assess this next call.     Physical Activity Screening   No change to exercise routine.        Will assess this next call.     Medication Adherence Screening   She did not miss a dose this month.      SDOH  "

## 2020-03-24 DIAGNOSIS — I10 ESSENTIAL HYPERTENSION: Chronic | ICD-10-CM

## 2020-03-24 RX ORDER — CHLORTHALIDONE 25 MG/1
TABLET ORAL
Qty: 90 TABLET | Refills: 1 | Status: SHIPPED | OUTPATIENT
Start: 2020-03-24 | End: 2020-08-12

## 2020-03-24 NOTE — TELEPHONE ENCOUNTER
REFILL APPROVED      Requested Prescriptions     Signed Prescriptions Disp Refills    chlorthalidone (HYGROTEN) 25 MG Tab 90 tablet 1     Sig: TAKE 1 TABLET EVERY DAY     Authorizing Provider: VICK ADAME

## 2020-04-06 ENCOUNTER — PATIENT OUTREACH (OUTPATIENT)
Dept: OTHER | Facility: OTHER | Age: 70
End: 2020-04-06

## 2020-04-16 ENCOUNTER — PATIENT OUTREACH (OUTPATIENT)
Dept: OTHER | Facility: OTHER | Age: 70
End: 2020-04-16

## 2020-04-16 NOTE — PROGRESS NOTES
Digital Medicine: Health  Follow-Up    Patient reports she is well, no complaints. Denies symptoms of hypertension- HA, chest pains and SOB.     Discussed COVID-19 and importance of proper hand hygiene and social distancing.     Reports her son was recently involved in an accident, but home recovering now.     Discussed elevated BG reading on 4/13 and 3/20, patient attributes elevation to Easter candy. Reports she is making efforts to limit.       The history is provided by the patient.     Follow Up  Follow-up reason(s): reading review and routine education      Readings are trending down due to lifestyle change and reduced stress as son has recovered .    Routine Education Topics: eating patterns and physical activity        INTERVENTION(S)  recommended diet modifications, recommend physical activity, encouragement/support, denied further coaching, denied resources and denied questions    PLAN  patient verbalizes understanding, demonstrates understanding via teach back, patient amenable to changes and continue monitoring          Topic    Eye Exam        Last 5 Patient Entered Readings                                      Current 30 Day Average: 131/66     Recent Readings 4/15/2020 4/13/2020 4/6/2020 4/3/2020 3/31/2020    SBP (mmHg) 112 121 118 152 147    DBP (mmHg) 58 52 64 76 78    Pulse 101 99 94 96 90        Last 6 Patient Entered Readings                                          Most Recent A1c: 6.3% on 2/12/2020  (Goal: 7%)     Recent Readings 4/15/2020 4/13/2020 4/6/2020 4/3/2020 3/31/2020    Blood Glucose (mg/dL) 103 238 108 87 136                    Diet Screening   Dinner is typically between.  BBQ ribs, baked potatoes with green beans, cabbage, rice and cornbread .    Patient reports eating or drinking the following: juice and water    Reports her daughter has been going grocery shopping for her. Reports she reduced intake of soda during this time, switched to orange and cranberry juice and water.  Reports she has been cooking.     Physical Activity Screening   When asked if exercising, patient responded: yes    She exercises for 60 minutes per day 3 day(s) a week.      Patient participates in the following activities: biking    Reports she has not been able to walk during this time because her daughter does not think it is safe.     Reports she can ride her stationary bike, rides for an hour per day, every other day.    Medication Adherence Screening   She did not miss a dose this month.      SDOH

## 2020-05-21 ENCOUNTER — PATIENT OUTREACH (OUTPATIENT)
Dept: OTHER | Facility: OTHER | Age: 70
End: 2020-05-21

## 2020-05-21 NOTE — PROGRESS NOTES
Digital Medicine: Health  Follow-Up    Patient reports she is well. Reports she is tired of staying home. Reports her and son are staying home together, sometimes sitting outside. Reports she is staying home during this time due to COVID-19 concerns.    Reports she has only been waiting about 45 minutes after a meal to take BG readings, advised to wait 2 hours after meals. Reports she does not rest prior to BP readings. Reviewed importance of resting prior to readings.     The history is provided by the patient.     Follow Up  Follow-up reason(s): reading review and routine education      Readings are trending up due to inaccurate technique.        INTERVENTION(S)  recommended diet modifications, recommend physical activity, reviewed monitoring technique, encouragement/support, denied further coaching, denied resources and denied questions    PLAN  patient verbalizes understanding, demonstrates understanding via teach back, patient amenable to changes and continue monitoring          Topic    Eye Exam        Last 5 Patient Entered Readings                                      Current 30 Day Average: 142/72     Recent Readings 5/21/2020 5/19/2020 5/13/2020 5/12/2020 5/7/2020    SBP (mmHg) 152 126 135 136 156    DBP (mmHg) 79 61 73 63 82    Pulse 106 103 93 89 86        Last 6 Patient Entered Readings                                          Most Recent A1c: 6.3% on 2/12/2020  (Goal: 7%)     Recent Readings 5/21/2020 5/19/2020 5/13/2020 5/12/2020 5/7/2020    Blood Glucose (mg/dL) 123 111 107 73 105                    Diet Screening   Breakfast is typically between. Grits, birch, and eggs .  Lunch is typically between. [place], ground meat, lettuce and tomatoes; chips.    Dinner is typically between. Mac and cheese, green beans, broccoli .    Patient reports eating or drinking the following: water, fresh vegetables and white bread, milk, coke if sugar is lowShe has the following dietary restrictions: low carbShe  "cooks for self and has meals prepared by family.    Patient does the shopping for groceries and lets family grocery shop.  She gets groceries from the grocery store.      Reports she has been ordering groceries online. Reports she has been making most meals at home. Reports she is limiting "some" of her carbs. Reports she is trying not to eat "too much" bread, using a hamburger bun. Reports she breaks out if she eats wheat bread or "anything with wheat in them". Discussed wheat content in hamburger buns, patient reports she gets the "plain" buns without seeds.     Reports she has been eating cabbage, pork chop and rice for dinner last night.     Physical Activity Screening   When asked if exercising, patient responded: yes    Patient participates in the following activities: yard/housework and pulling weeds    Reports she has not been going on daily walks due to COVID-19 concerns.     Medication Adherence Screening   She did not miss a dose this month.      SDOH  "

## 2020-06-25 ENCOUNTER — PATIENT OUTREACH (OUTPATIENT)
Dept: OTHER | Facility: OTHER | Age: 70
End: 2020-06-25

## 2020-06-25 ENCOUNTER — TELEPHONE (OUTPATIENT)
Dept: INTERNAL MEDICINE | Facility: CLINIC | Age: 70
End: 2020-06-25

## 2020-06-25 ENCOUNTER — PATIENT MESSAGE (OUTPATIENT)
Dept: INTERNAL MEDICINE | Facility: CLINIC | Age: 70
End: 2020-06-25

## 2020-06-25 NOTE — PROGRESS NOTES
"Digital Medicine: Health  Follow-Up    Patient reports she is well. Denies symptoms of hypertension- severe HA, chest pains and SOB. Patient reports her back bothering her lately, low back. Reports she takes a tablet for minor headaches. Discussed COVID-19 and importance of proper hand hygiene and social distancing.     The history is provided by the patient.   Follow Up  Follow-up reason(s): reading review and routine education      Readings are trending up due to back pain .        INTERVENTION(S)  recommended diet modifications, encouragement/support, denied further coaching, denied resources and denied questions    PLAN  patient verbalizes understanding, demonstrates understanding via teach back, patient amenable to changes and continue monitoring          Topic    Eye Exam        Last 5 Patient Entered Readings                                      Current 30 Day Average: 131/63     Recent Readings 6/24/2020 6/22/2020 6/18/2020 6/16/2020 6/13/2020    SBP (mmHg) 132 120 133 134 128    DBP (mmHg) 56 59 75 58 62    Pulse 93 90 90 94 97        Last 6 Patient Entered Readings                                          Most Recent A1c: 6.3% on 2/12/2020  (Goal: 7%)     Recent Readings 6/24/2020 6/22/2020 6/18/2020 6/16/2020 6/13/2020    Blood Glucose (mg/dL) 75 83 88 201 103                    Diet Screening   No change to diet.  Breakfast is typically between. Oatmeal with strawberries and milk.  Lunch is typically between. Salad- homemade: smoked chicken with veggies .    Dinner is typically between. Red beand and rice.    Patient reports eating or drinking the following: fruit, water, fresh vegetables and lemon waterShe has the following dietary restrictions: low carb    Reports she is trying not to eat "too much" bread lately.     Reports she is losing weight, but does not want to lose much more weight.     Physical Activity Screening   When asked if exercising, patient responded: yes    Reports her " great-great grandson has been keeping her busy lately, cares for him about every day. Reports she has not been walking or any formal activity during this time.     Medication Adherence Screening   She did not miss a dose this month.      SDOH

## 2020-06-26 ENCOUNTER — OFFICE VISIT (OUTPATIENT)
Dept: INTERNAL MEDICINE | Facility: CLINIC | Age: 70
End: 2020-06-26
Payer: MEDICARE

## 2020-06-26 ENCOUNTER — HOSPITAL ENCOUNTER (OUTPATIENT)
Dept: RADIOLOGY | Facility: HOSPITAL | Age: 70
Discharge: HOME | End: 2020-06-26
Attending: NURSE PRACTITIONER
Payer: MEDICARE

## 2020-06-26 VITALS
HEIGHT: 62 IN | OXYGEN SATURATION: 95 % | DIASTOLIC BLOOD PRESSURE: 58 MMHG | BODY MASS INDEX: 27.75 KG/M2 | SYSTOLIC BLOOD PRESSURE: 124 MMHG | TEMPERATURE: 96 F | WEIGHT: 150.81 LBS | HEART RATE: 106 BPM

## 2020-06-26 DIAGNOSIS — M54.50 ACUTE BILATERAL LOW BACK PAIN WITHOUT SCIATICA: Primary | ICD-10-CM

## 2020-06-26 DIAGNOSIS — M54.50 ACUTE BILATERAL LOW BACK PAIN WITHOUT SCIATICA: ICD-10-CM

## 2020-06-26 PROCEDURE — 72100 XR LUMBAR SPINE AP AND LATERAL: ICD-10-PCS | Mod: 26,HCNC,, | Performed by: RADIOLOGY

## 2020-06-26 PROCEDURE — 99999 PR PBB SHADOW E&M-EST. PATIENT-LVL V: CPT | Mod: PBBFAC,HCNC,, | Performed by: NURSE PRACTITIONER

## 2020-06-26 PROCEDURE — 99999 PR PBB SHADOW E&M-EST. PATIENT-LVL V: ICD-10-PCS | Mod: PBBFAC,HCNC,, | Performed by: NURSE PRACTITIONER

## 2020-06-26 PROCEDURE — 1159F PR MEDICATION LIST DOCUMENTED IN MEDICAL RECORD: ICD-10-PCS | Mod: HCNC,S$GLB,, | Performed by: NURSE PRACTITIONER

## 2020-06-26 PROCEDURE — 1101F PT FALLS ASSESS-DOCD LE1/YR: CPT | Mod: HCNC,CPTII,S$GLB, | Performed by: NURSE PRACTITIONER

## 2020-06-26 PROCEDURE — 3078F DIAST BP <80 MM HG: CPT | Mod: HCNC,CPTII,S$GLB, | Performed by: NURSE PRACTITIONER

## 2020-06-26 PROCEDURE — 72100 X-RAY EXAM L-S SPINE 2/3 VWS: CPT | Mod: TC,HCNC

## 2020-06-26 PROCEDURE — 72100 X-RAY EXAM L-S SPINE 2/3 VWS: CPT | Mod: 26,HCNC,, | Performed by: RADIOLOGY

## 2020-06-26 PROCEDURE — 3074F SYST BP LT 130 MM HG: CPT | Mod: HCNC,CPTII,S$GLB, | Performed by: NURSE PRACTITIONER

## 2020-06-26 PROCEDURE — 3078F PR MOST RECENT DIASTOLIC BLOOD PRESSURE < 80 MM HG: ICD-10-PCS | Mod: HCNC,CPTII,S$GLB, | Performed by: NURSE PRACTITIONER

## 2020-06-26 PROCEDURE — 99214 PR OFFICE/OUTPT VISIT, EST, LEVL IV, 30-39 MIN: ICD-10-PCS | Mod: HCNC,S$GLB,, | Performed by: NURSE PRACTITIONER

## 2020-06-26 PROCEDURE — 3008F BODY MASS INDEX DOCD: CPT | Mod: HCNC,CPTII,S$GLB, | Performed by: NURSE PRACTITIONER

## 2020-06-26 PROCEDURE — 1101F PR PT FALLS ASSESS DOC 0-1 FALLS W/OUT INJ PAST YR: ICD-10-PCS | Mod: HCNC,CPTII,S$GLB, | Performed by: NURSE PRACTITIONER

## 2020-06-26 PROCEDURE — 1125F AMNT PAIN NOTED PAIN PRSNT: CPT | Mod: HCNC,S$GLB,, | Performed by: NURSE PRACTITIONER

## 2020-06-26 PROCEDURE — 3074F PR MOST RECENT SYSTOLIC BLOOD PRESSURE < 130 MM HG: ICD-10-PCS | Mod: HCNC,CPTII,S$GLB, | Performed by: NURSE PRACTITIONER

## 2020-06-26 PROCEDURE — 99214 OFFICE O/P EST MOD 30 MIN: CPT | Mod: HCNC,S$GLB,, | Performed by: NURSE PRACTITIONER

## 2020-06-26 PROCEDURE — 3008F PR BODY MASS INDEX (BMI) DOCUMENTED: ICD-10-PCS | Mod: HCNC,CPTII,S$GLB, | Performed by: NURSE PRACTITIONER

## 2020-06-26 PROCEDURE — 1159F MED LIST DOCD IN RCRD: CPT | Mod: HCNC,S$GLB,, | Performed by: NURSE PRACTITIONER

## 2020-06-26 PROCEDURE — 1125F PR PAIN SEVERITY QUANTIFIED, PAIN PRESENT: ICD-10-PCS | Mod: HCNC,S$GLB,, | Performed by: NURSE PRACTITIONER

## 2020-06-26 RX ORDER — TIZANIDINE 2 MG/1
TABLET ORAL
Qty: 30 TABLET | Refills: 0 | Status: SHIPPED | OUTPATIENT
Start: 2020-06-26 | End: 2020-12-18

## 2020-06-26 NOTE — PROGRESS NOTES
Subjective:       Patient ID: Lucia Barlow is a 70 y.o. female.    Chief Complaint: Back Pain    Patient presents with lower back pain that started about 3 weeks ago.      Back Pain  This is a new problem. The current episode started 1 to 4 weeks ago. The problem occurs intermittently. The problem is unchanged. The pain is present in the lumbar spine. The pain is moderate. The symptoms are aggravated by sitting and standing. Pertinent negatives include no bladder incontinence, bowel incontinence or fever. She has tried analgesics for the symptoms.     Review of Systems   Constitutional: Negative for chills, fatigue and fever.   Respiratory: Negative for cough and shortness of breath.    Gastrointestinal: Negative for bowel incontinence.   Genitourinary: Negative for bladder incontinence.   Musculoskeletal: Positive for arthralgias, back pain and gait problem.   Integumentary:  Negative for rash.         Objective:      Physical Exam  Constitutional:       Appearance: Normal appearance.   HENT:      Head: Normocephalic.   Cardiovascular:      Rate and Rhythm: Normal rate and regular rhythm.   Pulmonary:      Effort: Pulmonary effort is normal.      Breath sounds: Normal breath sounds.   Musculoskeletal:         General: Tenderness present. No swelling.      Lumbar back: She exhibits decreased range of motion and tenderness.   Neurological:      Mental Status: She is alert.         Assessment:       1. Acute bilateral low back pain without sciatica        Plan:           Acute bilateral low back pain without sciatica  -     X-Ray Lumbar Spine AP And Lateral; Future; Expected date: 06/26/2020  -     tiZANidine (ZANAFLEX) 2 MG tablet; Take 1-2 tabs twice a day as needed for muscle pain.  Will cause drowsiness  Dispense: 30 tablet; Refill: 0          X-ray today.  Recommend muscle relaxer.  Can continue Tylenol.  Will inform of reports.  Follow up if no improvement.

## 2020-07-08 ENCOUNTER — PATIENT OUTREACH (OUTPATIENT)
Dept: OTHER | Facility: OTHER | Age: 70
End: 2020-07-08

## 2020-07-10 NOTE — PROGRESS NOTES
"Digital Medicine: Health  Follow-Up    Called to address low BG alert of 21 mg/dL on 7/5. Denies symptoms of hypoglycemia. Reports she repeated a reading with another device, was "still low". Ate something, then drove to the store to purchase a coke. Advised against driving when BG is low. Reports she has not been very hungry lately, over past 6 days. Reports she has been unable to eat very much at one time, only can eat a few spoonfuls of oatmeal for breakfast, small meals throughout the day. Reports she recently started taking muscle relaxors and has noticed lower appetite.     Charging device once per week.     Discussed COVID-19 and importance of proper hand hygiene and social distancing.         The history is provided by the patient.   Follow Up  Follow-up reason(s): reading review and routine education      Alert received.   Care Team received low BG alert.  Patient is not experiencing symptoms.      INTERVENTION(S)  recommended diet modifications, recommend physical activity, reviewed monitoring technique, encouragement/support, denied further coaching, denied resources and denied questions    PLAN  patient verbalizes understanding, demonstrates understanding via teach back, patient amenable to changes and continue monitoring          Topic    Eye Exam        Last 5 Patient Entered Readings                                      Current 30 Day Average: 132/64     Recent Readings 7/9/2020 7/6/2020 7/5/2020 7/3/2020 6/28/2020    SBP (mmHg) 127 104 140 142 130    DBP (mmHg) 71 64 65 68 61    Pulse 92 96 91 90 83        Last 6 Patient Entered Readings                                          Most Recent A1c: 6.3% on 2/12/2020  (Goal: 8%)     Recent Readings 7/9/2020 7/6/2020 7/5/2020 7/3/2020 6/28/2020    Blood Glucose (mg/dL) 127 92 21 123 119                    Diet Screening   No change to diet.  Breakfast is typically between. Oatmeal.  Patient reports eating or drinking the following: water    Reports " "she "stays thirsty", drinking "water all day". Reports low appetite since starting on muscle relaxors.     Physical Activity Screening   No change to exercise routine.    When asked if exercising, patient responded: yes    Reports her great-great grandson has been keeping her busy lately, cares for him about every day. Reports she has not been walking or any formal activity during this time.     Medication Adherence Screening   She did not miss a dose this month.      SDOH  "

## 2020-08-06 DIAGNOSIS — J44.9 CHRONIC OBSTRUCTIVE PULMONARY DISEASE, UNSPECIFIED COPD TYPE: Primary | ICD-10-CM

## 2020-08-11 ENCOUNTER — PATIENT OUTREACH (OUTPATIENT)
Dept: ADMINISTRATIVE | Facility: OTHER | Age: 70
End: 2020-08-11

## 2020-08-11 ENCOUNTER — TELEPHONE (OUTPATIENT)
Dept: PULMONOLOGY | Facility: CLINIC | Age: 70
End: 2020-08-11

## 2020-08-11 NOTE — TELEPHONE ENCOUNTER
Called pt to schedule pre-procedural Covid-19 testing, 72 hours prior to Spirometry. Pt refused Covid-19 testing. Spirometry canceled.

## 2020-08-11 NOTE — PROGRESS NOTES
Requested updates within Care Everywhere.  Patient's chart was reviewed for overdue DEZ topics.  Immunizations not reconciled due to LINKS not responding.  Eye exam scheduled 8/12/20.

## 2020-08-12 ENCOUNTER — LAB VISIT (OUTPATIENT)
Dept: LAB | Facility: HOSPITAL | Age: 70
End: 2020-08-12
Attending: FAMILY MEDICINE
Payer: MEDICARE

## 2020-08-12 ENCOUNTER — OFFICE VISIT (OUTPATIENT)
Dept: OPHTHALMOLOGY | Facility: CLINIC | Age: 70
End: 2020-08-12
Payer: MEDICARE

## 2020-08-12 DIAGNOSIS — H25.013 CORTICAL AGE-RELATED CATARACT OF BOTH EYES: ICD-10-CM

## 2020-08-12 DIAGNOSIS — E11.22 TYPE 2 DIABETES MELLITUS WITH STAGE 3 CHRONIC KIDNEY DISEASE, WITHOUT LONG-TERM CURRENT USE OF INSULIN: Chronic | ICD-10-CM

## 2020-08-12 DIAGNOSIS — E11.22 TYPE 2 DIABETES MELLITUS WITH STAGE 3 CHRONIC KIDNEY DISEASE, WITHOUT LONG-TERM CURRENT USE OF INSULIN: Primary | Chronic | ICD-10-CM

## 2020-08-12 DIAGNOSIS — E11.29 TYPE 2 DIABETES MELLITUS WITH MICROALBUMINURIA, WITHOUT LONG-TERM CURRENT USE OF INSULIN: ICD-10-CM

## 2020-08-12 DIAGNOSIS — I70.203 ATHEROSCLEROSIS OF ARTERY OF BOTH LOWER EXTREMITIES: Chronic | ICD-10-CM

## 2020-08-12 DIAGNOSIS — N18.30 TYPE 2 DIABETES MELLITUS WITH STAGE 3 CHRONIC KIDNEY DISEASE, WITHOUT LONG-TERM CURRENT USE OF INSULIN: Chronic | ICD-10-CM

## 2020-08-12 DIAGNOSIS — E11.69 DYSLIPIDEMIA ASSOCIATED WITH TYPE 2 DIABETES MELLITUS: Chronic | ICD-10-CM

## 2020-08-12 DIAGNOSIS — R80.9 TYPE 2 DIABETES MELLITUS WITH MICROALBUMINURIA, WITHOUT LONG-TERM CURRENT USE OF INSULIN: ICD-10-CM

## 2020-08-12 DIAGNOSIS — H25.13 NUCLEAR SCLEROSIS, BILATERAL: ICD-10-CM

## 2020-08-12 DIAGNOSIS — E78.5 DYSLIPIDEMIA ASSOCIATED WITH TYPE 2 DIABETES MELLITUS: Chronic | ICD-10-CM

## 2020-08-12 DIAGNOSIS — N18.30 TYPE 2 DIABETES MELLITUS WITH STAGE 3 CHRONIC KIDNEY DISEASE, WITHOUT LONG-TERM CURRENT USE OF INSULIN: Primary | Chronic | ICD-10-CM

## 2020-08-12 DIAGNOSIS — I10 ESSENTIAL HYPERTENSION: Chronic | ICD-10-CM

## 2020-08-12 DIAGNOSIS — E11.9 TYPE 2 DIABETES MELLITUS WITHOUT RETINOPATHY: Primary | ICD-10-CM

## 2020-08-12 LAB
ALBUMIN SERPL BCP-MCNC: 3.1 G/DL (ref 3.5–5.2)
ALBUMIN SERPL BCP-MCNC: 3.1 G/DL (ref 3.5–5.2)
ALP SERPL-CCNC: 91 U/L (ref 55–135)
ALP SERPL-CCNC: 91 U/L (ref 55–135)
ALT SERPL W/O P-5'-P-CCNC: 10 U/L (ref 10–44)
ALT SERPL W/O P-5'-P-CCNC: 10 U/L (ref 10–44)
ANION GAP SERPL CALC-SCNC: 8 MMOL/L (ref 8–16)
ANION GAP SERPL CALC-SCNC: 8 MMOL/L (ref 8–16)
AST SERPL-CCNC: 16 U/L (ref 10–40)
AST SERPL-CCNC: 16 U/L (ref 10–40)
BILIRUB SERPL-MCNC: 0.2 MG/DL (ref 0.1–1)
BILIRUB SERPL-MCNC: 0.2 MG/DL (ref 0.1–1)
BUN SERPL-MCNC: 18 MG/DL (ref 8–23)
BUN SERPL-MCNC: 18 MG/DL (ref 8–23)
CALCIUM SERPL-MCNC: 8.8 MG/DL (ref 8.7–10.5)
CALCIUM SERPL-MCNC: 8.8 MG/DL (ref 8.7–10.5)
CHLORIDE SERPL-SCNC: 105 MMOL/L (ref 95–110)
CHLORIDE SERPL-SCNC: 105 MMOL/L (ref 95–110)
CHOLEST SERPL-MCNC: 209 MG/DL (ref 120–199)
CHOLEST/HDLC SERPL: 3.3 {RATIO} (ref 2–5)
CO2 SERPL-SCNC: 28 MMOL/L (ref 23–29)
CO2 SERPL-SCNC: 28 MMOL/L (ref 23–29)
CREAT SERPL-MCNC: 1 MG/DL (ref 0.5–1.4)
CREAT SERPL-MCNC: 1 MG/DL (ref 0.5–1.4)
EST. GFR  (AFRICAN AMERICAN): >60 ML/MIN/1.73 M^2
EST. GFR  (AFRICAN AMERICAN): >60 ML/MIN/1.73 M^2
EST. GFR  (NON AFRICAN AMERICAN): 56.4 ML/MIN/1.73 M^2
EST. GFR  (NON AFRICAN AMERICAN): 56.4 ML/MIN/1.73 M^2
ESTIMATED AVG GLUCOSE: 131 MG/DL (ref 68–131)
ESTIMATED AVG GLUCOSE: 131 MG/DL (ref 68–131)
GLUCOSE SERPL-MCNC: 132 MG/DL (ref 70–110)
GLUCOSE SERPL-MCNC: 132 MG/DL (ref 70–110)
HBA1C MFR BLD HPLC: 6.2 % (ref 4–5.6)
HBA1C MFR BLD HPLC: 6.2 % (ref 4–5.6)
HDLC SERPL-MCNC: 64 MG/DL (ref 40–75)
HDLC SERPL: 30.6 % (ref 20–50)
LDLC SERPL CALC-MCNC: 132 MG/DL (ref 63–159)
NONHDLC SERPL-MCNC: 145 MG/DL
POTASSIUM SERPL-SCNC: 3.3 MMOL/L (ref 3.5–5.1)
POTASSIUM SERPL-SCNC: 3.3 MMOL/L (ref 3.5–5.1)
PROT SERPL-MCNC: 7 G/DL (ref 6–8.4)
PROT SERPL-MCNC: 7 G/DL (ref 6–8.4)
SODIUM SERPL-SCNC: 141 MMOL/L (ref 136–145)
SODIUM SERPL-SCNC: 141 MMOL/L (ref 136–145)
TRIGL SERPL-MCNC: 65 MG/DL (ref 30–150)

## 2020-08-12 PROCEDURE — 36415 COLL VENOUS BLD VENIPUNCTURE: CPT | Mod: HCNC

## 2020-08-12 PROCEDURE — 80053 COMPREHEN METABOLIC PANEL: CPT | Mod: HCNC

## 2020-08-12 PROCEDURE — 99999 PR PBB SHADOW E&M-EST. PATIENT-LVL IV: CPT | Mod: PBBFAC,HCNC,, | Performed by: OPTOMETRIST

## 2020-08-12 PROCEDURE — 80061 LIPID PANEL: CPT | Mod: HCNC

## 2020-08-12 PROCEDURE — 83036 HEMOGLOBIN GLYCOSYLATED A1C: CPT | Mod: HCNC

## 2020-08-12 PROCEDURE — 99999 PR PBB SHADOW E&M-EST. PATIENT-LVL IV: ICD-10-PCS | Mod: PBBFAC,HCNC,, | Performed by: OPTOMETRIST

## 2020-08-12 PROCEDURE — 92014 COMPRE OPH EXAM EST PT 1/>: CPT | Mod: HCNC,S$GLB,, | Performed by: OPTOMETRIST

## 2020-08-12 PROCEDURE — 92014 PR EYE EXAM, EST PATIENT,COMPREHESV: ICD-10-PCS | Mod: HCNC,S$GLB,, | Performed by: OPTOMETRIST

## 2020-08-12 RX ORDER — CHLORTHALIDONE 25 MG/1
TABLET ORAL
Qty: 90 TABLET | Refills: 2 | Status: SHIPPED | OUTPATIENT
Start: 2020-08-12 | End: 2021-03-10 | Stop reason: SDUPTHER

## 2020-08-12 NOTE — LETTER
August 12, 2020      VICK Sandoval MD  [address]  [place] [address]           [facility]  [address] [place] [place] [address]  Phone: [phone]  Fax: [phone]          Patient: Lucia Barlow   MR Number: [id number]   YOB: 1950   Date of Visit: 8/12/2020       Dear Dr. VICK Sandoval:    Thank you for referring Lucia Barlow to me for evaluation. Attached you will find relevant portions of my assessment and plan of care.    If you have questions, please do not hesitate to call me. I look forward to following Lucia Barlow along with you.    Sincerely,    Keith Bhatti, OD    Enclosure  CC:  No Recipients    If you would like to receive this communication electronically, please contact [email or web address] or [phone] to request more information on [facility] access.    For providers and/or their staff who would like to refer a patient to [facility], please contact us through our one-stop-shop provider referral line, Critical access hospitalierge, at [phone].    If you feel you have received this communication in error or would no longer like to receive these types of communications, please e-mail [email or web address]

## 2020-08-12 NOTE — PROGRESS NOTES
HPI     Diabetic Eye Exam     Comments: Yearly              Comments     Last seen by DNL on 2/1/19 for yearly DM eye exam  Patient here today for yearly eye exam  Diabetic eye exam  Diagnosed with diabetes in January 2019  Recent vision fluctuations No  Lab Results       Component                Value               Date                       HGBA1C                   6.3 (H)             02/12/2020            HPI    Any vision changes since last exam: No   Eye pain: No  Other ocular symptoms: Tearing    Do you wear currently wear glasses or contacts? None    Interested in contacts today? No    Do you plan on getting new glasses today? If needed    1. DM  2. NSC OU                Last edited by Nataliya Gay, PCT on 8/12/2020  8:32 AM. (History)              Assessment /Plan     For exam results, see Encounter Report.    Type 2 diabetes mellitus without retinopathy  No diabetic retinopathy in either eye  Continue close care with PCP   Monitor 12 months      Nuclear sclerosis, bilateral  Cortical age-related cataract of both eyes  Stable OU  Cataract accounts for change in vision. Patient is at the option stage.   Cataract surgery will improve vision, but necessity is dependent on patient's symptoms.   Patient prefers to wait on surgery at this time. Pt to call back if symptoms worsen before next appointment.    She does well with OTC readers PRN and does not want spec rx at this time      RTC 1 yr for dilated eye exam or PRN if any problems.   Discussed above and answered questions.

## 2020-08-13 NOTE — PROGRESS NOTES
"Hi, Lucia.    These results are OK and do not require a change in treatment right now. We can discuss your test results in detail at your next appointment.    Thanks for letting me care for you, thanks for trusting us with your healthcare needs, and thanks for using [facility].    Sincerely,    VICK Sandoval MD  --------------------------------------------------------------------------------   Want to learn more about your test results and what they mean?  (1) Log in to your [facility] account at [email or web address].  (2) From the "View test results" tab, click on the test you want to know more about.  (3) Click on the "About This Test" link."

## 2020-08-14 ENCOUNTER — TELEPHONE (OUTPATIENT)
Dept: CARDIOLOGY | Facility: CLINIC | Age: 70
End: 2020-08-14

## 2020-08-14 NOTE — TELEPHONE ENCOUNTER
Per patient she is taking her cholesterol medication in the morning and as far as her diet,she is locked in and eating everything because of covid 19.        ----- Message from Amalia Rosas MD sent at 8/13/2020  8:20 AM CDT -----  Her cholesterol has gone up what happened with her meds diet etc...

## 2020-08-17 ENCOUNTER — TELEPHONE (OUTPATIENT)
Dept: CARDIOLOGY | Facility: CLINIC | Age: 70
End: 2020-08-17

## 2020-08-17 NOTE — TELEPHONE ENCOUNTER
Placed call to and left voicemail to call the office.         MD Nancy Bradford LPN   Caller: Unspecified (3 days ago,  1:10 PM)             SHE NEEDS TO FIX HERE DIET    Previous Messages          Patient Calls     Amalia Rosas MD  You 3 days ago     SHE NEEDS TO FIX HERE DIET     Message text        You routed conversation to Amalia Rosas MD 3 days ago      You  Lucia Barlow 3 days ago      You 3 days ago           Per patient she is taking her cholesterol medication in the morning and as far as her diet,she is locked in and eating everything because of covid 19.           ----- Message from Amalia Rosas MD sent at 8/13/2020  8:20 AM CDT -----  Her cholesterol has gone up what happened with her meds diet etc...            Documentation

## 2020-08-19 ENCOUNTER — TELEPHONE (OUTPATIENT)
Dept: PULMONOLOGY | Facility: CLINIC | Age: 70
End: 2020-08-19

## 2020-08-19 ENCOUNTER — OFFICE VISIT (OUTPATIENT)
Dept: PULMONOLOGY | Facility: CLINIC | Age: 70
End: 2020-08-19
Payer: MEDICARE

## 2020-08-19 ENCOUNTER — OFFICE VISIT (OUTPATIENT)
Dept: INTERNAL MEDICINE | Facility: CLINIC | Age: 70
End: 2020-08-19
Payer: MEDICARE

## 2020-08-19 ENCOUNTER — HOSPITAL ENCOUNTER (OUTPATIENT)
Dept: RADIOLOGY | Facility: HOSPITAL | Age: 70
Discharge: HOME | End: 2020-08-19
Attending: INTERNAL MEDICINE
Payer: MEDICARE

## 2020-08-19 VITALS
DIASTOLIC BLOOD PRESSURE: 50 MMHG | BODY MASS INDEX: 26.81 KG/M2 | HEART RATE: 94 BPM | RESPIRATION RATE: 18 BRPM | HEIGHT: 61 IN | OXYGEN SATURATION: 98 % | WEIGHT: 142 LBS | SYSTOLIC BLOOD PRESSURE: 120 MMHG

## 2020-08-19 VITALS
OXYGEN SATURATION: 98 % | HEART RATE: 118 BPM | BODY MASS INDEX: 26.13 KG/M2 | WEIGHT: 142 LBS | SYSTOLIC BLOOD PRESSURE: 110 MMHG | TEMPERATURE: 97 F | HEIGHT: 62 IN | DIASTOLIC BLOOD PRESSURE: 60 MMHG

## 2020-08-19 DIAGNOSIS — G44.209 TENSION HEADACHE: ICD-10-CM

## 2020-08-19 DIAGNOSIS — I25.10 ATHEROSCLEROSIS OF NATIVE CORONARY ARTERY OF NATIVE HEART WITHOUT ANGINA PECTORIS: ICD-10-CM

## 2020-08-19 DIAGNOSIS — G47.33 OSA (OBSTRUCTIVE SLEEP APNEA): ICD-10-CM

## 2020-08-19 DIAGNOSIS — I70.0 ATHEROSCLEROSIS OF AORTA: ICD-10-CM

## 2020-08-19 DIAGNOSIS — N18.30 TYPE 2 DIABETES MELLITUS WITH STAGE 3 CHRONIC KIDNEY DISEASE, WITHOUT LONG-TERM CURRENT USE OF INSULIN: Primary | Chronic | ICD-10-CM

## 2020-08-19 DIAGNOSIS — E11.69 DYSLIPIDEMIA ASSOCIATED WITH TYPE 2 DIABETES MELLITUS: Chronic | ICD-10-CM

## 2020-08-19 DIAGNOSIS — I10 ESSENTIAL HYPERTENSION: Chronic | ICD-10-CM

## 2020-08-19 DIAGNOSIS — R91.1 SOLITARY PULMONARY NODULE: Primary | ICD-10-CM

## 2020-08-19 DIAGNOSIS — E11.22 TYPE 2 DIABETES MELLITUS WITH STAGE 3 CHRONIC KIDNEY DISEASE, WITHOUT LONG-TERM CURRENT USE OF INSULIN: Primary | Chronic | ICD-10-CM

## 2020-08-19 DIAGNOSIS — N18.30 CKD (CHRONIC KIDNEY DISEASE) STAGE 3, GFR 30-59 ML/MIN: ICD-10-CM

## 2020-08-19 DIAGNOSIS — E78.5 DYSLIPIDEMIA ASSOCIATED WITH TYPE 2 DIABETES MELLITUS: Chronic | ICD-10-CM

## 2020-08-19 DIAGNOSIS — I70.203 ATHEROSCLEROSIS OF ARTERY OF BOTH LOWER EXTREMITIES: Chronic | ICD-10-CM

## 2020-08-19 DIAGNOSIS — M81.0 AGE-RELATED OSTEOPOROSIS WITHOUT CURRENT PATHOLOGICAL FRACTURE: ICD-10-CM

## 2020-08-19 DIAGNOSIS — J44.9 CHRONIC OBSTRUCTIVE PULMONARY DISEASE, UNSPECIFIED COPD TYPE: ICD-10-CM

## 2020-08-19 PROCEDURE — 3078F PR MOST RECENT DIASTOLIC BLOOD PRESSURE < 80 MM HG: ICD-10-PCS | Mod: HCNC,CPTII,S$GLB, | Performed by: FAMILY MEDICINE

## 2020-08-19 PROCEDURE — 1125F PR PAIN SEVERITY QUANTIFIED, PAIN PRESENT: ICD-10-PCS | Mod: HCNC,S$GLB,, | Performed by: FAMILY MEDICINE

## 2020-08-19 PROCEDURE — 1125F AMNT PAIN NOTED PAIN PRSNT: CPT | Mod: HCNC,S$GLB,, | Performed by: FAMILY MEDICINE

## 2020-08-19 PROCEDURE — 3078F DIAST BP <80 MM HG: CPT | Mod: HCNC,CPTII,S$GLB, | Performed by: FAMILY MEDICINE

## 2020-08-19 PROCEDURE — 3074F SYST BP LT 130 MM HG: CPT | Mod: HCNC,CPTII,S$GLB, | Performed by: FAMILY MEDICINE

## 2020-08-19 PROCEDURE — 3074F PR MOST RECENT SYSTOLIC BLOOD PRESSURE < 130 MM HG: ICD-10-PCS | Mod: HCNC,CPTII,S$GLB, | Performed by: INTERNAL MEDICINE

## 2020-08-19 PROCEDURE — 1101F PT FALLS ASSESS-DOCD LE1/YR: CPT | Mod: HCNC,CPTII,S$GLB, | Performed by: FAMILY MEDICINE

## 2020-08-19 PROCEDURE — 99999 PR PBB SHADOW E&M-EST. PATIENT-LVL V: CPT | Mod: PBBFAC,HCNC,, | Performed by: FAMILY MEDICINE

## 2020-08-19 PROCEDURE — 3078F PR MOST RECENT DIASTOLIC BLOOD PRESSURE < 80 MM HG: ICD-10-PCS | Mod: HCNC,CPTII,S$GLB, | Performed by: INTERNAL MEDICINE

## 2020-08-19 PROCEDURE — 3008F BODY MASS INDEX DOCD: CPT | Mod: HCNC,CPTII,S$GLB, | Performed by: INTERNAL MEDICINE

## 2020-08-19 PROCEDURE — 71046 X-RAY EXAM CHEST 2 VIEWS: CPT | Mod: TC,HCNC

## 2020-08-19 PROCEDURE — 71046 X-RAY EXAM CHEST 2 VIEWS: CPT | Mod: 26,HCNC,, | Performed by: RADIOLOGY

## 2020-08-19 PROCEDURE — 3074F SYST BP LT 130 MM HG: CPT | Mod: HCNC,CPTII,S$GLB, | Performed by: INTERNAL MEDICINE

## 2020-08-19 PROCEDURE — 3008F PR BODY MASS INDEX (BMI) DOCUMENTED: ICD-10-PCS | Mod: HCNC,CPTII,S$GLB, | Performed by: INTERNAL MEDICINE

## 2020-08-19 PROCEDURE — 1159F PR MEDICATION LIST DOCUMENTED IN MEDICAL RECORD: ICD-10-PCS | Mod: HCNC,S$GLB,, | Performed by: INTERNAL MEDICINE

## 2020-08-19 PROCEDURE — 1159F PR MEDICATION LIST DOCUMENTED IN MEDICAL RECORD: ICD-10-PCS | Mod: HCNC,S$GLB,, | Performed by: FAMILY MEDICINE

## 2020-08-19 PROCEDURE — 3008F PR BODY MASS INDEX (BMI) DOCUMENTED: ICD-10-PCS | Mod: HCNC,CPTII,S$GLB, | Performed by: FAMILY MEDICINE

## 2020-08-19 PROCEDURE — 71046 XR CHEST PA AND LATERAL: ICD-10-PCS | Mod: 26,HCNC,, | Performed by: RADIOLOGY

## 2020-08-19 PROCEDURE — 3078F DIAST BP <80 MM HG: CPT | Mod: HCNC,CPTII,S$GLB, | Performed by: INTERNAL MEDICINE

## 2020-08-19 PROCEDURE — 99215 OFFICE O/P EST HI 40 MIN: CPT | Mod: HCNC,S$GLB,, | Performed by: INTERNAL MEDICINE

## 2020-08-19 PROCEDURE — 99215 PR OFFICE/OUTPT VISIT, EST, LEVL V, 40-54 MIN: ICD-10-PCS | Mod: HCNC,S$GLB,, | Performed by: INTERNAL MEDICINE

## 2020-08-19 PROCEDURE — 1101F PR PT FALLS ASSESS DOC 0-1 FALLS W/OUT INJ PAST YR: ICD-10-PCS | Mod: HCNC,CPTII,S$GLB, | Performed by: FAMILY MEDICINE

## 2020-08-19 PROCEDURE — 3008F BODY MASS INDEX DOCD: CPT | Mod: HCNC,CPTII,S$GLB, | Performed by: FAMILY MEDICINE

## 2020-08-19 PROCEDURE — 3044F HG A1C LEVEL LT 7.0%: CPT | Mod: HCNC,CPTII,S$GLB, | Performed by: FAMILY MEDICINE

## 2020-08-19 PROCEDURE — 1159F MED LIST DOCD IN RCRD: CPT | Mod: HCNC,S$GLB,, | Performed by: INTERNAL MEDICINE

## 2020-08-19 PROCEDURE — 3074F PR MOST RECENT SYSTOLIC BLOOD PRESSURE < 130 MM HG: ICD-10-PCS | Mod: HCNC,CPTII,S$GLB, | Performed by: FAMILY MEDICINE

## 2020-08-19 PROCEDURE — 3044F PR MOST RECENT HEMOGLOBIN A1C LEVEL <7.0%: ICD-10-PCS | Mod: HCNC,CPTII,S$GLB, | Performed by: FAMILY MEDICINE

## 2020-08-19 PROCEDURE — 99999 PR PBB SHADOW E&M-EST. PATIENT-LVL V: ICD-10-PCS | Mod: PBBFAC,HCNC,, | Performed by: INTERNAL MEDICINE

## 2020-08-19 PROCEDURE — 1101F PR PT FALLS ASSESS DOC 0-1 FALLS W/OUT INJ PAST YR: ICD-10-PCS | Mod: HCNC,CPTII,S$GLB, | Performed by: INTERNAL MEDICINE

## 2020-08-19 PROCEDURE — 99214 OFFICE O/P EST MOD 30 MIN: CPT | Mod: HCNC,S$GLB,, | Performed by: FAMILY MEDICINE

## 2020-08-19 PROCEDURE — 1159F MED LIST DOCD IN RCRD: CPT | Mod: HCNC,S$GLB,, | Performed by: FAMILY MEDICINE

## 2020-08-19 PROCEDURE — 99999 PR PBB SHADOW E&M-EST. PATIENT-LVL V: CPT | Mod: PBBFAC,HCNC,, | Performed by: INTERNAL MEDICINE

## 2020-08-19 PROCEDURE — 99214 PR OFFICE/OUTPT VISIT, EST, LEVL IV, 30-39 MIN: ICD-10-PCS | Mod: HCNC,S$GLB,, | Performed by: FAMILY MEDICINE

## 2020-08-19 PROCEDURE — 1101F PT FALLS ASSESS-DOCD LE1/YR: CPT | Mod: HCNC,CPTII,S$GLB, | Performed by: INTERNAL MEDICINE

## 2020-08-19 PROCEDURE — 99999 PR PBB SHADOW E&M-EST. PATIENT-LVL V: ICD-10-PCS | Mod: PBBFAC,HCNC,, | Performed by: FAMILY MEDICINE

## 2020-08-19 RX ORDER — ASPIRIN 81 MG/1
81 TABLET ORAL DAILY
Qty: 90 TABLET | Refills: 4 | Status: SHIPPED | OUTPATIENT
Start: 2020-08-19 | End: 2020-09-30

## 2020-08-19 RX ORDER — BUTALBITAL, ACETAMINOPHEN AND CAFFEINE 50; 325; 40 MG/1; MG/1; MG/1
1 TABLET ORAL 3 TIMES DAILY PRN
Qty: 30 TABLET | Refills: 3 | Status: SHIPPED | OUTPATIENT
Start: 2020-08-19 | End: 2021-03-27

## 2020-08-19 RX ORDER — MULTIVITAMIN
2 TABLET ORAL DAILY
Qty: 180 TABLET | Refills: 4 | Status: SHIPPED | OUTPATIENT
Start: 2020-08-19 | End: 2021-10-27

## 2020-08-19 NOTE — TELEPHONE ENCOUNTER
----- Message from Gabriella Thomas sent at 8/19/2020 10:10 AM CDT -----  [facility]

## 2020-08-19 NOTE — ASSESSMENT & PLAN NOTE
Lab Results   Component Value Date    ESTGFRAFRICA >60.0 08/12/2020    ESTGFRAFRICA >60.0 08/12/2020    ESTGFRAFRICA 58.0 (A) 02/12/2020    ESTGFRAFRICA 58.0 (A) 02/12/2020    EGFRNONAA 56.4 (A) 08/12/2020    EGFRNONAA 56.4 (A) 08/12/2020    EGFRNONAA 50.3 (A) 02/12/2020    EGFRNONAA 50.3 (A) 02/12/2020    CREATININE 1.0 08/12/2020    CREATININE 1.0 08/12/2020    CREATININE 1.1 02/12/2020    CREATININE 1.1 02/12/2020    BUN 18 08/12/2020    BUN 18 08/12/2020    BUN 20 02/12/2020    BUN 20 02/12/2020     Lab Results   Component Value Date    ALBUMIN 3.1 (L) 08/12/2020    ALBUMIN 3.1 (L) 08/12/2020    PROT 7.0 08/12/2020    PROT 7.0 08/12/2020    MICALBCREAT 110.7 (H) 02/19/2020    LABMICR 176.0 02/19/2020    CREATRANDUR 159.0 02/19/2020     08/12/2020     08/12/2020    K 3.3 (L) 08/12/2020    K 3.3 (L) 08/12/2020     08/12/2020     08/12/2020    CO2 28 08/12/2020    CO2 28 08/12/2020     (H) 08/12/2020     (H) 08/12/2020    CALCIUM 8.8 08/12/2020    CALCIUM 8.8 08/12/2020    PHOS 3.3 05/30/2019    MG 1.6 08/10/2019    HGB 11.7 (L) 12/06/2019    HCT 38.1 12/06/2019   Improved.

## 2020-08-19 NOTE — ASSESSMENT & PLAN NOTE
Lab Results   Component Value Date    CHOL 209 (H) 08/12/2020    CHOL 171 02/12/2020    CHOL 173 02/12/2020    TRIG 65 08/12/2020    TRIG 137 02/12/2020    TRIG 136 02/12/2020    HDL 64 08/12/2020    HDL 77 (H) 02/12/2020    HDL 78 (H) 02/12/2020    LDLCALC 132.0 08/12/2020    LDLCALC 66.6 02/12/2020    LDLCALC 67.8 02/12/2020    NONHDLCHOL 145 08/12/2020    NONHDLCHOL 94 02/12/2020    NONHDLCHOL 95 02/12/2020    AST 16 08/12/2020    AST 16 08/12/2020    ALT 10 08/12/2020    ALT 10 08/12/2020     The 10-year ASCVD risk score (Grace NEWBY Jr., et al., 2013) is: 42.8%    Values used to calculate the score:      Age: 70 years      Sex: Female      Is Non- : Yes      Diabetic: Yes      Tobacco smoker: Yes      Systolic Blood Pressure: 110 mmHg      Is BP treated: Yes      HDL Cholesterol: 64 mg/dL      Total Cholesterol: 209 mg/dL  LDL above goal. She appears to be tolerating statin for dyslipidemia without apparent symptoms of myositis or hepatic dysfunction. Therapeutic lifestyle changes encouraged.

## 2020-08-19 NOTE — ASSESSMENT & PLAN NOTE
Diabetes Management Status    Statin: Taking  ACE/ARB: Taking    Screening or Prevention Patient's value Goal Complete/Controlled?   HgA1C Testing and Control   Lab Results   Component Value Date    HGBA1C 6.2 (H) 08/12/2020    HGBA1C 6.2 (H) 08/12/2020      Annually/Less than 8% Yes   Lipid profile : 08/12/2020 Annually Yes   LDL control Lab Results   Component Value Date    LDLCALC 132.0 08/12/2020    Annually/Less than 100 mg/dl  No   Nephropathy screening Lab Results   Component Value Date    LABMICR 176.0 02/19/2020     Lab Results   Component Value Date    PROTEINUA 1+ (A) 12/16/2018    Annually Yes   Blood pressure BP Readings from Last 1 Encounters:   08/19/20 110/60    Less than 140/90 Yes   Dilated retinal exam : 08/12/2020 Annually Yes   Foot exam   : 02/19/2020 Annually Yes     Lab Results   Component Value Date    HGBA1C 6.2 (H) 08/12/2020    HGBA1C 6.2 (H) 08/12/2020    HGBA1C 6.3 (H) 02/12/2020    ESTGFRAFRICA >60.0 08/12/2020    ESTGFRAFRICA >60.0 08/12/2020    EGFRNONAA 56.4 (A) 08/12/2020    EGFRNONAA 56.4 (A) 08/12/2020    MICALBCREAT 110.7 (H) 02/19/2020    LDLCALC 132.0 08/12/2020     Last 6 Patient Entered Readings                                          Most Recent A1c: 6.2% on 8/12/2020  (Goal: 8%)     Recent Readings 8/18/2020 8/17/2020 8/14/2020 8/11/2020 8/8/2020    Blood Glucose (mg/dL) 144 128 117 115 95           HEALTH MAINTENANCE: Diabetic health maintenance interventions reviewed and are up to date except for:  There are no preventive care reminders to display for this patient.    Intolerant of metformin due to side-effect of diarrhea.    Well controlled, stable.

## 2020-08-19 NOTE — PATIENT INSTRUCTIONS
Please call [facility] to schedule sleep study at  [phone] . Usually take 1- 2 days to get insurance company approval.  You will need to schedule at follow up clinic appointment 7 days after the sleep study to review the results.

## 2020-08-19 NOTE — PROGRESS NOTES
CHIEF COMPLAINT  Follow-up      DIAGNOSES, HISTORY, ASSESSMENT, AND PLAN  Assessment   1. Type 2 diabetes mellitus with stage 3 chronic kidney disease, without long-term current use of insulin    2. CKD (chronic kidney disease) stage 3, GFR 30-59 ml/min    3. Dyslipidemia associated with type 2 diabetes mellitus    4. Essential hypertension    5. Tension headache    6. Age-related osteoporosis without current pathological fracture    7. Atherosclerosis of aorta    8. Atherosclerosis of artery of both lower extremities    9. Atherosclerosis of native coronary artery of native heart without angina pectoris         Orders Placed This Encounter    Hemoglobin A1C    Lipid Panel    TSH    butalbital-acetaminophen-caffeine -40 mg (FIORICET, ESGIC) -40 mg per tablet    calcium-vitamin D (CALCIUM WITH VITAMIN D) 600 mg(1,500mg) -400 unit Tab    aspirin (ECOTRIN) 81 MG EC tablet       Plan   Problem List Items Addressed This Visit     Essential hypertension (Chronic)    Current Assessment & Plan     BP Readings from Last 6 Encounters:   08/19/20 110/60   06/26/20 (!) 124/58   03/04/20 (!) 138/52   02/26/20 116/60   02/19/20 (!) 130/58   12/13/19 (!) 118/56     Last 5 Patient Entered Readings                                      Current 30 Day Average: 139/70     Recent Readings 8/18/2020 8/17/2020 8/14/2020 8/14/2020 8/11/2020    SBP (mmHg) 103 128 144 154 136    DBP (mmHg) 72 66 65 79 66    Pulse 110 97 84 87 91      Well controlled, stable.           Relevant Orders    TSH    Dyslipidemia associated with type 2 diabetes mellitus (Chronic)    Current Assessment & Plan     Lab Results   Component Value Date    CHOL 209 (H) 08/12/2020    CHOL 171 02/12/2020    CHOL 173 02/12/2020    TRIG 65 08/12/2020    TRIG 137 02/12/2020    TRIG 136 02/12/2020    HDL 64 08/12/2020    HDL 77 (H) 02/12/2020    HDL 78 (H) 02/12/2020    LDLCALC 132.0 08/12/2020    LDLCALC 66.6 02/12/2020    LDLCALC 67.8 02/12/2020     NONHDLCHOL 145 08/12/2020    NONHDLCHOL 94 02/12/2020    NONHDLCHOL 95 02/12/2020    AST 16 08/12/2020    AST 16 08/12/2020    ALT 10 08/12/2020    ALT 10 08/12/2020     The 10-year ASCVD risk score (Grace NEWBY Jr., et al., 2013) is: 42.8%    Values used to calculate the score:      Age: 70 years      Sex: Female      Is Non- : Yes      Diabetic: Yes      Tobacco smoker: Yes      Systolic Blood Pressure: 110 mmHg      Is BP treated: Yes      HDL Cholesterol: 64 mg/dL      Total Cholesterol: 209 mg/dL  LDL above goal. She appears to be tolerating statin for dyslipidemia without apparent symptoms of myositis or hepatic dysfunction. Therapeutic lifestyle changes encouraged.          Relevant Orders    Lipid Panel    TSH    Atherosclerosis of artery of both lower extremities (Chronic)    Overview     Had stents done around 2015.         Current Assessment & Plan     Asymptomatic. Clinically stable.          Relevant Medications    aspirin (ECOTRIN) 81 MG EC tablet    Type 2 diabetes mellitus with stage 3 chronic kidney disease, without long-term current use of insulin - Primary (Chronic)    Current Assessment & Plan     Diabetes Management Status    Statin: Taking  ACE/ARB: Taking    Screening or Prevention Patient's value Goal Complete/Controlled?   HgA1C Testing and Control   Lab Results   Component Value Date    HGBA1C 6.2 (H) 08/12/2020    HGBA1C 6.2 (H) 08/12/2020      Annually/Less than 8% Yes   Lipid profile : 08/12/2020 Annually Yes   LDL control Lab Results   Component Value Date    LDLCALC 132.0 08/12/2020    Annually/Less than 100 mg/dl  No   Nephropathy screening Lab Results   Component Value Date    LABMICR 176.0 02/19/2020     Lab Results   Component Value Date    PROTEINUA 1+ (A) 12/16/2018    Annually Yes   Blood pressure BP Readings from Last 1 Encounters:   08/19/20 110/60    Less than 140/90 Yes   Dilated retinal exam : 08/12/2020 Annually Yes   Foot exam   : 02/19/2020 Annually  Yes     Lab Results   Component Value Date    HGBA1C 6.2 (H) 08/12/2020    HGBA1C 6.2 (H) 08/12/2020    HGBA1C 6.3 (H) 02/12/2020    ESTGFRAFRICA >60.0 08/12/2020    ESTGFRAFRICA >60.0 08/12/2020    EGFRNONAA 56.4 (A) 08/12/2020    EGFRNONAA 56.4 (A) 08/12/2020    MICALBCREAT 110.7 (H) 02/19/2020    LDLCALC 132.0 08/12/2020     Last 6 Patient Entered Readings                                          Most Recent A1c: 6.2% on 8/12/2020  (Goal: 8%)     Recent Readings 8/18/2020 8/17/2020 8/14/2020 8/11/2020 8/8/2020    Blood Glucose (mg/dL) 144 128 117 115 95           HEALTH MAINTENANCE: Diabetic health maintenance interventions reviewed and are up to date except for:  There are no preventive care reminders to display for this patient.    Intolerant of metformin due to side-effect of diarrhea.    Well controlled, stable.          Relevant Orders    Hemoglobin A1C    TSH    Osteoporosis    Overview     Alendronate therapy from 1/8/2014-1/25/2019.         Current Assessment & Plan     On calcium + vitamin D.         Relevant Medications    calcium-vitamin D (CALCIUM WITH VITAMIN D) 600 mg(1,500mg) -400 unit Tab    Atherosclerosis of aorta    Overview     CT CHEST WITHOUT CONTRAST 11/27/2018  FINDINGS: Scattered atherosclerotic plaque noted involving the thoracic aorta and aortic branch vessels, including the coronary arteries.         Current Assessment & Plan     Asymptomatic. Clinically stable.         Relevant Medications    aspirin (ECOTRIN) 81 MG EC tablet    Atherosclerosis of native coronary artery of native heart without angina pectoris    Overview     CT CHEST WITHOUT CONTRAST 11/27/2018  FINDINGS: Scattered atherosclerotic plaque noted involving the thoracic aorta and aortic branch vessels, including the coronary arteries.         Current Assessment & Plan     Asymptomatic. Clinically stable. No angina or equivalent symptoms reported.         Relevant Medications    aspirin (ECOTRIN) 81 MG EC tablet    Tension  headache    Current Assessment & Plan     Well controlled, stable. She says she uses Fioricet fewer than 10 tablets per month.         Relevant Medications    butalbital-acetaminophen-caffeine -40 mg (FIORICET, ESGIC) -40 mg per tablet    CKD (chronic kidney disease) stage 3, GFR 30-59 ml/min    Current Assessment & Plan     Lab Results   Component Value Date    ESTGFRAFRICA >60.0 08/12/2020    ESTGFRAFRICA >60.0 08/12/2020    ESTGFRAFRICA 58.0 (A) 02/12/2020    ESTGFRAFRICA 58.0 (A) 02/12/2020    EGFRNONAA 56.4 (A) 08/12/2020    EGFRNONAA 56.4 (A) 08/12/2020    EGFRNONAA 50.3 (A) 02/12/2020    EGFRNONAA 50.3 (A) 02/12/2020    CREATININE 1.0 08/12/2020    CREATININE 1.0 08/12/2020    CREATININE 1.1 02/12/2020    CREATININE 1.1 02/12/2020    BUN 18 08/12/2020    BUN 18 08/12/2020    BUN 20 02/12/2020    BUN 20 02/12/2020     Lab Results   Component Value Date    ALBUMIN 3.1 (L) 08/12/2020    ALBUMIN 3.1 (L) 08/12/2020    PROT 7.0 08/12/2020    PROT 7.0 08/12/2020    MICALBCREAT 110.7 (H) 02/19/2020    LABMICR 176.0 02/19/2020    CREATRANDUR 159.0 02/19/2020     08/12/2020     08/12/2020    K 3.3 (L) 08/12/2020    K 3.3 (L) 08/12/2020     08/12/2020     08/12/2020    CO2 28 08/12/2020    CO2 28 08/12/2020     (H) 08/12/2020     (H) 08/12/2020    CALCIUM 8.8 08/12/2020    CALCIUM 8.8 08/12/2020    PHOS 3.3 05/30/2019    MG 1.6 08/10/2019    HGB 11.7 (L) 12/06/2019    HCT 38.1 12/06/2019   Improved.                Outpatient Medications Prior to Visit   Medication Sig Dispense Refill    amlodipine-benazepril 5-20 mg (LOTREL) 5-20 mg per capsule Take 1 capsule by mouth once daily. 90 capsule 4    blood sugar diagnostic Strp 1 each by Misc.(Non-Drug; Combo Route) route 3 (three) times daily. [facility] test strips 100 each 3    blood-glucose meter kit Insurance preferred 1 each 0    chlorthalidone (HYGROTEN) 25 MG Tab TAKE 1 TABLET EVERY DAY 90 tablet 2    ezetimibe  (ZETIA) 10 mg tablet Take 1 tablet (10 mg total) by mouth once daily. 90 tablet 4    fluticasone propionate (FLONASE) 50 mcg/actuation nasal spray USE 2 SPRAYS IN EACH NOSTRIL ONE TIME DAILY  48 g 4    glimepiride (AMARYL) 2 MG tablet Take 1 tablet (2 mg total) by mouth before breakfast. 90 tablet 4    lancets 32 gauge Misc 1 lancet by Misc.(Non-Drug; Combo Route) route 3 (three) times daily. Insurance preferred 100 each 11    montelukast (SINGULAIR) 10 mg tablet       pantoprazole (PROTONIX) 40 MG tablet TAKE 1 TABLET EVERY DAY 90 tablet 4    potassium 99 mg Tab Take 1 tablet by mouth once daily.      rosuvastatin (CRESTOR) 20 MG tablet Take 1 tablet (20 mg total) by mouth once daily. 90 tablet 4    tiZANidine (ZANAFLEX) 2 MG tablet Take 1-2 tabs twice a day as needed for muscle pain.  Will cause drowsiness 30 tablet 0    TRUEPLUS LANCETS 33 gauge Misc       varicella-zoster [id number], PF, (SHINGRIX) 50 mcg/0.5 mL injection Inject 0.5 mL (one dose) into muscle now; give second dose at least 2 months later 0.5 mL 1    albuterol (PROVENTIL) 2.5 mg /3 mL (0.083 %) nebulizer solution every 6 (six) hours as needed.  12    albuterol (VENTOLIN HFA) 90 mcg/actuation inhaler Inhale 2 puffs into the lungs every 4 (four) hours as needed for Wheezing. 18 g 3    aspirin (ECOTRIN) 81 MG EC tablet Take 1 tablet (81 mg total) by mouth once daily. (FOR HEART HEALTH) 90 tablet 3    butalbital-acetaminophen-caffeine -40 mg (FIORICET, ESGIC) -40 mg per tablet Take 1 tablet by mouth 3 (three) times daily as needed for Headaches. (MAXIMUM OF 15 TABLET PER MONTH) 30 tablet 2    calcium-vitamin D (CALCIUM WITH VITAMIN D) 600 mg(1,500mg) -400 unit Tab Take 2 tablets by mouth once daily. 180 tablet 4    fluticasone furoate-vilanterol (BREO ELLIPTA) 100-25 mcg/dose diskus inhaler Inhale 1 puff into the lungs once daily. 60 each 11    metFORMIN (GLUCOPHAGE-XR) 500 MG XR 24hr tablet Take 1 tablet (500 mg total) by  mouth 2 (two) times daily with meals. 180 tablet 4     No facility-administered medications prior to visit.         Medications Discontinued During This Encounter   Medication Reason    metFORMIN (GLUCOPHAGE-XR) 500 MG XR 24hr tablet Side effects    aspirin (ECOTRIN) 81 MG EC tablet Reorder    calcium-vitamin D (CALCIUM WITH VITAMIN D) 600 mg(1,500mg) -400 unit Tab Reorder    butalbital-acetaminophen-caffeine -40 mg (FIORICET, ESGIC) -40 mg per tablet Reorder        Medications Ordered This Encounter   Medications    aspirin (ECOTRIN) 81 MG EC tablet     Sig: Take 1 tablet (81 mg total) by mouth once daily. (FOR HEART HEALTH)     Dispense:  90 tablet     Refill:  4    butalbital-acetaminophen-caffeine -40 mg (FIORICET, ESGIC) -40 mg per tablet     Sig: Take 1 tablet by mouth 3 (three) times daily as needed for Headaches. (MAXIMUM OF 15 TABLET PER MONTH)     Dispense:  30 tablet     Refill:  3    calcium-vitamin D (CALCIUM WITH VITAMIN D) 600 mg(1,500mg) -400 unit Tab     Sig: Take 2 tablets by mouth once daily.     Dispense:  180 tablet     Refill:  4       Follow up in about 4 months (around 12/18/2020) for review test results, discuss treatment plan.     Subjective   Review of Systems   Constitutional: Positive for activity change. Negative for unexpected weight change.   HENT: Negative for hearing loss and trouble swallowing.    Eyes: Negative for discharge and visual disturbance.   Respiratory: Negative for chest tightness and shortness of breath.    Cardiovascular: Negative for chest pain and palpitations.   Gastrointestinal: Negative for blood in stool, constipation and vomiting.   Endocrine: Negative for polydipsia and polyuria.   Genitourinary: Negative for difficulty urinating, dysuria, hematuria and menstrual problem.   Musculoskeletal: Negative for joint swelling and neck pain.   Psychiatric/Behavioral: Negative for confusion and dysphoric mood.        Objective   Vitals:     "08/19/20 0818   BP: 110/60   BP Location: Right arm   Patient Position: Sitting   BP Method: Medium (Manual)   Pulse: (!) 118   Temp: 97 °F (36.1 °C)   TempSrc: Tympanic   SpO2: 98%   Weight: 64.4 kg (141 lb 15.6 oz)   Height: 5' 2" (1.575 m)     Physical Exam  Vitals signs reviewed.   Constitutional:       General: She is not in acute distress.     Appearance: Normal appearance. She is not ill-appearing, toxic-appearing or diaphoretic.   HENT:      Head: Normocephalic and atraumatic.   Eyes:      General: No scleral icterus.     Conjunctiva/sclera: Conjunctivae normal.   Cardiovascular:      Rate and Rhythm: Normal rate and regular rhythm.   Pulmonary:      Effort: Pulmonary effort is normal.      Breath sounds: Normal breath sounds.   Skin:     General: Skin is warm and dry.   Neurological:      General: No focal deficit present.      Mental Status: She is alert and oriented to person, place, and time.   Psychiatric:         Mood and Affect: Mood normal.         Behavior: Behavior normal.         Judgment: Judgment normal.           Documentation entered by me for this encounter may have been done in part using speech-recognition technology. Although I have made an effort to ensure accuracy, "sound like" errors may exist and should be interpreted in context. -VICK Sandoval MD.  "

## 2020-08-19 NOTE — PROGRESS NOTES
Subjective:       Patient ID: Lucia Barlow is a 70 y.o. female.    Chief Complaint: She  is seen today in follow-up examination for multiple problems.  She has complaints of a dry throat with the use of her jet nebulizer.  Her chest x-ray today demonstrates what appears to be a new pulmonary nodule.  Patient previously had a CT-guided needle biopsy of a pulmonary nodule in 2017.  Based on a chest x-ray this appears to be a new finding.  Will need to obtain a CT scan for further determination.  Additionally she has complaints consistent with obstructive sleep apnea and a home sleep study has been ordered.      Sleep Apnea  She presents for a sleep evaluation. She complains of snoring, choking, decreased concentration, excessive daytime sleepiness, falling asleep while watching television, sinus problems, feels sleepy during the day, take naps during the day.  Symptoms began 4 months ago, gradually worsening since that time.  She goes to sleep at 11:30   weekdays and  weekends. She awakens 6 weekdays and  weekends. She falls asleep in 10 minutes.  Collar size . She denies knees buckling with laughing, completely or partially paralyzed while falling asleep or waking up. Previous evaluation and treatment has included none.  EPWORTH 13    Sinus Pain  Patient complains of congestion, cough, itchy eyes, nasal congestion, post nasal drip, puffiness of the eyes and sinus pressure. Onset of symptoms was 1 month ago. Symptoms have been gradually worsening since that time. She is drinking plenty of fluids.  Past history is significant for COPD. Patient is former smoker, quit 3 years ago.      Lung Nodule  She presents for evaluation and treatment of a lung nodule. The patient had an abnormal imaging study but reports experiencing no current or past pulmonary symptoms. The patient denies other associated symptoms. She is still smoking approximately . The patient has no known exposure to tuberculosis and no history of PPD  placement. The patient does not have a history of cancer. Hx of left lower lobe nodule biopsied in 2017  - benign. Appears to be a new nodule on Chest X Ray.  Patient continues to smoke cigarettes despite admonitions to the contrary.       COPD  She presents for evaluation and treatment of COPD. The patient is not currently having symptoms / an exacerbation. Current symptoms include chronic dyspnea, cough productive of white sputum in small amounts and wheezing. Symptoms have been present since several months ago and have been unchanged. She denies chills and fever. Associated symptoms include fatigue and shortness of breath.  This episode appears to have been triggered by no identifiable factor. Treatments tried for the current exacerbation: none. The patient has been having similar episodes for approximately 1 years. She uses 1 pillows at night. Patient currently is not on home oxygen therapy.. The patient is having no constitutional symptoms, denying fever, chills, anorexia, or weight loss. The patient has not been hospitalized for this condition before. She is still smoking . The patient is experiencing exercise intolerance (difficulty walking 3 blocks on flat ground and difficulty climbing 1 flights of stairs).          Past Medical History:     Past Medical History:   Diagnosis Date    Atherosclerosis of artery of both lower extremities 1/17/2014    Atherosclerosis of native coronary artery of native heart without angina pectoris 11/18/2016    Atherosclerotic PVD with intermittent claudication 1/17/2014    Chronic bronchitis     COPD (chronic obstructive pulmonary disease)     Dyslipidemia associated with type 2 diabetes mellitus 6/14/2013    Dyslipidemia associated with type 2 diabetes mellitus     Ex-smoker     Gastroesophageal reflux disease without esophagitis 1/25/2019    Hyperlipidemia     Hypertension     Osteoporosis 1/16 rosita 1/18    PVD (peripheral vascular disease)     S/P peripheral  artery angioplasty 2014    Simple chronic bronchitis     Tobacco dependence     Type 2 diabetes mellitus with microalbuminuria, without long-term current use of insulin 3/29/2019    Type 2 diabetes mellitus with peripheral vascular disease     Type 2 diabetes mellitus with stage 3 chronic kidney disease, without long-term current use of insulin 2019    Type 2 diabetes mellitus without retinopathy 2019    Urinary incontinence      Past Surgical History:   Procedure Laterality Date    ANGIOPLASTY Bilateral 2014    aortoiliac stenting     CARPAL TUNNEL RELEASE  2003    Henrry    COLECTOMY  approximate 2005    pt states 1in colon -dx with benign mass removed-states no colon cancer    COLONOSCOPY N/A 2016    Procedure: COLONOSCOPY;  Surgeon: Dmitri Sterling MD;  Location: [facility] ENDO;  Service: Endoscopy;  Laterality: N/A;    COLONOSCOPY N/A 11/15/2019    Procedure: COLONOSCOPY;  Surgeon: Marko Vicente MD;  Location: [facility] ENDO;  Service: Endoscopy;  Laterality: N/A;    HYSTERECTOMY      no cancer    OOPHORECTOMY       Social History     Socioeconomic History    Marital status:      Spouse name: Not on file    Number of children: 4    Years of education: Not on file    Highest education level: Not on file   Occupational History    Occupation:    Social Needs    Financial resource strain: Not very hard    Food insecurity     Worry: Never true     Inability: Never true    Transportation needs     Medical: No     Non-medical: No   Tobacco Use    Smoking status: Current Some Day Smoker     Packs/day: 0.50     Years: 56.00     Pack years: 28.00     Types: Cigarettes     Start date: 1960     Last attempt to quit: 2016     Years since quittin.6    Smokeless tobacco: Never Used   Substance and Sexual Activity    Alcohol use: No     Alcohol/week: 0.0 standard drinks     Frequency: Never     Drinks per session: Patient refused     Binge frequency:  Never    Drug use: No    Sexual activity: Never   Lifestyle    Physical activity     Days per week: 2 days     Minutes per session: 10 min    Stress: Only a little   Relationships    Social connections     Talks on phone: More than three times a week     Gets together: Never     Attends [employer] service: Not on file     Active member of club or organization: Yes     Attends meetings of clubs or organizations: More than 4 times per year     Relationship status:    Other Topics Concern    Not on file   Social History Narrative    Son smoker, no pets in household.     Review of Systems   Constitutional: Positive for fatigue. Negative for fever.   HENT: Positive for postnasal drip, rhinorrhea and congestion.    Eyes: Negative for redness and itching.   Respiratory: Positive for cough, sputum production, shortness of breath, dyspnea on extertion, use of rescue inhaler and Paroxysmal Nocturnal Dyspnea.    Cardiovascular: Negative for chest pain, palpitations and leg swelling.   Genitourinary: Negative for difficulty urinating and hematuria.   Endocrine: Negative for cold intolerance and heat intolerance.    Skin: Negative for rash.   Gastrointestinal: Negative for nausea and abdominal pain.   Neurological: Negative for dizziness, syncope, weakness and light-headedness.   Hematological: Negative for adenopathy. Does not bruise/bleed easily.   Psychiatric/Behavioral: Negative for sleep disturbance. The patient is not nervous/anxious.        Objective:      Physical Exam  Vitals signs and nursing note reviewed.   Constitutional:       Appearance: She is well-developed.   HENT:      Head: Normocephalic and atraumatic.      Mouth/Throat:      Pharynx: Oropharyngeal exudate present.   Eyes:      Conjunctiva/sclera: Conjunctivae normal.      Pupils: Pupils are equal, round, and reactive to light.   Neck:      Musculoskeletal: Neck supple.      Thyroid: No thyromegaly.      Vascular: No JVD.      Trachea: No  tracheal deviation.   Cardiovascular:      Rate and Rhythm: Normal rate and regular rhythm.      Heart sounds: Normal heart sounds.   Pulmonary:      Effort: Pulmonary effort is normal. No respiratory distress.      Breath sounds: Examination of the right-lower field reveals wheezing. Examination of the left-lower field reveals wheezing. Decreased breath sounds and wheezing present. No rhonchi or rales.   Chest:      Chest wall: No tenderness.   Abdominal:      General: Bowel sounds are normal.      Palpations: Abdomen is soft.   Musculoskeletal: Normal range of motion.   Lymphadenopathy:      Cervical: No cervical adenopathy.   Skin:     General: Skin is warm and dry.   Neurological:      Mental Status: She is alert and oriented to person, place, and time.       Personal Diagnostic Review  Chest x-ray: hyperinflation, left sided pulmonary nodule    X-Ray Chest PA And Lateral  Narrative: EXAMINATION:  XR CHEST PA AND LATERAL    CLINICAL HISTORY:  SOB; Chronic obstructive pulmonary disease, unspecified    TECHNIQUE:  PA and lateral views of the chest were performed.    COMPARISON:  11/27/2019    FINDINGS:  Cardiac silhouette and mediastinal contours are normal.  Lungs demonstrate 11 mm irregular opacity within the left midlung.  COPD hyperinflation.  No pleural effusion.  Osseous structures are intact.  Impression: Left mid lung nodular opacity.  CT chest recommended.  This report was flagged in Epic as abnormal.    Electronically signed by: Karel Kaufman MD  Date:    08/19/2020  Time:    09:17      Office Spirometry Results:     No flowsheet data found.  Pulmonary Studies Review 8/19/2020   SpO2 98   Height 61   Weight 2271.62   BMI (Calculated) 26.8   Predicted Distance 291.01   Predicted Distance Meters (Calculated) 430.2         Assessment:       1. Solitary pulmonary nodule    2. IVANIA (obstructive sleep apnea)    3. Chronic obstructive pulmonary disease, unspecified COPD type    4. Lung nodule < 6cm on CT           Lung nodule < 6cm on CT  Repeat CT of the chest based on a new nodule noted on chest x-ray of 08/19/2020      Outpatient Encounter Medications as of 8/19/2020   Medication Sig Dispense Refill    amlodipine-benazepril 5-20 mg (LOTREL) 5-20 mg per capsule Take 1 capsule by mouth once daily. 90 capsule 4    aspirin (ECOTRIN) 81 MG EC tablet Take 1 tablet (81 mg total) by mouth once daily. (FOR HEART HEALTH) 90 tablet 4    blood sugar diagnostic Strp 1 each by Misc.(Non-Drug; Combo Route) route 3 (three) times daily. EALTH test strips 100 each 3    blood-glucose meter kit Insurance preferred 1 each 0    butalbital-acetaminophen-caffeine -40 mg (FIORICET, ESGIC) -40 mg per tablet Take 1 tablet by mouth 3 (three) times daily as needed for Headaches. (MAXIMUM OF 15 TABLET PER MONTH) 30 tablet 3    calcium-vitamin D (CALCIUM WITH VITAMIN D) 600 mg(1,500mg) -400 unit Tab Take 2 tablets by mouth once daily. 180 tablet 4    chlorthalidone (HYGROTEN) 25 MG Tab TAKE 1 TABLET EVERY DAY 90 tablet 2    ezetimibe (ZETIA) 10 mg tablet Take 1 tablet (10 mg total) by mouth once daily. 90 tablet 4    fluticasone propionate (FLONASE) 50 mcg/actuation nasal spray USE 2 SPRAYS IN EACH NOSTRIL ONE TIME DAILY  48 g 4    glimepiride (AMARYL) 2 MG tablet Take 1 tablet (2 mg total) by mouth before breakfast. 90 tablet 4    lancets 32 gauge Misc 1 lancet by Misc.(Non-Drug; Combo Route) route 3 (three) times daily. Insurance preferred 100 each 11    montelukast (SINGULAIR) 10 mg tablet       pantoprazole (PROTONIX) 40 MG tablet TAKE 1 TABLET EVERY DAY 90 tablet 4    potassium 99 mg Tab Take 1 tablet by mouth once daily.      rosuvastatin (CRESTOR) 20 MG tablet Take 1 tablet (20 mg total) by mouth once daily. 90 tablet 4    tiZANidine (ZANAFLEX) 2 MG tablet Take 1-2 tabs twice a day as needed for muscle pain.  Will cause drowsiness 30 tablet 0    TRUEPLUS LANCETS 33 gauge Misc       varicella-zoster [id number],  PF, (SHINGRIX) 50 mcg/0.5 mL injection Inject 0.5 mL (one dose) into muscle now; give second dose at least 2 months later 0.5 mL 1    [DISCONTINUED] albuterol (PROVENTIL) 2.5 mg /3 mL (0.083 %) nebulizer solution every 6 (six) hours as needed.  12    [DISCONTINUED] albuterol (VENTOLIN HFA) 90 mcg/actuation inhaler Inhale 2 puffs into the lungs every 4 (four) hours as needed for Wheezing. 18 g 3    [DISCONTINUED] fluticasone furoate-vilanterol (BREO ELLIPTA) 100-25 mcg/dose diskus inhaler Inhale 1 puff into the lungs once daily. 60 each 11    ipratropium-albuteroL (COMBIVENT)  mcg/actuation inhaler Inhale 2 puffs into the lungs every 4 (four) hours as needed for Wheezing or Shortness of Breath. Rescue 4 g 11    [DISCONTINUED] aspirin (ECOTRIN) 81 MG EC tablet Take 1 tablet (81 mg total) by mouth once daily. (FOR HEART HEALTH) 90 tablet 3    [DISCONTINUED] butalbital-acetaminophen-caffeine -40 mg (FIORICET, ESGIC) -40 mg per tablet Take 1 tablet by mouth 3 (three) times daily as needed for Headaches. (MAXIMUM OF 15 TABLET PER MONTH) 30 tablet 2    [DISCONTINUED] calcium-vitamin D (CALCIUM WITH VITAMIN D) 600 mg(1,500mg) -400 unit Tab Take 2 tablets by mouth once daily. 180 tablet 4    [DISCONTINUED] metFORMIN (GLUCOPHAGE-XR) 500 MG XR 24hr tablet Take 1 tablet (500 mg total) by mouth 2 (two) times daily with meals. 180 tablet 4     No facility-administered encounter medications on file as of 8/19/2020.      Orders Placed This Encounter   Procedures    CT Chest Without Contrast     Standing Status:   Future     Standing Expiration Date:   8/19/2021     Order Specific Question:   May the Radiologist modify the order per protocol to meet the clinical needs of the patient?     Answer:   Yes    Home Sleep Studies     Please perform two night study     Standing Status:   Future     Standing Expiration Date:   8/19/2021     Scheduling Instructions:      Call patient and instruct on procedure for  home sleep study. Schedule follow up 1 weeks after completion     Plan:       Requested Prescriptions     Signed Prescriptions Disp Refills    ipratropium-albuteroL (COMBIVENT)  mcg/actuation inhaler 4 g 11     Sig: Inhale 2 puffs into the lungs every 4 (four) hours as needed for Wheezing or Shortness of Breath. Rescue     Solitary pulmonary nodule  -     CT Chest Without Contrast; Future; Expected date: 08/19/2020    IVANIA (obstructive sleep apnea)  -     Home Sleep Studies; Future; Expected date: 08/19/2020    Chronic obstructive pulmonary disease, unspecified COPD type  -     ipratropium-albuteroL (COMBIVENT)  mcg/actuation inhaler; Inhale 2 puffs into the lungs every 4 (four) hours as needed for Wheezing or Shortness of Breath. Rescue  Dispense: 4 g; Refill: 11    Lung nodule < 6cm on CT           Follow up in about 2 weeks (around 9/2/2020) for Review Sleep Study - on return, Review CT/PET - on return visit.    MEDICAL DECISION MAKING: Moderate to high complexity.  Overall, the multiple problems listed are of moderate to high severity that may impact quality of life and activities of daily living. Side effects of medications, treatment plan as well as options and alternatives reviewed and discussed with patient. There was counseling of patient concerning these issues.    Total time spent in face to face counseling and coordination of care - 45  minutes over 50% of time was used in discussion of prognosis, risks, benefits of treatment, instructions and compliance with regimen . Discussion with other physicians or health care providers (DME, NP, pharmacy, respiratory therapy) occurred.

## 2020-08-19 NOTE — ASSESSMENT & PLAN NOTE
BP Readings from Last 6 Encounters:   08/19/20 110/60   06/26/20 (!) 124/58   03/04/20 (!) 138/52   02/26/20 116/60   02/19/20 (!) 130/58   12/13/19 (!) 118/56     Last 5 Patient Entered Readings                                      Current 30 Day Average: 139/70     Recent Readings 8/18/2020 8/17/2020 8/14/2020 8/14/2020 8/11/2020    SBP (mmHg) 103 128 144 154 136    DBP (mmHg) 72 66 65 79 66    Pulse 110 97 84 87 91      Well controlled, stable.

## 2020-08-21 ENCOUNTER — PATIENT OUTREACH (OUTPATIENT)
Dept: OTHER | Facility: OTHER | Age: 70
End: 2020-08-21

## 2020-08-21 NOTE — PROGRESS NOTES
"Digital Medicine: Health  Follow-Up    The history is provided by the patient.             Reason for review: Blood glucose at goal and Blood pressure at goal        Topics Covered on Call: physical activity and Diet    Additional Follow-up details: Patient reports she is well, no complaints. Denies symptoms of hypo/hypertension. Recently went to the doctor. Was recently taken off of metformin, feeling better.     Discussed COVID-19 and importance of proper hand hygiene and social distancing. Reports her friend passed away from COVID-19 recently.     Discussed elevated BP readings early this month, attributes to feeling "frustrated" and not feeling well due to metformin. Lost 3 dress sizes since started taking metformin.             Diet-Change  24 hour dietary recall  Breakfast is typically between. Scrambled eggs with grits.  Lunch is typically between. Hamburgers.   Dinner is typically between. Small portion- cream potaotes and steak .   Patient reports eating or drinking the following: Limited dining out, eating most meals at home. Denies major changes to diet since last call. Reports she has not had a big appetite lately, not eating very much lately.     Reports she has been drinking mainly water lately.      Physical Activity-Change      Additional physical activity details: Staying active throughout the day, but just in the house- not outside.       Medication Adherence-Medication adherence was assessed.        Substance, Sleep, Stress-change  stress-assessed  Details:helping her grandkids with online school/homework   Intervention(s):    Sleep-assessed  Details:sleeping "pretty good"  Intervention(s):    Alcohol -not assessed  Details:  Intervention(s):    Tobacco-Not Assessed  Details:  Intervention(s):          Continue current diet/physical activity routine.  Provided patient education.       Addressed any questions or concerns and patient has my contact information if needed prior to next outreach. " Patient verbalizes understanding.      Explained the importance of self-monitoring and medication adherence. Encouraged the patient to communicate with their health  for lifestyle modifications to help improve or maintain a healthy lifestyle.            There are no preventive care reminders to display for this patient.    Last 5 Patient Entered Readings                                      Current 30 Day Average: 140/71     Recent Readings 8/18/2020 8/17/2020 8/14/2020 8/14/2020 8/11/2020    SBP (mmHg) 103 128 144 154 136    DBP (mmHg) 72 66 65 79 66    Pulse 110 97 84 87 91        Last 6 Patient Entered Readings                                          Most Recent A1c: 6.2% on 8/12/2020  (Goal: 8%)     Recent Readings 8/18/2020 8/17/2020 8/14/2020 8/11/2020 8/8/2020    Blood Glucose (mg/dL) 144 128 117 115 95

## 2020-09-02 ENCOUNTER — OFFICE VISIT (OUTPATIENT)
Dept: CARDIOLOGY | Facility: CLINIC | Age: 70
End: 2020-09-02
Payer: MEDICARE

## 2020-09-02 VITALS
DIASTOLIC BLOOD PRESSURE: 64 MMHG | HEART RATE: 89 BPM | OXYGEN SATURATION: 98 % | SYSTOLIC BLOOD PRESSURE: 126 MMHG | BODY MASS INDEX: 27.58 KG/M2 | WEIGHT: 145.94 LBS

## 2020-09-02 DIAGNOSIS — F17.210 LIGHT SMOKER: Chronic | ICD-10-CM

## 2020-09-02 DIAGNOSIS — E11.69 DYSLIPIDEMIA ASSOCIATED WITH TYPE 2 DIABETES MELLITUS: Chronic | ICD-10-CM

## 2020-09-02 DIAGNOSIS — R80.9 TYPE 2 DIABETES MELLITUS WITH MICROALBUMINURIA, WITHOUT LONG-TERM CURRENT USE OF INSULIN: Chronic | ICD-10-CM

## 2020-09-02 DIAGNOSIS — E78.5 DYSLIPIDEMIA ASSOCIATED WITH TYPE 2 DIABETES MELLITUS: Chronic | ICD-10-CM

## 2020-09-02 DIAGNOSIS — Z98.62 S/P PERIPHERAL ARTERY ANGIOPLASTY: ICD-10-CM

## 2020-09-02 DIAGNOSIS — E11.51 TYPE 2 DIABETES MELLITUS WITH PERIPHERAL VASCULAR DISEASE: Chronic | ICD-10-CM

## 2020-09-02 DIAGNOSIS — I10 ESSENTIAL HYPERTENSION: Chronic | ICD-10-CM

## 2020-09-02 DIAGNOSIS — K21.9 GASTROESOPHAGEAL REFLUX DISEASE WITHOUT ESOPHAGITIS: Chronic | ICD-10-CM

## 2020-09-02 DIAGNOSIS — I25.10 ATHEROSCLEROSIS OF NATIVE CORONARY ARTERY OF NATIVE HEART WITHOUT ANGINA PECTORIS: ICD-10-CM

## 2020-09-02 DIAGNOSIS — E11.29 TYPE 2 DIABETES MELLITUS WITH MICROALBUMINURIA, WITHOUT LONG-TERM CURRENT USE OF INSULIN: Chronic | ICD-10-CM

## 2020-09-02 DIAGNOSIS — I35.0 NONRHEUMATIC AORTIC VALVE STENOSIS: ICD-10-CM

## 2020-09-02 DIAGNOSIS — I70.203 ATHEROSCLEROSIS OF ARTERY OF BOTH LOWER EXTREMITIES: Primary | Chronic | ICD-10-CM

## 2020-09-02 DIAGNOSIS — I73.9 PVD (PERIPHERAL VASCULAR DISEASE): ICD-10-CM

## 2020-09-02 DIAGNOSIS — N18.30 CKD (CHRONIC KIDNEY DISEASE) STAGE 3, GFR 30-59 ML/MIN: ICD-10-CM

## 2020-09-02 DIAGNOSIS — E11.22 TYPE 2 DIABETES MELLITUS WITH STAGE 3 CHRONIC KIDNEY DISEASE, WITHOUT LONG-TERM CURRENT USE OF INSULIN: Chronic | ICD-10-CM

## 2020-09-02 DIAGNOSIS — N18.30 TYPE 2 DIABETES MELLITUS WITH STAGE 3 CHRONIC KIDNEY DISEASE, WITHOUT LONG-TERM CURRENT USE OF INSULIN: Chronic | ICD-10-CM

## 2020-09-02 DIAGNOSIS — I77.1 TORTUOUS AORTA: ICD-10-CM

## 2020-09-02 DIAGNOSIS — J44.9 CHRONIC OBSTRUCTIVE PULMONARY DISEASE, UNSPECIFIED COPD TYPE: ICD-10-CM

## 2020-09-02 DIAGNOSIS — I70.0 ATHEROSCLEROSIS OF AORTA: ICD-10-CM

## 2020-09-02 PROCEDURE — 1159F MED LIST DOCD IN RCRD: CPT | Mod: HCNC,S$GLB,, | Performed by: INTERNAL MEDICINE

## 2020-09-02 PROCEDURE — 99214 PR OFFICE/OUTPT VISIT, EST, LEVL IV, 30-39 MIN: ICD-10-PCS | Mod: HCNC,S$GLB,, | Performed by: INTERNAL MEDICINE

## 2020-09-02 PROCEDURE — 1101F PT FALLS ASSESS-DOCD LE1/YR: CPT | Mod: HCNC,CPTII,S$GLB, | Performed by: INTERNAL MEDICINE

## 2020-09-02 PROCEDURE — 3074F PR MOST RECENT SYSTOLIC BLOOD PRESSURE < 130 MM HG: ICD-10-PCS | Mod: HCNC,CPTII,S$GLB, | Performed by: INTERNAL MEDICINE

## 2020-09-02 PROCEDURE — 99999 PR PBB SHADOW E&M-EST. PATIENT-LVL IV: CPT | Mod: PBBFAC,HCNC,, | Performed by: INTERNAL MEDICINE

## 2020-09-02 PROCEDURE — 1101F PR PT FALLS ASSESS DOC 0-1 FALLS W/OUT INJ PAST YR: ICD-10-PCS | Mod: HCNC,CPTII,S$GLB, | Performed by: INTERNAL MEDICINE

## 2020-09-02 PROCEDURE — 1159F PR MEDICATION LIST DOCUMENTED IN MEDICAL RECORD: ICD-10-PCS | Mod: HCNC,S$GLB,, | Performed by: INTERNAL MEDICINE

## 2020-09-02 PROCEDURE — 3078F DIAST BP <80 MM HG: CPT | Mod: HCNC,CPTII,S$GLB, | Performed by: INTERNAL MEDICINE

## 2020-09-02 PROCEDURE — 3008F PR BODY MASS INDEX (BMI) DOCUMENTED: ICD-10-PCS | Mod: HCNC,CPTII,S$GLB, | Performed by: INTERNAL MEDICINE

## 2020-09-02 PROCEDURE — 3044F PR MOST RECENT HEMOGLOBIN A1C LEVEL <7.0%: ICD-10-PCS | Mod: HCNC,CPTII,S$GLB, | Performed by: INTERNAL MEDICINE

## 2020-09-02 PROCEDURE — 3008F BODY MASS INDEX DOCD: CPT | Mod: HCNC,CPTII,S$GLB, | Performed by: INTERNAL MEDICINE

## 2020-09-02 PROCEDURE — 3074F SYST BP LT 130 MM HG: CPT | Mod: HCNC,CPTII,S$GLB, | Performed by: INTERNAL MEDICINE

## 2020-09-02 PROCEDURE — 99999 PR PBB SHADOW E&M-EST. PATIENT-LVL IV: ICD-10-PCS | Mod: PBBFAC,HCNC,, | Performed by: INTERNAL MEDICINE

## 2020-09-02 PROCEDURE — 99214 OFFICE O/P EST MOD 30 MIN: CPT | Mod: HCNC,S$GLB,, | Performed by: INTERNAL MEDICINE

## 2020-09-02 PROCEDURE — 3078F PR MOST RECENT DIASTOLIC BLOOD PRESSURE < 80 MM HG: ICD-10-PCS | Mod: HCNC,CPTII,S$GLB, | Performed by: INTERNAL MEDICINE

## 2020-09-02 PROCEDURE — 3044F HG A1C LEVEL LT 7.0%: CPT | Mod: HCNC,CPTII,S$GLB, | Performed by: INTERNAL MEDICINE

## 2020-09-02 NOTE — PROGRESS NOTES
Subjective:   Patient ID:  Lucia Barlow is a 70 y.o. female who presents for follow up of No chief complaint on file.      HPI  A 71 yo female with pvd s/p pta copd smoker copd diabetes (stopped metformin due to gi side effects and lost weight) cad htn hlp ckd is here for f/u decreased smoking but has not stopped no claudication no chest pain or shortness of breath no chest pain has heart burn has been eating a lot of cheese/. Has no leg swelling. Tomatoes triggers heart burn.her a1c is in order however her lipids have increased has been eating a lot of cheese.   Past Medical History:   Diagnosis Date    Atherosclerosis of artery of both lower extremities 1/17/2014    Atherosclerosis of native coronary artery of native heart without angina pectoris 11/18/2016    Atherosclerotic PVD with intermittent claudication 1/17/2014    Chronic bronchitis     COPD (chronic obstructive pulmonary disease)     Dyslipidemia associated with type 2 diabetes mellitus 6/14/2013    Dyslipidemia associated with type 2 diabetes mellitus     Ex-smoker     Gastroesophageal reflux disease without esophagitis 1/25/2019    Hyperlipidemia     Hypertension     Osteoporosis 1/16 rosita 1/18    PVD (peripheral vascular disease)     S/P peripheral artery angioplasty 2/7/2014    Simple chronic bronchitis     Tobacco dependence     Type 2 diabetes mellitus with microalbuminuria, without long-term current use of insulin 3/29/2019    Type 2 diabetes mellitus with peripheral vascular disease     Type 2 diabetes mellitus with stage 3 chronic kidney disease, without long-term current use of insulin 2/1/2019    Type 2 diabetes mellitus without retinopathy 2/1/2019    Urinary incontinence        Past Surgical History:   Procedure Laterality Date    ANGIOPLASTY Bilateral 01/24/2014    aortoiliac stenting     CARPAL TUNNEL RELEASE  2003    [facility]    COLECTOMY  approximate 2005    pt states 1in colon -dx with benign mass removed-states no  colon cancer    COLONOSCOPY N/A 2016    Procedure: COLONOSCOPY;  Surgeon: Dmitri Sterling MD;  Location: [facility] ENDO;  Service: Endoscopy;  Laterality: N/A;    COLONOSCOPY N/A 11/15/2019    Procedure: COLONOSCOPY;  Surgeon: Marko Vicente MD;  Location: [facility] ENDO;  Service: Endoscopy;  Laterality: N/A;    HYSTERECTOMY      no cancer    OOPHORECTOMY         Social History     Tobacco Use    Smoking status: Current Some Day Smoker     Packs/day: 0.50     Years: 56.00     Pack years: 28.00     Types: Cigarettes     Start date: 1960     Last attempt to quit: 2016     Years since quittin.6    Smokeless tobacco: Never Used   Substance Use Topics    Alcohol use: No     Alcohol/week: 0.0 standard drinks     Frequency: Never     Drinks per session: Patient refused     Binge frequency: Never    Drug use: No       Family History   Problem Relation Age of Onset    Stroke Father     Stroke Sister     Asthma Daughter     Diabetes Daughter     Breast cancer Daughter     Asthma Son        Current Outpatient Medications   Medication Sig    amlodipine-benazepril 5-20 mg (LOTREL) 5-20 mg per capsule Take 1 capsule by mouth once daily.    aspirin (ECOTRIN) 81 MG EC tablet Take 1 tablet (81 mg total) by mouth once daily. (FOR HEART HEALTH)    blood sugar diagnostic Strp 1 each by Misc.(Non-Drug; Combo Route) route 3 (three) times daily. [facility] test strips    blood-glucose meter kit Insurance preferred    butalbital-acetaminophen-caffeine -40 mg (FIORICET, ESGIC) -40 mg per tablet Take 1 tablet by mouth 3 (three) times daily as needed for Headaches. (MAXIMUM OF 15 TABLET PER MONTH)    calcium-vitamin D (CALCIUM WITH VITAMIN D) 600 mg(1,500mg) -400 unit Tab Take 2 tablets by mouth once daily.    chlorthalidone (HYGROTEN) 25 MG Tab TAKE 1 TABLET EVERY DAY    ezetimibe (ZETIA) 10 mg tablet Take 1 tablet (10 mg total) by mouth once daily.    fluticasone propionate (FLONASE) 50  mcg/actuation nasal spray USE 2 SPRAYS IN EACH NOSTRIL ONE TIME DAILY     glimepiride (AMARYL) 2 MG tablet Take 1 tablet (2 mg total) by mouth before breakfast.    ipratropium-albuteroL (COMBIVENT)  mcg/actuation inhaler Inhale 2 puffs into the lungs every 4 (four) hours as needed for Wheezing or Shortness of Breath. Rescue    lancets 32 gauge Misc 1 lancet by Misc.(Non-Drug; Combo Route) route 3 (three) times daily. Insurance preferred    montelukast (SINGULAIR) 10 mg tablet     pantoprazole (PROTONIX) 40 MG tablet TAKE 1 TABLET EVERY DAY    potassium 99 mg Tab Take 1 tablet by mouth once daily.    rosuvastatin (CRESTOR) 20 MG tablet Take 1 tablet (20 mg total) by mouth once daily.    tiZANidine (ZANAFLEX) 2 MG tablet Take 1-2 tabs twice a day as needed for muscle pain.  Will cause drowsiness    TRUEPLUS LANCETS 33 gauge Misc     varicella-zoster gE-AS01B, PF, (SHINGRIX) 50 mcg/0.5 mL injection Inject 0.5 mL (one dose) into muscle now; give second dose at least 2 months later     No current facility-administered medications for this visit.      Current Outpatient Medications on File Prior to Visit   Medication Sig    amlodipine-benazepril 5-20 mg (LOTREL) 5-20 mg per capsule Take 1 capsule by mouth once daily.    aspirin (ECOTRIN) 81 MG EC tablet Take 1 tablet (81 mg total) by mouth once daily. (FOR HEART HEALTH)    blood sugar diagnostic Strp 1 each by Misc.(Non-Drug; Combo Route) route 3 (three) times daily. [facility] test strips    blood-glucose meter kit Insurance preferred    butalbital-acetaminophen-caffeine -40 mg (FIORICET, ESGIC) -40 mg per tablet Take 1 tablet by mouth 3 (three) times daily as needed for Headaches. (MAXIMUM OF 15 TABLET PER MONTH)    calcium-vitamin D (CALCIUM WITH VITAMIN D) 600 mg(1,500mg) -400 unit Tab Take 2 tablets by mouth once daily.    chlorthalidone (HYGROTEN) 25 MG Tab TAKE 1 TABLET EVERY DAY    ezetimibe (ZETIA) 10 mg tablet Take 1 tablet (10  mg total) by mouth once daily.    fluticasone propionate (FLONASE) 50 mcg/actuation nasal spray USE 2 SPRAYS IN EACH NOSTRIL ONE TIME DAILY     glimepiride (AMARYL) 2 MG tablet Take 1 tablet (2 mg total) by mouth before breakfast.    ipratropium-albuteroL (COMBIVENT)  mcg/actuation inhaler Inhale 2 puffs into the lungs every 4 (four) hours as needed for Wheezing or Shortness of Breath. Rescue    lancets 32 gauge Misc 1 lancet by Misc.(Non-Drug; Combo Route) route 3 (three) times daily. Insurance preferred    montelukast (SINGULAIR) 10 mg tablet     pantoprazole (PROTONIX) 40 MG tablet TAKE 1 TABLET EVERY DAY    potassium 99 mg Tab Take 1 tablet by mouth once daily.    rosuvastatin (CRESTOR) 20 MG tablet Take 1 tablet (20 mg total) by mouth once daily.    tiZANidine (ZANAFLEX) 2 MG tablet Take 1-2 tabs twice a day as needed for muscle pain.  Will cause drowsiness    TRUEPLUS LANCETS 33 gauge Misc     varicella-zoster gE-AS01B, PF, (SHINGRIX) 50 mcg/0.5 mL injection Inject 0.5 mL (one dose) into muscle now; give second dose at least 2 months later     No current facility-administered medications on file prior to visit.      Review of patient's allergies indicates:   Allergen Reactions    Skin staples [surgical stainless steel] Swelling    Egg derived      Shortness breath, lip swelling    Fish containing products     Iodine and iodide containing products Swelling    Latex, natural rubber Swelling    Losartan Itching    Nickel     Pravastatin      40 mg causes nausea vomitting but 20 mg ok    Shellfish containing products Swelling     Review of Systems   Constitution: Positive for weight loss. Negative for diaphoresis, malaise/fatigue and weight gain.   HENT: Negative for hoarse voice.    Eyes: Negative for double vision and visual disturbance.   Cardiovascular: Negative for chest pain, claudication, cyanosis, dyspnea on exertion, irregular heartbeat, leg swelling, near-syncope, orthopnea,  palpitations, paroxysmal nocturnal dyspnea and syncope.   Respiratory: Positive for cough and wheezing. Negative for hemoptysis, shortness of breath and snoring.    Hematologic/Lymphatic: Negative for bleeding problem. Does not bruise/bleed easily.   Skin: Negative for color change and poor wound healing.   Musculoskeletal: Negative for muscle cramps, muscle weakness and myalgias.   Gastrointestinal: Positive for heartburn. Negative for bloating, abdominal pain, change in bowel habit, diarrhea, hematemesis, hematochezia, melena and nausea.   Neurological: Negative for excessive daytime sleepiness, dizziness, headaches, light-headedness, loss of balance, numbness and weakness.   Psychiatric/Behavioral: Negative for memory loss. The patient does not have insomnia.    Allergic/Immunologic: Negative for hives.       Objective:   Physical Exam   Constitutional: She is oriented to person, place, and time. She appears well-developed and well-nourished. She does not appear ill. No distress.   HENT:   Head: Normocephalic and atraumatic.   Eyes: Pupils are equal, round, and reactive to light. EOM are normal. No scleral icterus.   Neck: Normal range of motion. Neck supple. Normal carotid pulses, no hepatojugular reflux and no JVD present. Carotid bruit is not present. No tracheal deviation present. No thyromegaly present.   Cardiovascular: Normal rate, regular rhythm and normal pulses. Exam reveals no gallop and no friction rub.   Murmur heard.   Harsh midsystolic murmur is present with a grade of 2/6 at the upper right sternal border radiating to the neck.  Pulses:       Carotid pulses are 2+ on the right side and 2+ on the left side.       Radial pulses are 2+ on the right side and 2+ on the left side.        Femoral pulses are 2+ on the right side and 2+ on the left side.       Popliteal pulses are 2+ on the right side and 2+ on the left side.        Dorsalis pedis pulses are 2+ on the right side and 2+ on the left side.         Posterior tibial pulses are 2+ on the right side and 2+ on the left side.   Pulmonary/Chest: Effort normal and breath sounds normal. No respiratory distress. She has no wheezes. She has no rhonchi. She has no rales. She exhibits no tenderness.   Abdominal: Soft. Normal appearance, normal aorta and bowel sounds are normal. She exhibits no distension, no abdominal bruit, no ascites and no pulsatile midline mass. There is no hepatomegaly. There is no abdominal tenderness.   Musculoskeletal:         General: No edema.      Right shoulder: She exhibits no deformity.   Neurological: She is alert and oriented to person, place, and time. She has normal strength. No cranial nerve deficit. Coordination normal.   Skin: Skin is warm. No rash noted. She is not diaphoretic. No cyanosis or erythema. Nails show no clubbing.   Psychiatric: She has a normal mood and affect. Her speech is normal and behavior is normal.   Nursing note and vitals reviewed.    Vitals:    09/02/20 0809 09/02/20 0811   BP: 126/72 126/64   BP Location: Left arm Right arm   Patient Position: Sitting Sitting   Pulse: 89    SpO2: 98%    Weight: 66.2 kg (145 lb 15.1 oz)      Lab Results   Component Value Date    CHOL 209 (H) 08/12/2020    CHOL 171 02/12/2020    CHOL 173 02/12/2020     Lab Results   Component Value Date    HDL 64 08/12/2020    HDL 77 (H) 02/12/2020    HDL 78 (H) 02/12/2020     Lab Results   Component Value Date    LDLCALC 132.0 08/12/2020    LDLCALC 66.6 02/12/2020    LDLCALC 67.8 02/12/2020     Lab Results   Component Value Date    TRIG 65 08/12/2020    TRIG 137 02/12/2020    TRIG 136 02/12/2020     Lab Results   Component Value Date    CHOLHDL 30.6 08/12/2020    CHOLHDL 45.0 02/12/2020    CHOLHDL 45.1 02/12/2020       Chemistry        Component Value Date/Time     08/12/2020 0750     08/12/2020 0750    K 3.3 (L) 08/12/2020 0750    K 3.3 (L) 08/12/2020 0750     08/12/2020 0750     08/12/2020 0750    CO2 28 08/12/2020  0750    CO2 28 08/12/2020 0750    BUN 18 08/12/2020 0750    BUN 18 08/12/2020 0750    CREATININE 1.0 08/12/2020 0750    CREATININE 1.0 08/12/2020 0750     (H) 08/12/2020 0750     (H) 08/12/2020 0750        Component Value Date/Time    CALCIUM 8.8 08/12/2020 0750    CALCIUM 8.8 08/12/2020 0750    ALKPHOS 91 08/12/2020 0750    ALKPHOS 91 08/12/2020 0750    AST 16 08/12/2020 0750    AST 16 08/12/2020 0750    ALT 10 08/12/2020 0750    ALT 10 08/12/2020 0750    BILITOT 0.2 08/12/2020 0750    BILITOT 0.2 08/12/2020 0750    ESTGFRAFRICA >60.0 08/12/2020 0750    ESTGFRAFRICA >60.0 08/12/2020 0750    EGFRNONAA 56.4 (A) 08/12/2020 0750    EGFRNONAA 56.4 (A) 08/12/2020 0750        Lab Results   Component Value Date    HGBA1C 6.2 (H) 08/12/2020    HGBA1C 6.2 (H) 08/12/2020       Lab Results   Component Value Date    TSH 1.352 06/05/2013     Lab Results   Component Value Date    INR 0.9 08/10/2019    INR 0.9 12/16/2018    INR 0.9 03/10/2017     Lab Results   Component Value Date    WBC 9.34 12/06/2019    HGB 11.7 (L) 12/06/2019    HCT 38.1 12/06/2019    MCV 96 12/06/2019     12/06/2019     BMP  Sodium   Date Value Ref Range Status   08/12/2020 141 136 - 145 mmol/L Final   08/12/2020 141 136 - 145 mmol/L Final     Potassium   Date Value Ref Range Status   08/12/2020 3.3 (L) 3.5 - 5.1 mmol/L Final   08/12/2020 3.3 (L) 3.5 - 5.1 mmol/L Final     Chloride   Date Value Ref Range Status   08/12/2020 105 95 - 110 mmol/L Final   08/12/2020 105 95 - 110 mmol/L Final     CO2   Date Value Ref Range Status   08/12/2020 28 23 - 29 mmol/L Final   08/12/2020 28 23 - 29 mmol/L Final     BUN, Bld   Date Value Ref Range Status   08/12/2020 18 8 - 23 mg/dL Final   08/12/2020 18 8 - 23 mg/dL Final     Creatinine   Date Value Ref Range Status   08/12/2020 1.0 0.5 - 1.4 mg/dL Final   08/12/2020 1.0 0.5 - 1.4 mg/dL Final     Calcium   Date Value Ref Range Status   08/12/2020 8.8 8.7 - 10.5 mg/dL Final   08/12/2020 8.8 8.7 - 10.5  mg/dL Final     Anion Gap   Date Value Ref Range Status   08/12/2020 8 8 - 16 mmol/L Final   08/12/2020 8 8 - 16 mmol/L Final     eGFR if    Date Value Ref Range Status   08/12/2020 >60.0 >60 mL/min/1.73 m^2 Final   08/12/2020 >60.0 >60 mL/min/1.73 m^2 Final     eGFR if non    Date Value Ref Range Status   08/12/2020 56.4 (A) >60 mL/min/1.73 m^2 Final     Comment:     Calculation used to obtain the estimated glomerular filtration  rate (eGFR) is the CKD-EPI equation.      08/12/2020 56.4 (A) >60 mL/min/1.73 m^2 Final     Comment:     Calculation used to obtain the estimated glomerular filtration  rate (eGFR) is the CKD-EPI equation.        CrCl cannot be calculated (Patient's most recent lab result is older than the maximum 7 days allowed.).    Assessment:     1. Atherosclerosis of artery of both lower extremities    2. Currently smokes a few cigarettes per day; former heavy smoker    3. Essential hypertension    4. Dyslipidemia associated with type 2 diabetes mellitus    5. Type 2 diabetes mellitus with peripheral vascular disease    6. Gastroesophageal reflux disease without esophagitis    7. Type 2 diabetes mellitus with stage 3 chronic kidney disease, without long-term current use of insulin    8. Type 2 diabetes mellitus with microalbuminuria, without long-term current use of insulin    9. S/P peripheral artery angioplasty    10. Atherosclerosis of aorta    11. Nonrheumatic aortic valve stenosis    12. Atherosclerosis of native coronary artery of native heart without angina pectoris    13. PVD (peripheral vascular disease)    14. Chronic obstructive pulmonary disease, unspecified COPD type    15. CKD (chronic kidney disease) stage 3, GFR 30-59 ml/min    16. Tortuous aorta      pvd asymptomatic now still smoking counseled about smoking cessation and risk factor modification  hlp increased cholesterol secondary tpoe ating a lot of cheese counsled about low fat dioet   Lack of  exercise emphasized importance of at least daily walks to help cardiovascular wise lipids diabetes and htn controlled  Aortic stenosis asymptomatic  Cad asymptomatic  Diabetes on target counseled about continued compliance  Plan:   Continue current therapy  Cardiac low salt diet.  Risk factor modification and excercise program.  F/u in 6 months with lipid cmp a1c

## 2020-09-09 ENCOUNTER — OFFICE VISIT (OUTPATIENT)
Dept: PULMONOLOGY | Facility: CLINIC | Age: 70
End: 2020-09-09
Payer: MEDICARE

## 2020-09-09 ENCOUNTER — HOSPITAL ENCOUNTER (OUTPATIENT)
Dept: RADIOLOGY | Facility: HOSPITAL | Age: 70
Discharge: HOME | End: 2020-09-09
Attending: INTERNAL MEDICINE
Payer: MEDICARE

## 2020-09-09 VITALS
RESPIRATION RATE: 17 BRPM | HEART RATE: 94 BPM | OXYGEN SATURATION: 96 % | BODY MASS INDEX: 27.01 KG/M2 | DIASTOLIC BLOOD PRESSURE: 70 MMHG | HEIGHT: 61 IN | SYSTOLIC BLOOD PRESSURE: 124 MMHG | WEIGHT: 143.06 LBS

## 2020-09-09 DIAGNOSIS — R91.1 SOLID NODULE OF LUNG GREATER THAN 8 MM IN DIAMETER: Primary | ICD-10-CM

## 2020-09-09 DIAGNOSIS — R91.1 SOLITARY PULMONARY NODULE: ICD-10-CM

## 2020-09-09 DIAGNOSIS — C34.90 MALIGNANT NEOPLASM OF UNSPECIFIED PART OF UNSPECIFIED BRONCHUS OR LUNG: ICD-10-CM

## 2020-09-09 PROCEDURE — 3078F PR MOST RECENT DIASTOLIC BLOOD PRESSURE < 80 MM HG: ICD-10-PCS | Mod: HCNC,CPTII,S$GLB, | Performed by: INTERNAL MEDICINE

## 2020-09-09 PROCEDURE — 71250 CT THORAX DX C-: CPT | Mod: 26,HCNC,, | Performed by: RADIOLOGY

## 2020-09-09 PROCEDURE — 99499 UNLISTED E&M SERVICE: CPT | Mod: S$GLB,,, | Performed by: INTERNAL MEDICINE

## 2020-09-09 PROCEDURE — 1101F PT FALLS ASSESS-DOCD LE1/YR: CPT | Mod: HCNC,CPTII,S$GLB, | Performed by: INTERNAL MEDICINE

## 2020-09-09 PROCEDURE — 3008F BODY MASS INDEX DOCD: CPT | Mod: HCNC,CPTII,S$GLB, | Performed by: INTERNAL MEDICINE

## 2020-09-09 PROCEDURE — 3074F PR MOST RECENT SYSTOLIC BLOOD PRESSURE < 130 MM HG: ICD-10-PCS | Mod: HCNC,CPTII,S$GLB, | Performed by: INTERNAL MEDICINE

## 2020-09-09 PROCEDURE — 99214 PR OFFICE/OUTPT VISIT, EST, LEVL IV, 30-39 MIN: ICD-10-PCS | Mod: HCNC,S$GLB,, | Performed by: INTERNAL MEDICINE

## 2020-09-09 PROCEDURE — 1101F PR PT FALLS ASSESS DOC 0-1 FALLS W/OUT INJ PAST YR: ICD-10-PCS | Mod: HCNC,CPTII,S$GLB, | Performed by: INTERNAL MEDICINE

## 2020-09-09 PROCEDURE — 71250 CT CHEST WITHOUT CONTRAST: ICD-10-PCS | Mod: 26,HCNC,, | Performed by: RADIOLOGY

## 2020-09-09 PROCEDURE — 99499 RISK ADDL DX/OHS AUDIT: ICD-10-PCS | Mod: S$GLB,,, | Performed by: INTERNAL MEDICINE

## 2020-09-09 PROCEDURE — 3008F PR BODY MASS INDEX (BMI) DOCUMENTED: ICD-10-PCS | Mod: HCNC,CPTII,S$GLB, | Performed by: INTERNAL MEDICINE

## 2020-09-09 PROCEDURE — 3074F SYST BP LT 130 MM HG: CPT | Mod: HCNC,CPTII,S$GLB, | Performed by: INTERNAL MEDICINE

## 2020-09-09 PROCEDURE — 1159F PR MEDICATION LIST DOCUMENTED IN MEDICAL RECORD: ICD-10-PCS | Mod: HCNC,S$GLB,, | Performed by: INTERNAL MEDICINE

## 2020-09-09 PROCEDURE — 99214 OFFICE O/P EST MOD 30 MIN: CPT | Mod: HCNC,S$GLB,, | Performed by: INTERNAL MEDICINE

## 2020-09-09 PROCEDURE — 3078F DIAST BP <80 MM HG: CPT | Mod: HCNC,CPTII,S$GLB, | Performed by: INTERNAL MEDICINE

## 2020-09-09 PROCEDURE — 99999 PR PBB SHADOW E&M-EST. PATIENT-LVL V: CPT | Mod: PBBFAC,HCNC,, | Performed by: INTERNAL MEDICINE

## 2020-09-09 PROCEDURE — 1159F MED LIST DOCD IN RCRD: CPT | Mod: HCNC,S$GLB,, | Performed by: INTERNAL MEDICINE

## 2020-09-09 PROCEDURE — 99999 PR PBB SHADOW E&M-EST. PATIENT-LVL V: ICD-10-PCS | Mod: PBBFAC,HCNC,, | Performed by: INTERNAL MEDICINE

## 2020-09-09 PROCEDURE — 71250 CT THORAX DX C-: CPT | Mod: TC,HCNC

## 2020-09-09 NOTE — PATIENT INSTRUCTIONS
[facility]  [address] [place] [address]  (off [address])    Lung Biopsy Appointment    The radiologist will review your CT scan to determine when the lung biopsy will be scheduled. If you do not hear from the radiology department in 48 hours then call [phone].     This test is done in the Radiology Department on the 1st floor of [facility]    Lung Biopsy is performed to diagnose or determine the level of lung disease that cant be determined by regular lab testing.    Prior to Lung Biopsy: (VERY IMPORTANT-PLEASE READ)   You must be fasting: nothing to eat or drink after midnight the night before the test.   You must have a responsible adult to drive you home after procedure and stay with you the rest of the day.   Plan to stay approximately 4 hours after procedure is completed.   If you are on Coumadin, Heparin, Plavix, Aspirin or NSAIDS must be stopped 7 days prior to the procedure. (YOU MUST LET YOUR PHYSICIAN KNOW)   The ordering physician should order lab work to be done within 7 days of the procedure, to determine your blood clotting factor.   Diabetic patients: If you take oral glucose pills, dont take it the morning of the biopsy.    If you take insulin, take your normal dose as usual.   Blood pressure medications are to be taken the morning of procedure with a small sip of water at least 2 hours before biopsy.   Based on your medical condition, your physician may request other specific preparation.     During the test you will lie on your back with your right hand behind your head. You will need to lie completely still while the best spot to insert the biopsy needle is determine. Ultrasound, MRI, or CT may also be used to locate a specific spot in the lung. Then, your skin will be cleaned and a local anesthetic (numbing) will be applied to the skin. You will also be given a light sedation. A biopsy needle will be inserted and advanced  to the surface of the lung, where a tissue sample is obtained. After the biopsy you will remain lying on your right side for 2 hours. You may experience some pain around the site or in the shoulder. This isnt uncommon. Then, you will change position to lying on your back for 2-3 hours. You will be monitored closely during this time in recovery. You will be able to drink 2 hours and eat 4 hours after the procedure. The biopsy site will be dressed. A small amount of blood on dressing is normal.    What to expect when home for Lung Biopsy:   Bed rest for the rest of the day and evening.   Blood thinning medications can be resumed when instructed by prescribing physician.   Avoid heavy lifting and strenuous exercise for the next 2-3 days following the procedure.   You can resume your normal diet.   You wont be able to travel by airplane for up to 5 days after biopsy.    Contact doctors office or go to ER if you experience:   Excessive bleeding saturated dressing or bleeding that will not stop by biopsy site   Fever or chills   Severe pain in the abdomen   Difficulty breathing    If your biopsy needs to be rescheduled, please contact the Radiology Department at [phone].

## 2020-09-09 NOTE — PROGRESS NOTES
Subjective:     Patient ID: Lucia Barlow is a 70 y.o. female.    Chief Complaint:      HPI     Lung Nodule  She presents for evaluation and treatment of an abnormal chest x-ray. The patient reports that the imaging was performed to evaluate symptoms of dyspnea on exertion, productive cough and shortness of breath which have been present for 10 years and are gradually worsening. Symptoms are exacerbated by walking and relieved by rest. The patient denies other associated symptoms. She has a history of 60 pack years. The patient has no known exposure to tuberculosis. The patient does not have a history of cancer.          Past Medical History:   Diagnosis Date    Atherosclerosis of artery of both lower extremities 1/17/2014    Atherosclerosis of native coronary artery of native heart without angina pectoris 11/18/2016    Atherosclerotic PVD with intermittent claudication 1/17/2014    Chronic bronchitis     COPD (chronic obstructive pulmonary disease)     Dyslipidemia associated with type 2 diabetes mellitus 6/14/2013    Dyslipidemia associated with type 2 diabetes mellitus     Ex-smoker     Gastroesophageal reflux disease without esophagitis 1/25/2019    Hyperlipidemia     Hypertension     Osteoporosis 1/16 rosita 1/18    PVD (peripheral vascular disease)     S/P peripheral artery angioplasty 2/7/2014    Simple chronic bronchitis     Tobacco dependence     Type 2 diabetes mellitus with microalbuminuria, without long-term current use of insulin 3/29/2019    Type 2 diabetes mellitus with peripheral vascular disease     Type 2 diabetes mellitus with stage 3 chronic kidney disease, without long-term current use of insulin 2/1/2019    Type 2 diabetes mellitus without retinopathy 2/1/2019    Urinary incontinence      Past Surgical History:   Procedure Laterality Date    ANGIOPLASTY Bilateral 01/24/2014    aortoiliac stenting     CARPAL TUNNEL RELEASE  2003    Henrry    COLECTOMY  approximate 2005    pt  states 1in colon -dx with benign mass removed-states no colon cancer    COLONOSCOPY N/A 11/9/2016    Procedure: COLONOSCOPY;  Surgeon: Dmitri Sterling MD;  Location: [facility] ENDO;  Service: Endoscopy;  Laterality: N/A;    COLONOSCOPY N/A 11/15/2019    Procedure: COLONOSCOPY;  Surgeon: Marko Vicente MD;  Location: [facility] ENDO;  Service: Endoscopy;  Laterality: N/A;    HYSTERECTOMY      no cancer    OOPHORECTOMY       Review of patient's allergies indicates:   Allergen Reactions    Skin staples [surgical stainless steel] Swelling    Egg derived      Shortness breath, lip swelling    Fish containing products     Iodine and iodide containing products Swelling    Latex, natural rubber Swelling    Losartan Itching    Nickel     Pravastatin      40 mg causes nausea vomitting but 20 mg ok    Shellfish containing products Swelling     Current Outpatient Medications on File Prior to Visit   Medication Sig Dispense Refill    amlodipine-benazepril 5-20 mg (LOTREL) 5-20 mg per capsule Take 1 capsule by mouth once daily. 90 capsule 4    aspirin (ECOTRIN) 81 MG EC tablet Take 1 tablet (81 mg total) by mouth once daily. (FOR HEART HEALTH) 90 tablet 4    blood sugar diagnostic Strp 1 each by Misc.(Non-Drug; Combo Route) route 3 (three) times daily. IHEALTH test strips 100 each 3    blood-glucose meter kit Insurance preferred 1 each 0    butalbital-acetaminophen-caffeine -40 mg (FIORICET, ESGIC) -40 mg per tablet Take 1 tablet by mouth 3 (three) times daily as needed for Headaches. (MAXIMUM OF 15 TABLET PER MONTH) 30 tablet 3    calcium-vitamin D (CALCIUM WITH VITAMIN D) 600 mg(1,500mg) -400 unit Tab Take 2 tablets by mouth once daily. 180 tablet 4    chlorthalidone (HYGROTEN) 25 MG Tab TAKE 1 TABLET EVERY DAY 90 tablet 2    ezetimibe (ZETIA) 10 mg tablet Take 1 tablet (10 mg total) by mouth once daily. 90 tablet 4    fluticasone propionate (FLONASE) 50 mcg/actuation nasal spray USE 2 SPRAYS IN  EACH NOSTRIL ONE TIME DAILY  48 g 4    glimepiride (AMARYL) 2 MG tablet Take 1 tablet (2 mg total) by mouth before breakfast. 90 tablet 4    ipratropium-albuteroL (COMBIVENT)  mcg/actuation inhaler Inhale 2 puffs into the lungs every 4 (four) hours as needed for Wheezing or Shortness of Breath. Rescue 4 g 11    lancets 32 gauge Misc 1 lancet by Misc.(Non-Drug; Combo Route) route 3 (three) times daily. Insurance preferred 100 each 11    montelukast (SINGULAIR) 10 mg tablet       pantoprazole (PROTONIX) 40 MG tablet TAKE 1 TABLET EVERY DAY 90 tablet 4    potassium 99 mg Tab Take 1 tablet by mouth once daily.      rosuvastatin (CRESTOR) 20 MG tablet Take 1 tablet (20 mg total) by mouth once daily. 90 tablet 4    tiZANidine (ZANAFLEX) 2 MG tablet Take 1-2 tabs twice a day as needed for muscle pain.  Will cause drowsiness 30 tablet 0    TRUEPLUS LANCETS 33 gauge Misc       varicella-zoster gE-AS01B, PF, (SHINGRIX) 50 mcg/0.5 mL injection Inject 0.5 mL (one dose) into muscle now; give second dose at least 2 months later 0.5 mL 1     No current facility-administered medications on file prior to visit.      Social History     Socioeconomic History    Marital status:      Spouse name: Not on file    Number of children: 4    Years of education: Not on file    Highest education level: Not on file   Occupational History    Occupation:    Social Needs    Financial resource strain: Not very hard    Food insecurity     Worry: Never true     Inability: Never true    Transportation needs     Medical: No     Non-medical: No   Tobacco Use    Smoking status: Current Some Day Smoker     Packs/day: 1.00     Years: 60.00     Pack years: 60.00     Types: Cigarettes     Start date: 1/1/1960    Smokeless tobacco: Never Used   Substance and Sexual Activity    Alcohol use: No     Alcohol/week: 0.0 standard drinks     Frequency: Never     Drinks per session: Patient refused     Binge frequency: Never  "   Drug use: No    Sexual activity: Never   Lifestyle    Physical activity     Days per week: 2 days     Minutes per session: 10 min    Stress: Only a little   Relationships    Social connections     Talks on phone: More than three times a week     Gets together: Never     Attends Baptism service: Not on file     Active member of club or organization: Yes     Attends meetings of clubs or organizations: More than 4 times per year     Relationship status:    Other Topics Concern    Not on file   Social History Narrative    Son smoker, no pets in household.     Family History   Problem Relation Age of Onset    Stroke Father     Stroke Sister     Asthma Daughter     Diabetes Daughter     Breast cancer Daughter     Asthma Son        Review of Systems   Constitutional: Positive for fatigue. Negative for fever.   HENT: Positive for postnasal drip, rhinorrhea and congestion.    Eyes: Negative for redness and itching.   Respiratory: Positive for cough, sputum production, shortness of breath, dyspnea on extertion, use of rescue inhaler and Paroxysmal Nocturnal Dyspnea.    Cardiovascular: Negative for chest pain, palpitations and leg swelling.   Genitourinary: Negative for difficulty urinating and hematuria.   Endocrine: Negative for cold intolerance and heat intolerance.    Musculoskeletal: Positive for arthralgias and back pain.        Pain at base of lumbar scan   Skin: Negative for rash.   Gastrointestinal: Negative for nausea and abdominal pain.   Neurological: Negative for dizziness, syncope, weakness and light-headedness.   Hematological: Negative for adenopathy. Does not bruise/bleed easily.   Psychiatric/Behavioral: Negative for sleep disturbance. The patient is not nervous/anxious.        Objective:      /70   Pulse 94   Resp 17   Ht 5' 1" (1.549 m)   Wt 64.9 kg (143 lb 1.3 oz)   SpO2 96%   BMI 27.03 kg/m²   Physical Exam  Vitals signs and nursing note reviewed.   Constitutional:      "  Appearance: She is well-developed.   HENT:      Head: Normocephalic and atraumatic.      Mouth/Throat:      Pharynx: Oropharyngeal exudate present.   Eyes:      Conjunctiva/sclera: Conjunctivae normal.      Pupils: Pupils are equal, round, and reactive to light.   Neck:      Musculoskeletal: Neck supple.      Thyroid: No thyromegaly.      Vascular: No JVD.      Trachea: No tracheal deviation.   Cardiovascular:      Rate and Rhythm: Normal rate and regular rhythm.      Heart sounds: Normal heart sounds.   Pulmonary:      Effort: Pulmonary effort is normal. No respiratory distress.      Breath sounds: Examination of the right-lower field reveals wheezing. Examination of the left-lower field reveals wheezing. Decreased breath sounds and wheezing present. No rhonchi or rales.   Chest:      Chest wall: No tenderness.   Abdominal:      General: Bowel sounds are normal.      Palpations: Abdomen is soft.   Musculoskeletal: Normal range of motion.   Lymphadenopathy:      Cervical: No cervical adenopathy.   Skin:     General: Skin is warm and dry.   Neurological:      Mental Status: She is alert and oriented to person, place, and time.       Personal Diagnostic Review  CT of chest performed on 9/9/20-Left lung nodule.  X-Ray Chest PA And Lateral  Narrative: EXAMINATION:  XR CHEST PA AND LATERAL    CLINICAL HISTORY:  SOB; Chronic obstructive pulmonary disease, unspecified    TECHNIQUE:  PA and lateral views of the chest were performed.    COMPARISON:  11/27/2019    FINDINGS:  Cardiac silhouette and mediastinal contours are normal.  Lungs demonstrate 11 mm irregular opacity within the left midlung.  COPD hyperinflation.  No pleural effusion.  Osseous structures are intact.  Impression: Left mid lung nodular opacity.  CT chest recommended.  This report was flagged in Epic as abnormal.    Electronically signed by: Karel Kaufman MD  Date:    08/19/2020  Time:    09:17        Office Spirometry Results:     No flowsheet data  found.  Pulmonary Studies Review 9/9/2020   SpO2 96   Height 61   Weight 2289.26   BMI (Calculated) 27   Predicted Distance 289.76   Predicted Distance Meters (Calculated) 429.06         Assessment:            Solid nodule of lung greater than 8 mm in diameter  -     NM PET CT Routine Skull to Mid Thigh; Future; Expected date: 09/09/2020    Malignant neoplasm of unspecified part of unspecified bronchus or lung  -     NM PET CT Routine Skull to Mid Thigh; Future; Expected date: 09/09/2020          Outpatient Encounter Medications as of 9/9/2020   Medication Sig Dispense Refill    amlodipine-benazepril 5-20 mg (LOTREL) 5-20 mg per capsule Take 1 capsule by mouth once daily. 90 capsule 4    aspirin (ECOTRIN) 81 MG EC tablet Take 1 tablet (81 mg total) by mouth once daily. (FOR HEART HEALTH) 90 tablet 4    blood sugar diagnostic Strp 1 each by Misc.(Non-Drug; Combo Route) route 3 (three) times daily. IHEALTH test strips 100 each 3    blood-glucose meter kit Insurance preferred 1 each 0    butalbital-acetaminophen-caffeine -40 mg (FIORICET, ESGIC) -40 mg per tablet Take 1 tablet by mouth 3 (three) times daily as needed for Headaches. (MAXIMUM OF 15 TABLET PER MONTH) 30 tablet 3    calcium-vitamin D (CALCIUM WITH VITAMIN D) 600 mg(1,500mg) -400 unit Tab Take 2 tablets by mouth once daily. 180 tablet 4    chlorthalidone (HYGROTEN) 25 MG Tab TAKE 1 TABLET EVERY DAY 90 tablet 2    ezetimibe (ZETIA) 10 mg tablet Take 1 tablet (10 mg total) by mouth once daily. 90 tablet 4    fluticasone propionate (FLONASE) 50 mcg/actuation nasal spray USE 2 SPRAYS IN EACH NOSTRIL ONE TIME DAILY  48 g 4    glimepiride (AMARYL) 2 MG tablet Take 1 tablet (2 mg total) by mouth before breakfast. 90 tablet 4    ipratropium-albuteroL (COMBIVENT)  mcg/actuation inhaler Inhale 2 puffs into the lungs every 4 (four) hours as needed for Wheezing or Shortness of Breath. Rescue 4 g 11    lancets 32 gauge Misc 1 lancet by  Misc.(Non-Drug; Combo Route) route 3 (three) times daily. Insurance preferred 100 each 11    montelukast (SINGULAIR) 10 mg tablet       pantoprazole (PROTONIX) 40 MG tablet TAKE 1 TABLET EVERY DAY 90 tablet 4    potassium 99 mg Tab Take 1 tablet by mouth once daily.      rosuvastatin (CRESTOR) 20 MG tablet Take 1 tablet (20 mg total) by mouth once daily. 90 tablet 4    tiZANidine (ZANAFLEX) 2 MG tablet Take 1-2 tabs twice a day as needed for muscle pain.  Will cause drowsiness 30 tablet 0    TRUEPLUS LANCETS 33 gauge Misc       varicella-zoster gE-AS01B, PF, (SHINGRIX) 50 mcg/0.5 mL injection Inject 0.5 mL (one dose) into muscle now; give second dose at least 2 months later 0.5 mL 1     No facility-administered encounter medications on file as of 9/9/2020.      Plan:       Requested Prescriptions      No prescriptions requested or ordered in this encounter     Problem List Items Addressed This Visit     None      Visit Diagnoses     Solid nodule of lung greater than 8 mm in diameter    -  Primary    Relevant Orders    NM PET CT Routine Skull to Mid Thigh    Malignant neoplasm of unspecified part of unspecified bronchus or lung        Relevant Orders    NM PET CT Routine Skull to Mid Thigh             Follow up in about 1 week (around 9/16/2020) for Review CT/PET - on return visit.    MEDICAL DECISION MAKING: Moderate to high complexity.  Overall, the multiple problems listed are of moderate to high severity that may impact quality of life and activities of daily living. Side effects of medications, treatment plan as well as options and alternatives reviewed and discussed with patient. There was counseling of patient concerning these issues.    Total time spent in face to face counseling and coordination of care - 25  minutes over 50% of time was used in discussion of prognosis, risks, benefits of treatment, instructions and compliance with regimen . Discussion with other physicians or health care providers  (DME, NP, pharmacy, respiratory therapy) occurred.

## 2020-09-14 ENCOUNTER — PROCEDURE VISIT (OUTPATIENT)
Dept: SLEEP MEDICINE | Facility: CLINIC | Age: 70
End: 2020-09-14
Payer: MEDICARE

## 2020-09-14 DIAGNOSIS — G47.33 OSA (OBSTRUCTIVE SLEEP APNEA): Primary | ICD-10-CM

## 2020-09-14 PROCEDURE — 95800 PR SLEEP STUDY, UNATTENDED, RECORD HEART RATE/O2 SAT/RESP ANAL/SLEEP TIME: ICD-10-PCS | Mod: 26,HCNC,, | Performed by: INTERNAL MEDICINE

## 2020-09-14 PROCEDURE — 95800 SLP STDY UNATTENDED: CPT | Mod: HCNC

## 2020-09-14 PROCEDURE — 95800 SLP STDY UNATTENDED: CPT | Mod: 26,HCNC,, | Performed by: INTERNAL MEDICINE

## 2020-09-14 NOTE — PROCEDURES
Home Sleep Studies    Date/Time: 9/14/2020 8:00 AM  Performed by: Ananda Castelan MD  Authorized by: Ramiro Aleman MD       PHYSICIAN INTERPRETATION AND COMMENTS: Findings are consistent with mild, non-positional obstructive sleep  apnea (IVANIA). Overall AHI was 9.0/hr with 5.8 hours data. Spo2 sasha was 84.7%, Therapy with CPAP should be considered.  AutoPAP 5-20 cmwp or CPAP inlab titration. Indications of cardiac dysrhythmia are recorded. consult cardiology.  CLINICAL HISTORY: 70 year old female presented with: 13.5 inch neck, BMI of 27, an Odell sleepiness score of 5, history  of hypertension, diabetes, lung disease and symptoms of nocturnal snoring, waking up choking and witnessed apneas. Based on the  clinical history, the patient has a high pre-test probability of having moderate IVANIA.

## 2020-09-18 ENCOUNTER — PATIENT MESSAGE (OUTPATIENT)
Dept: PULMONOLOGY | Facility: CLINIC | Age: 70
End: 2020-09-18

## 2020-09-22 ENCOUNTER — PATIENT OUTREACH (OUTPATIENT)
Dept: OTHER | Facility: OTHER | Age: 70
End: 2020-09-22

## 2020-09-22 DIAGNOSIS — E11.51 TYPE 2 DIABETES MELLITUS WITH PERIPHERAL VASCULAR DISEASE: Primary | Chronic | ICD-10-CM

## 2020-09-22 NOTE — PROGRESS NOTES
Digital Medicine: Clinician Follow-Up    Called patient to discuss her at goal blood pressure and T2DM.     The history is provided by the patient.   Follow-up reason(s): routine follow up.     Hypertension    Patient readings are stable   Diabetes    Patient readings are stable   Patient is not experiencing signs/symptoms of hypotension.  Patient is not experiencing signs/symptoms of hypertension.  Patient is not experiencing signs/symptoms of hypoglycemia.  Patient is not experiencing signs/symptoms of hyperglycemia.    Additional Follow-up details: Patient was informed to stop metformin over a 50 pound weight loss and GI side effects. This occurred two-three weeks ago. Her readings are pretty stable.        Last 5 Patient Entered Readings                                      Current 30 Day Average: 127/64     Recent Readings 9/19/2020 9/15/2020 9/13/2020 9/12/2020 9/6/2020    SBP (mmHg) 158 112 129 120 130    DBP (mmHg) 69 62 73 54 62    Pulse 83 92 97 93 104        Last 6 Patient Entered Readings                                          Most Recent A1c: 6.2% on 8/12/2020  (Goal: 8%)     Recent Readings 9/19/2020 9/15/2020 9/13/2020 9/12/2020 9/6/2020    Blood Glucose (mg/dL) 97 135 127 124 119                           Medication Adherence-Medication adherence was assessed.          ASSESSMENT(S)  Patients BP average is 127/64 mmHg, which is at goal. Patient's BP goal is less than or equal to 130/80 per 2017 ACC/AHA Hypertension Guidelines.  Patient's A1C goal is less than or equal to 8 per 2020 ADA guidelines. Patient's most recent A1C result is at goal. Lab Results    Component                Value               Date                     HGBA1C                   6.2 (H)             08/12/2020               HGBA1C                   6.2 (H)             08/12/2020          .       Hypertension Plan  Continue current therapy.    Diabetes Plan  Continue current therapy.       Addressed patient questions and patient  has my contact information if needed prior to next outreach. Patient verbalizes understanding.            There are no preventive care reminders to display for this patient.  There are no preventive care reminders to display for this patient.    Hypertension Medications             amlodipine-benazepril 5-20 mg (LOTREL) 5-20 mg per capsule Take 1 capsule by mouth once daily.    chlorthalidone (HYGROTEN) 25 MG Tab TAKE 1 TABLET EVERY DAY        Diabetes Medications             glimepiride (AMARYL) 2 MG tablet Take 1 tablet (2 mg total) by mouth before breakfast.

## 2020-09-28 ENCOUNTER — TELEPHONE (OUTPATIENT)
Dept: RADIOLOGY | Facility: HOSPITAL | Age: 70
End: 2020-09-28

## 2020-09-29 ENCOUNTER — TELEPHONE (OUTPATIENT)
Dept: RADIOLOGY | Facility: HOSPITAL | Age: 70
End: 2020-09-29

## 2020-09-30 ENCOUNTER — OFFICE VISIT (OUTPATIENT)
Dept: PULMONOLOGY | Facility: CLINIC | Age: 70
End: 2020-09-30
Payer: MEDICARE

## 2020-09-30 ENCOUNTER — HOSPITAL ENCOUNTER (OUTPATIENT)
Dept: RADIOLOGY | Facility: HOSPITAL | Age: 70
Discharge: HOME | End: 2020-09-30
Attending: INTERNAL MEDICINE
Payer: MEDICARE

## 2020-09-30 VITALS
WEIGHT: 142.63 LBS | HEIGHT: 61 IN | SYSTOLIC BLOOD PRESSURE: 112 MMHG | HEART RATE: 89 BPM | BODY MASS INDEX: 26.93 KG/M2 | RESPIRATION RATE: 16 BRPM | DIASTOLIC BLOOD PRESSURE: 72 MMHG | OXYGEN SATURATION: 95 %

## 2020-09-30 DIAGNOSIS — J30.1 SEASONAL ALLERGIC RHINITIS DUE TO POLLEN: ICD-10-CM

## 2020-09-30 DIAGNOSIS — R91.1 SOLID NODULE OF LUNG GREATER THAN 8 MM IN DIAMETER: ICD-10-CM

## 2020-09-30 DIAGNOSIS — J01.90 ACUTE SINUSITIS, RECURRENCE NOT SPECIFIED, UNSPECIFIED LOCATION: ICD-10-CM

## 2020-09-30 DIAGNOSIS — G47.33 OSA (OBSTRUCTIVE SLEEP APNEA): ICD-10-CM

## 2020-09-30 DIAGNOSIS — R91.1 SOLID NODULE OF LUNG GREATER THAN 8 MM IN DIAMETER: Primary | ICD-10-CM

## 2020-09-30 DIAGNOSIS — C34.90 MALIGNANT NEOPLASM OF UNSPECIFIED PART OF UNSPECIFIED BRONCHUS OR LUNG: ICD-10-CM

## 2020-09-30 PROCEDURE — 3008F PR BODY MASS INDEX (BMI) DOCUMENTED: ICD-10-PCS | Mod: CPTII,S$GLB,, | Performed by: INTERNAL MEDICINE

## 2020-09-30 PROCEDURE — 1101F PT FALLS ASSESS-DOCD LE1/YR: CPT | Mod: CPTII,S$GLB,, | Performed by: INTERNAL MEDICINE

## 2020-09-30 PROCEDURE — 99499 UNLISTED E&M SERVICE: CPT | Mod: S$GLB,,, | Performed by: INTERNAL MEDICINE

## 2020-09-30 PROCEDURE — 99999 PR PBB SHADOW E&M-EST. PATIENT-LVL V: ICD-10-PCS | Mod: PBBFAC,HCNC,, | Performed by: INTERNAL MEDICINE

## 2020-09-30 PROCEDURE — 99999 PR PBB SHADOW E&M-EST. PATIENT-LVL V: CPT | Mod: PBBFAC,HCNC,, | Performed by: INTERNAL MEDICINE

## 2020-09-30 PROCEDURE — 99499 RISK ADDL DX/OHS AUDIT: ICD-10-PCS | Mod: S$GLB,,, | Performed by: INTERNAL MEDICINE

## 2020-09-30 PROCEDURE — 1159F PR MEDICATION LIST DOCUMENTED IN MEDICAL RECORD: ICD-10-PCS | Mod: S$GLB,,, | Performed by: INTERNAL MEDICINE

## 2020-09-30 PROCEDURE — 1101F PR PT FALLS ASSESS DOC 0-1 FALLS W/OUT INJ PAST YR: ICD-10-PCS | Mod: CPTII,S$GLB,, | Performed by: INTERNAL MEDICINE

## 2020-09-30 PROCEDURE — 3078F PR MOST RECENT DIASTOLIC BLOOD PRESSURE < 80 MM HG: ICD-10-PCS | Mod: CPTII,S$GLB,, | Performed by: INTERNAL MEDICINE

## 2020-09-30 PROCEDURE — 78815 PET IMAGE W/CT SKULL-THIGH: CPT | Mod: 26,PI,HCNC, | Performed by: RADIOLOGY

## 2020-09-30 PROCEDURE — 3078F DIAST BP <80 MM HG: CPT | Mod: CPTII,S$GLB,, | Performed by: INTERNAL MEDICINE

## 2020-09-30 PROCEDURE — 78815 NM PET CT ROUTINE: ICD-10-PCS | Mod: 26,PI,HCNC, | Performed by: RADIOLOGY

## 2020-09-30 PROCEDURE — 99215 PR OFFICE/OUTPT VISIT, EST, LEVL V, 40-54 MIN: ICD-10-PCS | Mod: S$GLB,,, | Performed by: INTERNAL MEDICINE

## 2020-09-30 PROCEDURE — 3074F SYST BP LT 130 MM HG: CPT | Mod: CPTII,S$GLB,, | Performed by: INTERNAL MEDICINE

## 2020-09-30 PROCEDURE — 78815 PET IMAGE W/CT SKULL-THIGH: CPT | Mod: TC,HCNC,PI

## 2020-09-30 PROCEDURE — 1159F MED LIST DOCD IN RCRD: CPT | Mod: S$GLB,,, | Performed by: INTERNAL MEDICINE

## 2020-09-30 PROCEDURE — 99215 OFFICE O/P EST HI 40 MIN: CPT | Mod: S$GLB,,, | Performed by: INTERNAL MEDICINE

## 2020-09-30 PROCEDURE — 3008F BODY MASS INDEX DOCD: CPT | Mod: CPTII,S$GLB,, | Performed by: INTERNAL MEDICINE

## 2020-09-30 PROCEDURE — 3074F PR MOST RECENT SYSTOLIC BLOOD PRESSURE < 130 MM HG: ICD-10-PCS | Mod: CPTII,S$GLB,, | Performed by: INTERNAL MEDICINE

## 2020-09-30 RX ORDER — AMOXICILLIN AND CLAVULANATE POTASSIUM 875; 125 MG/1; MG/1
1 TABLET, FILM COATED ORAL EVERY 12 HOURS
Qty: 20 TABLET | Refills: 0 | Status: SHIPPED | OUTPATIENT
Start: 2020-09-30 | End: 2020-10-10

## 2020-09-30 RX ORDER — PREDNISONE 20 MG/1
TABLET ORAL
Qty: 20 TABLET | Refills: 0 | Status: ON HOLD | OUTPATIENT
Start: 2020-09-30 | End: 2020-10-22 | Stop reason: HOSPADM

## 2020-09-30 RX ORDER — FLUTICASONE PROPIONATE 50 MCG
2 SPRAY, SUSPENSION (ML) NASAL DAILY
Qty: 48 G | Refills: 4 | Status: SHIPPED | OUTPATIENT
Start: 2020-09-30 | End: 2020-12-09 | Stop reason: SDUPTHER

## 2020-09-30 NOTE — PROGRESS NOTES
Subjective:     Patient ID: Lucia Barlow is a 70 y.o. female.    Chief Complaint:      HPI 70-year-old patient with a new pulmonary nodule is seen back in follow-up examination to review a PET scan and to review results of polysomnogram  .    Lung Nodule  She presents for evaluation and treatment of an abnormal chest x-ray. The patient reports that the imaging was performed to evaluate symptoms of dyspnea on exertion, productive cough and shortness of breath which have been present for 10 years and are gradually worsening. Symptoms are exacerbated by walking and relieved by rest. The patient denies other associated symptoms. She has a history of 60 pack years. The patient has no known exposure to tuberculosis. The patient does not have a history of cancer.    Sinus Pain  Patient complains of clear rhinorrhea, congestion, headaches, nasal congestion and post nasal drip. Onset of symptoms was 3years ago. Symptoms have been gradually worsening since that time. She is drinking plenty of fluids.  Past history is significant for occasional episodes of bronchitis. Patient is smoker  (0.5  ppd x 50 yrs).      Past Medical History:   Diagnosis Date    Atherosclerosis of artery of both lower extremities 1/17/2014    Atherosclerosis of native coronary artery of native heart without angina pectoris 11/18/2016    Atherosclerotic PVD with intermittent claudication 1/17/2014    Chronic bronchitis     COPD (chronic obstructive pulmonary disease)     Dyslipidemia associated with type 2 diabetes mellitus 6/14/2013    Dyslipidemia associated with type 2 diabetes mellitus     Ex-smoker     Gastroesophageal reflux disease without esophagitis 1/25/2019    Hyperlipidemia     Hypertension     Osteoporosis 1/16 rosita 1/18    PVD (peripheral vascular disease)     S/P peripheral artery angioplasty 2/7/2014    Simple chronic bronchitis     Tobacco dependence     Type 2 diabetes mellitus with microalbuminuria, without  long-term current use of insulin 3/29/2019    Type 2 diabetes mellitus with peripheral vascular disease     Type 2 diabetes mellitus with stage 3 chronic kidney disease, without long-term current use of insulin 2/1/2019    Type 2 diabetes mellitus without retinopathy 2/1/2019    Urinary incontinence      Past Surgical History:   Procedure Laterality Date    ANGIOPLASTY Bilateral 01/24/2014    aortoiliac stenting     CARPAL TUNNEL RELEASE  2003    Henrry    COLECTOMY  approximate 2005    pt states 1in colon -dx with benign mass removed-states no colon cancer    COLONOSCOPY N/A 11/9/2016    Procedure: COLONOSCOPY;  Surgeon: Dmitri Sterling MD;  Location: [facility] ENDO;  Service: Endoscopy;  Laterality: N/A;    COLONOSCOPY N/A 11/15/2019    Procedure: COLONOSCOPY;  Surgeon: Marko Vicente MD;  Location: [facility] ENDO;  Service: Endoscopy;  Laterality: N/A;    HYSTERECTOMY      no cancer    OOPHORECTOMY       Review of patient's allergies indicates:   Allergen Reactions    Skin staples [surgical stainless steel] Swelling    Egg derived      Shortness breath, lip swelling    Fish containing products     Iodine and iodide containing products Swelling    Latex, natural rubber Swelling    Losartan Itching    Nickel     Pravastatin      40 mg causes nausea vomitting but 20 mg ok    Shellfish containing products Swelling     Current Outpatient Medications on File Prior to Visit   Medication Sig Dispense Refill    amlodipine-benazepril 5-20 mg (LOTREL) 5-20 mg per capsule Take 1 capsule by mouth once daily. 90 capsule 4    blood sugar diagnostic Strp 1 each by Misc.(Non-Drug; Combo Route) route 3 (three) times daily. [facility] test strips 100 each 3    blood-glucose meter kit Insurance preferred 1 each 0    butalbital-acetaminophen-caffeine -40 mg (FIORICET, ESGIC) -40 mg per tablet Take 1 tablet by mouth 3 (three) times daily as needed for Headaches. (MAXIMUM OF 15 TABLET PER MONTH) 30 tablet 3     calcium-vitamin D (CALCIUM WITH VITAMIN D) 600 mg(1,500mg) -400 unit Tab Take 2 tablets by mouth once daily. 180 tablet 4    chlorthalidone (HYGROTEN) 25 MG Tab TAKE 1 TABLET EVERY DAY 90 tablet 2    ezetimibe (ZETIA) 10 mg tablet Take 1 tablet (10 mg total) by mouth once daily. 90 tablet 4    glimepiride (AMARYL) 2 MG tablet Take 1 tablet (2 mg total) by mouth before breakfast. 90 tablet 4    ipratropium-albuteroL (COMBIVENT)  mcg/actuation inhaler Inhale 2 puffs into the lungs every 4 (four) hours as needed for Wheezing or Shortness of Breath. Rescue 4 g 11    lancets 32 gauge Misc 1 lancet by Misc.(Non-Drug; Combo Route) route 3 (three) times daily. Insurance preferred 100 each 11    montelukast (SINGULAIR) 10 mg tablet       pantoprazole (PROTONIX) 40 MG tablet TAKE 1 TABLET EVERY DAY 90 tablet 4    potassium 99 mg Tab Take 1 tablet by mouth once daily.      rosuvastatin (CRESTOR) 20 MG tablet Take 1 tablet (20 mg total) by mouth once daily. 90 tablet 4    tiZANidine (ZANAFLEX) 2 MG tablet Take 1-2 tabs twice a day as needed for muscle pain.  Will cause drowsiness 30 tablet 0    TRUEPLUS LANCETS 33 gauge Misc       varicella-zoster gE-AS01B, PF, (SHINGRIX) 50 mcg/0.5 mL injection Inject 0.5 mL (one dose) into muscle now; give second dose at least 2 months later 0.5 mL 1     No current facility-administered medications on file prior to visit.      Social History     Socioeconomic History    Marital status:      Spouse name: Not on file    Number of children: 4    Years of education: Not on file    Highest education level: Not on file   Occupational History    Occupation:    Social Needs    Financial resource strain: Not very hard    Food insecurity     Worry: Never true     Inability: Never true    Transportation needs     Medical: No     Non-medical: No   Tobacco Use    Smoking status: Current Some Day Smoker     Packs/day: 1.00     Years: 60.00     Pack years:  60.00     Types: Cigarettes     Start date: 1/1/1960    Smokeless tobacco: Never Used   Substance and Sexual Activity    Alcohol use: No     Alcohol/week: 0.0 standard drinks     Frequency: Never     Drinks per session: Patient refused     Binge frequency: Never    Drug use: No    Sexual activity: Never   Lifestyle    Physical activity     Days per week: 2 days     Minutes per session: 10 min    Stress: Only a little   Relationships    Social connections     Talks on phone: More than three times a week     Gets together: Never     Attends [employer] service: Not on file     Active member of club or organization: Yes     Attends meetings of clubs or organizations: More than 4 times per year     Relationship status:    Other Topics Concern    Not on file   Social History Narrative    Son smoker, no pets in household.     Family History   Problem Relation Age of Onset    Stroke Father     Stroke Sister     Asthma Daughter     Diabetes Daughter     Breast cancer Daughter     Asthma Son        Review of Systems   Constitutional: Positive for fatigue. Negative for fever.   HENT: Positive for postnasal drip, rhinorrhea and congestion.    Eyes: Negative for redness and itching.   Respiratory: Positive for cough, sputum production, shortness of breath, dyspnea on extertion, use of rescue inhaler and Paroxysmal Nocturnal Dyspnea.    Cardiovascular: Negative for chest pain, palpitations and leg swelling.   Genitourinary: Negative for difficulty urinating and hematuria.   Endocrine: Negative for cold intolerance and heat intolerance.    Musculoskeletal: Positive for arthralgias and back pain.        Pain at base of lumbar scan   Skin: Negative for rash.   Gastrointestinal: Negative for nausea and abdominal pain.   Neurological: Negative for dizziness, syncope, weakness and light-headedness.   Hematological: Negative for adenopathy. Does not bruise/bleed easily.   Psychiatric/Behavioral: Negative for sleep  "disturbance. The patient is not nervous/anxious.        Objective:      /72   Pulse 89   Resp 16   Ht 5' 1" (1.549 m)   Wt 64.7 kg (142 lb 10.2 oz)   SpO2 95%   BMI 26.95 kg/m²   Physical Exam  Vitals signs and nursing note reviewed.   Constitutional:       Appearance: She is well-developed.   HENT:      Head: Normocephalic and atraumatic.      Mouth/Throat:      Pharynx: Oropharyngeal exudate present.   Eyes:      Conjunctiva/sclera: Conjunctivae normal.      Pupils: Pupils are equal, round, and reactive to light.   Neck:      Musculoskeletal: Neck supple.      Thyroid: No thyromegaly.      Vascular: No JVD.      Trachea: No tracheal deviation.   Cardiovascular:      Rate and Rhythm: Normal rate and regular rhythm.      Heart sounds: Normal heart sounds.   Pulmonary:      Effort: Pulmonary effort is normal. No respiratory distress.      Breath sounds: Examination of the right-lower field reveals wheezing. Examination of the left-lower field reveals wheezing. Decreased breath sounds and wheezing present. No rhonchi or rales.   Chest:      Chest wall: No tenderness.   Abdominal:      General: Bowel sounds are normal.      Palpations: Abdomen is soft.   Musculoskeletal: Normal range of motion.   Lymphadenopathy:      Cervical: No cervical adenopathy.   Skin:     General: Skin is warm and dry.   Neurological:      Mental Status: She is alert and oriented to person, place, and time.       Personal Diagnostic Review  CT of chest performed on 9/9/20-Left lung nodule.  NM PET CT Routine Skull to Mid Thigh  Narrative: EXAMINATION:  NM PET CT ROUTINE    CLINICAL HISTORY:  Lung cancer, non-small cell, staging;T-spine fracture, pathological;  Malignant neoplasm of unspecified part of unspecified bronchus or lung    TECHNIQUE:  Segmented attenuation corrected 3-D PET imaging was obtained from the skull base through the mid thighs utilizing 13.67 mCi F-18-FDG.  Noncontrast CT imaging was performed for attenuation " correction, diagnosis, and anatomical fusion with PET.    COMPARISON:  None.    FINDINGS:  Head/neck: There is normal physiologic FDG uptake noted within the visualized brain parenchyma. No FDG avid lymphadenopathy within the neck.    Chest: There is a hypermetabolic lung nodule with spiculated margins in the anterior left upper lobe measuring 1.7 cm and exhibiting an SUV max of 4.3.  Additional 7 mm nodule identified in the posterior left lower lobe exhibiting no significant hypermetabolism.  Tiny 3 mm non FDG avid nodule noted in the posterolateral periphery of the left lower lobe.  Bilateral emphysematous changes.  Weakly FDG avid lymph node in the AP window measuring 10 mm in short axis with SUV max of 2.5.  Similar weakly FDG avid node in the subcarinal space measuring 8 mm in short axis with SUV max of 2.5..    Abdomen/Pelvis: Normal physiologic FDG uptake noted within the liver, spleen, urinary tract, and bowel.Non FDG avid bilateral adrenal nodules likely benign adenomas or nodular hyperplasia.  No FDG avid retroperitoneal lymph nodes.    Skeletal: No FDG avid osseus lesions identified.  Impression: 1. Hypermetabolic left upper lobe pulmonary nodule suspicious for bronchogenic malignancy.  2. Subcentimeter non FDG avid left lower lobe nodules, indeterminate.  3. Remainder as above.    Electronically signed by: VICTORINO Kamara MD  Date:    09/30/2020  Time:    14:50        Office Spirometry Results:     No flowsheet data found.  Pulmonary Studies Review 9/30/2020   SpO2 95   Height 61   Weight 2282.2   BMI (Calculated) 27   Predicted Distance 289.76   Predicted Distance Meters (Calculated) 429.52         Procedures  Ananda Castelan MD (Physician)   Pulmonary Disease  Procedure Orders   1. Home Sleep Studies [[id number]] ordered by Ramiro Aleman MD   Pre-procedure Diagnoses   1. IVANIA (obstructive sleep apnea) [G47.33]   Post-procedure Diagnoses   1. IVANIA (obstructive sleep apnea) [G47.33]      Home  Sleep Studies   Date/Time: 9/14/2020 8:00 AM  Performed by: Ananda Castelan MD  Authorized by: Ramiro Aleman MD        PHYSICIAN INTERPRETATION AND COMMENTS: Findings are consistent with mild, non-positional obstructive sleep  apnea (IVANIA). Overall AHI was 9.0/hr with 5.8 hours data. Spo2 sasha was 84.7%, Therapy with CPAP should be considered.  AutoPAP 5-20 cmwp or CPAP inlab titration. Indications of cardiac dysrhythmia are recorded. consult cardiology.  CLINICAL HISTORY: 70 year old female presented with: 13.5 inch neck, BMI of 27, an [place] sleepiness score of 5, history  of hypertension, diabetes, lung disease and symptoms of nocturnal snoring, waking up choking and witnessed apneas. Based on the  clinical history, the patient has a high pre-test probability of having moderate IVANIA              Assessment:            Solid nodule of lung greater than 8 mm in diameter  -     CT Biopsy Lung (xpd); Future; Expected date: 09/30/2020    Acute sinusitis, recurrence not specified, unspecified location  -     predniSONE (DELTASONE) 20 MG tablet; Prednisone 60 mg/ day for 3 days, 40 mg/day for 3 days,20 mg/ day for 3 days, (1/2 tablet )10 mg a day for 3 days.  Dispense: 20 tablet; Refill: 0  -     amoxicillin-clavulanate 875-125mg (AUGMENTIN) 875-125 mg per tablet; Take 1 tablet by mouth every 12 (twelve) hours. for 10 days  Dispense: 20 tablet; Refill: 0    IVANIA (obstructive sleep apnea)  -     CPAP FOR HOME USE  -     CPAP/BIPAP SUPPLIES    Seasonal allergic rhinitis due to pollen  -     fluticasone propionate (FLONASE) 50 mcg/actuation nasal spray; 2 sprays (100 mcg total) by Each Nostril route once daily.  Dispense: 48 g; Refill: 4          Outpatient Encounter Medications as of 9/30/2020   Medication Sig Dispense Refill    amlodipine-benazepril 5-20 mg (LOTREL) 5-20 mg per capsule Take 1 capsule by mouth once daily. 90 capsule 4    blood sugar diagnostic Strp 1 each by Misc.(Non-Drug; Combo Route) route 3  (three) times daily. [facility] test strips 100 each 3    blood-glucose meter kit Insurance preferred 1 each 0    butalbital-acetaminophen-caffeine -40 mg (FIORICET, ESGIC) -40 mg per tablet Take 1 tablet by mouth 3 (three) times daily as needed for Headaches. (MAXIMUM OF 15 TABLET PER MONTH) 30 tablet 3    calcium-vitamin D (CALCIUM WITH VITAMIN D) 600 mg(1,500mg) -400 unit Tab Take 2 tablets by mouth once daily. 180 tablet 4    chlorthalidone (HYGROTEN) 25 MG Tab TAKE 1 TABLET EVERY DAY 90 tablet 2    ezetimibe (ZETIA) 10 mg tablet Take 1 tablet (10 mg total) by mouth once daily. 90 tablet 4    fluticasone propionate (FLONASE) 50 mcg/actuation nasal spray 2 sprays (100 mcg total) by Each Nostril route once daily. 48 g 4    glimepiride (AMARYL) 2 MG tablet Take 1 tablet (2 mg total) by mouth before breakfast. 90 tablet 4    ipratropium-albuteroL (COMBIVENT)  mcg/actuation inhaler Inhale 2 puffs into the lungs every 4 (four) hours as needed for Wheezing or Shortness of Breath. Rescue 4 g 11    lancets 32 gauge Misc 1 lancet by Misc.(Non-Drug; Combo Route) route 3 (three) times daily. Insurance preferred 100 each 11    montelukast (SINGULAIR) 10 mg tablet       pantoprazole (PROTONIX) 40 MG tablet TAKE 1 TABLET EVERY DAY 90 tablet 4    potassium 99 mg Tab Take 1 tablet by mouth once daily.      rosuvastatin (CRESTOR) 20 MG tablet Take 1 tablet (20 mg total) by mouth once daily. 90 tablet 4    tiZANidine (ZANAFLEX) 2 MG tablet Take 1-2 tabs twice a day as needed for muscle pain.  Will cause drowsiness 30 tablet 0    TRUEPLUS LANCETS 33 gauge Misc       varicella-zoster gE-AS01B, PF, (SHINGRIX) 50 mcg/0.5 mL injection Inject 0.5 mL (one dose) into muscle now; give second dose at least 2 months later 0.5 mL 1    [DISCONTINUED] aspirin (ECOTRIN) 81 MG EC tablet Take 1 tablet (81 mg total) by mouth once daily. (FOR HEART HEALTH) 90 tablet 4    [DISCONTINUED] fluticasone propionate  (FLONASE) 50 mcg/actuation nasal spray USE 2 SPRAYS IN EACH NOSTRIL ONE TIME DAILY  48 g 4    amoxicillin-clavulanate 875-125mg (AUGMENTIN) 875-125 mg per tablet Take 1 tablet by mouth every 12 (twelve) hours. for 10 days 20 tablet 0    predniSONE (DELTASONE) 20 MG tablet Prednisone 60 mg/ day for 3 days, 40 mg/day for 3 days,20 mg/ day for 3 days, (1/2 tablet )10 mg a day for 3 days. 20 tablet 0     No facility-administered encounter medications on file as of 2020.      Plan:       Requested Prescriptions     Signed Prescriptions Disp Refills    predniSONE (DELTASONE) 20 MG tablet 20 tablet 0     Sig: Prednisone 60 mg/ day for 3 days, 40 mg/day for 3 days,20 mg/ day for 3 days, (1/2 tablet )10 mg a day for 3 days.    amoxicillin-clavulanate 875-125mg (AUGMENTIN) 875-125 mg per tablet 20 tablet 0     Sig: Take 1 tablet by mouth every 12 (twelve) hours. for 10 days    fluticasone propionate (FLONASE) 50 mcg/actuation nasal spray 48 g 4     Si sprays (100 mcg total) by Each Nostril route once daily.     Problem List Items Addressed This Visit     Seasonal allergic rhinitis due to pollen    Relevant Medications    fluticasone propionate (FLONASE) 50 mcg/actuation nasal spray      Other Visit Diagnoses     Solid nodule of lung greater than 8 mm in diameter    -  Primary    Relevant Orders    CT Biopsy Lung (xpd)    Acute sinusitis, recurrence not specified, unspecified location        Relevant Medications    predniSONE (DELTASONE) 20 MG tablet    amoxicillin-clavulanate 875-125mg (AUGMENTIN) 875-125 mg per tablet    IVANIA (obstructive sleep apnea)        Relevant Orders    CPAP FOR HOME USE    CPAP/BIPAP SUPPLIES             Follow up in about 2 weeks (around 10/14/2020) for REview biopsy results.    MEDICAL DECISION MAKING: Moderate to high complexity.  Overall, the multiple problems listed are of moderate to high severity that may impact quality of life and activities of daily living. Side effects of  medications, treatment plan as well as options and alternatives reviewed and discussed with patient. There was counseling of patient concerning these issues.    Total time spent in face to face counseling and coordination of care - 40  minutes over 50% of time was used in discussion of prognosis, risks, benefits of treatment, instructions and compliance with regimen . Discussion with other physicians or health care providers (DME, NP, pharmacy, respiratory therapy) occurred.

## 2020-09-30 NOTE — H&P (VIEW-ONLY)
Subjective:     Patient ID: Lucia Barlow is a 70 y.o. female.    Chief Complaint:      HPI 70-year-old patient with a new pulmonary nodule is seen back in follow-up examination to review a PET scan and to review results of polysomnogram  .    Lung Nodule  She presents for evaluation and treatment of an abnormal chest x-ray. The patient reports that the imaging was performed to evaluate symptoms of dyspnea on exertion, productive cough and shortness of breath which have been present for 10 years and are gradually worsening. Symptoms are exacerbated by walking and relieved by rest. The patient denies other associated symptoms. She has a history of 60 pack years. The patient has no known exposure to tuberculosis. The patient does not have a history of cancer.    Sinus Pain  Patient complains of clear rhinorrhea, congestion, headaches, nasal congestion and post nasal drip. Onset of symptoms was 3years ago. Symptoms have been gradually worsening since that time. She is drinking plenty of fluids.  Past history is significant for occasional episodes of bronchitis. Patient is smoker  (0.5  ppd x 50 yrs).      Past Medical History:   Diagnosis Date    Atherosclerosis of artery of both lower extremities 1/17/2014    Atherosclerosis of native coronary artery of native heart without angina pectoris 11/18/2016    Atherosclerotic PVD with intermittent claudication 1/17/2014    Chronic bronchitis     COPD (chronic obstructive pulmonary disease)     Dyslipidemia associated with type 2 diabetes mellitus 6/14/2013    Dyslipidemia associated with type 2 diabetes mellitus     Ex-smoker     Gastroesophageal reflux disease without esophagitis 1/25/2019    Hyperlipidemia     Hypertension     Osteoporosis 1/16 rosita 1/18    PVD (peripheral vascular disease)     S/P peripheral artery angioplasty 2/7/2014    Simple chronic bronchitis     Tobacco dependence     Type 2 diabetes mellitus with microalbuminuria, without  long-term current use of insulin 3/29/2019    Type 2 diabetes mellitus with peripheral vascular disease     Type 2 diabetes mellitus with stage 3 chronic kidney disease, without long-term current use of insulin 2/1/2019    Type 2 diabetes mellitus without retinopathy 2/1/2019    Urinary incontinence      Past Surgical History:   Procedure Laterality Date    ANGIOPLASTY Bilateral 01/24/2014    aortoiliac stenting     CARPAL TUNNEL RELEASE  2003    [facility]    COLECTOMY  approximate 2005    pt states 1in colon -dx with benign mass removed-states no colon cancer    COLONOSCOPY N/A 11/9/2016    Procedure: COLONOSCOPY;  Surgeon: Dmitri Sterling MD;  Location: [facility] ENDO;  Service: Endoscopy;  Laterality: N/A;    COLONOSCOPY N/A 11/15/2019    Procedure: COLONOSCOPY;  Surgeon: Marko Vicente MD;  Location: [facility] ENDO;  Service: Endoscopy;  Laterality: N/A;    HYSTERECTOMY      no cancer    OOPHORECTOMY       Review of patient's allergies indicates:   Allergen Reactions    Skin staples [surgical stainless steel] Swelling    Egg derived      Shortness breath, lip swelling    Fish containing products     Iodine and iodide containing products Swelling    Latex, natural rubber Swelling    Losartan Itching    Nickel     Pravastatin      40 mg causes nausea vomitting but 20 mg ok    Shellfish containing products Swelling     Current Outpatient Medications on File Prior to Visit   Medication Sig Dispense Refill    amlodipine-benazepril 5-20 mg (LOTREL) 5-20 mg per capsule Take 1 capsule by mouth once daily. 90 capsule 4    blood sugar diagnostic Strp 1 each by Misc.(Non-Drug; Combo Route) route 3 (three) times daily. [employer] test strips 100 each 3    blood-glucose meter kit Insurance preferred 1 each 0    butalbital-acetaminophen-caffeine -40 mg (FIORICET, ESGIC) -40 mg per tablet Take 1 tablet by mouth 3 (three) times daily as needed for Headaches. (MAXIMUM OF 15 TABLET PER MONTH) 30 tablet 3     calcium-vitamin D (CALCIUM WITH VITAMIN D) 600 mg(1,500mg) -400 unit Tab Take 2 tablets by mouth once daily. 180 tablet 4    chlorthalidone (HYGROTEN) 25 MG Tab TAKE 1 TABLET EVERY DAY 90 tablet 2    ezetimibe (ZETIA) 10 mg tablet Take 1 tablet (10 mg total) by mouth once daily. 90 tablet 4    glimepiride (AMARYL) 2 MG tablet Take 1 tablet (2 mg total) by mouth before breakfast. 90 tablet 4    ipratropium-albuteroL (COMBIVENT)  mcg/actuation inhaler Inhale 2 puffs into the lungs every 4 (four) hours as needed for Wheezing or Shortness of Breath. Rescue 4 g 11    lancets 32 gauge Misc 1 lancet by Misc.(Non-Drug; Combo Route) route 3 (three) times daily. Insurance preferred 100 each 11    montelukast (SINGULAIR) 10 mg tablet       pantoprazole (PROTONIX) 40 MG tablet TAKE 1 TABLET EVERY DAY 90 tablet 4    potassium 99 mg Tab Take 1 tablet by mouth once daily.      rosuvastatin (CRESTOR) 20 MG tablet Take 1 tablet (20 mg total) by mouth once daily. 90 tablet 4    tiZANidine (ZANAFLEX) 2 MG tablet Take 1-2 tabs twice a day as needed for muscle pain.  Will cause drowsiness 30 tablet 0    TRUEPLUS LANCETS 33 gauge Misc       varicella-zoster gE-AS01B, PF, (SHINGRIX) 50 mcg/0.5 mL injection Inject 0.5 mL (one dose) into muscle now; give second dose at least 2 months later 0.5 mL 1     No current facility-administered medications on file prior to visit.      Social History     Socioeconomic History    Marital status:      Spouse name: Not on file    Number of children: 4    Years of education: Not on file    Highest education level: Not on file   Occupational History    Occupation:    Social Needs    Financial resource strain: Not very hard    Food insecurity     Worry: Never true     Inability: Never true    Transportation needs     Medical: No     Non-medical: No   Tobacco Use    Smoking status: Current Some Day Smoker     Packs/day: 1.00     Years: 60.00     Pack years:  60.00     Types: Cigarettes     Start date: 1/1/1960    Smokeless tobacco: Never Used   Substance and Sexual Activity    Alcohol use: No     Alcohol/week: 0.0 standard drinks     Frequency: Never     Drinks per session: Patient refused     Binge frequency: Never    Drug use: No    Sexual activity: Never   Lifestyle    Physical activity     Days per week: 2 days     Minutes per session: 10 min    Stress: Only a little   Relationships    Social connections     Talks on phone: More than three times a week     Gets together: Never     Attends [employer] service: Not on file     Active member of club or organization: Yes     Attends meetings of clubs or organizations: More than 4 times per year     Relationship status:    Other Topics Concern    Not on file   Social History Narrative    Son smoker, no pets in household.     Family History   Problem Relation Age of Onset    Stroke Father     Stroke Sister     Asthma Daughter     Diabetes Daughter     Breast cancer Daughter     Asthma Son        Review of Systems   Constitutional: Positive for fatigue. Negative for fever.   HENT: Positive for postnasal drip, rhinorrhea and congestion.    Eyes: Negative for redness and itching.   Respiratory: Positive for cough, sputum production, shortness of breath, dyspnea on extertion, use of rescue inhaler and Paroxysmal Nocturnal Dyspnea.    Cardiovascular: Negative for chest pain, palpitations and leg swelling.   Genitourinary: Negative for difficulty urinating and hematuria.   Endocrine: Negative for cold intolerance and heat intolerance.    Musculoskeletal: Positive for arthralgias and back pain.        Pain at base of lumbar scan   Skin: Negative for rash.   Gastrointestinal: Negative for nausea and abdominal pain.   Neurological: Negative for dizziness, syncope, weakness and light-headedness.   Hematological: Negative for adenopathy. Does not bruise/bleed easily.   Psychiatric/Behavioral: Negative for sleep  "disturbance. The patient is not nervous/anxious.        Objective:      /72   Pulse 89   Resp 16   Ht 5' 1" (1.549 m)   Wt 64.7 kg (142 lb 10.2 oz)   SpO2 95%   BMI 26.95 kg/m²   Physical Exam  Vitals signs and nursing note reviewed.   Constitutional:       Appearance: She is well-developed.   HENT:      Head: Normocephalic and atraumatic.      Mouth/Throat:      Pharynx: Oropharyngeal exudate present.   Eyes:      Conjunctiva/sclera: Conjunctivae normal.      Pupils: Pupils are equal, round, and reactive to light.   Neck:      Musculoskeletal: Neck supple.      Thyroid: No thyromegaly.      Vascular: No JVD.      Trachea: No tracheal deviation.   Cardiovascular:      Rate and Rhythm: Normal rate and regular rhythm.      Heart sounds: Normal heart sounds.   Pulmonary:      Effort: Pulmonary effort is normal. No respiratory distress.      Breath sounds: Examination of the right-lower field reveals wheezing. Examination of the left-lower field reveals wheezing. Decreased breath sounds and wheezing present. No rhonchi or rales.   Chest:      Chest wall: No tenderness.   Abdominal:      General: Bowel sounds are normal.      Palpations: Abdomen is soft.   Musculoskeletal: Normal range of motion.   Lymphadenopathy:      Cervical: No cervical adenopathy.   Skin:     General: Skin is warm and dry.   Neurological:      Mental Status: She is alert and oriented to person, place, and time.       Personal Diagnostic Review  CT of chest performed on 9/9/20-Left lung nodule.  NM PET CT Routine Skull to Mid Thigh  Narrative: EXAMINATION:  NM PET CT ROUTINE    CLINICAL HISTORY:  Lung cancer, non-small cell, staging;T-spine fracture, pathological;  Malignant neoplasm of unspecified part of unspecified bronchus or lung    TECHNIQUE:  Segmented attenuation corrected 3-D PET imaging was obtained from the skull base through the mid thighs utilizing 13.67 mCi F-18-FDG.  Noncontrast CT imaging was performed for attenuation " correction, diagnosis, and anatomical fusion with PET.    COMPARISON:  None.    FINDINGS:  Head/neck: There is normal physiologic FDG uptake noted within the visualized brain parenchyma. No FDG avid lymphadenopathy within the neck.    Chest: There is a hypermetabolic lung nodule with spiculated margins in the anterior left upper lobe measuring 1.7 cm and exhibiting an SUV max of 4.3.  Additional 7 mm nodule identified in the posterior left lower lobe exhibiting no significant hypermetabolism.  Tiny 3 mm non FDG avid nodule noted in the posterolateral periphery of the left lower lobe.  Bilateral emphysematous changes.  Weakly FDG avid lymph node in the AP window measuring 10 mm in short axis with SUV max of 2.5.  Similar weakly FDG avid node in the subcarinal space measuring 8 mm in short axis with SUV max of 2.5..    Abdomen/Pelvis: Normal physiologic FDG uptake noted within the liver, spleen, urinary tract, and bowel.Non FDG avid bilateral adrenal nodules likely benign adenomas or nodular hyperplasia.  No FDG avid retroperitoneal lymph nodes.    Skeletal: No FDG avid osseus lesions identified.  Impression: 1. Hypermetabolic left upper lobe pulmonary nodule suspicious for bronchogenic malignancy.  2. Subcentimeter non FDG avid left lower lobe nodules, indeterminate.  3. Remainder as above.    Electronically signed by: VICTORINO Kamara MD  Date:    09/30/2020  Time:    14:50        Office Spirometry Results:     No flowsheet data found.  Pulmonary Studies Review 9/30/2020   SpO2 95   Height 61   Weight 2282.2   BMI (Calculated) 27   Predicted Distance 289.76   Predicted Distance Meters (Calculated) 429.52         Procedures  Ananda Castelan MD (Physician)   Pulmonary Disease  Procedure Orders   1. Home Sleep Studies [[id number]] ordered by Ramiro Aleman MD   Pre-procedure Diagnoses   1. IVANIA (obstructive sleep apnea) [G47.33]   Post-procedure Diagnoses   1. IVANIA (obstructive sleep apnea) [G47.33]      Home  Sleep Studies   Date/Time: 9/14/2020 8:00 AM  Performed by: Ananda Castelan MD  Authorized by: Ramiro Aleman MD        PHYSICIAN INTERPRETATION AND COMMENTS: Findings are consistent with mild, non-positional obstructive sleep  apnea (IVANIA). Overall AHI was 9.0/hr with 5.8 hours data. Spo2 sasha was 84.7%, Therapy with CPAP should be considered.  AutoPAP 5-20 cmwp or CPAP inlab titration. Indications of cardiac dysrhythmia are recorded. consult cardiology.  CLINICAL HISTORY: 70 year old female presented with: 13.5 inch neck, BMI of 27, an [place] sleepiness score of 5, history  of hypertension, diabetes, lung disease and symptoms of nocturnal snoring, waking up choking and witnessed apneas. Based on the  clinical history, the patient has a high pre-test probability of having moderate IVANIA              Assessment:            Solid nodule of lung greater than 8 mm in diameter  -     CT Biopsy Lung (xpd); Future; Expected date: 09/30/2020    Acute sinusitis, recurrence not specified, unspecified location  -     predniSONE (DELTASONE) 20 MG tablet; Prednisone 60 mg/ day for 3 days, 40 mg/day for 3 days,20 mg/ day for 3 days, (1/2 tablet )10 mg a day for 3 days.  Dispense: 20 tablet; Refill: 0  -     amoxicillin-clavulanate 875-125mg (AUGMENTIN) 875-125 mg per tablet; Take 1 tablet by mouth every 12 (twelve) hours. for 10 days  Dispense: 20 tablet; Refill: 0    IVANIA (obstructive sleep apnea)  -     CPAP FOR HOME USE  -     CPAP/BIPAP SUPPLIES    Seasonal allergic rhinitis due to pollen  -     fluticasone propionate (FLONASE) 50 mcg/actuation nasal spray; 2 sprays (100 mcg total) by Each Nostril route once daily.  Dispense: 48 g; Refill: 4          Outpatient Encounter Medications as of 9/30/2020   Medication Sig Dispense Refill    amlodipine-benazepril 5-20 mg (LOTREL) 5-20 mg per capsule Take 1 capsule by mouth once daily. 90 capsule 4    blood sugar diagnostic Strp 1 each by Misc.(Non-Drug; Combo Route) route 3  (three) times daily. [facility] test strips 100 each 3    blood-glucose meter kit Insurance preferred 1 each 0    butalbital-acetaminophen-caffeine -40 mg (FIORICET, ESGIC) -40 mg per tablet Take 1 tablet by mouth 3 (three) times daily as needed for Headaches. (MAXIMUM OF 15 TABLET PER MONTH) 30 tablet 3    calcium-vitamin D (CALCIUM WITH VITAMIN D) 600 mg(1,500mg) -400 unit Tab Take 2 tablets by mouth once daily. 180 tablet 4    chlorthalidone (HYGROTEN) 25 MG Tab TAKE 1 TABLET EVERY DAY 90 tablet 2    ezetimibe (ZETIA) 10 mg tablet Take 1 tablet (10 mg total) by mouth once daily. 90 tablet 4    fluticasone propionate (FLONASE) 50 mcg/actuation nasal spray 2 sprays (100 mcg total) by Each Nostril route once daily. 48 g 4    glimepiride (AMARYL) 2 MG tablet Take 1 tablet (2 mg total) by mouth before breakfast. 90 tablet 4    ipratropium-albuteroL (COMBIVENT)  mcg/actuation inhaler Inhale 2 puffs into the lungs every 4 (four) hours as needed for Wheezing or Shortness of Breath. Rescue 4 g 11    lancets 32 gauge Misc 1 lancet by Misc.(Non-Drug; Combo Route) route 3 (three) times daily. Insurance preferred 100 each 11    montelukast (SINGULAIR) 10 mg tablet       pantoprazole (PROTONIX) 40 MG tablet TAKE 1 TABLET EVERY DAY 90 tablet 4    potassium 99 mg Tab Take 1 tablet by mouth once daily.      rosuvastatin (CRESTOR) 20 MG tablet Take 1 tablet (20 mg total) by mouth once daily. 90 tablet 4    tiZANidine (ZANAFLEX) 2 MG tablet Take 1-2 tabs twice a day as needed for muscle pain.  Will cause drowsiness 30 tablet 0    TRUEPLUS LANCETS 33 gauge Misc       varicella-zoster gE-AS01B, PF, (SHINGRIX) 50 mcg/0.5 mL injection Inject 0.5 mL (one dose) into muscle now; give second dose at least 2 months later 0.5 mL 1    [DISCONTINUED] aspirin (ECOTRIN) 81 MG EC tablet Take 1 tablet (81 mg total) by mouth once daily. (FOR HEART HEALTH) 90 tablet 4    [DISCONTINUED] fluticasone propionate  (FLONASE) 50 mcg/actuation nasal spray USE 2 SPRAYS IN EACH NOSTRIL ONE TIME DAILY  48 g 4    amoxicillin-clavulanate 875-125mg (AUGMENTIN) 875-125 mg per tablet Take 1 tablet by mouth every 12 (twelve) hours. for 10 days 20 tablet 0    predniSONE (DELTASONE) 20 MG tablet Prednisone 60 mg/ day for 3 days, 40 mg/day for 3 days,20 mg/ day for 3 days, (1/2 tablet )10 mg a day for 3 days. 20 tablet 0     No facility-administered encounter medications on file as of 2020.      Plan:       Requested Prescriptions     Signed Prescriptions Disp Refills    predniSONE (DELTASONE) 20 MG tablet 20 tablet 0     Sig: Prednisone 60 mg/ day for 3 days, 40 mg/day for 3 days,20 mg/ day for 3 days, (1/2 tablet )10 mg a day for 3 days.    amoxicillin-clavulanate 875-125mg (AUGMENTIN) 875-125 mg per tablet 20 tablet 0     Sig: Take 1 tablet by mouth every 12 (twelve) hours. for 10 days    fluticasone propionate (FLONASE) 50 mcg/actuation nasal spray 48 g 4     Si sprays (100 mcg total) by Each Nostril route once daily.     Problem List Items Addressed This Visit     Seasonal allergic rhinitis due to pollen    Relevant Medications    fluticasone propionate (FLONASE) 50 mcg/actuation nasal spray      Other Visit Diagnoses     Solid nodule of lung greater than 8 mm in diameter    -  Primary    Relevant Orders    CT Biopsy Lung (xpd)    Acute sinusitis, recurrence not specified, unspecified location        Relevant Medications    predniSONE (DELTASONE) 20 MG tablet    amoxicillin-clavulanate 875-125mg (AUGMENTIN) 875-125 mg per tablet    IVANIA (obstructive sleep apnea)        Relevant Orders    CPAP FOR HOME USE    CPAP/BIPAP SUPPLIES             Follow up in about 2 weeks (around 10/14/2020) for REview biopsy results.    MEDICAL DECISION MAKING: Moderate to high complexity.  Overall, the multiple problems listed are of moderate to high severity that may impact quality of life and activities of daily living. Side effects of  medications, treatment plan as well as options and alternatives reviewed and discussed with patient. There was counseling of patient concerning these issues.    Total time spent in face to face counseling and coordination of care - 40  minutes over 50% of time was used in discussion of prognosis, risks, benefits of treatment, instructions and compliance with regimen . Discussion with other physicians or health care providers (DME, NP, pharmacy, respiratory therapy) occurred.

## 2020-09-30 NOTE — PATIENT INSTRUCTIONS
[facility]  [address] [place] [address]  (off [address])    Lung Biopsy Appointment    The radiologist will review your CT scan to determine when the lung biopsy will be scheduled. If you do not hear from the radiology department in 48 hours then call [phone].     This test is done in the Radiology Department on the 1st floor of [facility]    Lung Biopsy is performed to diagnose or determine the level of lung disease that cant be determined by regular lab testing.    Prior to Lung Biopsy: (VERY IMPORTANT-PLEASE READ)   You must be fasting: nothing to eat or drink after midnight the night before the test.   You must have a responsible adult to drive you home after procedure and stay with you the rest of the day.   Plan to stay approximately 4 hours after procedure is completed.   If you are on Coumadin, Heparin, Plavix, Aspirin or NSAIDS must be stopped 7 days prior to the procedure. (YOU MUST LET YOUR PHYSICIAN KNOW)   The ordering physician should order lab work to be done within 7 days of the procedure, to determine your blood clotting factor.   Diabetic patients: If you take oral glucose pills, dont take it the morning of the biopsy.    If you take insulin, take your normal dose as usual.   Blood pressure medications are to be taken the morning of procedure with a small sip of water at least 2 hours before biopsy.   Based on your medical condition, your physician may request other specific preparation.     During the test you will lie on your back with your right hand behind your head. You will need to lie completely still while the best spot to insert the biopsy needle is determine. Ultrasound, MRI, or CT may also be used to locate a specific spot in the lung. Then, your skin will be cleaned and a local anesthetic (numbing) will be applied to the skin. You will also be given a light sedation. A biopsy needle will be inserted and advanced  to the surface of the lung, where a tissue sample is obtained. After the biopsy you will remain lying on your right side for 2 hours. You may experience some pain around the site or in the shoulder. This isnt uncommon. Then, you will change position to lying on your back for 2-3 hours. You will be monitored closely during this time in recovery. You will be able to drink 2 hours and eat 4 hours after the procedure. The biopsy site will be dressed. A small amount of blood on dressing is normal.    What to expect when home for Lung Biopsy:   Bed rest for the rest of the day and evening.   Blood thinning medications can be resumed when instructed by prescribing physician.   Avoid heavy lifting and strenuous exercise for the next 2-3 days following the procedure.   You can resume your normal diet.   You wont be able to travel by airplane for up to 5 days after biopsy.    Contact doctors office or go to ER if you experience:   Excessive bleeding saturated dressing or bleeding that will not stop by biopsy site   Fever or chills   Severe pain in the abdomen   Difficulty breathing    If your biopsy needs to be rescheduled, please contact the Radiology Department at [phone].

## 2020-10-01 ENCOUNTER — TELEPHONE (OUTPATIENT)
Dept: PULMONOLOGY | Facility: CLINIC | Age: 70
End: 2020-10-01

## 2020-10-01 ENCOUNTER — PATIENT OUTREACH (OUTPATIENT)
Dept: OTHER | Facility: OTHER | Age: 70
End: 2020-10-01

## 2020-10-13 ENCOUNTER — TELEPHONE (OUTPATIENT)
Dept: PULMONOLOGY | Facility: CLINIC | Age: 70
End: 2020-10-13

## 2020-10-20 ENCOUNTER — TELEPHONE (OUTPATIENT)
Dept: RADIOLOGY | Facility: HOSPITAL | Age: 70
End: 2020-10-20

## 2020-10-20 DIAGNOSIS — I77.1 TORTUOUS AORTA: ICD-10-CM

## 2020-10-20 DIAGNOSIS — I25.10 ATHEROSCLEROSIS OF NATIVE CORONARY ARTERY OF NATIVE HEART WITHOUT ANGINA PECTORIS: ICD-10-CM

## 2020-10-20 DIAGNOSIS — N18.31 STAGE 3A CHRONIC KIDNEY DISEASE: ICD-10-CM

## 2020-10-20 DIAGNOSIS — I70.203 ATHEROSCLEROSIS OF ARTERY OF BOTH LOWER EXTREMITIES: Chronic | ICD-10-CM

## 2020-10-20 DIAGNOSIS — I10 ESSENTIAL HYPERTENSION: Chronic | ICD-10-CM

## 2020-10-20 DIAGNOSIS — Z98.62 S/P PERIPHERAL ARTERY ANGIOPLASTY: ICD-10-CM

## 2020-10-20 DIAGNOSIS — I70.0 ATHEROSCLEROSIS OF AORTA: ICD-10-CM

## 2020-10-20 DIAGNOSIS — R79.1 ABNORMAL BLOOD COAGULATION PROFILE: ICD-10-CM

## 2020-10-20 DIAGNOSIS — I73.9 PVD (PERIPHERAL VASCULAR DISEASE): ICD-10-CM

## 2020-10-20 DIAGNOSIS — E11.51 TYPE 2 DIABETES MELLITUS WITH PERIPHERAL VASCULAR DISEASE: Chronic | ICD-10-CM

## 2020-10-20 DIAGNOSIS — I35.0 NONRHEUMATIC AORTIC VALVE STENOSIS: ICD-10-CM

## 2020-10-20 NOTE — TELEPHONE ENCOUNTER
Interventional Radiology:  Called patient to confirm appt for procedure on  Tomorrow, pt needs to be here at 0715 - left   .  Patient is to be here at hospital on [address], must have a , nothing to eat/drink after midnight.  Patient may take morning meds, except DM,with a small sip of water.  Patient will go to registration, then lab, then rad check in window.  Left our phone number to call back with any questions

## 2020-10-21 ENCOUNTER — HOSPITAL ENCOUNTER (OUTPATIENT)
Dept: RADIOLOGY | Facility: HOSPITAL | Age: 70
Discharge: HOME | DRG: 200 | End: 2020-10-21
Attending: PHYSICIAN ASSISTANT
Payer: MEDICARE

## 2020-10-21 ENCOUNTER — HOSPITAL ENCOUNTER (OUTPATIENT)
Dept: RADIOLOGY | Facility: HOSPITAL | Age: 70
Discharge: HOME | DRG: 200 | End: 2020-10-21
Attending: PHYSICIAN ASSISTANT | Admitting: EMERGENCY MEDICINE
Payer: MEDICARE

## 2020-10-21 ENCOUNTER — HOSPITAL ENCOUNTER (INPATIENT)
Dept: RADIOLOGY | Facility: HOSPITAL | Age: 70
LOS: 1 days | Discharge: HOME | DRG: 200 | End: 2020-10-22
Attending: INTERNAL MEDICINE | Admitting: INTERNAL MEDICINE
Payer: MEDICARE

## 2020-10-21 DIAGNOSIS — J95.811 PNEUMOTHORAX AFTER BIOPSY: ICD-10-CM

## 2020-10-21 DIAGNOSIS — R91.1 SOLID NODULE OF LUNG GREATER THAN 8 MM IN DIAMETER: Primary | ICD-10-CM

## 2020-10-21 LAB
ANION GAP SERPL CALC-SCNC: 12 MMOL/L (ref 8–16)
BASOPHILS # BLD AUTO: 0.02 K/UL (ref 0–0.2)
BASOPHILS NFR BLD: 0.3 % (ref 0–1.9)
BUN SERPL-MCNC: 17 MG/DL (ref 8–23)
CALCIUM SERPL-MCNC: 8.9 MG/DL (ref 8.7–10.5)
CHLORIDE SERPL-SCNC: 103 MMOL/L (ref 95–110)
CO2 SERPL-SCNC: 23 MMOL/L (ref 23–29)
CREAT SERPL-MCNC: 1.1 MG/DL (ref 0.5–1.4)
DIFFERENTIAL METHOD: ABNORMAL
EOSINOPHIL # BLD AUTO: 0.2 K/UL (ref 0–0.5)
EOSINOPHIL NFR BLD: 2.3 % (ref 0–8)
ERYTHROCYTE [DISTWIDTH] IN BLOOD BY AUTOMATED COUNT: 16.9 % (ref 11.5–14.5)
EST. GFR  (AFRICAN AMERICAN): 58 ML/MIN/1.73 M^2
EST. GFR  (NON AFRICAN AMERICAN): 50 ML/MIN/1.73 M^2
GLUCOSE SERPL-MCNC: 187 MG/DL (ref 70–110)
HCT VFR BLD AUTO: 34.1 % (ref 37–48.5)
HGB BLD-MCNC: 10.6 G/DL (ref 12–16)
IMM GRANULOCYTES # BLD AUTO: 0.02 K/UL (ref 0–0.04)
IMM GRANULOCYTES NFR BLD AUTO: 0.3 % (ref 0–0.5)
LYMPHOCYTES # BLD AUTO: 1.2 K/UL (ref 1–4.8)
LYMPHOCYTES NFR BLD: 16.4 % (ref 18–48)
MCH RBC QN AUTO: 27.8 PG (ref 27–31)
MCHC RBC AUTO-ENTMCNC: 31.1 G/DL (ref 32–36)
MCV RBC AUTO: 90 FL (ref 82–98)
MONOCYTES # BLD AUTO: 0.3 K/UL (ref 0.3–1)
MONOCYTES NFR BLD: 4.4 % (ref 4–15)
NEUTROPHILS # BLD AUTO: 5.4 K/UL (ref 1.8–7.7)
NEUTROPHILS NFR BLD: 76.3 % (ref 38–73)
NRBC BLD-RTO: 0 /100 WBC
PLATELET # BLD AUTO: 301 K/UL (ref 150–350)
PMV BLD AUTO: 9.6 FL (ref 9.2–12.9)
POCT GLUCOSE: 143 MG/DL (ref 70–110)
POTASSIUM SERPL-SCNC: 3.9 MMOL/L (ref 3.5–5.1)
RBC # BLD AUTO: 3.81 M/UL (ref 4–5.4)
SARS-COV-2 RDRP RESP QL NAA+PROBE: NEGATIVE
SODIUM SERPL-SCNC: 138 MMOL/L (ref 136–145)
WBC # BLD AUTO: 7.06 K/UL (ref 3.9–12.7)

## 2020-10-21 PROCEDURE — 77012 CT SCAN FOR NEEDLE BIOPSY: CPT | Mod: TC,HCNC

## 2020-10-21 PROCEDURE — 25000003 PHARM REV CODE 250: Mod: HCNC | Performed by: INTERNAL MEDICINE

## 2020-10-21 PROCEDURE — 21400001 HC TELEMETRY ROOM: Mod: HCNC

## 2020-10-21 PROCEDURE — 88305 TISSUE EXAM BY PATHOLOGIST: CPT | Mod: HCNC | Performed by: PATHOLOGY

## 2020-10-21 PROCEDURE — 88341 IMHCHEM/IMCYTCHM EA ADD ANTB: CPT | Mod: HCNC | Performed by: PATHOLOGY

## 2020-10-21 PROCEDURE — 81445 SO NEO GSAP 5-50DNA/DNA&RNA: CPT | Mod: HCNC | Performed by: PATHOLOGY

## 2020-10-21 PROCEDURE — 63600175 PHARM REV CODE 636 W HCPCS: Mod: HCNC | Performed by: RADIOLOGY

## 2020-10-21 PROCEDURE — 88342 IMHCHEM/IMCYTCHM 1ST ANTB: CPT | Mod: HCNC | Performed by: PATHOLOGY

## 2020-10-21 PROCEDURE — 71045 X-RAY EXAM CHEST 1 VIEW: CPT | Mod: TC,HCNC

## 2020-10-21 PROCEDURE — 88305 TISSUE EXAM BY PATHOLOGIST: ICD-10-PCS | Mod: 26,HCNC,, | Performed by: PATHOLOGY

## 2020-10-21 PROCEDURE — 88360 TUMOR IMMUNOHISTOCHEM/MANUAL: CPT | Mod: HCNC | Performed by: PATHOLOGY

## 2020-10-21 PROCEDURE — 88381 MICRODISSECTION MANUAL: CPT | Mod: HCNC | Performed by: PATHOLOGY

## 2020-10-21 PROCEDURE — 11000001 HC ACUTE MED/SURG PRIVATE ROOM: Mod: HCNC

## 2020-10-21 PROCEDURE — 85025 COMPLETE CBC W/AUTO DIFF WBC: CPT | Mod: 91,HCNC

## 2020-10-21 PROCEDURE — 99223 PR INITIAL HOSPITAL CARE,LEVL III: ICD-10-PCS | Mod: AI,HCNC,, | Performed by: INTERNAL MEDICINE

## 2020-10-21 PROCEDURE — 36415 COLL VENOUS BLD VENIPUNCTURE: CPT | Mod: HCNC

## 2020-10-21 PROCEDURE — 80048 BASIC METABOLIC PNL TOTAL CA: CPT | Mod: HCNC

## 2020-10-21 PROCEDURE — 99223 1ST HOSP IP/OBS HIGH 75: CPT | Mod: AI,HCNC,, | Performed by: INTERNAL MEDICINE

## 2020-10-21 PROCEDURE — U0002 COVID-19 LAB TEST NON-CDC: HCPCS | Mod: HCNC

## 2020-10-21 PROCEDURE — 25000003 PHARM REV CODE 250: Mod: HCNC | Performed by: NURSE PRACTITIONER

## 2020-10-21 PROCEDURE — 88305 TISSUE EXAM BY PATHOLOGIST: CPT | Mod: 26,HCNC,, | Performed by: PATHOLOGY

## 2020-10-21 RX ORDER — ONDANSETRON 2 MG/ML
4 INJECTION INTRAMUSCULAR; INTRAVENOUS EVERY 4 HOURS PRN
Status: DISCONTINUED | OUTPATIENT
Start: 2020-10-21 | End: 2020-10-22 | Stop reason: HOSPADM

## 2020-10-21 RX ORDER — EZETIMIBE 10 MG/1
10 TABLET ORAL NIGHTLY
Status: DISCONTINUED | OUTPATIENT
Start: 2020-10-21 | End: 2020-10-22 | Stop reason: HOSPADM

## 2020-10-21 RX ORDER — INSULIN ASPART 100 [IU]/ML
1-10 INJECTION, SOLUTION INTRAVENOUS; SUBCUTANEOUS
Status: DISCONTINUED | OUTPATIENT
Start: 2020-10-21 | End: 2020-10-22 | Stop reason: HOSPADM

## 2020-10-21 RX ORDER — PANTOPRAZOLE SODIUM 40 MG/1
40 TABLET, DELAYED RELEASE ORAL DAILY
Status: DISCONTINUED | OUTPATIENT
Start: 2020-10-22 | End: 2020-10-21

## 2020-10-21 RX ORDER — BUTALBITAL, ACETAMINOPHEN AND CAFFEINE 50; 325; 40 MG/1; MG/1; MG/1
1 TABLET ORAL EVERY 8 HOURS PRN
Status: DISCONTINUED | OUTPATIENT
Start: 2020-10-21 | End: 2020-10-22 | Stop reason: HOSPADM

## 2020-10-21 RX ORDER — BENAZEPRIL HYDROCHLORIDE 20 MG/1
20 TABLET ORAL DAILY
Status: DISCONTINUED | OUTPATIENT
Start: 2020-10-22 | End: 2020-10-21

## 2020-10-21 RX ORDER — BENAZEPRIL HYDROCHLORIDE 20 MG/1
20 TABLET ORAL DAILY
Status: DISCONTINUED | OUTPATIENT
Start: 2020-10-22 | End: 2020-10-22 | Stop reason: HOSPADM

## 2020-10-21 RX ORDER — FERROUS SULFATE, DRIED 160(50) MG
1 TABLET, EXTENDED RELEASE ORAL 2 TIMES DAILY
Status: DISCONTINUED | OUTPATIENT
Start: 2020-10-21 | End: 2020-10-22 | Stop reason: HOSPADM

## 2020-10-21 RX ORDER — PANTOPRAZOLE SODIUM 40 MG/1
40 TABLET, DELAYED RELEASE ORAL DAILY
Status: DISCONTINUED | OUTPATIENT
Start: 2020-10-22 | End: 2020-10-22 | Stop reason: HOSPADM

## 2020-10-21 RX ORDER — ROSUVASTATIN CALCIUM 10 MG/1
20 TABLET, COATED ORAL DAILY
Status: DISCONTINUED | OUTPATIENT
Start: 2020-10-22 | End: 2020-10-21

## 2020-10-21 RX ORDER — MIDAZOLAM HYDROCHLORIDE 1 MG/ML
INJECTION INTRAMUSCULAR; INTRAVENOUS CODE/TRAUMA/SEDATION MEDICATION
Status: COMPLETED | OUTPATIENT
Start: 2020-10-21 | End: 2020-10-21

## 2020-10-21 RX ORDER — TIZANIDINE 4 MG/1
4 TABLET ORAL 2 TIMES DAILY PRN
Status: DISCONTINUED | OUTPATIENT
Start: 2020-10-21 | End: 2020-10-22 | Stop reason: HOSPADM

## 2020-10-21 RX ORDER — MONTELUKAST SODIUM 10 MG/1
10 TABLET ORAL DAILY
Status: DISCONTINUED | OUTPATIENT
Start: 2020-10-22 | End: 2020-10-21

## 2020-10-21 RX ORDER — CHLORTHALIDONE 25 MG/1
25 TABLET ORAL DAILY
Status: DISCONTINUED | OUTPATIENT
Start: 2020-10-22 | End: 2020-10-22 | Stop reason: HOSPADM

## 2020-10-21 RX ORDER — SODIUM CHLORIDE 0.9 % (FLUSH) 0.9 %
10 SYRINGE (ML) INJECTION
Status: DISCONTINUED | OUTPATIENT
Start: 2020-10-21 | End: 2020-10-22 | Stop reason: HOSPADM

## 2020-10-21 RX ORDER — IBUPROFEN 200 MG
24 TABLET ORAL
Status: DISCONTINUED | OUTPATIENT
Start: 2020-10-21 | End: 2020-10-22 | Stop reason: HOSPADM

## 2020-10-21 RX ORDER — FENTANYL CITRATE 50 UG/ML
INJECTION, SOLUTION INTRAMUSCULAR; INTRAVENOUS CODE/TRAUMA/SEDATION MEDICATION
Status: COMPLETED | OUTPATIENT
Start: 2020-10-21 | End: 2020-10-21

## 2020-10-21 RX ORDER — AMLODIPINE BESYLATE 5 MG/1
5 TABLET ORAL DAILY
Status: DISCONTINUED | OUTPATIENT
Start: 2020-10-22 | End: 2020-10-21

## 2020-10-21 RX ORDER — IBUPROFEN 200 MG
16 TABLET ORAL
Status: DISCONTINUED | OUTPATIENT
Start: 2020-10-21 | End: 2020-10-22 | Stop reason: HOSPADM

## 2020-10-21 RX ORDER — MONTELUKAST SODIUM 10 MG/1
10 TABLET ORAL DAILY
Status: DISCONTINUED | OUTPATIENT
Start: 2020-10-22 | End: 2020-10-22 | Stop reason: HOSPADM

## 2020-10-21 RX ORDER — TIZANIDINE 4 MG/1
2 TABLET ORAL 2 TIMES DAILY PRN
Status: DISCONTINUED | OUTPATIENT
Start: 2020-10-21 | End: 2020-10-21

## 2020-10-21 RX ORDER — GLUCAGON 1 MG
1 KIT INJECTION
Status: DISCONTINUED | OUTPATIENT
Start: 2020-10-21 | End: 2020-10-22 | Stop reason: HOSPADM

## 2020-10-21 RX ORDER — EZETIMIBE 10 MG/1
10 TABLET ORAL DAILY
Status: DISCONTINUED | OUTPATIENT
Start: 2020-10-22 | End: 2020-10-21

## 2020-10-21 RX ORDER — CHLORTHALIDONE 25 MG/1
25 TABLET ORAL DAILY
Status: DISCONTINUED | OUTPATIENT
Start: 2020-10-22 | End: 2020-10-21

## 2020-10-21 RX ORDER — HYDROCODONE BITARTRATE AND ACETAMINOPHEN 5; 325 MG/1; MG/1
1 TABLET ORAL EVERY 4 HOURS PRN
Status: DISCONTINUED | OUTPATIENT
Start: 2020-10-21 | End: 2020-10-22 | Stop reason: HOSPADM

## 2020-10-21 RX ORDER — AMLODIPINE BESYLATE 5 MG/1
5 TABLET ORAL DAILY
Status: DISCONTINUED | OUTPATIENT
Start: 2020-10-22 | End: 2020-10-22 | Stop reason: HOSPADM

## 2020-10-21 RX ORDER — FLUTICASONE PROPIONATE 50 MCG
2 SPRAY, SUSPENSION (ML) NASAL DAILY
Status: DISCONTINUED | OUTPATIENT
Start: 2020-10-22 | End: 2020-10-22 | Stop reason: HOSPADM

## 2020-10-21 RX ORDER — ROSUVASTATIN CALCIUM 10 MG/1
20 TABLET, COATED ORAL DAILY
Status: DISCONTINUED | OUTPATIENT
Start: 2020-10-22 | End: 2020-10-22 | Stop reason: HOSPADM

## 2020-10-21 RX ORDER — ACETAMINOPHEN 325 MG/1
650 TABLET ORAL EVERY 4 HOURS PRN
Status: DISCONTINUED | OUTPATIENT
Start: 2020-10-21 | End: 2020-10-22 | Stop reason: HOSPADM

## 2020-10-21 RX ADMIN — FENTANYL CITRATE 25 MCG: 50 INJECTION, SOLUTION INTRAMUSCULAR; INTRAVENOUS at 08:10

## 2020-10-21 RX ADMIN — HYDROCODONE BITARTRATE AND ACETAMINOPHEN 1 TABLET: 5; 325 TABLET ORAL at 06:10

## 2020-10-21 RX ADMIN — FENTANYL CITRATE 25 MCG: 0.05 INJECTION, SOLUTION INTRAMUSCULAR; INTRAVENOUS at 03:10

## 2020-10-21 RX ADMIN — CALCIUM CARBONATE 500 MG (1,250 MG)-VITAMIN D3 200 UNIT TABLET 1 TABLET: at 09:10

## 2020-10-21 RX ADMIN — EZETIMIBE 10 MG: 10 TABLET ORAL at 09:10

## 2020-10-21 RX ADMIN — MIDAZOLAM HYDROCHLORIDE 0.5 MG: 1 INJECTION, SOLUTION INTRAMUSCULAR; INTRAVENOUS at 08:10

## 2020-10-21 RX ADMIN — MIDAZOLAM HYDROCHLORIDE 0.5 MG: 1 INJECTION, SOLUTION INTRAMUSCULAR; INTRAVENOUS at 03:10

## 2020-10-21 NOTE — SUBJECTIVE & OBJECTIVE
Past Medical History:   Diagnosis Date    Atherosclerosis of artery of both lower extremities 1/17/2014    Atherosclerosis of native coronary artery of native heart without angina pectoris 11/18/2016    Atherosclerotic PVD with intermittent claudication 1/17/2014    Chronic bronchitis     COPD (chronic obstructive pulmonary disease)     Dyslipidemia associated with type 2 diabetes mellitus 6/14/2013    Dyslipidemia associated with type 2 diabetes mellitus     Ex-smoker     Gastroesophageal reflux disease without esophagitis 1/25/2019    Hyperlipidemia     Hypertension     Osteoporosis 1/16 rosita 1/18    PVD (peripheral vascular disease)     S/P peripheral artery angioplasty 2/7/2014    Seasonal allergic rhinitis due to pollen     Simple chronic bronchitis     Tobacco dependence     Type 2 diabetes mellitus with microalbuminuria, without long-term current use of insulin 3/29/2019    Type 2 diabetes mellitus with peripheral vascular disease     Type 2 diabetes mellitus with stage 3 chronic kidney disease, without long-term current use of insulin 2/1/2019    Type 2 diabetes mellitus without retinopathy 2/1/2019    Urinary incontinence        Past Surgical History:   Procedure Laterality Date    ANGIOPLASTY Bilateral 01/24/2014    aortoiliac stenting     CARPAL TUNNEL RELEASE  2003    Henrry    COLECTOMY  approximate 2005    pt states 1in colon -dx with benign mass removed-states no colon cancer    COLONOSCOPY N/A 11/9/2016    Procedure: COLONOSCOPY;  Surgeon: Dmitri Sterling MD;  Location: [facility] ENDO;  Service: Endoscopy;  Laterality: N/A;    COLONOSCOPY N/A 11/15/2019    Procedure: COLONOSCOPY;  Surgeon: Marko Vicente MD;  Location: [facility] ENDO;  Service: Endoscopy;  Laterality: N/A;    HYSTERECTOMY      no cancer    OOPHORECTOMY         Review of patient's allergies indicates:   Allergen Reactions    Skin staples [surgical stainless steel] Swelling    Egg derived      Shortness  breath, lip swelling    Fish containing products     Iodine and iodide containing products Swelling    Latex, natural rubber Swelling    Losartan Itching    Nickel     Pravastatin      40 mg causes nausea vomitting but 20 mg ok    Shellfish containing products Swelling       Family History     Problem Relation (Age of Onset)    Asthma Daughter, Son    Breast cancer Daughter    Diabetes Daughter    Stroke Father, Sister        Tobacco Use    Smoking status: Current Some Day Smoker     Packs/day: 1.00     Years: 60.00     Pack years: 60.00     Types: Cigarettes     Start date: 1/1/1960    Smokeless tobacco: Never Used   Substance and Sexual Activity    Alcohol use: No     Alcohol/week: 0.0 standard drinks     Frequency: Never     Drinks per session: Patient refused     Binge frequency: Never    Drug use: No    Sexual activity: Never         Review of Systems   Constitutional: Negative.  Negative for fatigue, fever and unexpected weight change.   HENT: Negative for congestion, postnasal drip, rhinorrhea, sinus pressure and sneezing.    Respiratory: Positive for chest tightness and shortness of breath.    Cardiovascular: Negative for chest pain, palpitations and leg swelling.   Gastrointestinal: Negative for abdominal pain and nausea.   Musculoskeletal: Positive for arthralgias. Negative for back pain.   Skin: Negative for rash.   Neurological: Negative for dizziness, syncope, weakness and light-headedness.   Hematological: Negative for adenopathy. Does not bruise/bleed easily.   Psychiatric/Behavioral: Negative for dysphoric mood and sleep disturbance. The patient is not nervous/anxious.      Objective:     Vital Signs (Most Recent):  Temp: 98.1 °F (36.7 °C) (10/21/20 1620)  Pulse: 82 (10/21/20 1620)  Resp: 18 (10/21/20 1620)  BP: 136/63 (10/21/20 1620)  SpO2: 97 % (10/21/20 1620) Vital Signs (24h Range):  Temp:  [98.1 °F (36.7 °C)] 98.1 °F (36.7 °C)  Pulse:  [77-99] 82  Resp:  [16-21] 18  SpO2:  [94 %-100  %] 97 %  BP: (109-149)/(44-83) 136/63     Weight: 64.4 kg (142 lb)  Body mass index is 25.97 kg/m².    No intake or output data in the 24 hours ending 10/21/20 1706    Physical Exam  Vitals signs and nursing note reviewed.   Constitutional:       Appearance: She is well-developed.   HENT:      Head: Normocephalic and atraumatic.   Eyes:      Conjunctiva/sclera: Conjunctivae normal.      Pupils: Pupils are equal, round, and reactive to light.   Neck:      Musculoskeletal: Neck supple.      Thyroid: No thyromegaly.      Vascular: No JVD.      Trachea: No tracheal deviation.   Cardiovascular:      Rate and Rhythm: Normal rate and regular rhythm.      Heart sounds: Normal heart sounds.   Pulmonary:      Effort: Pulmonary effort is normal. No respiratory distress.      Breath sounds: Normal breath sounds. Decreased air movement present. No wheezing or rales.   Chest:      Chest wall: No tenderness.   Abdominal:      General: Bowel sounds are normal.      Palpations: Abdomen is soft.   Musculoskeletal: Normal range of motion.   Lymphadenopathy:      Cervical: No cervical adenopathy.   Skin:     General: Skin is warm and dry.   Neurological:      Mental Status: She is alert and oriented to person, place, and time.         Vents:       Lines/Drains/Airways     Drain                 Chest Tube 10/21/20 1528 1 Left Pleural 8.5 Fr. less than 1 day          Peripheral Intravenous Line                 Peripheral IV - Single Lumen 10/21/20 0853 22 G Left Antecubital less than 1 day                Significant Labs:    CBC/Anemia Profile:  Recent Labs   Lab 10/21/20  0735   WBC 6.34   HGB 10.7*   HCT 35.5*      MCV 91   RDW 17.1*        Chemistries:  No results for input(s): NA, K, CL, CO2, BUN, CREATININE, CALCIUM, ALBUMIN, PROT, BILITOT, ALKPHOS, ALT, AST, GLUCOSE, MG, PHOS in the last 48 hours.    BMP: No results for input(s): GLU, NA, K, CL, CO2, BUN, CREATININE, CALCIUM, MG in the last 48 hours.  CMP: No results for  input(s): NA, K, CL, CO2, GLU, BUN, CREATININE, CALCIUM, PROT, ALBUMIN, BILITOT, ALKPHOS, AST, ALT, ANIONGAP, EGFRNONAA in the last 48 hours.    Invalid input(s): ESTGFA[place]  All pertinent labs within the past 24 hours have been reviewed.    Significant Imaging:   I have reviewed all pertinent imaging results/findings within the past 24 hours.  I have reviewed and interpreted all pertinent imaging results/findings within the past 24 hours.     X-Ray Chest AP Portable  Narrative: EXAMINATION:  XR CHEST AP PORTABLE    CLINICAL HISTORY:  s/p left chest tube. inspiration. show to radiologist.;    TECHNIQUE:  Single frontal view of the chest was performed.    COMPARISON:  10/21/2020 at 13:41    FINDINGS:  Pleural catheter present.  No appreciable pneumothorax.  No pleural effusion.    The cardiac silhouette is normal in size. The hilar and mediastinal contours are unremarkable.  Impression: No pneumothorax post pleural catheter placement.  Nodular density in the left mid lung field as seen on the prior films.    Electronically signed by: Kvng Smith MD  Date:    10/21/2020  Time:    16:41  X-Ray Chest AP Portable  Narrative: EXAMINATION:  XR CHEST AP PORTABLE    CLINICAL HISTORY:  s/p left lung bx. inspiration. show to radiologist.;    TECHNIQUE:  Single frontal view of the chest was performed.    COMPARISON:  None    FINDINGS:  Pneumothorax is increased from 11 mm on the previous examination to 2 cm on this study.  Right lung field is clear.    Heart size normal.  Atherosclerotic calcifications of the thoracic aorta.  Impression: Increasing left pneumothorax.    Electronically signed by: Kvng Smith MD  Date:    10/21/2020  Time:    13:49  X-Ray Chest AP Portable  Narrative: EXAMINATION:  XR CHEST AP PORTABLE    CLINICAL HISTORY:  s/p lung biopsy with pneumothorax.  inspiration. show to radiologist.;.    TECHNIQUE:  Single frontal portable view of the chest was performed.    COMPARISON:  10/21/2020  11:20    FINDINGS:  Support devices: None    Small left pneumothorax slightly enlarged compared to earlier exam, now 11 mm separation of the visceral and parietal pleura at the apex.    The cardiac silhouette is normal in size. The hilar and mediastinal contours are unremarkable.    Bones are intact.  Impression: Small left pneumothorax slightly enlarged compared to earlier exam, now 11 mm separation of the visceral and parietal pleura at the apex.    Electronically signed by: Hernando Dorsey  Date:    10/21/2020  Time:    12:43  X-Ray Chest 1 View  Narrative: EXAMINATION:  XR CHEST 1 VIEW    CLINICAL HISTORY:  2 hour s/p left lung biopsy. inspiration. show to radiologist.;    TECHNIQUE:  Single frontal view of the chest was performed.    COMPARISON:  10/21/2020    FINDINGS:  Small pneumothorax post lung biopsy not present on the prior film it has measures up to 4 mm in thickness.  Nodular density in the left mid lung field is unchanged.  Impression: 4 mm pneumothorax not present on the prior chest x-ray dated 10/21/2020 at 09:45    Electronically signed by: Kvng Smith MD  Date:    10/21/2020  Time:    11:27  CT Biopsy Lung (xpd)  Narrative: EXAMINATION:  CT BIOPSY LUNG (XPD)    CLINICAL HISTORY:  pulmonary nodule;  Solitary pulmonary nodule    TECHNIQUE:  Medications:    Moderate sedation using versed and fentanyl was provided with and trained observer monitoring the patient's vital signs and level of consciousness. The total time of sedation was 30 minutes.    1% lidocaine locally    : SIMIN Dorsey; Saturnino PIKE assisted in this procedure.    Complications: None    COMPARISON:  PET-CT 09/30/2020    FINDINGS:  Procedure: The risks and benefits of this procedure were discussed with the patient, written informed consent was obtained.  The patient was placed supine on the CT gantry.  Preprocedural imaging revealed 17 mm left upper lobe/superior lingula PET avid nodule.  A suitable skin site for biopsy was  selected.  IV fentanyl and Versed was given for conscious sedation.  Nurse or other independent trained observer monitored the patient's physiologic status and vital signs during the procedure.  The skin site was prepped and draped in sterile fashion.  The skin was anesthetized with 1% lidocaine.    Using CT guidance a 19 gauge introducer needle was guided into the mass.  Prior to biopsy appropriate needle positioning was confirmed with CT.  Three passes 20 gauge 2 cm samples were acquired.  The stylet was replaced and the needle was removed.    Postprocedural imaging was acquired. The site was bandaged sterilely. The patient left the room in stable condition.  Impression: CT guided biopsy of a 17 mm left upper lobe/superior lingula PET avid nodule.    All CT scans at this facility use dose modulation, iterative reconstruction, and/or weight based dosing when appropriate to reduce radiation dose to as low as reasonable achievable.    Electronically signed by: Hernando Dorsey  Date:    10/21/2020  Time:    10:03  X-Ray Chest 1 View  Narrative: EXAMINATION:  XR CHEST 1 VIEW    CLINICAL HISTORY:  s/p left lung biopsy. inspiration. show to radiologist.;    TECHNIQUE:  Single frontal view of the chest was performed.    COMPARISON:  08/19/2020    FINDINGS:  Mass appears slightly more prominent on this image measuring 1.7 cm in diameter possibly a small hematoma post biopsy.  No evidence of pneumothorax can be identified.  Impression: No pneumothorax post lung biopsy.    Electronically signed by: Kvng Smith MD  Date:    10/21/2020  Time:    10:00

## 2020-10-21 NOTE — H&P
[facility]  Pulmonology  H&P    Patient Name: Lucia Barlow  MRN: [id number]  Admission Date: 10/21/2020  Code Status: Full Code  Primary Care Provider: ZELDA Sandoval MD   Principal Problem: <principal problem not specified>    Subjective: shortness of breath following CT guided needle biopsy     HPI:  70-year-old patient with a new pulmonary nodule underwent CT guided needle biopsy complicated by pneumothorax. Chest tube placed     Lung Nodule  She presents for evaluation and treatment of an abnormal chest x-ray. The patient reports that the imaging was performed to evaluate symptoms of dyspnea on exertion, productive cough and shortness of breath which have been present for 10 years and are gradually worsening. Symptoms are exacerbated by walking and relieved by rest. The patient denies other associated symptoms. She has a history of 60 pack years. The patient has no known exposure to tuberculosis. The patient does not have a history of cancer.       Past Medical History:   Diagnosis Date    Atherosclerosis of artery of both lower extremities 1/17/2014    Atherosclerosis of native coronary artery of native heart without angina pectoris 11/18/2016    Atherosclerotic PVD with intermittent claudication 1/17/2014    Chronic bronchitis     COPD (chronic obstructive pulmonary disease)     Dyslipidemia associated with type 2 diabetes mellitus 6/14/2013    Dyslipidemia associated with type 2 diabetes mellitus     Ex-smoker     Gastroesophageal reflux disease without esophagitis 1/25/2019    Hyperlipidemia     Hypertension     Osteoporosis 1/16 rosita 1/18    PVD (peripheral vascular disease)     S/P peripheral artery angioplasty 2/7/2014    Seasonal allergic rhinitis due to pollen     Simple chronic bronchitis     Tobacco dependence     Type 2 diabetes mellitus with microalbuminuria, without long-term current use of insulin 3/29/2019    Type 2 diabetes mellitus with peripheral vascular  disease     Type 2 diabetes mellitus with stage 3 chronic kidney disease, without long-term current use of insulin 2/1/2019    Type 2 diabetes mellitus without retinopathy 2/1/2019    Urinary incontinence        Past Surgical History:   Procedure Laterality Date    ANGIOPLASTY Bilateral 01/24/2014    aortoiliac stenting     CARPAL TUNNEL RELEASE  2003    Henrry    COLECTOMY  approximate 2005    pt states 1in colon -dx with benign mass removed-states no colon cancer    COLONOSCOPY N/A 11/9/2016    Procedure: COLONOSCOPY;  Surgeon: Dmitri Sterling MD;  Location: [facility] [place] ENDO;  Service: Endoscopy;  Laterality: N/A;    COLONOSCOPY N/A 11/15/2019    Procedure: COLONOSCOPY;  Surgeon: Marko Vicente MD;  Location: [facility] [place] ENDO;  Service: Endoscopy;  Laterality: N/A;    HYSTERECTOMY      no cancer    OOPHORECTOMY         Review of patient's allergies indicates:   Allergen Reactions    Skin staples [surgical stainless steel] Swelling    Egg derived      Shortness breath, lip swelling    Fish containing products     Iodine and iodide containing products Swelling    Latex, natural rubber Swelling    Losartan Itching    Nickel     Pravastatin      40 mg causes nausea vomitting but 20 mg ok    Shellfish containing products Swelling       Family History     Problem Relation (Age of Onset)    Asthma Daughter, Son    Breast cancer Daughter    Diabetes Daughter    Stroke Father, Sister        Tobacco Use    Smoking status: Current Some Day Smoker     Packs/day: 1.00     Years: 60.00     Pack years: 60.00     Types: Cigarettes     Start date: 1/1/1960    Smokeless tobacco: Never Used   Substance and Sexual Activity    Alcohol use: No     Alcohol/week: 0.0 standard drinks     Frequency: Never     Drinks per session: Patient refused     Binge frequency: Never    Drug use: No    Sexual activity: Never         Review of Systems   Constitutional: Negative.  Negative for fatigue, fever and unexpected weight change.    HENT: Negative for congestion, postnasal drip, rhinorrhea, sinus pressure and sneezing.    Respiratory: Positive for chest tightness and shortness of breath.    Cardiovascular: Negative for chest pain, palpitations and leg swelling.   Gastrointestinal: Negative for abdominal pain and nausea.   Musculoskeletal: Positive for arthralgias. Negative for back pain.   Skin: Negative for rash.   Neurological: Negative for dizziness, syncope, weakness and light-headedness.   Hematological: Negative for adenopathy. Does not bruise/bleed easily.   Psychiatric/Behavioral: Negative for dysphoric mood and sleep disturbance. The patient is not nervous/anxious.      Objective:     Vital Signs (Most Recent):  Temp: 98.1 °F (36.7 °C) (10/21/20 1620)  Pulse: 82 (10/21/20 1620)  Resp: 18 (10/21/20 1620)  BP: 136/63 (10/21/20 1620)  SpO2: 97 % (10/21/20 1620) Vital Signs (24h Range):  Temp:  [98.1 °F (36.7 °C)] 98.1 °F (36.7 °C)  Pulse:  [77-99] 82  Resp:  [16-21] 18  SpO2:  [94 %-100 %] 97 %  BP: (109-149)/(44-83) 136/63     Weight: 64.4 kg (142 lb)  Body mass index is 25.97 kg/m².    No intake or output data in the 24 hours ending 10/21/20 1706    Physical Exam  Vitals signs and nursing note reviewed.   Constitutional:       Appearance: She is well-developed.   HENT:      Head: Normocephalic and atraumatic.   Eyes:      Conjunctiva/sclera: Conjunctivae normal.      Pupils: Pupils are equal, round, and reactive to light.   Neck:      Musculoskeletal: Neck supple.      Thyroid: No thyromegaly.      Vascular: No JVD.      Trachea: No tracheal deviation.   Cardiovascular:      Rate and Rhythm: Normal rate and regular rhythm.      Heart sounds: Normal heart sounds.   Pulmonary:      Effort: Pulmonary effort is normal. No respiratory distress.      Breath sounds: Normal breath sounds. Decreased air movement present. No wheezing or rales.   Chest:      Chest wall: No tenderness.   Abdominal:      General: Bowel sounds are normal.       Palpations: Abdomen is soft.   Musculoskeletal: Normal range of motion.   Lymphadenopathy:      Cervical: No cervical adenopathy.   Skin:     General: Skin is warm and dry.   Neurological:      Mental Status: She is alert and oriented to person, place, and time.         Vents:       Lines/Drains/Airways     Drain                 Chest Tube 10/21/20 1528 1 Left Pleural 8.5 Fr. less than 1 day          Peripheral Intravenous Line                 Peripheral IV - Single Lumen 10/21/20 0853 22 G Left Antecubital less than 1 day                Significant Labs:    CBC/Anemia Profile:  Recent Labs   Lab 10/21/20  0735   WBC 6.34   HGB 10.7*   HCT 35.5*      MCV 91   RDW 17.1*        Chemistries:  No results for input(s): NA, K, CL, CO2, BUN, CREATININE, CALCIUM, ALBUMIN, PROT, BILITOT, ALKPHOS, ALT, AST, GLUCOSE, MG, PHOS in the last 48 hours.    BMP: No results for input(s): GLU, NA, K, CL, CO2, BUN, CREATININE, CALCIUM, MG in the last 48 hours.  CMP: No results for input(s): NA, K, CL, CO2, GLU, BUN, CREATININE, CALCIUM, PROT, ALBUMIN, BILITOT, ALKPHOS, AST, ALT, ANIONGAP, EGFRNONAA in the last 48 hours.    Invalid input(s): ESTGFAFRICA  All pertinent labs within the past 24 hours have been reviewed.    Significant Imaging:   I have reviewed all pertinent imaging results/findings within the past 24 hours.  I have reviewed and interpreted all pertinent imaging results/findings within the past 24 hours.     X-Ray Chest AP Portable  Narrative: EXAMINATION:  XR CHEST AP PORTABLE    CLINICAL HISTORY:  s/p left chest tube. inspiration. show to radiologist.;    TECHNIQUE:  Single frontal view of the chest was performed.    COMPARISON:  10/21/2020 at 13:41    FINDINGS:  Pleural catheter present.  No appreciable pneumothorax.  No pleural effusion.    The cardiac silhouette is normal in size. The hilar and mediastinal contours are unremarkable.  Impression: No pneumothorax post pleural catheter placement.  Nodular density  in the left mid lung field as seen on the prior films.    Electronically signed by: Kvng Smith MD  Date:    10/21/2020  Time:    16:41  X-Ray Chest AP Portable  Narrative: EXAMINATION:  XR CHEST AP PORTABLE    CLINICAL HISTORY:  s/p left lung bx. inspiration. show to radiologist.;    TECHNIQUE:  Single frontal view of the chest was performed.    COMPARISON:  None    FINDINGS:  Pneumothorax is increased from 11 mm on the previous examination to 2 cm on this study.  Right lung field is clear.    Heart size normal.  Atherosclerotic calcifications of the thoracic aorta.  Impression: Increasing left pneumothorax.    Electronically signed by: Kvng Smith MD  Date:    10/21/2020  Time:    13:49  X-Ray Chest AP Portable  Narrative: EXAMINATION:  XR CHEST AP PORTABLE    CLINICAL HISTORY:  s/p lung biopsy with pneumothorax.  inspiration. show to radiologist.;.    TECHNIQUE:  Single frontal portable view of the chest was performed.    COMPARISON:  10/21/2020 11:20    FINDINGS:  Support devices: None    Small left pneumothorax slightly enlarged compared to earlier exam, now 11 mm separation of the visceral and parietal pleura at the apex.    The cardiac silhouette is normal in size. The hilar and mediastinal contours are unremarkable.    Bones are intact.  Impression: Small left pneumothorax slightly enlarged compared to earlier exam, now 11 mm separation of the visceral and parietal pleura at the apex.    Electronically signed by: Hernando Dorsey  Date:    10/21/2020  Time:    12:43  X-Ray Chest 1 View  Narrative: EXAMINATION:  XR CHEST 1 VIEW    CLINICAL HISTORY:  2 hour s/p left lung biopsy. inspiration. show to radiologist.;    TECHNIQUE:  Single frontal view of the chest was performed.    COMPARISON:  10/21/2020    FINDINGS:  Small pneumothorax post lung biopsy not present on the prior film it has measures up to 4 mm in thickness.  Nodular density in the left mid lung field is unchanged.  Impression: 4 mm pneumothorax not  present on the prior chest x-ray dated 10/21/2020 at 09:45    Electronically signed by: Kvng Smith MD  Date:    10/21/2020  Time:    11:27  CT Biopsy Lung (xpd)  Narrative: EXAMINATION:  CT BIOPSY LUNG (XPD)    CLINICAL HISTORY:  pulmonary nodule;  Solitary pulmonary nodule    TECHNIQUE:  Medications:    Moderate sedation using versed and fentanyl was provided with and trained observer monitoring the patient's vital signs and level of consciousness. The total time of sedation was 30 minutes.    1% lidocaine locally    : SIMIN Dorsey; Saturnino PIKE assisted in this procedure.    Complications: None    COMPARISON:  PET-CT 09/30/2020    FINDINGS:  Procedure: The risks and benefits of this procedure were discussed with the patient, written informed consent was obtained.  The patient was placed supine on the CT gantry.  Preprocedural imaging revealed 17 mm left upper lobe/superior lingula PET avid nodule.  A suitable skin site for biopsy was selected.  IV fentanyl and Versed was given for conscious sedation.  Nurse or other independent trained observer monitored the patient's physiologic status and vital signs during the procedure.  The skin site was prepped and draped in sterile fashion.  The skin was anesthetized with 1% lidocaine.    Using CT guidance a 19 gauge introducer needle was guided into the mass.  Prior to biopsy appropriate needle positioning was confirmed with CT.  Three passes 20 gauge 2 cm samples were acquired.  The stylet was replaced and the needle was removed.    Postprocedural imaging was acquired. The site was bandaged sterilely. The patient left the room in stable condition.  Impression: CT guided biopsy of a 17 mm left upper lobe/superior lingula PET avid nodule.    All CT scans at this facility use dose modulation, iterative reconstruction, and/or weight based dosing when appropriate to reduce radiation dose to as low as reasonable achievable.    Electronically signed by: Hernando  iWllian  Date:    10/21/2020  Time:    10:03  X-Ray Chest 1 View  Narrative: EXAMINATION:  XR CHEST 1 VIEW    CLINICAL HISTORY:  s/p left lung biopsy. inspiration. show to radiologist.;    TECHNIQUE:  Single frontal view of the chest was performed.    COMPARISON:  08/19/2020    FINDINGS:  Mass appears slightly more prominent on this image measuring 1.7 cm in diameter possibly a small hematoma post biopsy.  No evidence of pneumothorax can be identified.  Impression: No pneumothorax post lung biopsy.    Electronically signed by: Kvng Smith MD  Date:    10/21/2020  Time:    10:00        Assessment/Plan:     Pneumothorax after biopsy  Lung re-expanded with chest tube    Solid nodule of lung greater than 8 mm in diameter  Review biopsy results             Ramiro Aleman MD  Pulmonology  [facility]

## 2020-10-21 NOTE — SEDATION DOCUMENTATION
Pt transferred to stretcher supine with hob elevated.  Pt transported to m/s.  Report given to Mendy charge nurse.  Pt daughter at bedside. Nurse El notified of pt arrival to floor.  Daughter at bedside, updated given.  15 minutes spent educating pt and family regarding chest tube.  All questions answered, both verbalized understanding.  Instructed pt to call for assist for ambulation to bathroom or any other assist.

## 2020-10-21 NOTE — SUBJECTIVE & OBJECTIVE
Past Medical History:   Diagnosis Date    Atherosclerosis of artery of both lower extremities 1/17/2014    Atherosclerosis of native coronary artery of native heart without angina pectoris 11/18/2016    Atherosclerotic PVD with intermittent claudication 1/17/2014    Chronic bronchitis     COPD (chronic obstructive pulmonary disease)     Dyslipidemia associated with type 2 diabetes mellitus 6/14/2013    Dyslipidemia associated with type 2 diabetes mellitus     Ex-smoker     Gastroesophageal reflux disease without esophagitis 1/25/2019    Hyperlipidemia     Hypertension     Osteoporosis 1/16 rosita 1/18    PVD (peripheral vascular disease)     S/P peripheral artery angioplasty 2/7/2014    Seasonal allergic rhinitis due to pollen     Simple chronic bronchitis     Tobacco dependence     Type 2 diabetes mellitus with microalbuminuria, without long-term current use of insulin 3/29/2019    Type 2 diabetes mellitus with peripheral vascular disease     Type 2 diabetes mellitus with stage 3 chronic kidney disease, without long-term current use of insulin 2/1/2019    Type 2 diabetes mellitus without retinopathy 2/1/2019    Urinary incontinence        Past Surgical History:   Procedure Laterality Date    ANGIOPLASTY Bilateral 01/24/2014    aortoiliac stenting     CARPAL TUNNEL RELEASE  2003    [facility]    COLECTOMY  approximate 2005    pt states 1in colon -dx with benign mass removed-states no colon cancer    COLONOSCOPY N/A 11/9/2016    Procedure: COLONOSCOPY;  Surgeon: Dmitri Sterling MD;  Location: [facility] ENDO;  Service: Endoscopy;  Laterality: N/A;    COLONOSCOPY N/A 11/15/2019    Procedure: COLONOSCOPY;  Surgeon: Marko Vicente MD;  Location: [facility] ENDO;  Service: Endoscopy;  Laterality: N/A;    HYSTERECTOMY      no cancer    OOPHORECTOMY         Review of patient's allergies indicates:   Allergen Reactions    Skin staples [surgical stainless steel] Swelling    Egg derived      Shortness  breath, lip swelling    Fish containing products     Iodine and iodide containing products Swelling    Latex, natural rubber Swelling    Losartan Itching    Nickel     Pravastatin      40 mg causes nausea vomitting but 20 mg ok    Shellfish containing products Swelling       No current facility-administered medications on file prior to encounter.      Current Outpatient Medications on File Prior to Encounter   Medication Sig    amlodipine-benazepril 5-20 mg (LOTREL) 5-20 mg per capsule Take 1 capsule by mouth once daily.    butalbital-acetaminophen-caffeine -40 mg (FIORICET, ESGIC) -40 mg per tablet Take 1 tablet by mouth 3 (three) times daily as needed for Headaches. (MAXIMUM OF 15 TABLET PER MONTH)    calcium-vitamin D (CALCIUM WITH VITAMIN D) 600 mg(1,500mg) -400 unit Tab Take 2 tablets by mouth once daily.    chlorthalidone (HYGROTEN) 25 MG Tab TAKE 1 TABLET EVERY DAY    ezetimibe (ZETIA) 10 mg tablet Take 1 tablet (10 mg total) by mouth once daily.    glimepiride (AMARYL) 2 MG tablet Take 1 tablet (2 mg total) by mouth before breakfast.    montelukast (SINGULAIR) 10 mg tablet     pantoprazole (PROTONIX) 40 MG tablet TAKE 1 TABLET EVERY DAY    potassium 99 mg Tab Take 1 tablet by mouth once daily.    predniSONE (DELTASONE) 20 MG tablet Prednisone 60 mg/ day for 3 days, 40 mg/day for 3 days,20 mg/ day for 3 days, (1/2 tablet )10 mg a day for 3 days.    rosuvastatin (CRESTOR) 20 MG tablet Take 1 tablet (20 mg total) by mouth once daily.    blood sugar diagnostic Strp 1 each by Misc.(Non-Drug; Combo Route) route 3 (three) times daily. [facility] test strips    blood-glucose meter kit Insurance preferred    fluticasone propionate (FLONASE) 50 mcg/actuation nasal spray 2 sprays (100 mcg total) by Each Nostril route once daily.    ipratropium-albuteroL (COMBIVENT)  mcg/actuation inhaler Inhale 2 puffs into the lungs every 4 (four) hours as needed for Wheezing or Shortness of  Breath. Rescue    lancets 32 gauge Misc 1 lancet by Misc.(Non-Drug; Combo Route) route 3 (three) times daily. Insurance preferred    tiZANidine (ZANAFLEX) 2 MG tablet Take 1-2 tabs twice a day as needed for muscle pain.  Will cause drowsiness    TRUEPLUS LANCETS 33 gauge Misc     varicella-zoster gE-AS01B, PF, (SHINGRIX) 50 mcg/0.5 mL injection Inject 0.5 mL (one dose) into muscle now; give second dose at least 2 months later     Family History     Problem Relation (Age of Onset)    Asthma Daughter, Son    Breast cancer Daughter    Diabetes Daughter    Stroke Father, Sister        Tobacco Use    Smoking status: Current Some Day Smoker     Packs/day: 1.00     Years: 60.00     Pack years: 60.00     Types: Cigarettes     Start date: 1/1/1960    Smokeless tobacco: Never Used   Substance and Sexual Activity    Alcohol use: No     Alcohol/week: 0.0 standard drinks     Frequency: Never     Drinks per session: Patient refused     Binge frequency: Never    Drug use: No    Sexual activity: Never     Review of Systems   Constitutional: Positive for activity change. Negative for appetite change, chills, fatigue and fever.   HENT: Negative for ear discharge, ear pain and facial swelling.    Eyes: Negative for pain and redness.   Respiratory: Negative for cough and shortness of breath.    Cardiovascular: Positive for chest pain. Negative for palpitations and leg swelling.        Midsternal chest pain with movement and inspiration   Gastrointestinal: Negative for abdominal pain, blood in stool, constipation, diarrhea, nausea and vomiting.   Endocrine: Negative for polydipsia and polyphagia.   Genitourinary: Negative for difficulty urinating, dysuria, flank pain and hematuria.   Musculoskeletal: Negative for gait problem, neck pain and neck stiffness.   Skin: Negative for color change.   Allergic/Immunologic: Negative for food allergies.   Neurological: Negative for seizures, facial asymmetry, speech difficulty and  weakness.   Hematological: Does not bruise/bleed easily.   Psychiatric/Behavioral: Negative for agitation, behavioral problems, confusion, hallucinations and suicidal ideas. The patient is not nervous/anxious.      Objective:     Vital Signs (Most Recent):  Temp: 98.1 °F (36.7 °C) (10/21/20 1620)  Pulse: 82 (10/21/20 1620)  Resp: 18 (10/21/20 1620)  BP: 136/63 (10/21/20 1620)  SpO2: 97 % (10/21/20 1620) Vital Signs (24h Range):  Temp:  [98.1 °F (36.7 °C)] 98.1 °F (36.7 °C)  Pulse:  [77-99] 82  Resp:  [16-21] 18  SpO2:  [94 %-100 %] 97 %  BP: (109-149)/(44-83) 136/63     Weight: 64.4 kg (142 lb)  Body mass index is 25.97 kg/m².    Physical Exam  Constitutional:       General: She is not in acute distress.     Appearance: She is obese.   HENT:      Head: Normocephalic and atraumatic.      Mouth/Throat:      Mouth: Mucous membranes are moist.   Eyes:      General:         Right eye: No discharge.         Left eye: No discharge.      Extraocular Movements: Extraocular movements intact.      Conjunctiva/sclera: Conjunctivae normal.      Pupils: Pupils are equal, round, and reactive to light.   Neck:      Musculoskeletal: Normal range of motion and neck supple. No neck rigidity or muscular tenderness.   Cardiovascular:      Rate and Rhythm: Normal rate and regular rhythm.      Pulses: Normal pulses.      Heart sounds: Normal heart sounds. No murmur.   Pulmonary:      Effort: Pulmonary effort is normal. No respiratory distress.      Breath sounds: Wheezing present.      Comments: Mild intermittent expiratory  Wheeze    Left chest tube noted  Abdominal:      General: Bowel sounds are normal. There is no distension.      Tenderness: There is no abdominal tenderness. There is no guarding.      Comments: Obese    Abdominal hernia   Genitourinary:     Comments: Not examined  Musculoskeletal: Normal range of motion.         General: No swelling or tenderness.      Right lower leg: No edema.      Left lower leg: No edema.    Skin:     General: Skin is warm and dry.      Capillary Refill: Capillary refill takes less than 2 seconds.   Neurological:      General: No focal deficit present.      Mental Status: She is alert and oriented to person, place, and time. Mental status is at baseline.      Cranial Nerves: No cranial nerve deficit.   Psychiatric:         Mood and Affect: Mood normal.         Behavior: Behavior normal.         Thought Content: Thought content normal.         Judgment: Judgment normal.           CRANIAL NERVES     CN III, IV, VI   Pupils are equal, round, and reactive to light.             Results for orders placed or performed during the hospital encounter of 10/21/20   COVID-19 Rapid Screening   Result Value Ref Range    SARS-CoV-2 RNA, Amplification, Qual Negative Negative   CBC auto differential   Result Value Ref Range    WBC 7.06 3.90 - 12.70 K/uL    RBC 3.81 (L) 4.00 - 5.40 M/uL    Hemoglobin 10.6 (L) 12.0 - 16.0 g/dL    Hematocrit 34.1 (L) 37.0 - 48.5 %    Mean Corpuscular Volume 90 82 - 98 fL    Mean Corpuscular Hemoglobin 27.8 27.0 - 31.0 pg    Mean Corpuscular Hemoglobin Conc 31.1 (L) 32.0 - 36.0 g/dL    RDW 16.9 (H) 11.5 - 14.5 %    Platelets 301 150 - 350 K/uL    MPV 9.6 9.2 - 12.9 fL    Immature Granulocytes 0.3 0.0 - 0.5 %    Gran # (ANC) 5.4 1.8 - 7.7 K/uL    Immature Grans (Abs) 0.02 0.00 - 0.04 K/uL    Lymph # 1.2 1.0 - 4.8 K/uL    Mono # 0.3 0.3 - 1.0 K/uL    Eos # 0.2 0.0 - 0.5 K/uL    Baso # 0.02 0.00 - 0.20 K/uL    nRBC 0 0 /100 WBC    Gran% 76.3 (H) 38.0 - 73.0 %    Lymph% 16.4 (L) 18.0 - 48.0 %    Mono% 4.4 4.0 - 15.0 %    Eosinophil% 2.3 0.0 - 8.0 %    Basophil% 0.3 0.0 - 1.9 %    Differential Method Automated    Basic metabolic panel   Result Value Ref Range    Sodium 138 136 - 145 mmol/L    Potassium 3.9 3.5 - 5.1 mmol/L    Chloride 103 95 - 110 mmol/L    CO2 23 23 - 29 mmol/L    Glucose 187 (H) 70 - 110 mg/dL    BUN, Bld 17 8 - 23 mg/dL    Creatinine 1.1 0.5 - 1.4 mg/dL    Calcium 8.9 8.7 -  10.5 mg/dL    Anion Gap 12 8 - 16 mmol/L    eGFR if African American 58 (A) >60 mL/min/1.73 m^2    eGFR if non African American 50 (A) >60 mL/min/1.73 m^2

## 2020-10-21 NOTE — SEDATION DOCUMENTATION
Pt in ct on table supine with bilateral arms above head, cm in place, vss.  Pt verbalized understanding of instructions.

## 2020-10-21 NOTE — HPI
This is a 70-year-old female who has a past medical history of COPD with nicotine dependence, diabetes type 2, peripheral vascular disease with prior angioplasty dyslipidemia, GERD, hypertension, osteoporosis, and pulmonary nodule.  Patient came in for evaluation due to abnormal chest x-ray showing pulmonary nodule patient patient underwent CT scan-guided needle biopsy which was complicated by a pneumothorax.  A chest tube was placed.  Patient has been placed in the hospital for further monitoring.

## 2020-10-21 NOTE — DISCHARGE SUMMARY
Pre Op Diagnosis: left sided pneumothorax     Post Op Diagnosis: same     Procedure:  Left chest tube     Procedure performed by: Lloyd BAUMAN, Gail CEBALLOS     Written Informed Consent Obtained: Yes     Specimen Removed:  no     Estimated Blood Loss:  minimal     Findings: Local anesthesia and moderate sedation were used.     The patient tolerated the procedure well and there were no complications.      Sterile technique was performed in the left chest, lidocaine was used as a local anesthetic.  8.5 fr chest drainage tube placed and secured with suture, tegaderm and vaseline gauze.  Pt tolerated the procedure well without immediate complications.  Please see radiologist report for details. F/u with PCP and/or ordering physician.

## 2020-10-21 NOTE — H&P
[facility] Hospital Medicine  History & Physical    Patient Name: Lucia Barlow  MRN: [id number]  Admission Date: 10/21/2020  Attending Physician: Dr. Ugarte  Primary Care Provider: ZELDA Sandoval MD    Patient information was obtained from patient and Records available.     Subjective:     Principal Problem:Pneumothorax after biopsy    Chief Complaint: As above       HPI: This is a 70-year-old female who has a past medical history of COPD with nicotine dependence, diabetes type 2, peripheral vascular disease with prior angioplasty dyslipidemia, GERD, hypertension, osteoporosis, and pulmonary nodule.  Patient came in for evaluation due to abnormal chest x-ray showing pulmonary nodule patient patient underwent CT scan-guided needle biopsy which was complicated by a pneumothorax.  A chest tube was placed.  Patient has been placed in the hospital for further monitoring.      Patient seen in room. Daughter at bedside. No distress noted. Patient just got through eating a large dinner from [employer] including several tacos and a mexican pizza.     Past Medical History:   Diagnosis Date    Atherosclerosis of artery of both lower extremities 1/17/2014    Atherosclerosis of native coronary artery of native heart without angina pectoris 11/18/2016    Atherosclerotic PVD with intermittent claudication 1/17/2014    Chronic bronchitis     COPD (chronic obstructive pulmonary disease)     Dyslipidemia associated with type 2 diabetes mellitus 6/14/2013    Dyslipidemia associated with type 2 diabetes mellitus     Ex-smoker     Gastroesophageal reflux disease without esophagitis 1/25/2019    Hyperlipidemia     Hypertension     Osteoporosis 1/16 rosita 1/18    PVD (peripheral vascular disease)     S/P peripheral artery angioplasty 2/7/2014    Seasonal allergic rhinitis due to pollen     Simple chronic bronchitis     Tobacco dependence     Type 2 diabetes mellitus with microalbuminuria, without  long-term current use of insulin 3/29/2019    Type 2 diabetes mellitus with peripheral vascular disease     Type 2 diabetes mellitus with stage 3 chronic kidney disease, without long-term current use of insulin 2/1/2019    Type 2 diabetes mellitus without retinopathy 2/1/2019    Urinary incontinence        Past Surgical History:   Procedure Laterality Date    ANGIOPLASTY Bilateral 01/24/2014    aortoiliac stenting     CARPAL TUNNEL RELEASE  2003    Henrry    COLECTOMY  approximate 2005    pt states 1in colon -dx with benign mass removed-states no colon cancer    COLONOSCOPY N/A 11/9/2016    Procedure: COLONOSCOPY;  Surgeon: Dmitri Sterling MD;  Location: [facility] END[facility];  Service: Endoscopy;  Laterality: N/A;    COLONOSCOPY N/A 11/15/2019    Procedure: COLONOSCOPY;  Surgeon: Marko Vicente MD;  Location: [facility] ENDO;  Service: Endoscopy;  Laterality: N/A;    HYSTERECTOMY      no cancer    OOPHORECTOMY         Review of patient's allergies indicates:   Allergen Reactions    Skin staples [surgical stainless steel] Swelling    Egg derived      Shortness breath, lip swelling    Fish containing products     Iodine and iodide containing products Swelling    Latex, natural rubber Swelling    Losartan Itching    Nickel     Pravastatin      40 mg causes nausea vomitting but 20 mg ok    Shellfish containing products Swelling       No current facility-administered medications on file prior to encounter.      Current Outpatient Medications on File Prior to Encounter   Medication Sig    amlodipine-benazepril 5-20 mg (LOTREL) 5-20 mg per capsule Take 1 capsule by mouth once daily.    butalbital-acetaminophen-caffeine -40 mg (FIORICET, ESGIC) -40 mg per tablet Take 1 tablet by mouth 3 (three) times daily as needed for Headaches. (MAXIMUM OF 15 TABLET PER MONTH)    calcium-vitamin D (CALCIUM WITH VITAMIN D) 600 mg(1,500mg) -400 unit Tab Take 2 tablets by mouth once daily.    chlorthalidone  (HYGROTEN) 25 MG Tab TAKE 1 TABLET EVERY DAY    ezetimibe (ZETIA) 10 mg tablet Take 1 tablet (10 mg total) by mouth once daily.    glimepiride (AMARYL) 2 MG tablet Take 1 tablet (2 mg total) by mouth before breakfast.    montelukast (SINGULAIR) 10 mg tablet     pantoprazole (PROTONIX) 40 MG tablet TAKE 1 TABLET EVERY DAY    potassium 99 mg Tab Take 1 tablet by mouth once daily.    predniSONE (DELTASONE) 20 MG tablet Prednisone 60 mg/ day for 3 days, 40 mg/day for 3 days,20 mg/ day for 3 days, (1/2 tablet )10 mg a day for 3 days.    rosuvastatin (CRESTOR) 20 MG tablet Take 1 tablet (20 mg total) by mouth once daily.    blood sugar diagnostic Strp 1 each by Misc.(Non-Drug; Combo Route) route 3 (three) times daily. [facility] test strips    blood-glucose meter kit Insurance preferred    fluticasone propionate (FLONASE) 50 mcg/actuation nasal spray 2 sprays (100 mcg total) by Each Nostril route once daily.    ipratropium-albuteroL (COMBIVENT)  mcg/actuation inhaler Inhale 2 puffs into the lungs every 4 (four) hours as needed for Wheezing or Shortness of Breath. Rescue    lancets 32 gauge Misc 1 lancet by Misc.(Non-Drug; Combo Route) route 3 (three) times daily. Insurance preferred    tiZANidine (ZANAFLEX) 2 MG tablet Take 1-2 tabs twice a day as needed for muscle pain.  Will cause drowsiness    TRUEPLUS LANCETS 33 gauge Misc     varicella-zoster gE-AS01B, PF, (SHINGRIX) 50 mcg/0.5 mL injection Inject 0.5 mL (one dose) into muscle now; give second dose at least 2 months later     Family History     Problem Relation (Age of Onset)    Asthma Daughter, Son    Breast cancer Daughter    Diabetes Daughter    Stroke Father, Sister        Tobacco Use    Smoking status: Current Some Day Smoker     Packs/day: 1.00     Years: 60.00     Pack years: 60.00     Types: Cigarettes     Start date: 1/1/1960    Smokeless tobacco: Never Used   Substance and Sexual Activity    Alcohol use: No     Alcohol/week: 0.0  standard drinks     Frequency: Never     Drinks per session: Patient refused     Binge frequency: Never    Drug use: No    Sexual activity: Never     Review of Systems   Constitutional: Positive for activity change. Negative for appetite change, chills, fatigue and fever.   HENT: Negative for ear discharge, ear pain and facial swelling.    Eyes: Negative for pain and redness.   Respiratory: Negative for cough and shortness of breath.    Cardiovascular: Positive for chest pain. Negative for palpitations and leg swelling.        Midsternal chest pain with movement and inspiration   Gastrointestinal: Negative for abdominal pain, blood in stool, constipation, diarrhea, nausea and vomiting.   Endocrine: Negative for polydipsia and polyphagia.   Genitourinary: Negative for difficulty urinating, dysuria, flank pain and hematuria.   Musculoskeletal: Negative for gait problem, neck pain and neck stiffness.   Skin: Negative for color change.   Allergic/Immunologic: Negative for food allergies.   Neurological: Negative for seizures, facial asymmetry, speech difficulty and weakness.   Hematological: Does not bruise/bleed easily.   Psychiatric/Behavioral: Negative for agitation, behavioral problems, confusion, hallucinations and suicidal ideas. The patient is not nervous/anxious.      Objective:     Vital Signs (Most Recent):  Temp: 98.1 °F (36.7 °C) (10/21/20 1620)  Pulse: 82 (10/21/20 1620)  Resp: 18 (10/21/20 1620)  BP: 136/63 (10/21/20 1620)  SpO2: 97 % (10/21/20 1620) Vital Signs (24h Range):  Temp:  [98.1 °F (36.7 °C)] 98.1 °F (36.7 °C)  Pulse:  [77-99] 82  Resp:  [16-21] 18  SpO2:  [94 %-100 %] 97 %  BP: (109-149)/(44-83) 136/63     Weight: 64.4 kg (142 lb)  Body mass index is 25.97 kg/m².    Physical Exam  Constitutional:       General: She is not in acute distress.     Appearance: She is obese.   HENT:      Head: Normocephalic and atraumatic.      Mouth/Throat:      Mouth: Mucous membranes are moist.   Eyes:       General:         Right eye: No discharge.         Left eye: No discharge.      Extraocular Movements: Extraocular movements intact.      Conjunctiva/sclera: Conjunctivae normal.      Pupils: Pupils are equal, round, and reactive to light.   Neck:      Musculoskeletal: Normal range of motion and neck supple. No neck rigidity or muscular tenderness.   Cardiovascular:      Rate and Rhythm: Normal rate and regular rhythm.      Pulses: Normal pulses.      Heart sounds: Normal heart sounds. No murmur.   Pulmonary:      Effort: Pulmonary effort is normal. No respiratory distress.      Breath sounds: Wheezing present.      Comments: Mild intermittent expiratory  Wheeze    Left chest tube noted  Abdominal:      General: Bowel sounds are normal. There is no distension.      Tenderness: There is no abdominal tenderness. There is no guarding.      Comments: Obese    Abdominal hernia   Genitourinary:     Comments: Not examined  Musculoskeletal: Normal range of motion.         General: No swelling or tenderness.      Right lower leg: No edema.      Left lower leg: No edema.   Skin:     General: Skin is warm and dry.      Capillary Refill: Capillary refill takes less than 2 seconds.   Neurological:      General: No focal deficit present.      Mental Status: She is alert and oriented to person, place, and time. Mental status is at baseline.      Cranial Nerves: No cranial nerve deficit.   Psychiatric:         Mood and Affect: Mood normal.         Behavior: Behavior normal.         Thought Content: Thought content normal.         Judgment: Judgment normal.           CRANIAL NERVES     CN III, IV, VI   Pupils are equal, round, and reactive to light.             Results for orders placed or performed during the hospital encounter of 10/21/20   COVID-19 Rapid Screening   Result Value Ref Range    SARS-CoV-2 RNA, Amplification, Qual Negative Negative   CBC auto differential   Result Value Ref Range    WBC 7.06 3.90 - 12.70 K/uL    RBC  3.81 (L) 4.00 - 5.40 M/uL    Hemoglobin 10.6 (L) 12.0 - 16.0 g/dL    Hematocrit 34.1 (L) 37.0 - 48.5 %    Mean Corpuscular Volume 90 82 - 98 fL    Mean Corpuscular Hemoglobin 27.8 27.0 - 31.0 pg    Mean Corpuscular Hemoglobin Conc 31.1 (L) 32.0 - 36.0 g/dL    RDW 16.9 (H) 11.5 - 14.5 %    Platelets 301 150 - 350 K/uL    MPV 9.6 9.2 - 12.9 fL    Immature Granulocytes 0.3 0.0 - 0.5 %    Gran # (ANC) 5.4 1.8 - 7.7 K/uL    Immature Grans (Abs) 0.02 0.00 - 0.04 K/uL    Lymph # 1.2 1.0 - 4.8 K/uL    Mono # 0.3 0.3 - 1.0 K/uL    Eos # 0.2 0.0 - 0.5 K/uL    Baso # 0.02 0.00 - 0.20 K/uL    nRBC 0 0 /100 WBC    Gran% 76.3 (H) 38.0 - 73.0 %    Lymph% 16.4 (L) 18.0 - 48.0 %    Mono% 4.4 4.0 - 15.0 %    Eosinophil% 2.3 0.0 - 8.0 %    Basophil% 0.3 0.0 - 1.9 %    Differential Method Automated    Basic metabolic panel   Result Value Ref Range    Sodium 138 136 - 145 mmol/L    Potassium 3.9 3.5 - 5.1 mmol/L    Chloride 103 95 - 110 mmol/L    CO2 23 23 - 29 mmol/L    Glucose 187 (H) 70 - 110 mg/dL    BUN, Bld 17 8 - 23 mg/dL    Creatinine 1.1 0.5 - 1.4 mg/dL    Calcium 8.9 8.7 - 10.5 mg/dL    Anion Gap 12 8 - 16 mmol/L    eGFR if African American 58 (A) >60 mL/min/1.73 m^2    eGFR if non African American 50 (A) >60 mL/min/1.73 m^2         Assessment/Plan:     * Pneumothorax after biopsy  Telemetry  Monitor O2 sats, supplement O2 as needed  Chest tube currently in place  Orders as per pulmonology  Prn pain medication       Solid nodule of lung greater than 8 mm in diameter  Status post biopsy on 10/21/2020      Chronic obstructive pulmonary disease  Monitor O2 sats, supplement O2 as needed  Orders as per pulmonology      Type 2 diabetes mellitus with stage 3 chronic kidney disease, without long-term current use of insulin  Monitor  Renal dose all medications      Type 2 diabetes mellitus with peripheral vascular disease  Dm diet  Continue home medications      Dyslipidemia associated with type 2 diabetes mellitus  Continue home  statin      Hypertension associated with diabetes  Continue home medications        VTE Risk Mitigation (From admission, onward)         Ordered     Place WADE hose  Until discontinued      10/21/20 1751     Place WADE hose  Until discontinued      10/21/20 1653     IP VTE LOW RISK PATIENT  Once      10/21/20 1653               Time spent seeing patient( greater than 1/2 spent in direct contact) : 58 minutes      Loren Mabry, ORI  Department of Hospital Medicine   [facility]

## 2020-10-21 NOTE — SEDATION DOCUMENTATION
Procedure complete, pt tolerated well.  Vss.  8.5 Luxembourgish chest tube to left upper chest wall to 20 cmH20 water seal atrium.  Secured with sutures, Vaseline gauze, stat lock and Tegaderm.

## 2020-10-21 NOTE — DISCHARGE SUMMARY
Pre Op Diagnosis: left lung mass     Post Op Diagnosis: same     Procedure:  Left lung mass biopsy     Procedure performed by: Lloyd BAUMAN, Gail CEBALLOS     Written Informed Consent Obtained: Yes     Specimen Removed:  yes     Estimated Blood Loss:  minimal     Findings: Local anesthesia and moderate sedation were used.     The patient tolerated the procedure well and there were no complications.      Sterile technique was performed in the lateral left thorax, lidocaine was used as a local anesthetic.  Multiple samples taken from the left lung nodule.  Pt tolerated the procedure well without immediate complications.  Please see radiologist report for details. F/u with PCP and/or ordering physician.

## 2020-10-21 NOTE — PLAN OF CARE
VSS. No adverse reactions. Glucose monitoring ACHS. Dressing to [facility] [place]. Cardiac monitoring. Will continue to monitor. Call light in reach.

## 2020-10-21 NOTE — SEDATION DOCUMENTATION
Procedure complete, pt tolerated well.  Vss.  Band aid placed to L chest puncture site, site WDL.

## 2020-10-21 NOTE — ASSESSMENT & PLAN NOTE
Telemetry  Monitor O2 sats, supplement O2 as needed  Chest tube currently in place  Orders as per pulmonology  Prn pain medication

## 2020-10-21 NOTE — SEDATION DOCUMENTATION
Pt in ct on table lying on r side with left side up.  Cm in place, vss.  Pt and daughter verbalized understanding of instructions.

## 2020-10-21 NOTE — DISCHARGE INSTRUCTIONS
Recovery After Procedural Sedation (Adult)   You have been given medicine by vein to make you sleep during your surgery. This may have included both a pain medicine and sleeping medicine. Most of the effects have worn off. But you may still have some drowsiness for the next 6 to 8 hours.  Home care  Follow these guidelines when you get home:  · For the next 8 hours, you should be watched by a responsible adult. This person should make sure your condition is not getting worse.  · Don't drink any alcohol for the next 24 hours.  · Don't drive, operate dangerous machinery, or make important business or personal decisions during the next 24 hours.  · To prevent injury or falls, use caution when standing and walking for at least 24 hours after your procedure.  Note: Your healthcare provider may tell you not to take any medicine by mouth for pain or sleep in the next 4 hours. These medicines may react with the medicines you were given in the hospital. This could cause a much stronger response than usual.  Follow-up care  Follow up with your healthcare provider if you are not alert and back to your usual level of activity within 12 hours.  When to seek medical advice  Call your healthcare provider right away if any of these occur:  · Drowsiness gets worse  · Weakness or dizziness gets worse  · Repeated vomiting  · You can't be awakened  · Fever  · New rash  [employer] last reviewed this educational content on 9/1/2019  © 6836-9241 [employer]. [address] [place] [address]. All rights reserved. This information is not intended as a substitute for professional medical care. Always follow your healthcare professional's instructions.        CT-Guided Lung Biopsy  CT-guided lung biopsy is a procedure to collect small tissue samples from an abnormal area in your lung. During the procedure, an imaging method called CT (computed tomography) is used to show live pictures of your lung. Then a thin needle is  used to remove the tissue samples. The samples are tested in a lab for cancer and other problems.     For the biopsy, a thin needle is used to remove samples of tissue from an abnormal area in the lung.   Getting ready for your procedure  Follow any instructions from your healthcare provider.  Tell your provider about any medicines you are taking. It is important for your provider to know if you are taking any blood-thinning medicines or have a bleeding disorder.  You may need to stop taking all or some medicine before the procedure. This includes:  · All prescription medicines  · Blood-thinning medicines (anticoagulants)  · Over-the-counter medicines such as aspirin or ibuprofen  · Street drugs  · Herbs, vitamins, and other supplements  Also tell your provider if you:  · Are pregnant or think you may be pregnant  · Are breastfeeding  · Are allergic to or have intolerances to any medicines  · Have a chronic cough, new cough, or other illness  · Use oxygen therapy at home  · Smoke or drink alcohol on a regular basis  Follow any directions youre given for not eating or drinking before the procedure.  The day of your procedure  The procedure takes about 60 minutes. The entire procedure (including time to prepare and recover) takes several hours. Youll likely go home the same day.  Before the procedure begins:  · An IV (intravenous) line may be put into a vein in your hand or arm. This line supplies fluids and medicines.  · To keep you free of pain during the procedure, you may be given anesthesia. Depending on the type of anesthesia used, you may be awake, drowsy, or in a deep sleep for the procedure.  During the procedure:  · Youll lie on a CT scan table. You may be on your back, side, or stomach. Pictures of your lung are then taken using the CT scanner. This helps your provider find the best place to position the needle in your lungs.  · A mickey is made on your skin where the needle will be inserted (biopsy site).  The site is injected with numbing medicine.  · Using the CT pictures as a guide, the needle is passed through the numbed skin between your ribs and into your lung. Samples of tissue are then removed from the abnormal area in your lung. The samples are sent to a lab to be checked for problems.  · When the procedure is complete, the needle is removed. Pressure is applied to the biopsy site to help stop any bleeding. The site is then bandaged.  After the procedure:  · Youll be taken to a room to rest until the anesthesia wears off.  · A chest X-ray may be done. This is to make sure there was no damage to your lungs or the area where the needle was placed.  · When its time for you to go home, have an adult family member or friend ready to drive you.  Recovering at home  You may cough up a small amount of blood shortly after the procedure. Later, you may also have some soreness around the biopsy site. Once at home, follow any instructions youre given. Be sure to:  · Take all medicines as directed.  · Care for the biopsy site as instructed.  · Check for signs of infection at the biopsy site (see below).  · Do not bathe or shower until your provider says it's OK. If you wish, you may wash with a sponge or washcloth.  · Avoid heavy lifting and other strenuous activities as directed.  · If you plan any air travel, ask your provider when you can do so. You may be told not to fly for a few weeks. This is because pressure changes may affect your lungs.  When to call your provider  Call 911 or your local emergency number if you have sudden, severe difficulty breathing. This could be a life-threatening emergency.  Call your healthcare provider for less severe symptoms that are still concerning. If you are unable to speak with your provider, go to the emergency room if you have any of the following symptoms:  · Fever of 100.4°F (38°C) or higher, or as directed by your provider  · Sudden chest pain, shortness of breath, or  fainting  · Coughing up increasing amounts of blood  · Signs of infection at the biopsy site, such as increased redness or swelling, warmth, more pain, bleeding, or bad-smelling drainage   Follow-up  The provider who ordered your test will discuss the biopsy results with you during a follow-up visit. Results are usually ready within 1 to 2 weeks. If more tests or treatments are needed, your provider will discuss these with you.  Risks and possible complications  All procedures have some risk. Possible risks of this procedure include:  · An air leak in your lung (pneumothorax). This may require a stay in the hospital and treatment to re-inflate the lung.  · Bleeding into or around the lung  · Infection in the skin or lung  · Injury to other structures in the chest  · Risks of anesthesia. This will be discussed with you before the procedure.   Date Last Reviewed: 6/11/2015  © 0852-1323 [employer]. [address] [place] [address], [place] [address]. All rights reserved. This information is not intended as a substitute for professional medical care. Always follow your healthcare professional's instructions.

## 2020-10-21 NOTE — HPI
70-year-old patient with a new pulmonary nodule underwent CT guided needle biopsy complicated by pneumothorax. Chest tube placed     Lung Nodule  She presents for evaluation and treatment of an abnormal chest x-ray. The patient reports that the imaging was performed to evaluate symptoms of dyspnea on exertion, productive cough and shortness of breath which have been present for 10 years and are gradually worsening. Symptoms are exacerbated by walking and relieved by rest. The patient denies other associated symptoms. She has a history of 60 pack years. The patient has no known exposure to tuberculosis. The patient does not have a history of cancer.

## 2020-10-21 NOTE — SEDATION DOCUMENTATION
Pt transferred to stretcher lying on left side with r side up and transported to radiology recovery.  Will continue to monitor.

## 2020-10-21 NOTE — HOSPITAL COURSE
10/21 Post chest tube Chest X Ray - lung re-expanded  10/22 CHest tube removed, post removal Chest X Ray pending  10/22 no pneumothorax - ok for discharge

## 2020-10-22 ENCOUNTER — OUTPATIENT CASE MANAGEMENT (OUTPATIENT)
Dept: ADMINISTRATIVE | Facility: OTHER | Age: 70
End: 2020-10-22

## 2020-10-22 VITALS
WEIGHT: 142 LBS | HEART RATE: 94 BPM | HEIGHT: 62 IN | BODY MASS INDEX: 26.13 KG/M2 | DIASTOLIC BLOOD PRESSURE: 60 MMHG | OXYGEN SATURATION: 95 % | SYSTOLIC BLOOD PRESSURE: 131 MMHG | RESPIRATION RATE: 18 BRPM | TEMPERATURE: 99 F

## 2020-10-22 LAB — POCT GLUCOSE: 114 MG/DL (ref 70–110)

## 2020-10-22 PROCEDURE — 99900035 HC TECH TIME PER 15 MIN (STAT): Mod: HCNC

## 2020-10-22 PROCEDURE — 27000221 HC OXYGEN, UP TO 24 HOURS: Mod: HCNC

## 2020-10-22 PROCEDURE — 25000242 PHARM REV CODE 250 ALT 637 W/ HCPCS: Mod: HCNC | Performed by: NURSE PRACTITIONER

## 2020-10-22 PROCEDURE — 25000003 PHARM REV CODE 250: Mod: HCNC | Performed by: INTERNAL MEDICINE

## 2020-10-22 PROCEDURE — 25000003 PHARM REV CODE 250: Mod: HCNC | Performed by: NURSE PRACTITIONER

## 2020-10-22 PROCEDURE — 99238 HOSP IP/OBS DSCHRG MGMT 30/<: CPT | Mod: HCNC,,, | Performed by: INTERNAL MEDICINE

## 2020-10-22 PROCEDURE — 99238 PR HOSPITAL DISCHARGE DAY,<30 MIN: ICD-10-PCS | Mod: HCNC,,, | Performed by: INTERNAL MEDICINE

## 2020-10-22 PROCEDURE — 94761 N-INVAS EAR/PLS OXIMETRY MLT: CPT | Mod: HCNC

## 2020-10-22 RX ORDER — OXYCODONE AND ACETAMINOPHEN 5; 325 MG/1; MG/1
1 TABLET ORAL EVERY 6 HOURS PRN
Qty: 12 TABLET | Refills: 0 | Status: SHIPPED | OUTPATIENT
Start: 2020-10-22 | End: 2020-11-18 | Stop reason: CLARIF

## 2020-10-22 RX ADMIN — AMLODIPINE BESYLATE 5 MG: 5 TABLET ORAL at 08:10

## 2020-10-22 RX ADMIN — HYDROCODONE BITARTRATE AND ACETAMINOPHEN 1 TABLET: 5; 325 TABLET ORAL at 11:10

## 2020-10-22 RX ADMIN — MONTELUKAST 10 MG: 10 TABLET, FILM COATED ORAL at 08:10

## 2020-10-22 RX ADMIN — HYDROCODONE BITARTRATE AND ACETAMINOPHEN 1 TABLET: 5; 325 TABLET ORAL at 01:10

## 2020-10-22 RX ADMIN — CALCIUM CARBONATE 500 MG (1,250 MG)-VITAMIN D3 200 UNIT TABLET 1 TABLET: at 08:10

## 2020-10-22 RX ADMIN — PANTOPRAZOLE SODIUM 40 MG: 40 TABLET, DELAYED RELEASE ORAL at 08:10

## 2020-10-22 RX ADMIN — ROSUVASTATIN 20 MG: 10 TABLET, FILM COATED ORAL at 08:10

## 2020-10-22 RX ADMIN — BENAZEPRIL HYDROCHLORIDE 20 MG: 20 TABLET ORAL at 08:10

## 2020-10-22 RX ADMIN — HYDROCODONE BITARTRATE AND ACETAMINOPHEN 1 TABLET: 5; 325 TABLET ORAL at 05:10

## 2020-10-22 RX ADMIN — FLUTICASONE PROPIONATE 100 MCG: 50 SPRAY, METERED NASAL at 08:10

## 2020-10-22 RX ADMIN — CHLORTHALIDONE 25 MG: 25 TABLET ORAL at 08:10

## 2020-10-22 NOTE — PROGRESS NOTES
Secure chat message sent to Edmundo Chavez LMSW. Patient is High Risk and eligible for Outpatient Case Management. Requested Ambulatory Referral to Outpatient Case management at discharge. GALO Manzano

## 2020-10-22 NOTE — PROGRESS NOTES
[facility] -   Pulmonology  Progress Note    Patient Name: Lucia Barlow  MRN: [id number]  Admission Date: 10/21/2020  Hospital Length of Stay: 1 days  Code Status: Full Code  Attending Provider: Jorje Ugarte MD  Primary Care Provider: ZELDA Sandoval MD   Principal Problem: Pneumothorax after biopsy    Subjective: c/o chest wall pain on the left     Interval History: lung re-expanded  - chest tube removed      Objective:     Vital Signs (Most Recent):  Temp: 98.7 °F (37.1 °C) (10/22/20 1202)  Pulse: 94 (10/22/20 1202)  Resp: 18 (10/22/20 1202)  BP: 131/60 (10/22/20 1202)  SpO2: 95 % (10/22/20 1202) Vital Signs (24h Range):  Temp:  [97.1 °F (36.2 °C)-99.1 °F (37.3 °C)] 98.7 °F (37.1 °C)  Pulse:  [] 94  Resp:  [17-21] 18  SpO2:  [87 %-100 %] 95 %  BP: (115-149)/(50-83) 131/60     Weight: 64.4 kg (142 lb)  Body mass index is 25.97 kg/m².      Intake/Output Summary (Last 24 hours) at 10/22/2020 1231  Last data filed at 10/22/2020 0736  Gross per 24 hour   Intake 240 ml   Output 0 ml   Net 240 ml       Physical Exam  Vitals signs and nursing note reviewed.   Constitutional:       Appearance: She is well-developed.   HENT:      Head: Normocephalic and atraumatic.   Eyes:      Conjunctiva/sclera: Conjunctivae normal.      Pupils: Pupils are equal, round, and reactive to light.   Neck:      Musculoskeletal: Neck supple.      Thyroid: No thyromegaly.      Vascular: No JVD.      Trachea: No tracheal deviation.   Cardiovascular:      Rate and Rhythm: Normal rate and regular rhythm.      Heart sounds: Normal heart sounds.   Pulmonary:      Effort: Pulmonary effort is normal. No respiratory distress.      Breath sounds: Normal breath sounds. No wheezing or rales.   Chest:      Chest wall: No tenderness.   Abdominal:      General: Bowel sounds are normal.      Palpations: Abdomen is soft.   Musculoskeletal: Normal range of motion.   Lymphadenopathy:      Cervical: No cervical adenopathy.   Skin:      General: Skin is warm and dry.   Neurological:      Mental Status: She is alert and oriented to person, place, and time.         Vents:  Oxygen Concentration (%): 28 (10/22/20 0853)    Lines/Drains/Airways     Drain                 Chest Tube 10/21/20 1528 1 Left Pleural 8.5 Fr. less than 1 day          Peripheral Intravenous Line                 Peripheral IV - Single Lumen 10/21/20 0853 22 G Left Antecubital 1 day                Significant Labs:    CBC/Anemia Profile:  Recent Labs   Lab 10/21/20  0735 10/21/20  1724   WBC 6.34 7.06   HGB 10.7* 10.6*   HCT 35.5* 34.1*    301   MCV 91 90   RDW 17.1* 16.9*        Chemistries:  Recent Labs   Lab 10/21/20  1724      K 3.9      CO2 23   BUN 17   CREATININE 1.1   CALCIUM 8.9       All pertinent labs within the past 24 hours have been reviewed.    Significant Imaging:  I have reviewed all pertinent imaging results/findings within the past 24 hours.  I have reviewed and interpreted all pertinent imaging results/findings within the past 24 hours.     X-Ray Chest 1 View  Narrative: EXAMINATION:  XR CHEST 1 VIEW    CLINICAL HISTORY:  post procedure - r/o pneumothorax;    COMPARISON:  October 22, 2020    FINDINGS:  EKG leads and oxygen tubing overlie the chest.  Stable small bore left-sided pigtail catheter.  Negative for definite pneumothorax on today's study.  Stable bands of atelectasis or scarring in the right greater than left lung bases with similar degree of peripheral reticular interstitial changes.  The lungs are free of new pulmonary opacities.  The hilar and mediastinal contours and osseous structures are otherwise unchanged.  Impression: 1.  Overall, no significant interval change has occurred.    Electronically signed by: Rex Arias MD  Date:    10/22/2020  Time:    10:48  X-Ray Chest 1 View  Narrative: EXAMINATION:  XR CHEST 1 VIEW    CLINICAL HISTORY:  SOB;    FINDINGS:  Single view of the chest.    Comparison 10/21/2020 at 16:34 hours.   Low lung volumes limits evaluation.    Cardiac silhouette is normal.  The lungs demonstrate no evidence of consolidation.  Nodule left midlung zone.  Mild atelectasis right lung base.  Left-sided pneumocentesis catheter noted.  No large pneumothorax.  No pleural effusion..  No evidence of pleural effusion or pneumothorax.  Bones appear intact.  Impression: Left-sided pneumocentesis catheter noted. No large pneumothorax.    Electronically signed by: Kvng Desai MD  Date:    10/22/2020  Time:    07:44          ABG  No results for input(s): PH, PO2, PCO2, HCO3, BE in the last 168 hours.  Assessment/Plan:     * Pneumothorax after biopsy  Lung re-expanded with chest tube  10/22 stable, chest tube removed    Chronic obstructive pulmonary disease  stable           Ramiro Aleman MD  Pulmonology  [facility] -

## 2020-10-22 NOTE — SUBJECTIVE & OBJECTIVE
Interval History: lung re-expanded  - chest tube removed      Objective:     Vital Signs (Most Recent):  Temp: 98.7 °F (37.1 °C) (10/22/20 1202)  Pulse: 94 (10/22/20 1202)  Resp: 18 (10/22/20 1202)  BP: 131/60 (10/22/20 1202)  SpO2: 95 % (10/22/20 1202) Vital Signs (24h Range):  Temp:  [97.1 °F (36.2 °C)-99.1 °F (37.3 °C)] 98.7 °F (37.1 °C)  Pulse:  [] 94  Resp:  [17-21] 18  SpO2:  [87 %-100 %] 95 %  BP: (115-149)/(50-83) 131/60     Weight: 64.4 kg (142 lb)  Body mass index is 25.97 kg/m².      Intake/Output Summary (Last 24 hours) at 10/22/2020 1315  Last data filed at 10/22/2020 0736  Gross per 24 hour   Intake 240 ml   Output 0 ml   Net 240 ml       Physical Exam  Vitals signs and nursing note reviewed.   Constitutional:       Appearance: She is well-developed.   HENT:      Head: Normocephalic and atraumatic.   Eyes:      Conjunctiva/sclera: Conjunctivae normal.      Pupils: Pupils are equal, round, and reactive to light.   Neck:      Musculoskeletal: Neck supple.      Thyroid: No thyromegaly.      Vascular: No JVD.      Trachea: No tracheal deviation.   Cardiovascular:      Rate and Rhythm: Normal rate and regular rhythm.      Heart sounds: Normal heart sounds.   Pulmonary:      Effort: Pulmonary effort is normal. No respiratory distress.      Breath sounds: Normal breath sounds. No wheezing or rales.   Chest:      Chest wall: No tenderness.   Abdominal:      General: Bowel sounds are normal.      Palpations: Abdomen is soft.   Musculoskeletal: Normal range of motion.   Lymphadenopathy:      Cervical: No cervical adenopathy.   Skin:     General: Skin is warm and dry.   Neurological:      Mental Status: She is alert and oriented to person, place, and time.         Vents:  Oxygen Concentration (%): 28 (10/22/20 0853)    Lines/Drains/Airways     Drain                 Chest Tube 10/21/20 1528 1 Left Pleural 8.5 Fr. less than 1 day          Peripheral Intravenous Line                 Peripheral IV - Single  Lumen 10/21/20 0853 22 G Left Antecubital 1 day                Significant Labs:    CBC/Anemia Profile:  Recent Labs   Lab 10/21/20  0735 10/21/20  1724   WBC 6.34 7.06   HGB 10.7* 10.6*   HCT 35.5* 34.1*    301   MCV 91 90   RDW 17.1* 16.9*        Chemistries:  Recent Labs   Lab 10/21/20  1724      K 3.9      CO2 23   BUN 17   CREATININE 1.1   CALCIUM 8.9       All pertinent labs within the past 24 hours have been reviewed.    Significant Imaging:  I have reviewed all pertinent imaging results/findings within the past 24 hours.  I have reviewed and interpreted all pertinent imaging results/findings within the past 24 hours.     X-Ray Chest 1 View  Narrative: EXAMINATION:  XR CHEST 1 VIEW    CLINICAL HISTORY:  post procedure - r/o pneumothorax;    COMPARISON:  October 22, 2020    FINDINGS:  EKG leads and oxygen tubing overlie the chest.  Stable small bore left-sided pigtail catheter.  Negative for definite pneumothorax on today's study.  Stable bands of atelectasis or scarring in the right greater than left lung bases with similar degree of peripheral reticular interstitial changes.  The lungs are free of new pulmonary opacities.  The hilar and mediastinal contours and osseous structures are otherwise unchanged.  Impression: 1.  Overall, no significant interval change has occurred.    Electronically signed by: Rex Arias MD  Date:    10/22/2020  Time:    10:48  X-Ray Chest 1 View  Narrative: EXAMINATION:  XR CHEST 1 VIEW    CLINICAL HISTORY:  SOB;    FINDINGS:  Single view of the chest.    Comparison 10/21/2020 at 16:34 hours.  Low lung volumes limits evaluation.    Cardiac silhouette is normal.  The lungs demonstrate no evidence of consolidation.  Nodule left midlung zone.  Mild atelectasis right lung base.  Left-sided pneumocentesis catheter noted.  No large pneumothorax.  No pleural effusion..  No evidence of pleural effusion or pneumothorax.  Bones appear intact.  Impression: Left-sided  pneumocentesis catheter noted. No large pneumothorax.    Electronically signed by: Kvng Desai MD  Date:    10/22/2020  Time:    07:44

## 2020-10-22 NOTE — DISCHARGE SUMMARY
[facility] -   Pulmonology  Discharge Summary      Patient Name: Lucia Barlow  MRN: [id number]  Admission Date: 10/21/2020  Hospital Length of Stay: 1 days  Discharge Date and Time:  10/22/2020 1:26 PM  Attending Physician: Jorje Ugarte MD   Discharging Provider: Jesse Aleman MD  Primary Care Provider: ZELDA Sandoval MD    HPI:  70-year-old patient with a new pulmonary nodule underwent CT guided needle biopsy complicated by pneumothorax. Chest tube placed     Lung Nodule  She presents for evaluation and treatment of an abnormal chest x-ray. The patient reports that the imaging was performed to evaluate symptoms of dyspnea on exertion, productive cough and shortness of breath which have been present for 10 years and are gradually worsening. Symptoms are exacerbated by walking and relieved by rest. The patient denies other associated symptoms. She has a history of 60 pack years. The patient has no known exposure to tuberculosis. The patient does not have a history of cancer.     Procedures:  CT guided needle biopsy  Placement of Chest tube    Indwelling Lines/Drains at Time of Discharge:   Lines/Drains/Airways     Drain                 Chest Tube 10/21/20 1528 1 Left Pleural 8.5 Fr. less than 1 day                Hospital Course:  10/21 Post chest tube Chest X Ray - lung re-expanded  10/22 CHest tube removed, post removal Chest X Ray pending  10/22 no pneumothorax - ok for discharge    Consults (From admission, onward)        Status Ordering Provider     Inpatient consult to Respiratory Care  Once     Provider:  (Not yet assigned)    Acknowledged JESSE ALEMAN          Significant Labs:  All pertinent labs within the past 24 hours have been reviewed.    ABG  No results for input(s): PH, PO2, PCO2, HCO3, BE in the last 168 hours.    Significant Imaging:  I have reviewed all pertinent imaging results/findings within the past 24 hours.  I have reviewed and interpreted all pertinent imaging  results/findings within the past 24 hours.    Pending Diagnostic Studies:     Procedure Component Value Units Date/Time    CT Guided Pneumocentesis [[id number]] Resulted: 10/21/20 1514    Order Status: Sent Lab Status: In process Updated: 10/21/20 1718    Specimen to Pathology, Radiology Lung, biopsy not tranplant [[id number]] Collected: 10/21/20 0900    Order Status: Sent Lab Status: In process Updated: 10/21/20 1046        Final Active Diagnoses:    Diagnosis Date Noted POA    PRINCIPAL PROBLEM:  Pneumothorax after biopsy [J95.811] 10/21/2020 Yes    Solid nodule of lung greater than 8 mm in diameter [R91.1] 10/21/2020 Yes    Chronic obstructive pulmonary disease [J44.9] 11/27/2019 Yes    Type 2 diabetes mellitus with stage 3 chronic kidney disease, without long-term current use of insulin [E11.22, N18.30] 02/01/2019 Yes     Chronic    Type 2 diabetes mellitus with peripheral vascular disease [E11.51] 12/28/2018 Yes     Chronic    Dyslipidemia associated with type 2 diabetes mellitus [E11.69, E78.5] 06/14/2013 Yes     Chronic    Hypertension associated with diabetes [E11.59, I10] 06/14/2013 Yes      Problems Resolved During this Admission:       Discharged Condition: good    Disposition:     Follow Up:  Follow-up Information     Ramiro Aleman MD In 1 week.    Specialty: Pulmonary Disease  Why: review biopsy results  Contact information:  [address]  [place] [address] [phone]                 Patient Instructions:      Ambulatory referral/consult to Outpatient Case Management   Referral Priority: Routine Referral Type: Consultation   Referral Reason: Specialty Services Required   Number of Visits Requested: 1     Medications:  Reconciled Home Medications:      Medication List      START taking these medications    oxyCODONE-acetaminophen 5-325 mg per tablet  Commonly known as: PERCOCET  Take 1 tablet by mouth every 6 (six) hours as needed for Pain.        STOP taking these medications     predniSONE 20 MG tablet  Commonly known as: DELTASONE        ASK your doctor about these medications    amlodipine-benazepril 5-20 mg 5-20 mg per capsule  Commonly known as: LOTREL  Take 1 capsule by mouth once daily.     blood sugar diagnostic Strp  1 each by Misc.(Non-Drug; Combo Route) route 3 (three) times daily. [facility]     blood-glucose meter kit  Insurance preferred     butalbital-acetaminophen-caffeine -40 mg -40 mg per tablet  Commonly known as: FIORICET, ESGIC  Take 1 tablet by mouth 3 (three) times daily as needed for Headaches. (MAXIMUM OF 15 TABLET PER MONTH)     calcium-vitamin D 600 mg(1,500mg) -400 unit Tab  Commonly known as: CALCIUM WITH VITAMIN D  Take 2 tablets by mouth once daily.     chlorthalidone 25 MG Tab  Commonly known as: HYGROTEN  TAKE 1 TABLET EVERY DAY     ezetimibe 10 mg tablet  Commonly known as: ZETIA  Take 1 tablet (10 mg total) by mouth once daily.     fluticasone propionate 50 mcg/actuation nasal spray  Commonly known as: FLONASE  2 sprays (100 mcg total) by Each Nostril route once daily.     glimepiride 2 MG tablet  Commonly known as: AMARYL  Take 1 tablet (2 mg total) by mouth before breakfast.     ipratropium-albuteroL  mcg/actuation inhaler  Commonly known as: COMBIVENT  Inhale 2 puffs into the lungs every 4 (four) hours as needed for Wheezing or Shortness of Breath. Rescue     * lancets 32 gauge Misc  1 lancet by Misc.(Non-Drug; Combo Route) route 3 (three) times daily. Insurance preferred     * TRUEPLUS LANCETS 33 gauge Misc  Generic drug: lancets     montelukast 10 mg tablet  Commonly known as: SINGULAIR     pantoprazole 40 MG tablet  Commonly known as: PROTONIX  TAKE 1 TABLET EVERY DAY     potassium 99 mg Tab  Take 1 tablet by mouth once daily.     rosuvastatin 20 MG tablet  Commonly known as: CRESTOR  Take 1 tablet (20 mg total) by mouth once daily.     SHINGRIX (PF) 50 mcg/0.5 mL injection  Generic drug: varicella-zoster [id number]  (PF)  Inject 0.5 mL (one dose) into muscle now; give second dose at least 2 months later     tiZANidine 2 MG tablet  Commonly known as: ZANAFLEX  Take 1-2 tabs twice a day as needed for muscle pain.  Will cause drowsiness         * This list has 2 medication(s) that are the same as other medications prescribed for you. Read the directions carefully, and ask your doctor or other care provider to review them with you.                Ramiro Aleman MD  Pulmonology  [facility]

## 2020-10-22 NOTE — PLAN OF CARE
Patient is in stable condition, no acute distress, remained free from injuries, pain adequately controlled, on cardiac monitoring (NSR, 90s), blood glucose checks performed, chest tube CDI, daughter at bedside and will continue to monitor. 24 hr chart check performed.

## 2020-10-22 NOTE — PROGRESS NOTES
Post Procedure Progress Note:  Vital signs stable  Placed to water seal:    Chest X Ray ordered    Patient stable.  PLAN:   Review Chest X Ray   If no pneumothorax then will pull chest tube    Ramiro Aleman MD  Pulmonary / Critical Care Medicine

## 2020-10-22 NOTE — NURSING
Patients Blood Sugar showed 151. Patient refused Insulin. Documented in MAR. Will continue to monitor.

## 2020-10-22 NOTE — PROGRESS NOTES
Post Procedure Progress Note:  Vital signs stable  Chest tube removed    Post procedure Chest X Ray:pending    Radiologist report pending.    Patient stable.  Continue with post procedure monitoring and discharge/transfer plans.    Ramiro Aleman MD  Pulmonary / Critical Care Medicine

## 2020-10-22 NOTE — PLAN OF CARE
Swer met with pt and pt's daughter at bedside for initial assessment. Pt lives alone and was independent with ADLs prior to admission. Pt's daughter will provide transportation at discharge. Pt had home health in the past, but doesn't remember the name of the company. Pt denied using medical equipment. Pt does not have a problem obtaining medication and family provides transportation to medical appointments. Pt does not have an advanced directive at this time. SWer provided a transitional care folder, information on advanced directives, information on pharmacy bedside delivery, and discharge planning begins on admission with contact information for any needs/questions.    PCP; ZELDA Sandoval MD  Pharm;   [facility] - [place], [place] - [address]  [address] [address]  Phone: [phone] Fax: [phone]    [facility] [address]  [address] [address]  Phone: [phone] Fax: [phone]    [facility]  [address]  [place] [address]  Phone: [phone] Fax: [phone]  [facility]   Bedside [facility]; Yes       10/22/20 1225   Discharge Assessment   Assessment Type Discharge Planning Assessment   Confirmed/corrected address and phone number on facesheet? Yes   Assessment information obtained from? Patient;Caregiver   Communicated expected length of stay with patient/caregiver yes   Prior to hospitilization cognitive status: Alert/Oriented   Prior to hospitalization functional status: Independent   Current cognitive status: Alert/Oriented   Current Functional Status: Independent   Facility Arrived From: home   Lives With alone   Able to Return to Prior Arrangements yes   Is patient able to care for self after discharge? Yes   Who are your caregiver(s) and their phone number(s)? Debora Barlow (Daughter) [phone]   Patient's perception of discharge disposition home or  selfcare   Readmission Within the Last 30 Days no previous admission in last 30 days   Patient currently being followed by outpatient case management? No   Patient currently receives any other outside agency services? No   Equipment Currently Used at Home none   Do you have any problems affording any of your prescribed medications? No   Is the patient taking medications as prescribed? yes   Does the patient have transportation home? Yes   Transportation Anticipated family or friend will provide   Discharge Plan A Home with family   DME Needed Upon Discharge  none   Patient/Family in Agreement with Plan yes

## 2020-10-22 NOTE — HOSPITAL COURSE
The patient was monitored closely during her stay.  Her O2 saturations were monitored and she received supplement O2 as needed.  Patient had chest tube noted to left chest.  Repeat chest x-ray ordered.  Patient monitored closely by pulmonology.  Her chest tube was removed.  Repeat chest x-ray showed no signs of pneumothorax.  Patient's overall condition remained stable and improved and she was discharged to home once cleared by pulmonology.

## 2020-10-22 NOTE — SUBJECTIVE & OBJECTIVE
Interval History: lung re-expanded  - chest tube removed      Objective:     Vital Signs (Most Recent):  Temp: 98.7 °F (37.1 °C) (10/22/20 1202)  Pulse: 94 (10/22/20 1202)  Resp: 18 (10/22/20 1202)  BP: 131/60 (10/22/20 1202)  SpO2: 95 % (10/22/20 1202) Vital Signs (24h Range):  Temp:  [97.1 °F (36.2 °C)-99.1 °F (37.3 °C)] 98.7 °F (37.1 °C)  Pulse:  [] 94  Resp:  [17-21] 18  SpO2:  [87 %-100 %] 95 %  BP: (115-149)/(50-83) 131/60     Weight: 64.4 kg (142 lb)  Body mass index is 25.97 kg/m².      Intake/Output Summary (Last 24 hours) at 10/22/2020 1231  Last data filed at 10/22/2020 0736  Gross per 24 hour   Intake 240 ml   Output 0 ml   Net 240 ml       Physical Exam  Vitals signs and nursing note reviewed.   Constitutional:       Appearance: She is well-developed.   HENT:      Head: Normocephalic and atraumatic.   Eyes:      Conjunctiva/sclera: Conjunctivae normal.      Pupils: Pupils are equal, round, and reactive to light.   Neck:      Musculoskeletal: Neck supple.      Thyroid: No thyromegaly.      Vascular: No JVD.      Trachea: No tracheal deviation.   Cardiovascular:      Rate and Rhythm: Normal rate and regular rhythm.      Heart sounds: Normal heart sounds.   Pulmonary:      Effort: Pulmonary effort is normal. No respiratory distress.      Breath sounds: Normal breath sounds. No wheezing or rales.   Chest:      Chest wall: No tenderness.   Abdominal:      General: Bowel sounds are normal.      Palpations: Abdomen is soft.   Musculoskeletal: Normal range of motion.   Lymphadenopathy:      Cervical: No cervical adenopathy.   Skin:     General: Skin is warm and dry.   Neurological:      Mental Status: She is alert and oriented to person, place, and time.         Vents:  Oxygen Concentration (%): 28 (10/22/20 0853)    Lines/Drains/Airways     Drain                 Chest Tube 10/21/20 1528 1 Left Pleural 8.5 Fr. less than 1 day          Peripheral Intravenous Line                 Peripheral IV - Single  Lumen 10/21/20 0853 22 G Left Antecubital 1 day                Significant Labs:    CBC/Anemia Profile:  Recent Labs   Lab 10/21/20  0735 10/21/20  1724   WBC 6.34 7.06   HGB 10.7* 10.6*   HCT 35.5* 34.1*    301   MCV 91 90   RDW 17.1* 16.9*        Chemistries:  Recent Labs   Lab 10/21/20  1724      K 3.9      CO2 23   BUN 17   CREATININE 1.1   CALCIUM 8.9       All pertinent labs within the past 24 hours have been reviewed.    Significant Imaging:  I have reviewed all pertinent imaging results/findings within the past 24 hours.  I have reviewed and interpreted all pertinent imaging results/findings within the past 24 hours.     X-Ray Chest 1 View  Narrative: EXAMINATION:  XR CHEST 1 VIEW    CLINICAL HISTORY:  post procedure - r/o pneumothorax;    COMPARISON:  October 22, 2020    FINDINGS:  EKG leads and oxygen tubing overlie the chest.  Stable small bore left-sided pigtail catheter.  Negative for definite pneumothorax on today's study.  Stable bands of atelectasis or scarring in the right greater than left lung bases with similar degree of peripheral reticular interstitial changes.  The lungs are free of new pulmonary opacities.  The hilar and mediastinal contours and osseous structures are otherwise unchanged.  Impression: 1.  Overall, no significant interval change has occurred.    Electronically signed by: Rex Arias MD  Date:    10/22/2020  Time:    10:48  X-Ray Chest 1 View  Narrative: EXAMINATION:  XR CHEST 1 VIEW    CLINICAL HISTORY:  SOB;    FINDINGS:  Single view of the chest.    Comparison 10/21/2020 at 16:34 hours.  Low lung volumes limits evaluation.    Cardiac silhouette is normal.  The lungs demonstrate no evidence of consolidation.  Nodule left midlung zone.  Mild atelectasis right lung base.  Left-sided pneumocentesis catheter noted.  No large pneumothorax.  No pleural effusion..  No evidence of pleural effusion or pneumothorax.  Bones appear intact.  Impression: Left-sided  pneumocentesis catheter noted. No large pneumothorax.    Electronically signed by: Kvng Desai MD  Date:    10/22/2020  Time:    07:44

## 2020-10-22 NOTE — PROGRESS NOTES
Discharge education given. Pt verbalized an understanding. IV removed, catheter intact. Cardiac monitoring removed. Pt to leave with meds and personal belongings. Pt to be discharged via wheelchair with daughter whom will be bringing her home.

## 2020-10-23 NOTE — PROGRESS NOTES
[facility] Hospital Medicine  Progress Note    Patient Name: Lucia Barlow  MRN: [id number]  Patient Class: IP- Inpatient   Admission Date: 10/21/2020  Length of Stay: 1 days  Attending Physician: No att. providers found  Primary Care Provider: ZELDA Sandoval MD      Subjective:     Principal Problem:Pneumothorax after biopsy    HPI:  This is a 70-year-old female who has a past medical history of COPD with nicotine dependence, diabetes type 2, peripheral vascular disease with prior angioplasty dyslipidemia, GERD, hypertension, osteoporosis, and pulmonary nodule.  Patient came in for evaluation due to abnormal chest x-ray showing pulmonary nodule patient patient underwent CT scan-guided needle biopsy which was complicated by a pneumothorax.  A chest tube was placed.  Patient has been placed in the hospital for further monitoring.          Overview/Hospital Course:  The patient was monitored closely during her stay.  Her O2 saturations were monitored and she received supplement O2 as needed.  Patient had chest tube noted to left chest.  Repeat chest x-ray ordered.  Patient monitored closely by pulmonology.  Her chest tube was removed.  Repeat chest x-ray showed no signs of pneumothorax.  Patient's overall condition remained stable and improved and she was discharged to home once cleared by pulmonology.     lnterval  History:  Patient reports she feels much better.  No complaints of shortness of breath or pain at this time.  Patient tolerating p.o. intake well.  Patient educated on importance of smoking cessation.  Daughter at bedside.     Review of Systems   Constitutional: Negative for activity change, appetite change, chills, fatigue and fever.   HENT: Negative for ear discharge, ear pain and facial swelling.    Eyes: Negative for pain and redness.   Respiratory: Negative for cough and shortness of breath.    Cardiovascular: Negative for chest pain, palpitations and leg swelling.             Gastrointestinal: Negative for abdominal pain, blood in stool, constipation, diarrhea, nausea and vomiting.   Endocrine: Negative for polydipsia and polyphagia.   Genitourinary: Negative for difficulty urinating, dysuria, flank pain and hematuria.   Musculoskeletal: Negative for gait problem, neck pain and neck stiffness.   Skin: Negative for color change.   Allergic/Immunologic: Negative for food allergies.   Neurological: Negative for seizures, facial asymmetry, speech difficulty and weakness.   Hematological: Does not bruise/bleed easily.   Psychiatric/Behavioral: Negative for agitation, behavioral problems, confusion, hallucinations and suicidal ideas. The patient is not nervous/anxious.      Objective:     Vital Signs (Most Recent):  Temp: 98.7 °F (37.1 °C) (10/22/20 1202)  Pulse: 94 (10/22/20 1202)  Resp: 18 (10/22/20 1202)  BP: 131/60 (10/22/20 1202)  SpO2: 95 % (10/22/20 1202) Vital Signs (24h Range):  Temp:  [97.1 °F (36.2 °C)-99.1 °F (37.3 °C)] 98.7 °F (37.1 °C)  Pulse:  [] 94  Resp:  [18-20] 18  SpO2:  [87 %-95 %] 95 %  BP: (124-141)/(56-68) 131/60     Weight: 64.4 kg (142 lb)  Body mass index is 25.97 kg/m².    Physical Exam  Constitutional:       General: She is not in acute distress.     Appearance: She is obese.   HENT:      Head: Normocephalic and atraumatic.      Mouth/Throat:      Mouth: Mucous membranes are moist.   Eyes:      General:         Right eye: No discharge.         Left eye: No discharge.      Extraocular Movements: Extraocular movements intact.      Conjunctiva/sclera: Conjunctivae normal.      Pupils: Pupils are equal, round, and reactive to light.   Neck:      Musculoskeletal: Normal range of motion and neck supple. No neck rigidity or muscular tenderness.   Cardiovascular:      Rate and Rhythm: Normal rate and regular rhythm.      Pulses: Normal pulses.      Heart sounds: Normal heart sounds. No murmur.   Pulmonary:      Effort: Pulmonary effort is normal. No respiratory  distress.      Breath sounds: No wheezing or rhonchi.      Comments:      Left chest tube noted  Abdominal:      General: Bowel sounds are normal. There is no distension.      Tenderness: There is no abdominal tenderness. There is no guarding.      Comments: Obese    Abdominal hernia   Genitourinary:     Comments: Not examined  Musculoskeletal: Normal range of motion.         General: No swelling or tenderness.      Right lower leg: No edema.      Left lower leg: No edema.   Skin:     General: Skin is warm and dry.      Capillary Refill: Capillary refill takes less than 2 seconds.   Neurological:      General: No focal deficit present.      Mental Status: She is alert and oriented to person, place, and time. Mental status is at baseline.      Cranial Nerves: No cranial nerve deficit.   Psychiatric:         Mood and Affect: Mood normal.         Behavior: Behavior normal.         Thought Content: Thought content normal.         Judgment: Judgment normal.           CRANIAL NERVES     CN III, IV, VI   Pupils are equal, round, and reactive to light.       Lab Results   Component Value Date    WBC 7.06 10/21/2020    HGB 10.6 (L) 10/21/2020    HCT 34.1 (L) 10/21/2020    MCV 90 10/21/2020     10/21/2020         BMP  Lab Results   Component Value Date     10/21/2020    K 3.9 10/21/2020     10/21/2020    CO2 23 10/21/2020    BUN 17 10/21/2020    CREATININE 1.1 10/21/2020    CALCIUM 8.9 10/21/2020    ANIONGAP 12 10/21/2020    ESTGFRAFRICA 58 (A) 10/21/2020    EGFRNONAA 50 (A) 10/21/2020           Assessment/Plan:      * Pneumothorax after biopsy  Telemetry  Monitor O2 sats, supplement O2 as needed  Chest tube currently in place  Orders as per pulmonology  Prn pain medication       Solid nodule of lung greater than 8 mm in diameter  Status post biopsy on 10/21/2020      Chronic obstructive pulmonary disease  Monitor O2 sats, supplement O2 as needed  Orders as per pulmonology      Type 2 diabetes mellitus with  stage 3 chronic kidney disease, without long-term current use of insulin  Monitor  Renal dose all medications      Type 2 diabetes mellitus with peripheral vascular disease  Dm diet  Continue home medications  Blood sugars running 114-143    Dyslipidemia associated with type 2 diabetes mellitus  Continue home statin      Hypertension associated with diabetes  Continue home medications        VTE Risk Mitigation (From admission, onward)         Ordered     IP VTE LOW RISK PATIENT  Once      10/21/20 9985                Discharge Planning   SUSY: 10/22/2020     Code Status: Prior   Is the patient medically ready for discharge?: Yes    Reason for patient still in hospital (select all that apply): Treatment  Discharge Plan A: Home with family      Time spent seeing patient( greater than 1/2 spent in direct contact) : 32 minutes      ORI Alfaro  Department of Hospital Medicine   [facility]

## 2020-10-23 NOTE — SUBJECTIVE & OBJECTIVE
lnterval  History:  Patient reports she feels much better.  No complaints of shortness of breath or pain at this time.  Patient tolerating p.o. intake well.  Patient educated on importance of smoking cessation.  Daughter at bedside.     Review of Systems   Constitutional: Negative for activity change, appetite change, chills, fatigue and fever.   HENT: Negative for ear discharge, ear pain and facial swelling.    Eyes: Negative for pain and redness.   Respiratory: Negative for cough and shortness of breath.    Cardiovascular: Negative for chest pain, palpitations and leg swelling.            Gastrointestinal: Negative for abdominal pain, blood in stool, constipation, diarrhea, nausea and vomiting.   Endocrine: Negative for polydipsia and polyphagia.   Genitourinary: Negative for difficulty urinating, dysuria, flank pain and hematuria.   Musculoskeletal: Negative for gait problem, neck pain and neck stiffness.   Skin: Negative for color change.   Allergic/Immunologic: Negative for food allergies.   Neurological: Negative for seizures, facial asymmetry, speech difficulty and weakness.   Hematological: Does not bruise/bleed easily.   Psychiatric/Behavioral: Negative for agitation, behavioral problems, confusion, hallucinations and suicidal ideas. The patient is not nervous/anxious.      Objective:     Vital Signs (Most Recent):  Temp: 98.7 °F (37.1 °C) (10/22/20 1202)  Pulse: 94 (10/22/20 1202)  Resp: 18 (10/22/20 1202)  BP: 131/60 (10/22/20 1202)  SpO2: 95 % (10/22/20 1202) Vital Signs (24h Range):  Temp:  [97.1 °F (36.2 °C)-99.1 °F (37.3 °C)] 98.7 °F (37.1 °C)  Pulse:  [] 94  Resp:  [18-20] 18  SpO2:  [87 %-95 %] 95 %  BP: (124-141)/(56-68) 131/60     Weight: 64.4 kg (142 lb)  Body mass index is 25.97 kg/m².    Physical Exam  Constitutional:       General: She is not in acute distress.     Appearance: She is obese.   HENT:      Head: Normocephalic and atraumatic.      Mouth/Throat:      Mouth: Mucous membranes  are moist.   Eyes:      General:         Right eye: No discharge.         Left eye: No discharge.      Extraocular Movements: Extraocular movements intact.      Conjunctiva/sclera: Conjunctivae normal.      Pupils: Pupils are equal, round, and reactive to light.   Neck:      Musculoskeletal: Normal range of motion and neck supple. No neck rigidity or muscular tenderness.   Cardiovascular:      Rate and Rhythm: Normal rate and regular rhythm.      Pulses: Normal pulses.      Heart sounds: Normal heart sounds. No murmur.   Pulmonary:      Effort: Pulmonary effort is normal. No respiratory distress.      Breath sounds: No wheezing or rhonchi.      Comments:      Left chest tube noted  Abdominal:      General: Bowel sounds are normal. There is no distension.      Tenderness: There is no abdominal tenderness. There is no guarding.      Comments: Obese    Abdominal hernia   Genitourinary:     Comments: Not examined  Musculoskeletal: Normal range of motion.         General: No swelling or tenderness.      Right lower leg: No edema.      Left lower leg: No edema.   Skin:     General: Skin is warm and dry.      Capillary Refill: Capillary refill takes less than 2 seconds.   Neurological:      General: No focal deficit present.      Mental Status: She is alert and oriented to person, place, and time. Mental status is at baseline.      Cranial Nerves: No cranial nerve deficit.   Psychiatric:         Mood and Affect: Mood normal.         Behavior: Behavior normal.         Thought Content: Thought content normal.         Judgment: Judgment normal.           CRANIAL NERVES     CN III, IV, VI   Pupils are equal, round, and reactive to light.       Lab Results   Component Value Date    WBC 7.06 10/21/2020    HGB 10.6 (L) 10/21/2020    HCT 34.1 (L) 10/21/2020    MCV 90 10/21/2020     10/21/2020         BMP  Lab Results   Component Value Date     10/21/2020    K 3.9 10/21/2020     10/21/2020    CO2 23 10/21/2020     BUN 17 10/21/2020    CREATININE 1.1 10/21/2020    CALCIUM 8.9 10/21/2020    ANIONGAP 12 10/21/2020    ESTGFRAFRICA 58 (A) 10/21/2020    EGFRNONAA 50 (A) 10/21/2020

## 2020-10-27 ENCOUNTER — OUTPATIENT CASE MANAGEMENT (OUTPATIENT)
Dept: ADMINISTRATIVE | Facility: OTHER | Age: 70
End: 2020-10-27

## 2020-10-27 NOTE — LETTER
October 27, 2020    Lucia Barlow  [address] [facility] [address]             [facility] [address] [place] [address] Dear Ms Barlow,    I work with [facility] Outpatient Case Management Department. We received a referral to call you to discuss your medical history.These services are free of charge and are offered to [facility] patients who have recently been discharged from any of our facilities or who have complex medical conditions that may require the skill of a nurse to assist with management.             I am a Registered Nurse who specializes in connecting patients with available medical and financial resources as well as addressing any educational needs that may be indicated.      I attempted to reach you by telephone, but I was unsuccessful. Please call our department so that we can go over some questions with you regarding your health.    The Outpatient Case Management Department can be reached at [phone] from 8:00AM to 4:30 PM on Monday thru Friday. [facility] also has a program where a nurse is available 24/7 to answer questions or provide medical advice. Their number is [phone].      Thank you,      Lilliam Puente RN  Outpatient Case Management  [phone]

## 2020-10-28 ENCOUNTER — OFFICE VISIT (OUTPATIENT)
Dept: PULMONOLOGY | Facility: CLINIC | Age: 70
End: 2020-10-28
Payer: MEDICARE

## 2020-10-28 VITALS
DIASTOLIC BLOOD PRESSURE: 76 MMHG | HEART RATE: 110 BPM | OXYGEN SATURATION: 97 % | WEIGHT: 144.63 LBS | HEIGHT: 62 IN | SYSTOLIC BLOOD PRESSURE: 130 MMHG | RESPIRATION RATE: 17 BRPM | BODY MASS INDEX: 26.61 KG/M2

## 2020-10-28 DIAGNOSIS — C34.92 NON-SMALL CELL CANCER OF LEFT LUNG: Primary | ICD-10-CM

## 2020-10-28 DIAGNOSIS — R25.2 NOCTURNAL MUSCLE CRAMP: ICD-10-CM

## 2020-10-28 DIAGNOSIS — R06.09 DYSPNEA ON EXERTION: ICD-10-CM

## 2020-10-28 DIAGNOSIS — R91.1 PULMONARY NODULE, LEFT: ICD-10-CM

## 2020-10-28 DIAGNOSIS — F17.200 SMOKING: ICD-10-CM

## 2020-10-28 DIAGNOSIS — J44.9 CHRONIC OBSTRUCTIVE PULMONARY DISEASE, UNSPECIFIED COPD TYPE: ICD-10-CM

## 2020-10-28 PROCEDURE — 99999 PR PBB SHADOW E&M-EST. PATIENT-LVL V: CPT | Mod: PBBFAC,HCNC,, | Performed by: INTERNAL MEDICINE

## 2020-10-28 PROCEDURE — 99215 PR OFFICE/OUTPT VISIT, EST, LEVL V, 40-54 MIN: ICD-10-PCS | Mod: HCNC,S$GLB,, | Performed by: INTERNAL MEDICINE

## 2020-10-28 PROCEDURE — 3008F PR BODY MASS INDEX (BMI) DOCUMENTED: ICD-10-PCS | Mod: HCNC,CPTII,S$GLB, | Performed by: INTERNAL MEDICINE

## 2020-10-28 PROCEDURE — 1159F MED LIST DOCD IN RCRD: CPT | Mod: HCNC,S$GLB,, | Performed by: INTERNAL MEDICINE

## 2020-10-28 PROCEDURE — 99499 RISK ADDL DX/OHS AUDIT: ICD-10-PCS | Mod: S$GLB,,, | Performed by: INTERNAL MEDICINE

## 2020-10-28 PROCEDURE — 3075F PR MOST RECENT SYSTOLIC BLOOD PRESS GE 130-139MM HG: ICD-10-PCS | Mod: HCNC,CPTII,S$GLB, | Performed by: INTERNAL MEDICINE

## 2020-10-28 PROCEDURE — 99215 OFFICE O/P EST HI 40 MIN: CPT | Mod: HCNC,S$GLB,, | Performed by: INTERNAL MEDICINE

## 2020-10-28 PROCEDURE — 3078F DIAST BP <80 MM HG: CPT | Mod: HCNC,CPTII,S$GLB, | Performed by: INTERNAL MEDICINE

## 2020-10-28 PROCEDURE — 1159F PR MEDICATION LIST DOCUMENTED IN MEDICAL RECORD: ICD-10-PCS | Mod: HCNC,S$GLB,, | Performed by: INTERNAL MEDICINE

## 2020-10-28 PROCEDURE — 3075F SYST BP GE 130 - 139MM HG: CPT | Mod: HCNC,CPTII,S$GLB, | Performed by: INTERNAL MEDICINE

## 2020-10-28 PROCEDURE — 3008F BODY MASS INDEX DOCD: CPT | Mod: HCNC,CPTII,S$GLB, | Performed by: INTERNAL MEDICINE

## 2020-10-28 PROCEDURE — 1101F PR PT FALLS ASSESS DOC 0-1 FALLS W/OUT INJ PAST YR: ICD-10-PCS | Mod: HCNC,CPTII,S$GLB, | Performed by: INTERNAL MEDICINE

## 2020-10-28 PROCEDURE — 99999 PR PBB SHADOW E&M-EST. PATIENT-LVL V: ICD-10-PCS | Mod: PBBFAC,HCNC,, | Performed by: INTERNAL MEDICINE

## 2020-10-28 PROCEDURE — 1101F PT FALLS ASSESS-DOCD LE1/YR: CPT | Mod: HCNC,CPTII,S$GLB, | Performed by: INTERNAL MEDICINE

## 2020-10-28 PROCEDURE — 99499 UNLISTED E&M SERVICE: CPT | Mod: S$GLB,,, | Performed by: INTERNAL MEDICINE

## 2020-10-28 PROCEDURE — 3078F PR MOST RECENT DIASTOLIC BLOOD PRESSURE < 80 MM HG: ICD-10-PCS | Mod: HCNC,CPTII,S$GLB, | Performed by: INTERNAL MEDICINE

## 2020-10-28 RX ORDER — IBUPROFEN 200 MG
1 TABLET ORAL DAILY
Qty: 28 PATCH | Refills: 1 | Status: SHIPPED | OUTPATIENT
Start: 2020-10-28 | End: 2021-12-01

## 2020-10-28 RX ORDER — CLONAZEPAM 0.25 MG/1
0.25 TABLET, ORALLY DISINTEGRATING ORAL NIGHTLY
Qty: 30 TABLET | Refills: 5 | Status: SHIPPED | OUTPATIENT
Start: 2020-10-28 | End: 2022-06-03

## 2020-10-28 NOTE — PROGRESS NOTES
Subjective:     Patient ID: Lucia Barlow is a 70 y.o. female.    Chief Complaint:      HPI 70-year-old patient with a new pulmonary nodule is seen back in follow-up examination to review  Results of CT guided needle biopsy - complicated by pneumothorax and chest tube placment.  She   presents for evaluation and treatment of PET positive pulmonary nodule after being discharged from the hospital  5  days ago. Since discharge symptoms have stable course since that time. Patient denies chest pain . Symptoms are aggravated by strenous activity . Symptoms improve with rest.  Respiratory: positive for dyspnea on exertion; Cardiovascular: no chest pain or palpitations.  Patient currently is not on home oxygen therapy..    MEDICAL RECORDS FROM THE HOSPITAL REVIEWED:  PATHOLOGY REPORT  [facility]  JESSE WATSON  BIOPSY OF RIGHT LUNG:  NON-SMALL CELL CARCINOMA, AT LEAST UPON THE SURFACE--SEE DESCRIPTION  THE SPECIMEN IS BEING SENT FOR ADVANCED STUDIES  Surgery ID: [id number]; Pathology ID: [id number]  1. Received in formalin labeled right lung biopsy are multiple pale tan tissue cores, 0.3-0.6 cm in greatest  dimension. The specimen is entirely embedded in 1 cassettes.  FWK--1-A  Grossed by: Demetri Agee  Report Footnotes    There is only a small amount of material for evaluation, but a non-small cell carcinoma is present. This  tumor is not forming well-defined glands or squamous pearls. The tumor here may all be on the surface  with extension into submucosal glands.  In conclusion, the histologic type of this tumor is not clearly established, and invasion is not definitively  documented.  RENETTA ALMAGUER M.D. 10/26/2020 09:45    Lung Nodule  She presents for evaluation and treatment of an abnormal chest x-ray. The patient reports that the imaging was performed to evaluate symptoms of dyspnea on exertion, productive cough and shortness of breath which have been present for 10  years and are gradually worsening. Symptoms are exacerbated by walking and relieved by rest. The patient denies other associated symptoms. She has a history of 60 pack years. The patient has no known exposure to tuberculosis. The patient does not have a history of cancer.    Sinus Pain  Patient complains of clear rhinorrhea, congestion, headaches, nasal congestion and post nasal drip. Onset of symptoms was 3years ago. Symptoms have been gradually worsening since that time. She is drinking plenty of fluids.  Past history is significant for occasional episodes of bronchitis. Patient is smoker  (0.5  ppd x 50 yrs).      Past Medical History:   Diagnosis Date    Atherosclerosis of artery of both lower extremities 1/17/2014    Atherosclerosis of native coronary artery of native heart without angina pectoris 11/18/2016    Atherosclerotic PVD with intermittent claudication 1/17/2014    Chronic bronchitis     COPD (chronic obstructive pulmonary disease)     Dyslipidemia associated with type 2 diabetes mellitus 6/14/2013    Dyslipidemia associated with type 2 diabetes mellitus     Ex-smoker     Gastroesophageal reflux disease without esophagitis 1/25/2019    Hyperlipidemia     Hypertension     Osteoporosis 1/16 rosita 1/18    PVD (peripheral vascular disease)     S/P peripheral artery angioplasty 2/7/2014    Seasonal allergic rhinitis due to pollen     Simple chronic bronchitis     Tobacco dependence     Type 2 diabetes mellitus with microalbuminuria, without long-term current use of insulin 3/29/2019    Type 2 diabetes mellitus with peripheral vascular disease     Type 2 diabetes mellitus with stage 3 chronic kidney disease, without long-term current use of insulin 2/1/2019    Type 2 diabetes mellitus without retinopathy 2/1/2019    Urinary incontinence      Past Surgical History:   Procedure Laterality Date    ANGIOPLASTY Bilateral 01/24/2014    aortoiliac stenting     CARPAL TUNNEL RELEASE  2003     [facility]    COLECTOMY  approximate 2005    pt states 1in colon -dx with benign mass removed-states no colon cancer    COLONOSCOPY N/A 11/9/2016    Procedure: COLONOSCOPY;  Surgeon: Dmitri Sterling MD;  Location: [facility] ENDO;  Service: Endoscopy;  Laterality: N/A;    COLONOSCOPY N/A 11/15/2019    Procedure: COLONOSCOPY;  Surgeon: Marko Vicente MD;  Location: [facility] ENDO;  Service: Endoscopy;  Laterality: N/A;    HYSTERECTOMY      no cancer    OOPHORECTOMY       Review of patient's allergies indicates:   Allergen Reactions    Skin staples [surgical stainless steel] Swelling    Egg derived      Shortness breath, lip swelling    Fish containing products     Iodine and iodide containing products Swelling    Latex, natural rubber Swelling    Losartan Itching    Nickel     Pravastatin      40 mg causes nausea vomitting but 20 mg ok    Shellfish containing products Swelling     Current Outpatient Medications on File Prior to Visit   Medication Sig Dispense Refill    amlodipine-benazepril 5-20 mg (LOTREL) 5-20 mg per capsule Take 1 capsule by mouth once daily. 90 capsule 4    blood sugar diagnostic Strp 1 each by Misc.(Non-Drug; Combo Route) route 3 (three) times daily. EALTH test strips 100 each 3    blood-glucose meter kit Insurance preferred 1 each 0    butalbital-acetaminophen-caffeine -40 mg (FIORICET, ESGIC) -40 mg per tablet Take 1 tablet by mouth 3 (three) times daily as needed for Headaches. (MAXIMUM OF 15 TABLET PER MONTH) 30 tablet 3    calcium-vitamin D (CALCIUM WITH VITAMIN D) 600 mg(1,500mg) -400 unit Tab Take 2 tablets by mouth once daily. 180 tablet 4    chlorthalidone (HYGROTEN) 25 MG Tab TAKE 1 TABLET EVERY DAY 90 tablet 2    ezetimibe (ZETIA) 10 mg tablet Take 1 tablet (10 mg total) by mouth once daily. 90 tablet 4    fluticasone propionate (FLONASE) 50 mcg/actuation nasal spray 2 sprays (100 mcg total) by Each Nostril route once daily. 48 g 4    glimepiride (AMARYL) 2  MG tablet Take 1 tablet (2 mg total) by mouth before breakfast. 90 tablet 4    ipratropium-albuteroL (COMBIVENT)  mcg/actuation inhaler Inhale 2 puffs into the lungs every 4 (four) hours as needed for Wheezing or Shortness of Breath. Rescue 4 g 11    lancets 32 gauge Misc 1 lancet by Misc.(Non-Drug; Combo Route) route 3 (three) times daily. Insurance preferred 100 each 11    montelukast (SINGULAIR) 10 mg tablet       oxyCODONE-acetaminophen (PERCOCET) 5-325 mg per tablet Take 1 tablet by mouth every 6 (six) hours as needed for Pain. 12 tablet 0    pantoprazole (PROTONIX) 40 MG tablet TAKE 1 TABLET EVERY DAY 90 tablet 4    potassium 99 mg Tab Take 1 tablet by mouth once daily.      rosuvastatin (CRESTOR) 20 MG tablet Take 1 tablet (20 mg total) by mouth once daily. 90 tablet 4    tiZANidine (ZANAFLEX) 2 MG tablet Take 1-2 tabs twice a day as needed for muscle pain.  Will cause drowsiness 30 tablet 0    TRUEPLUS LANCETS 33 gauge Misc       varicella-zoster gE-AS01B, PF, (SHINGRIX) 50 mcg/0.5 mL injection Inject 0.5 mL (one dose) into muscle now; give second dose at least 2 months later 0.5 mL 1     No current facility-administered medications on file prior to visit.      Social History     Socioeconomic History    Marital status:      Spouse name: Not on file    Number of children: 4    Years of education: Not on file    Highest education level: Not on file   Occupational History    Occupation:    Social Needs    Financial resource strain: Not very hard    Food insecurity     Worry: Never true     Inability: Never true    Transportation needs     Medical: No     Non-medical: No   Tobacco Use    Smoking status: Current Some Day Smoker     Packs/day: 1.00     Years: 60.00     Pack years: 60.00     Types: Cigarettes     Start date: 1/1/1960    Smokeless tobacco: Never Used   Substance and Sexual Activity    Alcohol use: No     Alcohol/week: 0.0 standard drinks     Frequency:  "Never     Drinks per session: Patient refused     Binge frequency: Never    Drug use: No    Sexual activity: Never   Lifestyle    Physical activity     Days per week: 2 days     Minutes per session: 10 min    Stress: Only a little   Relationships    Social connections     Talks on phone: More than three times a week     Gets together: Never     Attends Synagogue service: Not on file     Active member of club or organization: Yes     Attends meetings of clubs or organizations: More than 4 times per year     Relationship status:    Other Topics Concern    Not on file   Social History Narrative    Son smoker, no pets in household.     Family History   Problem Relation Age of Onset    Stroke Father     Stroke Sister     Asthma Daughter     Diabetes Daughter     Breast cancer Daughter     Asthma Son        Review of Systems   Constitutional: Positive for fatigue. Negative for fever.   HENT: Positive for postnasal drip, rhinorrhea and congestion.    Eyes: Negative for redness and itching.   Respiratory: Positive for cough, sputum production, shortness of breath, dyspnea on extertion, use of rescue inhaler and Paroxysmal Nocturnal Dyspnea.    Cardiovascular: Negative for chest pain, palpitations and leg swelling.   Genitourinary: Negative for difficulty urinating and hematuria.   Endocrine: Negative for cold intolerance and heat intolerance.    Musculoskeletal: Positive for arthralgias, back pain and myalgias.        Pain at base of lumbar scan. Nocturnal leg cramps   Skin: Negative for rash.   Gastrointestinal: Negative for nausea and abdominal pain.   Neurological: Negative for dizziness, syncope, weakness and light-headedness.   Hematological: Negative for adenopathy. Does not bruise/bleed easily.   Psychiatric/Behavioral: Negative for sleep disturbance. The patient is not nervous/anxious.        Objective:      /76   Pulse 110   Resp 17   Ht 5' 2" (1.575 m)   Wt 65.6 kg (144 lb 10 oz)   " SpO2 97%   BMI 26.45 kg/m²   Physical Exam  Vitals signs and nursing note reviewed.   Constitutional:       Appearance: She is well-developed.   HENT:      Head: Normocephalic and atraumatic.      Mouth/Throat:      Pharynx: Oropharyngeal exudate present.   Eyes:      Conjunctiva/sclera: Conjunctivae normal.      Pupils: Pupils are equal, round, and reactive to light.   Neck:      Musculoskeletal: Neck supple.      Thyroid: No thyromegaly.      Vascular: No JVD.      Trachea: No tracheal deviation.   Cardiovascular:      Rate and Rhythm: Normal rate and regular rhythm.      Heart sounds: Normal heart sounds.   Pulmonary:      Effort: Pulmonary effort is normal. No respiratory distress.      Breath sounds: Examination of the right-lower field reveals wheezing. Examination of the left-lower field reveals wheezing. Decreased breath sounds and wheezing present. No rhonchi or rales.   Chest:      Chest wall: No tenderness.   Abdominal:      General: Bowel sounds are normal.      Palpations: Abdomen is soft.   Musculoskeletal: Normal range of motion.   Lymphadenopathy:      Cervical: No cervical adenopathy.   Skin:     General: Skin is warm and dry.   Neurological:      Mental Status: She is alert and oriented to person, place, and time.       Personal Diagnostic Review  CT of chest performed on 9/9/20-Left lung nodule.  CT Guided Pneumocentesis  Narrative: EXAMINATION:  CT-guided chest tube placement for left pneumothorax    Procedural Personnel    Attending physician(s): Willian    Fellow physician(s): None    Resident physician(s): None    Advanced practice provider(s): none    Pre-procedure diagnosis: Left pneumothorax status post lung biopsy    Post-procedure diagnosis: Same    Indication: Pneumothorax    Additional clinical history: None    Complications: No immediate complications.    CLINICAL HISTORY:  Left lung biopsy    TECHNIQUE:  - Percutaneous pleural drainage with insertion of indwelling catheter under CT  guidance    FINDINGS:  Pre-procedure    Consent: Informed consent for the procedure was obtained and time-out was performed prior to the procedure.    Preparation: The site was prepared and draped using maximal sterile barrier technique including cutaneous antisepsis.    Antibiotic administered: Prophylactic dose within 1 hour of procedure start time or 2 hours for vancomycin or fluoroquinolones    Anesthesia/sedation    Level of anesthesia/sedation: Moderate sedation (conscious sedation)    Anesthesia/sedation administered by: Independent trained observer under attending supervision with continuous monitoring of the patient's level of consciousness and physiologic status    Total intra-service sedation time (minutes): 30    Chest tube placement    The patient was positioned supine. Initial imaging was performed. Local anesthesia was administered. The pleural space was accessed using an access needle followed by wire insertion and serial dilation and a drainage catheter was placed. Position of the drainage catheter within the pleural space was confirmed.    - Initial imaging findings: Moderate left pneumothorax    - Drainage catheter placed: Multipurpose drainage catheter    - External catheter securement: Non-absorbable suture and adhesive anchoring device    - Post-drainage imaging findings: Pneumothorax resolved    - Additional findings: None    Contrast    Contrast agent: None    Contrast volume (mL): 0    Radiation Dose    All CT scans at this location are performed using dose modulation techniques as appropriate to a performed exam including the following: Automated exposure control; adjustment of the mA and/or kV  according to patient size.    Total 91 images obtained    Additional Details    Additional description of procedure: None    Equipment details: None    Specimens removed: None    Estimated blood loss (mL): Less than 10    Standardized report: [email or web address]    Attestation    Signer name:  Willian    I attest that I performed the entire procedure. I reviewed the stored images and agree with the report as written.  Impression: Left-sided 8.5 Hebrew chest tube placement for biopsy-associated pneumothorax.    Electronically signed by: Hernando Dorsey  Date:    10/23/2020  Time:    08:38        Office Spirometry Results:     No flowsheet data found.  Pulmonary Studies Review 10/28/2020   SpO2 97   Height 62   Weight 2313.95   BMI (Calculated) 26.4   Predicted Distance 293.5   Predicted Distance Meters (Calculated) 432.94         Procedures  Ananda Castelan MD (Physician)   Pulmonary Disease  Procedure Orders   1. Home Sleep Studies [[id number]] ordered by Ramiro Aleman MD   Pre-procedure Diagnoses   1. IVANIA (obstructive sleep apnea) [G47.33]   Post-procedure Diagnoses   1. IVANIA (obstructive sleep apnea) [G47.33]      Home Sleep Studies   Date/Time: 9/14/2020 8:00 AM  Performed by: Ananda Castelan MD  Authorized by: Ramiro Aleman MD        PHYSICIAN INTERPRETATION AND COMMENTS: Findings are consistent with mild, non-positional obstructive sleep  apnea (IVANIA). Overall AHI was 9.0/hr with 5.8 hours data. Spo2 sasha was 84.7%, Therapy with CPAP should be considered.  AutoPAP 5-20 cmwp or CPAP inlab titration. Indications of cardiac dysrhythmia are recorded. consult cardiology.  CLINICAL HISTORY: 70 year old female presented with: 13.5 inch neck, BMI of 27, an [place] sleepiness score of 5, history  of hypertension, diabetes, lung disease and symptoms of nocturnal snoring, waking up choking and witnessed apneas. Based on the  clinical history, the patient has a high pre-test probability of having moderate IVANIA              Assessment:            Non-small cell cancer of left lung  -     Complete PFT without bronchodilator; Future; Expected date: 10/28/2020  -     Ambulatory referral/consult to Hematology / Oncology; Future; Expected date: 11/04/2020  -     Ambulatory referral/consult to Radiation Oncology;  Future; Expected date: 11/04/2020  -     Ambulatory referral/consult to Cardiothoracic Surgery; Future; Expected date: 11/04/2020    Dyspnea on exertion  -     NM Lung Ventilation Perfusion Imaging; Future; Expected date: 10/28/2020    Chronic obstructive pulmonary disease, unspecified COPD type    Pulmonary nodule, left  -     Ambulatory referral/consult to Hematology / Oncology; Future; Expected date: 11/04/2020  -     Ambulatory referral/consult to Radiation Oncology; Future; Expected date: 11/04/2020  -     Ambulatory referral/consult to Cardiothoracic Surgery; Future; Expected date: 11/04/2020    Smoking  -     nicotine (NICODERM CQ) 21 mg/24 hr; Place 1 patch onto the skin once daily.  Dispense: 28 patch; Refill: 1    Nocturnal muscle cramp  -     clonazePAM (KLONOPIN) 0.25 MG TbDL; Take 1 tablet (0.25 mg total) by mouth nightly.  Dispense: 30 tablet; Refill: 5          Outpatient Encounter Medications as of 10/28/2020   Medication Sig Dispense Refill    amlodipine-benazepril 5-20 mg (LOTREL) 5-20 mg per capsule Take 1 capsule by mouth once daily. 90 capsule 4    blood sugar diagnostic Strp 1 each by Misc.(Non-Drug; Combo Route) route 3 (three) times daily. IHEALTH test strips 100 each 3    blood-glucose meter kit Insurance preferred 1 each 0    butalbital-acetaminophen-caffeine -40 mg (FIORICET, ESGIC) -40 mg per tablet Take 1 tablet by mouth 3 (three) times daily as needed for Headaches. (MAXIMUM OF 15 TABLET PER MONTH) 30 tablet 3    calcium-vitamin D (CALCIUM WITH VITAMIN D) 600 mg(1,500mg) -400 unit Tab Take 2 tablets by mouth once daily. 180 tablet 4    chlorthalidone (HYGROTEN) 25 MG Tab TAKE 1 TABLET EVERY DAY 90 tablet 2    ezetimibe (ZETIA) 10 mg tablet Take 1 tablet (10 mg total) by mouth once daily. 90 tablet 4    fluticasone propionate (FLONASE) 50 mcg/actuation nasal spray 2 sprays (100 mcg total) by Each Nostril route once daily. 48 g 4    glimepiride (AMARYL) 2 MG tablet  Take 1 tablet (2 mg total) by mouth before breakfast. 90 tablet 4    ipratropium-albuteroL (COMBIVENT)  mcg/actuation inhaler Inhale 2 puffs into the lungs every 4 (four) hours as needed for Wheezing or Shortness of Breath. Rescue 4 g 11    lancets 32 gauge Misc 1 lancet by Misc.(Non-Drug; Combo Route) route 3 (three) times daily. Insurance preferred 100 each 11    montelukast (SINGULAIR) 10 mg tablet       oxyCODONE-acetaminophen (PERCOCET) 5-325 mg per tablet Take 1 tablet by mouth every 6 (six) hours as needed for Pain. 12 tablet 0    pantoprazole (PROTONIX) 40 MG tablet TAKE 1 TABLET EVERY DAY 90 tablet 4    potassium 99 mg Tab Take 1 tablet by mouth once daily.      rosuvastatin (CRESTOR) 20 MG tablet Take 1 tablet (20 mg total) by mouth once daily. 90 tablet 4    tiZANidine (ZANAFLEX) 2 MG tablet Take 1-2 tabs twice a day as needed for muscle pain.  Will cause drowsiness 30 tablet 0    TRUEPLUS LANCETS 33 gauge Misc       varicella-zoster gE-AS01B, PF, (SHINGRIX) 50 mcg/0.5 mL injection Inject 0.5 mL (one dose) into muscle now; give second dose at least 2 months later 0.5 mL 1    clonazePAM (KLONOPIN) 0.25 MG TbDL Take 1 tablet (0.25 mg total) by mouth nightly. 30 tablet 5    nicotine (NICODERM CQ) 21 mg/24 hr Place 1 patch onto the skin once daily. 28 patch 1     No facility-administered encounter medications on file as of 10/28/2020.      Plan:       Requested Prescriptions     Signed Prescriptions Disp Refills    nicotine (NICODERM CQ) 21 mg/24 hr 28 patch 1     Sig: Place 1 patch onto the skin once daily.    clonazePAM (KLONOPIN) 0.25 MG TbDL 30 tablet 5     Sig: Take 1 tablet (0.25 mg total) by mouth nightly.     Problem List Items Addressed This Visit     Chronic obstructive pulmonary disease      Other Visit Diagnoses     Non-small cell cancer of left lung    -  Primary    Relevant Orders    Complete PFT without bronchodilator    Ambulatory referral/consult to Hematology / Oncology     Ambulatory referral/consult to Radiation Oncology    Ambulatory referral/consult to Cardiothoracic Surgery    Dyspnea on exertion        Relevant Orders    NM Lung Ventilation Perfusion Imaging    Pulmonary nodule, left        Relevant Orders    Ambulatory referral/consult to Hematology / Oncology    Ambulatory referral/consult to Radiation Oncology    Ambulatory referral/consult to Cardiothoracic Surgery    Smoking        Relevant Medications    nicotine (NICODERM CQ) 21 mg/24 hr    Nocturnal muscle cramp        Relevant Medications    clonazePAM (KLONOPIN) 0.25 MG TbDL             Follow up in about 6 weeks (around 12/9/2020) for Review progress.    MEDICAL DECISION MAKING: Moderate to high complexity.  Overall, the multiple problems listed are of moderate to high severity that may impact quality of life and activities of daily living. Side effects of medications, treatment plan as well as options and alternatives reviewed and discussed with patient. There was counseling of patient concerning these issues.    Total time spent in face to face counseling and coordination of care - 40  minutes over 50% of time was used in discussion of prognosis, risks, benefits of treatment, instructions and compliance with regimen . Discussion with other physicians or health care providers (DME, NP, pharmacy, respiratory therapy) occurred.

## 2020-10-28 NOTE — PROGRESS NOTES
Outpatient Care Management  Patient Not Qualified    Patient: Lucia Barlow  MRN:  [id number]  Date of Service:  10/30/2020  Completed by:  Lilliam Rasmussen RN    Chief Complaint   Patient presents with    OPCM RN First Follow-Up Attempt    OPCM RN Second Follow-Up Attempt    OPCM RN Third Follow-Up Attempt    Case Closure       Patient Summary     Program:  OPCM High Risk  Qualification Status:  No  Reason Not Qualified:  Unable to reach

## 2020-10-30 ENCOUNTER — TELEPHONE (OUTPATIENT)
Dept: HEMATOLOGY/ONCOLOGY | Facility: CLINIC | Age: 70
End: 2020-10-30

## 2020-10-30 NOTE — NURSING
Received referral for pt to see Dr. Aleman with new diagnosis of lung cancer.  Spoke with pt and is agreeable with scheduling to see Dr. Flores at [facility].  Instruction on where to go given, pt is not familiar with [facility] and will have daughter drive to appointment.  Active on [facility] , but requested an appointment slip.  All scheduled test needed for appointment.    Oncology Navigation   Intake  Date of Diagnosis: 10/21/20  Cancer Type: Thoracic  Internal / External Referral: Internal  Date of Referral: 10/28/20  Initial Nurse Navigator Contact: 10/28/20  Referral to Initial Contact Timeline (days): 0  First Appointment Available: 11/03/20  Appointment Date: 11/11/20  First Available Date vs. Scheduled Date (days): 8  Multiple appointments: Yes (Lung Perfusion study 11/4)  Reason if booked > 7 days after scheduling: Transportation coordination     Treatment   Acuity   Follow Up  No follow-ups on file.

## 2020-11-03 ENCOUNTER — LAB VISIT (OUTPATIENT)
Dept: OTOLARYNGOLOGY | Facility: CLINIC | Age: 70
End: 2020-11-03
Payer: MEDICARE

## 2020-11-03 DIAGNOSIS — J44.9 CHRONIC OBSTRUCTIVE PULMONARY DISEASE, UNSPECIFIED COPD TYPE: ICD-10-CM

## 2020-11-03 PROCEDURE — U0003 INFECTIOUS AGENT DETECTION BY NUCLEIC ACID (DNA OR RNA); SEVERE ACUTE RESPIRATORY SYNDROME CORONAVIRUS 2 (SARS-COV-2) (CORONAVIRUS DISEASE [COVID-19]), AMPLIFIED PROBE TECHNIQUE, MAKING USE OF HIGH THROUGHPUT TECHNOLOGIES AS DESCRIBED BY CMS-2020-01-R: HCPCS | Mod: HCNC

## 2020-11-03 NOTE — PHYSICIAN QUERY
PT Name: Lucia Barlow  MR #: [id number]    Pathology Findings Clarification     CDS/: Hilary Timmons   RN CCDS            Contact information:[email or web address]  This form is a permanent document in the medical record.     Query Date: November 3, 2020    By submitting this query, we are merely seeking further clarification of documentation.  Please utilize your independent clinical judgment when addressing the question(s) below.    The medical record contains the following:  Pathology Findings Location in Medical Record       Biopsy of right lung: non-small cell carcinoma, at least upon the surface    There is only a small amount of material for evaluation, but a non-small cell   carcinoma is present.  This tumor is not forming well-defined glands or   squamous pearls.  The tumor here may all be on the surface with extension   into submucosal glands.   In conclusion, the histologic type of this tumor is not clearly established,   and invasion is not definitively documented.      Pathology report from 10/21       Please clarify:  [  ] Pathology findings noted above are ruled in/confirmed as diagnoses   [  ] Pathology findings noted above are not confirmed as diagnoses   [  ] Other diagnosis (please specify): ___________   [  ] Clinically Undetermined     (X) Please check with Pulmonologist    Please document in your progress notes daily for the duration of treatment until resolved and include in your discharge summary.

## 2020-11-03 NOTE — PHYSICIAN QUERY
PT Name: Lucia Barlow  MR #: [id number]    Hypertensive Condition Clarification      CDS/: Hilary Timmons  RN CCDS             Contact information: [email or web address]    This form is a permanent document in the medical record.     Query Date: November 3, 2020    By submitting this query, we are merely seeking further clarification of documentation. Please utilize your independent clinical judgment when addressing the question(s) below.    The Medical Record contains the following:   Indicators   Supporting Clinical Findings Location in Medical Record   x Hypertension associated with diabetes documented Hypertension associated with diabetes   Continue home medications    H&P-DS   x Chronic condition(s) COPD, DM 2, PVD, dyslipidemia, CKD 3 H&P   x Vital signs BP: (109-149)/(44-83) 136/63 B/P range on H&P   x Treatment/Medication Amlodipine  Benazepril   Amaryl   MAR    Other     Provider, please clarify the relationship between the Hypertension and Diabetes Mellitus  [   ] Hypertension is a manifestation of Diabetes Mellitus (Secondary Hypertension)   [  x ]  Hypertension is not a manifestation of Diabetes Mellitus (Essential Hypertension)   [   ] Other Clarification (please specify): ____________   [  ] Clinically Undetermined       Please document in your progress notes daily for the duration of treatment, until resolved, and include in your discharge summary.

## 2020-11-04 ENCOUNTER — HOSPITAL ENCOUNTER (OUTPATIENT)
Dept: RADIOLOGY | Facility: HOSPITAL | Age: 70
Discharge: HOME | End: 2020-11-04
Attending: INTERNAL MEDICINE
Payer: MEDICARE

## 2020-11-04 DIAGNOSIS — R06.09 DYSPNEA ON EXERTION: ICD-10-CM

## 2020-11-04 DIAGNOSIS — R06.09 DOE (DYSPNEA ON EXERTION): ICD-10-CM

## 2020-11-04 LAB — SARS-COV-2 RNA RESP QL NAA+PROBE: NOT DETECTED

## 2020-11-04 PROCEDURE — 78597 LUNG PERFUSION DIFFERENTIAL: CPT | Mod: TC,HCNC

## 2020-11-04 PROCEDURE — 71046 X-RAY EXAM CHEST 2 VIEWS: CPT | Mod: TC,HCNC

## 2020-11-04 PROCEDURE — A9540 TC99M MAA: HCPCS | Mod: HCNC

## 2020-11-04 NOTE — PHYSICIAN QUERY
PT Name: Lucia Barlow  MR #: [id number]    Pathology Findings Clarification     CDS/: Hilary Timmons   RN CCDS            Contact information:[email or web address]  This form is a permanent document in the medical record.     Query Date: November 4, 2020    By submitting this query, we are merely seeking further clarification of documentation.  Please utilize your independent clinical judgment when addressing the question(s) below.    The medical record contains the following:  Pathology Findings Location in Medical Record       Biopsy of right lung: non-small cell carcinoma, at least upon the surface    There is only a small amount of material for evaluation, but a non-small cell   carcinoma is present.  This tumor is not forming well-defined glands or   squamous pearls.  The tumor here may all be on the surface with extension   into submucosal glands.   In conclusion, the histologic type of this tumor is not clearly established,   and invasion is not definitively documented.      Pathology report from 10/21       Please clarify:  [X  ] Pathology findings noted above are ruled in/confirmed as diagnoses   [  ] Pathology findings noted above are not confirmed as diagnoses   [  ] Other diagnosis (please specify): ___________   [  ] Clinically Undetermined         Please document in your progress notes daily for the duration of treatment until resolved and include in your discharge summary.

## 2020-11-06 ENCOUNTER — CLINICAL SUPPORT (OUTPATIENT)
Dept: PULMONOLOGY | Facility: CLINIC | Age: 70
End: 2020-11-06
Payer: MEDICARE

## 2020-11-06 DIAGNOSIS — C34.92 NON-SMALL CELL CANCER OF LEFT LUNG: ICD-10-CM

## 2020-11-06 LAB
BRPFT: ABNORMAL
DLCO ADJ PRE: 8.47 ML/(MIN*MMHG) (ref 12.68–24.14)
DLCO SINGLE BREATH LLN: 12.68
DLCO SINGLE BREATH PRE REF: 41.5 %
DLCO SINGLE BREATH REF: 18.41
DLCOC SBVA LLN: 2.65
DLCOC SBVA PRE REF: 81 %
DLCOC SBVA REF: 4.34
DLCOC SINGLE BREATH LLN: 12.68
DLCOC SINGLE BREATH PRE REF: 46 %
DLCOC SINGLE BREATH REF: 18.41
DLCOVA LLN: 2.65
DLCOVA PRE REF: 73 %
DLCOVA PRE: 3.17 ML/(MIN*MMHG*L) (ref 2.65–6.03)
DLCOVA REF: 4.34
DLVAADJ PRE: 3.51 ML/(MIN*MMHG*L) (ref 2.65–6.03)
ERV LLN: -16449.43
ERV PRE REF: 43.1 %
ERV REF: 0.57
FEF 25 75 LLN: 0.52
FEF 25 75 PRE REF: 29.6 %
FEF 25 75 REF: 1.43
FEV1 FVC LLN: 65
FEV1 FVC PRE REF: 77.2 %
FEV1 FVC REF: 79
FEV1 LLN: 1.1
FEV1 PRE REF: 60.3 %
FEV1 REF: 1.59
FRCPLETH LLN: 1.65
FRCPLETH PREREF: 106.5 %
FRCPLETH REF: 2.48
FVC LLN: 1.42
FVC PRE REF: 77.7 %
FVC REF: 2.03
IVC PRE: 1.3 L (ref 1.42–2.64)
IVC SINGLE BREATH LLN: 1.42
IVC SINGLE BREATH PRE REF: 64.2 %
IVC SINGLE BREATH REF: 2.03
MVV LLN: 55
MVV PRE REF: 62.3 %
MVV REF: 64
PEF LLN: 2.27
PEF PRE REF: 79.6 %
PEF REF: 4.02
PRE DLCO: 7.64 ML/(MIN*MMHG) (ref 12.68–24.14)
PRE ERV: 0.25 L (ref -16449.43–16450.57)
PRE FEF 25 75: 0.42 L/S (ref 0.52–2.33)
PRE FET 100: 6.7 SEC
PRE FEV1 FVC: 60.84 % (ref 65.43–92.16)
PRE FEV1: 0.96 L (ref 1.1–2.09)
PRE FRC PL: 2.64 L
PRE FVC: 1.58 L (ref 1.42–2.64)
PRE MVV: 40 L/MIN (ref 54.54–73.78)
PRE PEF: 3.2 L/S (ref 2.27–5.77)
PRE RV: 2.28 L (ref 1.33–2.48)
PRE TLC: 3.92 L (ref 3.25–5.23)
RAW LLN: 3.06
RAW PRE REF: 199.3 %
RAW PRE: 6.1 CMH2O*S/L (ref 3.06–3.06)
RAW REF: 3.06
RV LLN: 1.33
RV PRE REF: 119.6 %
RV REF: 1.9
RVTLC LLN: 34
RVTLC PRE REF: 133.8 %
RVTLC PRE: 58.1 % (ref 33.85–53.03)
RVTLC REF: 43
TLC LLN: 3.25
TLC PRE REF: 92.4 %
TLC REF: 4.24
VA PRE: 2.41 L (ref 4.09–4.09)
VA SINGLE BREATH LLN: 4.09
VA SINGLE BREATH PRE REF: 58.9 %
VA SINGLE BREATH REF: 4.09
VC LLN: 1.42
VC PRE REF: 80.9 %
VC PRE: 1.64 L (ref 1.42–2.64)
VC REF: 2.03
VTGRAWPRE: 3.23 L

## 2020-11-06 PROCEDURE — 94726 PULM FUNCT TST PLETHYSMOGRAP: ICD-10-PCS | Mod: HCNC,S$GLB,, | Performed by: INTERNAL MEDICINE

## 2020-11-06 PROCEDURE — 94729 DIFFUSING CAPACITY: CPT | Mod: HCNC,S$GLB,, | Performed by: INTERNAL MEDICINE

## 2020-11-06 PROCEDURE — 94010 BREATHING CAPACITY TEST: CPT | Mod: HCNC,S$GLB,, | Performed by: INTERNAL MEDICINE

## 2020-11-06 PROCEDURE — 94726 PLETHYSMOGRAPHY LUNG VOLUMES: CPT | Mod: HCNC,S$GLB,, | Performed by: INTERNAL MEDICINE

## 2020-11-06 PROCEDURE — 94010 BREATHING CAPACITY TEST: ICD-10-PCS | Mod: HCNC,S$GLB,, | Performed by: INTERNAL MEDICINE

## 2020-11-06 PROCEDURE — 94729 PR C02/MEMBANE DIFFUSE CAPACITY: ICD-10-PCS | Mod: HCNC,S$GLB,, | Performed by: INTERNAL MEDICINE

## 2020-11-09 LAB
FINAL PATHOLOGIC DIAGNOSIS: NORMAL
GROSS: NORMAL
MICROSCOPIC EXAM: NORMAL
SUPPLEMENTAL DIAGNOSIS: NORMAL

## 2020-11-10 NOTE — PROGRESS NOTES
History & Physical    SUBJECTIVE:     History of Present Illness:  70 year old female with PMH of GERD, COPD, PVD (bilateral aortoiliac stents), IVANIA on CPAP, DMII (no insulin) and newly diagnosed NSCLC of left upper lobe. Patient has undergone LDCT since 2016. She had a left lower lobe nodule biopsied in 2017 which only showed chronic lymphocytic inflammation and fibrosis. Surveillance CT in September 2020 showed development of left upper lobe nodule concerning for malignancy. She also has bilateral subcentimeter nodules. CT guided biopsy on 10/21/20 complicated by pneumothorax requiring chest tube placement. Pathology positive for NSCLC. Minimal staining performed due to small sample size. PET CT showed very mild uptake in johann and no distant disease. Today she reports feeling well. Only feels SOB after wearing facemask in public for extended periods of time. She does not feel her ambulation or daily activities are limited. Uses Combivent at home PRN. Recently started wearing CPAP at night. Denies fever, chills, anginal chest pain, palpitations, N/V or changes in bowel and bladder functioning. Only on 81mg ASA for antiplatelet therapy.    Quit smoking 1 week ago. 60 pack year history. No EtOH use. Formerly worked as a .   PSH of hysterectomy, rectosigmoid polypectomy with partial colectomy, bilateral carpal tunnel release and angioplasty for PVD.    Chief Complaint   Patient presents with    Consult       Review of patient's allergies indicates:   Allergen Reactions    Skin staples [surgical stainless steel] Swelling    Egg derived      Shortness breath, lip swelling    Fish containing products     Iodine and iodide containing products Swelling    Latex, natural rubber Swelling    Losartan Itching    Nickel     Pravastatin      40 mg causes nausea vomitting but 20 mg ok    Shellfish containing products Swelling       Current Outpatient Medications   Medication Sig Dispense Refill     amlodipine-benazepril 5-20 mg (LOTREL) 5-20 mg per capsule Take 1 capsule by mouth once daily. 90 capsule 4    blood sugar diagnostic Strp 1 each by Misc.(Non-Drug; Combo Route) route 3 (three) times daily. [employer] test strips 100 each 3    blood-glucose meter kit Insurance preferred 1 each 0    butalbital-acetaminophen-caffeine -40 mg (FIORICET, ESGIC) -40 mg per tablet Take 1 tablet by mouth 3 (three) times daily as needed for Headaches. (MAXIMUM OF 15 TABLET PER MONTH) 30 tablet 3    calcium-vitamin D (CALCIUM WITH VITAMIN D) 600 mg(1,500mg) -400 unit Tab Take 2 tablets by mouth once daily. 180 tablet 4    chlorthalidone (HYGROTEN) 25 MG Tab TAKE 1 TABLET EVERY DAY 90 tablet 2    clonazePAM (KLONOPIN) 0.25 MG TbDL Take 1 tablet (0.25 mg total) by mouth nightly. 30 tablet 5    ezetimibe (ZETIA) 10 mg tablet Take 1 tablet (10 mg total) by mouth once daily. 90 tablet 4    fluticasone propionate (FLONASE) 50 mcg/actuation nasal spray 2 sprays (100 mcg total) by Each Nostril route once daily. 48 g 4    glimepiride (AMARYL) 2 MG tablet Take 1 tablet (2 mg total) by mouth before breakfast. 90 tablet 4    ipratropium-albuteroL (COMBIVENT)  mcg/actuation inhaler Inhale 2 puffs into the lungs every 4 (four) hours as needed for Wheezing or Shortness of Breath. Rescue 4 g 11    lancets 32 gauge Misc 1 lancet by Misc.(Non-Drug; Combo Route) route 3 (three) times daily. Insurance preferred 100 each 11    montelukast (SINGULAIR) 10 mg tablet       nicotine (NICODERM CQ) 21 mg/24 hr Place 1 patch onto the skin once daily. 28 patch 1    oxyCODONE-acetaminophen (PERCOCET) 5-325 mg per tablet Take 1 tablet by mouth every 6 (six) hours as needed for Pain. 12 tablet 0    pantoprazole (PROTONIX) 40 MG tablet TAKE 1 TABLET EVERY DAY 90 tablet 4    potassium 99 mg Tab Take 1 tablet by mouth once daily.      rosuvastatin (CRESTOR) 20 MG tablet Take 1 tablet (20 mg total) by mouth once daily. 90 tablet 4     tiZANidine (ZANAFLEX) 2 MG tablet Take 1-2 tabs twice a day as needed for muscle pain.  Will cause drowsiness 30 tablet 0    TRUEPLUS LANCETS 33 gauge Misc       varicella-zoster gE-AS01B, PF, (SHINGRIX) 50 mcg/0.5 mL injection Inject 0.5 mL (one dose) into muscle now; give second dose at least 2 months later 0.5 mL 1     No current facility-administered medications for this visit.        Past Medical History:   Diagnosis Date    Atherosclerosis of artery of both lower extremities 1/17/2014    Atherosclerosis of native coronary artery of native heart without angina pectoris 11/18/2016    Atherosclerotic PVD with intermittent claudication 1/17/2014    Chronic bronchitis     COPD (chronic obstructive pulmonary disease)     Dyslipidemia associated with type 2 diabetes mellitus 6/14/2013    Dyslipidemia associated with type 2 diabetes mellitus     Ex-smoker     Gastroesophageal reflux disease without esophagitis 1/25/2019    Hyperlipidemia     Hypertension     Osteoporosis 1/16 rosita 1/18    PVD (peripheral vascular disease)     S/P peripheral artery angioplasty 2/7/2014    Seasonal allergic rhinitis due to pollen     Simple chronic bronchitis     Tobacco dependence     Type 2 diabetes mellitus with microalbuminuria, without long-term current use of insulin 3/29/2019    Type 2 diabetes mellitus with peripheral vascular disease     Type 2 diabetes mellitus with stage 3 chronic kidney disease, without long-term current use of insulin 2/1/2019    Type 2 diabetes mellitus without retinopathy 2/1/2019    Urinary incontinence      Past Surgical History:   Procedure Laterality Date    ANGIOPLASTY Bilateral 01/24/2014    aortoiliac stenting     CARPAL TUNNEL RELEASE  2003    Henrry    COLECTOMY  approximate 2005    pt states 1in colon -dx with benign mass removed-states no colon cancer    COLONOSCOPY N/A 11/9/2016    Procedure: COLONOSCOPY;  Surgeon: Dmitri Sterling MD;  Location: [facility];   "Service: Endoscopy;  Laterality: N/A;    COLONOSCOPY N/A 11/15/2019    Procedure: COLONOSCOPY;  Surgeon: Marko Vicente MD;  Location[facility];  Service: Endoscopy;  Laterality: N/A;    HYSTERECTOMY      no cancer    OOPHORECTOMY       Family History   Problem Relation Age of Onset    Stroke Father     Stroke Sister     Asthma Daughter     Diabetes Daughter     Breast cancer Daughter     Asthma Son      Social History     Tobacco Use    Smoking status: Current Some Day Smoker     Packs/day: 1.00     Years: 60.00     Pack years: 60.00     Types: Cigarettes     Start date: 1/1/1960    Smokeless tobacco: Never Used   Substance Use Topics    Alcohol use: No     Alcohol/week: 0.0 standard drinks     Frequency: Never     Drinks per session: Patient refused     Binge frequency: Never    Drug use: No        Review of Systems:  Review of Systems   Constitutional: Negative for activity change, fatigue and fever.   HENT: Negative for congestion, trouble swallowing and voice change.    Eyes: Negative for visual disturbance.   Respiratory: Negative for cough, chest tightness, shortness of breath and wheezing.    Cardiovascular: Negative for chest pain, palpitations and leg swelling.   Gastrointestinal: Negative for abdominal distention, diarrhea, nausea and vomiting.   Genitourinary: Negative for difficulty urinating.   Musculoskeletal: Positive for arthralgias (bialteral knee arthritis). Negative for myalgias.   Neurological: Negative for weakness, light-headedness and headaches.   Hematological: Negative for adenopathy.   Psychiatric/Behavioral: Negative for confusion.       OBJECTIVE:     Vital Signs (Most Recent)  BP: 126/65 (11/11/20 0908)  SpO2: 95 % (11/11/20 0908)  5' 2" (1.575 m)  66.6 kg (146 lb 13.2 oz)   Body mass index is 26.85 kg/m².  ECOG Performance Scale:  0 - Asymptomatic    Physical Exam:  Physical Exam  Constitutional:       Appearance: Normal appearance.   HENT:      Nose: Nose normal.     "  Mouth/Throat:      Mouth: Mucous membranes are moist.   Eyes:      General: No scleral icterus.     Pupils: Pupils are equal, round, and reactive to light.   Cardiovascular:      Rate and Rhythm: Normal rate and regular rhythm.      Pulses: Normal pulses.   Pulmonary:      Effort: Pulmonary effort is normal.      Breath sounds: Normal breath sounds. No wheezing.   Abdominal:      General: Abdomen is flat. Bowel sounds are normal.      Palpations: Abdomen is soft.   Musculoskeletal:      Right lower leg: No edema.      Left lower leg: No edema.   Lymphadenopathy:      Cervical: No cervical adenopathy.   Skin:     Coloration: Skin is not jaundiced.   Neurological:      General: No focal deficit present.      Mental Status: She is alert and oriented to person, place, and time.   Psychiatric:         Mood and Affect: Mood normal.         Laboratory    11/4/20- NM Split Lung Vent Perfusion: I reviewed images and report  Left lung  Left upper lung zone: 18.6 %  Left mid lung zone: 19.7 %  Left lower lung zone: 12.2 %  Left lung total: 50.5 %     Right lung  Right upper lung zone: 15.7 %  Right mid lung zone: 22.0 %  Right lower lung zone: 11.8 %  Right lung total: 49.5 %    11/6/20- PFTs: I reviewed report  FEV1- 0.96 60%  DLCO- 7.64 41%    Diagnostic Results:    8/29/2018- I reviewed  Stress ECHO  Impression: NORMAL MYOCARDIAL PERFUSION   1. The perfusion scan is free of evidence for myocardial ischemia or injury.   2. Resting wall motion is physiologic.   3. Resting LV function is normal.   4. The ventricular volumes are normal at rest and stress.   5. The extracardiac distribution of radioactivity is normal.    2D ECHO    1 - Concentric remodeling.     2 - No wall motion abnormalities.     3 - Normal left ventricular systolic function (EF 55-60%).     4 - Indeterminate LV diastolic function.     5 - Normal right ventricular systolic function .     6 - The estimated PA systolic pressure is 40 mmHg.     7 - Mild aortic  stenosis, DEL = 1.46 cm2, AVAi = 0.86 cm2/m2, mean gradient = 13 mmHg.    9/9/20- CT Chest without contrast: I reviewed the images  Left upper lobe mass corresponding to the abnormality seen on the CT is an air bronchogram containing opacity which measures 17 x 16 mm.  There is also a nodule in the left lower lobe image 314 of series 4 which measures 11 by 9 mm.  This is unchanged when compared to November 2018.  Tiny nodules in the right lung are seen on image 218 of series 4 which appear to be endobronchial.  These were not clearly visualized on the prior CT.  A 2 mm nodule is seen in the medial right lower lobe image 316 of series 4.  Small scattered nodules are seen in the peripheral right upper lung.  For example, see image 154 of series 4.  These measure 2-3 mm.  There is a 3 mm nodule left lower lobe image 245 of series 4.  Multiple additional tiny scattered bilateral pulmonary nodules are noted as well.  No pneumothorax or pleural effusion or pulmonic infiltrate.  Heart size is normal.  No pericardial effusion.  Thoracic aortic and coronary artery calcifications are noted.  Trachea and mainstem bronchi remain patent.  Small scattered intrathoracic lymph nodes are noted  Limited imaging through the upper abdomen demonstrates dense calcified plaque in the abdominal aorta.  There are degenerative changes of the spine.  Emphysematous changes are noted    9/30/20- PET- I reviewed the images  1. Hypermetabolic left upper lobe pulmonary nodule suspicious for bronchogenic malignancy. There is a hypermetabolic lung nodule with spiculated margins in the anterior left upper lobe measuring 1.7 cm and exhibiting an SUV max of 4.3.  Additional 7 mm nodule identified in the posterior left lower lobe exhibiting no significant hypermetabolism.  Tiny 3 mm non FDG avid nodule noted in the posterolateral periphery of the left lower lobe.  Bilateral emphysematous changes.  Weakly FDG avid lymph node in the AP window measuring 10  mm in short axis with SUV max of 2.5.  Similar weakly FDG avid node in the subcarinal space measuring 8 mm in short axis with SUV max of 2.5  2. Subcentimeter non FDG avid left lower lobe nodules, indeterminate    ASSESSMENT/PLAN:     70 year old female with PMH of GERD, COPD, PVD (bilateral aortoiliac stents), IVANIA on CPAP, DMII (no insulin) and newly diagnosed NSCLC of left upper lobe.  Moderate to severe COPD by PFT profile and nearly equal lung function on V/Q scan--> this warrants further evaluation with 6 min walk and VO2 max  Underlying COPD, PVD and tumor size/location are indications for anatomic lingulectomy.    PLAN:Plan   Counseled patient on continued smoking cessation.   Will order 6MWT with VO2 Max to further assess baseline pulmonary functioning.   Present patient's case in multidisciplinary lung tumor board  Based on the above, plan for robot-assisted lingulectomy, possible upper lobectomy with MLND.   Appropriate patient education regarding the zia-operative period as well as intraoperative details were discussed. Risks, including but not limited to, bleeding, infection, pain and anesthetic complication were discussed. Patient was given the opportunity to ask questions and to have those questions answered to their satisfaction. Patient verbalized understanding to both procedure and associated risks. Consent was obtained.

## 2020-11-10 NOTE — H&P (VIEW-ONLY)
History & Physical    SUBJECTIVE:     History of Present Illness:  70 year old female with PMH of GERD, COPD, PVD (bilateral aortoiliac stents), IVANIA on CPAP, DMII (no insulin) and newly diagnosed NSCLC of left upper lobe. Patient has undergone LDCT since 2016. She had a left lower lobe nodule biopsied in 2017 which only showed chronic lymphocytic inflammation and fibrosis. Surveillance CT in September 2020 showed development of left upper lobe nodule concerning for malignancy. She also has bilateral subcentimeter nodules. CT guided biopsy on 10/21/20 complicated by pneumothorax requiring chest tube placement. Pathology positive for NSCLC. Minimal staining performed due to small sample size. PET CT showed very mild uptake in johann and no distant disease. Today she reports feeling well. Only feels SOB after wearing facemask in public for extended periods of time. She does not feel her ambulation or daily activities are limited. Uses Combivent at home PRN. Recently started wearing CPAP at night. Denies fever, chills, anginal chest pain, palpitations, N/V or changes in bowel and bladder functioning. Only on 81mg ASA for antiplatelet therapy.    Quit smoking 1 week ago. 60 pack year history. No EtOH use. Formerly worked as a .   PSH of hysterectomy, rectosigmoid polypectomy with partial colectomy, bilateral carpal tunnel release and angioplasty for PVD.    Chief Complaint   Patient presents with    Consult       Review of patient's allergies indicates:   Allergen Reactions    Skin staples [surgical stainless steel] Swelling    Egg derived      Shortness breath, lip swelling    Fish containing products     Iodine and iodide containing products Swelling    Latex, natural rubber Swelling    Losartan Itching    Nickel     Pravastatin      40 mg causes nausea vomitting but 20 mg ok    Shellfish containing products Swelling       Current Outpatient Medications   Medication Sig Dispense Refill     amlodipine-benazepril 5-20 mg (LOTREL) 5-20 mg per capsule Take 1 capsule by mouth once daily. 90 capsule 4    blood sugar diagnostic Strp 1 each by Misc.(Non-Drug; Combo Route) route 3 (three) times daily. [employer] test strips 100 each 3    blood-glucose meter kit Insurance preferred 1 each 0    butalbital-acetaminophen-caffeine -40 mg (FIORICET, ESGIC) -40 mg per tablet Take 1 tablet by mouth 3 (three) times daily as needed for Headaches. (MAXIMUM OF 15 TABLET PER MONTH) 30 tablet 3    calcium-vitamin D (CALCIUM WITH VITAMIN D) 600 mg(1,500mg) -400 unit Tab Take 2 tablets by mouth once daily. 180 tablet 4    chlorthalidone (HYGROTEN) 25 MG Tab TAKE 1 TABLET EVERY DAY 90 tablet 2    clonazePAM (KLONOPIN) 0.25 MG TbDL Take 1 tablet (0.25 mg total) by mouth nightly. 30 tablet 5    ezetimibe (ZETIA) 10 mg tablet Take 1 tablet (10 mg total) by mouth once daily. 90 tablet 4    fluticasone propionate (FLONASE) 50 mcg/actuation nasal spray 2 sprays (100 mcg total) by Each Nostril route once daily. 48 g 4    glimepiride (AMARYL) 2 MG tablet Take 1 tablet (2 mg total) by mouth before breakfast. 90 tablet 4    ipratropium-albuteroL (COMBIVENT)  mcg/actuation inhaler Inhale 2 puffs into the lungs every 4 (four) hours as needed for Wheezing or Shortness of Breath. Rescue 4 g 11    lancets 32 gauge Misc 1 lancet by Misc.(Non-Drug; Combo Route) route 3 (three) times daily. Insurance preferred 100 each 11    montelukast (SINGULAIR) 10 mg tablet       nicotine (NICODERM CQ) 21 mg/24 hr Place 1 patch onto the skin once daily. 28 patch 1    oxyCODONE-acetaminophen (PERCOCET) 5-325 mg per tablet Take 1 tablet by mouth every 6 (six) hours as needed for Pain. 12 tablet 0    pantoprazole (PROTONIX) 40 MG tablet TAKE 1 TABLET EVERY DAY 90 tablet 4    potassium 99 mg Tab Take 1 tablet by mouth once daily.      rosuvastatin (CRESTOR) 20 MG tablet Take 1 tablet (20 mg total) by mouth once daily. 90 tablet 4     tiZANidine (ZANAFLEX) 2 MG tablet Take 1-2 tabs twice a day as needed for muscle pain.  Will cause drowsiness 30 tablet 0    TRUEPLUS LANCETS 33 gauge Misc       varicella-zoster gE-AS01B, PF, (SHINGRIX) 50 mcg/0.5 mL injection Inject 0.5 mL (one dose) into muscle now; give second dose at least 2 months later 0.5 mL 1     No current facility-administered medications for this visit.        Past Medical History:   Diagnosis Date    Atherosclerosis of artery of both lower extremities 1/17/2014    Atherosclerosis of native coronary artery of native heart without angina pectoris 11/18/2016    Atherosclerotic PVD with intermittent claudication 1/17/2014    Chronic bronchitis     COPD (chronic obstructive pulmonary disease)     Dyslipidemia associated with type 2 diabetes mellitus 6/14/2013    Dyslipidemia associated with type 2 diabetes mellitus     Ex-smoker     Gastroesophageal reflux disease without esophagitis 1/25/2019    Hyperlipidemia     Hypertension     Osteoporosis 1/16 rosita 1/18    PVD (peripheral vascular disease)     S/P peripheral artery angioplasty 2/7/2014    Seasonal allergic rhinitis due to pollen     Simple chronic bronchitis     Tobacco dependence     Type 2 diabetes mellitus with microalbuminuria, without long-term current use of insulin 3/29/2019    Type 2 diabetes mellitus with peripheral vascular disease     Type 2 diabetes mellitus with stage 3 chronic kidney disease, without long-term current use of insulin 2/1/2019    Type 2 diabetes mellitus without retinopathy 2/1/2019    Urinary incontinence      Past Surgical History:   Procedure Laterality Date    ANGIOPLASTY Bilateral 01/24/2014    aortoiliac stenting     CARPAL TUNNEL RELEASE  2003    Henrry    COLECTOMY  approximate 2005    pt states 1in colon -dx with benign mass removed-states no colon cancer    COLONOSCOPY N/A 11/9/2016    Procedure: COLONOSCOPY;  Surgeon: Dmitri Sterling MD;  [facility];   "Service: Endoscopy;  Laterality: N/A;    COLONOSCOPY N/A 11/15/2019    Procedure: COLONOSCOPY;  Surgeon: Marko Vicente MD;  Location: [facility];  Service: Endoscopy;  Laterality: N/A;    HYSTERECTOMY      no cancer    OOPHORECTOMY       Family History   Problem Relation Age of Onset    Stroke Father     Stroke Sister     Asthma Daughter     Diabetes Daughter     Breast cancer Daughter     Asthma Son      Social History     Tobacco Use    Smoking status: Current Some Day Smoker     Packs/day: 1.00     Years: 60.00     Pack years: 60.00     Types: Cigarettes     Start date: 1/1/1960    Smokeless tobacco: Never Used   Substance Use Topics    Alcohol use: No     Alcohol/week: 0.0 standard drinks     Frequency: Never     Drinks per session: Patient refused     Binge frequency: Never    Drug use: No        Review of Systems:  Review of Systems   Constitutional: Negative for activity change, fatigue and fever.   HENT: Negative for congestion, trouble swallowing and voice change.    Eyes: Negative for visual disturbance.   Respiratory: Negative for cough, chest tightness, shortness of breath and wheezing.    Cardiovascular: Negative for chest pain, palpitations and leg swelling.   Gastrointestinal: Negative for abdominal distention, diarrhea, nausea and vomiting.   Genitourinary: Negative for difficulty urinating.   Musculoskeletal: Positive for arthralgias (bialteral knee arthritis). Negative for myalgias.   Neurological: Negative for weakness, light-headedness and headaches.   Hematological: Negative for adenopathy.   Psychiatric/Behavioral: Negative for confusion.       OBJECTIVE:     Vital Signs (Most Recent)  BP: 126/65 (11/11/20 0908)  SpO2: 95 % (11/11/20 0908)  5' 2" (1.575 m)  66.6 kg (146 lb 13.2 oz)   Body mass index is 26.85 kg/m².  ECOG Performance Scale:  0 - Asymptomatic    Physical Exam:  Physical Exam  Constitutional:       Appearance: Normal appearance.   HENT:      Nose: Nose normal.     "  Mouth/Throat:      Mouth: Mucous membranes are moist.   Eyes:      General: No scleral icterus.     Pupils: Pupils are equal, round, and reactive to light.   Cardiovascular:      Rate and Rhythm: Normal rate and regular rhythm.      Pulses: Normal pulses.   Pulmonary:      Effort: Pulmonary effort is normal.      Breath sounds: Normal breath sounds. No wheezing.   Abdominal:      General: Abdomen is flat. Bowel sounds are normal.      Palpations: Abdomen is soft.   Musculoskeletal:      Right lower leg: No edema.      Left lower leg: No edema.   Lymphadenopathy:      Cervical: No cervical adenopathy.   Skin:     Coloration: Skin is not jaundiced.   Neurological:      General: No focal deficit present.      Mental Status: She is alert and oriented to person, place, and time.   Psychiatric:         Mood and Affect: Mood normal.         Laboratory    11/4/20- NM Split Lung Vent Perfusion: I reviewed images and report  Left lung  Left upper lung zone: 18.6 %  Left mid lung zone: 19.7 %  Left lower lung zone: 12.2 %  Left lung total: 50.5 %     Right lung  Right upper lung zone: 15.7 %  Right mid lung zone: 22.0 %  Right lower lung zone: 11.8 %  Right lung total: 49.5 %    11/6/20- PFTs: I reviewed report  FEV1- 0.96 60%  DLCO- 7.64 41%    Diagnostic Results:    8/29/2018- I reviewed  Stress ECHO  Impression: NORMAL MYOCARDIAL PERFUSION   1. The perfusion scan is free of evidence for myocardial ischemia or injury.   2. Resting wall motion is physiologic.   3. Resting LV function is normal.   4. The ventricular volumes are normal at rest and stress.   5. The extracardiac distribution of radioactivity is normal.    2D ECHO    1 - Concentric remodeling.     2 - No wall motion abnormalities.     3 - Normal left ventricular systolic function (EF 55-60%).     4 - Indeterminate LV diastolic function.     5 - Normal right ventricular systolic function .     6 - The estimated PA systolic pressure is 40 mmHg.     7 - Mild aortic  stenosis, DEL = 1.46 cm2, AVAi = 0.86 cm2/m2, mean gradient = 13 mmHg.    9/9/20- CT Chest without contrast: I reviewed the images  Left upper lobe mass corresponding to the abnormality seen on the CT is an air bronchogram containing opacity which measures 17 x 16 mm.  There is also a nodule in the left lower lobe image 314 of series 4 which measures 11 by 9 mm.  This is unchanged when compared to November 2018.  Tiny nodules in the right lung are seen on image 218 of series 4 which appear to be endobronchial.  These were not clearly visualized on the prior CT.  A 2 mm nodule is seen in the medial right lower lobe image 316 of series 4.  Small scattered nodules are seen in the peripheral right upper lung.  For example, see image 154 of series 4.  These measure 2-3 mm.  There is a 3 mm nodule left lower lobe image 245 of series 4.  Multiple additional tiny scattered bilateral pulmonary nodules are noted as well.  No pneumothorax or pleural effusion or pulmonic infiltrate.  Heart size is normal.  No pericardial effusion.  Thoracic aortic and coronary artery calcifications are noted.  Trachea and mainstem bronchi remain patent.  Small scattered intrathoracic lymph nodes are noted  Limited imaging through the upper abdomen demonstrates dense calcified plaque in the abdominal aorta.  There are degenerative changes of the spine.  Emphysematous changes are noted    9/30/20- PET- I reviewed the images  1. Hypermetabolic left upper lobe pulmonary nodule suspicious for bronchogenic malignancy. There is a hypermetabolic lung nodule with spiculated margins in the anterior left upper lobe measuring 1.7 cm and exhibiting an SUV max of 4.3.  Additional 7 mm nodule identified in the posterior left lower lobe exhibiting no significant hypermetabolism.  Tiny 3 mm non FDG avid nodule noted in the posterolateral periphery of the left lower lobe.  Bilateral emphysematous changes.  Weakly FDG avid lymph node in the AP window measuring 10  mm in short axis with SUV max of 2.5.  Similar weakly FDG avid node in the subcarinal space measuring 8 mm in short axis with SUV max of 2.5  2. Subcentimeter non FDG avid left lower lobe nodules, indeterminate    ASSESSMENT/PLAN:     70 year old female with PMH of GERD, COPD, PVD (bilateral aortoiliac stents), IVANIA on CPAP, DMII (no insulin) and newly diagnosed NSCLC of left upper lobe.  Moderate to severe COPD by PFT profile and nearly equal lung function on V/Q scan--> this warrants further evaluation with 6 min walk and VO2 max  Underlying COPD, PVD and tumor size/location are indications for anatomic lingulectomy.    PLAN:Plan   Counseled patient on continued smoking cessation.   Will order 6MWT with VO2 Max to further assess baseline pulmonary functioning.   Present patient's case in multidisciplinary lung tumor board  Based on the above, plan for robot-assisted lingulectomy, possible upper lobectomy with MLND.   Appropriate patient education regarding the zia-operative period as well as intraoperative details were discussed. Risks, including but not limited to, bleeding, infection, pain and anesthetic complication were discussed. Patient was given the opportunity to ask questions and to have those questions answered to their satisfaction. Patient verbalized understanding to both procedure and associated risks. Consent was obtained.

## 2020-11-11 ENCOUNTER — OFFICE VISIT (OUTPATIENT)
Dept: CARDIOTHORACIC SURGERY | Facility: CLINIC | Age: 70
End: 2020-11-11
Payer: MEDICARE

## 2020-11-11 VITALS
SYSTOLIC BLOOD PRESSURE: 126 MMHG | HEIGHT: 62 IN | OXYGEN SATURATION: 95 % | WEIGHT: 146.81 LBS | DIASTOLIC BLOOD PRESSURE: 65 MMHG | BODY MASS INDEX: 27.02 KG/M2

## 2020-11-11 DIAGNOSIS — R06.02 SHORTNESS OF BREATH: ICD-10-CM

## 2020-11-11 DIAGNOSIS — J44.9 CHRONIC OBSTRUCTIVE PULMONARY DISEASE, UNSPECIFIED COPD TYPE: Primary | ICD-10-CM

## 2020-11-11 DIAGNOSIS — C34.92 NON-SMALL CELL CANCER OF LEFT LUNG: ICD-10-CM

## 2020-11-11 DIAGNOSIS — R91.1 PULMONARY NODULE, LEFT: ICD-10-CM

## 2020-11-11 PROCEDURE — 1126F PR PAIN SEVERITY QUANTIFIED, NO PAIN PRESENT: ICD-10-PCS | Mod: HCNC,S$GLB,, | Performed by: THORACIC SURGERY (CARDIOTHORACIC VASCULAR SURGERY)

## 2020-11-11 PROCEDURE — 99999 PR PBB SHADOW E&M-EST. PATIENT-LVL V: CPT | Mod: PBBFAC,HCNC,, | Performed by: THORACIC SURGERY (CARDIOTHORACIC VASCULAR SURGERY)

## 2020-11-11 PROCEDURE — 99999 PR PBB SHADOW E&M-EST. PATIENT-LVL V: ICD-10-PCS | Mod: PBBFAC,HCNC,, | Performed by: THORACIC SURGERY (CARDIOTHORACIC VASCULAR SURGERY)

## 2020-11-11 PROCEDURE — 99499 RISK ADDL DX/OHS AUDIT: ICD-10-PCS | Mod: S$GLB,,, | Performed by: THORACIC SURGERY (CARDIOTHORACIC VASCULAR SURGERY)

## 2020-11-11 PROCEDURE — 3074F SYST BP LT 130 MM HG: CPT | Mod: HCNC,CPTII,S$GLB, | Performed by: THORACIC SURGERY (CARDIOTHORACIC VASCULAR SURGERY)

## 2020-11-11 PROCEDURE — 1101F PR PT FALLS ASSESS DOC 0-1 FALLS W/OUT INJ PAST YR: ICD-10-PCS | Mod: HCNC,CPTII,S$GLB, | Performed by: THORACIC SURGERY (CARDIOTHORACIC VASCULAR SURGERY)

## 2020-11-11 PROCEDURE — 3074F PR MOST RECENT SYSTOLIC BLOOD PRESSURE < 130 MM HG: ICD-10-PCS | Mod: HCNC,CPTII,S$GLB, | Performed by: THORACIC SURGERY (CARDIOTHORACIC VASCULAR SURGERY)

## 2020-11-11 PROCEDURE — 1126F AMNT PAIN NOTED NONE PRSNT: CPT | Mod: HCNC,S$GLB,, | Performed by: THORACIC SURGERY (CARDIOTHORACIC VASCULAR SURGERY)

## 2020-11-11 PROCEDURE — 3008F BODY MASS INDEX DOCD: CPT | Mod: HCNC,CPTII,S$GLB, | Performed by: THORACIC SURGERY (CARDIOTHORACIC VASCULAR SURGERY)

## 2020-11-11 PROCEDURE — 3078F DIAST BP <80 MM HG: CPT | Mod: HCNC,CPTII,S$GLB, | Performed by: THORACIC SURGERY (CARDIOTHORACIC VASCULAR SURGERY)

## 2020-11-11 PROCEDURE — 99205 PR OFFICE/OUTPT VISIT, NEW, LEVL V, 60-74 MIN: ICD-10-PCS | Mod: HCNC,S$GLB,, | Performed by: THORACIC SURGERY (CARDIOTHORACIC VASCULAR SURGERY)

## 2020-11-11 PROCEDURE — 1159F PR MEDICATION LIST DOCUMENTED IN MEDICAL RECORD: ICD-10-PCS | Mod: HCNC,S$GLB,, | Performed by: THORACIC SURGERY (CARDIOTHORACIC VASCULAR SURGERY)

## 2020-11-11 PROCEDURE — 1101F PT FALLS ASSESS-DOCD LE1/YR: CPT | Mod: HCNC,CPTII,S$GLB, | Performed by: THORACIC SURGERY (CARDIOTHORACIC VASCULAR SURGERY)

## 2020-11-11 PROCEDURE — 99499 UNLISTED E&M SERVICE: CPT | Mod: S$GLB,,, | Performed by: THORACIC SURGERY (CARDIOTHORACIC VASCULAR SURGERY)

## 2020-11-11 PROCEDURE — 3078F PR MOST RECENT DIASTOLIC BLOOD PRESSURE < 80 MM HG: ICD-10-PCS | Mod: HCNC,CPTII,S$GLB, | Performed by: THORACIC SURGERY (CARDIOTHORACIC VASCULAR SURGERY)

## 2020-11-11 PROCEDURE — 1159F MED LIST DOCD IN RCRD: CPT | Mod: HCNC,S$GLB,, | Performed by: THORACIC SURGERY (CARDIOTHORACIC VASCULAR SURGERY)

## 2020-11-11 PROCEDURE — 3008F PR BODY MASS INDEX (BMI) DOCUMENTED: ICD-10-PCS | Mod: HCNC,CPTII,S$GLB, | Performed by: THORACIC SURGERY (CARDIOTHORACIC VASCULAR SURGERY)

## 2020-11-11 PROCEDURE — 99205 OFFICE O/P NEW HI 60 MIN: CPT | Mod: HCNC,S$GLB,, | Performed by: THORACIC SURGERY (CARDIOTHORACIC VASCULAR SURGERY)

## 2020-11-11 NOTE — LETTER
[facility]  [address]  [facility] [address]  Phone: [phone]  Fax: [phone] November 11, 2020        Ramiro Aleman MD  [address]  [place] [address]    Patient: Lucia Barlow   MR Number: [id number]   YOB: 1950   Date of Visit: 11/11/2020     Dear Dr. Aleman:    Thank you for referring Lucia Barlow to me for evaluation. Ms. Barlow presents for surgical evaluation of left upper lobe NSCLC.  She has moderate to severe COPD and PVD.    I will obtain a 6-minute walk test and VO2 max in [place].  I will also discuss Ms. Barlow's case at multidisciplinary lung conference.  If work up indicates that she is a surgery candidate, I will perform a robotic anatomic lingulectomy with lymph node dissection.     We counseled Ms. Barlow on continued smoking cessation.  If you have questions, please do not hesitate to call me. I look forward to following Lucia along with you.    Sincerely,      Amrit Flores MD    BLP/jose g     CC  VICK Sandoval MD

## 2020-11-12 ENCOUNTER — PATIENT OUTREACH (OUTPATIENT)
Dept: OTHER | Facility: OTHER | Age: 70
End: 2020-11-12

## 2020-11-17 ENCOUNTER — CLINICAL SUPPORT (OUTPATIENT)
Dept: PULMONOLOGY | Facility: CLINIC | Age: 70
End: 2020-11-17
Payer: MEDICARE

## 2020-11-17 ENCOUNTER — TELEPHONE (OUTPATIENT)
Dept: HEMATOLOGY/ONCOLOGY | Facility: CLINIC | Age: 70
End: 2020-11-17

## 2020-11-17 ENCOUNTER — PATIENT MESSAGE (OUTPATIENT)
Dept: INFECTIOUS DISEASES | Facility: CLINIC | Age: 70
End: 2020-11-17

## 2020-11-17 VITALS — HEIGHT: 60 IN | WEIGHT: 147.69 LBS | BODY MASS INDEX: 28.99 KG/M2

## 2020-11-17 DIAGNOSIS — C34.92 NON-SMALL CELL CANCER OF LEFT LUNG: ICD-10-CM

## 2020-11-17 DIAGNOSIS — R91.1 PULMONARY NODULE, LEFT: ICD-10-CM

## 2020-11-17 PROCEDURE — 94618 PULMONARY STRESS TESTING: ICD-10-PCS | Mod: HCNC,S$GLB,, | Performed by: INTERNAL MEDICINE

## 2020-11-17 PROCEDURE — 94618 PULMONARY STRESS TESTING: CPT | Mod: HCNC,S$GLB,, | Performed by: INTERNAL MEDICINE

## 2020-11-17 PROCEDURE — 99999 PR PBB SHADOW E&M-EST. PATIENT-LVL I: CPT | Mod: PBBFAC,HCNC,,

## 2020-11-17 PROCEDURE — 99999 PR PBB SHADOW E&M-EST. PATIENT-LVL I: ICD-10-PCS | Mod: PBBFAC,HCNC,,

## 2020-11-17 NOTE — PROCEDURES
[facility] - [facility]  Six Minute Walk     SUMMARY     Ordering Provider: Neda PIKE   Interpreting Provider: Dr. Mathias  Performing nurse/tech/RT: Brodie Ray RRT  Diagnosis: (pulmonary nodule)  Height: 5' (152.4 cm)  Weight: 67 kg (147 lb 11.3 oz)  BMI (Calculated): 28.8   Patient Race:             Phase Oxygen Assessment Supplemental O2 Heart   Rate Blood Pressure Dom Dyspnea Scale Rating   Resting 99 % Room Air 124 bpm 128/62 0   Exercise        Minute        1 96 % Room Air 124 bpm     2 96 % Room Air 136 bpm     3 96 % Room Air 138 bpm     4 98 % Room Air 144 bpm     5 98 % Room Air 150 bpm     6  97 % Room Air 150 bpm 144/52 4   Recovery        Minute        1 97 % Room Air 137 bpm     2 100 % Room Air 134 bpm     3 100 % Room Air 117 bpm     4 100 % Room Air 116 bpm 143/57 3     Six Minute Walk Summary  6MWT Status: completed without stopping  Patient Reported: No complaints     Interpretation:  Did the patient stop or pause?: No                                         Total Time Walked (Calculated): 360 seconds  Final Partial Lap Distance (feet): 100 feet  Total Distance Meters (Calculated): 396.24 meters  Predicted Distance Meters (Calculated): 418.97 meters  Percentage of Predicted (Calculated): 94.57  Peak VO2 (Calculated): 15.87  Mets: 4.53  Has The Patient Had a Previous Six Minute Walk Test?: No       Previous 6MWT Results  Has The Patient Had a Previous Six Minute Walk Test?: No

## 2020-11-17 NOTE — TELEPHONE ENCOUNTER
Patient cancelled consult with Dr. Whiteside that was scheduled for  11/18. Called patient to reschedule appointment. Patient states she doesn't want to reschedule appointment at this time because she is having surgery on 11/19. Nurse verbalized understanding, message sent to referring provider.

## 2020-11-18 ENCOUNTER — ANESTHESIA EVENT (OUTPATIENT)
Dept: SURGERY | Facility: HOSPITAL | Age: 70
DRG: 165 | End: 2020-11-18
Payer: MEDICARE

## 2020-11-18 DIAGNOSIS — C34.90 MALIGNANT NEOPLASM OF UNSPECIFIED PART OF UNSPECIFIED BRONCHUS OR LUNG: ICD-10-CM

## 2020-11-18 NOTE — PRE-PROCEDURE INSTRUCTIONS
PREOP INSTRUCTIONS:      NO SOLID FOOD, MILK OR MILK PRODUCTS 8 HOURS PRIOR TO SX START TIME. NOTHING AFTER 2300  YOU MAY ONLY HAVE SIPS OF WATER WITH MORNING MEDICATIONS (1) HOUR PRIOR TO ARRIVAL TO HOSPITAL.  0400  Instructed to follow the surgeon's instructions if they differ from these.Shower instructions as well as directions to the Surgery Center were given.Encouraged to wear loose fitting,comfortable clothing.Medication instructions for pm prior to and am of procedure reviewed.Instructed to avoid taking vitamins,supplements,aspirin and ibuprofen the morning of surgery. Patient's questions and concerns addressed .Patient informed of the current visitor policy and advised patient that one visitor may accompany each patient into the hospital and wait (socially distanced) until a member of the medical team provides an update at the conclusion of the procedure.When they enter the hospital both patient and visitor will have their temperature checked.All visitors are asked to arrive with a mask and to keep their mask on throughout the visit.      Patient denies any side effects or issues with anesthesia or sedation.      DAUGHTER - KALEB ROYAL will accompany pt DOS.

## 2020-11-18 NOTE — ANESTHESIA PREPROCEDURE EVALUATION
[facility]  Anesthesia Pre-Operative Evaluation         Patient Name: Payal Royal  YOB: 1950  MRN: [id number]     Results for PAYAL ROYAL (MRN [id number]) as of 11/19/2020 06:00   Ref. Range 11/19/2020 05:13   SARS-CoV-2 RNA, Amplification, Qual Latest Ref Range: Negative  Negative         SUBJECTIVE:     Pre-operative evaluation for Procedure(s) (LRB):  XI ROBOTIC RATS,WITH LOBECTOMY,LUNG (Left)  EXCISION, LYMPH NODE (N/A)  Scheduled for 11/19/2020    HPI 11/18/2020:  Payal Royal is a 70 y.o. female with newly diagnosed NSCLC of left upper lobe. Presents for RATS w/ Dr. Flores. CT guided biopsy on 10/21/20 complicated by pneumothorax requiring chest tube placement. Only feels SOB after wearing facemask in public for extended periods of time. She does not feel her ambulation or daily activities are limited. TTE from 2018 w/ Nl ef and indeterminate DD noted.     Other PMH: GERD, COPD, PVD (bilateral aortoiliac stents), IVANIA on CPAP, DMII (no insulin), Quit smoking 1 week ago. 60 pack year history  Prev airway:   None on file     Oxygen/Ventilation Requirements:  On room air       Patient Active Problem List   Diagnosis    Currently smokes a few cigarettes per day; former heavy smoker    Hypertension associated with diabetes    Dyslipidemia associated with type 2 diabetes mellitus    Osteoporosis    Atherosclerosis of artery of both lower extremities    S/P peripheral artery angioplasty    History of adenomatous polyp of colon    Nonrheumatic aortic valve stenosis    Atherosclerosis of aorta    Lung nodule < 6cm on CT    Atherosclerosis of native coronary artery of native heart without angina pectoris    PVD (peripheral vascular disease)    Type 2 diabetes mellitus with peripheral vascular disease    Gastroesophageal reflux disease without esophagitis    Type 2 diabetes mellitus with stage 3 chronic kidney disease, without long-term current use of insulin     Type 2 diabetes mellitus with microalbuminuria, without long-term current use of insulin    Screening for colon cancer    Chronic obstructive pulmonary disease    Seasonal allergic rhinitis due to pollen    Tension headache    Tortuous aorta    Diverticulosis    Solid nodule of lung greater than 8 mm in diameter    Pneumothorax after biopsy       Review of patient's allergies indicates:   Allergen Reactions    Skin staples [surgical stainless steel] Swelling    Egg derived      Shortness breath, lip swelling    Fish containing products     Iodine and iodide containing products Swelling    Latex, natural rubber Swelling    Losartan Itching    Nickel     Pravastatin      40 mg causes nausea vomitting but 20 mg ok    Shellfish containing products Swelling       Outpatient Medications:  No current facility-administered medications on file prior to encounter.      Current Outpatient Medications on File Prior to Encounter   Medication Sig Dispense Refill    amlodipine-benazepril 5-20 mg (LOTREL) 5-20 mg per capsule Take 1 capsule by mouth once daily. 90 capsule 4    calcium-vitamin D (CALCIUM WITH VITAMIN D) 600 mg(1,500mg) -400 unit Tab Take 2 tablets by mouth once daily. 180 tablet 4    chlorthalidone (HYGROTEN) 25 MG Tab TAKE 1 TABLET EVERY DAY 90 tablet 2    clonazePAM (KLONOPIN) 0.25 MG TbDL Take 1 tablet (0.25 mg total) by mouth nightly. 30 tablet 5    ezetimibe (ZETIA) 10 mg tablet Take 1 tablet (10 mg total) by mouth once daily. 90 tablet 4    glimepiride (AMARYL) 2 MG tablet Take 1 tablet (2 mg total) by mouth before breakfast. 90 tablet 4    montelukast (SINGULAIR) 10 mg tablet Take 10 mg by mouth every evening.       pantoprazole (PROTONIX) 40 MG tablet TAKE 1 TABLET EVERY DAY 90 tablet 4    potassium 99 mg Tab Take 1 tablet by mouth once daily.      rosuvastatin (CRESTOR) 20 MG tablet Take 1 tablet (20 mg total) by mouth once daily. 90 tablet 4    blood sugar diagnostic Strp 1  each by Misc.(Non-Drug; Combo Route) route 3 (three) times daily. EALTH test strips 100 each 3    blood-glucose meter kit Insurance preferred 1 each 0    butalbital-acetaminophen-caffeine -40 mg (FIORICET, ESGIC) -40 mg per tablet Take 1 tablet by mouth 3 (three) times daily as needed for Headaches. (MAXIMUM OF 15 TABLET PER MONTH) 30 tablet 3    fluticasone propionate (FLONASE) 50 mcg/actuation nasal spray 2 sprays (100 mcg total) by Each Nostril route once daily. 48 g 4    ipratropium-albuteroL (COMBIVENT)  mcg/actuation inhaler Inhale 2 puffs into the lungs every 4 (four) hours as needed for Wheezing or Shortness of Breath. Rescue 4 g 11    lancets 32 gauge Misc 1 lancet by Misc.(Non-Drug; Combo Route) route 3 (three) times daily. Insurance preferred 100 each 11    nicotine (NICODERM CQ) 21 mg/24 hr Place 1 patch onto the skin once daily. 28 patch 1    tiZANidine (ZANAFLEX) 2 MG tablet Take 1-2 tabs twice a day as needed for muscle pain.  Will cause drowsiness 30 tablet 0    TRUEPLUS LANCETS 33 gauge Misc       varicella-zoster gE-AS01B, PF, (SHINGRIX) 50 mcg/0.5 mL injection Inject 0.5 mL (one dose) into muscle now; give second dose at least 2 months later 0.5 mL 1        Current Inpatient Medications:      Past Surgical History:   Procedure Laterality Date    ANGIOPLASTY Bilateral 01/24/2014    aortoiliac stenting     CARPAL TUNNEL RELEASE  2003    Henrry    COLECTOMY  approximate 2005    pt states 1in colon -dx with benign mass removed-states no colon cancer    COLONOSCOPY N/A 11/9/2016    Procedure: COLONOSCOPY;  Surgeon: Dmitri Sterling MD;  Location: [facility] ENDO;  Service: Endoscopy;  Laterality: N/A;    COLONOSCOPY N/A 11/15/2019    Procedure: COLONOSCOPY;  Surgeon: Marko Vicente MD;  Location: [facility] ENDO;  Service: Endoscopy;  Laterality: N/A;    HYSTERECTOMY      no cancer    OOPHORECTOMY         Social History     Socioeconomic History    Marital status:       Spouse name: Not on file    Number of children: 4    Years of education: Not on file    Highest education level: Not on file   Occupational History    Occupation:    Social Needs    Financial resource strain: Not very hard    Food insecurity     Worry: Never true     Inability: Never true    Transportation needs     Medical: No     Non-medical: No   Tobacco Use    Smoking status: Current Some Day Smoker     Packs/day: 1.00     Years: 60.00     Pack years: 60.00     Types: Cigarettes     Start date: 1/1/1960    Smokeless tobacco: Never Used   Substance and Sexual Activity    Alcohol use: No     Alcohol/week: 0.0 standard drinks     Frequency: Never     Drinks per session: Patient refused     Binge frequency: Never    Drug use: No    Sexual activity: Never   Lifestyle    Physical activity     Days per week: 2 days     Minutes per session: 10 min    Stress: Only a little   Relationships    Social connections     Talks on phone: More than three times a week     Gets together: Never     Attends Sikhism service: Not on file     Active member of club or organization: Yes     Attends meetings of clubs or organizations: More than 4 times per year     Relationship status:    Other Topics Concern    Not on file   Social History Narrative    Son smoker, no pets in household.       OBJECTIVE:     Weight:  Wt Readings from Last 1 Encounters:   11/17/20 67 kg (147 lb 11.3 oz)     There is no height or weight on file to calculate BMI.    Recent Blood Pressure Readings:  BP Readings from Last 3 Encounters:   11/11/20 126/65   10/28/20 130/76   10/22/20 131/60       Vital Signs Range (Last 24H):         CBC:   Lab Results   Component Value Date    WBC 7.06 10/21/2020    HGB 10.6 (L) 10/21/2020    HCT 34.1 (L) 10/21/2020    MCV 90 10/21/2020     10/21/2020       CMP:     Chemistry        Component Value Date/Time     10/21/2020 1724    K 3.9 10/21/2020 1724     10/21/2020 1724     CO2 23 10/21/2020 1724    BUN 17 10/21/2020 1724    CREATININE 1.1 10/21/2020 1724     (H) 10/21/2020 1724        Component Value Date/Time    CALCIUM 8.9 10/21/2020 1724    ALKPHOS 91 08/12/2020 0750    ALKPHOS 91 08/12/2020 0750    AST 16 08/12/2020 0750    AST 16 08/12/2020 0750    ALT 10 08/12/2020 0750    ALT 10 08/12/2020 0750    BILITOT 0.2 08/12/2020 0750    BILITOT 0.2 08/12/2020 0750    ESTGFRAFRICA 58 (A) 10/21/2020 1724    EGFRNONAA 50 (A) 10/21/2020 1724            INR:  Lab Results   Component Value Date    INR 0.9 10/21/2020    INR 0.9 08/10/2019    INR 0.9 12/16/2018       Diagnostic Studies:    EKG:  Results for orders placed or performed during the hospital encounter of 03/04/20   SCHEDULED EKG 12-LEAD (to Muse)    Collection Time: 03/04/20  8:03 AM    Narrative    Test Reason : I10,    Vent. Rate : 115 BPM     Atrial Rate : 115 BPM     P-R Int : 120 ms          QRS Dur : 066 ms      QT Int : 316 ms       P-R-T Axes : 079 045 068 degrees     QTc Int : 437 ms    Sinus tachycardia  Possible Left atrial enlargement  Low voltage QRS  Cannot rule out Anterior infarct ,age undetermined  Abnormal ECG  When compared with ECG of 13-SEP-2019 09:50,  Minimal criteria for Anterior infarct are now Present  Confirmed by BUZZ BAUMAN, ELAN ([id number]) on 3/4/2020 11:31:41 PM    Referred By: ELAN GERBER           Confirmed By:ELAN GERBER MD       2D Echo:  Results for orders placed or performed in visit on 08/29/18   2D echo with color flow doppler   Result Value Ref Range    QEF 55 55 - 65    Aortic Valve Stenosis MILD (A)     Est. PA Systolic Pressure 39.72        No results found for this or any previous visit.    No results found for this or any previous visit.  Nuclear stress, 2018   EKG Conclusions:     1. The EKG portion of this study is negative for ischemia at a peak heart rate of 123 bpm (85% of predicted).   2. Blood pressure remained stable throughout the protocol  (Presenting BP: 136/67 Peak BP:  168/63).   3. No significant arrhythmias were present.   4. There were no symptoms of chest discomfort or significant dyspnea throughout the protocol.     Impression: NORMAL MYOCARDIAL PERFUSION   1. The perfusion scan is free of evidence for myocardial ischemia or injury.   2. Resting wall motion is physiologic.   3. Resting LV function is normal.   4. The ventricular volumes are normal at rest and stress.   5. The extracardiac distribution of radioactivity is normal.    ASSESSMENT/PLAN:         Anesthesia Evaluation    I have reviewed the Patient Summary Reports.      I have reviewed the Medications.     Review of Systems  Anesthesia Hx:   Denies Personal Hx of Anesthesia complications.   Social:  Smoker    Hematology/Oncology:         -- Anemia: Current/Recent Cancer. Other (see Oncology comments)   EENT/Dental:EENT/Dental Normal   Cardiovascular:   Hypertension CAD    Hypertension    Pulmonary:   COPD  Chronic Obstructive Pulmonary Disease (COPD):    Renal/:   Chronic Renal Disease  Kidney Function/Disease    Hepatic/GI:   GERD    Neurological:   Headaches    Endocrine:   Diabetes, type 2  Diabetes        Physical Exam  General:  Well nourished    Airway/Jaw/Neck:  Airway Findings: Mouth Opening: Normal Tongue: Normal  General Airway Assessment: Adult  Mallampati: II  TM Distance: Normal, at least 6 cm      Dental:  Dental Findings: In tact        Mental Status:  Mental Status Findings:  Cooperative, Alert and Oriented         Anesthesia Plan  Type of Anesthesia, risks & benefits discussed:  Anesthesia Type:  general, regional  Patient's Preference:   Intra-op Monitoring Plan: arterial line, central line and standard ASA monitors  Intra-op Monitoring Plan Comments:   Post Op Pain Control Plan: multimodal analgesia and IV/PO Opioids PRN  Post Op Pain Control Plan Comments:   Induction:   IV  Beta Blocker:  Patient is not currently on a Beta-Blocker (No further documentation required).       Informed Consent:  Patient understands risks and agrees with Anesthesia plan.  Questions answered. Anesthesia consent signed with patient.  ASA Score: 3     Day of Surgery Review of History & Physical: I have interviewed and examined the patient. I have reviewed the patient's H&P dated:    H&P update referred to the surgeon.         Ready For Surgery From Anesthesia Perspective.

## 2020-11-19 ENCOUNTER — ANESTHESIA (OUTPATIENT)
Dept: SURGERY | Facility: HOSPITAL | Age: 70
DRG: 165 | End: 2020-11-19
Payer: MEDICARE

## 2020-11-19 ENCOUNTER — HOSPITAL ENCOUNTER (INPATIENT)
Facility: HOSPITAL | Age: 70
LOS: 1 days | Discharge: HOME | DRG: 165 | End: 2020-11-20
Attending: THORACIC SURGERY (CARDIOTHORACIC VASCULAR SURGERY) | Admitting: THORACIC SURGERY (CARDIOTHORACIC VASCULAR SURGERY)
Payer: MEDICARE

## 2020-11-19 DIAGNOSIS — C34.92 NSCLC OF LEFT LUNG: ICD-10-CM

## 2020-11-19 LAB
ABO + RH BLD: NORMAL
BLD GP AB SCN CELLS X3 SERPL QL: NORMAL
GLUCOSE SERPL-MCNC: 147 MG/DL (ref 70–110)
GLUCOSE SERPL-MCNC: 169 MG/DL (ref 70–110)
HCO3 UR-SCNC: 25.3 MMOL/L (ref 24–28)
HCO3 UR-SCNC: 25.7 MMOL/L (ref 24–28)
HCT VFR BLD CALC: 28 %PCV (ref 36–54)
HCT VFR BLD CALC: 28 %PCV (ref 36–54)
PCO2 BLDA: 53.5 MMHG (ref 35–45)
PCO2 BLDA: 59.4 MMHG (ref 35–45)
PH SMN: 7.24 [PH] (ref 7.35–7.45)
PH SMN: 7.28 [PH] (ref 7.35–7.45)
PO2 BLDA: 137 MMHG (ref 80–100)
PO2 BLDA: 89 MMHG (ref 80–100)
POC BE: -1 MMOL/L
POC BE: -2 MMOL/L
POC IONIZED CALCIUM: 1.19 MMOL/L (ref 1.06–1.42)
POC IONIZED CALCIUM: 1.22 MMOL/L (ref 1.06–1.42)
POC SATURATED O2: 95 % (ref 95–100)
POC SATURATED O2: 99 % (ref 95–100)
POC TCO2: 27 MMOL/L (ref 23–27)
POC TCO2: 27 MMOL/L (ref 23–27)
POCT GLUCOSE: 110 MG/DL (ref 70–110)
POCT GLUCOSE: 153 MG/DL (ref 70–110)
POCT GLUCOSE: 174 MG/DL (ref 70–110)
POCT GLUCOSE: 176 MG/DL (ref 70–110)
POTASSIUM BLD-SCNC: 3.8 MMOL/L (ref 3.5–5.1)
POTASSIUM BLD-SCNC: 3.9 MMOL/L (ref 3.5–5.1)
SAMPLE: ABNORMAL
SAMPLE: ABNORMAL
SARS-COV-2 RDRP RESP QL NAA+PROBE: NEGATIVE
SODIUM BLD-SCNC: 138 MMOL/L (ref 136–145)
SODIUM BLD-SCNC: 139 MMOL/L (ref 136–145)

## 2020-11-19 PROCEDURE — 36620 INSERTION CATHETER ARTERY: CPT | Mod: HCNC,59,, | Performed by: SURGERY

## 2020-11-19 PROCEDURE — 94761 N-INVAS EAR/PLS OXIMETRY MLT: CPT | Mod: HCNC

## 2020-11-19 PROCEDURE — 32674 PR THORACOSCOPY LYMPH NODE EXC: ICD-10-PCS | Mod: HCNC,,, | Performed by: THORACIC SURGERY (CARDIOTHORACIC VASCULAR SURGERY)

## 2020-11-19 PROCEDURE — 32669 PR THORACOSCOPY REMOVE SEGMENT: ICD-10-PCS | Mod: HCNC,LT,, | Performed by: THORACIC SURGERY (CARDIOTHORACIC VASCULAR SURGERY)

## 2020-11-19 PROCEDURE — C9290 INJ, BUPIVACAINE LIPOSOME: HCPCS | Mod: HCNC | Performed by: THORACIC SURGERY (CARDIOTHORACIC VASCULAR SURGERY)

## 2020-11-19 PROCEDURE — 25000003 PHARM REV CODE 250: Mod: HCNC | Performed by: STUDENT IN AN ORGANIZED HEALTH CARE EDUCATION/TRAINING PROGRAM

## 2020-11-19 PROCEDURE — 88313 SPECIAL STAINS GROUP 2: CPT | Mod: 26,HCNC,, | Performed by: PATHOLOGY

## 2020-11-19 PROCEDURE — 94640 AIRWAY INHALATION TREATMENT: CPT | Mod: HCNC

## 2020-11-19 PROCEDURE — 88307 PR  SURG PATH,LEVEL V: ICD-10-PCS | Mod: 26,HCNC,, | Performed by: PATHOLOGY

## 2020-11-19 PROCEDURE — 82962 GLUCOSE BLOOD TEST: CPT | Mod: HCNC | Performed by: THORACIC SURGERY (CARDIOTHORACIC VASCULAR SURGERY)

## 2020-11-19 PROCEDURE — 25000242 PHARM REV CODE 250 ALT 637 W/ HCPCS: Mod: HCNC | Performed by: PHYSICIAN ASSISTANT

## 2020-11-19 PROCEDURE — 71000015 HC POSTOP RECOV 1ST HR: Mod: HCNC | Performed by: THORACIC SURGERY (CARDIOTHORACIC VASCULAR SURGERY)

## 2020-11-19 PROCEDURE — 86920 COMPATIBILITY TEST SPIN: CPT | Mod: HCNC

## 2020-11-19 PROCEDURE — 36620 ARTERIAL: ICD-10-PCS | Mod: HCNC,59,, | Performed by: SURGERY

## 2020-11-19 PROCEDURE — 86850 RBC ANTIBODY SCREEN: CPT | Mod: HCNC

## 2020-11-19 PROCEDURE — 88342 IMHCHEM/IMCYTCHM 1ST ANTB: CPT | Mod: 91,HCNC | Performed by: PATHOLOGY

## 2020-11-19 PROCEDURE — 63600175 PHARM REV CODE 636 W HCPCS: Mod: HCNC | Performed by: PHYSICIAN ASSISTANT

## 2020-11-19 PROCEDURE — 88312 SPECIAL STAINS GROUP 1: CPT | Mod: 26,HCNC,, | Performed by: PATHOLOGY

## 2020-11-19 PROCEDURE — 88341 PR IHC OR ICC EACH ADD'L SINGLE ANTIBODY  STAINPR: ICD-10-PCS | Mod: 26,HCNC,, | Performed by: PATHOLOGY

## 2020-11-19 PROCEDURE — 88305 TISSUE EXAM BY PATHOLOGIST: CPT | Mod: HCNC | Performed by: PATHOLOGY

## 2020-11-19 PROCEDURE — 88307 TISSUE EXAM BY PATHOLOGIST: CPT | Mod: HCNC | Performed by: PATHOLOGY

## 2020-11-19 PROCEDURE — 25000003 PHARM REV CODE 250: Mod: HCNC | Performed by: PHYSICIAN ASSISTANT

## 2020-11-19 PROCEDURE — 88342 IMHCHEM/IMCYTCHM 1ST ANTB: CPT | Mod: 26,HCNC,, | Performed by: PATHOLOGY

## 2020-11-19 PROCEDURE — 63600175 PHARM REV CODE 636 W HCPCS: Mod: HCNC | Performed by: STUDENT IN AN ORGANIZED HEALTH CARE EDUCATION/TRAINING PROGRAM

## 2020-11-19 PROCEDURE — 88305 TISSUE EXAM BY PATHOLOGIST: CPT | Mod: 26,HCNC,, | Performed by: PATHOLOGY

## 2020-11-19 PROCEDURE — 88312 SPECIAL STAINS GROUP 1: CPT | Mod: HCNC | Performed by: PATHOLOGY

## 2020-11-19 PROCEDURE — 88360 TUMOR IMMUNOHISTOCHEM/MANUAL: CPT | Mod: HCNC | Performed by: PATHOLOGY

## 2020-11-19 PROCEDURE — 63600175 PHARM REV CODE 636 W HCPCS: Mod: HCNC | Performed by: THORACIC SURGERY (CARDIOTHORACIC VASCULAR SURGERY)

## 2020-11-19 PROCEDURE — U0002 COVID-19 LAB TEST NON-CDC: HCPCS | Mod: HCNC

## 2020-11-19 PROCEDURE — 36000712 HC OR TIME LEV V 1ST 15 MIN: Mod: HCNC | Performed by: THORACIC SURGERY (CARDIOTHORACIC VASCULAR SURGERY)

## 2020-11-19 PROCEDURE — 88312 PR  SPECIAL STAINS,GROUP I: ICD-10-PCS | Mod: 26,HCNC,, | Performed by: PATHOLOGY

## 2020-11-19 PROCEDURE — 27201423 OPTIME MED/SURG SUP & DEVICES STERILE SUPPLY: Mod: HCNC | Performed by: THORACIC SURGERY (CARDIOTHORACIC VASCULAR SURGERY)

## 2020-11-19 PROCEDURE — 25000003 PHARM REV CODE 250: Mod: HCNC | Performed by: THORACIC SURGERY (CARDIOTHORACIC VASCULAR SURGERY)

## 2020-11-19 PROCEDURE — 88341 IMHCHEM/IMCYTCHM EA ADD ANTB: CPT | Mod: 26,HCNC,, | Performed by: PATHOLOGY

## 2020-11-19 PROCEDURE — 88305 TISSUE EXAM BY PATHOLOGIST: ICD-10-PCS | Mod: 26,HCNC,, | Performed by: PATHOLOGY

## 2020-11-19 PROCEDURE — 36000713 HC OR TIME LEV V EA ADD 15 MIN: Mod: HCNC | Performed by: THORACIC SURGERY (CARDIOTHORACIC VASCULAR SURGERY)

## 2020-11-19 PROCEDURE — 32674 THORACOSCOPY LYMPH NODE EXC: CPT | Mod: AS,HCNC,, | Performed by: PHYSICIAN ASSISTANT

## 2020-11-19 PROCEDURE — 20600001 HC STEP DOWN PRIVATE ROOM: Mod: HCNC

## 2020-11-19 PROCEDURE — 32674 PR THORACOSCOPY LYMPH NODE EXC: ICD-10-PCS | Mod: AS,HCNC,, | Performed by: PHYSICIAN ASSISTANT

## 2020-11-19 PROCEDURE — 88342 IMHCHEM/IMCYTCHM 1ST ANTB: CPT | Mod: HCNC | Performed by: PATHOLOGY

## 2020-11-19 PROCEDURE — 88307 TISSUE EXAM BY PATHOLOGIST: CPT | Mod: 26,HCNC,, | Performed by: PATHOLOGY

## 2020-11-19 PROCEDURE — 32669 PR THORACOSCOPY REMOVE SEGMENT: ICD-10-PCS | Mod: AS,HCNC,LT, | Performed by: PHYSICIAN ASSISTANT

## 2020-11-19 PROCEDURE — 88313 SPECIAL STAINS GROUP 2: CPT | Mod: HCNC | Performed by: PATHOLOGY

## 2020-11-19 PROCEDURE — 32669 THORACOSCOPY REMOVE SEGMENT: CPT | Mod: AS,HCNC,LT, | Performed by: PHYSICIAN ASSISTANT

## 2020-11-19 PROCEDURE — 32674 THORACOSCOPY LYMPH NODE EXC: CPT | Mod: HCNC,,, | Performed by: THORACIC SURGERY (CARDIOTHORACIC VASCULAR SURGERY)

## 2020-11-19 PROCEDURE — D9220A PRA ANESTHESIA: Mod: HCNC,,, | Performed by: SURGERY

## 2020-11-19 PROCEDURE — 32669 THORACOSCOPY REMOVE SEGMENT: CPT | Mod: HCNC,LT,, | Performed by: THORACIC SURGERY (CARDIOTHORACIC VASCULAR SURGERY)

## 2020-11-19 PROCEDURE — 71000016 HC POSTOP RECOV ADDL HR: Mod: HCNC | Performed by: THORACIC SURGERY (CARDIOTHORACIC VASCULAR SURGERY)

## 2020-11-19 PROCEDURE — 27201037 HC PRESSURE MONITORING SET UP: Mod: HCNC

## 2020-11-19 PROCEDURE — 88313 PR  SPECIAL STAINS,GROUP II: ICD-10-PCS | Mod: 26,HCNC,, | Performed by: PATHOLOGY

## 2020-11-19 PROCEDURE — D9220A PRA ANESTHESIA: ICD-10-PCS | Mod: HCNC,,, | Performed by: SURGERY

## 2020-11-19 PROCEDURE — 37000009 HC ANESTHESIA EA ADD 15 MINS: Mod: HCNC | Performed by: THORACIC SURGERY (CARDIOTHORACIC VASCULAR SURGERY)

## 2020-11-19 PROCEDURE — 88342 CHG IMMUNOCYTOCHEMISTRY: ICD-10-PCS | Mod: 26,HCNC,, | Performed by: PATHOLOGY

## 2020-11-19 PROCEDURE — 88381 MICRODISSECTION MANUAL: CPT | Mod: HCNC | Performed by: PATHOLOGY

## 2020-11-19 PROCEDURE — 37000008 HC ANESTHESIA 1ST 15 MINUTES: Mod: HCNC | Performed by: THORACIC SURGERY (CARDIOTHORACIC VASCULAR SURGERY)

## 2020-11-19 PROCEDURE — 88341 IMHCHEM/IMCYTCHM EA ADD ANTB: CPT | Mod: 59,HCNC | Performed by: PATHOLOGY

## 2020-11-19 PROCEDURE — 71000033 HC RECOVERY, INTIAL HOUR: Mod: HCNC | Performed by: THORACIC SURGERY (CARDIOTHORACIC VASCULAR SURGERY)

## 2020-11-19 PROCEDURE — 81445 SO NEO GSAP 5-50DNA/DNA&RNA: CPT | Mod: HCNC | Performed by: PATHOLOGY

## 2020-11-19 PROCEDURE — 71000039 HC RECOVERY, EACH ADD'L HOUR: Mod: HCNC | Performed by: THORACIC SURGERY (CARDIOTHORACIC VASCULAR SURGERY)

## 2020-11-19 RX ORDER — PHENYLEPHRINE HCL IN 0.9% NACL 1 MG/10 ML
SYRINGE (ML) INTRAVENOUS
Status: DISCONTINUED | OUTPATIENT
Start: 2020-11-19 | End: 2020-11-19

## 2020-11-19 RX ORDER — ONDANSETRON 8 MG/1
8 TABLET, ORALLY DISINTEGRATING ORAL EVERY 8 HOURS PRN
Status: DISCONTINUED | OUTPATIENT
Start: 2020-11-19 | End: 2020-11-19 | Stop reason: HOSPADM

## 2020-11-19 RX ORDER — ACETAMINOPHEN 325 MG/1
650 TABLET ORAL EVERY 4 HOURS PRN
Status: DISCONTINUED | OUTPATIENT
Start: 2020-11-19 | End: 2020-11-19 | Stop reason: HOSPADM

## 2020-11-19 RX ORDER — ONDANSETRON 2 MG/ML
INJECTION INTRAMUSCULAR; INTRAVENOUS
Status: DISCONTINUED | OUTPATIENT
Start: 2020-11-19 | End: 2020-11-19

## 2020-11-19 RX ORDER — POLYETHYLENE GLYCOL 3350 17 G/17G
17 POWDER, FOR SOLUTION ORAL DAILY
Status: DISCONTINUED | OUTPATIENT
Start: 2020-11-19 | End: 2020-11-19 | Stop reason: HOSPADM

## 2020-11-19 RX ORDER — ONDANSETRON 2 MG/ML
4 INJECTION INTRAMUSCULAR; INTRAVENOUS DAILY PRN
Status: DISCONTINUED | OUTPATIENT
Start: 2020-11-19 | End: 2020-11-19 | Stop reason: HOSPADM

## 2020-11-19 RX ORDER — BISACODYL 10 MG
10 SUPPOSITORY, RECTAL RECTAL DAILY PRN
Status: DISCONTINUED | OUTPATIENT
Start: 2020-11-19 | End: 2020-11-19 | Stop reason: HOSPADM

## 2020-11-19 RX ORDER — OXYCODONE HYDROCHLORIDE 5 MG/1
5 TABLET ORAL EVERY 4 HOURS PRN
Status: DISCONTINUED | OUTPATIENT
Start: 2020-11-19 | End: 2020-11-19 | Stop reason: HOSPADM

## 2020-11-19 RX ORDER — IBUPROFEN 200 MG
24 TABLET ORAL
Status: DISCONTINUED | OUTPATIENT
Start: 2020-11-19 | End: 2020-11-19 | Stop reason: HOSPADM

## 2020-11-19 RX ORDER — OXYCODONE HYDROCHLORIDE 5 MG/1
10 TABLET ORAL EVERY 4 HOURS PRN
Status: DISCONTINUED | OUTPATIENT
Start: 2020-11-19 | End: 2020-11-19 | Stop reason: HOSPADM

## 2020-11-19 RX ORDER — CEFAZOLIN SODIUM 1 G/3ML
2 INJECTION, POWDER, FOR SOLUTION INTRAMUSCULAR; INTRAVENOUS
Status: COMPLETED | OUTPATIENT
Start: 2020-11-19 | End: 2020-11-19

## 2020-11-19 RX ORDER — MONTELUKAST SODIUM 10 MG/1
10 TABLET ORAL NIGHTLY
Status: DISCONTINUED | OUTPATIENT
Start: 2020-11-19 | End: 2020-11-19 | Stop reason: HOSPADM

## 2020-11-19 RX ORDER — IBUPROFEN 200 MG
16 TABLET ORAL
Status: DISCONTINUED | OUTPATIENT
Start: 2020-11-19 | End: 2020-11-19 | Stop reason: HOSPADM

## 2020-11-19 RX ORDER — OXYCODONE AND ACETAMINOPHEN 5; 325 MG/1; MG/1
1 TABLET ORAL EVERY 6 HOURS PRN
Status: DISCONTINUED | OUTPATIENT
Start: 2020-11-19 | End: 2020-11-20

## 2020-11-19 RX ORDER — ACETAMINOPHEN 325 MG/1
650 TABLET ORAL EVERY 8 HOURS
Status: DISCONTINUED | OUTPATIENT
Start: 2020-11-19 | End: 2020-11-19 | Stop reason: HOSPADM

## 2020-11-19 RX ORDER — ESMOLOL HYDROCHLORIDE 10 MG/ML
INJECTION INTRAVENOUS
Status: DISCONTINUED | OUTPATIENT
Start: 2020-11-19 | End: 2020-11-19

## 2020-11-19 RX ORDER — LIDOCAINE HYDROCHLORIDE 20 MG/ML
INJECTION, SOLUTION EPIDURAL; INFILTRATION; INTRACAUDAL; PERINEURAL
Status: DISCONTINUED | OUTPATIENT
Start: 2020-11-19 | End: 2020-11-19

## 2020-11-19 RX ORDER — INSULIN ASPART 100 [IU]/ML
1-10 INJECTION, SOLUTION INTRAVENOUS; SUBCUTANEOUS
Status: DISCONTINUED | OUTPATIENT
Start: 2020-11-19 | End: 2020-11-19 | Stop reason: HOSPADM

## 2020-11-19 RX ORDER — ROCURONIUM BROMIDE 10 MG/ML
INJECTION, SOLUTION INTRAVENOUS
Status: DISCONTINUED | OUTPATIENT
Start: 2020-11-19 | End: 2020-11-19

## 2020-11-19 RX ORDER — HEPARIN SODIUM 5000 [USP'U]/ML
5000 INJECTION, SOLUTION INTRAVENOUS; SUBCUTANEOUS EVERY 8 HOURS
Status: DISCONTINUED | OUTPATIENT
Start: 2020-11-20 | End: 2020-11-20 | Stop reason: HOSPADM

## 2020-11-19 RX ORDER — KETOROLAC TROMETHAMINE 30 MG/ML
15 INJECTION, SOLUTION INTRAMUSCULAR; INTRAVENOUS EVERY 6 HOURS
Status: DISCONTINUED | OUTPATIENT
Start: 2020-11-19 | End: 2020-11-19 | Stop reason: HOSPADM

## 2020-11-19 RX ORDER — AMLODIPINE BESYLATE 5 MG/1
5 TABLET ORAL DAILY
Status: DISCONTINUED | OUTPATIENT
Start: 2020-11-20 | End: 2020-11-20 | Stop reason: HOSPADM

## 2020-11-19 RX ORDER — FENTANYL CITRATE 50 UG/ML
INJECTION, SOLUTION INTRAMUSCULAR; INTRAVENOUS
Status: DISCONTINUED | OUTPATIENT
Start: 2020-11-19 | End: 2020-11-19

## 2020-11-19 RX ORDER — HYDROMORPHONE HYDROCHLORIDE 1 MG/ML
0.2 INJECTION, SOLUTION INTRAMUSCULAR; INTRAVENOUS; SUBCUTANEOUS EVERY 5 MIN PRN
Status: DISCONTINUED | OUTPATIENT
Start: 2020-11-19 | End: 2020-11-19 | Stop reason: HOSPADM

## 2020-11-19 RX ORDER — ACETAMINOPHEN 500 MG
1000 TABLET ORAL
Status: COMPLETED | OUTPATIENT
Start: 2020-11-19 | End: 2020-11-19

## 2020-11-19 RX ORDER — DIPHENHYDRAMINE HYDROCHLORIDE 50 MG/ML
25 INJECTION INTRAMUSCULAR; INTRAVENOUS EVERY 6 HOURS PRN
Status: DISCONTINUED | OUTPATIENT
Start: 2020-11-19 | End: 2020-11-19 | Stop reason: HOSPADM

## 2020-11-19 RX ORDER — EPHEDRINE SULFATE 50 MG/ML
INJECTION, SOLUTION INTRAVENOUS
Status: DISCONTINUED | OUTPATIENT
Start: 2020-11-19 | End: 2020-11-19

## 2020-11-19 RX ORDER — IPRATROPIUM BROMIDE AND ALBUTEROL SULFATE 2.5; .5 MG/3ML; MG/3ML
3 SOLUTION RESPIRATORY (INHALATION) EVERY 4 HOURS PRN
Status: DISCONTINUED | OUTPATIENT
Start: 2020-11-19 | End: 2020-11-19 | Stop reason: HOSPADM

## 2020-11-19 RX ORDER — ROSUVASTATIN CALCIUM 20 MG/1
20 TABLET, COATED ORAL DAILY
Status: DISCONTINUED | OUTPATIENT
Start: 2020-11-19 | End: 2020-11-19 | Stop reason: HOSPADM

## 2020-11-19 RX ORDER — BUPIVACAINE HYDROCHLORIDE 5 MG/ML
INJECTION, SOLUTION EPIDURAL; INTRACAUDAL
Status: DISCONTINUED | OUTPATIENT
Start: 2020-11-19 | End: 2020-11-19 | Stop reason: HOSPADM

## 2020-11-19 RX ORDER — PANTOPRAZOLE SODIUM 40 MG/1
40 TABLET, DELAYED RELEASE ORAL DAILY
Status: DISCONTINUED | OUTPATIENT
Start: 2020-11-20 | End: 2020-11-20 | Stop reason: HOSPADM

## 2020-11-19 RX ORDER — FENTANYL CITRATE 50 UG/ML
25 INJECTION, SOLUTION INTRAMUSCULAR; INTRAVENOUS EVERY 5 MIN PRN
Status: DISCONTINUED | OUTPATIENT
Start: 2020-11-19 | End: 2020-11-19 | Stop reason: HOSPADM

## 2020-11-19 RX ORDER — AMOXICILLIN 250 MG
1 CAPSULE ORAL 2 TIMES DAILY
Status: DISCONTINUED | OUTPATIENT
Start: 2020-11-19 | End: 2020-11-19 | Stop reason: HOSPADM

## 2020-11-19 RX ORDER — PROPOFOL 10 MG/ML
VIAL (ML) INTRAVENOUS
Status: DISCONTINUED | OUTPATIENT
Start: 2020-11-19 | End: 2020-11-19

## 2020-11-19 RX ORDER — LABETALOL HYDROCHLORIDE 5 MG/ML
INJECTION, SOLUTION INTRAVENOUS
Status: DISCONTINUED | OUTPATIENT
Start: 2020-11-19 | End: 2020-11-19

## 2020-11-19 RX ORDER — ONDANSETRON 4 MG/1
4 TABLET, FILM COATED ORAL EVERY 6 HOURS PRN
Status: DISCONTINUED | OUTPATIENT
Start: 2020-11-19 | End: 2020-11-20 | Stop reason: HOSPADM

## 2020-11-19 RX ORDER — IPRATROPIUM BROMIDE AND ALBUTEROL SULFATE 2.5; .5 MG/3ML; MG/3ML
3 SOLUTION RESPIRATORY (INHALATION)
Status: DISCONTINUED | OUTPATIENT
Start: 2020-11-19 | End: 2020-11-19 | Stop reason: HOSPADM

## 2020-11-19 RX ORDER — OXYCODONE AND ACETAMINOPHEN 10; 325 MG/1; MG/1
1 TABLET ORAL EVERY 6 HOURS PRN
Status: DISCONTINUED | OUTPATIENT
Start: 2020-11-19 | End: 2020-11-20

## 2020-11-19 RX ORDER — CEFAZOLIN SODIUM 1 G/3ML
2 INJECTION, POWDER, FOR SOLUTION INTRAMUSCULAR; INTRAVENOUS
Status: DISCONTINUED | OUTPATIENT
Start: 2020-11-19 | End: 2020-11-19 | Stop reason: HOSPADM

## 2020-11-19 RX ADMIN — ROCURONIUM BROMIDE 40 MG: 10 INJECTION, SOLUTION INTRAVENOUS at 07:11

## 2020-11-19 RX ADMIN — HYDROMORPHONE HYDROCHLORIDE 0.2 MG: 1 INJECTION, SOLUTION INTRAMUSCULAR; INTRAVENOUS; SUBCUTANEOUS at 04:11

## 2020-11-19 RX ADMIN — Medication 100 MCG: at 11:11

## 2020-11-19 RX ADMIN — OXYCODONE HYDROCHLORIDE AND ACETAMINOPHEN 1 TABLET: 10; 325 TABLET ORAL at 09:11

## 2020-11-19 RX ADMIN — Medication 10 MG: at 12:11

## 2020-11-19 RX ADMIN — CEFAZOLIN 2 G: 330 INJECTION, POWDER, FOR SOLUTION INTRAMUSCULAR; INTRAVENOUS at 12:11

## 2020-11-19 RX ADMIN — PROPOFOL 150 MG: 10 INJECTION, EMULSION INTRAVENOUS at 07:11

## 2020-11-19 RX ADMIN — HYDROMORPHONE HYDROCHLORIDE 0.2 MG: 1 INJECTION, SOLUTION INTRAMUSCULAR; INTRAVENOUS; SUBCUTANEOUS at 01:11

## 2020-11-19 RX ADMIN — KETOROLAC TROMETHAMINE 15 MG: 30 INJECTION, SOLUTION INTRAMUSCULAR; INTRAVENOUS at 06:11

## 2020-11-19 RX ADMIN — EPHEDRINE SULFATE 10 MG: 50 INJECTION INTRAVENOUS at 11:11

## 2020-11-19 RX ADMIN — ROSUVASTATIN CALCIUM 20 MG: 20 TABLET, FILM COATED ORAL at 05:11

## 2020-11-19 RX ADMIN — ACETAMINOPHEN 650 MG: 325 TABLET ORAL at 01:11

## 2020-11-19 RX ADMIN — ROCURONIUM BROMIDE 20 MG: 10 INJECTION, SOLUTION INTRAVENOUS at 08:11

## 2020-11-19 RX ADMIN — Medication 100 MCG: at 09:11

## 2020-11-19 RX ADMIN — Medication 100 MCG: at 10:11

## 2020-11-19 RX ADMIN — SODIUM CHLORIDE: 0.9 INJECTION, SOLUTION INTRAVENOUS at 06:11

## 2020-11-19 RX ADMIN — Medication 100 MCG: at 08:11

## 2020-11-19 RX ADMIN — OXYCODONE HYDROCHLORIDE 10 MG: 5 TABLET ORAL at 01:11

## 2020-11-19 RX ADMIN — PROPOFOL 50 MG: 10 INJECTION, EMULSION INTRAVENOUS at 07:11

## 2020-11-19 RX ADMIN — FENTANYL CITRATE 25 MCG: 50 INJECTION, SOLUTION INTRAMUSCULAR; INTRAVENOUS at 12:11

## 2020-11-19 RX ADMIN — DOCUSATE SODIUM 50MG AND SENNOSIDES 8.6MG 1 TABLET: 8.6; 5 TABLET, FILM COATED ORAL at 01:11

## 2020-11-19 RX ADMIN — ROCURONIUM BROMIDE 15 MG: 10 INJECTION, SOLUTION INTRAVENOUS at 10:11

## 2020-11-19 RX ADMIN — SODIUM CHLORIDE, SODIUM GLUCONATE, SODIUM ACETATE, POTASSIUM CHLORIDE, MAGNESIUM CHLORIDE, SODIUM PHOSPHATE, DIBASIC, AND POTASSIUM PHOSPHATE: .53; .5; .37; .037; .03; .012; .00082 INJECTION, SOLUTION INTRAVENOUS at 07:11

## 2020-11-19 RX ADMIN — SUGAMMADEX 200 MG: 100 INJECTION, SOLUTION INTRAVENOUS at 12:11

## 2020-11-19 RX ADMIN — FENTANYL CITRATE 25 MCG: 50 INJECTION, SOLUTION INTRAMUSCULAR; INTRAVENOUS at 11:11

## 2020-11-19 RX ADMIN — ONDANSETRON 4 MG: 2 INJECTION INTRAMUSCULAR; INTRAVENOUS at 12:11

## 2020-11-19 RX ADMIN — ESMOLOL HYDROCHLORIDE 30 MCG: 10 INJECTION INTRAVENOUS at 07:11

## 2020-11-19 RX ADMIN — POLYETHYLENE GLYCOL 3350 17 G: 17 POWDER, FOR SOLUTION ORAL at 01:11

## 2020-11-19 RX ADMIN — IPRATROPIUM BROMIDE AND ALBUTEROL SULFATE 3 ML: .5; 2.5 SOLUTION RESPIRATORY (INHALATION) at 01:11

## 2020-11-19 RX ADMIN — ROCURONIUM BROMIDE 10 MG: 10 INJECTION, SOLUTION INTRAVENOUS at 09:11

## 2020-11-19 RX ADMIN — FENTANYL CITRATE 50 MCG: 50 INJECTION, SOLUTION INTRAMUSCULAR; INTRAVENOUS at 07:11

## 2020-11-19 RX ADMIN — ACETAMINOPHEN 1000 MG: 500 TABLET ORAL at 05:11

## 2020-11-19 RX ADMIN — ROCURONIUM BROMIDE 15 MG: 10 INJECTION, SOLUTION INTRAVENOUS at 11:11

## 2020-11-19 RX ADMIN — CEFAZOLIN 2 G: 330 INJECTION, POWDER, FOR SOLUTION INTRAMUSCULAR; INTRAVENOUS at 08:11

## 2020-11-19 RX ADMIN — LIDOCAINE HYDROCHLORIDE 5 ML: 20 INJECTION, SOLUTION EPIDURAL; INFILTRATION; INTRACAUDAL at 07:11

## 2020-11-19 RX ADMIN — HYDROMORPHONE HYDROCHLORIDE 0.2 MG: 1 INJECTION, SOLUTION INTRAMUSCULAR; INTRAVENOUS; SUBCUTANEOUS at 02:11

## 2020-11-19 RX ADMIN — FENTANYL CITRATE 25 MCG: 50 INJECTION, SOLUTION INTRAMUSCULAR; INTRAVENOUS at 08:11

## 2020-11-19 RX ADMIN — GLYCOPYRROLATE 0.2 MG: 0.2 INJECTION INTRAMUSCULAR; INTRAVENOUS at 08:11

## 2020-11-19 NOTE — NURSING TRANSFER
Nursing Transfer Note      11/19/2020     Transfer To: 1026    Transfer via bed    Transfer with chest tube    Transported by RN    Medicines sent: n/a    Chart send with patient: Yes    Notified: daughter    Patient reassessed at: 11/19/2020    Upon arrival to floor:

## 2020-11-19 NOTE — BRIEF OP NOTE
Certification of Assistant at Surgery       Surgery Date: 11/19/2020     Participating Surgeons:  Surgeon(s) and Role:     * Amrit Flores MD - Primary     * Chelsey Irving PA-C - Assisting     Procedures:  Procedure(s) (LRB):  XI ROBOTIC RATS,WITH LOBECTOMY,LUNG (Left)  EXCISION, LYMPH NODE (Left)  BLOCK, NERVE, INTERCOSTAL, 2 OR MORE (Left)    Assistant Surgeon's Certification of Necessity:  I understand that section 1842 (b) (6) (d) of the Social Security Act generally prohibits Medicare Part B reasonable charge payment for the services of assistants at surgery in teaching hospitals when qualified residents are available to furnish such services. I certify that the services for which payment is claimed were medically necessary, and that no qualified resident was available to perform the services. I further understand that these services are subject to post-payment review by the Medicare carrier.      Chelsey Irving PA-C    11/19/2020  1:23 PM

## 2020-11-19 NOTE — ANESTHESIA PROCEDURE NOTES
Intubation  Performed by: Ananda Neal MD  Authorized by: Ulysses Davis MD     Intubation:     Induction:  Intravenous    Intubated:  Postinduction    Mask Ventilation:  Easy with oral airway    Attempts:  1    Attempted By:  Resident anesthesiologist    Method of Intubation:  Direct and fiberoptic    Blade:  Iyer 2    Laryngeal View Grade: Grade I - full view of chords      Difficult Airway Encountered?: No      Complications:  None    Airway Device:  Double lumen tube left    Airway Device Size:  35F    Style/Cuff Inflation:  Cuffed (inflated to minimal occlusive pressure)    Secured at:  The lips    Placement Verified By:  Capnometry    Complicating Factors:  None    Findings Post-Intubation:  Atraumatic/condition of teeth unchanged  Notes:      Tracheal lumen closed   Bronchial lumen open   Ventilating right lung

## 2020-11-19 NOTE — ANESTHESIA PROCEDURE NOTES
Arterial    Diagnosis: Lung Cancer    Patient location during procedure: done in OR  Procedure start time: 11/19/2020 7:17 AM  Timeout: 11/19/2020 7:16 AM  Procedure end time: 11/19/2020 7:22 AM    Staffing  Authorizing Provider: Ulysses Davis MD  Performing Provider: Ananda Neal MD    Anesthesiologist was present at the time of the procedure.    Preanesthetic Checklist  Completed: patient identified, site marked, surgical consent, pre-op evaluation, timeout performed, IV checked, risks and benefits discussed, monitors and equipment checked and anesthesia consent givenArterial  Skin Prep: alcohol swabs  Orientation: left  Location: radial  Catheter Size: 22 G  Catheter placement by Ultrasound guidance. Heme positive aspiration all ports.  Vessel Caliber: small, patent  Needle advanced into vessel with real time Ultrasound guidance.Insertion Attempts: 2  Assessment  Dressing: secured with tape and tegaderm  Patient: Tolerated well

## 2020-11-19 NOTE — INTERVAL H&P NOTE
The patient has been examined and the H&P has been reviewed:    I concur with the findings and no changes have occurred since H&P was written.    Surgery risks, benefits and alternative options discussed and understood by patient/family.          Active Hospital Problems    Diagnosis  POA    NSCLC of left lung [C34.92]  Yes      Resolved Hospital Problems   No resolved problems to display.

## 2020-11-19 NOTE — TRANSFER OF CARE
Anesthesia Transfer of Care Note    Patient: Lucia Barlow    Procedure(s) Performed: Procedure(s) (LRB):  XI ROBOTIC RATS,WITH LOBECTOMY,LUNG (Left)  EXCISION, LYMPH NODE (Left)  BLOCK, NERVE, INTERCOSTAL, 2 OR MORE (Left)    Patient location: PACU    Anesthesia Type: general    Transport from OR: Transported from OR on 6-10 L/min O2 by face mask with adequate spontaneous ventilation    Post pain: adequate analgesia    Post assessment: no apparent anesthetic complications and tolerated procedure well    Post vital signs: stable    Level of consciousness: awake and responds to stimulation    Nausea/Vomiting: no nausea/vomiting    Complications: none    Transfer of care protocol was followed      Last vitals:   Visit Vitals  BP (!) 114/55 (BP Location: Right arm, Patient Position: Lying)   Pulse 73   Temp 36.3 °C (97.3 °F) (Temporal)   Resp 18   Wt 66.2 kg (146 lb)   SpO2 100%   Breastfeeding No   BMI 28.51 kg/m²

## 2020-11-19 NOTE — ANESTHESIA POSTPROCEDURE EVALUATION
Anesthesia Post Evaluation    Patient: Lucia Barlow    Procedure(s) Performed: Procedure(s) (LRB):  XI ROBOTIC RATS,WITH LOBECTOMY,LUNG (Left)  EXCISION, LYMPH NODE (Left)  BLOCK, NERVE, INTERCOSTAL, 2 OR MORE (Left)    Final Anesthesia Type: general    Patient location during evaluation: PACU  Patient participation: Yes- Able to Participate  Level of consciousness: awake and alert  Post-procedure vital signs: reviewed and stable  Pain management: adequate  Airway patency: patent    PONV status at discharge: No PONV  Anesthetic complications: no      Cardiovascular status: blood pressure returned to baseline  Respiratory status: unassisted and spontaneous ventilation  Hydration status: euvolemic  Follow-up not needed.          Vitals Value Taken Time   /70 11/19/20 1517   Temp 36.3 °C (97.4 °F) 11/19/20 1400   Pulse 77 11/19/20 1517   Resp 12 11/19/20 1517   SpO2 98 % 11/19/20 1517   Vitals shown include unvalidated device data.      No case tracking events are documented in the log.      Pain/Ada Score: Pain Rating Prior to Med Admin: 5 (11/19/2020  2:00 PM)  Ada Score: 8 (11/19/2020  3:00 PM)

## 2020-11-19 NOTE — BRIEF OP NOTE
[facility]  Surgery Department  Operative Note    SUMMARY     Date of Procedure: 11/19/2020     Procedure: Procedure(s) (LRB):  XI ROBOTIC RATS,WITH LOBECTOMY,LUNG (Left)  EXCISION, LYMPH NODE (Left)  BLOCK, NERVE, INTERCOSTAL, 2 OR MORE (Left)     Surgeon(s) and Role:     * Amrit Flroes MD - Primary     * Chelsey Irving PA-C - assisting       Pre-Operative Diagnosis: Non-small cell cancer of left lung [C34.92]    Post-Operative Diagnosis: Post-Op Diagnosis Codes:     * Non-small cell cancer of left lung [C34.92]    Anesthesia: General    Description of the Findings of the Procedure:  left robotic assisted anatomic lingulectomy with MLND and intercostal nerve block, fused fissure, 24 Polish left pleural christopher drain     Significant Surgical Tasks Conducted by the Assistant(s), if Applicable: bedside assist    Complications: No    Estimated Blood Loss (EBL): * No values recorded between 11/19/2020  8:15 AM and 11/19/2020 12:53 PM *           Implants: * No implants in log *    Specimens:   Specimen (12h ago, onward)    None                  Condition: Good    Disposition: PACU - hemodynamically stable.    Attestation: A qualified resident physician was not available to bedside assist.

## 2020-11-19 NOTE — OP NOTE
Date of Surgery: 11/19/20  Preoperative Diagnosis: Left upper lobe lung cancer  Postoperative Diagnosis: Same  Procedure: Robotic assisted anatomic lingulectomy, mediastinal lymph node dissection, intercostal nerve block 2 more  Surgeon: Amrit Flores MD  First Assistant: Chelsey Irving PA-C  Anesthesia: GETA, Exparel/ Marcaine IC nerve block  Interspaces 3 through 10 in 1:1 concentration  EBL: 30 mL    Surgery in Detail:    The patient has a history of left upper lobe adenocarcinoma.  She also has a prior smoking history and underlying COPD.  The tumor size and location are indications for an anatomic lingulectomy.    The patient was taken to the operating room placed supine and identified.  General anesthesia was administered with double-lumen tube placement.  Tube positioning was confirmed fiberoptically.  The patient was positioned in a right lateral decubitus position all pressure points were padded.  The patient's left chest was prepped and draped in a sterile fashion.  Single lung anesthesia was administered.  Robotic ports were placed along the 8th interspace between the anterior axillary line and the vertebral column. A #12mm assistant port was placed in the 10th interspace midaxillary line.  Carbon dioxide insufflation was administered.  I performed an intercostal nerve block between interspaces 3 and 10 using a 1:1 mixture of Exparel and Marcaine.  The robot was docked into place.  There was significant  Hyperinflation within the left lung making exposure difficult.  A suction catheter was placed into the endobronchial lumen of the double-lumen tube.  The insufflation pressure was increased.  The oblique fissure was incomplete along its entire length.  I dissected along the oblique fissure exposing the interlobar pulmonary artery.  This was quite difficult and took 45 minutes.  I then identified the distal aspect of the inter lobar pulmonary artery and completed the distal portion of the oblique  fissure using several fires of a robotic blue load stapler.  Two medium-sized lingular arterial branches were mobilized and divided using a robotic vascular load once inter lobar and segmental lymph nodes were harvested.  The lingular bronchus was mobilized and divided using a robotic green load after removing the suction catheter from the airway.  The lingular vein was then divided using a robotic vascular load. I identified the tumor which was located at the origin of the inferior subsegment of the lingular division.  I then performed a parenchymal division taking special care to preserve the upper division branches of the superior pulmonary vein.  There was at least a 3:1 staple line margin to tumor ratio.  I then performed a mediastinal lymphadenectomy incising the anterior hilum and harvesting a level 5 and level 6 lymph node packet.  Bipolar energy was used and special care was taken to avoid injury to the phrenic and recurrent nerves.  I then explored the posterior hilum and harvested the level 7 lymph node packet.  Surgicel was placed in all mediastinal lymph node dissection beds.  All dissection planes were hemostatic.  The most anterior robotic port was removed and that portion of the 8th interspace enlarged.  The lingulectomy specimen was placed into a large endobag and removed from the operative field.  A #24Fr   Ryan drain was inserted into the left pleural space and exteriorized through the assistant port.  The remaining ports were removed and each port site was hemostatic.  Left lung ventilation was resumed with complete left lung reexpansion.  The upper division had a good color and expanded fully without any evidence of torsion.  The chest drain was secured using silk suture and each port site was closed in 2 layers using absorbable suture.  Steri-Strips and  sterile dressings were applied.    Attending attestation:  I was present for and either directly assisted with or performed the critical and  key portions of the procedure.    Bedside assistant attestation:  Ms. Irving functioned as a bedside 1st assistant.  Her duties included robotic docking, instrument exchange, and specimen retrieval throughout the entire surgery.  There was no qualified resident available to function as a bedside first assistant given the case complexity and extent of duties described above.

## 2020-11-20 ENCOUNTER — DOCUMENTATION ONLY (OUTPATIENT)
Dept: CARDIOTHORACIC SURGERY | Facility: HOSPITAL | Age: 70
End: 2020-11-20

## 2020-11-20 VITALS
WEIGHT: 146 LBS | OXYGEN SATURATION: 94 % | HEART RATE: 94 BPM | DIASTOLIC BLOOD PRESSURE: 59 MMHG | RESPIRATION RATE: 18 BRPM | BODY MASS INDEX: 26.87 KG/M2 | SYSTOLIC BLOOD PRESSURE: 125 MMHG | TEMPERATURE: 98 F | HEIGHT: 62 IN

## 2020-11-20 DIAGNOSIS — C34.92 NSCLC OF LEFT LUNG: Primary | ICD-10-CM

## 2020-11-20 LAB
POCT GLUCOSE: 141 MG/DL (ref 70–110)
POCT GLUCOSE: 146 MG/DL (ref 70–110)

## 2020-11-20 PROCEDURE — 63600175 PHARM REV CODE 636 W HCPCS: Mod: HCNC | Performed by: PHYSICIAN ASSISTANT

## 2020-11-20 PROCEDURE — 25000003 PHARM REV CODE 250: Mod: HCNC | Performed by: STUDENT IN AN ORGANIZED HEALTH CARE EDUCATION/TRAINING PROGRAM

## 2020-11-20 PROCEDURE — 25000003 PHARM REV CODE 250: Mod: HCNC | Performed by: PHYSICIAN ASSISTANT

## 2020-11-20 RX ORDER — ACETAMINOPHEN 325 MG/1
650 TABLET ORAL EVERY 8 HOURS
Status: DISCONTINUED | OUTPATIENT
Start: 2020-11-20 | End: 2020-11-20

## 2020-11-20 RX ORDER — OXYCODONE HYDROCHLORIDE 5 MG/1
5 TABLET ORAL EVERY 4 HOURS PRN
Qty: 41 TABLET | Refills: 0 | Status: SHIPPED | OUTPATIENT
Start: 2020-11-20 | End: 2020-12-04 | Stop reason: SDUPTHER

## 2020-11-20 RX ORDER — ACETAMINOPHEN 325 MG/1
650 TABLET ORAL EVERY 8 HOURS
Status: DISCONTINUED | OUTPATIENT
Start: 2020-11-20 | End: 2020-11-20 | Stop reason: HOSPADM

## 2020-11-20 RX ORDER — SODIUM CHLORIDE 9 MG/ML
INJECTION, SOLUTION INTRAVENOUS CONTINUOUS PRN
Status: DISCONTINUED | OUTPATIENT
Start: 2020-11-19 | End: 2020-11-20

## 2020-11-20 RX ORDER — OXYCODONE HYDROCHLORIDE 10 MG/1
10 TABLET ORAL EVERY 4 HOURS PRN
Status: DISCONTINUED | OUTPATIENT
Start: 2020-11-20 | End: 2020-11-20 | Stop reason: HOSPADM

## 2020-11-20 RX ORDER — POLYETHYLENE GLYCOL 3350 17 G/17G
17 POWDER, FOR SOLUTION ORAL DAILY
Status: DISCONTINUED | OUTPATIENT
Start: 2020-11-20 | End: 2020-11-20 | Stop reason: HOSPADM

## 2020-11-20 RX ORDER — OXYCODONE HYDROCHLORIDE 5 MG/1
5 TABLET ORAL EVERY 4 HOURS PRN
Status: DISCONTINUED | OUTPATIENT
Start: 2020-11-20 | End: 2020-11-20 | Stop reason: HOSPADM

## 2020-11-20 RX ADMIN — OXYCODONE HYDROCHLORIDE AND ACETAMINOPHEN 1 TABLET: 10; 325 TABLET ORAL at 05:11

## 2020-11-20 RX ADMIN — OXYCODONE HYDROCHLORIDE 5 MG: 5 TABLET ORAL at 10:11

## 2020-11-20 RX ADMIN — ACETAMINOPHEN 650 MG: 325 TABLET ORAL at 08:11

## 2020-11-20 RX ADMIN — HEPARIN SODIUM 5000 UNITS: 5000 INJECTION INTRAVENOUS; SUBCUTANEOUS at 05:11

## 2020-11-20 RX ADMIN — PANTOPRAZOLE SODIUM 40 MG: 40 TABLET, DELAYED RELEASE ORAL at 08:11

## 2020-11-20 RX ADMIN — POLYETHYLENE GLYCOL 3350 17 G: 17 POWDER, FOR SOLUTION ORAL at 08:11

## 2020-11-20 RX ADMIN — ACETAMINOPHEN 650 MG: 325 TABLET ORAL at 01:11

## 2020-11-20 RX ADMIN — AMLODIPINE BESYLATE 5 MG: 5 TABLET ORAL at 08:11

## 2020-11-20 NOTE — PLAN OF CARE
No Social Service needs identified at this time.       11/20/20 1157   Post-Acute Status   Post-Acute Authorization Other   Other Status No Post-Acute Service Needs     Janene Ortega LMSW  PRN-  [facility]  Ext. [phone]

## 2020-11-20 NOTE — PLAN OF CARE
Patient is a 70 year old female admitted from home 11/16/2020 and underwent RATS with Left Lung Lobectomy and Excision Lymph Node.  Patient discharging home today with no needs.    Patient live alone in a one story home.  Patient's daughter will drive her home and obtain anything she needs after discharge. CM name and number written on white board in patient's room with request to call for any questions and concerns.        PCP  ZELDA Sandoval MD  [address] [facility] [address]  [phone]      [facility] - [place], [place] - [address]  [address] [facility] [address]  Phone: [phone] Fax: [phone]    [facility] - [place], [place] - [address]  [address] [facility] [address]  Phone: [phone] Fax: [phone]    [facility]  [address]  [place] [address]  Phone: [phone] Fax: [phone]    Extended Emergency Contact Information  Primary Emergency Contact: CainDebora  Address: [address] [place] [address] [place]  Mobile Phone: [phone]  Relation: Daughter  Secondary Emergency Contact: Salena Barlow   [place]  Mobile Phone: [phone]  Relation: Daughter       11/20/20 1437   Discharge Assessment   Assessment Type Discharge Planning Assessment   Confirmed/corrected address and phone number on facesheet? Yes   Assessment information obtained from? Patient   Expected Length of Stay (days) 2   Communicated expected length of stay with patient/caregiver yes   Prior to hospitilization cognitive status: Alert/Oriented   Prior to hospitalization functional status: Independent   Current cognitive status: Alert/Oriented   Current Functional Status: Independent;Needs Assistance   Lives With alone   Able to Return to Prior Arrangements yes   Is patient able to care for self after discharge? Yes   Who are your caregiver(s)  and their phone number(s)? daughters   Patient's perception of discharge disposition home or selfcare   Equipment Currently Used at Home none   Do you have any problems affording any of your prescribed medications? No   Is the patient taking medications as prescribed? yes   Does the patient have transportation home? Yes   Transportation Anticipated family or friend will provide   Discharge Plan A Home with family   Discharge Plan B Home   DME Needed Upon Discharge  none   Patient/Family in Agreement with Plan yes

## 2020-11-20 NOTE — PROGRESS NOTES
[facility]  Thoracic Surgery  Progress Note    Subjective:     History of Present Illness:  70 year old female with PMH of GERD, COPD, PVD (bilateral aortoiliac stents), IVANIA on CPAP, DMII (no insulin) and newly diagnosed NSCLC of left upper lobe. Patient has undergone LDCT since 2016. She had a left lower lobe nodule biopsied in 2017 which only showed chronic lymphocytic inflammation and fibrosis. Surveillance CT in September 2020 showed development of left upper lobe nodule concerning for malignancy. She also has bilateral subcentimeter nodules. CT guided biopsy on 10/21/20 complicated by pneumothorax requiring chest tube placement. Pathology positive for NSCLC. Minimal staining performed due to small sample size. PET CT showed very mild uptake in johann and no distant disease. Today she reports feeling well. Only feels SOB after wearing facemask in public for extended periods of time. She does not feel her ambulation or daily activities are limited. Uses Combivent at home PRN. Recently started wearing CPAP at night. Denies fever, chills, anginal chest pain, palpitations, N/V or changes in bowel and bladder functioning. Only on 81mg ASA for antiplatelet therapy.     Quit smoking 1 week ago. 60 pack year history. No EtOH use. Formerly worked as a .   PSH of hysterectomy, rectosigmoid polypectomy with partial colectomy, bilateral carpal tunnel release and angioplasty for PVD.    Post-Op Info:  Procedure(s) (LRB):  XI ROBOTIC RATS,WITH LOBECTOMY,LUNG (Left)  EXCISION, LYMPH NODE (Left)  BLOCK, NERVE, INTERCOSTAL, 2 OR MORE (Left)   1 Day Post-Op     Interval History: NAEON. Weaned to RA then placed on 1L overnight. Pain controlled. C/o sore throat this morning    Medications:  Continuous Infusions:  Scheduled Meds:   acetaminophen  650 mg Oral Q8H    amLODIPine  5 mg Oral Daily    heparin (porcine)  5,000 Units Subcutaneous Q8H    pantoprazole  40 mg Oral Daily    polyethylene  glycol  17 g Oral Daily     PRN Meds:ondansetron, oxyCODONE, oxyCODONE     Review of patient's allergies indicates:   Allergen Reactions    Skin staples [surgical stainless steel] Swelling    Egg derived      Shortness breath, lip swelling    Fish containing products     Iodine and iodide containing products Swelling    Latex, natural rubber Swelling    Losartan Itching    Nickel     Pravastatin      40 mg causes nausea vomitting but 20 mg ok    Shellfish containing products Swelling     Objective:     Vital Signs (Most Recent):  Temp: 98 °F (36.7 °C) (11/20/20 0440)  Pulse: 89 (11/20/20 0440)  Resp: 18 (11/20/20 0521)  BP: (!) 143/64 (11/20/20 0440)  SpO2: (!) 94 % (11/20/20 0529) Vital Signs (24h Range):  Temp:  [96.7 °F (35.9 °C)-98 °F (36.7 °C)] 98 °F (36.7 °C)  Pulse:  [71-90] 89  Resp:  [9-20] 18  SpO2:  [88 %-100 %] 94 %  BP: (111-153)/(55-70) 143/64     Intake/Output - Last 3 Shifts       11/18 0700 - 11/19 0659 11/19 0700 - 11/20 0659 11/20 0700 - 11/21 0659    P.O.  120     Total Intake(mL/kg)  120 (1.8)     Urine (mL/kg/hr)  600 (0.4)     Stool  0     Chest Tube  250     Total Output  850     Net  -730            Stool Occurrence  0 x           SpO2: (!) 94 %  O2 Device (Oxygen Therapy): nasal cannula    Physical Exam  Vitals signs reviewed.   Constitutional:       Appearance: Normal appearance.   HENT:      Head: Atraumatic.   Eyes:      Extraocular Movements: Extraocular movements intact.   Cardiovascular:      Rate and Rhythm: Normal rate and regular rhythm.   Pulmonary:      Effort: Pulmonary effort is normal.      Breath sounds: Normal breath sounds.      Comments: Left chest tube in place, no air leak  Abdominal:      Palpations: Abdomen is soft.   Skin:     General: Skin is warm and dry.   Neurological:      General: No focal deficit present.      Mental Status: She is alert and oriented to person, place, and time.   Psychiatric:         Behavior: Behavior normal.         Significant  Labs:  None    Significant Diagnostics:  CXR: I have reviewed all pertinent results/findings within the past 24 hours    VTE Risk Mitigation (From admission, onward)         Ordered     heparin (porcine) injection 5,000 Units  Every 8 hours      11/19/20 1608     Place sequential compression device  Until discontinued      11/19/20 1255     Place WADE hose  Until discontinued      11/19/20 1255              Assessment/Plan:     NSCLC of left lung  POD 1     Remove chest tube this morning.   Diabetic diet  Wean to RA.   PO pain meds  Scheduled tylenol  OOB, ambulate  IS   Home meds    Dispo: remove chest tube home this afternoon.         Chelsey Irving PA-C  Thoracic Surgery  [facility]

## 2020-11-20 NOTE — PLAN OF CARE
Patient discharged to home. Discharge instructions verbally given and hard copy provided to patient and daughter. Prescription delivered to bedside.. Removed 18 g IV with cath tip in place. Patient tolerated IV removal well with bleeding controlled. Patient remains free of falls with no acute pain or distress noted. Patient provided additional guaze and tape for dressing changes. Patient left floor via transport.

## 2020-11-20 NOTE — PROGRESS NOTES
Pt arrived to [facility]. VVS on 2 L NC. AAOx4. L chest tube intact. Pt up to toilet to void.   Night RN will resume care.

## 2020-11-20 NOTE — PLAN OF CARE
Patient discharged home today with no needs.    Future Appointments   Date Time Provider Department [place]   12/4/2020  8:00 AM LABORATORY, [facility] LAB [facility]   12/4/2020 11:00 AM [facility] [id number] LB LIMIT [facility] XRAY OP [address]   12/4/2020 11:30 AM Amrit Flores MD [facility] THORAC [facility]   12/9/2020  1:20 PM Ramiro Aleman MD [facility] PULMSVC [facility]   12/11/2020  8:00 AM VICK Sandoval MD [facility] IM [facility]   8/18/2021  8:40 AM Keith Bhatti OD [facility] OPHTHAL [facility]          11/20/20 1441   Final Note   Assessment Type Final Discharge Note   Anticipated Discharge Disposition Home   Hospital Follow Up  Appt(s) scheduled? Yes   Right Care Referral Info   Post Acute Recommendation No Care   Post-Acute Status   Post-Acute Authorization Other   Other Status No Post-Acute Service Needs

## 2020-11-20 NOTE — PROGRESS NOTES
Notified surgery intern on call that all of the patient orders are PACU orders and there are no new orders for me to release. He said he will review chart and put in orders. Will continue to monitor.

## 2020-11-20 NOTE — ASSESSMENT & PLAN NOTE
POD 1     Remove chest tube this morning.   Diabetic diet  Wean to RA.   PO pain meds  Scheduled tylenol  OOB, ambulate  IS   Home meds    Dispo: remove chest tube home this afternoon.

## 2020-11-20 NOTE — HPI
70 year old female with PMH of GERD, COPD, PVD (bilateral aortoiliac stents), IVANIA on CPAP, DMII (no insulin) and newly diagnosed NSCLC of left upper lobe. Patient has undergone LDCT since 2016. She had a left lower lobe nodule biopsied in 2017 which only showed chronic lymphocytic inflammation and fibrosis. Surveillance CT in September 2020 showed development of left upper lobe nodule concerning for malignancy. She also has bilateral subcentimeter nodules. CT guided biopsy on 10/21/20 complicated by pneumothorax requiring chest tube placement. Pathology positive for NSCLC. Minimal staining performed due to small sample size. PET CT showed very mild uptake in johann and no distant disease. Today she reports feeling well. Only feels SOB after wearing facemask in public for extended periods of time. She does not feel her ambulation or daily activities are limited. Uses Combivent at home PRN. Recently started wearing CPAP at night. Denies fever, chills, anginal chest pain, palpitations, N/V or changes in bowel and bladder functioning. Only on 81mg ASA for antiplatelet therapy.     Quit smoking 1 week ago. 60 pack year history. No EtOH use. Formerly worked as a .   PSH of hysterectomy, rectosigmoid polypectomy with partial colectomy, bilateral carpal tunnel release and angioplasty for PVD.

## 2020-11-20 NOTE — SUBJECTIVE & OBJECTIVE
Interval History: NAEON. Weaned to RA then placed on 1L overnight. Pain controlled. C/o sore throat this morning    Medications:  Continuous Infusions:  Scheduled Meds:   acetaminophen  650 mg Oral Q8H    amLODIPine  5 mg Oral Daily    heparin (porcine)  5,000 Units Subcutaneous Q8H    pantoprazole  40 mg Oral Daily    polyethylene glycol  17 g Oral Daily     PRN Meds:ondansetron, oxyCODONE, oxyCODONE     Review of patient's allergies indicates:   Allergen Reactions    Skin staples [surgical stainless steel] Swelling    Egg derived      Shortness breath, lip swelling    Fish containing products     Iodine and iodide containing products Swelling    Latex, natural rubber Swelling    Losartan Itching    Nickel     Pravastatin      40 mg causes nausea vomitting but 20 mg ok    Shellfish containing products Swelling     Objective:     Vital Signs (Most Recent):  Temp: 98 °F (36.7 °C) (11/20/20 0440)  Pulse: 89 (11/20/20 0440)  Resp: 18 (11/20/20 0521)  BP: (!) 143/64 (11/20/20 0440)  SpO2: (!) 94 % (11/20/20 0529) Vital Signs (24h Range):  Temp:  [96.7 °F (35.9 °C)-98 °F (36.7 °C)] 98 °F (36.7 °C)  Pulse:  [71-90] 89  Resp:  [9-20] 18  SpO2:  [88 %-100 %] 94 %  BP: (111-153)/(55-70) 143/64     Intake/Output - Last 3 Shifts       11/18 0700 - 11/19 0659 11/19 0700 - 11/20 0659 11/20 0700 - 11/21 0659    P.O.  120     Total Intake(mL/kg)  120 (1.8)     Urine (mL/kg/hr)  600 (0.4)     Stool  0     Chest Tube  250     Total Output  850     Net  -730            Stool Occurrence  0 x           SpO2: (!) 94 %  O2 Device (Oxygen Therapy): nasal cannula    Physical Exam  Vitals signs reviewed.   Constitutional:       Appearance: Normal appearance.   HENT:      Head: Atraumatic.   Eyes:      Extraocular Movements: Extraocular movements intact.   Cardiovascular:      Rate and Rhythm: Normal rate and regular rhythm.   Pulmonary:      Effort: Pulmonary effort is normal.      Breath sounds: Normal breath sounds.       Comments: Left chest tube in place, no air leak  Abdominal:      Palpations: Abdomen is soft.   Skin:     General: Skin is warm and dry.   Neurological:      General: No focal deficit present.      Mental Status: She is alert and oriented to person, place, and time.   Psychiatric:         Behavior: Behavior normal.         Significant Labs:  None    Significant Diagnostics:  CXR: I have reviewed all pertinent results/findings within the past 24 hours    VTE Risk Mitigation (From admission, onward)         Ordered     heparin (porcine) injection 5,000 Units  Every 8 hours      11/19/20 1608     Place sequential compression device  Until discontinued      11/19/20 1255     Place WADE hose  Until discontinued      11/19/20 1255

## 2020-11-20 NOTE — TREATMENT PLAN
Thoracic Surgery     Patient cannot use CPAP until 11/23/20. Please do not fault her for noncompliance. She recently had lung surgery and cannot have positive pressure.     Chelsey Irving PA-C  Thoracic Surgery  [phone]

## 2020-11-20 NOTE — HOSPITAL COURSE
11/19/20: left robotic assisted lingulectomy, CLD, POo pain meds. Chest tube to water seal.   11/20/20: chest tube removed. Pain controlled. Ambulating. Voiding. Stable for discharge.

## 2020-11-23 ENCOUNTER — PATIENT OUTREACH (OUTPATIENT)
Dept: ADMINISTRATIVE | Facility: CLINIC | Age: 70
End: 2020-11-23

## 2020-11-23 LAB
BLD PROD TYP BPU: NORMAL
BLD PROD TYP BPU: NORMAL
BLOOD UNIT EXPIRATION DATE: NORMAL
BLOOD UNIT EXPIRATION DATE: NORMAL
BLOOD UNIT TYPE CODE: 5100
BLOOD UNIT TYPE CODE: 5100
BLOOD UNIT TYPE: NORMAL
BLOOD UNIT TYPE: NORMAL
CODING SYSTEM: NORMAL
CODING SYSTEM: NORMAL
DISPENSE STATUS: NORMAL
DISPENSE STATUS: NORMAL
NUM UNITS TRANS PACKED RBC: NORMAL
NUM UNITS TRANS PACKED RBC: NORMAL

## 2020-11-23 NOTE — PATIENT INSTRUCTIONS
Discharge Instructions for Lobectomy of the Lung  You had a surgical procedure called an open lung or pulmonary lobectomy. This is the removal of a section (lobe) of one of your lungs. The right lung has 3 lobes and the left lung has 2 lobes. An open procedure involves a large incision into the chest. It may involve the:  · Chest wall  · Muscles  · Ribs  · Membranes covering the lungs (pleura)  · Airways (bronchi)  · Lobes of the lungs  There are different reasons for having this surgery. The most common reason is lung cancer. It's possible to live without having all the lobes of your lungs, but your remaining lungs, your heart, and your whole body must adjust to this change.  Incision care  · Make sure you and your caregiver follow all instructions for caring for your surgical site.  · You may have a home health nurse help you change your dressing or bandage and help with your other needs. He or she will change the dressing and report any problems as instructed by your healthcare provider.  Activity  · You will notice that you get tired more easily. This is normal because your lungs and your heart have to work harder. Rest when you are tired.  · You may have been instructed to do exercises to increase the strength and movement in your arms and shoulders. These exercises are important with any surgery of the chest. Be sure to do the exercises as instructed.  · You may also have home physical and occupational therapy. The therapists help you with daily activities, movement, and exercise. Be sure to do these as instructed.  · Don't lift anything that is heavy.  · Avoid sitting with your legs down or crossed for long periods of time.  · When lying down, use a few pillows to support your knees and lower legs.  Other home care  · You will continue to take medicines to help lessen the pain. Make sure you know when you are supposed to take these medicines.  · While you are healing and taking pain medicines, do not  drive.  · Keep in mind that pain medicines often cause constipation.  ¨ Use laxatives, stool softeners, or enemas as directed by your healthcare provider.  ¨ Drink water throughout the day, unless instructed to limit fluids.  ¨ Eat high-fiber foods, like whole grains, and fruits and vegetables.  · Use your incentive spirometer as instructed.  · Return to your diet as you feel able. Eat a healthy, well-balanced diet.  · Having a healthy diet helps you heal. Make sure you have lean meats, low- or no-fat dairy products, whole grains, and fruits and vegetables.  · Do not smoke and stay away from people who do. If you do smoke, talk to your healthcare provider about ways to quit.  Follow-up care  Follow up with your healthcare provider, or as advised. And call your healthcare provider if you have any concerns before your appointment.  When to seek medical care  Call your healthcare provider right away if any of these occur:  · Fever  · Increased coughing or coughing up brown or bloody mucus  · Increased redness, swelling, or pain near your incision, or drainage from your incision  · Nausea or vomiting  · Increased pain  · Shortness of breath  · Swelling in one or both legs  · Feeling like your heart is beating too fast (palpitations)   Date Last Reviewed: 2/1/2017  © 0714-0651 [employer]. [address] [place] [address], [place] [address]. All rights reserved. This information is not intended as a substitute for professional medical care. Always follow your healthcare professional's instructions.

## 2020-12-03 NOTE — PROGRESS NOTES
"Subjective:       Patient ID: Lucia Barlow is a 70 y.o. female.    Chief Complaint: Post-op Evaluation    Diagnosis:  NSCLC    Pre-operative therapy: none    Procedure(s) and date(s): 11/19/20: left robotic assisted lingulectomy with MLND     Pathology: Preliminary Report- 1.5 x 1.5 x 1.5cm NSCLC. Margin negative. Lymph nodes negative. pT1b N0     Post-operative therapy: Surveillance     HPI   70 female here today for 2 week follow-up from robotic assisted lingulectomy. Uncomplicated post operative course. Since discharge she has been recovering well. Minimal use of narcotics initially but over the last week she has been in more pain. Describes pain at posterior port site and burning pain that wraps anteriorly. Productive cough has resolved. Appetite and activity level slowly increasing. Denies fever, chills, SOB, CP, palpitations or changes in bowel and bladder functioning.     Review of Systems   Constitutional: Positive for appetite change. Negative for diaphoresis, fatigue and fever.   HENT: Negative for trouble swallowing and voice change.    Respiratory: Negative for chest tightness, shortness of breath and wheezing.    Cardiovascular: Negative for chest pain, palpitations and leg swelling.   Gastrointestinal: Negative for abdominal distention, diarrhea, nausea and vomiting.   Genitourinary: Negative for difficulty urinating.   Musculoskeletal: Negative for arthralgias and myalgias.   Neurological: Negative for weakness, light-headedness and headaches.   Psychiatric/Behavioral: Negative for confusion.         Objective:       Vitals:    12/04/20 0939   BP: 127/67   SpO2: 97%   Weight: 65.3 kg (143 lb 15.4 oz)   Height: 5' 2" (1.575 m)   PainSc: 0-No pain   Body mass index is 26.33 kg/m².    Physical Exam  Constitutional:       Appearance: Normal appearance.   Cardiovascular:      Rate and Rhythm: Normal rate and regular rhythm.      Pulses: Normal pulses.   Pulmonary:      Effort: Pulmonary effort is " normal.      Breath sounds: Normal breath sounds. No wheezing.      Comments: Posterior port site still healing with healthy granulomatous tissue. No erythema or drainage.   Abdominal:      General: Abdomen is flat.      Palpations: Abdomen is soft.      Tenderness: There is no abdominal tenderness.   Neurological:      General: No focal deficit present.      Mental Status: She is oriented to person, place, and time.           CXR 12/4/20:   Interpretation: Removal of left chest tube, clearing of perihilar mid lower lung zone and right lower lung zone infiltrates.  Residual scarring postoperative change left midlung zone anterior segment remaining left upper lobe.  Flattening of hemidiaphragms with increased AP diameter chest.  COPD.  Mild elevation left hemidiaphragm     Assessment:       70 female here today for 2 week follow-up from robotic assisted lingulectomy for a pT1b N0 NSCLC      Plan:       Recommend starting Gabapentin HS. Educated on increasing to tid as tolerated.   Will call with final pathology report. If Stage IA2 NSCLC confirmed, return to clinic in 6 months with repeat non contrast chest CT.

## 2020-12-04 ENCOUNTER — OFFICE VISIT (OUTPATIENT)
Dept: CARDIOTHORACIC SURGERY | Facility: CLINIC | Age: 70
End: 2020-12-04
Payer: MEDICARE

## 2020-12-04 ENCOUNTER — LAB VISIT (OUTPATIENT)
Dept: LAB | Facility: HOSPITAL | Age: 70
End: 2020-12-04
Attending: FAMILY MEDICINE
Payer: MEDICARE

## 2020-12-04 ENCOUNTER — HOSPITAL ENCOUNTER (OUTPATIENT)
Dept: RADIOLOGY | Facility: HOSPITAL | Age: 70
Discharge: HOME | End: 2020-12-04
Payer: MEDICARE

## 2020-12-04 VITALS
WEIGHT: 143.94 LBS | SYSTOLIC BLOOD PRESSURE: 127 MMHG | HEIGHT: 62 IN | DIASTOLIC BLOOD PRESSURE: 67 MMHG | BODY MASS INDEX: 26.49 KG/M2 | OXYGEN SATURATION: 97 %

## 2020-12-04 DIAGNOSIS — E78.5 DYSLIPIDEMIA ASSOCIATED WITH TYPE 2 DIABETES MELLITUS: Chronic | ICD-10-CM

## 2020-12-04 DIAGNOSIS — C34.12 MALIGNANT NEOPLASM OF UPPER LOBE OF LEFT LUNG: Primary | ICD-10-CM

## 2020-12-04 DIAGNOSIS — E11.22 TYPE 2 DIABETES MELLITUS WITH STAGE 3 CHRONIC KIDNEY DISEASE, WITHOUT LONG-TERM CURRENT USE OF INSULIN: Chronic | ICD-10-CM

## 2020-12-04 DIAGNOSIS — C34.92 NSCLC OF LEFT LUNG: ICD-10-CM

## 2020-12-04 DIAGNOSIS — N18.30 TYPE 2 DIABETES MELLITUS WITH STAGE 3 CHRONIC KIDNEY DISEASE, WITHOUT LONG-TERM CURRENT USE OF INSULIN: Chronic | ICD-10-CM

## 2020-12-04 DIAGNOSIS — I10 ESSENTIAL HYPERTENSION: Chronic | ICD-10-CM

## 2020-12-04 DIAGNOSIS — E11.69 DYSLIPIDEMIA ASSOCIATED WITH TYPE 2 DIABETES MELLITUS: Chronic | ICD-10-CM

## 2020-12-04 LAB
CHOLEST SERPL-MCNC: 148 MG/DL (ref 120–199)
CHOLEST/HDLC SERPL: 2.2 {RATIO} (ref 2–5)
HDLC SERPL-MCNC: 67 MG/DL (ref 40–75)
HDLC SERPL: 45.3 % (ref 20–50)
LDLC SERPL CALC-MCNC: 72 MG/DL (ref 63–159)
NONHDLC SERPL-MCNC: 81 MG/DL
TRIGL SERPL-MCNC: 45 MG/DL (ref 30–150)
TSH SERPL DL<=0.005 MIU/L-ACNC: 0.4 UIU/ML (ref 0.4–4)

## 2020-12-04 PROCEDURE — 36415 COLL VENOUS BLD VENIPUNCTURE: CPT | Mod: HCNC

## 2020-12-04 PROCEDURE — 3008F BODY MASS INDEX DOCD: CPT | Mod: HCNC,CPTII,S$GLB, | Performed by: THORACIC SURGERY (CARDIOTHORACIC VASCULAR SURGERY)

## 2020-12-04 PROCEDURE — 3288F PR FALLS RISK ASSESSMENT DOCUMENTED: ICD-10-PCS | Mod: HCNC,CPTII,S$GLB, | Performed by: THORACIC SURGERY (CARDIOTHORACIC VASCULAR SURGERY)

## 2020-12-04 PROCEDURE — 99499 UNLISTED E&M SERVICE: CPT | Mod: S$GLB,,, | Performed by: PHYSICIAN ASSISTANT

## 2020-12-04 PROCEDURE — 3072F LOW RISK FOR RETINOPATHY: CPT | Mod: HCNC,S$GLB,, | Performed by: THORACIC SURGERY (CARDIOTHORACIC VASCULAR SURGERY)

## 2020-12-04 PROCEDURE — 71046 X-RAY EXAM CHEST 2 VIEWS: CPT | Mod: 26,HCNC,, | Performed by: RADIOLOGY

## 2020-12-04 PROCEDURE — 80061 LIPID PANEL: CPT | Mod: HCNC

## 2020-12-04 PROCEDURE — 83036 HEMOGLOBIN GLYCOSYLATED A1C: CPT | Mod: HCNC

## 2020-12-04 PROCEDURE — 99999 PR PBB SHADOW E&M-EST. PATIENT-LVL V: CPT | Mod: PBBFAC,HCNC,, | Performed by: THORACIC SURGERY (CARDIOTHORACIC VASCULAR SURGERY)

## 2020-12-04 PROCEDURE — 99024 PR POST-OP FOLLOW-UP VISIT: ICD-10-PCS | Mod: HCNC,S$GLB,, | Performed by: THORACIC SURGERY (CARDIOTHORACIC VASCULAR SURGERY)

## 2020-12-04 PROCEDURE — 71046 X-RAY EXAM CHEST 2 VIEWS: CPT | Mod: TC,HCNC,FY

## 2020-12-04 PROCEDURE — 84443 ASSAY THYROID STIM HORMONE: CPT | Mod: HCNC

## 2020-12-04 PROCEDURE — 99024 POSTOP FOLLOW-UP VISIT: CPT | Mod: HCNC,S$GLB,, | Performed by: THORACIC SURGERY (CARDIOTHORACIC VASCULAR SURGERY)

## 2020-12-04 PROCEDURE — 1126F AMNT PAIN NOTED NONE PRSNT: CPT | Mod: HCNC,S$GLB,, | Performed by: THORACIC SURGERY (CARDIOTHORACIC VASCULAR SURGERY)

## 2020-12-04 PROCEDURE — 99499 RISK ADDL DX/OHS AUDIT: ICD-10-PCS | Mod: S$GLB,,, | Performed by: PHYSICIAN ASSISTANT

## 2020-12-04 PROCEDURE — 71046 XR CHEST PA AND LATERAL: ICD-10-PCS | Mod: 26,HCNC,, | Performed by: RADIOLOGY

## 2020-12-04 PROCEDURE — 1126F PR PAIN SEVERITY QUANTIFIED, NO PAIN PRESENT: ICD-10-PCS | Mod: HCNC,S$GLB,, | Performed by: THORACIC SURGERY (CARDIOTHORACIC VASCULAR SURGERY)

## 2020-12-04 PROCEDURE — 1101F PT FALLS ASSESS-DOCD LE1/YR: CPT | Mod: HCNC,CPTII,S$GLB, | Performed by: THORACIC SURGERY (CARDIOTHORACIC VASCULAR SURGERY)

## 2020-12-04 PROCEDURE — 3008F PR BODY MASS INDEX (BMI) DOCUMENTED: ICD-10-PCS | Mod: HCNC,CPTII,S$GLB, | Performed by: THORACIC SURGERY (CARDIOTHORACIC VASCULAR SURGERY)

## 2020-12-04 PROCEDURE — 1101F PR PT FALLS ASSESS DOC 0-1 FALLS W/OUT INJ PAST YR: ICD-10-PCS | Mod: HCNC,CPTII,S$GLB, | Performed by: THORACIC SURGERY (CARDIOTHORACIC VASCULAR SURGERY)

## 2020-12-04 PROCEDURE — 3288F FALL RISK ASSESSMENT DOCD: CPT | Mod: HCNC,CPTII,S$GLB, | Performed by: THORACIC SURGERY (CARDIOTHORACIC VASCULAR SURGERY)

## 2020-12-04 PROCEDURE — 3072F PR LOW RISK FOR RETINOPATHY: ICD-10-PCS | Mod: HCNC,S$GLB,, | Performed by: THORACIC SURGERY (CARDIOTHORACIC VASCULAR SURGERY)

## 2020-12-04 PROCEDURE — 99999 PR PBB SHADOW E&M-EST. PATIENT-LVL V: ICD-10-PCS | Mod: PBBFAC,HCNC,, | Performed by: THORACIC SURGERY (CARDIOTHORACIC VASCULAR SURGERY)

## 2020-12-04 RX ORDER — OXYCODONE HYDROCHLORIDE 5 MG/1
5 TABLET ORAL EVERY 4 HOURS PRN
Qty: 21 TABLET | Refills: 0 | Status: SHIPPED | OUTPATIENT
Start: 2020-12-04 | End: 2020-12-18 | Stop reason: SDUPTHER

## 2020-12-04 RX ORDER — GABAPENTIN 400 MG/1
400 CAPSULE ORAL 3 TIMES DAILY
Qty: 90 CAPSULE | Refills: 11 | Status: SHIPPED | OUTPATIENT
Start: 2020-12-04 | End: 2021-12-01

## 2020-12-05 LAB
ESTIMATED AVG GLUCOSE: 137 MG/DL (ref 68–131)
HBA1C MFR BLD HPLC: 6.4 % (ref 4–5.6)

## 2020-12-07 ENCOUNTER — PATIENT MESSAGE (OUTPATIENT)
Dept: ADMINISTRATIVE | Facility: OTHER | Age: 70
End: 2020-12-07

## 2020-12-09 ENCOUNTER — OFFICE VISIT (OUTPATIENT)
Dept: PULMONOLOGY | Facility: CLINIC | Age: 70
End: 2020-12-09
Payer: MEDICARE

## 2020-12-09 VITALS
DIASTOLIC BLOOD PRESSURE: 74 MMHG | WEIGHT: 147.69 LBS | SYSTOLIC BLOOD PRESSURE: 140 MMHG | BODY MASS INDEX: 27.18 KG/M2 | HEART RATE: 110 BPM | HEIGHT: 62 IN | RESPIRATION RATE: 18 BRPM | OXYGEN SATURATION: 96 %

## 2020-12-09 DIAGNOSIS — J44.9 CHRONIC OBSTRUCTIVE PULMONARY DISEASE, UNSPECIFIED COPD TYPE: Primary | ICD-10-CM

## 2020-12-09 DIAGNOSIS — J30.1 SEASONAL ALLERGIC RHINITIS DUE TO POLLEN: ICD-10-CM

## 2020-12-09 DIAGNOSIS — R09.02 EXERCISE HYPOXEMIA: ICD-10-CM

## 2020-12-09 PROCEDURE — 3077F SYST BP >= 140 MM HG: CPT | Mod: HCNC,CPTII,S$GLB, | Performed by: INTERNAL MEDICINE

## 2020-12-09 PROCEDURE — 3288F PR FALLS RISK ASSESSMENT DOCUMENTED: ICD-10-PCS | Mod: HCNC,CPTII,S$GLB, | Performed by: INTERNAL MEDICINE

## 2020-12-09 PROCEDURE — 3078F PR MOST RECENT DIASTOLIC BLOOD PRESSURE < 80 MM HG: ICD-10-PCS | Mod: HCNC,CPTII,S$GLB, | Performed by: INTERNAL MEDICINE

## 2020-12-09 PROCEDURE — 3072F PR LOW RISK FOR RETINOPATHY: ICD-10-PCS | Mod: HCNC,S$GLB,, | Performed by: INTERNAL MEDICINE

## 2020-12-09 PROCEDURE — 99499 UNLISTED E&M SERVICE: CPT | Mod: S$GLB,,, | Performed by: INTERNAL MEDICINE

## 2020-12-09 PROCEDURE — 99499 RISK ADDL DX/OHS AUDIT: ICD-10-PCS | Mod: S$GLB,,, | Performed by: INTERNAL MEDICINE

## 2020-12-09 PROCEDURE — 3288F FALL RISK ASSESSMENT DOCD: CPT | Mod: HCNC,CPTII,S$GLB, | Performed by: INTERNAL MEDICINE

## 2020-12-09 PROCEDURE — 99214 PR OFFICE/OUTPT VISIT, EST, LEVL IV, 30-39 MIN: ICD-10-PCS | Mod: HCNC,S$GLB,, | Performed by: INTERNAL MEDICINE

## 2020-12-09 PROCEDURE — 3008F PR BODY MASS INDEX (BMI) DOCUMENTED: ICD-10-PCS | Mod: HCNC,CPTII,S$GLB, | Performed by: INTERNAL MEDICINE

## 2020-12-09 PROCEDURE — 3077F PR MOST RECENT SYSTOLIC BLOOD PRESSURE >= 140 MM HG: ICD-10-PCS | Mod: HCNC,CPTII,S$GLB, | Performed by: INTERNAL MEDICINE

## 2020-12-09 PROCEDURE — 1159F MED LIST DOCD IN RCRD: CPT | Mod: HCNC,S$GLB,, | Performed by: INTERNAL MEDICINE

## 2020-12-09 PROCEDURE — 99999 PR PBB SHADOW E&M-EST. PATIENT-LVL III: ICD-10-PCS | Mod: PBBFAC,HCNC,, | Performed by: INTERNAL MEDICINE

## 2020-12-09 PROCEDURE — 3008F BODY MASS INDEX DOCD: CPT | Mod: HCNC,CPTII,S$GLB, | Performed by: INTERNAL MEDICINE

## 2020-12-09 PROCEDURE — 1101F PR PT FALLS ASSESS DOC 0-1 FALLS W/OUT INJ PAST YR: ICD-10-PCS | Mod: HCNC,CPTII,S$GLB, | Performed by: INTERNAL MEDICINE

## 2020-12-09 PROCEDURE — 1101F PT FALLS ASSESS-DOCD LE1/YR: CPT | Mod: HCNC,CPTII,S$GLB, | Performed by: INTERNAL MEDICINE

## 2020-12-09 PROCEDURE — 3072F LOW RISK FOR RETINOPATHY: CPT | Mod: HCNC,S$GLB,, | Performed by: INTERNAL MEDICINE

## 2020-12-09 PROCEDURE — 99999 PR PBB SHADOW E&M-EST. PATIENT-LVL III: CPT | Mod: PBBFAC,HCNC,, | Performed by: INTERNAL MEDICINE

## 2020-12-09 PROCEDURE — 1159F PR MEDICATION LIST DOCUMENTED IN MEDICAL RECORD: ICD-10-PCS | Mod: HCNC,S$GLB,, | Performed by: INTERNAL MEDICINE

## 2020-12-09 PROCEDURE — 99214 OFFICE O/P EST MOD 30 MIN: CPT | Mod: HCNC,S$GLB,, | Performed by: INTERNAL MEDICINE

## 2020-12-09 PROCEDURE — 3078F DIAST BP <80 MM HG: CPT | Mod: HCNC,CPTII,S$GLB, | Performed by: INTERNAL MEDICINE

## 2020-12-09 RX ORDER — CETIRIZINE HYDROCHLORIDE 10 MG/1
10 TABLET ORAL DAILY
Qty: 30 TABLET | Refills: 11 | Status: SHIPPED | OUTPATIENT
Start: 2020-12-09 | End: 2021-03-10 | Stop reason: SDUPTHER

## 2020-12-09 RX ORDER — FLUTICASONE PROPIONATE 50 MCG
2 SPRAY, SUSPENSION (ML) NASAL DAILY
Qty: 48 G | Refills: 4 | Status: SHIPPED | OUTPATIENT
Start: 2020-12-09 | End: 2021-06-11 | Stop reason: SDUPTHER

## 2020-12-09 RX ORDER — MONTELUKAST SODIUM 10 MG/1
10 TABLET ORAL NIGHTLY
Qty: 90 TABLET | Refills: 3 | Status: SHIPPED | OUTPATIENT
Start: 2020-12-09 | End: 2021-03-04

## 2020-12-09 NOTE — PROGRESS NOTES
Subjective:       Patient ID: Lucia Barlow is a 70 y.o. female.    Chief Complaint: She  Seen in follow up following surgery.       She   presents for evaluation and treatment of lung cancer after being discharged from the hospital  3  weeks ago. Since discharge symptoms have gradually improving course since that time. Patient denies fever or chills. Symptoms are aggravated by coughing . Symptoms improve with rest.  Respiratory: positive for dyspnea on exertion; Cardiovascular: positive for chest pain.  Patient currently is not on home oxygen therapy..    MEDICAL RECORDS FROM THE HOSPITAL REVIEWED:    Sinus Pain  Patient complains of congestion, cough, itchy eyes, nasal congestion, post nasal drip, puffiness of the eyes and sinus pressure. Onset of symptoms was 1 month ago. Symptoms have been gradually worsening since that time. She is drinking plenty of fluids.  Past history is significant for COPD. Patient is former smoker, quit 3 years ago.    Lung nodule    COPD  Associated symptoms include congestion, coughing and fatigue. Pertinent negatives include no abdominal pain, chest pain, fever, nausea, rash or weakness.        COPD  She presents for evaluation and treatment of COPD. The patient is currently having symptoms / an exacerbation. Current symptoms include chronic dyspnea, cough productive of white sputum in small amounts and wheezing. Symptoms have been present since several months ago and have been unchanged. She denies chills and fever. Associated symptoms include fatigue and shortness of breath.  This episode appears to have been triggered by no identifiable factor. Treatments tried for the current exacerbation: none. The patient has been having similar episodes for approximately 1 years. She uses 1 pillows at night. Patient currently is not on home oxygen therapy.. The patient is having no constitutional symptoms, denying fever, chills, anorexia, or weight loss. The patient has not been hospitalized  for this condition before. She quit smoking approximately 1 year ago. The patient is experiencing exercise intolerance (difficulty walking 3 blocks on flat ground and difficulty climbing 1 flights of stairs).          Past Medical History:     Past Medical History:   Diagnosis Date    Atherosclerosis of artery of both lower extremities 1/17/2014    Atherosclerosis of native coronary artery of native heart without angina pectoris 11/18/2016    Atherosclerotic PVD with intermittent claudication 1/17/2014    Chronic bronchitis     COPD (chronic obstructive pulmonary disease)     Dyslipidemia associated with type 2 diabetes mellitus 6/14/2013    Dyslipidemia associated with type 2 diabetes mellitus     Ex-smoker     Gastroesophageal reflux disease without esophagitis 1/25/2019    Hyperlipidemia     Hypertension     NSCLC of left lung 11/19/2020    Osteoporosis 1/16 rosita 1/18    PVD (peripheral vascular disease)     S/P peripheral artery angioplasty 2/7/2014    Seasonal allergic rhinitis due to pollen     Simple chronic bronchitis     Tobacco dependence     Type 2 diabetes mellitus with microalbuminuria, without long-term current use of insulin 3/29/2019    Type 2 diabetes mellitus with peripheral vascular disease     Type 2 diabetes mellitus with stage 3 chronic kidney disease, without long-term current use of insulin 2/1/2019    Type 2 diabetes mellitus without retinopathy 2/1/2019    Urinary incontinence      Past Surgical History:   Procedure Laterality Date    ANGIOPLASTY Bilateral 01/24/2014    aortoiliac stenting     CARPAL TUNNEL RELEASE  2003    [facility]    COLECTOMY  approximate 2005    pt states 1in colon -dx with benign mass removed-states no colon cancer    COLONOSCOPY N/A 11/9/2016    Procedure: COLONOSCOPY;  Surgeon: Dmitri Sterling MD;  Location: [facility];  Service: Endoscopy;  Laterality: N/A;    COLONOSCOPY N/A 11/15/2019    Procedure: COLONOSCOPY;  Surgeon: Marko STARKS  MD Cinthia;  Location: [facility];  Service: Endoscopy;  Laterality: N/A;    HYSTERECTOMY      no cancer    INJECTION OF ANESTHETIC AGENT AROUND MULTIPLE INTERCOSTAL NERVES Left 2020    Procedure: BLOCK, NERVE, INTERCOSTAL, 2 OR MORE;  Surgeon: Amirt Flores MD;  Location: [address];  Service: Thoracic;  Laterality: Left;    OOPHORECTOMY      SURGICAL REMOVAL OF LYMPH NODE Left 2020    Procedure: EXCISION, LYMPH NODE;  Surgeon: Amrit Flores MD;  Location: [address];  Service: Thoracic;  Laterality: Left;  mediastinal lymph node disection    XI ROBOTIC RATS,WITH LOBECTOMY,LUNG Left 2020    Procedure: XI ROBOTIC RATS,WITH LOBECTOMY,LUNG;  Surgeon: Amrit Flores MD;  Location: [address];  Service: Thoracic;  Laterality: Left;  Lingulectomy.     Social History     Socioeconomic History    Marital status:      Spouse name: Not on file    Number of children: 4    Years of education: Not on file    Highest education level: Not on file   Occupational History    Occupation:    Social Needs    Financial resource strain: Not very hard    Food insecurity     Worry: Never true     Inability: Never true    Transportation needs     Medical: No     Non-medical: No   Tobacco Use    Smoking status: Former Smoker     Packs/day: 1.00     Years: 60.00     Pack years: 60.00     Types: Cigarettes     Start date: 1960     Quit date: 1/10/2020     Years since quittin.9    Smokeless tobacco: Never Used   Substance and Sexual Activity    Alcohol use: No     Alcohol/week: 0.0 standard drinks     Frequency: Never     Drinks per session: Patient refused     Binge frequency: Never    Drug use: No    Sexual activity: Never   Lifestyle    Physical activity     Days per week: 2 days     Minutes per session: 10 min    Stress: Only a little   Relationships    Social connections     Talks on phone: More than three times a week     Gets together: Never      Attends [employer] service: More than 4 times per year     Active member of club or organization: Yes     Attends meetings of clubs or organizations: More than 4 times per year     Relationship status:    Other Topics Concern    Not on file   Social History Narrative    Son smoker, no pets in household.     Review of Systems   Constitutional: Positive for fatigue. Negative for fever.   HENT: Positive for postnasal drip, rhinorrhea and congestion.    Eyes: Negative for redness and itching.   Respiratory: Positive for cough, sputum production, shortness of breath, dyspnea on extertion, use of rescue inhaler and Paroxysmal Nocturnal Dyspnea.    Cardiovascular: Negative for chest pain, palpitations and leg swelling.   Genitourinary: Negative for difficulty urinating and hematuria.   Endocrine: Negative for cold intolerance and heat intolerance.    Skin: Negative for rash.   Gastrointestinal: Negative for nausea and abdominal pain.   Neurological: Negative for dizziness, syncope, weakness and light-headedness.   Hematological: Negative for adenopathy. Does not bruise/bleed easily.   Psychiatric/Behavioral: Negative for sleep disturbance. The patient is not nervous/anxious.        Objective:      Physical Exam  Vitals signs and nursing note reviewed.   Constitutional:       Appearance: She is well-developed.   HENT:      Head: Normocephalic and atraumatic.      Mouth/Throat:      Pharynx: Oropharyngeal exudate present.   Eyes:      Conjunctiva/sclera: Conjunctivae normal.      Pupils: Pupils are equal, round, and reactive to light.   Neck:      Musculoskeletal: Neck supple.      Thyroid: No thyromegaly.      Vascular: No JVD.      Trachea: No tracheal deviation.   Cardiovascular:      Rate and Rhythm: Normal rate and regular rhythm.      Heart sounds: Normal heart sounds.   Pulmonary:      Effort: Pulmonary effort is normal. No respiratory distress.      Breath sounds: Examination of the right-lower field  reveals wheezing. Examination of the left-lower field reveals wheezing. Decreased breath sounds and wheezing present. No rhonchi or rales.   Chest:      Chest wall: No tenderness.   Abdominal:      General: Bowel sounds are normal.      Palpations: Abdomen is soft.   Musculoskeletal: Normal range of motion.   Lymphadenopathy:      Cervical: No cervical adenopathy.   Skin:     General: Skin is warm and dry.   Neurological:      Mental Status: She is alert and oriented to person, place, and time.       Personal Diagnostic Review  Chest x-ray: hyperinflation  X-Ray Chest PA And Lateral  Narrative: EXAMINATION:  XR CHEST PA AND LATERAL    CLINICAL HISTORY:  s/p lingulectomy; Malignant neoplasm of unspecified part of left bronchus or lung    TECHNIQUE:  PA and lateral views of the chest were performed.    COMPARISON:  11/20/2020    Interpretation: Removal of left chest tube, clearing of perihilar mid lower lung zone and right lower lung zone infiltrates.  Residual scarring postoperative change left midlung zone anterior segment remaining left upper lobe.  Flattening of hemidiaphragms with increased AP diameter chest.  COPD.  Mild elevation left hemidiaphragm    FINDINGS:  See above  Impression: Residual scarring chronic change remaining left upper lobe status post lingular ectomy 4 history of malignant neoplasm.    Electronically signed by: Loyd Oliveira MD  Date:    12/04/2020  Time:    09:34      Office Spirometry Results:     No flowsheet data found.  Pulmonary Studies Review 12/9/2020   SpO2 96   Ordering Provider -   Interpreting Provider -   Performing nurse/tech/RT -   Diagnosis -   Height 62   Weight 2363.33   BMI (Calculated) 27   Predicted Distance 289.76   Patient Race -   6MWT Status -   Patient Reported -   Was O2 used? -   6MW Distance walked (feet) -   Distance walked (meters) -   Did patient stop? -   Type of assistive device(s) used? -   Is extra documentation required for this patient? -   Oxygen  Saturation -   Supplemental Oxygen -   Heart Rate -   Blood Pressure -   Dom Dyspnea Rating  -   Oxygen Saturation -   Supplemental Oxygen -   Heart Rate -   Blood Pressure -   Dom Dyspnea Rating  -   Recovery Time (seconds) -   Oxygen Saturation -   Supplemental Oxygen -   Heart Rate -   Blood Pressure -   Dom Dyspnea Rating  -   Is procedure ready for interpretation? -   Did the patient stop or pause? -   Total Time Walked (Calculated) -   Total Laps Walked -   Final Partial Lap Distance (feet) -   Total Distance Feet (Calculated) -   Total Distance Meters (Calculated) -   Predicted Distance Meters (Calculated) 429.74   Percentage of Predicted (Calculated) -   Peak VO2 (Calculated) -   Mets -   Has The Patient Had a Previous Six Minute Walk Test? -   Oxygen Qualification? -         Assessment:       1. Chronic obstructive pulmonary disease, unspecified COPD type    2. Seasonal allergic rhinitis due to pollen    3. Exercise hypoxemia                 Outpatient Encounter Medications as of 12/9/2020   Medication Sig Dispense Refill    amlodipine-benazepril 5-20 mg (LOTREL) 5-20 mg per capsule Take 1 capsule by mouth once daily. 90 capsule 4    blood sugar diagnostic Strp 1 each by Misc.(Non-Drug; Combo Route) route 3 (three) times daily. [facility] test strips 100 each 3    blood-glucose meter kit Insurance preferred 1 each 0    butalbital-acetaminophen-caffeine -40 mg (FIORICET, ESGIC) -40 mg per tablet Take 1 tablet by mouth 3 (three) times daily as needed for Headaches. (MAXIMUM OF 15 TABLET PER MONTH) 30 tablet 3    calcium-vitamin D (CALCIUM WITH VITAMIN D) 600 mg(1,500mg) -400 unit Tab Take 2 tablets by mouth once daily. 180 tablet 4    chlorthalidone (HYGROTEN) 25 MG Tab TAKE 1 TABLET EVERY DAY 90 tablet 2    clonazePAM (KLONOPIN) 0.25 MG TbDL Take 1 tablet (0.25 mg total) by mouth nightly. 30 tablet 5    ezetimibe (ZETIA) 10 mg tablet Take 1 tablet (10 mg total) by mouth once daily. 90  tablet 4    fluticasone propionate (FLONASE) 50 mcg/actuation nasal spray 2 sprays (100 mcg total) by Each Nostril route once daily. 48 g 4    gabapentin (NEURONTIN) 400 MG capsule Take 1 capsule (400 mg total) by mouth 3 (three) times daily. 90 capsule 11    glimepiride (AMARYL) 2 MG tablet Take 1 tablet (2 mg total) by mouth before breakfast. 90 tablet 4    ipratropium-albuteroL (COMBIVENT)  mcg/actuation inhaler Inhale 2 puffs into the lungs every 4 (four) hours as needed for Wheezing or Shortness of Breath. Rescue 12 g 3    lancets 32 gauge Misc 1 lancet by Misc.(Non-Drug; Combo Route) route 3 (three) times daily. Insurance preferred 100 each 11    montelukast (SINGULAIR) 10 mg tablet Take 1 tablet (10 mg total) by mouth every evening. 90 tablet 3    nicotine (NICODERM CQ) 21 mg/24 hr Place 1 patch onto the skin once daily. 28 patch 1    oxyCODONE (ROXICODONE) 5 MG immediate release tablet Take 1 tablet (5 mg total) by mouth every 4 (four) hours as needed for Pain. 21 tablet 0    pantoprazole (PROTONIX) 40 MG tablet TAKE 1 TABLET EVERY DAY 90 tablet 4    potassium 99 mg Tab Take 1 tablet by mouth once daily.      rosuvastatin (CRESTOR) 20 MG tablet Take 1 tablet (20 mg total) by mouth once daily. 90 tablet 4    tiZANidine (ZANAFLEX) 2 MG tablet Take 1-2 tabs twice a day as needed for muscle pain.  Will cause drowsiness 30 tablet 0    TRUEPLUS LANCETS 33 gauge Misc       varicella-zoster gE-AS01B, PF, (SHINGRIX) 50 mcg/0.5 mL injection Inject 0.5 mL (one dose) into muscle now; give second dose at least 2 months later 0.5 mL 1    [DISCONTINUED] fluticasone propionate (FLONASE) 50 mcg/actuation nasal spray 2 sprays (100 mcg total) by Each Nostril route once daily. 48 g 4    [DISCONTINUED] ipratropium-albuteroL (COMBIVENT)  mcg/actuation inhaler Inhale 2 puffs into the lungs every 4 (four) hours as needed for Wheezing or Shortness of Breath. Rescue 4 g 11    [DISCONTINUED] montelukast  (SINGULAIR) 10 mg tablet Take 10 mg by mouth every evening.       cetirizine (ZYRTEC) 10 MG tablet Take 1 tablet (10 mg total) by mouth once daily. For sinus congestion 30 tablet 11     No facility-administered encounter medications on file as of 2020.      Orders Placed This Encounter   Procedures    X-Ray Chest PA And Lateral     Standing Status:   Future     Standing Expiration Date:   2022     Order Specific Question:   Reason for Exam:     Answer:   SOB    Six Minute Walk Test to qualify for Home Oxygen     Standing Status:   Future     Standing Expiration Date:   2021     Plan:       Requested Prescriptions     Signed Prescriptions Disp Refills    cetirizine (ZYRTEC) 10 MG tablet 30 tablet 11     Sig: Take 1 tablet (10 mg total) by mouth once daily. For sinus congestion    fluticasone propionate (FLONASE) 50 mcg/actuation nasal spray 48 g 4     Si sprays (100 mcg total) by Each Nostril route once daily.    montelukast (SINGULAIR) 10 mg tablet 90 tablet 3     Sig: Take 1 tablet (10 mg total) by mouth every evening.    ipratropium-albuteroL (COMBIVENT)  mcg/actuation inhaler 12 g 3     Sig: Inhale 2 puffs into the lungs every 4 (four) hours as needed for Wheezing or Shortness of Breath. Rescue     Chronic obstructive pulmonary disease, unspecified COPD type  -     ipratropium-albuteroL (COMBIVENT)  mcg/actuation inhaler; Inhale 2 puffs into the lungs every 4 (four) hours as needed for Wheezing or Shortness of Breath. Rescue  Dispense: 12 g; Refill: 3  -     Six Minute Walk Test to qualify for Home Oxygen; Future  -     X-Ray Chest PA And Lateral; Future; Expected date: 2020    Seasonal allergic rhinitis due to pollen  -     cetirizine (ZYRTEC) 10 MG tablet; Take 1 tablet (10 mg total) by mouth once daily. For sinus congestion  Dispense: 30 tablet; Refill: 11  -     fluticasone propionate (FLONASE) 50 mcg/actuation nasal spray; 2 sprays (100 mcg total) by Each Nostril  route once daily.  Dispense: 48 g; Refill: 4  -     montelukast (SINGULAIR) 10 mg tablet; Take 1 tablet (10 mg total) by mouth every evening.  Dispense: 90 tablet; Refill: 3    Exercise hypoxemia  -     Six Minute Walk Test to qualify for Home Oxygen; Future           Follow up in about 6 months (around 6/9/2021).    MEDICAL DECISION MAKING: Moderate to high complexity.  Overall, the multiple problems listed are of moderate to high severity that may impact quality of life and activities of daily living. Side effects of medications, treatment plan as well as options and alternatives reviewed and discussed with patient. There was counseling of patient concerning these issues.    Total time spent in face to face counseling and coordination of care - 45  minutes over 50% of time was used in discussion of prognosis, risks, benefits of treatment, instructions and compliance with regimen . Discussion with other physicians or health care providers (DME, NP, pharmacy, respiratory therapy) occurred.

## 2020-12-10 NOTE — PROGRESS NOTES
"Digital Medicine: Health  Follow-Up    The history is provided by the patient.             Reason for review: Blood glucose not at goal and Blood pressure at goal        Topics Covered on Call: Diet    Additional Follow-up details: Recently had surgery, home recovering. Had surgery 8 weeks ago, reports her doctor informed her it will be about 8 weeks to recover. Patient reports she is well, no complaints. Denies symptoms of hypertension- HA, chest pains and SOB.     Patient denies further needs from the program today. Phone call brief due to limited patient engagement in conversation. Patient not interested in discussing health goals today due to recovering from surgery.             Diet-Change  No 24 hour dietary recall  Patient reports eating or drinking the following: Patient reports her daughter has been caring for her and cooking meals for her. Addressed BG reading of 421 mg/dL last week, patient reports she is "not sure what happened". Reports she has been eating "light"- sandwich, salads, soup. Reports she has been drinking water, milk, and orange juice. Declined to discuss further today.       Physical Activity-no change to routine  No change to exercise routine.       Additional physical activity details: Limited mobility as patient is recovering from surgery      Medication Adherence-Medication adherence was assessed.        Substance, Sleep, Stress-No change  stress-  Details:  Intervention(s):    Sleep-  Details:  Intervention(s):    Alcohol -  Details:  Intervention(s):    Tobacco-  Details:  Intervention(s):          Continue current diet/physical activity routine.  Provided patient education.       Addressed patient questions and patient has my contact information if needed prior to next outreach. Patient verbalizes understanding.      Explained the importance of self-monitoring and medication adherence. Encouraged the patient to communicate with their health  for lifestyle modifications to " help improve or maintain a healthy lifestyle.               There are no preventive care reminders to display for this patient.      Last 5 Patient Entered Readings                                      Current 30 Day Average: 130/64     Recent Readings 12/8/2020 12/8/2020 12/7/2020 12/3/2020 12/2/2020    SBP (mmHg) 122 117 126 122 136    DBP (mmHg) 53 56 70 62 73    Pulse 97 98 99 101 103        Last 6 Patient Entered Readings                                          Most Recent A1c: 6.4% on 12/4/2020  (Goal: 8%)     Recent Readings 12/8/2020 12/7/2020 12/3/2020 12/2/2020 11/27/2020    Blood Glucose (mg/dL) 226 139 113 421 136

## 2020-12-18 RX ORDER — METHOCARBAMOL 500 MG/1
500 TABLET, FILM COATED ORAL 2 TIMES DAILY PRN
Qty: 28 TABLET | Refills: 3 | Status: SHIPPED | OUTPATIENT
Start: 2020-12-18 | End: 2020-12-28

## 2020-12-18 RX ORDER — OXYCODONE HYDROCHLORIDE 5 MG/1
5 TABLET ORAL EVERY 4 HOURS PRN
Qty: 21 TABLET | Refills: 0 | Status: SHIPPED | OUTPATIENT
Start: 2020-12-18 | End: 2021-12-01

## 2020-12-23 DIAGNOSIS — J44.9 CHRONIC OBSTRUCTIVE PULMONARY DISEASE, UNSPECIFIED COPD TYPE: ICD-10-CM

## 2020-12-23 LAB
COMMENT: ABNORMAL
FINAL PATHOLOGIC DIAGNOSIS: ABNORMAL
GROSS: ABNORMAL
Lab: ABNORMAL
SUPPLEMENTAL DIAGNOSIS: ABNORMAL

## 2021-01-04 ENCOUNTER — PATIENT MESSAGE (OUTPATIENT)
Dept: PULMONOLOGY | Facility: CLINIC | Age: 71
End: 2021-01-04

## 2021-01-04 DIAGNOSIS — J44.9 CHRONIC OBSTRUCTIVE PULMONARY DISEASE, UNSPECIFIED COPD TYPE: ICD-10-CM

## 2021-01-04 NOTE — PROGRESS NOTES
"Hi, Lucia.    I'm happy to report that these results are fine and do not require a change in treatment.    Thanks for letting me care for you, thanks for trusting us with your healthcare needs, and thanks for using [facility].    Sincerely,    VICK Sandoval MD  --------------------------------------------------------------------------------   Want to learn more about your test results and what they mean?  (1) Log in to your [facility] account at [email or web address].  (2) From the "View test results" tab, click on the test you want to know more about.  (3) Click on the "About This Test" link."

## 2021-01-08 ENCOUNTER — PATIENT MESSAGE (OUTPATIENT)
Dept: CARDIOTHORACIC SURGERY | Facility: CLINIC | Age: 71
End: 2021-01-08

## 2021-01-11 ENCOUNTER — TELEPHONE (OUTPATIENT)
Dept: CARDIOTHORACIC SURGERY | Facility: HOSPITAL | Age: 71
End: 2021-01-11

## 2021-01-16 ENCOUNTER — IMMUNIZATION (OUTPATIENT)
Dept: INTERNAL MEDICINE | Facility: CLINIC | Age: 71
End: 2021-01-16
Payer: MEDICARE

## 2021-01-16 DIAGNOSIS — Z23 NEED FOR VACCINATION: Primary | ICD-10-CM

## 2021-01-16 PROCEDURE — 91300 COVID-19, MRNA, LNP-S, PF, 30 MCG/0.3 ML DOSE VACCINE: CPT | Mod: PBBFAC | Performed by: FAMILY MEDICINE

## 2021-01-25 ENCOUNTER — TELEPHONE (OUTPATIENT)
Dept: PULMONOLOGY | Facility: CLINIC | Age: 71
End: 2021-01-25

## 2021-02-01 ENCOUNTER — PES CALL (OUTPATIENT)
Dept: ADMINISTRATIVE | Facility: CLINIC | Age: 71
End: 2021-02-01

## 2021-02-03 ENCOUNTER — PATIENT MESSAGE (OUTPATIENT)
Dept: INTERNAL MEDICINE | Facility: CLINIC | Age: 71
End: 2021-02-03

## 2021-02-03 DIAGNOSIS — Z12.31 ENCOUNTER FOR SCREENING MAMMOGRAM FOR BREAST CANCER: Primary | ICD-10-CM

## 2021-02-04 ENCOUNTER — PATIENT MESSAGE (OUTPATIENT)
Dept: INTERNAL MEDICINE | Facility: CLINIC | Age: 71
End: 2021-02-04

## 2021-02-06 ENCOUNTER — IMMUNIZATION (OUTPATIENT)
Dept: INTERNAL MEDICINE | Facility: CLINIC | Age: 71
End: 2021-02-06
Payer: MEDICARE

## 2021-02-06 DIAGNOSIS — Z23 NEED FOR VACCINATION: Primary | ICD-10-CM

## 2021-02-06 PROCEDURE — 0002A COVID-19, MRNA, LNP-S, PF, 30 MCG/0.3 ML DOSE VACCINE: CPT | Mod: PBBFAC | Performed by: FAMILY MEDICINE

## 2021-02-06 PROCEDURE — 91300 COVID-19, MRNA, LNP-S, PF, 30 MCG/0.3 ML DOSE VACCINE: CPT | Mod: PBBFAC | Performed by: FAMILY MEDICINE

## 2021-02-11 PROBLEM — J95.811 PNEUMOTHORAX AFTER BIOPSY: Status: RESOLVED | Noted: 2020-10-21 | Resolved: 2021-02-11

## 2021-02-11 PROBLEM — G47.33 OSA (OBSTRUCTIVE SLEEP APNEA): Status: ACTIVE | Noted: 2021-02-11

## 2021-02-17 ENCOUNTER — LAB VISIT (OUTPATIENT)
Dept: LAB | Facility: HOSPITAL | Age: 71
End: 2021-02-17
Attending: INTERNAL MEDICINE
Payer: MEDICARE

## 2021-02-17 DIAGNOSIS — R80.9 TYPE 2 DIABETES MELLITUS WITH MICROALBUMINURIA, WITHOUT LONG-TERM CURRENT USE OF INSULIN: Chronic | ICD-10-CM

## 2021-02-17 DIAGNOSIS — E11.51 TYPE 2 DIABETES MELLITUS WITH PERIPHERAL VASCULAR DISEASE: Chronic | ICD-10-CM

## 2021-02-17 DIAGNOSIS — E11.29 TYPE 2 DIABETES MELLITUS WITH MICROALBUMINURIA, WITHOUT LONG-TERM CURRENT USE OF INSULIN: Chronic | ICD-10-CM

## 2021-02-17 DIAGNOSIS — I70.0 ATHEROSCLEROSIS OF AORTA: ICD-10-CM

## 2021-02-17 DIAGNOSIS — I70.203 ATHEROSCLEROSIS OF ARTERY OF BOTH LOWER EXTREMITIES: Chronic | ICD-10-CM

## 2021-02-17 LAB
ALBUMIN SERPL BCP-MCNC: 3.4 G/DL (ref 3.5–5.2)
ALP SERPL-CCNC: 98 U/L (ref 55–135)
ALT SERPL W/O P-5'-P-CCNC: 5 U/L (ref 10–44)
ANION GAP SERPL CALC-SCNC: 10 MMOL/L (ref 8–16)
AST SERPL-CCNC: 15 U/L (ref 10–40)
BILIRUB SERPL-MCNC: 0.3 MG/DL (ref 0.1–1)
BUN SERPL-MCNC: 18 MG/DL (ref 8–23)
CALCIUM SERPL-MCNC: 9.5 MG/DL (ref 8.7–10.5)
CHLORIDE SERPL-SCNC: 102 MMOL/L (ref 95–110)
CHOLEST SERPL-MCNC: 157 MG/DL (ref 120–199)
CHOLEST/HDLC SERPL: 2.6 {RATIO} (ref 2–5)
CO2 SERPL-SCNC: 27 MMOL/L (ref 23–29)
CREAT SERPL-MCNC: 1.2 MG/DL (ref 0.5–1.4)
EST. GFR  (AFRICAN AMERICAN): 51.8 ML/MIN/1.73 M^2
EST. GFR  (NON AFRICAN AMERICAN): 44.9 ML/MIN/1.73 M^2
ESTIMATED AVG GLUCOSE: 117 MG/DL (ref 68–131)
GLUCOSE SERPL-MCNC: 85 MG/DL (ref 70–110)
HBA1C MFR BLD: 5.7 % (ref 4–5.6)
HDLC SERPL-MCNC: 60 MG/DL (ref 40–75)
HDLC SERPL: 38.2 % (ref 20–50)
LDLC SERPL CALC-MCNC: 77.8 MG/DL (ref 63–159)
NONHDLC SERPL-MCNC: 97 MG/DL
POTASSIUM SERPL-SCNC: 4.1 MMOL/L (ref 3.5–5.1)
PROT SERPL-MCNC: 7.6 G/DL (ref 6–8.4)
SODIUM SERPL-SCNC: 139 MMOL/L (ref 136–145)
TRIGL SERPL-MCNC: 96 MG/DL (ref 30–150)

## 2021-02-17 PROCEDURE — 80053 COMPREHEN METABOLIC PANEL: CPT

## 2021-02-17 PROCEDURE — 80061 LIPID PANEL: CPT

## 2021-02-17 PROCEDURE — 83036 HEMOGLOBIN GLYCOSYLATED A1C: CPT

## 2021-02-17 PROCEDURE — 36415 COLL VENOUS BLD VENIPUNCTURE: CPT

## 2021-02-26 DIAGNOSIS — J44.9 CHRONIC OBSTRUCTIVE PULMONARY DISEASE, UNSPECIFIED COPD TYPE: ICD-10-CM

## 2021-03-02 DIAGNOSIS — J30.1 SEASONAL ALLERGIC RHINITIS DUE TO POLLEN: ICD-10-CM

## 2021-03-03 ENCOUNTER — OFFICE VISIT (OUTPATIENT)
Dept: CARDIOLOGY | Facility: CLINIC | Age: 71
End: 2021-03-03
Payer: MEDICARE

## 2021-03-03 ENCOUNTER — HOSPITAL ENCOUNTER (OUTPATIENT)
Dept: RADIOLOGY | Facility: HOSPITAL | Age: 71
Discharge: HOME | End: 2021-03-03
Attending: FAMILY MEDICINE
Payer: MEDICARE

## 2021-03-03 VITALS
SYSTOLIC BLOOD PRESSURE: 136 MMHG | BODY MASS INDEX: 27.59 KG/M2 | WEIGHT: 149.94 LBS | HEART RATE: 97 BPM | RESPIRATION RATE: 16 BRPM | OXYGEN SATURATION: 99 % | HEIGHT: 62 IN | DIASTOLIC BLOOD PRESSURE: 62 MMHG

## 2021-03-03 DIAGNOSIS — G47.33 OSA (OBSTRUCTIVE SLEEP APNEA): ICD-10-CM

## 2021-03-03 DIAGNOSIS — R80.9 TYPE 2 DIABETES MELLITUS WITH MICROALBUMINURIA, WITHOUT LONG-TERM CURRENT USE OF INSULIN: Chronic | ICD-10-CM

## 2021-03-03 DIAGNOSIS — I35.0 NONRHEUMATIC AORTIC VALVE STENOSIS: ICD-10-CM

## 2021-03-03 DIAGNOSIS — Z86.010 HISTORY OF ADENOMATOUS POLYP OF COLON: ICD-10-CM

## 2021-03-03 DIAGNOSIS — E11.59 HYPERTENSION ASSOCIATED WITH DIABETES: ICD-10-CM

## 2021-03-03 DIAGNOSIS — C34.92 NSCLC OF LEFT LUNG: ICD-10-CM

## 2021-03-03 DIAGNOSIS — M81.0 AGE-RELATED OSTEOPOROSIS WITHOUT CURRENT PATHOLOGICAL FRACTURE: ICD-10-CM

## 2021-03-03 DIAGNOSIS — I70.203 ATHEROSCLEROSIS OF ARTERY OF BOTH LOWER EXTREMITIES: Primary | Chronic | ICD-10-CM

## 2021-03-03 DIAGNOSIS — N18.30 TYPE 2 DIABETES MELLITUS WITH STAGE 3 CHRONIC KIDNEY DISEASE, WITHOUT LONG-TERM CURRENT USE OF INSULIN, UNSPECIFIED WHETHER STAGE 3A OR 3B CKD: Chronic | ICD-10-CM

## 2021-03-03 DIAGNOSIS — E11.51 TYPE 2 DIABETES MELLITUS WITH PERIPHERAL VASCULAR DISEASE: Chronic | ICD-10-CM

## 2021-03-03 DIAGNOSIS — I25.10 ATHEROSCLEROSIS OF NATIVE CORONARY ARTERY OF NATIVE HEART WITHOUT ANGINA PECTORIS: ICD-10-CM

## 2021-03-03 DIAGNOSIS — Z98.62 S/P PERIPHERAL ARTERY ANGIOPLASTY: ICD-10-CM

## 2021-03-03 DIAGNOSIS — K21.9 GASTROESOPHAGEAL REFLUX DISEASE WITHOUT ESOPHAGITIS: Chronic | ICD-10-CM

## 2021-03-03 DIAGNOSIS — E11.22 TYPE 2 DIABETES MELLITUS WITH STAGE 3 CHRONIC KIDNEY DISEASE, WITHOUT LONG-TERM CURRENT USE OF INSULIN, UNSPECIFIED WHETHER STAGE 3A OR 3B CKD: Chronic | ICD-10-CM

## 2021-03-03 DIAGNOSIS — E11.69 DYSLIPIDEMIA ASSOCIATED WITH TYPE 2 DIABETES MELLITUS: Chronic | ICD-10-CM

## 2021-03-03 DIAGNOSIS — J44.9 CHRONIC OBSTRUCTIVE PULMONARY DISEASE, UNSPECIFIED COPD TYPE: ICD-10-CM

## 2021-03-03 DIAGNOSIS — F17.210 LIGHT SMOKER: Chronic | ICD-10-CM

## 2021-03-03 DIAGNOSIS — I15.2 HYPERTENSION ASSOCIATED WITH DIABETES: ICD-10-CM

## 2021-03-03 DIAGNOSIS — E78.5 DYSLIPIDEMIA ASSOCIATED WITH TYPE 2 DIABETES MELLITUS: Chronic | ICD-10-CM

## 2021-03-03 DIAGNOSIS — Z12.31 ENCOUNTER FOR SCREENING MAMMOGRAM FOR BREAST CANCER: ICD-10-CM

## 2021-03-03 DIAGNOSIS — E11.29 TYPE 2 DIABETES MELLITUS WITH MICROALBUMINURIA, WITHOUT LONG-TERM CURRENT USE OF INSULIN: Chronic | ICD-10-CM

## 2021-03-03 PROCEDURE — 3078F PR MOST RECENT DIASTOLIC BLOOD PRESSURE < 80 MM HG: ICD-10-PCS | Mod: CPTII,S$GLB,, | Performed by: INTERNAL MEDICINE

## 2021-03-03 PROCEDURE — 77067 SCR MAMMO BI INCL CAD: CPT | Mod: 26,,, | Performed by: RADIOLOGY

## 2021-03-03 PROCEDURE — 77063 BREAST TOMOSYNTHESIS BI: CPT | Mod: 26,,, | Performed by: RADIOLOGY

## 2021-03-03 PROCEDURE — 1101F PR PT FALLS ASSESS DOC 0-1 FALLS W/OUT INJ PAST YR: ICD-10-PCS | Mod: CPTII,S$GLB,, | Performed by: INTERNAL MEDICINE

## 2021-03-03 PROCEDURE — 3288F FALL RISK ASSESSMENT DOCD: CPT | Mod: CPTII,S$GLB,, | Performed by: INTERNAL MEDICINE

## 2021-03-03 PROCEDURE — 3008F PR BODY MASS INDEX (BMI) DOCUMENTED: ICD-10-PCS | Mod: CPTII,S$GLB,, | Performed by: INTERNAL MEDICINE

## 2021-03-03 PROCEDURE — 3072F LOW RISK FOR RETINOPATHY: CPT | Mod: S$GLB,,, | Performed by: INTERNAL MEDICINE

## 2021-03-03 PROCEDURE — 1159F MED LIST DOCD IN RCRD: CPT | Mod: S$GLB,,, | Performed by: INTERNAL MEDICINE

## 2021-03-03 PROCEDURE — 99499 RISK ADDL DX/OHS AUDIT: ICD-10-PCS | Mod: S$GLB,,, | Performed by: INTERNAL MEDICINE

## 2021-03-03 PROCEDURE — 3078F DIAST BP <80 MM HG: CPT | Mod: CPTII,S$GLB,, | Performed by: INTERNAL MEDICINE

## 2021-03-03 PROCEDURE — 1159F PR MEDICATION LIST DOCUMENTED IN MEDICAL RECORD: ICD-10-PCS | Mod: S$GLB,,, | Performed by: INTERNAL MEDICINE

## 2021-03-03 PROCEDURE — 3008F BODY MASS INDEX DOCD: CPT | Mod: CPTII,S$GLB,, | Performed by: INTERNAL MEDICINE

## 2021-03-03 PROCEDURE — 77067 SCR MAMMO BI INCL CAD: CPT | Mod: TC

## 2021-03-03 PROCEDURE — 99999 PR PBB SHADOW E&M-EST. PATIENT-LVL V: CPT | Mod: PBBFAC,,, | Performed by: INTERNAL MEDICINE

## 2021-03-03 PROCEDURE — 3288F PR FALLS RISK ASSESSMENT DOCUMENTED: ICD-10-PCS | Mod: CPTII,S$GLB,, | Performed by: INTERNAL MEDICINE

## 2021-03-03 PROCEDURE — 3044F HG A1C LEVEL LT 7.0%: CPT | Mod: CPTII,S$GLB,, | Performed by: INTERNAL MEDICINE

## 2021-03-03 PROCEDURE — 77067 MAMMO DIGITAL SCREENING BILAT WITH TOMO: ICD-10-PCS | Mod: 26,,, | Performed by: RADIOLOGY

## 2021-03-03 PROCEDURE — 77063 MAMMO DIGITAL SCREENING BILAT WITH TOMO: ICD-10-PCS | Mod: 26,,, | Performed by: RADIOLOGY

## 2021-03-03 PROCEDURE — 3075F SYST BP GE 130 - 139MM HG: CPT | Mod: CPTII,S$GLB,, | Performed by: INTERNAL MEDICINE

## 2021-03-03 PROCEDURE — 3044F PR MOST RECENT HEMOGLOBIN A1C LEVEL <7.0%: ICD-10-PCS | Mod: CPTII,S$GLB,, | Performed by: INTERNAL MEDICINE

## 2021-03-03 PROCEDURE — 99214 PR OFFICE/OUTPT VISIT, EST, LEVL IV, 30-39 MIN: ICD-10-PCS | Mod: S$GLB,,, | Performed by: INTERNAL MEDICINE

## 2021-03-03 PROCEDURE — 3075F PR MOST RECENT SYSTOLIC BLOOD PRESS GE 130-139MM HG: ICD-10-PCS | Mod: CPTII,S$GLB,, | Performed by: INTERNAL MEDICINE

## 2021-03-03 PROCEDURE — 3072F PR LOW RISK FOR RETINOPATHY: ICD-10-PCS | Mod: S$GLB,,, | Performed by: INTERNAL MEDICINE

## 2021-03-03 PROCEDURE — 1101F PT FALLS ASSESS-DOCD LE1/YR: CPT | Mod: CPTII,S$GLB,, | Performed by: INTERNAL MEDICINE

## 2021-03-03 PROCEDURE — 99499 UNLISTED E&M SERVICE: CPT | Mod: S$GLB,,, | Performed by: INTERNAL MEDICINE

## 2021-03-03 PROCEDURE — 99214 OFFICE O/P EST MOD 30 MIN: CPT | Mod: S$GLB,,, | Performed by: INTERNAL MEDICINE

## 2021-03-03 PROCEDURE — 99999 PR PBB SHADOW E&M-EST. PATIENT-LVL V: ICD-10-PCS | Mod: PBBFAC,,, | Performed by: INTERNAL MEDICINE

## 2021-03-04 RX ORDER — MONTELUKAST SODIUM 10 MG/1
TABLET ORAL
Qty: 90 TABLET | Refills: 3 | Status: SHIPPED | OUTPATIENT
Start: 2021-03-04 | End: 2021-03-10 | Stop reason: SDUPTHER

## 2021-03-08 DIAGNOSIS — J44.9 CHRONIC OBSTRUCTIVE PULMONARY DISEASE, UNSPECIFIED COPD TYPE: ICD-10-CM

## 2021-03-10 ENCOUNTER — OFFICE VISIT (OUTPATIENT)
Dept: INTERNAL MEDICINE | Facility: CLINIC | Age: 71
End: 2021-03-10
Payer: MEDICARE

## 2021-03-10 VITALS
TEMPERATURE: 98 F | OXYGEN SATURATION: 95 % | SYSTOLIC BLOOD PRESSURE: 116 MMHG | HEART RATE: 105 BPM | HEIGHT: 62 IN | WEIGHT: 147.69 LBS | DIASTOLIC BLOOD PRESSURE: 60 MMHG | BODY MASS INDEX: 27.18 KG/M2

## 2021-03-10 DIAGNOSIS — G47.33 OSA (OBSTRUCTIVE SLEEP APNEA): ICD-10-CM

## 2021-03-10 DIAGNOSIS — I10 ESSENTIAL HYPERTENSION: Chronic | ICD-10-CM

## 2021-03-10 DIAGNOSIS — C34.92 NSCLC OF LEFT LUNG: ICD-10-CM

## 2021-03-10 DIAGNOSIS — E78.5 DYSLIPIDEMIA ASSOCIATED WITH TYPE 2 DIABETES MELLITUS: Chronic | ICD-10-CM

## 2021-03-10 DIAGNOSIS — N18.31 TYPE 2 DIABETES MELLITUS WITH STAGE 3A CHRONIC KIDNEY DISEASE, WITHOUT LONG-TERM CURRENT USE OF INSULIN: ICD-10-CM

## 2021-03-10 DIAGNOSIS — E11.69 DYSLIPIDEMIA ASSOCIATED WITH TYPE 2 DIABETES MELLITUS: Chronic | ICD-10-CM

## 2021-03-10 DIAGNOSIS — E11.22 TYPE 2 DIABETES MELLITUS WITH STAGE 3A CHRONIC KIDNEY DISEASE, WITHOUT LONG-TERM CURRENT USE OF INSULIN: ICD-10-CM

## 2021-03-10 DIAGNOSIS — K21.9 GASTROESOPHAGEAL REFLUX DISEASE WITHOUT ESOPHAGITIS: Chronic | ICD-10-CM

## 2021-03-10 DIAGNOSIS — I35.0 NONRHEUMATIC AORTIC VALVE STENOSIS: ICD-10-CM

## 2021-03-10 DIAGNOSIS — I77.1 TORTUOUS AORTA: ICD-10-CM

## 2021-03-10 DIAGNOSIS — Z87.891 FORMER CIGARETTE SMOKER: Chronic | ICD-10-CM

## 2021-03-10 DIAGNOSIS — Z09 NEED FOR CASE MANAGEMENT FOLLOW-UP: Primary | ICD-10-CM

## 2021-03-10 DIAGNOSIS — J30.1 SEASONAL ALLERGIC RHINITIS DUE TO POLLEN: ICD-10-CM

## 2021-03-10 PROBLEM — Z12.11 SCREENING FOR COLON CANCER: Status: RESOLVED | Noted: 2019-11-15 | Resolved: 2021-03-10

## 2021-03-10 PROCEDURE — 1125F AMNT PAIN NOTED PAIN PRSNT: CPT | Mod: S$GLB,,, | Performed by: FAMILY MEDICINE

## 2021-03-10 PROCEDURE — 99214 OFFICE O/P EST MOD 30 MIN: CPT | Mod: S$GLB,,, | Performed by: FAMILY MEDICINE

## 2021-03-10 PROCEDURE — 1159F MED LIST DOCD IN RCRD: CPT | Mod: S$GLB,,, | Performed by: FAMILY MEDICINE

## 2021-03-10 PROCEDURE — 3288F PR FALLS RISK ASSESSMENT DOCUMENTED: ICD-10-PCS | Mod: CPTII,S$GLB,, | Performed by: FAMILY MEDICINE

## 2021-03-10 PROCEDURE — 3008F BODY MASS INDEX DOCD: CPT | Mod: CPTII,S$GLB,, | Performed by: FAMILY MEDICINE

## 2021-03-10 PROCEDURE — 3078F DIAST BP <80 MM HG: CPT | Mod: CPTII,S$GLB,, | Performed by: FAMILY MEDICINE

## 2021-03-10 PROCEDURE — 99499 RISK ADDL DX/OHS AUDIT: ICD-10-PCS | Mod: S$GLB,,, | Performed by: FAMILY MEDICINE

## 2021-03-10 PROCEDURE — 3078F PR MOST RECENT DIASTOLIC BLOOD PRESSURE < 80 MM HG: ICD-10-PCS | Mod: CPTII,S$GLB,, | Performed by: FAMILY MEDICINE

## 2021-03-10 PROCEDURE — 1125F PR PAIN SEVERITY QUANTIFIED, PAIN PRESENT: ICD-10-PCS | Mod: S$GLB,,, | Performed by: FAMILY MEDICINE

## 2021-03-10 PROCEDURE — 99214 PR OFFICE/OUTPT VISIT, EST, LEVL IV, 30-39 MIN: ICD-10-PCS | Mod: S$GLB,,, | Performed by: FAMILY MEDICINE

## 2021-03-10 PROCEDURE — 3008F PR BODY MASS INDEX (BMI) DOCUMENTED: ICD-10-PCS | Mod: CPTII,S$GLB,, | Performed by: FAMILY MEDICINE

## 2021-03-10 PROCEDURE — 3072F PR LOW RISK FOR RETINOPATHY: ICD-10-PCS | Mod: S$GLB,,, | Performed by: FAMILY MEDICINE

## 2021-03-10 PROCEDURE — 3074F SYST BP LT 130 MM HG: CPT | Mod: CPTII,S$GLB,, | Performed by: FAMILY MEDICINE

## 2021-03-10 PROCEDURE — 3072F LOW RISK FOR RETINOPATHY: CPT | Mod: S$GLB,,, | Performed by: FAMILY MEDICINE

## 2021-03-10 PROCEDURE — 3044F PR MOST RECENT HEMOGLOBIN A1C LEVEL <7.0%: ICD-10-PCS | Mod: CPTII,S$GLB,, | Performed by: FAMILY MEDICINE

## 2021-03-10 PROCEDURE — 1101F PT FALLS ASSESS-DOCD LE1/YR: CPT | Mod: CPTII,S$GLB,, | Performed by: FAMILY MEDICINE

## 2021-03-10 PROCEDURE — 1101F PR PT FALLS ASSESS DOC 0-1 FALLS W/OUT INJ PAST YR: ICD-10-PCS | Mod: CPTII,S$GLB,, | Performed by: FAMILY MEDICINE

## 2021-03-10 PROCEDURE — 3288F FALL RISK ASSESSMENT DOCD: CPT | Mod: CPTII,S$GLB,, | Performed by: FAMILY MEDICINE

## 2021-03-10 PROCEDURE — 1159F PR MEDICATION LIST DOCUMENTED IN MEDICAL RECORD: ICD-10-PCS | Mod: S$GLB,,, | Performed by: FAMILY MEDICINE

## 2021-03-10 PROCEDURE — 99999 PR PBB SHADOW E&M-EST. PATIENT-LVL IV: CPT | Mod: PBBFAC,,, | Performed by: FAMILY MEDICINE

## 2021-03-10 PROCEDURE — 3074F PR MOST RECENT SYSTOLIC BLOOD PRESSURE < 130 MM HG: ICD-10-PCS | Mod: CPTII,S$GLB,, | Performed by: FAMILY MEDICINE

## 2021-03-10 PROCEDURE — 99499 UNLISTED E&M SERVICE: CPT | Mod: S$GLB,,, | Performed by: FAMILY MEDICINE

## 2021-03-10 PROCEDURE — 3044F HG A1C LEVEL LT 7.0%: CPT | Mod: CPTII,S$GLB,, | Performed by: FAMILY MEDICINE

## 2021-03-10 PROCEDURE — 99999 PR PBB SHADOW E&M-EST. PATIENT-LVL IV: ICD-10-PCS | Mod: PBBFAC,,, | Performed by: FAMILY MEDICINE

## 2021-03-10 RX ORDER — MONTELUKAST SODIUM 10 MG/1
10 TABLET ORAL NIGHTLY
Qty: 90 TABLET | Refills: 4 | Status: SHIPPED | OUTPATIENT
Start: 2021-03-10 | End: 2021-06-11 | Stop reason: SDUPTHER

## 2021-03-10 RX ORDER — ROSUVASTATIN CALCIUM 20 MG/1
20 TABLET, COATED ORAL DAILY
Qty: 90 TABLET | Refills: 4 | Status: SHIPPED | OUTPATIENT
Start: 2021-03-10 | End: 2021-12-01 | Stop reason: SDUPTHER

## 2021-03-10 RX ORDER — PANTOPRAZOLE SODIUM 40 MG/1
40 TABLET, DELAYED RELEASE ORAL DAILY
Qty: 90 TABLET | Refills: 4 | Status: SHIPPED | OUTPATIENT
Start: 2021-03-10 | End: 2021-12-01 | Stop reason: SDUPTHER

## 2021-03-10 RX ORDER — CHLORTHALIDONE 25 MG/1
25 TABLET ORAL DAILY
Qty: 90 TABLET | Refills: 4 | Status: SHIPPED | OUTPATIENT
Start: 2021-03-10 | End: 2021-12-01 | Stop reason: SDUPTHER

## 2021-03-10 RX ORDER — METHOCARBAMOL 500 MG/1
TABLET, FILM COATED ORAL
COMMUNITY
Start: 2021-01-03 | End: 2021-06-11

## 2021-03-10 RX ORDER — GLIMEPIRIDE 2 MG/1
2 TABLET ORAL
Qty: 90 TABLET | Refills: 4 | Status: SHIPPED | OUTPATIENT
Start: 2021-03-10 | End: 2022-01-21 | Stop reason: ALTCHOICE

## 2021-03-10 RX ORDER — CETIRIZINE HYDROCHLORIDE 10 MG/1
10 TABLET ORAL DAILY
Qty: 90 TABLET | Refills: 4 | Status: SHIPPED | OUTPATIENT
Start: 2021-03-10 | End: 2021-12-01 | Stop reason: SDUPTHER

## 2021-03-10 RX ORDER — EZETIMIBE 10 MG/1
10 TABLET ORAL DAILY
Qty: 90 TABLET | Refills: 4 | Status: SHIPPED | OUTPATIENT
Start: 2021-03-10 | End: 2021-12-01 | Stop reason: SDUPTHER

## 2021-03-11 ENCOUNTER — TELEPHONE (OUTPATIENT)
Dept: INTERNAL MEDICINE | Facility: CLINIC | Age: 71
End: 2021-03-11

## 2021-03-11 ENCOUNTER — OUTPATIENT CASE MANAGEMENT (OUTPATIENT)
Dept: ADMINISTRATIVE | Facility: OTHER | Age: 71
End: 2021-03-11

## 2021-03-11 DIAGNOSIS — J44.1 COPD WITH ACUTE EXACERBATION: Primary | ICD-10-CM

## 2021-03-11 RX ORDER — PREDNISONE 5 MG/1
TABLET ORAL
Qty: 30 TABLET | Refills: 0 | Status: SHIPPED | OUTPATIENT
Start: 2021-03-11 | End: 2021-06-11

## 2021-03-11 RX ORDER — AZITHROMYCIN 250 MG/1
TABLET, FILM COATED ORAL
Qty: 6 TABLET | Refills: 0 | Status: SHIPPED | OUTPATIENT
Start: 2021-03-11 | End: 2021-03-16

## 2021-03-19 ENCOUNTER — TELEPHONE (OUTPATIENT)
Dept: PULMONOLOGY | Facility: CLINIC | Age: 71
End: 2021-03-19

## 2021-03-22 ENCOUNTER — OUTPATIENT CASE MANAGEMENT (OUTPATIENT)
Dept: ADMINISTRATIVE | Facility: OTHER | Age: 71
End: 2021-03-22

## 2021-03-26 DIAGNOSIS — G44.209 TENSION HEADACHE: ICD-10-CM

## 2021-03-27 RX ORDER — BUTALBITAL, ACETAMINOPHEN AND CAFFEINE 50; 325; 40 MG/1; MG/1; MG/1
TABLET ORAL
Qty: 30 TABLET | Refills: 3 | Status: SHIPPED | OUTPATIENT
Start: 2021-03-27 | End: 2021-12-01 | Stop reason: SDUPTHER

## 2021-04-05 ENCOUNTER — TELEPHONE (OUTPATIENT)
Dept: ADMINISTRATIVE | Facility: HOSPITAL | Age: 71
End: 2021-04-05

## 2021-04-07 ENCOUNTER — OUTPATIENT CASE MANAGEMENT (OUTPATIENT)
Dept: ADMINISTRATIVE | Facility: OTHER | Age: 71
End: 2021-04-07

## 2021-04-19 DIAGNOSIS — C34.92 NON-SMALL CELL CANCER OF LEFT LUNG: Primary | ICD-10-CM

## 2021-05-03 ENCOUNTER — TELEPHONE (OUTPATIENT)
Dept: ADMINISTRATIVE | Facility: HOSPITAL | Age: 71
End: 2021-05-03

## 2021-05-20 ENCOUNTER — PATIENT MESSAGE (OUTPATIENT)
Dept: ADMINISTRATIVE | Facility: OTHER | Age: 71
End: 2021-05-20

## 2021-05-21 ENCOUNTER — OFFICE VISIT (OUTPATIENT)
Dept: CARDIOTHORACIC SURGERY | Facility: CLINIC | Age: 71
End: 2021-05-21
Payer: MEDICARE

## 2021-05-21 ENCOUNTER — HOSPITAL ENCOUNTER (OUTPATIENT)
Dept: RADIOLOGY | Facility: HOSPITAL | Age: 71
Discharge: HOME | End: 2021-05-21
Attending: THORACIC SURGERY (CARDIOTHORACIC VASCULAR SURGERY)
Payer: MEDICARE

## 2021-05-21 VITALS
SYSTOLIC BLOOD PRESSURE: 129 MMHG | BODY MASS INDEX: 28.24 KG/M2 | HEIGHT: 62 IN | HEART RATE: 106 BPM | OXYGEN SATURATION: 98 % | DIASTOLIC BLOOD PRESSURE: 60 MMHG | WEIGHT: 153.44 LBS

## 2021-05-21 DIAGNOSIS — R91.1 PULMONARY NODULE, LEFT: ICD-10-CM

## 2021-05-21 DIAGNOSIS — R91.1 PULMONARY NODULE, RIGHT: ICD-10-CM

## 2021-05-21 DIAGNOSIS — C34.92 NON-SMALL CELL CANCER OF LEFT LUNG: Primary | ICD-10-CM

## 2021-05-21 DIAGNOSIS — G89.12 POST-THORACOTOMY PAIN: ICD-10-CM

## 2021-05-21 DIAGNOSIS — C34.92 NON-SMALL CELL CANCER OF LEFT LUNG: ICD-10-CM

## 2021-05-21 PROCEDURE — 99213 OFFICE O/P EST LOW 20 MIN: CPT | Mod: S$GLB,,, | Performed by: THORACIC SURGERY (CARDIOTHORACIC VASCULAR SURGERY)

## 2021-05-21 PROCEDURE — 71250 CT CHEST WITHOUT CONTRAST: ICD-10-PCS | Mod: 26,,, | Performed by: RADIOLOGY

## 2021-05-21 PROCEDURE — 1101F PT FALLS ASSESS-DOCD LE1/YR: CPT | Mod: CPTII,S$GLB,, | Performed by: THORACIC SURGERY (CARDIOTHORACIC VASCULAR SURGERY)

## 2021-05-21 PROCEDURE — 99213 PR OFFICE/OUTPT VISIT, EST, LEVL III, 20-29 MIN: ICD-10-PCS | Mod: S$GLB,,, | Performed by: THORACIC SURGERY (CARDIOTHORACIC VASCULAR SURGERY)

## 2021-05-21 PROCEDURE — 71250 CT THORAX DX C-: CPT | Mod: 26,,, | Performed by: RADIOLOGY

## 2021-05-21 PROCEDURE — 3072F LOW RISK FOR RETINOPATHY: CPT | Mod: S$GLB,,, | Performed by: THORACIC SURGERY (CARDIOTHORACIC VASCULAR SURGERY)

## 2021-05-21 PROCEDURE — 99999 PR PBB SHADOW E&M-EST. PATIENT-LVL V: ICD-10-PCS | Mod: PBBFAC,,, | Performed by: THORACIC SURGERY (CARDIOTHORACIC VASCULAR SURGERY)

## 2021-05-21 PROCEDURE — 3008F PR BODY MASS INDEX (BMI) DOCUMENTED: ICD-10-PCS | Mod: CPTII,S$GLB,, | Performed by: THORACIC SURGERY (CARDIOTHORACIC VASCULAR SURGERY)

## 2021-05-21 PROCEDURE — 1159F MED LIST DOCD IN RCRD: CPT | Mod: S$GLB,,, | Performed by: THORACIC SURGERY (CARDIOTHORACIC VASCULAR SURGERY)

## 2021-05-21 PROCEDURE — 71250 CT THORAX DX C-: CPT | Mod: TC

## 2021-05-21 PROCEDURE — 1101F PR PT FALLS ASSESS DOC 0-1 FALLS W/OUT INJ PAST YR: ICD-10-PCS | Mod: CPTII,S$GLB,, | Performed by: THORACIC SURGERY (CARDIOTHORACIC VASCULAR SURGERY)

## 2021-05-21 PROCEDURE — 99499 UNLISTED E&M SERVICE: CPT | Mod: HCNC,S$GLB,, | Performed by: PHYSICIAN ASSISTANT

## 2021-05-21 PROCEDURE — 99999 PR PBB SHADOW E&M-EST. PATIENT-LVL V: CPT | Mod: PBBFAC,,, | Performed by: THORACIC SURGERY (CARDIOTHORACIC VASCULAR SURGERY)

## 2021-05-21 PROCEDURE — 1125F AMNT PAIN NOTED PAIN PRSNT: CPT | Mod: S$GLB,,, | Performed by: THORACIC SURGERY (CARDIOTHORACIC VASCULAR SURGERY)

## 2021-05-21 PROCEDURE — 3008F BODY MASS INDEX DOCD: CPT | Mod: CPTII,S$GLB,, | Performed by: THORACIC SURGERY (CARDIOTHORACIC VASCULAR SURGERY)

## 2021-05-21 PROCEDURE — 1125F PR PAIN SEVERITY QUANTIFIED, PAIN PRESENT: ICD-10-PCS | Mod: S$GLB,,, | Performed by: THORACIC SURGERY (CARDIOTHORACIC VASCULAR SURGERY)

## 2021-05-21 PROCEDURE — 99499 RISK ADDL DX/OHS AUDIT: ICD-10-PCS | Mod: HCNC,S$GLB,, | Performed by: PHYSICIAN ASSISTANT

## 2021-05-21 PROCEDURE — 3288F FALL RISK ASSESSMENT DOCD: CPT | Mod: CPTII,S$GLB,, | Performed by: THORACIC SURGERY (CARDIOTHORACIC VASCULAR SURGERY)

## 2021-05-21 PROCEDURE — 3288F PR FALLS RISK ASSESSMENT DOCUMENTED: ICD-10-PCS | Mod: CPTII,S$GLB,, | Performed by: THORACIC SURGERY (CARDIOTHORACIC VASCULAR SURGERY)

## 2021-05-21 PROCEDURE — 3072F PR LOW RISK FOR RETINOPATHY: ICD-10-PCS | Mod: S$GLB,,, | Performed by: THORACIC SURGERY (CARDIOTHORACIC VASCULAR SURGERY)

## 2021-05-21 PROCEDURE — 1159F PR MEDICATION LIST DOCUMENTED IN MEDICAL RECORD: ICD-10-PCS | Mod: S$GLB,,, | Performed by: THORACIC SURGERY (CARDIOTHORACIC VASCULAR SURGERY)

## 2021-05-28 ENCOUNTER — HOSPITAL ENCOUNTER (OUTPATIENT)
Dept: CARDIOLOGY | Facility: HOSPITAL | Age: 71
Discharge: HOME | End: 2021-05-28
Attending: INTERNAL MEDICINE
Payer: MEDICARE

## 2021-05-28 ENCOUNTER — OFFICE VISIT (OUTPATIENT)
Dept: CARDIOLOGY | Facility: CLINIC | Age: 71
End: 2021-05-28
Payer: MEDICARE

## 2021-05-28 VITALS
HEIGHT: 62 IN | OXYGEN SATURATION: 96 % | DIASTOLIC BLOOD PRESSURE: 52 MMHG | WEIGHT: 152 LBS | BODY MASS INDEX: 27.97 KG/M2 | RESPIRATION RATE: 16 BRPM | SYSTOLIC BLOOD PRESSURE: 140 MMHG | HEART RATE: 103 BPM

## 2021-05-28 DIAGNOSIS — R07.89 CHEST DISCOMFORT: ICD-10-CM

## 2021-05-28 DIAGNOSIS — E11.29 TYPE 2 DIABETES MELLITUS WITH MICROALBUMINURIA, WITHOUT LONG-TERM CURRENT USE OF INSULIN: Chronic | ICD-10-CM

## 2021-05-28 DIAGNOSIS — J44.9 CHRONIC OBSTRUCTIVE PULMONARY DISEASE, UNSPECIFIED COPD TYPE: ICD-10-CM

## 2021-05-28 DIAGNOSIS — I70.203 ATHEROSCLEROSIS OF ARTERY OF BOTH LOWER EXTREMITIES: Primary | Chronic | ICD-10-CM

## 2021-05-28 DIAGNOSIS — E11.59 HYPERTENSION ASSOCIATED WITH DIABETES: ICD-10-CM

## 2021-05-28 DIAGNOSIS — I70.0 ATHEROSCLEROSIS OF AORTA: ICD-10-CM

## 2021-05-28 DIAGNOSIS — R91.1 SOLID NODULE OF LUNG GREATER THAN 8 MM IN DIAMETER: ICD-10-CM

## 2021-05-28 DIAGNOSIS — R07.89 CHEST DISCOMFORT: Primary | ICD-10-CM

## 2021-05-28 DIAGNOSIS — E78.5 DYSLIPIDEMIA ASSOCIATED WITH TYPE 2 DIABETES MELLITUS: Chronic | ICD-10-CM

## 2021-05-28 DIAGNOSIS — E11.69 DYSLIPIDEMIA ASSOCIATED WITH TYPE 2 DIABETES MELLITUS: Chronic | ICD-10-CM

## 2021-05-28 DIAGNOSIS — G47.33 OSA (OBSTRUCTIVE SLEEP APNEA): ICD-10-CM

## 2021-05-28 DIAGNOSIS — R80.9 TYPE 2 DIABETES MELLITUS WITH MICROALBUMINURIA, WITHOUT LONG-TERM CURRENT USE OF INSULIN: Chronic | ICD-10-CM

## 2021-05-28 DIAGNOSIS — Z86.010 HISTORY OF ADENOMATOUS POLYP OF COLON: ICD-10-CM

## 2021-05-28 DIAGNOSIS — Z87.891 FORMER CIGARETTE SMOKER: Chronic | ICD-10-CM

## 2021-05-28 DIAGNOSIS — N18.31 TYPE 2 DIABETES MELLITUS WITH STAGE 3A CHRONIC KIDNEY DISEASE, WITHOUT LONG-TERM CURRENT USE OF INSULIN: Chronic | ICD-10-CM

## 2021-05-28 DIAGNOSIS — Z98.62 S/P PERIPHERAL ARTERY ANGIOPLASTY: ICD-10-CM

## 2021-05-28 DIAGNOSIS — E11.22 TYPE 2 DIABETES MELLITUS WITH STAGE 3A CHRONIC KIDNEY DISEASE, WITHOUT LONG-TERM CURRENT USE OF INSULIN: Chronic | ICD-10-CM

## 2021-05-28 DIAGNOSIS — E11.51 TYPE 2 DIABETES MELLITUS WITH PERIPHERAL VASCULAR DISEASE: Chronic | ICD-10-CM

## 2021-05-28 DIAGNOSIS — I15.2 HYPERTENSION ASSOCIATED WITH DIABETES: ICD-10-CM

## 2021-05-28 DIAGNOSIS — C34.92 NSCLC OF LEFT LUNG: ICD-10-CM

## 2021-05-28 PROCEDURE — 3288F FALL RISK ASSESSMENT DOCD: CPT | Mod: CPTII,S$GLB,, | Performed by: INTERNAL MEDICINE

## 2021-05-28 PROCEDURE — 99499 RISK ADDL DX/OHS AUDIT: ICD-10-PCS | Mod: HCNC,S$GLB,, | Performed by: INTERNAL MEDICINE

## 2021-05-28 PROCEDURE — 1125F AMNT PAIN NOTED PAIN PRSNT: CPT | Mod: S$GLB,,, | Performed by: INTERNAL MEDICINE

## 2021-05-28 PROCEDURE — 3008F PR BODY MASS INDEX (BMI) DOCUMENTED: ICD-10-PCS | Mod: CPTII,S$GLB,, | Performed by: INTERNAL MEDICINE

## 2021-05-28 PROCEDURE — 99214 OFFICE O/P EST MOD 30 MIN: CPT | Mod: S$GLB,,, | Performed by: INTERNAL MEDICINE

## 2021-05-28 PROCEDURE — 99999 PR PBB SHADOW E&M-EST. PATIENT-LVL V: CPT | Mod: PBBFAC,,, | Performed by: INTERNAL MEDICINE

## 2021-05-28 PROCEDURE — 1101F PT FALLS ASSESS-DOCD LE1/YR: CPT | Mod: CPTII,S$GLB,, | Performed by: INTERNAL MEDICINE

## 2021-05-28 PROCEDURE — 99999 PR PBB SHADOW E&M-EST. PATIENT-LVL V: ICD-10-PCS | Mod: PBBFAC,,, | Performed by: INTERNAL MEDICINE

## 2021-05-28 PROCEDURE — 99214 PR OFFICE/OUTPT VISIT, EST, LEVL IV, 30-39 MIN: ICD-10-PCS | Mod: S$GLB,,, | Performed by: INTERNAL MEDICINE

## 2021-05-28 PROCEDURE — 3072F LOW RISK FOR RETINOPATHY: CPT | Mod: S$GLB,,, | Performed by: INTERNAL MEDICINE

## 2021-05-28 PROCEDURE — 93010 EKG 12-LEAD: ICD-10-PCS | Mod: ,,, | Performed by: INTERNAL MEDICINE

## 2021-05-28 PROCEDURE — 1159F PR MEDICATION LIST DOCUMENTED IN MEDICAL RECORD: ICD-10-PCS | Mod: S$GLB,,, | Performed by: INTERNAL MEDICINE

## 2021-05-28 PROCEDURE — 99499 UNLISTED E&M SERVICE: CPT | Mod: HCNC,S$GLB,, | Performed by: INTERNAL MEDICINE

## 2021-05-28 PROCEDURE — 1101F PR PT FALLS ASSESS DOC 0-1 FALLS W/OUT INJ PAST YR: ICD-10-PCS | Mod: CPTII,S$GLB,, | Performed by: INTERNAL MEDICINE

## 2021-05-28 PROCEDURE — 3008F BODY MASS INDEX DOCD: CPT | Mod: CPTII,S$GLB,, | Performed by: INTERNAL MEDICINE

## 2021-05-28 PROCEDURE — 1125F PR PAIN SEVERITY QUANTIFIED, PAIN PRESENT: ICD-10-PCS | Mod: S$GLB,,, | Performed by: INTERNAL MEDICINE

## 2021-05-28 PROCEDURE — 93005 ELECTROCARDIOGRAM TRACING: CPT

## 2021-05-28 PROCEDURE — 3072F PR LOW RISK FOR RETINOPATHY: ICD-10-PCS | Mod: S$GLB,,, | Performed by: INTERNAL MEDICINE

## 2021-05-28 PROCEDURE — 3288F PR FALLS RISK ASSESSMENT DOCUMENTED: ICD-10-PCS | Mod: CPTII,S$GLB,, | Performed by: INTERNAL MEDICINE

## 2021-05-28 PROCEDURE — 93010 ELECTROCARDIOGRAM REPORT: CPT | Mod: ,,, | Performed by: INTERNAL MEDICINE

## 2021-05-28 PROCEDURE — 1159F MED LIST DOCD IN RCRD: CPT | Mod: S$GLB,,, | Performed by: INTERNAL MEDICINE

## 2021-06-03 ENCOUNTER — TELEPHONE (OUTPATIENT)
Dept: PULMONOLOGY | Facility: CLINIC | Age: 71
End: 2021-06-03

## 2021-06-09 ENCOUNTER — HOSPITAL ENCOUNTER (OUTPATIENT)
Dept: RADIOLOGY | Facility: HOSPITAL | Age: 71
Discharge: HOME | End: 2021-06-09
Attending: INTERNAL MEDICINE
Payer: MEDICARE

## 2021-06-09 ENCOUNTER — CLINICAL SUPPORT (OUTPATIENT)
Dept: PULMONOLOGY | Facility: CLINIC | Age: 71
End: 2021-06-09
Payer: MEDICARE

## 2021-06-09 VITALS — BODY MASS INDEX: 29.33 KG/M2 | HEIGHT: 62 IN | WEIGHT: 159.38 LBS

## 2021-06-09 DIAGNOSIS — R09.02 EXERCISE HYPOXEMIA: ICD-10-CM

## 2021-06-09 DIAGNOSIS — J44.9 CHRONIC OBSTRUCTIVE PULMONARY DISEASE, UNSPECIFIED COPD TYPE: ICD-10-CM

## 2021-06-09 PROCEDURE — 99999 PR PBB SHADOW E&M-EST. PATIENT-LVL I: ICD-10-PCS | Mod: PBBFAC,,,

## 2021-06-09 PROCEDURE — 94618 PULMONARY STRESS TESTING: CPT | Mod: S$GLB,,, | Performed by: INTERNAL MEDICINE

## 2021-06-09 PROCEDURE — 71046 X-RAY EXAM CHEST 2 VIEWS: CPT | Mod: TC

## 2021-06-09 PROCEDURE — 71046 XR CHEST PA AND LATERAL: ICD-10-PCS | Mod: 26,,, | Performed by: RADIOLOGY

## 2021-06-09 PROCEDURE — 71046 X-RAY EXAM CHEST 2 VIEWS: CPT | Mod: 26,,, | Performed by: RADIOLOGY

## 2021-06-09 PROCEDURE — 99999 PR PBB SHADOW E&M-EST. PATIENT-LVL I: CPT | Mod: PBBFAC,,,

## 2021-06-09 PROCEDURE — 94618 PULMONARY STRESS TESTING: ICD-10-PCS | Mod: S$GLB,,, | Performed by: INTERNAL MEDICINE

## 2021-06-11 ENCOUNTER — TELEPHONE (OUTPATIENT)
Dept: PULMONOLOGY | Facility: CLINIC | Age: 71
End: 2021-06-11

## 2021-06-11 ENCOUNTER — OFFICE VISIT (OUTPATIENT)
Dept: PULMONOLOGY | Facility: CLINIC | Age: 71
End: 2021-06-11
Payer: MEDICARE

## 2021-06-11 ENCOUNTER — PATIENT MESSAGE (OUTPATIENT)
Dept: PULMONOLOGY | Facility: CLINIC | Age: 71
End: 2021-06-11

## 2021-06-11 DIAGNOSIS — J44.9 CHRONIC OBSTRUCTIVE PULMONARY DISEASE, UNSPECIFIED COPD TYPE: Primary | ICD-10-CM

## 2021-06-11 DIAGNOSIS — J44.1 COPD WITH ACUTE EXACERBATION: ICD-10-CM

## 2021-06-11 DIAGNOSIS — J30.1 SEASONAL ALLERGIC RHINITIS DUE TO POLLEN: ICD-10-CM

## 2021-06-11 DIAGNOSIS — M62.830 BACK MUSCLE SPASM: ICD-10-CM

## 2021-06-11 DIAGNOSIS — C34.92 NON-SMALL CELL CANCER OF LEFT LUNG: ICD-10-CM

## 2021-06-11 PROCEDURE — 99499 RISK ADDL DX/OHS AUDIT: ICD-10-PCS | Mod: HCNC,95,, | Performed by: INTERNAL MEDICINE

## 2021-06-11 PROCEDURE — 99214 OFFICE O/P EST MOD 30 MIN: CPT | Mod: 95,,, | Performed by: INTERNAL MEDICINE

## 2021-06-11 PROCEDURE — 99499 UNLISTED E&M SERVICE: CPT | Mod: HCNC,95,, | Performed by: INTERNAL MEDICINE

## 2021-06-11 PROCEDURE — 99214 PR OFFICE/OUTPT VISIT, EST, LEVL IV, 30-39 MIN: ICD-10-PCS | Mod: 95,,, | Performed by: INTERNAL MEDICINE

## 2021-06-11 PROCEDURE — 1159F PR MEDICATION LIST DOCUMENTED IN MEDICAL RECORD: ICD-10-PCS | Mod: 95,,, | Performed by: INTERNAL MEDICINE

## 2021-06-11 PROCEDURE — 3072F LOW RISK FOR RETINOPATHY: CPT | Mod: 95,,, | Performed by: INTERNAL MEDICINE

## 2021-06-11 PROCEDURE — 1159F MED LIST DOCD IN RCRD: CPT | Mod: 95,,, | Performed by: INTERNAL MEDICINE

## 2021-06-11 PROCEDURE — 3072F PR LOW RISK FOR RETINOPATHY: ICD-10-PCS | Mod: 95,,, | Performed by: INTERNAL MEDICINE

## 2021-06-11 RX ORDER — AZITHROMYCIN 250 MG/1
TABLET, FILM COATED ORAL
Qty: 6 TABLET | Refills: 0 | Status: SHIPPED | OUTPATIENT
Start: 2021-06-11 | End: 2021-08-18 | Stop reason: ALTCHOICE

## 2021-06-11 RX ORDER — MONTELUKAST SODIUM 10 MG/1
10 TABLET ORAL NIGHTLY
Qty: 90 TABLET | Refills: 4 | Status: SHIPPED | OUTPATIENT
Start: 2021-06-11 | End: 2021-12-13 | Stop reason: SDUPTHER

## 2021-06-11 RX ORDER — PREDNISONE 20 MG/1
TABLET ORAL
Qty: 20 TABLET | Refills: 0 | Status: SHIPPED | OUTPATIENT
Start: 2021-06-11 | End: 2021-08-18

## 2021-06-11 RX ORDER — FLUTICASONE PROPIONATE 50 MCG
2 SPRAY, SUSPENSION (ML) NASAL DAILY
Qty: 48 G | Refills: 4 | Status: SHIPPED | OUTPATIENT
Start: 2021-06-11 | End: 2022-02-16 | Stop reason: SDUPTHER

## 2021-06-11 RX ORDER — CYCLOBENZAPRINE HCL 10 MG
10 TABLET ORAL NIGHTLY
Qty: 10 TABLET | Refills: 0 | Status: SHIPPED | OUTPATIENT
Start: 2021-06-11 | End: 2021-06-21

## 2021-06-11 RX ORDER — ALBUTEROL SULFATE 0.83 MG/ML
2.5 SOLUTION RESPIRATORY (INHALATION)
Qty: 270 ML | Refills: 11 | Status: SHIPPED | OUTPATIENT
Start: 2021-06-11 | End: 2021-12-13 | Stop reason: SDUPTHER

## 2021-06-18 ENCOUNTER — HOSPITAL ENCOUNTER (OUTPATIENT)
Dept: CARDIOLOGY | Facility: HOSPITAL | Age: 71
Discharge: HOME | End: 2021-06-18
Attending: INTERNAL MEDICINE
Payer: MEDICARE

## 2021-06-18 VITALS
DIASTOLIC BLOOD PRESSURE: 52 MMHG | WEIGHT: 159 LBS | HEIGHT: 62 IN | SYSTOLIC BLOOD PRESSURE: 140 MMHG | BODY MASS INDEX: 29.26 KG/M2

## 2021-06-18 VITALS
WEIGHT: 159 LBS | SYSTOLIC BLOOD PRESSURE: 144 MMHG | BODY MASS INDEX: 29.26 KG/M2 | SYSTOLIC BLOOD PRESSURE: 144 MMHG | DIASTOLIC BLOOD PRESSURE: 80 MMHG | DIASTOLIC BLOOD PRESSURE: 86 MMHG | HEIGHT: 62 IN | HEIGHT: 62 IN | WEIGHT: 159 LBS | BODY MASS INDEX: 29.26 KG/M2

## 2021-06-18 DIAGNOSIS — I15.2 HYPERTENSION ASSOCIATED WITH DIABETES: ICD-10-CM

## 2021-06-18 DIAGNOSIS — I70.203 ATHEROSCLEROSIS OF ARTERY OF BOTH LOWER EXTREMITIES: ICD-10-CM

## 2021-06-18 DIAGNOSIS — E11.59 HYPERTENSION ASSOCIATED WITH DIABETES: ICD-10-CM

## 2021-06-18 DIAGNOSIS — E11.51 TYPE 2 DIABETES MELLITUS WITH PERIPHERAL VASCULAR DISEASE: ICD-10-CM

## 2021-06-18 LAB
ABI POST MINUTES1: 5 MIN
IMMEDIATE ARM BP: 190 MMHG
IMMEDIATE LEFT ABI: 0.53
IMMEDIATE LEFT TIBIAL: 101 MMHG
IMMEDIATE RIGHT ABI: 0.65
IMMEDIATE RIGHT TIBIAL: 123 MMHG
LEFT ABI: 1
LEFT ARM BP: 144 MMHG
LEFT DORSALIS PEDIS: 132 MMHG
LEFT POSTERIOR TIBIAL: 144 MMHG
LEFT TBI: 0.35
LEFT TOE PRESSURE: 50 MMHG
POST1 ARM BP: 152 MMHG
POST1 LEFT ABI: 0.86
POST1 LEFT TIBIAL: 130 MMHG
POST1 RIGHT ABI: 0.93
POST1 RIGHT TIBIAL: 141 MMHG
RIGHT ABI: 0.99
RIGHT ARM BP: 143 MMHG
RIGHT DORSALIS PEDIS: 126 MMHG
RIGHT POSTERIOR TIBIAL: 143 MMHG
RIGHT TBI: 0.72
RIGHT TOE PRESSURE: 103 MMHG
TREADMILL GRADE: 10 %
TREADMILL SPEED: 1.3 MPH
TREADMILL TIME: 1.1 MIN

## 2021-06-18 PROCEDURE — 93306 ECHO (CUPID ONLY): ICD-10-PCS | Mod: 26,,, | Performed by: INTERNAL MEDICINE

## 2021-06-18 PROCEDURE — 93924 LWR XTR VASC STDY BILAT: CPT

## 2021-06-18 PROCEDURE — 93924 ANKLE BRACHIAL INDICES (ABI): ICD-10-PCS | Mod: 26,,, | Performed by: INTERNAL MEDICINE

## 2021-06-18 PROCEDURE — 93924 LWR XTR VASC STDY BILAT: CPT | Mod: 26,,, | Performed by: INTERNAL MEDICINE

## 2021-06-18 PROCEDURE — 93306 TTE W/DOPPLER COMPLETE: CPT | Mod: 26,,, | Performed by: INTERNAL MEDICINE

## 2021-06-18 PROCEDURE — 93925 LOWER EXTREMITY STUDY: CPT | Mod: 26,,, | Performed by: INTERNAL MEDICINE

## 2021-06-18 PROCEDURE — 93925 CV US DOPPLER ARTERIAL LEGS BILATERAL (CUPID ONLY): ICD-10-PCS | Mod: 26,,, | Performed by: INTERNAL MEDICINE

## 2021-06-18 PROCEDURE — 93306 TTE W/DOPPLER COMPLETE: CPT

## 2021-06-18 PROCEDURE — 93925 LOWER EXTREMITY STUDY: CPT

## 2021-06-19 LAB
LEFT ANT TIBIAL SYS PSV: 79 CM/S
LEFT CFA PSV: 372 CM/S
LEFT PERONEAL SYS PSV: 39 CM/S
LEFT POPLITEAL PSV: 51 CM/S
LEFT POST TIBIAL SYS PSV: 45 CM/S
LEFT PROFUNDA SYS PSV: 202 CM/S
LEFT SUPER FEMORAL DIST SYS PSV: 51 CM/S
LEFT SUPER FEMORAL MID SYS PSV: 89 CM/S
LEFT SUPER FEMORAL OSTIAL SYS PSV: 255 CM/S
LEFT SUPER FEMORAL PROX SYS PSV: 113 CM/S
LEFT TIB/PER TRUNK SYS PSV: 66 CM/S
OHS CV LEFT LOWER EXTREMITY ABI (NO CALC): 1
OHS CV RIGHT ABI LOWER EXTREMITY (NO CALC): 0.99
RIGHT ANT TIBIAL SYS PSV: 35 CM/S
RIGHT CFA PSV: 207 CM/S
RIGHT PERONEAL SYS PSV: 41 CM/S
RIGHT POPLITEAL PSV: 57 CM/S
RIGHT POST TIBIAL SYS PSV: 56 CM/S
RIGHT PROFUNDA SYS PSV: 264 CM/S
RIGHT SUPER FEMORAL DIST SYS PSV: 70 CM/S
RIGHT SUPER FEMORAL MID SYS PSV: 130 CM/S
RIGHT SUPER FEMORAL OSTIAL SYS PSV: 241 CM/S
RIGHT SUPER FEMORAL PROX SYS PSV: 117 CM/S
RIGHT TIB/PER TRUNK SYS PSV: 80 CM/S

## 2021-06-21 ENCOUNTER — TELEPHONE (OUTPATIENT)
Dept: CARDIOLOGY | Facility: CLINIC | Age: 71
End: 2021-06-21

## 2021-06-21 LAB
AORTIC ROOT ANNULUS: 2.02 CM
ASCENDING AORTA: 2.62 CM
AV INDEX (PROSTH): 0.45
AV MEAN GRADIENT: 15 MMHG
AV PEAK GRADIENT: 25 MMHG
AV VALVE AREA: 1.29 CM2
AV VELOCITY RATIO: 0.41
BSA FOR ECHO PROCEDURE: 1.78 M2
CV ECHO LV RWT: 0.85 CM
DOP CALC AO PEAK VEL: 2.51 M/S
DOP CALC AO VTI: 54.6 CM
DOP CALC LVOT AREA: 2.8 CM2
DOP CALC LVOT DIAMETER: 1.9 CM
DOP CALC LVOT PEAK VEL: 1.02 M/S
DOP CALC LVOT STROKE VOLUME: 70.28 CM3
DOP CALC RVOT PEAK VEL: 0.62 M/S
DOP CALC RVOT VTI: 13.19 CM
DOP CALCLVOT PEAK VEL VTI: 24.8 CM
E WAVE DECELERATION TIME: 205.11 MSEC
E/A RATIO: 0.79
E/E' RATIO: 14.8 M/S
ECHO LV POSTERIOR WALL: 1.32 CM (ref 0.6–1.1)
EJECTION FRACTION: 60 %
FRACTIONAL SHORTENING: 36 % (ref 28–44)
INTERVENTRICULAR SEPTUM: 1.3 CM (ref 0.6–1.1)
IVRT: 62.8 MSEC
LA MAJOR: 4.41 CM
LA MINOR: 4.59 CM
LA WIDTH: 3.15 CM
LEFT ATRIUM SIZE: 4.07 CM
LEFT ATRIUM VOLUME INDEX: 28.3 ML/M2
LEFT ATRIUM VOLUME: 49.02 CM3
LEFT INTERNAL DIMENSION IN SYSTOLE: 1.98 CM (ref 2.1–4)
LEFT VENTRICLE DIASTOLIC VOLUME INDEX: 21.88 ML/M2
LEFT VENTRICLE DIASTOLIC VOLUME: 37.85 ML
LEFT VENTRICLE MASS INDEX: 76 G/M2
LEFT VENTRICLE SYSTOLIC VOLUME INDEX: 7.2 ML/M2
LEFT VENTRICLE SYSTOLIC VOLUME: 12.4 ML
LEFT VENTRICULAR INTERNAL DIMENSION IN DIASTOLE: 3.1 CM (ref 3.5–6)
LEFT VENTRICULAR MASS: 131.52 G
LV LATERAL E/E' RATIO: 15.86 M/S
LV SEPTAL E/E' RATIO: 13.88 M/S
MV PEAK A VEL: 1.4 M/S
MV PEAK E VEL: 1.11 M/S
MV STENOSIS PRESSURE HALF TIME: 59.48 MS
MV VALVE AREA P 1/2 METHOD: 3.7 CM2
PISA TR MAX VEL: 2.94 M/S
PULM VEIN S/D RATIO: 1.67
PV MEAN GRADIENT: 1 MMHG
PV PEAK D VEL: 0.43 M/S
PV PEAK GRADIENT: 2 MMHG
PV PEAK S VEL: 0.72 M/S
PV PEAK VELOCITY: 0.96 CM/S
RA MAJOR: 3.92 CM
RA PRESSURE: 3 MMHG
RA WIDTH: 2.95 CM
RIGHT VENTRICULAR END-DIASTOLIC DIMENSION: 3.22 CM
SINUS: 2.35 CM
STJ: 2.02 CM
TDI LATERAL: 0.07 M/S
TDI SEPTAL: 0.08 M/S
TDI: 0.08 M/S
TR MAX PG: 35 MMHG
TV REST PULMONARY ARTERY PRESSURE: 38 MMHG

## 2021-06-22 ENCOUNTER — TELEPHONE (OUTPATIENT)
Dept: CARDIOLOGY | Facility: CLINIC | Age: 71
End: 2021-06-22

## 2021-06-22 ENCOUNTER — TELEPHONE (OUTPATIENT)
Dept: ADMINISTRATIVE | Facility: HOSPITAL | Age: 71
End: 2021-06-22

## 2021-06-30 PROCEDURE — 99457 RPM TX MGMT 1ST 20 MIN: CPT | Mod: S$GLB,,, | Performed by: FAMILY MEDICINE

## 2021-06-30 PROCEDURE — 99457 PR MONITORING, PHYSIOL PARAM, REMOTE, 1ST 20 MINS, PER MONTH: ICD-10-PCS | Mod: S$GLB,,, | Performed by: FAMILY MEDICINE

## 2021-07-07 ENCOUNTER — TELEPHONE (OUTPATIENT)
Dept: ADMINISTRATIVE | Facility: HOSPITAL | Age: 71
End: 2021-07-07

## 2021-08-03 ENCOUNTER — TELEPHONE (OUTPATIENT)
Dept: ADMINISTRATIVE | Facility: HOSPITAL | Age: 71
End: 2021-08-03

## 2021-08-17 ENCOUNTER — PATIENT OUTREACH (OUTPATIENT)
Dept: ADMINISTRATIVE | Facility: OTHER | Age: 71
End: 2021-08-17

## 2021-08-18 ENCOUNTER — OFFICE VISIT (OUTPATIENT)
Dept: INTERNAL MEDICINE | Facility: CLINIC | Age: 71
End: 2021-08-18
Payer: MEDICARE

## 2021-08-18 ENCOUNTER — OFFICE VISIT (OUTPATIENT)
Dept: OPHTHALMOLOGY | Facility: CLINIC | Age: 71
End: 2021-08-18
Payer: MEDICARE

## 2021-08-18 VITALS
TEMPERATURE: 97 F | HEART RATE: 100 BPM | DIASTOLIC BLOOD PRESSURE: 58 MMHG | BODY MASS INDEX: 26.17 KG/M2 | WEIGHT: 142.19 LBS | SYSTOLIC BLOOD PRESSURE: 118 MMHG | HEIGHT: 62 IN | OXYGEN SATURATION: 96 %

## 2021-08-18 DIAGNOSIS — E11.22 TYPE 2 DIABETES MELLITUS WITH STAGE 3A CHRONIC KIDNEY DISEASE, WITHOUT LONG-TERM CURRENT USE OF INSULIN: Chronic | ICD-10-CM

## 2021-08-18 DIAGNOSIS — C34.92 NON-SMALL CELL CANCER OF LEFT LUNG: ICD-10-CM

## 2021-08-18 DIAGNOSIS — Z87.891 FORMER CIGARETTE SMOKER: Primary | Chronic | ICD-10-CM

## 2021-08-18 DIAGNOSIS — G44.209 TENSION HEADACHE: ICD-10-CM

## 2021-08-18 DIAGNOSIS — I25.10 ATHEROSCLEROSIS OF NATIVE CORONARY ARTERY OF NATIVE HEART WITHOUT ANGINA PECTORIS: ICD-10-CM

## 2021-08-18 DIAGNOSIS — R91.1 SOLID NODULE OF LUNG GREATER THAN 8 MM IN DIAMETER: ICD-10-CM

## 2021-08-18 DIAGNOSIS — G47.33 OSA (OBSTRUCTIVE SLEEP APNEA): ICD-10-CM

## 2021-08-18 DIAGNOSIS — E11.51 TYPE 2 DIABETES MELLITUS WITH PERIPHERAL VASCULAR DISEASE: Chronic | ICD-10-CM

## 2021-08-18 DIAGNOSIS — E11.9 TYPE 2 DIABETES MELLITUS WITHOUT RETINOPATHY: Primary | ICD-10-CM

## 2021-08-18 DIAGNOSIS — Z86.010 HISTORY OF ADENOMATOUS POLYP OF COLON: ICD-10-CM

## 2021-08-18 DIAGNOSIS — M81.0 AGE-RELATED OSTEOPOROSIS WITHOUT CURRENT PATHOLOGICAL FRACTURE: ICD-10-CM

## 2021-08-18 DIAGNOSIS — K21.9 GASTROESOPHAGEAL REFLUX DISEASE WITHOUT ESOPHAGITIS: Chronic | ICD-10-CM

## 2021-08-18 DIAGNOSIS — Z98.62 S/P PERIPHERAL ARTERY ANGIOPLASTY: ICD-10-CM

## 2021-08-18 DIAGNOSIS — K57.90 DIVERTICULOSIS: ICD-10-CM

## 2021-08-18 DIAGNOSIS — R80.9 TYPE 2 DIABETES MELLITUS WITH MICROALBUMINURIA, WITHOUT LONG-TERM CURRENT USE OF INSULIN: Chronic | ICD-10-CM

## 2021-08-18 DIAGNOSIS — I15.2 HYPERTENSION ASSOCIATED WITH DIABETES: ICD-10-CM

## 2021-08-18 DIAGNOSIS — I77.1 TORTUOUS AORTA: ICD-10-CM

## 2021-08-18 DIAGNOSIS — I73.9 PVD (PERIPHERAL VASCULAR DISEASE): ICD-10-CM

## 2021-08-18 DIAGNOSIS — J30.1 SEASONAL ALLERGIC RHINITIS DUE TO POLLEN: ICD-10-CM

## 2021-08-18 DIAGNOSIS — I70.203 ATHEROSCLEROSIS OF ARTERY OF BOTH LOWER EXTREMITIES: Chronic | ICD-10-CM

## 2021-08-18 DIAGNOSIS — I70.0 ATHEROSCLEROSIS OF AORTA: ICD-10-CM

## 2021-08-18 DIAGNOSIS — H25.13 NUCLEAR SCLEROSIS, BILATERAL: ICD-10-CM

## 2021-08-18 DIAGNOSIS — E11.69 DYSLIPIDEMIA ASSOCIATED WITH TYPE 2 DIABETES MELLITUS: Chronic | ICD-10-CM

## 2021-08-18 DIAGNOSIS — Z00.00 ENCOUNTER FOR PREVENTIVE HEALTH EXAMINATION: ICD-10-CM

## 2021-08-18 DIAGNOSIS — H25.013 CORTICAL AGE-RELATED CATARACT OF BOTH EYES: ICD-10-CM

## 2021-08-18 DIAGNOSIS — I35.0 NONRHEUMATIC AORTIC VALVE STENOSIS: ICD-10-CM

## 2021-08-18 DIAGNOSIS — E78.5 DYSLIPIDEMIA ASSOCIATED WITH TYPE 2 DIABETES MELLITUS: Chronic | ICD-10-CM

## 2021-08-18 DIAGNOSIS — R26.9 ABNORMALITY OF GAIT AND MOBILITY: ICD-10-CM

## 2021-08-18 DIAGNOSIS — J44.9 CHRONIC OBSTRUCTIVE PULMONARY DISEASE, UNSPECIFIED COPD TYPE: ICD-10-CM

## 2021-08-18 DIAGNOSIS — R63.4 WEIGHT LOSS, ABNORMAL: ICD-10-CM

## 2021-08-18 DIAGNOSIS — N18.31 TYPE 2 DIABETES MELLITUS WITH STAGE 3A CHRONIC KIDNEY DISEASE, WITHOUT LONG-TERM CURRENT USE OF INSULIN: Chronic | ICD-10-CM

## 2021-08-18 DIAGNOSIS — E11.59 HYPERTENSION ASSOCIATED WITH DIABETES: ICD-10-CM

## 2021-08-18 DIAGNOSIS — E11.29 TYPE 2 DIABETES MELLITUS WITH MICROALBUMINURIA, WITHOUT LONG-TERM CURRENT USE OF INSULIN: Chronic | ICD-10-CM

## 2021-08-18 PROCEDURE — 99999 PR PBB SHADOW E&M-EST. PATIENT-LVL V: CPT | Mod: PBBFAC,,, | Performed by: PHYSICIAN ASSISTANT

## 2021-08-18 PROCEDURE — 99999 PR PBB SHADOW E&M-EST. PATIENT-LVL III: CPT | Mod: PBBFAC,,, | Performed by: OPTOMETRIST

## 2021-08-18 PROCEDURE — 3078F PR MOST RECENT DIASTOLIC BLOOD PRESSURE < 80 MM HG: ICD-10-PCS | Mod: CPTII,S$GLB,, | Performed by: PHYSICIAN ASSISTANT

## 2021-08-18 PROCEDURE — 1160F RVW MEDS BY RX/DR IN RCRD: CPT | Mod: CPTII,S$GLB,, | Performed by: PHYSICIAN ASSISTANT

## 2021-08-18 PROCEDURE — G0439 PR MEDICARE ANNUAL WELLNESS SUBSEQUENT VISIT: ICD-10-PCS | Mod: S$GLB,,, | Performed by: PHYSICIAN ASSISTANT

## 2021-08-18 PROCEDURE — 92014 COMPRE OPH EXAM EST PT 1/>: CPT | Mod: S$GLB,,, | Performed by: OPTOMETRIST

## 2021-08-18 PROCEDURE — 99999 PR PBB SHADOW E&M-EST. PATIENT-LVL III: ICD-10-PCS | Mod: PBBFAC,,, | Performed by: OPTOMETRIST

## 2021-08-18 PROCEDURE — 3008F BODY MASS INDEX DOCD: CPT | Mod: CPTII,S$GLB,, | Performed by: PHYSICIAN ASSISTANT

## 2021-08-18 PROCEDURE — 1160F PR REVIEW ALL MEDS BY PRESCRIBER/CLIN PHARMACIST DOCUMENTED: ICD-10-PCS | Mod: CPTII,S$GLB,, | Performed by: PHYSICIAN ASSISTANT

## 2021-08-18 PROCEDURE — 99999 PR PBB SHADOW E&M-EST. PATIENT-LVL V: ICD-10-PCS | Mod: PBBFAC,,, | Performed by: PHYSICIAN ASSISTANT

## 2021-08-18 PROCEDURE — 99499 UNLISTED E&M SERVICE: CPT | Mod: HCNC,S$GLB,, | Performed by: OPTOMETRIST

## 2021-08-18 PROCEDURE — 1101F PT FALLS ASSESS-DOCD LE1/YR: CPT | Mod: CPTII,S$GLB,, | Performed by: PHYSICIAN ASSISTANT

## 2021-08-18 PROCEDURE — 3008F PR BODY MASS INDEX (BMI) DOCUMENTED: ICD-10-PCS | Mod: CPTII,S$GLB,, | Performed by: PHYSICIAN ASSISTANT

## 2021-08-18 PROCEDURE — 99499 UNLISTED E&M SERVICE: CPT | Mod: HCNC,S$GLB,, | Performed by: PHYSICIAN ASSISTANT

## 2021-08-18 PROCEDURE — 3044F HG A1C LEVEL LT 7.0%: CPT | Mod: CPTII,S$GLB,, | Performed by: PHYSICIAN ASSISTANT

## 2021-08-18 PROCEDURE — G9919 SCRN ND POS ND PROV OF REC: HCPCS | Mod: CPTII,S$GLB,, | Performed by: PHYSICIAN ASSISTANT

## 2021-08-18 PROCEDURE — 1159F PR MEDICATION LIST DOCUMENTED IN MEDICAL RECORD: ICD-10-PCS | Mod: CPTII,S$GLB,, | Performed by: PHYSICIAN ASSISTANT

## 2021-08-18 PROCEDURE — 3074F PR MOST RECENT SYSTOLIC BLOOD PRESSURE < 130 MM HG: ICD-10-PCS | Mod: CPTII,S$GLB,, | Performed by: PHYSICIAN ASSISTANT

## 2021-08-18 PROCEDURE — 3072F LOW RISK FOR RETINOPATHY: CPT | Mod: CPTII,S$GLB,, | Performed by: PHYSICIAN ASSISTANT

## 2021-08-18 PROCEDURE — G9919 PR SCREENING AND POSITIVE: ICD-10-PCS | Mod: CPTII,S$GLB,, | Performed by: PHYSICIAN ASSISTANT

## 2021-08-18 PROCEDURE — 3044F HG A1C LEVEL LT 7.0%: CPT | Mod: CPTII,S$GLB,, | Performed by: OPTOMETRIST

## 2021-08-18 PROCEDURE — 3044F PR MOST RECENT HEMOGLOBIN A1C LEVEL <7.0%: ICD-10-PCS | Mod: CPTII,S$GLB,, | Performed by: PHYSICIAN ASSISTANT

## 2021-08-18 PROCEDURE — 3288F PR FALLS RISK ASSESSMENT DOCUMENTED: ICD-10-PCS | Mod: CPTII,S$GLB,, | Performed by: PHYSICIAN ASSISTANT

## 2021-08-18 PROCEDURE — 1126F PR PAIN SEVERITY QUANTIFIED, NO PAIN PRESENT: ICD-10-PCS | Mod: CPTII,S$GLB,, | Performed by: PHYSICIAN ASSISTANT

## 2021-08-18 PROCEDURE — 3074F SYST BP LT 130 MM HG: CPT | Mod: CPTII,S$GLB,, | Performed by: PHYSICIAN ASSISTANT

## 2021-08-18 PROCEDURE — 99499 RISK ADDL DX/OHS AUDIT: ICD-10-PCS | Mod: HCNC,S$GLB,, | Performed by: PHYSICIAN ASSISTANT

## 2021-08-18 PROCEDURE — 92014 PR EYE EXAM, EST PATIENT,COMPREHESV: ICD-10-PCS | Mod: S$GLB,,, | Performed by: OPTOMETRIST

## 2021-08-18 PROCEDURE — 1126F AMNT PAIN NOTED NONE PRSNT: CPT | Mod: CPTII,S$GLB,, | Performed by: PHYSICIAN ASSISTANT

## 2021-08-18 PROCEDURE — 2023F PR DILATED RETINAL EXAM W/O EVID OF RETINOPATHY: ICD-10-PCS | Mod: CPTII,S$GLB,, | Performed by: OPTOMETRIST

## 2021-08-18 PROCEDURE — 99499 RISK ADDL DX/OHS AUDIT: ICD-10-PCS | Mod: HCNC,S$GLB,, | Performed by: OPTOMETRIST

## 2021-08-18 PROCEDURE — 3288F FALL RISK ASSESSMENT DOCD: CPT | Mod: CPTII,S$GLB,, | Performed by: PHYSICIAN ASSISTANT

## 2021-08-18 PROCEDURE — 3072F PR LOW RISK FOR RETINOPATHY: ICD-10-PCS | Mod: CPTII,S$GLB,, | Performed by: PHYSICIAN ASSISTANT

## 2021-08-18 PROCEDURE — 3078F DIAST BP <80 MM HG: CPT | Mod: CPTII,S$GLB,, | Performed by: PHYSICIAN ASSISTANT

## 2021-08-18 PROCEDURE — G0439 PPPS, SUBSEQ VISIT: HCPCS | Mod: S$GLB,,, | Performed by: PHYSICIAN ASSISTANT

## 2021-08-18 PROCEDURE — 1101F PR PT FALLS ASSESS DOC 0-1 FALLS W/OUT INJ PAST YR: ICD-10-PCS | Mod: CPTII,S$GLB,, | Performed by: PHYSICIAN ASSISTANT

## 2021-08-18 PROCEDURE — 1159F MED LIST DOCD IN RCRD: CPT | Mod: CPTII,S$GLB,, | Performed by: PHYSICIAN ASSISTANT

## 2021-08-18 PROCEDURE — 3044F PR MOST RECENT HEMOGLOBIN A1C LEVEL <7.0%: ICD-10-PCS | Mod: CPTII,S$GLB,, | Performed by: OPTOMETRIST

## 2021-08-18 PROCEDURE — 2023F DILAT RTA XM W/O RTNOPTHY: CPT | Mod: CPTII,S$GLB,, | Performed by: OPTOMETRIST

## 2021-08-24 ENCOUNTER — LAB VISIT (OUTPATIENT)
Dept: LAB | Facility: HOSPITAL | Age: 71
End: 2021-08-24
Attending: INTERNAL MEDICINE
Payer: MEDICARE

## 2021-08-24 DIAGNOSIS — E11.22 TYPE 2 DIABETES MELLITUS WITH STAGE 3 CHRONIC KIDNEY DISEASE, WITHOUT LONG-TERM CURRENT USE OF INSULIN, UNSPECIFIED WHETHER STAGE 3A OR 3B CKD: Chronic | ICD-10-CM

## 2021-08-24 DIAGNOSIS — E11.51 TYPE 2 DIABETES MELLITUS WITH PERIPHERAL VASCULAR DISEASE: Chronic | ICD-10-CM

## 2021-08-24 DIAGNOSIS — E78.5 DYSLIPIDEMIA ASSOCIATED WITH TYPE 2 DIABETES MELLITUS: Chronic | ICD-10-CM

## 2021-08-24 DIAGNOSIS — E11.69 DYSLIPIDEMIA ASSOCIATED WITH TYPE 2 DIABETES MELLITUS: Chronic | ICD-10-CM

## 2021-08-24 DIAGNOSIS — N18.30 TYPE 2 DIABETES MELLITUS WITH STAGE 3 CHRONIC KIDNEY DISEASE, WITHOUT LONG-TERM CURRENT USE OF INSULIN, UNSPECIFIED WHETHER STAGE 3A OR 3B CKD: Chronic | ICD-10-CM

## 2021-08-24 LAB
ALBUMIN SERPL BCP-MCNC: 3.1 G/DL (ref 3.5–5.2)
ALP SERPL-CCNC: 70 U/L (ref 55–135)
ALT SERPL W/O P-5'-P-CCNC: 14 U/L (ref 10–44)
ANION GAP SERPL CALC-SCNC: 11 MMOL/L (ref 8–16)
AST SERPL-CCNC: 19 U/L (ref 10–40)
BILIRUB SERPL-MCNC: 0.2 MG/DL (ref 0.1–1)
BUN SERPL-MCNC: 19 MG/DL (ref 8–23)
CALCIUM SERPL-MCNC: 9 MG/DL (ref 8.7–10.5)
CHLORIDE SERPL-SCNC: 99 MMOL/L (ref 95–110)
CHOLEST SERPL-MCNC: 141 MG/DL (ref 120–199)
CHOLEST/HDLC SERPL: 2.1 {RATIO} (ref 2–5)
CO2 SERPL-SCNC: 27 MMOL/L (ref 23–29)
CREAT SERPL-MCNC: 1 MG/DL (ref 0.5–1.4)
EST. GFR  (AFRICAN AMERICAN): >60 ML/MIN/1.73 M^2
EST. GFR  (NON AFRICAN AMERICAN): 56 ML/MIN/1.73 M^2
ESTIMATED AVG GLUCOSE: 117 MG/DL (ref 68–131)
GLUCOSE SERPL-MCNC: 61 MG/DL (ref 70–110)
HBA1C MFR BLD: 5.7 % (ref 4–5.6)
HDLC SERPL-MCNC: 68 MG/DL (ref 40–75)
HDLC SERPL: 48.2 % (ref 20–50)
LDLC SERPL CALC-MCNC: 48.6 MG/DL (ref 63–159)
NONHDLC SERPL-MCNC: 73 MG/DL
POTASSIUM SERPL-SCNC: 3.1 MMOL/L (ref 3.5–5.1)
PROT SERPL-MCNC: 6.6 G/DL (ref 6–8.4)
SODIUM SERPL-SCNC: 137 MMOL/L (ref 136–145)
TRIGL SERPL-MCNC: 122 MG/DL (ref 30–150)

## 2021-08-24 PROCEDURE — 83036 HEMOGLOBIN GLYCOSYLATED A1C: CPT | Performed by: INTERNAL MEDICINE

## 2021-08-24 PROCEDURE — 80061 LIPID PANEL: CPT | Performed by: INTERNAL MEDICINE

## 2021-08-24 PROCEDURE — 80053 COMPREHEN METABOLIC PANEL: CPT | Performed by: INTERNAL MEDICINE

## 2021-08-24 PROCEDURE — 36415 COLL VENOUS BLD VENIPUNCTURE: CPT | Performed by: INTERNAL MEDICINE

## 2021-08-25 ENCOUNTER — TELEPHONE (OUTPATIENT)
Dept: CARDIOLOGY | Facility: CLINIC | Age: 71
End: 2021-08-25

## 2021-08-26 ENCOUNTER — TELEPHONE (OUTPATIENT)
Dept: TRANSPLANT | Facility: CLINIC | Age: 71
End: 2021-08-26

## 2021-09-07 ENCOUNTER — PATIENT MESSAGE (OUTPATIENT)
Dept: INTERNAL MEDICINE | Facility: CLINIC | Age: 71
End: 2021-09-07

## 2021-09-08 ENCOUNTER — LAB VISIT (OUTPATIENT)
Dept: LAB | Facility: HOSPITAL | Age: 71
End: 2021-09-08
Attending: FAMILY MEDICINE
Payer: MEDICARE

## 2021-09-08 DIAGNOSIS — R80.9 TYPE 2 DIABETES MELLITUS WITH MICROALBUMINURIA, WITHOUT LONG-TERM CURRENT USE OF INSULIN: ICD-10-CM

## 2021-09-08 DIAGNOSIS — E11.29 TYPE 2 DIABETES MELLITUS WITH MICROALBUMINURIA, WITHOUT LONG-TERM CURRENT USE OF INSULIN: ICD-10-CM

## 2021-09-08 LAB
ANION GAP SERPL CALC-SCNC: 12 MMOL/L (ref 8–16)
BUN SERPL-MCNC: 12 MG/DL (ref 8–23)
CALCIUM SERPL-MCNC: 9 MG/DL (ref 8.7–10.5)
CHLORIDE SERPL-SCNC: 105 MMOL/L (ref 95–110)
CO2 SERPL-SCNC: 23 MMOL/L (ref 23–29)
CREAT SERPL-MCNC: 1.1 MG/DL (ref 0.5–1.4)
EST. GFR  (AFRICAN AMERICAN): 57.6 ML/MIN/1.73 M^2
EST. GFR  (NON AFRICAN AMERICAN): 49.9 ML/MIN/1.73 M^2
GLUCOSE SERPL-MCNC: 130 MG/DL (ref 70–110)
POTASSIUM SERPL-SCNC: 4.5 MMOL/L (ref 3.5–5.1)
SODIUM SERPL-SCNC: 140 MMOL/L (ref 136–145)

## 2021-09-08 PROCEDURE — 80048 BASIC METABOLIC PNL TOTAL CA: CPT | Performed by: FAMILY MEDICINE

## 2021-09-08 PROCEDURE — 36415 COLL VENOUS BLD VENIPUNCTURE: CPT | Performed by: FAMILY MEDICINE

## 2021-09-29 ENCOUNTER — IMMUNIZATION (OUTPATIENT)
Dept: PHARMACY | Facility: CLINIC | Age: 71
End: 2021-09-29
Payer: MEDICARE

## 2021-09-29 DIAGNOSIS — Z23 NEED FOR VACCINATION: Primary | ICD-10-CM

## 2021-10-08 ENCOUNTER — IMMUNIZATION (OUTPATIENT)
Dept: INTERNAL MEDICINE | Facility: CLINIC | Age: 71
End: 2021-10-08
Payer: MEDICARE

## 2021-10-08 PROCEDURE — G0008 ADMIN INFLUENZA VIRUS VAC: HCPCS | Mod: HCNC,S$GLB,, | Performed by: FAMILY MEDICINE

## 2021-10-08 PROCEDURE — G0008 FLU VACCINE - QUADRIVALENT (RECOMBINANT) PRESERVATIVE FREE: ICD-10-PCS | Mod: HCNC,S$GLB,, | Performed by: FAMILY MEDICINE

## 2021-10-08 PROCEDURE — 90682 RIV4 VACC RECOMBINANT DNA IM: CPT | Mod: HCNC,S$GLB,, | Performed by: FAMILY MEDICINE

## 2021-10-08 PROCEDURE — 90682 FLU VACCINE - QUADRIVALENT (RECOMBINANT) PRESERVATIVE FREE: ICD-10-PCS | Mod: HCNC,S$GLB,, | Performed by: FAMILY MEDICINE

## 2021-10-25 DIAGNOSIS — M81.0 AGE-RELATED OSTEOPOROSIS WITHOUT CURRENT PATHOLOGICAL FRACTURE: ICD-10-CM

## 2021-10-27 RX ORDER — ASPIRIN 81 MG/1
TABLET ORAL
Qty: 90 TABLET | Refills: 0 | Status: SHIPPED | OUTPATIENT
Start: 2021-10-27 | End: 2021-12-01 | Stop reason: SDUPTHER

## 2021-10-27 RX ORDER — MULTIVITAMIN
2 TABLET ORAL DAILY
Qty: 180 TABLET | Refills: 0 | Status: SHIPPED | OUTPATIENT
Start: 2021-10-27 | End: 2021-12-01 | Stop reason: SDUPTHER

## 2021-11-02 ENCOUNTER — OFFICE VISIT (OUTPATIENT)
Dept: CARDIOTHORACIC SURGERY | Facility: CLINIC | Age: 71
End: 2021-11-02
Payer: MEDICARE

## 2021-11-02 ENCOUNTER — HOSPITAL ENCOUNTER (OUTPATIENT)
Dept: RADIOLOGY | Facility: HOSPITAL | Age: 71
Discharge: HOME | End: 2021-11-02
Attending: THORACIC SURGERY (CARDIOTHORACIC VASCULAR SURGERY)
Payer: MEDICARE

## 2021-11-02 VITALS
SYSTOLIC BLOOD PRESSURE: 98 MMHG | HEIGHT: 62 IN | HEART RATE: 99 BPM | WEIGHT: 142 LBS | TEMPERATURE: 98 F | DIASTOLIC BLOOD PRESSURE: 56 MMHG | BODY MASS INDEX: 26.13 KG/M2

## 2021-11-02 DIAGNOSIS — R53.83 FATIGUE, UNSPECIFIED TYPE: ICD-10-CM

## 2021-11-02 DIAGNOSIS — C34.92 NON-SMALL CELL CANCER OF LEFT LUNG: ICD-10-CM

## 2021-11-02 DIAGNOSIS — Z85.118 HISTORY OF LUNG CANCER: ICD-10-CM

## 2021-11-02 PROCEDURE — 99213 PR OFFICE/OUTPT VISIT, EST, LEVL III, 20-29 MIN: ICD-10-PCS | Mod: HCNC,S$GLB,, | Performed by: THORACIC SURGERY (CARDIOTHORACIC VASCULAR SURGERY)

## 2021-11-02 PROCEDURE — 1159F MED LIST DOCD IN RCRD: CPT | Mod: HCNC,CPTII,S$GLB, | Performed by: THORACIC SURGERY (CARDIOTHORACIC VASCULAR SURGERY)

## 2021-11-02 PROCEDURE — 3078F PR MOST RECENT DIASTOLIC BLOOD PRESSURE < 80 MM HG: ICD-10-PCS | Mod: HCNC,CPTII,S$GLB, | Performed by: THORACIC SURGERY (CARDIOTHORACIC VASCULAR SURGERY)

## 2021-11-02 PROCEDURE — 3008F PR BODY MASS INDEX (BMI) DOCUMENTED: ICD-10-PCS | Mod: HCNC,CPTII,S$GLB, | Performed by: THORACIC SURGERY (CARDIOTHORACIC VASCULAR SURGERY)

## 2021-11-02 PROCEDURE — 3044F HG A1C LEVEL LT 7.0%: CPT | Mod: HCNC,CPTII,S$GLB, | Performed by: THORACIC SURGERY (CARDIOTHORACIC VASCULAR SURGERY)

## 2021-11-02 PROCEDURE — 1160F PR REVIEW ALL MEDS BY PRESCRIBER/CLIN PHARMACIST DOCUMENTED: ICD-10-PCS | Mod: HCNC,CPTII,S$GLB, | Performed by: THORACIC SURGERY (CARDIOTHORACIC VASCULAR SURGERY)

## 2021-11-02 PROCEDURE — 3066F PR DOCUMENTATION OF TREATMENT FOR NEPHROPATHY: ICD-10-PCS | Mod: HCNC,CPTII,S$GLB, | Performed by: THORACIC SURGERY (CARDIOTHORACIC VASCULAR SURGERY)

## 2021-11-02 PROCEDURE — 1126F AMNT PAIN NOTED NONE PRSNT: CPT | Mod: HCNC,CPTII,S$GLB, | Performed by: THORACIC SURGERY (CARDIOTHORACIC VASCULAR SURGERY)

## 2021-11-02 PROCEDURE — 3074F SYST BP LT 130 MM HG: CPT | Mod: HCNC,CPTII,S$GLB, | Performed by: THORACIC SURGERY (CARDIOTHORACIC VASCULAR SURGERY)

## 2021-11-02 PROCEDURE — 3044F PR MOST RECENT HEMOGLOBIN A1C LEVEL <7.0%: ICD-10-PCS | Mod: HCNC,CPTII,S$GLB, | Performed by: THORACIC SURGERY (CARDIOTHORACIC VASCULAR SURGERY)

## 2021-11-02 PROCEDURE — 99999 PR PBB SHADOW E&M-EST. PATIENT-LVL V: ICD-10-PCS | Mod: PBBFAC,HCNC,, | Performed by: THORACIC SURGERY (CARDIOTHORACIC VASCULAR SURGERY)

## 2021-11-02 PROCEDURE — 1160F RVW MEDS BY RX/DR IN RCRD: CPT | Mod: HCNC,CPTII,S$GLB, | Performed by: THORACIC SURGERY (CARDIOTHORACIC VASCULAR SURGERY)

## 2021-11-02 PROCEDURE — 3066F NEPHROPATHY DOC TX: CPT | Mod: HCNC,CPTII,S$GLB, | Performed by: THORACIC SURGERY (CARDIOTHORACIC VASCULAR SURGERY)

## 2021-11-02 PROCEDURE — 3072F LOW RISK FOR RETINOPATHY: CPT | Mod: HCNC,CPTII,S$GLB, | Performed by: THORACIC SURGERY (CARDIOTHORACIC VASCULAR SURGERY)

## 2021-11-02 PROCEDURE — 3072F PR LOW RISK FOR RETINOPATHY: ICD-10-PCS | Mod: HCNC,CPTII,S$GLB, | Performed by: THORACIC SURGERY (CARDIOTHORACIC VASCULAR SURGERY)

## 2021-11-02 PROCEDURE — 4010F ACE/ARB THERAPY RXD/TAKEN: CPT | Mod: HCNC,CPTII,S$GLB, | Performed by: THORACIC SURGERY (CARDIOTHORACIC VASCULAR SURGERY)

## 2021-11-02 PROCEDURE — 99213 OFFICE O/P EST LOW 20 MIN: CPT | Mod: HCNC,S$GLB,, | Performed by: THORACIC SURGERY (CARDIOTHORACIC VASCULAR SURGERY)

## 2021-11-02 PROCEDURE — 4010F PR ACE/ARB THEARPY RXD/TAKEN: ICD-10-PCS | Mod: HCNC,CPTII,S$GLB, | Performed by: THORACIC SURGERY (CARDIOTHORACIC VASCULAR SURGERY)

## 2021-11-02 PROCEDURE — 71250 CT THORAX DX C-: CPT | Mod: TC,HCNC

## 2021-11-02 PROCEDURE — 1126F PR PAIN SEVERITY QUANTIFIED, NO PAIN PRESENT: ICD-10-PCS | Mod: HCNC,CPTII,S$GLB, | Performed by: THORACIC SURGERY (CARDIOTHORACIC VASCULAR SURGERY)

## 2021-11-02 PROCEDURE — 3074F PR MOST RECENT SYSTOLIC BLOOD PRESSURE < 130 MM HG: ICD-10-PCS | Mod: HCNC,CPTII,S$GLB, | Performed by: THORACIC SURGERY (CARDIOTHORACIC VASCULAR SURGERY)

## 2021-11-02 PROCEDURE — 3008F BODY MASS INDEX DOCD: CPT | Mod: HCNC,CPTII,S$GLB, | Performed by: THORACIC SURGERY (CARDIOTHORACIC VASCULAR SURGERY)

## 2021-11-02 PROCEDURE — 99999 PR PBB SHADOW E&M-EST. PATIENT-LVL V: CPT | Mod: PBBFAC,HCNC,, | Performed by: THORACIC SURGERY (CARDIOTHORACIC VASCULAR SURGERY)

## 2021-11-02 PROCEDURE — 3078F DIAST BP <80 MM HG: CPT | Mod: HCNC,CPTII,S$GLB, | Performed by: THORACIC SURGERY (CARDIOTHORACIC VASCULAR SURGERY)

## 2021-11-02 PROCEDURE — 3061F PR NEG MICROALBUMINURIA RESULT DOCUMENTED/REVIEW: ICD-10-PCS | Mod: HCNC,CPTII,S$GLB, | Performed by: THORACIC SURGERY (CARDIOTHORACIC VASCULAR SURGERY)

## 2021-11-02 PROCEDURE — 1159F PR MEDICATION LIST DOCUMENTED IN MEDICAL RECORD: ICD-10-PCS | Mod: HCNC,CPTII,S$GLB, | Performed by: THORACIC SURGERY (CARDIOTHORACIC VASCULAR SURGERY)

## 2021-11-02 PROCEDURE — 3061F NEG MICROALBUMINURIA REV: CPT | Mod: HCNC,CPTII,S$GLB, | Performed by: THORACIC SURGERY (CARDIOTHORACIC VASCULAR SURGERY)

## 2021-11-09 ENCOUNTER — PATIENT MESSAGE (OUTPATIENT)
Dept: OTHER | Facility: OTHER | Age: 71
End: 2021-11-09
Payer: MEDICARE

## 2021-11-22 ENCOUNTER — LAB VISIT (OUTPATIENT)
Dept: LAB | Facility: HOSPITAL | Age: 71
End: 2021-11-22
Attending: INTERNAL MEDICINE
Payer: MEDICARE

## 2021-11-22 DIAGNOSIS — E78.5 DYSLIPIDEMIA ASSOCIATED WITH TYPE 2 DIABETES MELLITUS: Chronic | ICD-10-CM

## 2021-11-22 DIAGNOSIS — E11.51 TYPE 2 DIABETES MELLITUS WITH PERIPHERAL VASCULAR DISEASE: Chronic | ICD-10-CM

## 2021-11-22 DIAGNOSIS — E11.69 DYSLIPIDEMIA ASSOCIATED WITH TYPE 2 DIABETES MELLITUS: Chronic | ICD-10-CM

## 2021-11-22 LAB
ALBUMIN SERPL BCP-MCNC: 3.5 G/DL (ref 3.5–5.2)
ALP SERPL-CCNC: 113 U/L (ref 55–135)
ALT SERPL W/O P-5'-P-CCNC: 10 U/L (ref 10–44)
ANION GAP SERPL CALC-SCNC: 7 MMOL/L (ref 8–16)
AST SERPL-CCNC: 18 U/L (ref 10–40)
BILIRUB SERPL-MCNC: 0.3 MG/DL (ref 0.1–1)
BUN SERPL-MCNC: 17 MG/DL (ref 8–23)
CALCIUM SERPL-MCNC: 9.8 MG/DL (ref 8.7–10.5)
CHLORIDE SERPL-SCNC: 107 MMOL/L (ref 95–110)
CHOLEST SERPL-MCNC: 142 MG/DL (ref 120–199)
CHOLEST/HDLC SERPL: 2.4 {RATIO} (ref 2–5)
CO2 SERPL-SCNC: 28 MMOL/L (ref 23–29)
CREAT SERPL-MCNC: 1.1 MG/DL (ref 0.5–1.4)
EST. GFR  (AFRICAN AMERICAN): 57.6 ML/MIN/1.73 M^2
EST. GFR  (NON AFRICAN AMERICAN): 49.9 ML/MIN/1.73 M^2
ESTIMATED AVG GLUCOSE: 134 MG/DL (ref 68–131)
GLUCOSE SERPL-MCNC: 79 MG/DL (ref 70–110)
HBA1C MFR BLD: 6.3 % (ref 4–5.6)
HDLC SERPL-MCNC: 59 MG/DL (ref 40–75)
HDLC SERPL: 41.5 % (ref 20–50)
LDLC SERPL CALC-MCNC: 70.4 MG/DL (ref 63–159)
NONHDLC SERPL-MCNC: 83 MG/DL
POTASSIUM SERPL-SCNC: 4.5 MMOL/L (ref 3.5–5.1)
PROT SERPL-MCNC: 6.7 G/DL (ref 6–8.4)
SODIUM SERPL-SCNC: 142 MMOL/L (ref 136–145)
TRIGL SERPL-MCNC: 63 MG/DL (ref 30–150)

## 2021-11-22 PROCEDURE — 80053 COMPREHEN METABOLIC PANEL: CPT | Mod: HCNC | Performed by: INTERNAL MEDICINE

## 2021-11-22 PROCEDURE — 80061 LIPID PANEL: CPT | Mod: HCNC | Performed by: INTERNAL MEDICINE

## 2021-11-22 PROCEDURE — 36415 COLL VENOUS BLD VENIPUNCTURE: CPT | Mod: HCNC | Performed by: INTERNAL MEDICINE

## 2021-11-22 PROCEDURE — 83036 HEMOGLOBIN GLYCOSYLATED A1C: CPT | Mod: HCNC | Performed by: INTERNAL MEDICINE

## 2021-12-01 ENCOUNTER — CLINICAL SUPPORT (OUTPATIENT)
Dept: PULMONOLOGY | Facility: CLINIC | Age: 71
End: 2021-12-01
Payer: MEDICARE

## 2021-12-01 ENCOUNTER — OFFICE VISIT (OUTPATIENT)
Dept: INTERNAL MEDICINE | Facility: CLINIC | Age: 71
End: 2021-12-01
Payer: MEDICARE

## 2021-12-01 VITALS
WEIGHT: 141.31 LBS | DIASTOLIC BLOOD PRESSURE: 60 MMHG | TEMPERATURE: 98 F | OXYGEN SATURATION: 98 % | SYSTOLIC BLOOD PRESSURE: 120 MMHG | HEIGHT: 62 IN | HEART RATE: 94 BPM | BODY MASS INDEX: 26.01 KG/M2

## 2021-12-01 DIAGNOSIS — K21.9 GASTROESOPHAGEAL REFLUX DISEASE WITHOUT ESOPHAGITIS: Chronic | ICD-10-CM

## 2021-12-01 DIAGNOSIS — R80.9 TYPE 2 DIABETES MELLITUS WITH MICROALBUMINURIA, WITHOUT LONG-TERM CURRENT USE OF INSULIN: ICD-10-CM

## 2021-12-01 DIAGNOSIS — I70.213 ATHEROSCLEROSIS OF NATIVE ARTERY OF BOTH LOWER EXTREMITIES WITH INTERMITTENT CLAUDICATION: Primary | ICD-10-CM

## 2021-12-01 DIAGNOSIS — E11.22 TYPE 2 DIABETES MELLITUS WITH STAGE 3A CHRONIC KIDNEY DISEASE, WITHOUT LONG-TERM CURRENT USE OF INSULIN: Chronic | ICD-10-CM

## 2021-12-01 DIAGNOSIS — E11.29 TYPE 2 DIABETES MELLITUS WITH MICROALBUMINURIA, WITHOUT LONG-TERM CURRENT USE OF INSULIN: ICD-10-CM

## 2021-12-01 DIAGNOSIS — G44.209 TENSION HEADACHE: ICD-10-CM

## 2021-12-01 DIAGNOSIS — Z98.62 S/P PERIPHERAL ARTERY ANGIOPLASTY: ICD-10-CM

## 2021-12-01 DIAGNOSIS — I25.10 ATHEROSCLEROSIS OF NATIVE CORONARY ARTERY OF NATIVE HEART WITHOUT ANGINA PECTORIS: ICD-10-CM

## 2021-12-01 DIAGNOSIS — C34.92 NON-SMALL CELL CANCER OF LEFT LUNG: ICD-10-CM

## 2021-12-01 DIAGNOSIS — E11.69 DYSLIPIDEMIA ASSOCIATED WITH TYPE 2 DIABETES MELLITUS: Chronic | ICD-10-CM

## 2021-12-01 DIAGNOSIS — M81.0 AGE-RELATED OSTEOPOROSIS WITHOUT CURRENT PATHOLOGICAL FRACTURE: ICD-10-CM

## 2021-12-01 DIAGNOSIS — J30.1 SEASONAL ALLERGIC RHINITIS DUE TO POLLEN: ICD-10-CM

## 2021-12-01 DIAGNOSIS — I10 ESSENTIAL HYPERTENSION: Chronic | ICD-10-CM

## 2021-12-01 DIAGNOSIS — N18.31 TYPE 2 DIABETES MELLITUS WITH STAGE 3A CHRONIC KIDNEY DISEASE, WITHOUT LONG-TERM CURRENT USE OF INSULIN: Chronic | ICD-10-CM

## 2021-12-01 DIAGNOSIS — E78.5 DYSLIPIDEMIA ASSOCIATED WITH TYPE 2 DIABETES MELLITUS: Chronic | ICD-10-CM

## 2021-12-01 DIAGNOSIS — J44.9 CHRONIC OBSTRUCTIVE PULMONARY DISEASE, UNSPECIFIED COPD TYPE: ICD-10-CM

## 2021-12-01 PROCEDURE — 99999 PR PBB SHADOW E&M-EST. PATIENT-LVL I: CPT | Mod: PBBFAC,HCNC,,

## 2021-12-01 PROCEDURE — 3061F NEG MICROALBUMINURIA REV: CPT | Mod: HCNC,CPTII,S$GLB, | Performed by: FAMILY MEDICINE

## 2021-12-01 PROCEDURE — 99214 OFFICE O/P EST MOD 30 MIN: CPT | Mod: HCNC,S$GLB,, | Performed by: FAMILY MEDICINE

## 2021-12-01 PROCEDURE — 3072F PR LOW RISK FOR RETINOPATHY: ICD-10-PCS | Mod: HCNC,CPTII,S$GLB, | Performed by: FAMILY MEDICINE

## 2021-12-01 PROCEDURE — 99214 PR OFFICE/OUTPT VISIT, EST, LEVL IV, 30-39 MIN: ICD-10-PCS | Mod: HCNC,S$GLB,, | Performed by: FAMILY MEDICINE

## 2021-12-01 PROCEDURE — 3066F PR DOCUMENTATION OF TREATMENT FOR NEPHROPATHY: ICD-10-PCS | Mod: HCNC,CPTII,S$GLB, | Performed by: FAMILY MEDICINE

## 2021-12-01 PROCEDURE — 99999 PR PBB SHADOW E&M-EST. PATIENT-LVL V: CPT | Mod: PBBFAC,HCNC,, | Performed by: FAMILY MEDICINE

## 2021-12-01 PROCEDURE — 94010 BREATHING CAPACITY TEST: CPT | Mod: HCNC,S$GLB,, | Performed by: INTERNAL MEDICINE

## 2021-12-01 PROCEDURE — 3066F NEPHROPATHY DOC TX: CPT | Mod: HCNC,CPTII,S$GLB, | Performed by: FAMILY MEDICINE

## 2021-12-01 PROCEDURE — 3061F PR NEG MICROALBUMINURIA RESULT DOCUMENTED/REVIEW: ICD-10-PCS | Mod: HCNC,CPTII,S$GLB, | Performed by: FAMILY MEDICINE

## 2021-12-01 PROCEDURE — 4010F PR ACE/ARB THEARPY RXD/TAKEN: ICD-10-PCS | Mod: HCNC,CPTII,S$GLB, | Performed by: FAMILY MEDICINE

## 2021-12-01 PROCEDURE — 99999 PR PBB SHADOW E&M-EST. PATIENT-LVL V: ICD-10-PCS | Mod: PBBFAC,HCNC,, | Performed by: FAMILY MEDICINE

## 2021-12-01 PROCEDURE — 99499 RISK ADDL DX/OHS AUDIT: ICD-10-PCS | Mod: S$GLB,,, | Performed by: FAMILY MEDICINE

## 2021-12-01 PROCEDURE — 99499 UNLISTED E&M SERVICE: CPT | Mod: S$GLB,,, | Performed by: FAMILY MEDICINE

## 2021-12-01 PROCEDURE — 94010 BREATHING CAPACITY TEST: ICD-10-PCS | Mod: HCNC,S$GLB,, | Performed by: INTERNAL MEDICINE

## 2021-12-01 PROCEDURE — 4010F ACE/ARB THERAPY RXD/TAKEN: CPT | Mod: HCNC,CPTII,S$GLB, | Performed by: FAMILY MEDICINE

## 2021-12-01 PROCEDURE — 99999 PR PBB SHADOW E&M-EST. PATIENT-LVL I: ICD-10-PCS | Mod: PBBFAC,HCNC,,

## 2021-12-01 PROCEDURE — 3072F LOW RISK FOR RETINOPATHY: CPT | Mod: HCNC,CPTII,S$GLB, | Performed by: FAMILY MEDICINE

## 2021-12-01 RX ORDER — PANTOPRAZOLE SODIUM 40 MG/1
40 TABLET, DELAYED RELEASE ORAL DAILY
Qty: 90 TABLET | Refills: 4 | Status: SHIPPED | OUTPATIENT
Start: 2021-12-01 | End: 2022-06-03 | Stop reason: SDUPTHER

## 2021-12-01 RX ORDER — CHLORTHALIDONE 25 MG/1
25 TABLET ORAL DAILY
Qty: 90 TABLET | Refills: 4 | Status: SHIPPED | OUTPATIENT
Start: 2021-12-01 | End: 2022-06-03 | Stop reason: SDUPTHER

## 2021-12-01 RX ORDER — BUTALBITAL, ACETAMINOPHEN AND CAFFEINE 50; 325; 40 MG/1; MG/1; MG/1
TABLET ORAL
Qty: 30 TABLET | Refills: 3 | Status: SHIPPED | OUTPATIENT
Start: 2021-12-01 | End: 2022-06-03 | Stop reason: SDUPTHER

## 2021-12-01 RX ORDER — EZETIMIBE 10 MG/1
10 TABLET ORAL DAILY
Qty: 90 TABLET | Refills: 4 | Status: SHIPPED | OUTPATIENT
Start: 2021-12-01 | End: 2022-06-03 | Stop reason: SDUPTHER

## 2021-12-01 RX ORDER — ASPIRIN 81 MG/1
81 TABLET ORAL DAILY
Qty: 90 TABLET | Refills: 4 | Status: SHIPPED | OUTPATIENT
Start: 2021-12-01 | End: 2022-02-16 | Stop reason: SINTOL

## 2021-12-01 RX ORDER — AMLODIPINE AND BENAZEPRIL HYDROCHLORIDE 5; 20 MG/1; MG/1
1 CAPSULE ORAL 2 TIMES DAILY
Qty: 180 CAPSULE | Refills: 4 | Status: SHIPPED | OUTPATIENT
Start: 2021-12-01 | End: 2022-06-03 | Stop reason: SDUPTHER

## 2021-12-01 RX ORDER — CETIRIZINE HYDROCHLORIDE 10 MG/1
10 TABLET ORAL DAILY
Qty: 90 TABLET | Refills: 4 | Status: SHIPPED | OUTPATIENT
Start: 2021-12-01

## 2021-12-01 RX ORDER — MULTIVITAMIN
2 TABLET ORAL DAILY
Qty: 180 TABLET | Refills: 4 | Status: SHIPPED | OUTPATIENT
Start: 2021-12-01 | End: 2022-06-03 | Stop reason: SDUPTHER

## 2021-12-01 RX ORDER — ROSUVASTATIN CALCIUM 20 MG/1
20 TABLET, COATED ORAL DAILY
Qty: 90 TABLET | Refills: 4 | Status: SHIPPED | OUTPATIENT
Start: 2021-12-01 | End: 2022-06-03 | Stop reason: SDUPTHER

## 2021-12-01 RX ORDER — POTASSIUM CHLORIDE 20 MEQ/1
20 TABLET, EXTENDED RELEASE ORAL DAILY
Qty: 90 TABLET | Refills: 4 | Status: SHIPPED | OUTPATIENT
Start: 2021-12-01 | End: 2022-06-03 | Stop reason: SDUPTHER

## 2021-12-02 ENCOUNTER — PATIENT OUTREACH (OUTPATIENT)
Dept: ADMINISTRATIVE | Facility: OTHER | Age: 71
End: 2021-12-02
Payer: MEDICARE

## 2021-12-02 LAB
BRPFT: NORMAL
FEF 25 75 LLN: 0.53
FEF 25 75 PRE REF: 33.2 %
FEF 25 75 REF: 1.46
FEV1 FVC LLN: 65
FEV1 FVC PRE REF: 75.7 %
FEV1 FVC REF: 78
FEV1 LLN: 1.15
FEV1 PRE REF: 67.5 %
FEV1 REF: 1.68
FVC LLN: 1.5
FVC PRE REF: 88.5 %
FVC REF: 2.16
PEF LLN: 2.37
PEF PRE REF: 72.2 %
PEF REF: 4.24
PRE FEF 25 75: 0.49 L/S
PRE FET 100: 8.58 SEC
PRE FEV1 FVC: 59.37 %
PRE FEV1: 1.13 L
PRE FVC: 1.91 L
PRE PEF: 3.06 L/S

## 2021-12-03 ENCOUNTER — OFFICE VISIT (OUTPATIENT)
Dept: CARDIOLOGY | Facility: CLINIC | Age: 71
End: 2021-12-03
Payer: MEDICARE

## 2021-12-03 VITALS
WEIGHT: 140.44 LBS | HEIGHT: 62 IN | DIASTOLIC BLOOD PRESSURE: 52 MMHG | SYSTOLIC BLOOD PRESSURE: 116 MMHG | OXYGEN SATURATION: 96 % | BODY MASS INDEX: 25.84 KG/M2 | HEART RATE: 84 BPM

## 2021-12-03 DIAGNOSIS — I15.2 HYPERTENSION ASSOCIATED WITH DIABETES: ICD-10-CM

## 2021-12-03 DIAGNOSIS — K21.9 GASTROESOPHAGEAL REFLUX DISEASE WITHOUT ESOPHAGITIS: Chronic | ICD-10-CM

## 2021-12-03 DIAGNOSIS — I25.10 ATHEROSCLEROSIS OF NATIVE CORONARY ARTERY OF NATIVE HEART WITHOUT ANGINA PECTORIS: ICD-10-CM

## 2021-12-03 DIAGNOSIS — I73.9 PERIPHERAL VASCULAR DISEASE, UNSPECIFIED: ICD-10-CM

## 2021-12-03 DIAGNOSIS — E11.29 TYPE 2 DIABETES MELLITUS WITH MICROALBUMINURIA, WITHOUT LONG-TERM CURRENT USE OF INSULIN: Chronic | ICD-10-CM

## 2021-12-03 DIAGNOSIS — E11.69 DYSLIPIDEMIA ASSOCIATED WITH TYPE 2 DIABETES MELLITUS: Chronic | ICD-10-CM

## 2021-12-03 DIAGNOSIS — J44.9 CHRONIC OBSTRUCTIVE PULMONARY DISEASE, UNSPECIFIED COPD TYPE: ICD-10-CM

## 2021-12-03 DIAGNOSIS — N18.31 TYPE 2 DIABETES MELLITUS WITH STAGE 3A CHRONIC KIDNEY DISEASE, WITHOUT LONG-TERM CURRENT USE OF INSULIN: Chronic | ICD-10-CM

## 2021-12-03 DIAGNOSIS — I77.1 TORTUOUS AORTA: ICD-10-CM

## 2021-12-03 DIAGNOSIS — C34.92 NON-SMALL CELL CANCER OF LEFT LUNG: ICD-10-CM

## 2021-12-03 DIAGNOSIS — E11.22 TYPE 2 DIABETES MELLITUS WITH STAGE 3A CHRONIC KIDNEY DISEASE, WITHOUT LONG-TERM CURRENT USE OF INSULIN: Chronic | ICD-10-CM

## 2021-12-03 DIAGNOSIS — R80.9 TYPE 2 DIABETES MELLITUS WITH MICROALBUMINURIA, WITHOUT LONG-TERM CURRENT USE OF INSULIN: Chronic | ICD-10-CM

## 2021-12-03 DIAGNOSIS — G44.209 TENSION HEADACHE: ICD-10-CM

## 2021-12-03 DIAGNOSIS — I70.213 ATHEROSCLEROSIS OF NATIVE ARTERY OF BOTH LOWER EXTREMITIES WITH INTERMITTENT CLAUDICATION: Primary | Chronic | ICD-10-CM

## 2021-12-03 DIAGNOSIS — I70.0 ATHEROSCLEROSIS OF AORTA: ICD-10-CM

## 2021-12-03 DIAGNOSIS — Z98.62 S/P PERIPHERAL ARTERY ANGIOPLASTY: ICD-10-CM

## 2021-12-03 DIAGNOSIS — E78.5 DYSLIPIDEMIA ASSOCIATED WITH TYPE 2 DIABETES MELLITUS: Chronic | ICD-10-CM

## 2021-12-03 DIAGNOSIS — E11.59 HYPERTENSION ASSOCIATED WITH DIABETES: ICD-10-CM

## 2021-12-03 DIAGNOSIS — E11.51 TYPE 2 DIABETES MELLITUS WITH PERIPHERAL VASCULAR DISEASE: Chronic | ICD-10-CM

## 2021-12-03 DIAGNOSIS — G47.33 OSA (OBSTRUCTIVE SLEEP APNEA): ICD-10-CM

## 2021-12-03 PROCEDURE — 99999 PR PBB SHADOW E&M-EST. PATIENT-LVL V: ICD-10-PCS | Mod: PBBFAC,HCNC,, | Performed by: INTERNAL MEDICINE

## 2021-12-03 PROCEDURE — 3072F PR LOW RISK FOR RETINOPATHY: ICD-10-PCS | Mod: HCNC,CPTII,S$GLB, | Performed by: INTERNAL MEDICINE

## 2021-12-03 PROCEDURE — 99214 PR OFFICE/OUTPT VISIT, EST, LEVL IV, 30-39 MIN: ICD-10-PCS | Mod: HCNC,S$GLB,, | Performed by: INTERNAL MEDICINE

## 2021-12-03 PROCEDURE — 3061F NEG MICROALBUMINURIA REV: CPT | Mod: HCNC,CPTII,S$GLB, | Performed by: INTERNAL MEDICINE

## 2021-12-03 PROCEDURE — 99499 UNLISTED E&M SERVICE: CPT | Mod: HCNC,S$GLB,, | Performed by: INTERNAL MEDICINE

## 2021-12-03 PROCEDURE — 99999 PR PBB SHADOW E&M-EST. PATIENT-LVL V: CPT | Mod: PBBFAC,HCNC,, | Performed by: INTERNAL MEDICINE

## 2021-12-03 PROCEDURE — 99499 RISK ADDL DX/OHS AUDIT: ICD-10-PCS | Mod: HCNC,S$GLB,, | Performed by: INTERNAL MEDICINE

## 2021-12-03 PROCEDURE — 3066F NEPHROPATHY DOC TX: CPT | Mod: HCNC,CPTII,S$GLB, | Performed by: INTERNAL MEDICINE

## 2021-12-03 PROCEDURE — 99214 OFFICE O/P EST MOD 30 MIN: CPT | Mod: HCNC,S$GLB,, | Performed by: INTERNAL MEDICINE

## 2021-12-03 PROCEDURE — 4010F PR ACE/ARB THEARPY RXD/TAKEN: ICD-10-PCS | Mod: HCNC,CPTII,S$GLB, | Performed by: INTERNAL MEDICINE

## 2021-12-03 PROCEDURE — 3066F PR DOCUMENTATION OF TREATMENT FOR NEPHROPATHY: ICD-10-PCS | Mod: HCNC,CPTII,S$GLB, | Performed by: INTERNAL MEDICINE

## 2021-12-03 PROCEDURE — 4010F ACE/ARB THERAPY RXD/TAKEN: CPT | Mod: HCNC,CPTII,S$GLB, | Performed by: INTERNAL MEDICINE

## 2021-12-03 PROCEDURE — 3072F LOW RISK FOR RETINOPATHY: CPT | Mod: HCNC,CPTII,S$GLB, | Performed by: INTERNAL MEDICINE

## 2021-12-03 PROCEDURE — 3061F PR NEG MICROALBUMINURIA RESULT DOCUMENTED/REVIEW: ICD-10-PCS | Mod: HCNC,CPTII,S$GLB, | Performed by: INTERNAL MEDICINE

## 2021-12-03 RX ORDER — CILOSTAZOL 50 MG/1
50 TABLET ORAL 2 TIMES DAILY
Qty: 60 TABLET | Refills: 11 | Status: SHIPPED | OUTPATIENT
Start: 2021-12-03 | End: 2022-02-16

## 2021-12-13 ENCOUNTER — OFFICE VISIT (OUTPATIENT)
Dept: PULMONOLOGY | Facility: CLINIC | Age: 71
End: 2021-12-13
Payer: MEDICARE

## 2021-12-13 VITALS
WEIGHT: 143.5 LBS | OXYGEN SATURATION: 97 % | BODY MASS INDEX: 27.09 KG/M2 | RESPIRATION RATE: 18 BRPM | SYSTOLIC BLOOD PRESSURE: 112 MMHG | HEIGHT: 61 IN | HEART RATE: 87 BPM | DIASTOLIC BLOOD PRESSURE: 40 MMHG

## 2021-12-13 DIAGNOSIS — J44.1 COPD EXACERBATION: Primary | ICD-10-CM

## 2021-12-13 DIAGNOSIS — J30.1 SEASONAL ALLERGIC RHINITIS DUE TO POLLEN: ICD-10-CM

## 2021-12-13 DIAGNOSIS — R91.1 SOLITARY PULMONARY NODULE: ICD-10-CM

## 2021-12-13 DIAGNOSIS — J44.9 CHRONIC OBSTRUCTIVE PULMONARY DISEASE, UNSPECIFIED COPD TYPE: ICD-10-CM

## 2021-12-13 PROCEDURE — 99214 PR OFFICE/OUTPT VISIT, EST, LEVL IV, 30-39 MIN: ICD-10-PCS | Mod: 25,HCNC,S$GLB, | Performed by: INTERNAL MEDICINE

## 2021-12-13 PROCEDURE — 3061F NEG MICROALBUMINURIA REV: CPT | Mod: HCNC,CPTII,S$GLB, | Performed by: INTERNAL MEDICINE

## 2021-12-13 PROCEDURE — 3072F PR LOW RISK FOR RETINOPATHY: ICD-10-PCS | Mod: HCNC,CPTII,S$GLB, | Performed by: INTERNAL MEDICINE

## 2021-12-13 PROCEDURE — 99499 RISK ADDL DX/OHS AUDIT: ICD-10-PCS | Mod: HCNC,S$GLB,, | Performed by: INTERNAL MEDICINE

## 2021-12-13 PROCEDURE — 99999 PR PBB SHADOW E&M-EST. PATIENT-LVL V: CPT | Mod: PBBFAC,HCNC,, | Performed by: INTERNAL MEDICINE

## 2021-12-13 PROCEDURE — 99214 OFFICE O/P EST MOD 30 MIN: CPT | Mod: 25,HCNC,S$GLB, | Performed by: INTERNAL MEDICINE

## 2021-12-13 PROCEDURE — 99999 PR PBB SHADOW E&M-EST. PATIENT-LVL V: ICD-10-PCS | Mod: PBBFAC,HCNC,, | Performed by: INTERNAL MEDICINE

## 2021-12-13 PROCEDURE — 4010F PR ACE/ARB THEARPY RXD/TAKEN: ICD-10-PCS | Mod: HCNC,CPTII,S$GLB, | Performed by: INTERNAL MEDICINE

## 2021-12-13 PROCEDURE — 99499 UNLISTED E&M SERVICE: CPT | Mod: HCNC,S$GLB,, | Performed by: INTERNAL MEDICINE

## 2021-12-13 PROCEDURE — 4010F ACE/ARB THERAPY RXD/TAKEN: CPT | Mod: HCNC,CPTII,S$GLB, | Performed by: INTERNAL MEDICINE

## 2021-12-13 PROCEDURE — 3066F NEPHROPATHY DOC TX: CPT | Mod: HCNC,CPTII,S$GLB, | Performed by: INTERNAL MEDICINE

## 2021-12-13 PROCEDURE — 3072F LOW RISK FOR RETINOPATHY: CPT | Mod: HCNC,CPTII,S$GLB, | Performed by: INTERNAL MEDICINE

## 2021-12-13 PROCEDURE — 3066F PR DOCUMENTATION OF TREATMENT FOR NEPHROPATHY: ICD-10-PCS | Mod: HCNC,CPTII,S$GLB, | Performed by: INTERNAL MEDICINE

## 2021-12-13 PROCEDURE — 3061F PR NEG MICROALBUMINURIA RESULT DOCUMENTED/REVIEW: ICD-10-PCS | Mod: HCNC,CPTII,S$GLB, | Performed by: INTERNAL MEDICINE

## 2021-12-13 RX ORDER — MONTELUKAST SODIUM 10 MG/1
10 TABLET ORAL NIGHTLY
Qty: 90 TABLET | Refills: 4 | Status: SHIPPED | OUTPATIENT
Start: 2021-12-13 | End: 2022-02-16 | Stop reason: SDUPTHER

## 2021-12-13 RX ORDER — ALBUTEROL SULFATE 0.83 MG/ML
2.5 SOLUTION RESPIRATORY (INHALATION)
Qty: 270 ML | Refills: 11 | Status: SHIPPED | OUTPATIENT
Start: 2021-12-13 | End: 2022-02-16 | Stop reason: SDUPTHER

## 2021-12-13 RX ORDER — PREDNISONE 20 MG/1
TABLET ORAL
Qty: 20 TABLET | Refills: 0 | Status: SHIPPED | OUTPATIENT
Start: 2021-12-13 | End: 2022-04-20 | Stop reason: SDUPTHER

## 2021-12-13 RX ORDER — LEVOFLOXACIN 500 MG/1
500 TABLET, FILM COATED ORAL DAILY
Qty: 10 TABLET | Refills: 0 | Status: SHIPPED | OUTPATIENT
Start: 2021-12-13 | End: 2022-04-20 | Stop reason: SDUPTHER

## 2021-12-20 ENCOUNTER — TELEPHONE (OUTPATIENT)
Dept: CARDIOLOGY | Facility: CLINIC | Age: 71
End: 2021-12-20
Payer: MEDICARE

## 2021-12-21 ENCOUNTER — TELEPHONE (OUTPATIENT)
Dept: RADIOLOGY | Facility: HOSPITAL | Age: 71
End: 2021-12-21
Payer: MEDICARE

## 2021-12-22 ENCOUNTER — HOSPITAL ENCOUNTER (OUTPATIENT)
Dept: RADIOLOGY | Facility: HOSPITAL | Age: 71
Discharge: HOME | End: 2021-12-22
Attending: INTERNAL MEDICINE
Payer: MEDICARE

## 2021-12-22 DIAGNOSIS — I73.9 PERIPHERAL VASCULAR DISEASE, UNSPECIFIED: ICD-10-CM

## 2021-12-22 PROCEDURE — 25500020 PHARM REV CODE 255: Mod: HCNC | Performed by: INTERNAL MEDICINE

## 2021-12-22 PROCEDURE — 75635 CT ANGIO ABDOMINAL ARTERIES: CPT | Mod: TC,HCNC

## 2021-12-22 PROCEDURE — 75635 CTA RUNOFF ABD PEL BILAT LOWER EXT: ICD-10-PCS | Mod: 26,HCNC,, | Performed by: RADIOLOGY

## 2021-12-22 PROCEDURE — 75635 CT ANGIO ABDOMINAL ARTERIES: CPT | Mod: 26,HCNC,, | Performed by: RADIOLOGY

## 2021-12-22 RX ADMIN — IOHEXOL 125 ML: 350 INJECTION, SOLUTION INTRAVENOUS at 09:12

## 2021-12-28 ENCOUNTER — TELEPHONE (OUTPATIENT)
Dept: CARDIOLOGY | Facility: CLINIC | Age: 71
End: 2021-12-28
Payer: MEDICARE

## 2022-01-05 ENCOUNTER — LAB VISIT (OUTPATIENT)
Dept: LAB | Facility: HOSPITAL | Age: 72
End: 2022-01-05
Attending: INTERNAL MEDICINE
Payer: MEDICARE

## 2022-01-05 DIAGNOSIS — E11.59 HYPERTENSION ASSOCIATED WITH DIABETES: ICD-10-CM

## 2022-01-05 DIAGNOSIS — I15.2 HYPERTENSION ASSOCIATED WITH DIABETES: ICD-10-CM

## 2022-01-05 LAB
ANION GAP SERPL CALC-SCNC: 11 MMOL/L (ref 8–16)
BUN SERPL-MCNC: 20 MG/DL (ref 8–23)
CALCIUM SERPL-MCNC: 9.3 MG/DL (ref 8.7–10.5)
CHLORIDE SERPL-SCNC: 103 MMOL/L (ref 95–110)
CO2 SERPL-SCNC: 26 MMOL/L (ref 23–29)
CREAT SERPL-MCNC: 1.2 MG/DL (ref 0.5–1.4)
EST. GFR  (AFRICAN AMERICAN): 51.8 ML/MIN/1.73 M^2
EST. GFR  (NON AFRICAN AMERICAN): 44.9 ML/MIN/1.73 M^2
GLUCOSE SERPL-MCNC: 63 MG/DL (ref 70–110)
POTASSIUM SERPL-SCNC: 3.6 MMOL/L (ref 3.5–5.1)
SODIUM SERPL-SCNC: 140 MMOL/L (ref 136–145)

## 2022-01-05 PROCEDURE — 36415 COLL VENOUS BLD VENIPUNCTURE: CPT | Mod: HCNC | Performed by: FAMILY MEDICINE

## 2022-01-05 PROCEDURE — 80048 BASIC METABOLIC PNL TOTAL CA: CPT | Mod: HCNC | Performed by: FAMILY MEDICINE

## 2022-01-12 NOTE — PROGRESS NOTES
We'll discuss these test results at upcoming appointment with me already scheduled.  Future Appointments  1/28/2022  8:30 AM    Temitope Moore MD             [facility] ALLERGY        [facility]  5/13/2022  9:00 AM    IVCK Sandoval MD      [facility] IM             [facility]  5/27/2022  7:35 AM    LABORATORY, [facility]           [facility] LAB            [facility]  6/3/2022   8:30 AM    Amalia Rosas MD         [facility] VAS CAR        [facility]  6/15/2022  12:30 PM   HG CT1 LIMIT 500 LBS     [facility] CT SCAN        [facility]  6/15/2022  1:00 PM    Ramiro Aleman MD         [facility] PULMSVC        [facility]

## 2022-01-25 ENCOUNTER — TELEPHONE (OUTPATIENT)
Dept: ALLERGY | Facility: CLINIC | Age: 72
End: 2022-01-25
Payer: MEDICARE

## 2022-02-06 ENCOUNTER — PATIENT MESSAGE (OUTPATIENT)
Dept: INTERNAL MEDICINE | Facility: CLINIC | Age: 72
End: 2022-02-06
Payer: MEDICARE

## 2022-02-06 DIAGNOSIS — Z12.31 ENCOUNTER FOR SCREENING MAMMOGRAM FOR BREAST CANCER: Primary | ICD-10-CM

## 2022-02-14 ENCOUNTER — HOSPITAL ENCOUNTER (EMERGENCY)
Facility: HOSPITAL | Age: 72
Discharge: HOME | End: 2022-02-14
Attending: FAMILY MEDICINE
Payer: MEDICARE

## 2022-02-14 ENCOUNTER — PATIENT MESSAGE (OUTPATIENT)
Dept: INTERNAL MEDICINE | Facility: CLINIC | Age: 72
End: 2022-02-14
Payer: MEDICARE

## 2022-02-14 VITALS
OXYGEN SATURATION: 100 % | RESPIRATION RATE: 20 BRPM | DIASTOLIC BLOOD PRESSURE: 71 MMHG | HEART RATE: 95 BPM | SYSTOLIC BLOOD PRESSURE: 161 MMHG | TEMPERATURE: 99 F

## 2022-02-14 DIAGNOSIS — J44.1 COPD WITH ACUTE EXACERBATION: Primary | ICD-10-CM

## 2022-02-14 DIAGNOSIS — J41.1 BRONCHITIS, MUCOPURULENT RECURRENT: ICD-10-CM

## 2022-02-14 DIAGNOSIS — D50.9 MICROCYTIC ANEMIA: ICD-10-CM

## 2022-02-14 DIAGNOSIS — R06.02 SHORTNESS OF BREATH: ICD-10-CM

## 2022-02-14 DIAGNOSIS — D64.9 SYMPTOMATIC ANEMIA: ICD-10-CM

## 2022-02-14 DIAGNOSIS — D64.9 SEVERE ANEMIA: ICD-10-CM

## 2022-02-14 LAB
ABO + RH BLD: NORMAL
ALBUMIN SERPL BCP-MCNC: 3 G/DL (ref 3.5–5.2)
ALP SERPL-CCNC: 98 U/L (ref 55–135)
ALT SERPL W/O P-5'-P-CCNC: 24 U/L (ref 10–44)
ANION GAP SERPL CALC-SCNC: 10 MMOL/L (ref 8–16)
AST SERPL-CCNC: 27 U/L (ref 10–40)
BASOPHILS # BLD AUTO: 0.01 K/UL (ref 0–0.2)
BASOPHILS NFR BLD: 0.2 % (ref 0–1.9)
BILIRUB SERPL-MCNC: 0.1 MG/DL (ref 0.1–1)
BLD GP AB SCN CELLS X3 SERPL QL: NORMAL
BLD PROD TYP BPU: NORMAL
BLOOD UNIT EXPIRATION DATE: NORMAL
BLOOD UNIT TYPE CODE: 5100
BLOOD UNIT TYPE: NORMAL
BNP SERPL-MCNC: 38 PG/ML (ref 0–99)
BUN SERPL-MCNC: 24 MG/DL (ref 8–23)
CALCIUM SERPL-MCNC: 8.2 MG/DL (ref 8.7–10.5)
CHLORIDE SERPL-SCNC: 105 MMOL/L (ref 95–110)
CO2 SERPL-SCNC: 27 MMOL/L (ref 23–29)
CODING SYSTEM: NORMAL
CREAT SERPL-MCNC: 1.1 MG/DL (ref 0.5–1.4)
CTP QC/QA: YES
DIFFERENTIAL METHOD: ABNORMAL
DISPENSE STATUS: NORMAL
EOSINOPHIL # BLD AUTO: 0 K/UL (ref 0–0.5)
EOSINOPHIL NFR BLD: 0.2 % (ref 0–8)
ERYTHROCYTE [DISTWIDTH] IN BLOOD BY AUTOMATED COUNT: 18.4 % (ref 11.5–14.5)
EST. GFR  (AFRICAN AMERICAN): 57 ML/MIN/1.73 M^2
EST. GFR  (NON AFRICAN AMERICAN): 50 ML/MIN/1.73 M^2
GLUCOSE SERPL-MCNC: 194 MG/DL (ref 70–110)
HCT VFR BLD AUTO: 23.4 % (ref 37–48.5)
HGB BLD-MCNC: 6.9 G/DL (ref 12–16)
IMM GRANULOCYTES # BLD AUTO: 0.02 K/UL (ref 0–0.04)
IMM GRANULOCYTES NFR BLD AUTO: 0.3 % (ref 0–0.5)
LYMPHOCYTES # BLD AUTO: 0.6 K/UL (ref 1–4.8)
LYMPHOCYTES NFR BLD: 9.8 % (ref 18–48)
MCH RBC QN AUTO: 22.1 PG (ref 27–31)
MCHC RBC AUTO-ENTMCNC: 29.5 G/DL (ref 32–36)
MCV RBC AUTO: 75 FL (ref 82–98)
MONOCYTES # BLD AUTO: 0.6 K/UL (ref 0.3–1)
MONOCYTES NFR BLD: 8.7 % (ref 4–15)
NEUTROPHILS # BLD AUTO: 5.2 K/UL (ref 1.8–7.7)
NEUTROPHILS NFR BLD: 80.8 % (ref 38–73)
NRBC BLD-RTO: 1 /100 WBC
NUM UNITS TRANS PACKED RBC: NORMAL
PLATELET # BLD AUTO: 298 K/UL (ref 150–450)
PMV BLD AUTO: 10.6 FL (ref 9.2–12.9)
POTASSIUM SERPL-SCNC: 3.5 MMOL/L (ref 3.5–5.1)
PROT SERPL-MCNC: 6.4 G/DL (ref 6–8.4)
RBC # BLD AUTO: 3.12 M/UL (ref 4–5.4)
SARS-COV-2 RDRP RESP QL NAA+PROBE: NEGATIVE
SODIUM SERPL-SCNC: 142 MMOL/L (ref 136–145)
TROPONIN I SERPL DL<=0.01 NG/ML-MCNC: 0.01 NG/ML (ref 0–0.03)
WBC # BLD AUTO: 6.43 K/UL (ref 3.9–12.7)

## 2022-02-14 PROCEDURE — 85025 COMPLETE CBC W/AUTO DIFF WBC: CPT | Mod: HCNC | Performed by: FAMILY MEDICINE

## 2022-02-14 PROCEDURE — 86901 BLOOD TYPING SEROLOGIC RH(D): CPT | Mod: HCNC | Performed by: EMERGENCY MEDICINE

## 2022-02-14 PROCEDURE — 83880 ASSAY OF NATRIURETIC PEPTIDE: CPT | Mod: HCNC | Performed by: FAMILY MEDICINE

## 2022-02-14 PROCEDURE — 36430 TRANSFUSION BLD/BLD COMPNT: CPT | Mod: HCNC

## 2022-02-14 PROCEDURE — 96374 THER/PROPH/DIAG INJ IV PUSH: CPT | Mod: HCNC

## 2022-02-14 PROCEDURE — 93005 ELECTROCARDIOGRAM TRACING: CPT | Mod: HCNC

## 2022-02-14 PROCEDURE — P9016 RBC LEUKOCYTES REDUCED: HCPCS | Mod: HCNC | Performed by: EMERGENCY MEDICINE

## 2022-02-14 PROCEDURE — 86920 COMPATIBILITY TEST SPIN: CPT | Mod: HCNC | Performed by: EMERGENCY MEDICINE

## 2022-02-14 PROCEDURE — 25000242 PHARM REV CODE 250 ALT 637 W/ HCPCS: Mod: HCNC | Performed by: EMERGENCY MEDICINE

## 2022-02-14 PROCEDURE — 93010 ELECTROCARDIOGRAM REPORT: CPT | Mod: HCNC,,, | Performed by: INTERNAL MEDICINE

## 2022-02-14 PROCEDURE — U0002 COVID-19 LAB TEST NON-CDC: HCPCS | Mod: HCNC | Performed by: FAMILY MEDICINE

## 2022-02-14 PROCEDURE — 93010 EKG 12-LEAD: ICD-10-PCS | Mod: HCNC,,, | Performed by: INTERNAL MEDICINE

## 2022-02-14 PROCEDURE — 94640 AIRWAY INHALATION TREATMENT: CPT | Mod: HCNC

## 2022-02-14 PROCEDURE — 94760 N-INVAS EAR/PLS OXIMETRY 1: CPT | Mod: HCNC

## 2022-02-14 PROCEDURE — 80053 COMPREHEN METABOLIC PANEL: CPT | Mod: HCNC | Performed by: FAMILY MEDICINE

## 2022-02-14 PROCEDURE — 84484 ASSAY OF TROPONIN QUANT: CPT | Mod: HCNC | Performed by: FAMILY MEDICINE

## 2022-02-14 PROCEDURE — 27000221 HC OXYGEN, UP TO 24 HOURS: Mod: HCNC

## 2022-02-14 PROCEDURE — 99291 CRITICAL CARE FIRST HOUR: CPT | Mod: 25,HCNC

## 2022-02-14 PROCEDURE — 63600175 PHARM REV CODE 636 W HCPCS: Mod: HCNC | Performed by: FAMILY MEDICINE

## 2022-02-14 RX ORDER — IPRATROPIUM BROMIDE AND ALBUTEROL SULFATE 2.5; .5 MG/3ML; MG/3ML
3 SOLUTION RESPIRATORY (INHALATION)
Status: COMPLETED | OUTPATIENT
Start: 2022-02-14 | End: 2022-02-14

## 2022-02-14 RX ORDER — LEVOFLOXACIN 500 MG/1
500 TABLET, FILM COATED ORAL DAILY
Qty: 5 TABLET | Refills: 0 | Status: SHIPPED | OUTPATIENT
Start: 2022-02-14 | End: 2022-02-19

## 2022-02-14 RX ORDER — HYDROCODONE BITARTRATE AND ACETAMINOPHEN 500; 5 MG/1; MG/1
TABLET ORAL
Status: DISCONTINUED | OUTPATIENT
Start: 2022-02-14 | End: 2022-02-14 | Stop reason: HOSPADM

## 2022-02-14 RX ORDER — ALBUTEROL SULFATE 90 UG/1
1-2 AEROSOL, METERED RESPIRATORY (INHALATION) EVERY 6 HOURS PRN
Qty: 8 G | Refills: 0 | Status: SHIPPED | OUTPATIENT
Start: 2022-02-14 | End: 2022-02-16 | Stop reason: SDUPTHER

## 2022-02-14 RX ORDER — METHYLPREDNISOLONE SOD SUCC 125 MG
125 VIAL (EA) INJECTION
Status: COMPLETED | OUTPATIENT
Start: 2022-02-14 | End: 2022-02-14

## 2022-02-14 RX ORDER — PREDNISONE 50 MG/1
50 TABLET ORAL DAILY
Qty: 5 TABLET | Refills: 0 | Status: SHIPPED | OUTPATIENT
Start: 2022-02-14 | End: 2022-02-19

## 2022-02-14 RX ADMIN — IPRATROPIUM BROMIDE AND ALBUTEROL SULFATE 3 ML: 2.5; .5 SOLUTION RESPIRATORY (INHALATION) at 05:02

## 2022-02-14 RX ADMIN — METHYLPREDNISOLONE SODIUM SUCCINATE 125 MG: 125 INJECTION, POWDER, FOR SOLUTION INTRAMUSCULAR; INTRAVENOUS at 02:02

## 2022-02-14 NOTE — ED PROVIDER NOTES
SCRIBE #1 NOTE: I, Kourtney Salter, am scribing for, and in the presence of, Ansley Tran MD. I have scribed HPI, ROS, and PEx    SCRIBE #2 NOTE: I, Angela Young, am scribing for, and in the presence of,  Keyon Davey MD. I have scribed the remaining portions of the note not scribed by Scribe #1.      History     Chief Complaint   Patient presents with    Shortness of Breath     C/o SOB, cough starting before weekend started. States coming get eval because it has not resolved and seems to have gotten worse. States she believes she has fluid on her lungs.      Review of patient's allergies indicates:   Allergen Reactions    Iodine and iodide containing products Swelling    Skin staples [surgical stainless steel] Swelling    Egg derived      Shortness breath, lip swelling    Fish containing products     Latex, natural rubber Swelling    Losartan Itching    Nickel     Pravastatin      40 mg causes nausea vomitting but 20 mg ok    Shellfish containing products Swelling         History of Present Illness     HPI    2/14/2022, 1:29 AM  History obtained from the patient      History of Present Illness: Lucia Barlow is a 72 y.o. female patient with a PMHx of COPD, DM, HTN, and PVD who presents to the Emergency Department for evaluation of SOB which onset gradually 3 days ago. Pt states that her SOB is worsening and feels like she has fluid in her lungs. Symptoms are constant and moderate in severity. No mitigating or exacerbating factors reported. Associated sxs include cough. Patient denies any CP, fever, chills, abdominal pain, weakness, and all other sxs at this time. No further complaints or concerns at this time.       Arrival mode: Personal vehicle     PCP: ZELDA Sandoval MD        Past Medical History:  Past Medical History:   Diagnosis Date    Atherosclerosis of artery of both lower extremities 1/17/2014    Atherosclerosis of native coronary artery of native heart without angina pectoris  11/18/2016    Atherosclerotic PVD with intermittent claudication 1/17/2014    Chronic bronchitis     COPD (chronic obstructive pulmonary disease)     Diabetes with neurologic complications     Dyslipidemia associated with type 2 diabetes mellitus 6/14/2013    Dyslipidemia associated with type 2 diabetes mellitus     Ex-smoker     Gastroesophageal reflux disease without esophagitis 1/25/2019    Hyperlipidemia     Hypertension     NSCLC of left lung 11/19/2020    IVANIA (obstructive sleep apnea) 2/11/2021    Osteoporosis 1/16 rosita 1/18    Pneumothorax after biopsy 10/21/2020    PVD (peripheral vascular disease)     Renal manifestation of secondary diabetes mellitus     S/P peripheral artery angioplasty 2/7/2014    Seasonal allergic rhinitis due to pollen     Simple chronic bronchitis     Tobacco dependence     Type 2 diabetes mellitus with microalbuminuria, without long-term current use of insulin 3/29/2019    Type 2 diabetes mellitus with peripheral vascular disease     Type 2 diabetes mellitus with stage 3 chronic kidney disease, without long-term current use of insulin 2/1/2019    Type 2 diabetes mellitus without retinopathy 2/1/2019    Urinary incontinence        Past Surgical History:  Past Surgical History:   Procedure Laterality Date    ANGIOPLASTY Bilateral 01/24/2014    aortoiliac stenting     CARPAL TUNNEL RELEASE  2003    Henrry    COLECTOMY  approximate 2005    pt states 1in colon -dx with benign mass removed-states no colon cancer    COLONOSCOPY N/A 11/9/2016    Procedure: COLONOSCOPY;  Surgeon: Dmitri Sterling MD;  Location: [facility] ENDO;  Service: Endoscopy;  Laterality: N/A;    COLONOSCOPY N/A 11/15/2019    Procedure: COLONOSCOPY;  Surgeon: Marko Vicente MD;  Location: [facility] ENDO;  Service: Endoscopy;  Laterality: N/A;    HYSTERECTOMY      no cancer    INJECTION OF ANESTHETIC AGENT AROUND MULTIPLE INTERCOSTAL NERVES Left 11/19/2020    Procedure: BLOCK, NERVE, INTERCOSTAL,  2 OR MORE;  Surgeon: Amrit Flores MD;  Location: [address];  Service: Thoracic;  Laterality: Left;    OOPHORECTOMY      SURGICAL REMOVAL OF LYMPH NODE Left 2020    Procedure: EXCISION, LYMPH NODE;  Surgeon: Amrit Flores MD;  Location: [facility] OR [address] [place] [address];  Service: Thoracic;  Laterality: Left;  mediastinal lymph node disection    XI ROBOTIC RATS,WITH LOBECTOMY,LUNG Left 2020    Procedure: XI ROBOTIC RATS,WITH LOBECTOMY,LUNG;  Surgeon: Amrit Flores MD;  Location: [address];  Service: Thoracic;  Laterality: Left;  Lingulectomy.         Family History:  Family History   Problem Relation Age of Onset    Stroke Father     Stroke Sister     Asthma Daughter     Diabetes Daughter     Breast cancer Daughter     Asthma Son        Social History:  Social History     Tobacco Use    Smoking status: Former Smoker     Packs/day: 1.00     Years: 60.00     Pack years: 60.00     Types: Cigarettes     Start date: 1960     Quit date: 1/10/2020     Years since quittin.1    Smokeless tobacco: Never Used   Substance and Sexual Activity    Alcohol use: No     Alcohol/week: 0.0 standard drinks    Drug use: No    Sexual activity: Never        Review of Systems     Review of Systems   Constitutional: Negative for chills and fever.   HENT: Negative for sore throat.    Respiratory: Positive for cough and shortness of breath.    Cardiovascular: Negative for chest pain.   Gastrointestinal: Negative for abdominal pain and nausea.   Genitourinary: Negative for dysuria.   Musculoskeletal: Negative for back pain.   Skin: Negative for rash.   Neurological: Negative for weakness.   Hematological: Does not bruise/bleed easily.   All other systems reviewed and are negative.       Physical Exam     Initial Vitals [22 0051]   BP Pulse Resp Temp SpO2   (!) 129/51 105 16 98.5 °F (36.9 °C) (!) 93 %      MAP       --          Physical Exam  Nursing Notes and Vital Signs  Reviewed.  Constitutional: Patient is in no acute distress. Well-developed and well-nourished.  Head: Atraumatic. Normocephalic.  Eyes: PERRL. EOM intact. Conjunctivae are not pale. No scleral icterus.  ENT: Mucous membranes are moist. Oropharynx is clear and symmetric.    Neck: Supple. Full ROM. No lymphadenopathy.  Cardiovascular: Regular rate. Regular rhythm. No murmurs, rubs, or gallops. Distal pulses are 2+ and symmetric.  Pulmonary/Chest: Tachypnic. Inspiratory and expiratory wheezes in bilateral upper lung fields.   Abdominal: Soft and non-distended.  There is no tenderness.  No rebound, guarding, or rigidity.   Musculoskeletal: Moves all extremities. No obvious deformities. 1+ BLE No edema.   Skin: Warm and dry.  Neurological:  Alert, awake, and appropriate.  Normal speech.  No acute focal neurological deficits are appreciated.  Psychiatric: Normal affect. Good eye contact. Appropriate in content.     ED Course   Critical Care    Date/Time: 2/14/2022 4:37 AM  Performed by: Keyon Davey MD  Authorized by: Keyon Davey MD   Direct patient critical care time: 20 minutes  Additional history critical care time: 10 minutes  Ordering / reviewing critical care time: 10 minutes  Documentation critical care time: 5 minutes  Total critical care time (exclusive of procedural time) : 45 minutes  Critical care time was exclusive of separately billable procedures and treating other patients and teaching time.  Critical care was necessary to treat or prevent imminent or life-threatening deterioration of the following conditions: severe COPD exacerabtion.  Critical care was time spent personally by me on the following activities: blood draw for specimens, development of treatment plan with patient or surrogate, discussions with consultants, interpretation of cardiac output measurements, evaluation of patient's response to treatment, examination of patient, obtaining history from patient or surrogate, ordering  and performing treatments and interventions, ordering and review of laboratory studies, ordering and review of radiographic studies, pulse oximetry, re-evaluation of patient's condition and review of old charts.        ED Vital Signs:  Vitals:    02/14/22 0051 02/14/22 0130 02/14/22 0200 02/14/22 0230   BP: (!) 129/51 (!) 159/66 (!) 148/66 (!) 147/89   Pulse: 105 97 96 91   Resp: 16      Temp: 98.5 °F (36.9 °C)      TempSrc: Oral      SpO2: (!) 93% 100% 100% 100%    02/14/22 0300 02/14/22 0330 02/14/22 0535 02/14/22 0540   BP: (!) 142/66 (!) 141/66 (!) 156/70    Pulse: 104 92 75 92   Resp:   20 20   Temp:   98.1 °F (36.7 °C)    TempSrc:   Oral    SpO2: 100% 100% 100%     02/14/22 0541 02/14/22 0544 02/14/22 0700 02/14/22 0740   BP:   (!) 156/69 (!) 159/96   Pulse: 92 91 99 99   Resp: 20 18 18 18   Temp:    98.6 °F (37 °C)   TempSrc:    Oral   SpO2:  100% 100% 100%    02/14/22 0800 02/14/22 0830 02/14/22 0840   BP: (!) 159/72  (!) 161/71   Pulse: 102 95 95   Resp: 20  20   Temp:   98.6 °F (37 °C)   TempSrc:   Oral   SpO2: 100% 100% 100%       Abnormal Lab Results:  Labs Reviewed   CBC W/ AUTO DIFFERENTIAL - Abnormal; Notable for the following components:       Result Value    RBC 3.12 (*)     Hemoglobin 6.9 (*)     Hematocrit 23.4 (*)     MCV 75 (*)     MCH 22.1 (*)     MCHC 29.5 (*)     RDW 18.4 (*)     Lymph # 0.6 (*)     nRBC 1 (*)     Gran % 80.8 (*)     Lymph % 9.8 (*)     All other components within normal limits   COMPREHENSIVE METABOLIC PANEL - Abnormal; Notable for the following components:    Glucose 194 (*)     BUN 24 (*)     Calcium 8.2 (*)     Albumin 3.0 (*)     eGFR if  57 (*)     eGFR if non  50 (*)     All other components within normal limits   SARS-COV-2 RDRP GENE - Normal    Narrative:     This test utilizes isothermal nucleic acid amplification   technology to detect the SARS-CoV-2 RdRp nucleic acid segment.   The analytical sensitivity (limit of detection) is 125  genome   equivalents/mL.   A POSITIVE result implies infection with the SARS-CoV-2 virus;   the patient is presumed to be contagious.     A NEGATIVE result means that SARS-CoV-2 nucleic acids are not   present above the limit of detection. A NEGATIVE result should be   treated as presumptive. It does not rule out the possibility of   COVID-19 and should not be the sole basis for treatment decisions.   If COVID-19 is strongly suspected based on clinical and exposure   history, re-testing using an alternate molecular assay should be   considered.   This test is only for use under the Food and Drug   Administration s Emergency Use Authorization (EUA).   Commercial kits are provided by [employer].   Performance characteristics of the EUA have been independently   verified by [facility] Department of   Pathology and Laboratory Medicine.   _________________________________________________________________   The authorized Fact Sheet for Healthcare Providers and the authorized Fact   Sheet for Patients of the ID NOW COVID-19 are available on the FDA   website:     https://www.fda.gov/media/932949/download  https://www.fda.gov/media/159385/download       B-TYPE NATRIURETIC PEPTIDE   TROPONIN I   TYPE & SCREEN   PREPARE RBC SOFT        All Lab Results:  Results for orders placed or performed during the hospital encounter of 02/14/22   CBC auto differential   Result Value Ref Range    WBC 6.43 3.90 - 12.70 K/uL    RBC 3.12 (L) 4.00 - 5.40 M/uL    Hemoglobin 6.9 (L) 12.0 - 16.0 g/dL    Hematocrit 23.4 (L) 37.0 - 48.5 %    MCV 75 (L) 82 - 98 fL    MCH 22.1 (L) 27.0 - 31.0 pg    MCHC 29.5 (L) 32.0 - 36.0 g/dL    RDW 18.4 (H) 11.5 - 14.5 %    Platelets 298 150 - 450 K/uL    MPV 10.6 9.2 - 12.9 fL    Immature Granulocytes 0.3 0.0 - 0.5 %    Gran # (ANC) 5.2 1.8 - 7.7 K/uL    Immature Grans (Abs) 0.02 0.00 - 0.04 K/uL    Lymph # 0.6 (L) 1.0 - 4.8 K/uL    Mono # 0.6 0.3 - 1.0 K/uL    Eos # 0.0 0.0 - 0.5 K/uL     Baso # 0.01 0.00 - 0.20 K/uL    nRBC 1 (A) 0 /100 WBC    Gran % 80.8 (H) 38.0 - 73.0 %    Lymph % 9.8 (L) 18.0 - 48.0 %    Mono % 8.7 4.0 - 15.0 %    Eosinophil % 0.2 0.0 - 8.0 %    Basophil % 0.2 0.0 - 1.9 %    Differential Method Automated    Comprehensive metabolic panel   Result Value Ref Range    Sodium 142 136 - 145 mmol/L    Potassium 3.5 3.5 - 5.1 mmol/L    Chloride 105 95 - 110 mmol/L    CO2 27 23 - 29 mmol/L    Glucose 194 (H) 70 - 110 mg/dL    BUN 24 (H) 8 - 23 mg/dL    Creatinine 1.1 0.5 - 1.4 mg/dL    Calcium 8.2 (L) 8.7 - 10.5 mg/dL    Total Protein 6.4 6.0 - 8.4 g/dL    Albumin 3.0 (L) 3.5 - 5.2 g/dL    Total Bilirubin 0.1 0.1 - 1.0 mg/dL    Alkaline Phosphatase 98 55 - 135 U/L    AST 27 10 - 40 U/L    ALT 24 10 - 44 U/L    Anion Gap 10 8 - 16 mmol/L    eGFR if African American 57 (A) >60 mL/min/1.73 m^2    eGFR if non African American 50 (A) >60 mL/min/1.73 m^2   B-Type natriuretic peptide (BNP)   Result Value Ref Range    BNP 38 0 - 99 pg/mL   Troponin I   Result Value Ref Range    Troponin I 0.011 0.000 - 0.026 ng/mL   POCT COVID-19 Rapid Screening   Result Value Ref Range    POC Rapid COVID Negative Negative     Acceptable Yes    Type & Screen   Result Value Ref Range    Group & Rh O POS     Indirect Uriel NEG    Prepare RBC 1 Unit   Result Value Ref Range    UNIT NUMBER [id number]     Product Code [id number]     DISPENSE STATUS TRANSFUSED     CODING SYSTEM [id number]     Unit Blood Type Code 5100     Unit Blood Type O POS     Unit Expiration [id number]          Imaging Results:  Imaging Results          X-Ray Chest AP Portable (Final result)  Result time 02/14/22 06:54:02    Final result by Kvng Desai MD (02/14/22 06:54:02)                 Impression:      No acute process seen.      Electronically signed by: Kvng Desai MD  Date:    02/14/2022  Time:    06:54             Narrative:    EXAMINATION:  XR CHEST AP PORTABLE    CLINICAL HISTORY:  Chest  Pain;    FINDINGS:  Single view of the chest.  Comparison 06/09/2021.    Cardiac silhouette is normal.  The lungs demonstrate no evidence of active disease.  In facet cipriano's changes are seen within the apices.  Scattered pulmonary nodule seen on chest CT are not well seen on plain radiograph.  No evidence of pleural effusion or pneumothorax.  Bones appear intact with mild scattered degenerative change.                             1:32 AM: Per ED physician, pt's CXR results: faint right lower lobe infiltrates       The EKG was ordered, reviewed, and independently interpreted by the ED provider.  Interpretation time: 1:17  Rate: 99 BPM  Rhythm: normal sinus rhythm  Interpretation: No ST abnormality. No STEMI.           The Emergency Provider reviewed the vital signs and test results, which are outlined above.     ED Discussion     2:00 AM: Dr. Tran transfers care of patient to Dr. Davey pending lab results.    5:26 AM: Reassessed pt at this time. Discussed with pt all pertinent ED information and results. Discussed pt dx and plan of tx. Gave pt all f/u and return to the ED instructions. All questions and concerns were addressed at this time. Pt expresses understanding of information and instructions, and is comfortable with plan to discharge. Pt is stable for discharge.    I discussed with patient and/or family/caretaker that evaluation in the ED does not suggest any emergent or life threatening medical conditions requiring immediate intervention beyond what was provided in the ED, and I believe patient is safe for discharge.  Regardless, an unremarkable evaluation in the ED does not preclude the development or presence of a serious of life threatening condition. As such, patient was instructed to return immediately for any worsening or change in current symptoms.         Medical Decision Making:   Clinical Tests:   Lab Tests: Ordered and Reviewed  Radiological Study: Ordered and Reviewed  Medical Tests: Ordered  and Reviewed           ED Medication(s):  Medications   methylPREDNISolone sodium succinate injection 125 mg (125 mg Intravenous Given 2/14/22 0229)   albuterol-ipratropium 2.5 mg-0.5 mg/3 mL nebulizer solution 3 mL (3 mLs Nebulization Given 2/14/22 0544)       Discharge Medication List as of 2/14/2022  8:48 AM      START taking these medications    Details   albuterol (PROVENTIL/VENTOLIN HFA) 90 mcg/actuation inhaler Inhale 1-2 puffs into the lungs every 6 (six) hours as needed for Wheezing., Starting Mon 2/14/2022, Until Tue 2/14/2023 at 2359, Normal      !! levoFLOXacin (LEVAQUIN) 500 MG tablet Take 1 tablet (500 mg total) by mouth once daily. for 5 days, Starting Mon 2/14/2022, Until Sat 2/19/2022, Normal      !! predniSONE (DELTASONE) 50 MG Tab Take 1 tablet (50 mg total) by mouth once daily. for 5 days, Starting Mon 2/14/2022, Until Sat 2/19/2022, Normal       !! - Potential duplicate medications found. Please discuss with provider.           Follow-up Information     L Brodie Sandoval MD. Schedule an appointment as soon as possible for a visit in 3 days.    Specialty: Family Medicine  Contact information:  [address]  [place] [address] [phone]             [facility] - Emergency Dept..    Specialty: Emergency Medicine  Why: As needed, If symptoms worsen  Contact information:  [address] [facility] [address] [phone]                           Scribe Attestation:   Scribe #1: I performed the above scribed service and the documentation accurately describes the services I performed. I attest to the accuracy of the note.     Attending:   Physician Attestation Statement for Scribe #1: I, Ansley Tran MD, personally performed the services described in this documentation, as scribed by Kourtney Salter, in my presence, and it is both accurate and complete.       Scribe Attestation:   Scribe #2: I performed the above scribed service and the documentation accurately  describes the services I performed. I attest to the accuracy of the note.    Attending Attestation:           Physician Attestation for Scribe:    Physician Attestation Statement for Scribe #2: I, Keyon Davey MD, reviewed documentation, as scribed by Angela Young in my presence, and it is both accurate and complete. I also acknowledge and confirm the content of the note done by Scribe #1.           Clinical Impression       ICD-10-CM ICD-9-CM   1. COPD with acute exacerbation  J44.1 491.21   2. Shortness of breath  R06.02 786.05   3. Bronchitis, mucopurulent recurrent  J41.1 491.1   4. Severe anemia  D64.9 285.9   5. Microcytic anemia  D50.9 280.9   6. Symptomatic anemia  D64.9 285.9       Disposition:   Disposition: Discharged  Condition: Stable         Keyon Davey MD  02/15/22 1496

## 2022-02-14 NOTE — ED NOTES
Received report from GALO Garner., introduced self to pt, and updated whiteboard    Pt is AAO x 4, sitting up in ED bed with eyes open, pt is on 2L NC with 100% O2 sat, respirations are even and unlabored, skin is warm and dry. IV site is clean, dry, and IV is patent. Pt currently has blood transfusing, normal rate and rhythm noted on monitor. Pt in NAD. Call bell within reach. Family member is at bedside. Will continue to monitor.

## 2022-02-15 ENCOUNTER — PATIENT OUTREACH (OUTPATIENT)
Dept: ADMINISTRATIVE | Facility: OTHER | Age: 72
End: 2022-02-15
Payer: MEDICARE

## 2022-02-15 ENCOUNTER — PATIENT MESSAGE (OUTPATIENT)
Dept: PULMONOLOGY | Facility: CLINIC | Age: 72
End: 2022-02-15
Payer: MEDICARE

## 2022-02-15 NOTE — PROGRESS NOTES
Health Maintenance Due   Topic Date Due    Shingles Vaccine (2 of 2) 01/08/2020    Mammogram  03/03/2022    Diabetes Urine Screening  03/10/2022    Foot Exam  03/10/2022     Updates were requested from care everywhere.  Chart was reviewed for overdue Proactive [facility] Encounters ([facility]) topics (CRS, Breast Cancer Screening, Eye exam)  Health Maintenance has been updated.  LINKS immunization registry triggered.  Immunizations were reconciled.

## 2022-02-15 NOTE — TELEPHONE ENCOUNTER
Spoke with patient and scheduled her a hospital follow up appointment tomorrow with Shirley Lay NP on 2/16/22 at 8:20 am. She verbalized understanding.

## 2022-02-16 ENCOUNTER — LAB VISIT (OUTPATIENT)
Dept: LAB | Facility: HOSPITAL | Age: 72
End: 2022-02-16
Attending: NURSE PRACTITIONER
Payer: MEDICARE

## 2022-02-16 ENCOUNTER — OFFICE VISIT (OUTPATIENT)
Dept: PULMONOLOGY | Facility: CLINIC | Age: 72
End: 2022-02-16
Payer: MEDICARE

## 2022-02-16 ENCOUNTER — OFFICE VISIT (OUTPATIENT)
Dept: INTERNAL MEDICINE | Facility: CLINIC | Age: 72
End: 2022-02-16
Payer: MEDICARE

## 2022-02-16 VITALS
SYSTOLIC BLOOD PRESSURE: 150 MMHG | OXYGEN SATURATION: 77 % | RESPIRATION RATE: 18 BRPM | TEMPERATURE: 98 F | DIASTOLIC BLOOD PRESSURE: 64 MMHG | WEIGHT: 143.31 LBS | HEART RATE: 94 BPM | BODY MASS INDEX: 27.08 KG/M2

## 2022-02-16 VITALS
OXYGEN SATURATION: 89 % | DIASTOLIC BLOOD PRESSURE: 70 MMHG | RESPIRATION RATE: 17 BRPM | HEIGHT: 61 IN | SYSTOLIC BLOOD PRESSURE: 130 MMHG | WEIGHT: 141.75 LBS | BODY MASS INDEX: 26.76 KG/M2 | HEART RATE: 114 BPM

## 2022-02-16 DIAGNOSIS — J44.9 CHRONIC OBSTRUCTIVE PULMONARY DISEASE, UNSPECIFIED COPD TYPE: ICD-10-CM

## 2022-02-16 DIAGNOSIS — I70.213 ATHEROSCLEROSIS OF NATIVE ARTERY OF BOTH LOWER EXTREMITIES WITH INTERMITTENT CLAUDICATION: Chronic | ICD-10-CM

## 2022-02-16 DIAGNOSIS — R80.9 TYPE 2 DIABETES MELLITUS WITH MICROALBUMINURIA, WITHOUT LONG-TERM CURRENT USE OF INSULIN: Chronic | ICD-10-CM

## 2022-02-16 DIAGNOSIS — Z91.89 PULMONARY NODULE LESS THAN 6 MM IN DIAMETER WITH HIGH RISK FOR MALIGNANT NEOPLASM: Primary | ICD-10-CM

## 2022-02-16 DIAGNOSIS — D50.0 IRON DEFICIENCY ANEMIA DUE TO CHRONIC BLOOD LOSS: ICD-10-CM

## 2022-02-16 DIAGNOSIS — I15.2 HYPERTENSION ASSOCIATED WITH DIABETES: ICD-10-CM

## 2022-02-16 DIAGNOSIS — D50.9 IRON DEFICIENCY ANEMIA, UNSPECIFIED IRON DEFICIENCY ANEMIA TYPE: Primary | ICD-10-CM

## 2022-02-16 DIAGNOSIS — E11.29 TYPE 2 DIABETES MELLITUS WITH MICROALBUMINURIA, WITHOUT LONG-TERM CURRENT USE OF INSULIN: Chronic | ICD-10-CM

## 2022-02-16 DIAGNOSIS — C34.92 NON-SMALL CELL CANCER OF LEFT LUNG: ICD-10-CM

## 2022-02-16 DIAGNOSIS — R91.1 PULMONARY NODULE LESS THAN 6 MM IN DIAMETER WITH HIGH RISK FOR MALIGNANT NEOPLASM: Primary | ICD-10-CM

## 2022-02-16 DIAGNOSIS — J30.1 SEASONAL ALLERGIC RHINITIS DUE TO POLLEN: ICD-10-CM

## 2022-02-16 DIAGNOSIS — D64.9 SYMPTOMATIC ANEMIA: Primary | ICD-10-CM

## 2022-02-16 DIAGNOSIS — R91.1 SOLITARY PULMONARY NODULE: ICD-10-CM

## 2022-02-16 DIAGNOSIS — R09.02 EXERCISE HYPOXEMIA: ICD-10-CM

## 2022-02-16 DIAGNOSIS — E11.59 HYPERTENSION ASSOCIATED WITH DIABETES: ICD-10-CM

## 2022-02-16 DIAGNOSIS — E11.51 TYPE 2 DIABETES MELLITUS WITH PERIPHERAL VASCULAR DISEASE: Chronic | ICD-10-CM

## 2022-02-16 DIAGNOSIS — B37.31 YEAST VAGINITIS: ICD-10-CM

## 2022-02-16 DIAGNOSIS — D64.9 SYMPTOMATIC ANEMIA: ICD-10-CM

## 2022-02-16 LAB
ANISOCYTOSIS BLD QL SMEAR: SLIGHT
BASOPHILS # BLD AUTO: 0.03 K/UL (ref 0–0.2)
BASOPHILS NFR BLD: 0.2 % (ref 0–1.9)
DIFFERENTIAL METHOD: ABNORMAL
EOSINOPHIL # BLD AUTO: 0 K/UL (ref 0–0.5)
EOSINOPHIL NFR BLD: 0.1 % (ref 0–8)
ERYTHROCYTE [DISTWIDTH] IN BLOOD BY AUTOMATED COUNT: 20.2 % (ref 11.5–14.5)
HCT VFR BLD AUTO: 33.4 % (ref 37–48.5)
HGB BLD-MCNC: 9.9 G/DL (ref 12–16)
HYPOCHROMIA BLD QL SMEAR: ABNORMAL
IMM GRANULOCYTES # BLD AUTO: 0.04 K/UL (ref 0–0.04)
IMM GRANULOCYTES NFR BLD AUTO: 0.3 % (ref 0–0.5)
LYMPHOCYTES # BLD AUTO: 0.8 K/UL (ref 1–4.8)
LYMPHOCYTES NFR BLD: 6.4 % (ref 18–48)
MCH RBC QN AUTO: 23.2 PG (ref 27–31)
MCHC RBC AUTO-ENTMCNC: 29.6 G/DL (ref 32–36)
MCV RBC AUTO: 78 FL (ref 82–98)
MONOCYTES # BLD AUTO: 0.2 K/UL (ref 0.3–1)
MONOCYTES NFR BLD: 1.6 % (ref 4–15)
NEUTROPHILS # BLD AUTO: 11.2 K/UL (ref 1.8–7.7)
NEUTROPHILS NFR BLD: 91.4 % (ref 38–73)
NRBC BLD-RTO: 1 /100 WBC
OVALOCYTES BLD QL SMEAR: ABNORMAL
PLATELET # BLD AUTO: 321 K/UL (ref 150–450)
PLATELET BLD QL SMEAR: ABNORMAL
PMV BLD AUTO: 10.8 FL (ref 9.2–12.9)
POIKILOCYTOSIS BLD QL SMEAR: SLIGHT
POLYCHROMASIA BLD QL SMEAR: ABNORMAL
RBC # BLD AUTO: 4.26 M/UL (ref 4–5.4)
WBC # BLD AUTO: 12.21 K/UL (ref 3.9–12.7)

## 2022-02-16 PROCEDURE — 99499 UNLISTED E&M SERVICE: CPT | Mod: HCNC,S$GLB,, | Performed by: INTERNAL MEDICINE

## 2022-02-16 PROCEDURE — 1159F PR MEDICATION LIST DOCUMENTED IN MEDICAL RECORD: ICD-10-PCS | Mod: HCNC,CPTII,S$GLB, | Performed by: INTERNAL MEDICINE

## 2022-02-16 PROCEDURE — 99999 PR PBB SHADOW E&M-EST. PATIENT-LVL V: CPT | Mod: PBBFAC,HCNC,, | Performed by: NURSE PRACTITIONER

## 2022-02-16 PROCEDURE — 99999 PR PBB SHADOW E&M-EST. PATIENT-LVL V: CPT | Mod: PBBFAC,HCNC,, | Performed by: INTERNAL MEDICINE

## 2022-02-16 PROCEDURE — 3288F FALL RISK ASSESSMENT DOCD: CPT | Mod: HCNC,CPTII,S$GLB, | Performed by: INTERNAL MEDICINE

## 2022-02-16 PROCEDURE — 3072F PR LOW RISK FOR RETINOPATHY: ICD-10-PCS | Mod: HCNC,CPTII,S$GLB, | Performed by: NURSE PRACTITIONER

## 2022-02-16 PROCEDURE — 3072F LOW RISK FOR RETINOPATHY: CPT | Mod: HCNC,CPTII,S$GLB, | Performed by: NURSE PRACTITIONER

## 2022-02-16 PROCEDURE — 99214 PR OFFICE/OUTPT VISIT, EST, LEVL IV, 30-39 MIN: ICD-10-PCS | Mod: HCNC,S$GLB,, | Performed by: NURSE PRACTITIONER

## 2022-02-16 PROCEDURE — 3008F PR BODY MASS INDEX (BMI) DOCUMENTED: ICD-10-PCS | Mod: HCNC,CPTII,S$GLB, | Performed by: INTERNAL MEDICINE

## 2022-02-16 PROCEDURE — 3072F PR LOW RISK FOR RETINOPATHY: ICD-10-PCS | Mod: HCNC,CPTII,S$GLB, | Performed by: INTERNAL MEDICINE

## 2022-02-16 PROCEDURE — 3072F LOW RISK FOR RETINOPATHY: CPT | Mod: HCNC,CPTII,S$GLB, | Performed by: INTERNAL MEDICINE

## 2022-02-16 PROCEDURE — 3075F PR MOST RECENT SYSTOLIC BLOOD PRESS GE 130-139MM HG: ICD-10-PCS | Mod: HCNC,CPTII,S$GLB, | Performed by: INTERNAL MEDICINE

## 2022-02-16 PROCEDURE — 99215 PR OFFICE/OUTPT VISIT, EST, LEVL V, 40-54 MIN: ICD-10-PCS | Mod: HCNC,S$GLB,, | Performed by: INTERNAL MEDICINE

## 2022-02-16 PROCEDURE — 3078F DIAST BP <80 MM HG: CPT | Mod: HCNC,CPTII,S$GLB, | Performed by: INTERNAL MEDICINE

## 2022-02-16 PROCEDURE — 99999 PR PBB SHADOW E&M-EST. PATIENT-LVL V: ICD-10-PCS | Mod: PBBFAC,HCNC,, | Performed by: INTERNAL MEDICINE

## 2022-02-16 PROCEDURE — 3008F BODY MASS INDEX DOCD: CPT | Mod: HCNC,CPTII,S$GLB, | Performed by: INTERNAL MEDICINE

## 2022-02-16 PROCEDURE — 1159F MED LIST DOCD IN RCRD: CPT | Mod: HCNC,CPTII,S$GLB, | Performed by: INTERNAL MEDICINE

## 2022-02-16 PROCEDURE — 3288F PR FALLS RISK ASSESSMENT DOCUMENTED: ICD-10-PCS | Mod: HCNC,CPTII,S$GLB, | Performed by: INTERNAL MEDICINE

## 2022-02-16 PROCEDURE — 3077F PR MOST RECENT SYSTOLIC BLOOD PRESSURE >= 140 MM HG: ICD-10-PCS | Mod: HCNC,CPTII,S$GLB, | Performed by: NURSE PRACTITIONER

## 2022-02-16 PROCEDURE — 1101F PT FALLS ASSESS-DOCD LE1/YR: CPT | Mod: HCNC,CPTII,S$GLB, | Performed by: INTERNAL MEDICINE

## 2022-02-16 PROCEDURE — 3078F PR MOST RECENT DIASTOLIC BLOOD PRESSURE < 80 MM HG: ICD-10-PCS | Mod: HCNC,CPTII,S$GLB, | Performed by: NURSE PRACTITIONER

## 2022-02-16 PROCEDURE — 1101F PR PT FALLS ASSESS DOC 0-1 FALLS W/OUT INJ PAST YR: ICD-10-PCS | Mod: HCNC,CPTII,S$GLB, | Performed by: INTERNAL MEDICINE

## 2022-02-16 PROCEDURE — 99999 PR PBB SHADOW E&M-EST. PATIENT-LVL V: ICD-10-PCS | Mod: PBBFAC,HCNC,, | Performed by: NURSE PRACTITIONER

## 2022-02-16 PROCEDURE — 99499 RISK ADDL DX/OHS AUDIT: ICD-10-PCS | Mod: HCNC,S$GLB,, | Performed by: INTERNAL MEDICINE

## 2022-02-16 PROCEDURE — 3008F BODY MASS INDEX DOCD: CPT | Mod: HCNC,CPTII,S$GLB, | Performed by: NURSE PRACTITIONER

## 2022-02-16 PROCEDURE — 99214 OFFICE O/P EST MOD 30 MIN: CPT | Mod: HCNC,S$GLB,, | Performed by: NURSE PRACTITIONER

## 2022-02-16 PROCEDURE — 36415 COLL VENOUS BLD VENIPUNCTURE: CPT | Mod: HCNC | Performed by: NURSE PRACTITIONER

## 2022-02-16 PROCEDURE — 3078F DIAST BP <80 MM HG: CPT | Mod: HCNC,CPTII,S$GLB, | Performed by: NURSE PRACTITIONER

## 2022-02-16 PROCEDURE — 3008F PR BODY MASS INDEX (BMI) DOCUMENTED: ICD-10-PCS | Mod: HCNC,CPTII,S$GLB, | Performed by: NURSE PRACTITIONER

## 2022-02-16 PROCEDURE — 1159F PR MEDICATION LIST DOCUMENTED IN MEDICAL RECORD: ICD-10-PCS | Mod: HCNC,CPTII,S$GLB, | Performed by: NURSE PRACTITIONER

## 2022-02-16 PROCEDURE — 99215 OFFICE O/P EST HI 40 MIN: CPT | Mod: HCNC,S$GLB,, | Performed by: INTERNAL MEDICINE

## 2022-02-16 PROCEDURE — 3075F SYST BP GE 130 - 139MM HG: CPT | Mod: HCNC,CPTII,S$GLB, | Performed by: INTERNAL MEDICINE

## 2022-02-16 PROCEDURE — 1159F MED LIST DOCD IN RCRD: CPT | Mod: HCNC,CPTII,S$GLB, | Performed by: NURSE PRACTITIONER

## 2022-02-16 PROCEDURE — 3078F PR MOST RECENT DIASTOLIC BLOOD PRESSURE < 80 MM HG: ICD-10-PCS | Mod: HCNC,CPTII,S$GLB, | Performed by: INTERNAL MEDICINE

## 2022-02-16 PROCEDURE — 3077F SYST BP >= 140 MM HG: CPT | Mod: HCNC,CPTII,S$GLB, | Performed by: NURSE PRACTITIONER

## 2022-02-16 PROCEDURE — 85025 COMPLETE CBC W/AUTO DIFF WBC: CPT | Mod: HCNC | Performed by: NURSE PRACTITIONER

## 2022-02-16 RX ORDER — ALBUTEROL SULFATE 90 UG/1
2 AEROSOL, METERED RESPIRATORY (INHALATION) EVERY 4 HOURS PRN
Qty: 54 G | Refills: 3 | Status: SHIPPED | OUTPATIENT
Start: 2022-02-16 | End: 2023-04-19 | Stop reason: SDUPTHER

## 2022-02-16 RX ORDER — MONTELUKAST SODIUM 10 MG/1
10 TABLET ORAL NIGHTLY
Qty: 90 TABLET | Refills: 4 | Status: SHIPPED | OUTPATIENT
Start: 2022-02-16 | End: 2023-02-23

## 2022-02-16 RX ORDER — CILOSTAZOL 50 MG/1
50 TABLET ORAL DAILY
Qty: 60 TABLET | Refills: 11
Start: 2022-02-16 | End: 2022-06-24 | Stop reason: SDUPTHER

## 2022-02-16 RX ORDER — ALBUTEROL SULFATE 0.83 MG/ML
2.5 SOLUTION RESPIRATORY (INHALATION)
Qty: 270 ML | Refills: 11 | Status: SHIPPED | OUTPATIENT
Start: 2022-02-16 | End: 2023-02-16

## 2022-02-16 RX ORDER — FLUCONAZOLE 150 MG/1
150 TABLET ORAL ONCE
Qty: 1 TABLET | Refills: 0 | Status: SHIPPED | OUTPATIENT
Start: 2022-02-16 | End: 2022-02-16

## 2022-02-16 RX ORDER — FLUTICASONE PROPIONATE 50 MCG
2 SPRAY, SUSPENSION (ML) NASAL DAILY
Qty: 48 G | Refills: 4 | Status: SHIPPED | OUTPATIENT
Start: 2022-02-16 | End: 2023-02-16

## 2022-02-16 NOTE — PROGRESS NOTES
Subjective: Fatigue and shortness of breath        Patient ID: Lucia Barlow is a 72 y.o. female.    Chief Complaint:   She   presents for evaluation and treatment of anemia and difficulty breathing after being discharged from the hospital  1  week ago. Transfused while in hospital - Still taking ASA - advised to stop due to suspected GI bleed.   Since discharge symptoms have gradually improving course since that time. Patient denies fever or chills . Still weak. Symptoms are aggravated by activity. Symptoms improve with rest.   Respiratory: positive for dyspnea on exertion, no cough or shorness of breath; Cardiovascular: no chest pain or palpitations.  Patient currently is not on home oxygen therapy..  History of lung cancer:1.5 x 1.5 x 1.5cm moderately differentiated NSCLC, favor adenosquamous  MEDICAL RECORDS FROM THE HOSPITAL REVIEWED: New 4 mm nodule    COPD    HPI  Past Medical History:   Diagnosis Date    Atherosclerosis of artery of both lower extremities 1/17/2014    Atherosclerosis of native coronary artery of native heart without angina pectoris 11/18/2016    Atherosclerotic PVD with intermittent claudication 1/17/2014    Chronic bronchitis     COPD (chronic obstructive pulmonary disease)     Diabetes with neurologic complications     Dyslipidemia associated with type 2 diabetes mellitus 6/14/2013    Dyslipidemia associated with type 2 diabetes mellitus     Ex-smoker     Gastroesophageal reflux disease without esophagitis 1/25/2019    Hyperlipidemia     Hypertension     NSCLC of left lung 11/19/2020    IVANIA (obstructive sleep apnea) 2/11/2021    Osteoporosis 1/16 rosita 1/18    Pneumothorax after biopsy 10/21/2020    PVD (peripheral vascular disease)     Renal manifestation of secondary diabetes mellitus     S/P peripheral artery angioplasty 2/7/2014    Seasonal allergic rhinitis due to pollen     Simple chronic bronchitis     Tobacco dependence     Type 2 diabetes mellitus with  microalbuminuria, without long-term current use of insulin 3/29/2019    Type 2 diabetes mellitus with peripheral vascular disease     Type 2 diabetes mellitus with stage 3 chronic kidney disease, without long-term current use of insulin 2/1/2019    Type 2 diabetes mellitus without retinopathy 2/1/2019    Urinary incontinence      Past Surgical History:   Procedure Laterality Date    ANGIOPLASTY Bilateral 01/24/2014    aortoiliac stenting     CARPAL TUNNEL RELEASE  2003    Henrry    COLECTOMY  approximate 2005    pt states 1in colon -dx with benign mass removed-states no colon cancer    COLONOSCOPY N/A 11/9/2016    Procedure: COLONOSCOPY;  Surgeon: Dmitri Sterling MD;  Location: [facility] ENDO;  Service: Endoscopy;  Laterality: N/A;    COLONOSCOPY N/A 11/15/2019    Procedure: COLONOSCOPY;  Surgeon: Marko Vicente MD;  Location: [facility] ENDO;  Service: Endoscopy;  Laterality: N/A;    HYSTERECTOMY      no cancer    INJECTION OF ANESTHETIC AGENT AROUND MULTIPLE INTERCOSTAL NERVES Left 11/19/2020    Procedure: BLOCK, NERVE, INTERCOSTAL, 2 OR MORE;  Surgeon: Amrit Flores MD;  Location: [facility] OR [address] [place] [address];  Service: Thoracic;  Laterality: Left;    OOPHORECTOMY      SURGICAL REMOVAL OF LYMPH NODE Left 11/19/2020    Procedure: EXCISION, LYMPH NODE;  Surgeon: Amrit Flores MD;  Location: [facility] OR [address] [place] [address];  Service: Thoracic;  Laterality: Left;  mediastinal lymph node disection    XI ROBOTIC RATS,WITH LOBECTOMY,LUNG Left 11/19/2020    Procedure: XI ROBOTIC RATS,WITH LOBECTOMY,LUNG;  Surgeon: Amrit Flores MD;  Location: [facility] OR [address] [place] [address];  Service: Thoracic;  Laterality: Left;  Lingulectomy.     Social History     Socioeconomic History    Marital status:     Number of children: 4   Occupational History    Occupation:    Tobacco Use    Smoking status: Former Smoker     Packs/day: 1.00     Years: 60.00     Pack years: 60.00     Types: Cigarettes     Start date: 1/1/1960     Quit  "date: 1/10/2020     Years since quittin.1    Smokeless tobacco: Never Used   Substance and Sexual Activity    Alcohol use: No     Alcohol/week: 0.0 standard drinks    Drug use: No    Sexual activity: Never   Social History Narrative    Son smoker, no pets in household.     Social Determinants of Health     Financial Resource Strain: Unknown    Difficulty of Paying Living Expenses: Patient refused   Food Insecurity: Unknown    Worried About Running Out of Food in the Last Year: Patient refused    Ran Out of Food in the Last Year: Patient refused   Transportation Needs: Unknown    Lack of Transportation (Medical): Patient refused    Lack of Transportation (Non-Medical): Patient refused   Physical Activity: Unknown    Days of Exercise per Week: Patient refused    Minutes of Exercise per Session: 0 min   Stress: Unknown    Feeling of Stress : Patient refused   Social Connections: Unknown    Frequency of Communication with Friends and Family: Patient refused    Frequency of Social Gatherings with Friends and Family: Patient refused    Attends [employer] Services: 1 to 4 times per year    Active Member of Clubs or Organizations: Patient refused    Attends Club or Organization Meetings: Patient refused    Marital Status:    Housing Stability: Low Risk     Unable to Pay for Housing in the Last Year: No    Number of Places Lived in the Last Year: 1    Unstable Housing in the Last Year: No     Review of Systems   Constitutional: Positive for fatigue.   Respiratory: Positive for shortness of breath and dyspnea on extertion.    Musculoskeletal: Positive for arthralgias.       Objective:   /70   Pulse (!) 114   Resp 17   Ht 5' 1" (1.549 m)   Wt 64.3 kg (141 lb 12.1 oz)   SpO2 (!) 89%   BMI 26.78 kg/m²      Physical Exam  Vitals and nursing note reviewed.   Constitutional:       Appearance: She is well-developed and well-nourished. She is ill-appearing.   HENT:      Head: Normocephalic " and atraumatic.      Nose: Nose normal.   Eyes:      Conjunctiva/sclera: Conjunctivae normal.      Pupils: Pupils are equal, round, and reactive to light.   Neck:      Thyroid: No thyromegaly.      Vascular: No JVD.      Trachea: No tracheal deviation.   Cardiovascular:      Rate and Rhythm: Normal rate and regular rhythm.      Heart sounds: Normal heart sounds.   Pulmonary:      Effort: Pulmonary effort is normal. No respiratory distress.      Breath sounds: Decreased breath sounds present. No wheezing or rales.   Chest:      Chest wall: No tenderness.   Abdominal:      General: Bowel sounds are normal.      Palpations: Abdomen is soft.   Musculoskeletal:         General: No edema. Normal range of motion.      Cervical back: Neck supple.   Lymphadenopathy:      Cervical: No cervical adenopathy.   Skin:     General: Skin is warm and dry.   Neurological:      Mental Status: She is alert and oriented to person, place, and time.       Personal Diagnostic Review  CT of chest performed on 11/2/2021 without contrast revealed new right lower lobe nodule.  history of new right lower lobe nodule:  Impression:     Postsurgical change of lingulectomy, stable.     Single new subsolid 4 mm right lower lobe pulmonary nodule.  Remaining pulmonary nodules appear stable.  Clinical concerns dictate the schedule for continued surveillance.     Diffuse mural thickening along the greater curvature of the stomach, nonspecific and possibly related to nondistention.  Underlying mass lesion cannot be excluded.        Electronically signed by: Cristina Peoples  Date:                                            11/02/2021  Time:                                           19:34    X-Ray Chest AP Portable  Narrative: EXAMINATION:  XR CHEST AP PORTABLE    CLINICAL HISTORY:  Chest Pain;    FINDINGS:  Single view of the chest.  Comparison 06/09/2021.    Cardiac silhouette is normal.  The lungs demonstrate no evidence of active disease.  In facet  cipriano's changes are seen within the apices.  Scattered pulmonary nodule seen on chest CT are not well seen on plain radiograph.  No evidence of pleural effusion or pneumothorax.  Bones appear intact with mild scattered degenerative change.  Impression: No acute process seen.    Electronically signed by: Kvng Desai MD  Date:    02/14/2022  Time:    06:54    .[email or web address]  No flowsheet data found.        Assessment:       Pulmonary nodule less than 6 mm in diameter with high risk for malignant neoplasm - RIght lower lobe  Follow up CT of chest in 5/2022    1. Pulmonary nodule less than 6 mm in diameter with high risk for malignant neoplasm    2. Iron deficiency anemia due to chronic blood loss    3. Non-small cell cancer of left lung    4. Exercise hypoxemia    5. Chronic obstructive pulmonary disease, unspecified COPD type    6. Seasonal allergic rhinitis due to pollen    7. Solitary pulmonary nodule    8. Atherosclerosis of native artery of both lower extremities with intermittent claudication        Outpatient Encounter Medications as of 2/16/2022   Medication Sig Dispense Refill    amlodipine-benazepril 5-20 mg (LOTREL) 5-20 mg per capsule Take 1 capsule by mouth 2 (two) times daily. 180 capsule 4    blood sugar diagnostic Strp 1 each by Misc.(Non-Drug; Combo Route) route 3 (three) times daily. EALTH test strips 100 each 3    blood-glucose meter kit Insurance preferred 1 each 0    butalbital-acetaminophen-caffeine -40 mg (FIORICET, ESGIC) -40 mg per tablet TAKE 1 TABLET BY MOUTH 3 (THREE) TIMES DAILY AS NEEDED FOR HEADACHES. (MAXIMUM OF 15 TABLETS PER MONTH) 30 tablet 3    calcium-vitamin D 600 mg(1,500mg) -400 unit Tab Take 2 tablets by mouth once daily. 180 tablet 4    cetirizine (ZYRTEC) 10 MG tablet Take 1 tablet (10 mg total) by mouth once daily. For sinus congestion 90 tablet 4    chlorthalidone (HYGROTEN) 25 MG Tab Take 1 tablet (25 mg total) by mouth once daily. 90 tablet 4     clonazePAM (KLONOPIN) 0.25 MG TbDL Take 1 tablet (0.25 mg total) by mouth nightly. 30 tablet 5    empagliflozin (JARDIANCE) 25 mg tablet Take 1 tablet (25 mg total) by mouth once daily. 90 tablet 1    ezetimibe (ZETIA) 10 mg tablet Take 1 tablet (10 mg total) by mouth once daily. 90 tablet 4    fluconazole (DIFLUCAN) 150 MG Tab Take 1 tablet (150 mg total) by mouth once. for 1 dose 1 tablet 0    levoFLOXacin (LEVAQUIN) 500 MG tablet Take 1 tablet (500 mg total) by mouth once daily. 10 tablet 0    levoFLOXacin (LEVAQUIN) 500 MG tablet Take 1 tablet (500 mg total) by mouth once daily. for 5 days 5 tablet 0    pantoprazole (PROTONIX) 40 MG tablet Take 1 tablet (40 mg total) by mouth once daily. 90 tablet 4    potassium chloride SA (K-DUR,KLOR-CON) 20 MEQ tablet Take 1 tablet (20 mEq total) by mouth once daily. 90 tablet 4    predniSONE (DELTASONE) 20 MG tablet Prednisone 60 mg/ day for 3 days, 40 mg/day for 3 days,20 mg/ day for 3 days, (1/2 tablet )10 mg a day for 3 days. 20 tablet 0    predniSONE (DELTASONE) 50 MG Tab Take 1 tablet (50 mg total) by mouth once daily. for 5 days 5 tablet 0    rosuvastatin (CRESTOR) 20 MG tablet Take 1 tablet (20 mg total) by mouth once daily. 90 tablet 4    varicella-zoster gE-AS01B, PF, (SHINGRIX) 50 mcg/0.5 mL injection Inject 0.5 mL (one dose) into muscle now; give second dose at least 2 months later 0.5 mL 1    [DISCONTINUED] albuterol (PROVENTIL) 2.5 mg /3 mL (0.083 %) nebulizer solution Take 3 mLs (2.5 mg total) by nebulization every 6 (six) hours while awake. 270 mL 11    [DISCONTINUED] albuterol (PROVENTIL/VENTOLIN HFA) 90 mcg/actuation inhaler Inhale 1-2 puffs into the lungs every 6 (six) hours as needed for Wheezing. 8 g 0    [DISCONTINUED] aspirin (ECOTRIN) 81 MG EC tablet Take 1 tablet (81 mg total) by mouth once daily. 90 tablet 4    [DISCONTINUED] cilostazoL (PLETAL) 50 MG Tab Take 1 tablet (50 mg total) by mouth 2 (two) times daily. 60 tablet 11     [DISCONTINUED] fluticasone propionate (FLONASE) 50 mcg/actuation nasal spray 2 sprays (100 mcg total) by Each Nostril route once daily. 48 g 4    [DISCONTINUED] ipratropium-albuteroL (COMBIVENT)  mcg/actuation inhaler Inhale 2 puffs into the lungs every 4 (four) hours as needed for Wheezing or Shortness of Breath. Rescue 12 g 3    [DISCONTINUED] montelukast (SINGULAIR) 10 mg tablet Take 1 tablet (10 mg total) by mouth every evening. 90 tablet 4    albuterol (PROVENTIL) 2.5 mg /3 mL (0.083 %) nebulizer solution Take 3 mLs (2.5 mg total) by nebulization every 6 (six) hours while awake. 270 mL 11    albuterol (PROVENTIL/VENTOLIN HFA) 90 mcg/actuation inhaler Inhale 2 puffs into the lungs every 4 (four) hours as needed for Wheezing or Shortness of Breath. 54 g 3    cilostazoL (PLETAL) 50 MG Tab Take 1 tablet (50 mg total) by mouth once daily. 60 tablet 11    fluticasone propionate (FLONASE) 50 mcg/actuation nasal spray 2 sprays (100 mcg total) by Each Nostril route once daily. 48 g 4    ipratropium-albuteroL (COMBIVENT)  mcg/actuation inhaler Inhale 2 puffs into the lungs every 4 (four) hours as needed for Wheezing or Shortness of Breath. Rescue 12 g 3    montelukast (SINGULAIR) 10 mg tablet Take 1 tablet (10 mg total) by mouth every evening. 90 tablet 4     No facility-administered encounter medications on file as of 2/16/2022.     Orders Placed This Encounter   Procedures    CT Chest Without Contrast     Cancel CT of chest for 6/15/2022     Standing Status:   Future     Standing Expiration Date:   2/16/2023     Order Specific Question:   May the Radiologist modify the order per protocol to meet the clinical needs of the patient?     Answer:   Yes    IRON AND TIBC     Standing Status:   Future     Standing Expiration Date:   4/17/2023    Ambulatory referral/consult to Hematology / Oncology     Standing Status:   Future     Standing Expiration Date:   3/16/2023     Referral Priority:   Routine      Referral Type:   Consultation     Referral Reason:   Specialty Services Required     Requested Specialty:   Hematology and Oncology     Number of Visits Requested:   1    Six Minute Walk Test to qualify for Home Oxygen     Standing Status:   Future     Standing Expiration Date:   2023     Order Specific Question:   Release to patient     Answer:   Immediate     Plan:       Requested Prescriptions     Signed Prescriptions Disp Refills    albuterol (PROVENTIL) 2.5 mg /3 mL (0.083 %) nebulizer solution 270 mL 11     Sig: Take 3 mLs (2.5 mg total) by nebulization every 6 (six) hours while awake.    ipratropium-albuteroL (COMBIVENT)  mcg/actuation inhaler 12 g 3     Sig: Inhale 2 puffs into the lungs every 4 (four) hours as needed for Wheezing or Shortness of Breath. Rescue    fluticasone propionate (FLONASE) 50 mcg/actuation nasal spray 48 g 4     Si sprays (100 mcg total) by Each Nostril route once daily.    montelukast (SINGULAIR) 10 mg tablet 90 tablet 4     Sig: Take 1 tablet (10 mg total) by mouth every evening.    albuterol (PROVENTIL/VENTOLIN HFA) 90 mcg/actuation inhaler 54 g 3     Sig: Inhale 2 puffs into the lungs every 4 (four) hours as needed for Wheezing or Shortness of Breath.    cilostazoL (PLETAL) 50 MG Tab 60 tablet 11     Sig: Take 1 tablet (50 mg total) by mouth once daily.     Pulmonary nodule less than 6 mm in diameter with high risk for malignant neoplasm    Iron deficiency anemia due to chronic blood loss  -     IRON AND TIBC; Future; Expected date: 2022  -     Ambulatory referral/consult to Hematology / Oncology; Future; Expected date: 2022    Non-small cell cancer of left lung    Exercise hypoxemia  -     Six Minute Walk Test to qualify for Home Oxygen; Future    Chronic obstructive pulmonary disease, unspecified COPD type  -     albuterol (PROVENTIL) 2.5 mg /3 mL (0.083 %) nebulizer solution; Take 3 mLs (2.5 mg total) by nebulization every 6 (six) hours while  awake.  Dispense: 270 mL; Refill: 11  -     ipratropium-albuteroL (COMBIVENT)  mcg/actuation inhaler; Inhale 2 puffs into the lungs every 4 (four) hours as needed for Wheezing or Shortness of Breath. Rescue  Dispense: 12 g; Refill: 3  -     albuterol (PROVENTIL/VENTOLIN HFA) 90 mcg/actuation inhaler; Inhale 2 puffs into the lungs every 4 (four) hours as needed for Wheezing or Shortness of Breath.  Dispense: 54 g; Refill: 3    Seasonal allergic rhinitis due to pollen  -     fluticasone propionate (FLONASE) 50 mcg/actuation nasal spray; 2 sprays (100 mcg total) by Each Nostril route once daily.  Dispense: 48 g; Refill: 4  -     montelukast (SINGULAIR) 10 mg tablet; Take 1 tablet (10 mg total) by mouth every evening.  Dispense: 90 tablet; Refill: 4    Solitary pulmonary nodule  -     CT Chest Without Contrast; Future; Expected date: 02/16/2022    Atherosclerosis of native artery of both lower extremities with intermittent claudication  -     cilostazoL (PLETAL) 50 MG Tab; Take 1 tablet (50 mg total) by mouth once daily.  Dispense: 60 tablet; Refill: 11           Follow up in about 2 months (around 5/2/2022) for Review CT/PET - on return visit, 6 min walk - ASAP.    Review of medical records from hospital. Medical records from hospital were reviewed during office visit - these included but were not limited to review of radiographic studies and /or radiologists reports, laboratory studies, discharge summaries, procedure notes, consultations and other transcribed notes.    MEDICAL DECISION MAKING: Moderate to high complexity.  Overall, the multiple problems listed are of moderate to high severity that may impact quality of life and activities of daily living. Side effects of medications, treatment plan as well as options and alternatives reviewed and discussed with patient. There was counseling of patient concerning these issues.    Total time spent in counseling and coordination of care - 45  minutes of total time  spent on the encounter, which includes face to face time and non-face to face time preparing to see the patient (eg, review of tests), Obtaining and/or reviewing separately obtained history, Documenting clinical information in the electronic or other health record, Independently interpreting results (not separately reported) and communicating results to the patient/family/caregiver, or Care coordination (not separately reported).    Time was used in discussion of prognosis, risks, benefits of treatment, instructions and compliance with regimen . Discussion with other physicians and/or health care providers - home health or for use of durable medical equipment (oxygen, nebulizers, CPAP, BiPAP) occurred.

## 2022-02-16 NOTE — PROGRESS NOTES
Subjective:       Patient ID: Lucia Barlow is a 72 y.o. female.    Chief Complaint: hospital flu  (Shortness of breath. )    HPI    Pt here for hosp follow up  Sob/weakness  Found to be anemic/copd exacerbation  Given IV steroids, 1 unit PRBcs  Sent home on levaquin/prednisone  States still sob and weak  Has follow up with pulm at 1 p  What is causing blood loss? Pt denies any bleeding/easy bruising, no abd pain. Taking protonix daily/ no bowel changes    Past Medical History:   Diagnosis Date    Atherosclerosis of artery of both lower extremities 1/17/2014    Atherosclerosis of native coronary artery of native heart without angina pectoris 11/18/2016    Atherosclerotic PVD with intermittent claudication 1/17/2014    Chronic bronchitis     COPD (chronic obstructive pulmonary disease)     Diabetes with neurologic complications     Dyslipidemia associated with type 2 diabetes mellitus 6/14/2013    Dyslipidemia associated with type 2 diabetes mellitus     Ex-smoker     Gastroesophageal reflux disease without esophagitis 1/25/2019    Hyperlipidemia     Hypertension     NSCLC of left lung 11/19/2020    IVANIA (obstructive sleep apnea) 2/11/2021    Osteoporosis 1/16 rosita 1/18    Pneumothorax after biopsy 10/21/2020    PVD (peripheral vascular disease)     Renal manifestation of secondary diabetes mellitus     S/P peripheral artery angioplasty 2/7/2014    Seasonal allergic rhinitis due to pollen     Simple chronic bronchitis     Tobacco dependence     Type 2 diabetes mellitus with microalbuminuria, without long-term current use of insulin 3/29/2019    Type 2 diabetes mellitus with peripheral vascular disease     Type 2 diabetes mellitus with stage 3 chronic kidney disease, without long-term current use of insulin 2/1/2019    Type 2 diabetes mellitus without retinopathy 2/1/2019    Urinary incontinence      Past Surgical History:   Procedure Laterality Date    ANGIOPLASTY Bilateral 01/24/2014     aortoiliac stenting     CARPAL TUNNEL RELEASE  2003    [facility]    COLECTOMY  approximate 2005    pt states 1in colon -dx with benign mass removed-states no colon cancer    COLONOSCOPY N/A 11/9/2016    Procedure: COLONOSCOPY;  Surgeon: Dmitri Sterling MD;  Location: [facility] ENDO;  Service: Endoscopy;  Laterality: N/A;    COLONOSCOPY N/A 11/15/2019    Procedure: COLONOSCOPY;  Surgeon: Marko Vicente MD;  Location: [facility];  Service: Endoscopy;  Laterality: N/A;    HYSTERECTOMY      no cancer    INJECTION OF ANESTHETIC AGENT AROUND MULTIPLE INTERCOSTAL NERVES Left 11/19/2020    Procedure: BLOCK, NERVE, INTERCOSTAL, 2 OR MORE;  Surgeon: Amrit Flores MD;  Location: [facility] OR [address] [place] [address];  Service: Thoracic;  Laterality: Left;    OOPHORECTOMY      SURGICAL REMOVAL OF LYMPH NODE Left 11/19/2020    Procedure: EXCISION, LYMPH NODE;  Surgeon: Amrit Flores MD;  [facility];  Service: Thoracic;  Laterality: Left;  mediastinal lymph node disection    XI ROBOTIC RATS,WITH LOBECTOMY,LUNG Left 11/19/2020    Procedure: XI ROBOTIC RATS,WITH LOBECTOMY,LUNG;  Surgeon: Amrit Flores MD;  [facility];  Service: Thoracic;  Laterality: Left;  Lingulectomy.     Past Surgical History:   Procedure Laterality Date    ANGIOPLASTY Bilateral 01/24/2014    aortoiliac stenting     CARPAL TUNNEL RELEASE  2003    [facility]    COLECTOMY  approximate 2005    pt states 1in colon -dx with benign mass removed-states no colon cancer    COLONOSCOPY N/A 11/9/2016    Procedure: COLONOSCOPY;  Surgeon: Dmitri Sterling MD;  Location: [facility];  Service: Endoscopy;  Laterality: N/A;    COLONOSCOPY N/A 11/15/2019    Procedure: COLONOSCOPY;  Surgeon: Marko Vicente MD;  Location: [facility];  Service: Endoscopy;  Laterality: N/A;    HYSTERECTOMY      no cancer    INJECTION OF ANESTHETIC AGENT AROUND MULTIPLE INTERCOSTAL NERVES Left 11/19/2020    Procedure: BLOCK, NERVE, INTERCOSTAL, 2 OR MORE;   Surgeon: Amrit Flores MD;  Location: [address];  Service: Thoracic;  Laterality: Left;    OOPHORECTOMY      SURGICAL REMOVAL OF LYMPH NODE Left 2020    Procedure: EXCISION, LYMPH NODE;  Surgeon: Amrit Flores MD;  Location: [facility] OR [address] [place] [address];  Service: Thoracic;  Laterality: Left;  mediastinal lymph node disection    XI ROBOTIC RATS,WITH LOBECTOMY,LUNG Left 2020    Procedure: XI ROBOTIC RATS,WITH LOBECTOMY,LUNG;  Surgeon: Amrit Flores MD;  Location: [address];  Service: Thoracic;  Laterality: Left;  Lingulectomy.     Social History     Socioeconomic History    Marital status:     Number of children: 4   Occupational History    Occupation:    Tobacco Use    Smoking status: Former Smoker     Packs/day: 1.00     Years: 60.00     Pack years: 60.00     Types: Cigarettes     Start date: 1960     Quit date: 1/10/2020     Years since quittin.1    Smokeless tobacco: Never Used   Substance and Sexual Activity    Alcohol use: No     Alcohol/week: 0.0 standard drinks    Drug use: No    Sexual activity: Never   Social History Narrative    Son smoker, no pets in household.     Social Determinants of Health     Financial Resource Strain: Unknown    Difficulty of Paying Living Expenses: Patient refused   Food Insecurity: Unknown    Worried About Running Out of Food in the Last Year: Patient refused    Ran Out of Food in the Last Year: Patient refused   Transportation Needs: Unknown    Lack of Transportation (Medical): Patient refused    Lack of Transportation (Non-Medical): Patient refused   Physical Activity: Unknown    Days of Exercise per Week: Patient refused    Minutes of Exercise per Session: 0 min   Stress: Unknown    Feeling of Stress : Patient refused   Social Connections: Unknown    Frequency of Communication with Friends and Family: Patient refused    Frequency of Social Gatherings with Friends and Family: Patient refused    Attends  Anglican Services: 1 to 4 times per year    Active Member of Clubs or Organizations: Patient refused    Attends Club or Organization Meetings: Patient refused    Marital Status:    Housing Stability: Low Risk     Unable to Pay for Housing in the Last Year: No    Number of Places Lived in the Last Year: 1    Unstable Housing in the Last Year: No     Review of patient's allergies indicates:   Allergen Reactions    Iodine and iodide containing products Swelling    Skin staples [surgical stainless steel] Swelling    Egg derived      Shortness breath, lip swelling    Fish containing products     Latex, natural rubber Swelling    Losartan Itching    Nickel     Pravastatin      40 mg causes nausea vomitting but 20 mg ok    Shellfish containing products Swelling     Current Outpatient Medications   Medication Sig    albuterol (PROVENTIL) 2.5 mg /3 mL (0.083 %) nebulizer solution Take 3 mLs (2.5 mg total) by nebulization every 6 (six) hours while awake.    albuterol (PROVENTIL/VENTOLIN HFA) 90 mcg/actuation inhaler Inhale 1-2 puffs into the lungs every 6 (six) hours as needed for Wheezing.    amlodipine-benazepril 5-20 mg (LOTREL) 5-20 mg per capsule Take 1 capsule by mouth 2 (two) times daily.    aspirin (ECOTRIN) 81 MG EC tablet Take 1 tablet (81 mg total) by mouth once daily.    blood sugar diagnostic Strp 1 each by Misc.(Non-Drug; Combo Route) route 3 (three) times daily. EAL test strips    blood-glucose meter kit Insurance preferred    butalbital-acetaminophen-caffeine -40 mg (FIORICET, ESGIC) -40 mg per tablet TAKE 1 TABLET BY MOUTH 3 (THREE) TIMES DAILY AS NEEDED FOR HEADACHES. (MAXIMUM OF 15 TABLETS PER MONTH)    calcium-vitamin D 600 mg(1,500mg) -400 unit Tab Take 2 tablets by mouth once daily.    cetirizine (ZYRTEC) 10 MG tablet Take 1 tablet (10 mg total) by mouth once daily. For sinus congestion    chlorthalidone (HYGROTEN) 25 MG Tab Take 1 tablet (25 mg total) by  mouth once daily.    cilostazoL (PLETAL) 50 MG Tab Take 1 tablet (50 mg total) by mouth 2 (two) times daily.    clonazePAM (KLONOPIN) 0.25 MG TbDL Take 1 tablet (0.25 mg total) by mouth nightly.    empagliflozin (JARDIANCE) 25 mg tablet Take 1 tablet (25 mg total) by mouth once daily.    ezetimibe (ZETIA) 10 mg tablet Take 1 tablet (10 mg total) by mouth once daily.    fluconazole (DIFLUCAN) 150 MG Tab Take 1 tablet (150 mg total) by mouth once. for 1 dose    fluticasone propionate (FLONASE) 50 mcg/actuation nasal spray 2 sprays (100 mcg total) by Each Nostril route once daily.    ipratropium-albuteroL (COMBIVENT)  mcg/actuation inhaler Inhale 2 puffs into the lungs every 4 (four) hours as needed for Wheezing or Shortness of Breath. Rescue    levoFLOXacin (LEVAQUIN) 500 MG tablet Take 1 tablet (500 mg total) by mouth once daily.    levoFLOXacin (LEVAQUIN) 500 MG tablet Take 1 tablet (500 mg total) by mouth once daily. for 5 days    montelukast (SINGULAIR) 10 mg tablet Take 1 tablet (10 mg total) by mouth every evening.    pantoprazole (PROTONIX) 40 MG tablet Take 1 tablet (40 mg total) by mouth once daily.    potassium chloride SA (K-DUR,KLOR-CON) 20 MEQ tablet Take 1 tablet (20 mEq total) by mouth once daily.    predniSONE (DELTASONE) 20 MG tablet Prednisone 60 mg/ day for 3 days, 40 mg/day for 3 days,20 mg/ day for 3 days, (1/2 tablet )10 mg a day for 3 days.    predniSONE (DELTASONE) 50 MG Tab Take 1 tablet (50 mg total) by mouth once daily. for 5 days    rosuvastatin (CRESTOR) 20 MG tablet Take 1 tablet (20 mg total) by mouth once daily.    varicella-zoster gE-AS01B, PF, (SHINGRIX) 50 mcg/0.5 mL injection Inject 0.5 mL (one dose) into muscle now; give second dose at least 2 months later (Patient not taking: Reported on 12/13/2021)     No current facility-administered medications for this visit.           Review of Systems   Constitutional: Negative for activity change, appetite change,  chills, diaphoresis, fatigue, fever and unexpected weight change.   HENT: Negative for congestion, ear pain, postnasal drip, rhinorrhea, sinus pressure, sinus pain, sneezing, sore throat, tinnitus, trouble swallowing and voice change.    Eyes: Negative for photophobia, pain and visual disturbance.   Respiratory: Positive for shortness of breath. Negative for cough, chest tightness and wheezing.    Cardiovascular: Negative for chest pain, palpitations and leg swelling.   Gastrointestinal: Negative for abdominal distention, abdominal pain, constipation, diarrhea, nausea and vomiting.   Genitourinary: Negative for decreased urine volume, difficulty urinating, dysuria, flank pain, frequency, hematuria and urgency.   Musculoskeletal: Negative for arthralgias, back pain, joint swelling, neck pain and neck stiffness.   Allergic/Immunologic: Negative for immunocompromised state.   Neurological: Positive for weakness. Negative for dizziness, tremors, seizures, syncope, facial asymmetry, speech difficulty, light-headedness, numbness and headaches.   Hematological: Negative for adenopathy. Does not bruise/bleed easily.   Psychiatric/Behavioral: Negative for confusion and sleep disturbance.       Objective:      Physical Exam  HENT:      Head: Atraumatic.   Eyes:      Extraocular Movements: EOM normal.   Cardiovascular:      Rate and Rhythm: Normal rate and regular rhythm.      Pulses: Intact distal pulses.      Heart sounds: Normal heart sounds.   Pulmonary:      Effort: Pulmonary effort is normal.      Breath sounds: Wheezing present.   Abdominal:      General: Bowel sounds are normal.      Palpations: Abdomen is soft.   Musculoskeletal:      Cervical back: Normal range of motion and neck supple.      Comments: gen weakness/ in w/c   Skin:     General: Skin is warm and dry.   Neurological:      General: No focal deficit present.      Mental Status: She is alert and oriented to person, place, and time.   Psychiatric:          Mood and Affect: Mood normal.         Assessment:     Vitals:    02/16/22 0821   BP: (!) 150/64   Pulse: 94   Resp: 18   Temp: 98 °F (36.7 °C)         1. Symptomatic anemia    2. Hypertension associated with diabetes    3. Type 2 diabetes mellitus with microalbuminuria, without long-term current use of insulin    4. Type 2 diabetes mellitus with peripheral vascular disease    5. Chronic obstructive pulmonary disease, unspecified COPD type    6. Yeast vaginitis        Plan:   Symptomatic anemia  -     CBC Auto Differential; Future; Expected date: 02/16/2022  -     Case Request Endoscopy: ESOPHAGOGASTRODUODENOSCOPY (EGD)  -     Case Request Endoscopy: COLONOSCOPY  -     Ambulatory referral/consult to Gastroenterology; Future; Expected date: 02/23/2022    Hypertension associated with diabetes    Type 2 diabetes mellitus with microalbuminuria, without long-term current use of insulin    Type 2 diabetes mellitus with peripheral vascular disease    Chronic obstructive pulmonary disease, unspecified COPD type    Yeast vaginitis  -     fluconazole (DIFLUCAN) 150 MG Tab; Take 1 tablet (150 mg total) by mouth once. for 1 dose  Dispense: 1 tablet; Refill: 0        EGD/colonoscopy asap  Cbc stat  See pulm today- continue levaquin/prednisone  ER prec discussed

## 2022-02-23 ENCOUNTER — CLINICAL SUPPORT (OUTPATIENT)
Dept: PULMONOLOGY | Facility: CLINIC | Age: 72
End: 2022-02-23
Payer: MEDICARE

## 2022-02-23 VITALS — WEIGHT: 141.75 LBS | HEIGHT: 60 IN | BODY MASS INDEX: 27.83 KG/M2

## 2022-02-23 DIAGNOSIS — R09.02 EXERCISE HYPOXEMIA: ICD-10-CM

## 2022-02-23 PROCEDURE — 94618 PULMONARY STRESS TESTING: ICD-10-PCS | Mod: HCNC,S$GLB,, | Performed by: INTERNAL MEDICINE

## 2022-02-23 PROCEDURE — 99999 PR PBB SHADOW E&M-EST. PATIENT-LVL I: ICD-10-PCS | Mod: PBBFAC,HCNC,,

## 2022-02-23 PROCEDURE — 99999 PR PBB SHADOW E&M-EST. PATIENT-LVL I: CPT | Mod: PBBFAC,HCNC,,

## 2022-02-23 PROCEDURE — 94618 PULMONARY STRESS TESTING: CPT | Mod: HCNC,S$GLB,, | Performed by: INTERNAL MEDICINE

## 2022-02-23 NOTE — PROCEDURES
"[facility]  Six Minute Walk     SUMMARY   Ordering Provider: Ramiro Aleman MD   Interpreting Provider: Ramiro Aleman MD  Performing nurse/tech/RT: V. T., RT  Diagnosis:  (Exercise hypoxemia)  Height: 4' 11.65" (151.5 cm)  Weight: 64.3 kg (141 lb 12.1 oz)  BMI (Calculated): 28   Patient Race:     Phase Oxygen Assessment Supplemental O2 Heart   Rate Blood Pressure Dom Dyspnea Scale Rating   Resting 96 % Room Air 114 bpm 142/60 2   Exercise        Minute        1 96 % Room Air 132 bpm     2 91 % Room Air 132 bpm     3 92 % Room Air 141 bpm     4 92 % Room Air 141 bpm     5 94 % Room Air 142 bpm     6  94 % Room Air 147 bpm 130/68 5-6   Recovery        Minute        1 94 % Room Air 142 bpm     2 96 % Room Air 138 bpm     3 97 % Room Air 129 bpm     4 97 % Room Air 124 bpm 139/64 4     Six Minute Walk Summary  6MWT Status: completed without stopping  Patient Reported: Dyspnea, Lightheadedness (back pain)     Interpretation:  Did the patient stop or pause?: No         Total Time Walked (Calculated): 360 seconds  Final Partial Lap Distance (feet): 150 feet  Total Distance Meters (Calculated): 228.6 meters  Predicted Distance Meters (Calculated): 411.7 meters  Percentage of Predicted (Calculated): 55.53  Peak VO2 (Calculated): 10.84  Mets: 3.1  Has The Patient Had a Previous Six Minute Walk Test?: Yes       Previous 6MWT Results  Has The Patient Had a Previous Six Minute Walk Test?: Yes  Date of Previous Test: 06/09/21  Total Time Walked: 320 seconds  Total Distance (meters): 388.62  Predicted Distance (meters): 411.82 meters  Percentage of Predicted: 94.37  Percent Change (Calculated): 0.41      Interpretation:  Total distance walked in six minutes is moderately reduced indicating a reduction in overall  functional capacity. Oxygen desaturation did not meet criteria for supplemental oxygen prescription.    Clinical correlation suggested.    [] Mild exercise-induced hypoxemia " described as an arterial oxygen saturation of 93-95% (or 3-4% less than at rest),  [x] Moderate exercise-induced hypoxemia as 89-93%  [] Severe exercise induced hypoxemia as < 89% O2 saturation.  Medicare Criteria for oxygen prescription comments:   When arterial oxygen saturation is at or below 88% during exercise on room air (severe exercise induced hypoxemia) then the patient falls under Medicare Group 1 criteria for supplemental oxygen prescription.  Details about Medicare Group Criteria coverage can be found at http://www.cms.[facility].gov/manuals/downloads/   Ramiro Aleman MD

## 2022-02-25 ENCOUNTER — TELEPHONE (OUTPATIENT)
Dept: PULMONOLOGY | Facility: CLINIC | Age: 72
End: 2022-02-25
Payer: MEDICARE

## 2022-02-25 ENCOUNTER — TELEPHONE (OUTPATIENT)
Dept: ALLERGY | Facility: CLINIC | Age: 72
End: 2022-02-25
Payer: MEDICARE

## 2022-02-25 DIAGNOSIS — J44.9 CHRONIC OBSTRUCTIVE PULMONARY DISEASE, UNSPECIFIED COPD TYPE: ICD-10-CM

## 2022-02-25 NOTE — TELEPHONE ENCOUNTER
Pharmacy wants clarification if pt should have albuterol inhaler or combivent inhaler or both.  Please clarify.

## 2022-02-25 NOTE — TELEPHONE ENCOUNTER
----- Message from Morales Greene sent at 2/25/2022  3:14 PM CST -----  Contact: Lola  Type:  Pharmacy Calling to Clarify an RX    Name of Caller:Lola  Pharmacy Name:[facility]  Prescription Name:Albuterol Sulphate HFA inhaler  What do they need to clarify?:current therapy  Best Call Back Number:[phone]  Additional Information:

## 2022-02-28 NOTE — PROGRESS NOTES
Please see completed report in cardiology procedures.   Spoke with patient she will keep track and let us know and if new or worsening sx's will get retested for covid

## 2022-03-04 ENCOUNTER — OFFICE VISIT (OUTPATIENT)
Dept: HEMATOLOGY/ONCOLOGY | Facility: CLINIC | Age: 72
End: 2022-03-04
Payer: MEDICARE

## 2022-03-04 ENCOUNTER — LAB VISIT (OUTPATIENT)
Dept: LAB | Facility: HOSPITAL | Age: 72
End: 2022-03-04
Attending: FAMILY MEDICINE
Payer: MEDICARE

## 2022-03-04 VITALS
DIASTOLIC BLOOD PRESSURE: 60 MMHG | BODY MASS INDEX: 27.66 KG/M2 | OXYGEN SATURATION: 96 % | WEIGHT: 140.88 LBS | RESPIRATION RATE: 14 BRPM | HEIGHT: 60 IN | TEMPERATURE: 98 F | SYSTOLIC BLOOD PRESSURE: 121 MMHG | HEART RATE: 106 BPM

## 2022-03-04 DIAGNOSIS — E11.29 TYPE 2 DIABETES MELLITUS WITH MICROALBUMINURIA, WITHOUT LONG-TERM CURRENT USE OF INSULIN: ICD-10-CM

## 2022-03-04 DIAGNOSIS — D50.0 IRON DEFICIENCY ANEMIA DUE TO CHRONIC BLOOD LOSS: ICD-10-CM

## 2022-03-04 DIAGNOSIS — R80.9 TYPE 2 DIABETES MELLITUS WITH MICROALBUMINURIA, WITHOUT LONG-TERM CURRENT USE OF INSULIN: ICD-10-CM

## 2022-03-04 LAB
ESTIMATED AVG GLUCOSE: 137 MG/DL (ref 68–131)
HBA1C MFR BLD: 6.4 % (ref 4–5.6)
IRON SERPL-MCNC: 34 UG/DL (ref 30–160)
SATURATED IRON: 8 % (ref 20–50)
TOTAL IRON BINDING CAPACITY: 406 UG/DL (ref 250–450)
TRANSFERRIN SERPL-MCNC: 274 MG/DL (ref 200–375)

## 2022-03-04 PROCEDURE — 3078F PR MOST RECENT DIASTOLIC BLOOD PRESSURE < 80 MM HG: ICD-10-PCS | Mod: HCNC,CPTII,S$GLB, | Performed by: INTERNAL MEDICINE

## 2022-03-04 PROCEDURE — 84466 ASSAY OF TRANSFERRIN: CPT | Mod: HCNC | Performed by: INTERNAL MEDICINE

## 2022-03-04 PROCEDURE — 3288F PR FALLS RISK ASSESSMENT DOCUMENTED: ICD-10-PCS | Mod: HCNC,CPTII,S$GLB, | Performed by: INTERNAL MEDICINE

## 2022-03-04 PROCEDURE — 1101F PR PT FALLS ASSESS DOC 0-1 FALLS W/OUT INJ PAST YR: ICD-10-PCS | Mod: HCNC,CPTII,S$GLB, | Performed by: INTERNAL MEDICINE

## 2022-03-04 PROCEDURE — 99999 PR PBB SHADOW E&M-EST. PATIENT-LVL V: ICD-10-PCS | Mod: PBBFAC,HCNC,, | Performed by: INTERNAL MEDICINE

## 2022-03-04 PROCEDURE — 1126F PR PAIN SEVERITY QUANTIFIED, NO PAIN PRESENT: ICD-10-PCS | Mod: HCNC,CPTII,S$GLB, | Performed by: INTERNAL MEDICINE

## 2022-03-04 PROCEDURE — 1126F AMNT PAIN NOTED NONE PRSNT: CPT | Mod: HCNC,CPTII,S$GLB, | Performed by: INTERNAL MEDICINE

## 2022-03-04 PROCEDURE — 3008F BODY MASS INDEX DOCD: CPT | Mod: HCNC,CPTII,S$GLB, | Performed by: INTERNAL MEDICINE

## 2022-03-04 PROCEDURE — 3074F PR MOST RECENT SYSTOLIC BLOOD PRESSURE < 130 MM HG: ICD-10-PCS | Mod: HCNC,CPTII,S$GLB, | Performed by: INTERNAL MEDICINE

## 2022-03-04 PROCEDURE — 1160F PR REVIEW ALL MEDS BY PRESCRIBER/CLIN PHARMACIST DOCUMENTED: ICD-10-PCS | Mod: HCNC,CPTII,S$GLB, | Performed by: INTERNAL MEDICINE

## 2022-03-04 PROCEDURE — 99215 PR OFFICE/OUTPT VISIT, EST, LEVL V, 40-54 MIN: ICD-10-PCS | Mod: HCNC,S$GLB,, | Performed by: INTERNAL MEDICINE

## 2022-03-04 PROCEDURE — 36415 COLL VENOUS BLD VENIPUNCTURE: CPT | Mod: HCNC | Performed by: INTERNAL MEDICINE

## 2022-03-04 PROCEDURE — 3008F PR BODY MASS INDEX (BMI) DOCUMENTED: ICD-10-PCS | Mod: HCNC,CPTII,S$GLB, | Performed by: INTERNAL MEDICINE

## 2022-03-04 PROCEDURE — 99215 OFFICE O/P EST HI 40 MIN: CPT | Mod: HCNC,S$GLB,, | Performed by: INTERNAL MEDICINE

## 2022-03-04 PROCEDURE — 1101F PT FALLS ASSESS-DOCD LE1/YR: CPT | Mod: HCNC,CPTII,S$GLB, | Performed by: INTERNAL MEDICINE

## 2022-03-04 PROCEDURE — 4010F ACE/ARB THERAPY RXD/TAKEN: CPT | Mod: HCNC,CPTII,S$GLB, | Performed by: INTERNAL MEDICINE

## 2022-03-04 PROCEDURE — 3074F SYST BP LT 130 MM HG: CPT | Mod: HCNC,CPTII,S$GLB, | Performed by: INTERNAL MEDICINE

## 2022-03-04 PROCEDURE — 83036 HEMOGLOBIN GLYCOSYLATED A1C: CPT | Mod: HCNC | Performed by: FAMILY MEDICINE

## 2022-03-04 PROCEDURE — 3288F FALL RISK ASSESSMENT DOCD: CPT | Mod: HCNC,CPTII,S$GLB, | Performed by: INTERNAL MEDICINE

## 2022-03-04 PROCEDURE — 4010F PR ACE/ARB THEARPY RXD/TAKEN: ICD-10-PCS | Mod: HCNC,CPTII,S$GLB, | Performed by: INTERNAL MEDICINE

## 2022-03-04 PROCEDURE — 3072F PR LOW RISK FOR RETINOPATHY: ICD-10-PCS | Mod: HCNC,CPTII,S$GLB, | Performed by: INTERNAL MEDICINE

## 2022-03-04 PROCEDURE — 1159F MED LIST DOCD IN RCRD: CPT | Mod: HCNC,CPTII,S$GLB, | Performed by: INTERNAL MEDICINE

## 2022-03-04 PROCEDURE — 3072F LOW RISK FOR RETINOPATHY: CPT | Mod: HCNC,CPTII,S$GLB, | Performed by: INTERNAL MEDICINE

## 2022-03-04 PROCEDURE — 3078F DIAST BP <80 MM HG: CPT | Mod: HCNC,CPTII,S$GLB, | Performed by: INTERNAL MEDICINE

## 2022-03-04 PROCEDURE — 1159F PR MEDICATION LIST DOCUMENTED IN MEDICAL RECORD: ICD-10-PCS | Mod: HCNC,CPTII,S$GLB, | Performed by: INTERNAL MEDICINE

## 2022-03-04 PROCEDURE — 99999 PR PBB SHADOW E&M-EST. PATIENT-LVL V: CPT | Mod: PBBFAC,HCNC,, | Performed by: INTERNAL MEDICINE

## 2022-03-04 PROCEDURE — 1160F RVW MEDS BY RX/DR IN RCRD: CPT | Mod: HCNC,CPTII,S$GLB, | Performed by: INTERNAL MEDICINE

## 2022-03-04 NOTE — PROGRESS NOTES
Subjective:       Patient ID: Lucia Barlow is a 72 y.o. female.    Chief Complaint: Follow-up    HPI     New patient visit for iron deficiency anemia. Accompanied by her daughter. Normocytic anemia dates to 2 years ago. Sudden onset symptomatic anemia last month to Hgb 6.9 with MCV 75. Repeat 9.9 with MCV 78 after blood transfusion. CBC changes are a pattern of blood loss. Endoscopy and colonoscopy scheduled 3/9/2022. No gross blood loss. Reports significant fatigue and cold intolerance. Diet very rich in iron with beef and chicken liver at least twice weekly. Poor prior tolerance to oral iron due to severe constipation. 40lb weight loss, severe diarrhea, and weakness due to metformin last fall- off therapy now.    Review of Systems   Constitutional: Positive for fatigue.   HENT: Negative.    Eyes: Negative.    Respiratory: Negative.    Cardiovascular: Negative.    Gastrointestinal: Negative.    Endocrine: Negative.    Genitourinary: Negative.    Musculoskeletal: Negative.    Integumentary:  Negative.   Allergic/Immunologic: Negative for environmental allergies, food allergies and immunocompromised state.   Neurological: Negative.    Hematological: Negative for adenopathy. Does not bruise/bleed easily.   Psychiatric/Behavioral: Negative.          Objective:      Physical Exam  Vitals and nursing note reviewed.   Constitutional:       Appearance: She is well-developed.   HENT:      Head: Normocephalic and atraumatic.   Eyes:      General: No scleral icterus.     Conjunctiva/sclera: Conjunctivae normal.   Cardiovascular:      Rate and Rhythm: Normal rate.   Pulmonary:      Effort: Pulmonary effort is normal. No respiratory distress.   Abdominal:      General: There is no distension.      Palpations: Abdomen is soft.   Musculoskeletal:         General: Normal range of motion.      Cervical back: Normal range of motion and neck supple.   Skin:     General: Skin is warm and dry.   Neurological:      Mental Status: She  is alert and oriented to person, place, and time.      Cranial Nerves: No cranial nerve deficit.   Psychiatric:         Behavior: Behavior normal.         Assessment:       Problem List Items Addressed This Visit    None     Visit Diagnoses     Iron deficiency anemia due to chronic blood loss              Plan:       CBC changes are pattern of blood loss.  Iron studies pending but not expecting severe iron deficiency.  Instructed patient to continue diet with very high iron content.  Endoscopy next week.   Will need a few months for CBC recovery- add repeat CBC and ferritin to May lab appt

## 2022-03-09 ENCOUNTER — OFFICE VISIT (OUTPATIENT)
Dept: GASTROENTEROLOGY | Facility: CLINIC | Age: 72
End: 2022-03-09
Payer: MEDICARE

## 2022-03-09 VITALS
WEIGHT: 141.75 LBS | BODY MASS INDEX: 27.83 KG/M2 | HEIGHT: 60 IN | HEART RATE: 102 BPM | DIASTOLIC BLOOD PRESSURE: 52 MMHG | SYSTOLIC BLOOD PRESSURE: 120 MMHG

## 2022-03-09 DIAGNOSIS — D50.9 IRON DEFICIENCY ANEMIA, UNSPECIFIED IRON DEFICIENCY ANEMIA TYPE: ICD-10-CM

## 2022-03-09 PROCEDURE — 1160F RVW MEDS BY RX/DR IN RCRD: CPT | Mod: HCNC,CPTII,S$GLB, | Performed by: NURSE PRACTITIONER

## 2022-03-09 PROCEDURE — 99203 PR OFFICE/OUTPT VISIT, NEW, LEVL III, 30-44 MIN: ICD-10-PCS | Mod: HCNC,S$GLB,, | Performed by: NURSE PRACTITIONER

## 2022-03-09 PROCEDURE — 1160F PR REVIEW ALL MEDS BY PRESCRIBER/CLIN PHARMACIST DOCUMENTED: ICD-10-PCS | Mod: HCNC,CPTII,S$GLB, | Performed by: NURSE PRACTITIONER

## 2022-03-09 PROCEDURE — 3072F LOW RISK FOR RETINOPATHY: CPT | Mod: HCNC,CPTII,S$GLB, | Performed by: NURSE PRACTITIONER

## 2022-03-09 PROCEDURE — 99999 PR PBB SHADOW E&M-EST. PATIENT-LVL V: ICD-10-PCS | Mod: PBBFAC,HCNC,, | Performed by: NURSE PRACTITIONER

## 2022-03-09 PROCEDURE — 3078F DIAST BP <80 MM HG: CPT | Mod: HCNC,CPTII,S$GLB, | Performed by: NURSE PRACTITIONER

## 2022-03-09 PROCEDURE — 4010F PR ACE/ARB THEARPY RXD/TAKEN: ICD-10-PCS | Mod: HCNC,CPTII,S$GLB, | Performed by: NURSE PRACTITIONER

## 2022-03-09 PROCEDURE — 3044F HG A1C LEVEL LT 7.0%: CPT | Mod: HCNC,CPTII,S$GLB, | Performed by: NURSE PRACTITIONER

## 2022-03-09 PROCEDURE — 3044F PR MOST RECENT HEMOGLOBIN A1C LEVEL <7.0%: ICD-10-PCS | Mod: HCNC,CPTII,S$GLB, | Performed by: NURSE PRACTITIONER

## 2022-03-09 PROCEDURE — 1101F PR PT FALLS ASSESS DOC 0-1 FALLS W/OUT INJ PAST YR: ICD-10-PCS | Mod: HCNC,CPTII,S$GLB, | Performed by: NURSE PRACTITIONER

## 2022-03-09 PROCEDURE — 3288F PR FALLS RISK ASSESSMENT DOCUMENTED: ICD-10-PCS | Mod: HCNC,CPTII,S$GLB, | Performed by: NURSE PRACTITIONER

## 2022-03-09 PROCEDURE — 3008F PR BODY MASS INDEX (BMI) DOCUMENTED: ICD-10-PCS | Mod: HCNC,CPTII,S$GLB, | Performed by: NURSE PRACTITIONER

## 2022-03-09 PROCEDURE — 3078F PR MOST RECENT DIASTOLIC BLOOD PRESSURE < 80 MM HG: ICD-10-PCS | Mod: HCNC,CPTII,S$GLB, | Performed by: NURSE PRACTITIONER

## 2022-03-09 PROCEDURE — 3072F PR LOW RISK FOR RETINOPATHY: ICD-10-PCS | Mod: HCNC,CPTII,S$GLB, | Performed by: NURSE PRACTITIONER

## 2022-03-09 PROCEDURE — 3074F PR MOST RECENT SYSTOLIC BLOOD PRESSURE < 130 MM HG: ICD-10-PCS | Mod: HCNC,CPTII,S$GLB, | Performed by: NURSE PRACTITIONER

## 2022-03-09 PROCEDURE — 99203 OFFICE O/P NEW LOW 30 MIN: CPT | Mod: HCNC,S$GLB,, | Performed by: NURSE PRACTITIONER

## 2022-03-09 PROCEDURE — 4010F ACE/ARB THERAPY RXD/TAKEN: CPT | Mod: HCNC,CPTII,S$GLB, | Performed by: NURSE PRACTITIONER

## 2022-03-09 PROCEDURE — 1125F AMNT PAIN NOTED PAIN PRSNT: CPT | Mod: HCNC,CPTII,S$GLB, | Performed by: NURSE PRACTITIONER

## 2022-03-09 PROCEDURE — 3008F BODY MASS INDEX DOCD: CPT | Mod: HCNC,CPTII,S$GLB, | Performed by: NURSE PRACTITIONER

## 2022-03-09 PROCEDURE — 3074F SYST BP LT 130 MM HG: CPT | Mod: HCNC,CPTII,S$GLB, | Performed by: NURSE PRACTITIONER

## 2022-03-09 PROCEDURE — 1125F PR PAIN SEVERITY QUANTIFIED, PAIN PRESENT: ICD-10-PCS | Mod: HCNC,CPTII,S$GLB, | Performed by: NURSE PRACTITIONER

## 2022-03-09 PROCEDURE — 1101F PT FALLS ASSESS-DOCD LE1/YR: CPT | Mod: HCNC,CPTII,S$GLB, | Performed by: NURSE PRACTITIONER

## 2022-03-09 PROCEDURE — 1159F PR MEDICATION LIST DOCUMENTED IN MEDICAL RECORD: ICD-10-PCS | Mod: HCNC,CPTII,S$GLB, | Performed by: NURSE PRACTITIONER

## 2022-03-09 PROCEDURE — 99999 PR PBB SHADOW E&M-EST. PATIENT-LVL V: CPT | Mod: PBBFAC,HCNC,, | Performed by: NURSE PRACTITIONER

## 2022-03-09 PROCEDURE — 1159F MED LIST DOCD IN RCRD: CPT | Mod: HCNC,CPTII,S$GLB, | Performed by: NURSE PRACTITIONER

## 2022-03-09 PROCEDURE — 3288F FALL RISK ASSESSMENT DOCD: CPT | Mod: HCNC,CPTII,S$GLB, | Performed by: NURSE PRACTITIONER

## 2022-03-09 RX ORDER — ONDANSETRON 4 MG/1
TABLET, ORALLY DISINTEGRATING ORAL
Qty: 4 TABLET | Refills: 0 | Status: SHIPPED | OUTPATIENT
Start: 2022-03-09 | End: 2022-03-09

## 2022-03-09 RX ORDER — SOD SULF/POT CHLORIDE/MAG SULF 1.479 G
12 TABLET ORAL DAILY
Qty: 24 TABLET | Refills: 0 | Status: SHIPPED | OUTPATIENT
Start: 2022-03-09

## 2022-03-09 RX ORDER — SOD SULF/POT CHLORIDE/MAG SULF 1.479 G
12 TABLET ORAL DAILY
Qty: 24 TABLET | Refills: 0 | Status: SHIPPED | OUTPATIENT
Start: 2022-03-09 | End: 2022-03-09

## 2022-03-09 RX ORDER — ONDANSETRON 4 MG/1
TABLET, ORALLY DISINTEGRATING ORAL
Qty: 4 TABLET | Refills: 0 | Status: SHIPPED | OUTPATIENT
Start: 2022-03-09

## 2022-03-09 NOTE — PROGRESS NOTES
Location Screening:    If answers yes to either of the following, schedule at [facility] ONLY. If No, OK for either location.    1. Is procedure for esophageal banding (Varices)? {YES/NO:20267) Yes, select [facility]  2. Is this an Advanced Endoscopic procedure (Dr Cardona)?  {YES/NO:20267) Yes, select [facility] (except for bariatric procedures - schedule those at the [facility])  3. Is the BMI > 50? {YES/NO:20267) Yes, select [facility]  4. Does the patient have chronic hypoxic respiratory failure, as evidenced by symptoms below? {YES/NO:20267) Yes, select [facility]  a. O2 Saturation <92%  b. Is the patient on supplemental O2  c. History of moderate to severe PFT's  d. Unable to complete a 6 min walk test    COVID Screening    1. Are you experiencing shortness of breath, cough, muscle aches, loss of taste or loss of smell?  no    If answered yes then the patient must seek medical attention with their PCP, urgent care or ED.  Do not schedule the procedure.       ENDO screening    1. Have you been admitted for cardiac, kidney or pulmonary causes to the hospital in the past 3 months? no   If yes, schedule an appointment with PCP before scheduling endoscopic procedure  Unless patient has been seen by Nephrology, Cardiologist or in the GI department within 3 months.      2. Have you had a stent placed in the last 12 months? no: If yes, have one of our providers review the chart and determine if they need to be seen in clinic.      3. Have you had a stroke or heart attack in the past 6 months? no If patient has not been seen by PCP, Neurology or Cardiology within 3 months, have one of our providers review the chart and determine if they need to be seen in clinic.        4. Have you had any chest pain in the past 3 months? yes   If yes, have you been evaluated by your PCP and/or cardiologist and was it determined to not be heart related? yes   If No, Pt needs to be seen by PCP or Cardiologist.  Pt can be scheduled once cardiac clearance obtained  "by either of those providers.     5. Do you take prescription weight loss medications?  no   If yes, must stop weight loss medication for 2 weeks prior to procedure.     6. Have you been diagnosed with diverticulitis within the past 3 months? no   If yes, must have been seen by GI within the last 3 months, if not schedule with GI MASOOD.    If Pt has been seen by GI, schedule procedure 8-12 weeks post antibiotic treatment.     7. Are you on Dialysis? no  If yes, schedule procedure for the day AFTER dialysis.  Appt time should be 9am or later, patient arrival time is 2 hours prior to procedure, for labs.  Nulytely or miralax prep must be used for all patients with kidney disease.     8. Are you diabetic?  no   If yes, schedule morning appt. Advise Pt to hold all diabetic meds day of procedure.     9. All patients 80 years of age and older must be seen in the GI clinic - please schedule an MASOOD appointment - Do not schedule a scope procedure appointment.     10. Is patient on a "high risk" medication (blood thinner/antiplatelet agent)?  no   If yes, has cardiac clearance been obtained within the last 60 days? N/A   If no, a new cardiac clearance must be obtained.     Final Questions:    1.I have reviewed the last colonoscopy for recommendations regarding next procedure bowel prep.  yes  2. I have reviewed medications and allergies.  yes  3. I have verified the pharmacy information and appropriate prep sent if needed. yes  4. Prep instructions have been mailed or sent to portal per patient request. yes        All schedulers will have ability to reach out to the ordering GI provider to clarify any issues.           "

## 2022-03-09 NOTE — PROGRESS NOTES
Clinic Consult:  [facility] Consultation Note    Reason for Consult:  The encounter diagnosis was Iron deficiency anemia, unspecified iron deficiency anemia type.    PCP: ZELDA Sandoval   [id number] [facility] / [place] [id number]    HPI:  This is a 72 y.o. female here for evaluation of anemia.   Onset: 2 years  Type of anemia: iron deficiency   Last Hgb: 9.9. dropped as low at 6.9 on 2/14/22  Iron studies: saturated iron low at 8  Blood thinners:yes- ASA 81 mg but has been off for about 1 week now   Any overt GI bleeding: no  EGD/Colonoscopy done: colonoscopy done in 2019- multiple polyp. Recall 5 years.     Review of Systems   Constitutional: Negative for fever, malaise/fatigue and weight loss.   HENT: Negative for sore throat.    Respiratory: Negative for cough and wheezing.    Cardiovascular: Negative for chest pain and palpitations.   Gastrointestinal: Negative for abdominal pain, blood in stool, constipation, diarrhea, heartburn, melena, nausea and vomiting.   Genitourinary: Negative for dysuria and frequency.   Musculoskeletal: Negative for back pain, joint pain, myalgias and neck pain.   Skin: Negative for itching and rash.   Neurological: Negative for dizziness, speech change, seizures, loss of consciousness and headaches.   Psychiatric/Behavioral: Negative for depression and substance abuse. The patient is not nervous/anxious.        Medical History:  has a past medical history of Atherosclerosis of artery of both lower extremities (1/17/2014), Atherosclerosis of native coronary artery of native heart without angina pectoris (11/18/2016), Atherosclerotic PVD with intermittent claudication (1/17/2014), Chronic bronchitis, COPD (chronic obstructive pulmonary disease), Diabetes with neurologic complications, Dyslipidemia associated with type 2 diabetes mellitus (6/14/2013), Dyslipidemia associated with type 2 diabetes mellitus, Ex-smoker, Gastroesophageal reflux disease without esophagitis  (1/25/2019), Hyperlipidemia, Hypertension, NSCLC of left lung (11/19/2020), IVANIA (obstructive sleep apnea) (2/11/2021), Osteoporosis (1/16 rosita 1/18), Pneumothorax after biopsy (10/21/2020), PVD (peripheral vascular disease), Renal manifestation of secondary diabetes mellitus, S/P peripheral artery angioplasty (2/7/2014), Seasonal allergic rhinitis due to pollen, Simple chronic bronchitis, Tobacco dependence, Type 2 diabetes mellitus with microalbuminuria, without long-term current use of insulin (3/29/2019), Type 2 diabetes mellitus with peripheral vascular disease, Type 2 diabetes mellitus with stage 3 chronic kidney disease, without long-term current use of insulin (2/1/2019), Type 2 diabetes mellitus without retinopathy (2/1/2019), and Urinary incontinence.    Surgical History:  has a past surgical history that includes Carpal tunnel release (2003); Hysterectomy; Angioplasty (Bilateral, 01/24/2014); Colonoscopy (N/A, 11/9/2016); Colectomy (approximate 2005); Colonoscopy (N/A, 11/15/2019); Oophorectomy; xi robotic rats,with lobectomy,lung (Left, 11/19/2020); Surgical removal of lymph node (Left, 11/19/2020); and Injection of anesthetic agent around multiple intercostal nerves (Left, 11/19/2020).    Family History: family history includes Asthma in her daughter and son; Breast cancer in her daughter; Diabetes in her daughter; Stroke in her father and sister..     Social History:  reports that she quit smoking about 2 years ago. Her smoking use included cigarettes. She started smoking about 62 years ago. She has a 60.00 pack-year smoking history. She has never used smokeless tobacco. She reports that she does not drink alcohol and does not use drugs.    Allergies: Reviewed    Home Medications:   Current Outpatient Medications on File Prior to Visit   Medication Sig Dispense Refill    albuterol (PROVENTIL) 2.5 mg /3 mL (0.083 %) nebulizer solution Take 3 mLs (2.5 mg total) by nebulization every 6 (six) hours while  awake. 270 mL 11    albuterol (PROVENTIL/VENTOLIN HFA) 90 mcg/actuation inhaler Inhale 2 puffs into the lungs every 4 (four) hours as needed for Wheezing or Shortness of Breath. 54 g 3    amlodipine-benazepril 5-20 mg (LOTREL) 5-20 mg per capsule Take 1 capsule by mouth 2 (two) times daily. 180 capsule 4    blood sugar diagnostic Strp 1 each by Misc.(Non-Drug; Combo Route) route 3 (three) times daily. [employer] test strips 100 each 3    blood-glucose meter kit Insurance preferred 1 each 0    butalbital-acetaminophen-caffeine -40 mg (FIORICET, ESGIC) -40 mg per tablet TAKE 1 TABLET BY MOUTH 3 (THREE) TIMES DAILY AS NEEDED FOR HEADACHES. (MAXIMUM OF 15 TABLETS PER MONTH) 30 tablet 3    calcium-vitamin D 600 mg(1,500mg) -400 unit Tab Take 2 tablets by mouth once daily. 180 tablet 4    cetirizine (ZYRTEC) 10 MG tablet Take 1 tablet (10 mg total) by mouth once daily. For sinus congestion 90 tablet 4    chlorthalidone (HYGROTEN) 25 MG Tab Take 1 tablet (25 mg total) by mouth once daily. 90 tablet 4    cilostazoL (PLETAL) 50 MG Tab Take 1 tablet (50 mg total) by mouth once daily. 60 tablet 11    clonazePAM (KLONOPIN) 0.25 MG TbDL Take 1 tablet (0.25 mg total) by mouth nightly. 30 tablet 5    empagliflozin (JARDIANCE) 25 mg tablet Take 1 tablet (25 mg total) by mouth once daily. 90 tablet 1    ezetimibe (ZETIA) 10 mg tablet Take 1 tablet (10 mg total) by mouth once daily. 90 tablet 4    fluticasone propionate (FLONASE) 50 mcg/actuation nasal spray 2 sprays (100 mcg total) by Each Nostril route once daily. 48 g 4    ipratropium-albuteroL (COMBIVENT)  mcg/actuation inhaler Inhale 2 puffs into the lungs every 4 (four) hours as needed for Wheezing or Shortness of Breath. Rescue 12 g 3    levoFLOXacin (LEVAQUIN) 500 MG tablet Take 1 tablet (500 mg total) by mouth once daily. 10 tablet 0    montelukast (SINGULAIR) 10 mg tablet Take 1 tablet (10 mg total) by mouth every evening. 90 tablet 4     pantoprazole (PROTONIX) 40 MG tablet Take 1 tablet (40 mg total) by mouth once daily. 90 tablet 4    potassium chloride SA (K-DUR,KLOR-CON) 20 MEQ tablet Take 1 tablet (20 mEq total) by mouth once daily. 90 tablet 4    predniSONE (DELTASONE) 20 MG tablet Prednisone 60 mg/ day for 3 days, 40 mg/day for 3 days,20 mg/ day for 3 days, (1/2 tablet )10 mg a day for 3 days. 20 tablet 0    rosuvastatin (CRESTOR) 20 MG tablet Take 1 tablet (20 mg total) by mouth once daily. 90 tablet 4    varicella-zoster gE-AS01B, PF, (SHINGRIX) 50 mcg/0.5 mL injection Inject 0.5 mL (one dose) into muscle now; give second dose at least 2 months later 0.5 mL 1     No current facility-administered medications on file prior to visit.       Physical Exam:  BP (!) 120/52 (BP Location: Left arm, Patient Position: Sitting, BP Method: Medium (Manual))   Pulse 102   Ht 5' (1.524 m)   Wt 64.3 kg (141 lb 12.1 oz)   BMI 27.68 kg/m²   Body mass index is 27.68 kg/m².  Physical Exam  Constitutional:       General: She is not in acute distress.  HENT:      Head: Normocephalic and atraumatic.   Eyes:      General: No scleral icterus.     Conjunctiva/sclera: Conjunctivae normal.   Cardiovascular:      Rate and Rhythm: Normal rate and regular rhythm.      Heart sounds: No murmur heard.  Pulmonary:      Effort: Pulmonary effort is normal. No respiratory distress.      Breath sounds: Normal breath sounds. No wheezing.   Abdominal:      General: Abdomen is flat. Bowel sounds are normal.      Palpations: Abdomen is soft.      Tenderness: There is no abdominal tenderness.   Skin:     General: Skin is warm and dry.   Neurological:      General: No focal deficit present.      Mental Status: She is alert and oriented to person, place, and time.      Cranial Nerves: No cranial nerve deficit.   Psychiatric:         Mood and Affect: Mood normal.         Judgment: Judgment normal.         Labs: Pertinent labs reviewed.  CRC Screening: up to date      Assessment:  1. Iron deficiency anemia, unspecified iron deficiency anemia type        Recommendations:   - EGD and colonoscopy   - if endoscopies are unrevealing, will need visualization of the small bowel with video capsule endoscopy.       Iron deficiency anemia, unspecified iron deficiency anemia type  -     Ambulatory referral/consult to Gastroenterology  -     Case Request Endoscopy: EGD (ESOPHAGOGASTRODUODENOSCOPY), COLONOSCOPY  -     COVID-19 Routine Screening; Future; Expected date: 03/09/2022  -     Discontinue: sod sulf-pot chloride-mag sulf (SUTAB) 1.479-0.188- 0.225 gram tablet; Take 12 tablets by mouth once daily. Take according to package instructions with indicated amount of water.  Dispense: 24 tablet; Refill: 0  -     Discontinue: ondansetron (ZOFRAN-ODT) 4 MG TbDL; 1-2 tablets PO every 6 hours as needed for nausea/vomiting while completing bowel prep  Dispense: 4 tablet; Refill: 0    Other orders  -     sod sulf-pot chloride-mag sulf (SUTAB) 1.479-0.188- 0.225 gram tablet; Take 12 tablets by mouth once daily. Take according to package instructions with indicated amount of water.  Dispense: 24 tablet; Refill: 0  -     ondansetron (ZOFRAN-ODT) 4 MG TbDL; 1-2 tablets PO every 6 hours as needed for nausea/vomiting while completing bowel prep  Dispense: 4 tablet; Refill: 0      Follow up to be determined after results/ procedure(s).    Thank you so much for allowing me to participate in the care of HITESH Hernandez

## 2022-03-25 ENCOUNTER — ANESTHESIA (OUTPATIENT)
Dept: ENDOSCOPY | Facility: HOSPITAL | Age: 72
End: 2022-03-25
Payer: MEDICARE

## 2022-03-25 ENCOUNTER — HOSPITAL ENCOUNTER (OUTPATIENT)
Facility: HOSPITAL | Age: 72
Discharge: HOME | End: 2022-03-25
Attending: INTERNAL MEDICINE | Admitting: INTERNAL MEDICINE
Payer: MEDICARE

## 2022-03-25 ENCOUNTER — HOSPITAL ENCOUNTER (OUTPATIENT)
Dept: RADIOLOGY | Facility: HOSPITAL | Age: 72
Discharge: HOME | End: 2022-03-25
Attending: FAMILY MEDICINE
Payer: MEDICARE

## 2022-03-25 ENCOUNTER — ANESTHESIA EVENT (OUTPATIENT)
Dept: ENDOSCOPY | Facility: HOSPITAL | Age: 72
End: 2022-03-25
Payer: MEDICARE

## 2022-03-25 VITALS
BODY MASS INDEX: 24.92 KG/M2 | TEMPERATURE: 97 F | DIASTOLIC BLOOD PRESSURE: 60 MMHG | HEART RATE: 96 BPM | OXYGEN SATURATION: 97 % | SYSTOLIC BLOOD PRESSURE: 118 MMHG | WEIGHT: 132 LBS | HEIGHT: 61 IN | RESPIRATION RATE: 26 BRPM

## 2022-03-25 VITALS — WEIGHT: 141 LBS | BODY MASS INDEX: 27.68 KG/M2 | HEIGHT: 60 IN

## 2022-03-25 DIAGNOSIS — D50.9 IRON DEFICIENCY ANEMIA, UNSPECIFIED IRON DEFICIENCY ANEMIA TYPE: Primary | ICD-10-CM

## 2022-03-25 DIAGNOSIS — Z12.31 ENCOUNTER FOR SCREENING MAMMOGRAM FOR BREAST CANCER: ICD-10-CM

## 2022-03-25 PROCEDURE — 25000003 PHARM REV CODE 250: Performed by: NURSE ANESTHETIST, CERTIFIED REGISTERED

## 2022-03-25 PROCEDURE — 43239 EGD BIOPSY SINGLE/MULTIPLE: CPT | Performed by: INTERNAL MEDICINE

## 2022-03-25 PROCEDURE — 77063 BREAST TOMOSYNTHESIS BI: CPT | Mod: TC

## 2022-03-25 PROCEDURE — 45380 COLONOSCOPY AND BIOPSY: CPT | Performed by: INTERNAL MEDICINE

## 2022-03-25 PROCEDURE — 27201012 HC FORCEPS, HOT/COLD, DISP: Performed by: INTERNAL MEDICINE

## 2022-03-25 PROCEDURE — 37000008 HC ANESTHESIA 1ST 15 MINUTES: Performed by: INTERNAL MEDICINE

## 2022-03-25 PROCEDURE — 43239 EGD BIOPSY SINGLE/MULTIPLE: CPT | Mod: 51,,, | Performed by: INTERNAL MEDICINE

## 2022-03-25 PROCEDURE — 77063 BREAST TOMOSYNTHESIS BI: CPT | Mod: 26,,, | Performed by: RADIOLOGY

## 2022-03-25 PROCEDURE — 43239 PR EGD, FLEX, W/BIOPSY, SGL/MULTI: ICD-10-PCS | Mod: 51,,, | Performed by: INTERNAL MEDICINE

## 2022-03-25 PROCEDURE — 88305 TISSUE EXAM BY PATHOLOGIST: ICD-10-PCS | Mod: 26,,, | Performed by: PATHOLOGY

## 2022-03-25 PROCEDURE — 37000009 HC ANESTHESIA EA ADD 15 MINS: Performed by: INTERNAL MEDICINE

## 2022-03-25 PROCEDURE — 63600175 PHARM REV CODE 636 W HCPCS: Performed by: NURSE ANESTHETIST, CERTIFIED REGISTERED

## 2022-03-25 PROCEDURE — 88342 IMHCHEM/IMCYTCHM 1ST ANTB: CPT | Performed by: PATHOLOGY

## 2022-03-25 PROCEDURE — 45380 PR COLONOSCOPY,BIOPSY: ICD-10-PCS | Mod: ,,, | Performed by: INTERNAL MEDICINE

## 2022-03-25 PROCEDURE — 45380 COLONOSCOPY AND BIOPSY: CPT | Mod: ,,, | Performed by: INTERNAL MEDICINE

## 2022-03-25 PROCEDURE — 88305 TISSUE EXAM BY PATHOLOGIST: CPT | Mod: 26,,, | Performed by: PATHOLOGY

## 2022-03-25 PROCEDURE — 77067 MAMMO DIGITAL SCREENING BILAT WITH TOMO: ICD-10-PCS | Mod: 26,,, | Performed by: RADIOLOGY

## 2022-03-25 PROCEDURE — 88342 IMHCHEM/IMCYTCHM 1ST ANTB: CPT | Mod: 26,,, | Performed by: PATHOLOGY

## 2022-03-25 PROCEDURE — 88305 TISSUE EXAM BY PATHOLOGIST: CPT | Mod: 59 | Performed by: PATHOLOGY

## 2022-03-25 PROCEDURE — 77063 MAMMO DIGITAL SCREENING BILAT WITH TOMO: ICD-10-PCS | Mod: 26,,, | Performed by: RADIOLOGY

## 2022-03-25 PROCEDURE — 88342 CHG IMMUNOCYTOCHEMISTRY: ICD-10-PCS | Mod: 26,,, | Performed by: PATHOLOGY

## 2022-03-25 PROCEDURE — 77067 SCR MAMMO BI INCL CAD: CPT | Mod: 26,,, | Performed by: RADIOLOGY

## 2022-03-25 RX ORDER — LIDOCAINE HYDROCHLORIDE 10 MG/ML
INJECTION, SOLUTION EPIDURAL; INFILTRATION; INTRACAUDAL; PERINEURAL
Status: DISCONTINUED | OUTPATIENT
Start: 2022-03-25 | End: 2022-03-25

## 2022-03-25 RX ORDER — SODIUM CHLORIDE 9 MG/ML
INJECTION, SOLUTION INTRAVENOUS CONTINUOUS
Status: DISCONTINUED | OUTPATIENT
Start: 2022-03-25 | End: 2022-03-25 | Stop reason: HOSPADM

## 2022-03-25 RX ORDER — PROPOFOL 10 MG/ML
VIAL (ML) INTRAVENOUS
Status: DISCONTINUED | OUTPATIENT
Start: 2022-03-25 | End: 2022-03-25

## 2022-03-25 RX ADMIN — PROPOFOL 80 MG: 10 INJECTION, EMULSION INTRAVENOUS at 01:03

## 2022-03-25 RX ADMIN — PROPOFOL 20 MG: 10 INJECTION, EMULSION INTRAVENOUS at 01:03

## 2022-03-25 RX ADMIN — SODIUM CHLORIDE, SODIUM LACTATE, POTASSIUM CHLORIDE, AND CALCIUM CHLORIDE: .6; .31; .03; .02 INJECTION, SOLUTION INTRAVENOUS at 01:03

## 2022-03-25 RX ADMIN — PROPOFOL 40 MG: 10 INJECTION, EMULSION INTRAVENOUS at 01:03

## 2022-03-25 RX ADMIN — PROPOFOL 30 MG: 10 INJECTION, EMULSION INTRAVENOUS at 01:03

## 2022-03-25 RX ADMIN — LIDOCAINE HYDROCHLORIDE 50 MG: 10 INJECTION, SOLUTION EPIDURAL; INFILTRATION; INTRACAUDAL; PERINEURAL at 01:03

## 2022-03-25 NOTE — PLAN OF CARE
Dr. Feldman at bedside to discuss procedures and recommendations to patient and daughter. No distress noted, vss. Discharge instructions provided. OK to discharge home.

## 2022-03-25 NOTE — ANESTHESIA RELEASE NOTE
"Anesthesia Release from PACU Note    Patient: Lucia Barlow    Procedure(s) Performed: Procedure(s) (LRB):  EGD (ESOPHAGOGASTRODUODENOSCOPY) (N/A)  COLONOSCOPY (N/A)    Anesthesia type: MAC    Post pain: Adequate analgesia    Post assessment: no apparent anesthetic complications    Last Vitals:   Visit Vitals  BP (!) 126/91   Pulse 104   Temp 36.8 °C (98.2 °F)   Resp 14   Ht 5' 1" (1.549 m)   Wt 59.9 kg (132 lb)   SpO2 96%   Breastfeeding No   BMI 24.94 kg/m²       Post vital signs: stable    Level of consciousness: awake    Nausea/Vomiting: no nausea/no vomiting    Complications: none    Airway Patency: patent    Respiratory: unassisted    Cardiovascular: stable and blood pressure at baseline    Hydration: euvolemic     "

## 2022-03-25 NOTE — [employer] PATIENT INSTRUCTIONS
Discharge Summary/Instructions after an Endoscopic Procedure  Patient Name: Lucia Barlow  Patient MRN: [id number]  Patient YOB: 1950 Friday, March 25, 2022 Paola Feldman MD  Dear patient,  As a result of recent federal legislation (The Federal Cures Act), you may   receive lab or pathology results from your procedure in your [facility]   account before your physician is able to contact you. Your physician or   their representative will relay the results to you with their   recommendations at their soonest availability.  Thank you,  RESTRICTIONS:  During your procedure today, you received medications for sedation.  These   medications may affect your judgment, balance and coordination.  Therefore,   for 24 hours, you have the following restrictions:   - DO NOT drive a car, operate machinery, make legal/financial decisions,   sign important papers or drink alcohol.    ACTIVITY:  Today: no heavy lifting, straining or running due to procedural   sedation/anesthesia.  The following day: return to full activity including work.  DIET:  Eat and drink normally unless instructed otherwise.     TREATMENT FOR COMMON SIDE EFFECTS:  - Mild abdominal pain, nausea, belching, bloating or excessive gas:  rest,   eat lightly and use a heating pad.  - Sore Throat: treat with throat lozenges and/or gargle with warm salt   water.  - Because air was used during the procedure, expelling large amounts of air   from your rectum or belching is normal.  - If a bowel prep was taken, you may not have a bowel movement for 1-3 days.    This is normal.  SYMPTOMS TO WATCH FOR AND REPORT TO YOUR PHYSICIAN:  1. Abdominal pain or bloating, other than gas cramps.  2. Chest pain.  3. Back pain.  4. Signs of infection such as: chills or fever occurring within 24 hours   after the procedure.  5. Rectal bleeding, which would show as bright red, maroon, or black stools.   (A tablespoon of blood from the rectum is not serious, especially if    hemorrhoids are present.)  6. Vomiting.  7. Weakness or dizziness.  GO DIRECTLY TO THE NEAREST EMERGENCY ROOM IF YOU HAVE ANY OF THE FOLLOWING:      Difficulty breathing              Chills and/or fever over 101 F   Persistent vomiting and/or vomiting blood   Severe abdominal pain   Severe chest pain   Black, tarry stools   Bleeding- more than one tablespoon   Any other symptom or condition that you feel may need urgent attention  Your doctor recommends these additional instructions:  If any biopsies were taken, your doctors clinic will contact you in 1 to 2   weeks with any results.  - Patient has a contact number available for emergencies.  The signs and   symptoms of potential delayed complications were discussed with the   patient.  Return to normal activities tomorrow.  Written discharge   instructions were provided to the patient.   - Discharge patient to home (via wheelchair).   - Resume previous diet today.   - Continue present medications.   - Await pathology results.  For questions, problems or results please call your physician Paola Feldman MD at Work:  [phone]  If you have any questions about the above instructions, call the GI   department at [phone] or call the endoscopy unit at [phone]   from 7am until 3 pm.  [facility] [place], EMERGENCY ROOM PHONE NUMBER:   [phone]  IF A COMPLICATION OR EMERGENCY SITUATION ARISES AND YOU ARE UNABLE TO REACH   YOUR PHYSICIAN - GO DIRECTLY TO THE EMERGENCY ROOM.  I have read or have had read to me these discharge instructions for my   procedure and have received a written copy.  I understand these   instructions and will follow-up with my physician if I have any questions.     __________________________________       _____________________________________  Nurse Signature                                          Patient/Designated   Responsible Party Signature  MD Paola Jarrell MD  3/25/2022 2:10:53  PM  This report has been verified and signed electronically.  Dear patient,  As a result of recent federal legislation (The Federal Cures Act), you may   receive lab or pathology results from your procedure in your [facility]   account before your physician is able to contact you. Your physician or   their representative will relay the results to you with their   recommendations at their soonest availability.  Thank you,  [employer]ATION

## 2022-03-25 NOTE — ANESTHESIA POSTPROCEDURE EVALUATION
Anesthesia Post Evaluation    Patient: Lucia Barlow    Procedure(s) Performed: Procedure(s) (LRB):  EGD (ESOPHAGOGASTRODUODENOSCOPY) (N/A)  COLONOSCOPY (N/A)    Final Anesthesia Type: MAC      Patient location during evaluation: PACU  Patient participation: Yes- Able to Participate  Level of consciousness: awake and alert  Post-procedure vital signs: reviewed and stable  Pain management: adequate  Airway patency: patent    PONV status at discharge: No PONV  Anesthetic complications: no      Cardiovascular status: blood pressure returned to baseline  Respiratory status: unassisted  Hydration status: euvolemic  Follow-up not needed.          Vitals Value Taken Time   /91 03/25/22 1406   Temp 98 03/25/22 1409   Pulse 104 03/25/22 1406   Resp 14 03/25/22 1406   SpO2 96 % 03/25/22 1406         No case tracking events are documented in the log.      Pain/Ada Score: No data recorded

## 2022-03-25 NOTE — H&P
PRE PROCEDURE H&P    Patient Name: Lucia Barlow  MRN: [id number]  : 1950  Date of Procedure:  3/25/2022  Referring Physician: Jayde Craven NP  Primary Physician: ZELDA Sandoval MD  Procedure Physician: Paola Feldman MD       Planned Procedure: Colonoscopy and EGD  Diagnosis: iron deficiency anemia  Chief Complaint: Same as above    HPI: Patient is an 72 y.o. female is here for the above.       Past Medical History:   Past Medical History:   Diagnosis Date    Atherosclerosis of artery of both lower extremities 2014    Atherosclerosis of native coronary artery of native heart without angina pectoris 2016    Atherosclerotic PVD with intermittent claudication 2014    Chronic bronchitis     COPD (chronic obstructive pulmonary disease)     Diabetes with neurologic complications     Dyslipidemia associated with type 2 diabetes mellitus 2013    Dyslipidemia associated with type 2 diabetes mellitus     Ex-smoker     Gastroesophageal reflux disease without esophagitis 2019    Hyperlipidemia     Hypertension     NSCLC of left lung 2020    IVANIA (obstructive sleep apnea) 2021    Osteoporosis  rosita     Pneumothorax after biopsy 10/21/2020    PVD (peripheral vascular disease)     Renal manifestation of secondary diabetes mellitus     S/P peripheral artery angioplasty 2014    Seasonal allergic rhinitis due to pollen     Simple chronic bronchitis     Tobacco dependence     Type 2 diabetes mellitus with microalbuminuria, without long-term current use of insulin 3/29/2019    Type 2 diabetes mellitus with peripheral vascular disease     Type 2 diabetes mellitus with stage 3 chronic kidney disease, without long-term current use of insulin 2019    Type 2 diabetes mellitus without retinopathy 2019    Urinary incontinence         Past Surgical History:  Past Surgical History:   Procedure Laterality Date    ANGIOPLASTY Bilateral 2014     aortoiliac stenting     CARPAL TUNNEL RELEASE  2003    Henrry    COLECTOMY  approximate 2005    pt states 1in colon -dx with benign mass removed-states no colon cancer    COLONOSCOPY N/A 11/9/2016    Procedure: COLONOSCOPY;  Surgeon: Dmitri Sterling MD;  Location: [facility] ENDO;  Service: Endoscopy;  Laterality: N/A;    COLONOSCOPY N/A 11/15/2019    Procedure: COLONOSCOPY;  Surgeon: Marko Vicente MD;  Location: [facility] ENDO;  Service: Endoscopy;  Laterality: N/A;    HYSTERECTOMY      no cancer    INJECTION OF ANESTHETIC AGENT AROUND MULTIPLE INTERCOSTAL NERVES Left 11/19/2020    Procedure: BLOCK, NERVE, INTERCOSTAL, 2 OR MORE;  Surgeon: Amrit Flores MD;  Location: [facility] OR [facility];  Service: Thoracic;  Laterality: Left;    OOPHORECTOMY      SURGICAL REMOVAL OF LYMPH NODE Left 11/19/2020    Procedure: EXCISION, LYMPH NODE;  Surgeon: Amrit Flores MD;  Location: [facility] OR [facility];  Service: Thoracic;  Laterality: Left;  mediastinal lymph node disection    XI ROBOTIC RATS,WITH LOBECTOMY,LUNG Left 11/19/2020    Procedure: XI ROBOTIC RATS,WITH LOBECTOMY,LUNG;  Surgeon: Amrit Flores MD;  Location: [facility] OR 2ND FLR;  Service: Thoracic;  Laterality: Left;  Lingulectomy.        Home Medications:  Prior to Admission medications    Medication Sig Start Date End Date Taking? Authorizing Provider   albuterol (PROVENTIL) 2.5 mg /3 mL (0.083 %) nebulizer solution Take 3 mLs (2.5 mg total) by nebulization every 6 (six) hours while awake. 2/16/22 2/16/23 Yes Ramiro Aleman MD   albuterol (PROVENTIL/VENTOLIN HFA) 90 mcg/actuation inhaler Inhale 2 puffs into the lungs every 4 (four) hours as needed for Wheezing or Shortness of Breath. 2/16/22 2/16/23 Yes Ramiro Aleman MD   amlodipine-benazepril 5-20 mg (LOTREL) 5-20 mg per capsule Take 1 capsule by mouth 2 (two) times daily. 12/1/21  Yes VICK Sandoval MD   butalbital-acetaminophen-caffeine -40 mg (FIORICET, ESGIC) -40 mg per tablet TAKE 1  TABLET BY MOUTH 3 (THREE) TIMES DAILY AS NEEDED FOR HEADACHES. (MAXIMUM OF 15 TABLETS PER MONTH) 12/1/21  Yes VICK Sandoval MD   calcium-vitamin D 600 mg(1,500mg) -400 unit Tab Take 2 tablets by mouth once daily. 12/1/21  Yes VICK Sandoval MD   cetirizine (ZYRTEC) 10 MG tablet Take 1 tablet (10 mg total) by mouth once daily. For sinus congestion 12/1/21 12/1/22 Yes VICK Sandoval MD   chlorthalidone (HYGROTEN) 25 MG Tab Take 1 tablet (25 mg total) by mouth once daily. 12/1/21  Yes VICK Sandoval MD   clonazePAM (KLONOPIN) 0.25 MG TbDL Take 1 tablet (0.25 mg total) by mouth nightly. 10/28/20  Yes Ramiro Aleman MD   empagliflozin (JARDIANCE) 25 mg tablet Take 1 tablet (25 mg total) by mouth once daily. 1/21/22  Yes VICK Sandoval MD   ezetimibe (ZETIA) 10 mg tablet Take 1 tablet (10 mg total) by mouth once daily. 12/1/21  Yes VICK Sandoval MD   fluticasone propionate (FLONASE) 50 mcg/actuation nasal spray 2 sprays (100 mcg total) by Each Nostril route once daily. 2/16/22  Yes Ramiro Aleman MD   ipratropium-albuteroL (COMBIVENT)  mcg/actuation inhaler Inhale 2 puffs into the lungs every 4 (four) hours as needed for Wheezing or Shortness of Breath. Rescue 2/25/22  Yes Ramiro Aleman MD   levoFLOXacin (LEVAQUIN) 500 MG tablet Take 1 tablet (500 mg total) by mouth once daily. 12/13/21  Yes Ramiro Aleman MD   montelukast (SINGULAIR) 10 mg tablet Take 1 tablet (10 mg total) by mouth every evening. 2/16/22  Yes Ramiro Aleman MD   ondansetron (ZOFRAN-ODT) 4 MG TbDL 1-2 tablets PO every 6 hours as needed for nausea/vomiting while completing bowel prep 3/9/22  Yes Jayde Craven, SHRUTHI   pantoprazole (PROTONIX) 40 MG tablet Take 1 tablet (40 mg total) by mouth once daily. 12/1/21  Yes VICK Sandoval MD   potassium chloride SA (K-DUR,KLOR-CON) 20 MEQ tablet Take 1 tablet (20 mEq total) by mouth once daily. 12/1/21 8/26/24 Yes VICK Sandoval MD   predniSONE (DELTASONE) 20 MG  tablet Prednisone 60 mg/ day for 3 days, 40 mg/day for 3 days,20 mg/ day for 3 days, (1/2 tablet )10 mg a day for 3 days. 21  Yes Ramiro Aleman MD   rosuvastatin (CRESTOR) 20 MG tablet Take 1 tablet (20 mg total) by mouth once daily. 21  Yes VICK Sandoval MD   blood sugar diagnostic Strp 1 each by Misc.(Non-Drug; Combo Route) route 3 (three) times daily. [employer] test strips 19   hSirley Lay NP   blood-glucose meter kit Insurance preferred 19   VICK Sandoval MD   cilostazoL (PLETAL) 50 MG Tab Take 1 tablet (50 mg total) by mouth once daily. 22  Ramiro Aleman MD   sod sulf-pot chloride-mag sulf (SUTAB) 1.479-0.188- 0.225 gram tablet Take 12 tablets by mouth once daily. Take according to package instructions with indicated amount of water. 3/9/22   Jayde Craven NP   varicella-zoster gE-AS01B, PF, (SHINGRIX) 50 mcg/0.5 mL injection Inject 0.5 mL (one dose) into muscle now; give second dose at least 2 months later 19   VICK Sandoval MD        Allergies:  Review of patient's allergies indicates:   Allergen Reactions    Iodine and iodide containing products Swelling    Skin staples [surgical stainless steel] Swelling    Egg derived      Shortness breath, lip swelling    Fish containing products     Latex, natural rubber Swelling    Losartan Itching    Nickel     Pravastatin      40 mg causes nausea vomitting but 20 mg ok    Shellfish containing products Swelling        Social History:   Social History     Socioeconomic History    Marital status:     Number of children: 4   Occupational History    Occupation:    Tobacco Use    Smoking status: Former Smoker     Packs/day: 1.00     Years: 60.00     Pack years: 60.00     Types: Cigarettes     Start date: 1960     Quit date: 1/10/2020     Years since quittin.2    Smokeless tobacco: Never Used   Substance and Sexual Activity    Alcohol use: No     Alcohol/week: 0.0  "standard drinks    Drug use: No    Sexual activity: Never   Social History Narrative    Son smoker, no pets in household.     Social Determinants of Health     Financial Resource Strain: Low Risk     Difficulty of Paying Living Expenses: Not very hard   Food Insecurity: No Food Insecurity    Worried About Running Out of Food in the Last Year: Never true    Ran Out of Food in the Last Year: Never true   Transportation Needs: No Transportation Needs    Lack of Transportation (Medical): No    Lack of Transportation (Non-Medical): No   Physical Activity: Unknown    Days of Exercise per Week: Patient refused    Minutes of Exercise per Session: 0 min   Stress: No Stress Concern Present    Feeling of Stress : Not at all   Social Connections: Unknown    Frequency of Communication with Friends and Family: More than three times a week    Frequency of Social Gatherings with Friends and Family: More than three times a week    Active Member of Clubs or Organizations: Yes    Attends Club or Organization Meetings: 1 to 4 times per year    Marital Status:    Housing Stability: Low Risk     Unable to Pay for Housing in the Last Year: No    Number of Places Lived in the Last Year: 1    Unstable Housing in the Last Year: No       Family History:  Family History   Problem Relation Age of Onset    Stroke Father     Stroke Sister     Asthma Daughter     Diabetes Daughter     Breast cancer Daughter     Asthma Son        ROS: No acute cardiac events, no acute respiratory complaints.     Physical Exam (all patients):    /64 (BP Location: Left arm, Patient Position: Lying)   Pulse 95   Temp 98.2 °F (36.8 °C)   Resp 18   Ht 5' 1" (1.549 m)   Wt 59.9 kg (132 lb)   SpO2 98%   Breastfeeding No   BMI 24.94 kg/m²   Lungs: Clear to auscultation bilaterally, respirations unlabored  Heart: Regular rate and rhythm, S1 and S2 normal, no obvious murmurs  Abdomen:         Soft, non-tender, bowel sounds normal, " no masses, no organomegaly    Lab Results   Component Value Date    WBC 12.21 02/16/2022    MCV 78 (L) 02/16/2022    RDW 20.2 (H) 02/16/2022     02/16/2022    INR 0.9 10/21/2020     (H) 02/14/2022    HGBA1C 6.4 (H) 03/04/2022    BUN 24 (H) 02/14/2022     02/14/2022    K 3.5 02/14/2022     02/14/2022        SEDATION PLAN: per anesthesia      History reviewed, vital signs satisfactory, cardiopulmonary status satisfactory, sedation options, risks and plans have been discussed with the patient  All their questions were answered and the patient agrees to the sedation procedures as planned and the patient is deemed an appropriate candidate for the sedation as planned.    Procedure explained to patient, informed consent obtained and placed in chart.    Paola Feldman  3/25/2022  1:42 PM

## 2022-03-25 NOTE — ANESTHESIA PREPROCEDURE EVALUATION
03/25/2022  Lucia Barlow is a 72 y.o., female.      Pre-op Assessment    I have reviewed the Patient Summary Reports.     I have reviewed the Nursing Notes. I have reviewed the NPO Status.   I have reviewed the Medications.     Review of Systems  Anesthesia Hx:  No problems with previous Anesthesia    Social:  Former Smoker    Hematology/Oncology:  Hematology Normal   Oncology Normal     EENT/Dental:EENT/Dental Normal   Cardiovascular:   Hypertension, well controlled CAD asymptomatic  PVD hyperlipidemia    Pulmonary:   COPD, moderate Sleep Apnea, CPAP    Renal/:   Chronic Renal Disease, CRI    Hepatic/GI:   Bowel Prep. GERD, poorly controlled  Bowel Conditions:  Diverticulitis    Musculoskeletal:  Musculoskeletal Normal  Bone Disorders: Osteoporosis    Neurological:   Headaches    Endocrine:   Diabetes, well controlled, type 2    Dermatological:  Skin Normal    Psych:  Psychiatric Normal           Physical Exam  General: Well nourished    Airway:  Mallampati: II   Mouth Opening: Normal  Tongue: Normal  Neck ROM: Normal ROM    Dental:  Intact    Chest/Lungs:  Clear to auscultation    Heart:  Rate: Normal        Anesthesia Plan  Type of Anesthesia, risks & benefits discussed:    Anesthesia Type: MAC  Intra-op Monitoring Plan: Standard ASA Monitors  Induction:  IV  Informed Consent: Informed consent signed with the Patient and all parties understand the risks and agree with anesthesia plan.  All questions answered. Patient consented to blood products? Yes  ASA Score: 3    Ready For Surgery From Anesthesia Perspective.     .

## 2022-03-25 NOTE — [employer] PATIENT INSTRUCTIONS
Discharge Summary/Instructions after an Endoscopic Procedure  Patient Name: Lucia Barlow  Patient MRN: [id number]  Patient YOB: 1950 Friday, March 25, 2022 Paola Feldman MD  Dear patient,  As a result of recent federal legislation (The Federal Cures Act), you may   receive lab or pathology results from your procedure in your [facility]   account before your physician is able to contact you. Your physician or   their representative will relay the results to you with their   recommendations at their soonest availability.  Thank you,  RESTRICTIONS:  During your procedure today, you received medications for sedation.  These   medications may affect your judgment, balance and coordination.  Therefore,   for 24 hours, you have the following restrictions:   - DO NOT drive a car, operate machinery, make legal/financial decisions,   sign important papers or drink alcohol.    ACTIVITY:  Today: no heavy lifting, straining or running due to procedural   sedation/anesthesia.  The following day: return to full activity including work.  DIET:  Eat and drink normally unless instructed otherwise.     TREATMENT FOR COMMON SIDE EFFECTS:  - Mild abdominal pain, nausea, belching, bloating or excessive gas:  rest,   eat lightly and use a heating pad.  - Sore Throat: treat with throat lozenges and/or gargle with warm salt   water.  - Because air was used during the procedure, expelling large amounts of air   from your rectum or belching is normal.  - If a bowel prep was taken, you may not have a bowel movement for 1-3 days.    This is normal.  SYMPTOMS TO WATCH FOR AND REPORT TO YOUR PHYSICIAN:  1. Abdominal pain or bloating, other than gas cramps.  2. Chest pain.  3. Back pain.  4. Signs of infection such as: chills or fever occurring within 24 hours   after the procedure.  5. Rectal bleeding, which would show as bright red, maroon, or black stools.   (A tablespoon of blood from the rectum is not serious, especially if    hemorrhoids are present.)  6. Vomiting.  7. Weakness or dizziness.  GO DIRECTLY TO THE NEAREST EMERGENCY ROOM IF YOU HAVE ANY OF THE FOLLOWING:      Difficulty breathing              Chills and/or fever over 101 F   Persistent vomiting and/or vomiting blood   Severe abdominal pain   Severe chest pain   Black, tarry stools   Bleeding- more than one tablespoon   Any other symptom or condition that you feel may need urgent attention  Your doctor recommends these additional instructions:  If any biopsies were taken, your doctors clinic will contact you in 1 to 2   weeks with any results.  - Patient has a contact number available for emergencies.  The signs and   symptoms of potential delayed complications were discussed with the   patient.  Return to normal activities tomorrow.  Written discharge   instructions were provided to the patient.   - Discharge patient to home (via wheelchair).   - Resume previous diet today.   - Continue present medications.   - Await pathology results.   - Repeat colonoscopy in 5 years for surveillance.  For questions, problems or results please call your physician Paola Feldman MD at Work:  [phone]  If you have any questions about the above instructions, call the GI   department at [phone] or call the endoscopy unit at [phone]   from 7am until 3 pm.  [facility] [place], EMERGENCY ROOM PHONE NUMBER:   [phone]  IF A COMPLICATION OR EMERGENCY SITUATION ARISES AND YOU ARE UNABLE TO REACH   YOUR PHYSICIAN - GO DIRECTLY TO THE EMERGENCY ROOM.  I have read or have had read to me these discharge instructions for my   procedure and have received a written copy.  I understand these   instructions and will follow-up with my physician if I have any questions.     __________________________________       _____________________________________  Nurse Signature                                          Patient/Designated   Responsible Party Signature  Paola  MD Paola Feldman MD  3/25/2022 2:08:56 PM  This report has been verified and signed electronically.  Dear patient,  As a result of recent federal legislation (The Federal Cures Act), you may   receive lab or pathology results from your procedure in your [facility]   account before your physician is able to contact you. Your physician or   their representative will relay the results to you with their   recommendations at their soonest availability.  Thank you,  [employer]

## 2022-03-26 DIAGNOSIS — N18.31 TYPE 2 DIABETES MELLITUS WITH STAGE 3A CHRONIC KIDNEY DISEASE, WITHOUT LONG-TERM CURRENT USE OF INSULIN: ICD-10-CM

## 2022-03-26 DIAGNOSIS — E11.22 TYPE 2 DIABETES MELLITUS WITH STAGE 3A CHRONIC KIDNEY DISEASE, WITHOUT LONG-TERM CURRENT USE OF INSULIN: ICD-10-CM

## 2022-03-26 NOTE — TELEPHONE ENCOUNTER
This Rx Request does not qualify for assessment with the OR   Please review protocol details and the Care Due Message for extra clinical information    Reasons Rx Request may be deferred:  Labs/Vitals abnormal  Patient has been seen in the ED/Hospital since the last PCP visit    Note composed:10:16 AM 03/26/2022

## 2022-03-26 NOTE — TELEPHONE ENCOUNTER
No new care gaps identified.  Powered by [employer]. Reference number: [id number].   3/26/2022 3:13:36 AM CDT

## 2022-03-27 RX ORDER — GLIMEPIRIDE 2 MG/1
2 TABLET ORAL
Qty: 90 TABLET | Refills: 4 | OUTPATIENT
Start: 2022-03-27 | End: 2023-03-27

## 2022-03-27 NOTE — TELEPHONE ENCOUNTER
LAST ENCOUNTER WITH ME:  12/1/2021   NEXT ENCOUNTER WITH ME: 5/13/2022    TO MY TEAM:    Please let Lucia know I was unable to approve this refill for the reasons listed.    Thanks.  --------------------------------------------------------------------------------  REFILL NOT APPROVED  REASON: This medication was previously DISCONTINUED. According to her chart, this should not be one of her current medications.  Requested Prescriptions     Pending Prescriptions Disp Refills    glimepiride (AMARYL) 2 MG tablet [Pharmacy Med Name: GLIMEPIRIDE 2 MG Tablet] 90 tablet 4     Sig: TAKE 1 TABLET (2 MG TOTAL) BY MOUTH BEFORE BREAKFAST.   #LMRX

## 2022-03-28 ENCOUNTER — PATIENT OUTREACH (OUTPATIENT)
Dept: ADMINISTRATIVE | Facility: OTHER | Age: 72
End: 2022-03-28
Payer: MEDICARE

## 2022-03-28 ENCOUNTER — OFFICE VISIT (OUTPATIENT)
Dept: ALLERGY | Facility: CLINIC | Age: 72
End: 2022-03-28
Payer: MEDICARE

## 2022-03-28 ENCOUNTER — LAB VISIT (OUTPATIENT)
Dept: LAB | Facility: HOSPITAL | Age: 72
End: 2022-03-28
Attending: ALLERGY & IMMUNOLOGY
Payer: MEDICARE

## 2022-03-28 VITALS
WEIGHT: 136.69 LBS | HEART RATE: 100 BPM | DIASTOLIC BLOOD PRESSURE: 58 MMHG | HEIGHT: 61 IN | TEMPERATURE: 98 F | SYSTOLIC BLOOD PRESSURE: 114 MMHG | BODY MASS INDEX: 25.81 KG/M2

## 2022-03-28 DIAGNOSIS — Z91.040 LATEX ALLERGY: ICD-10-CM

## 2022-03-28 DIAGNOSIS — T78.03XS: ICD-10-CM

## 2022-03-28 DIAGNOSIS — J30.1 SEASONAL ALLERGIC RHINITIS DUE TO POLLEN: ICD-10-CM

## 2022-03-28 DIAGNOSIS — Z91.018 FOOD ALLERGY: Primary | ICD-10-CM

## 2022-03-28 DIAGNOSIS — Z91.018 FOOD ALLERGY: ICD-10-CM

## 2022-03-28 LAB — IGE SERPL-ACNC: 932 IU/ML (ref 0–100)

## 2022-03-28 PROCEDURE — 3008F BODY MASS INDEX DOCD: CPT | Mod: CPTII,S$GLB,, | Performed by: ALLERGY & IMMUNOLOGY

## 2022-03-28 PROCEDURE — 1101F PT FALLS ASSESS-DOCD LE1/YR: CPT | Mod: CPTII,S$GLB,, | Performed by: ALLERGY & IMMUNOLOGY

## 2022-03-28 PROCEDURE — 99999 PR PBB SHADOW E&M-EST. PATIENT-LVL V: ICD-10-PCS | Mod: PBBFAC,,, | Performed by: ALLERGY & IMMUNOLOGY

## 2022-03-28 PROCEDURE — 99204 PR OFFICE/OUTPT VISIT, NEW, LEVL IV, 45-59 MIN: ICD-10-PCS | Mod: S$GLB,,, | Performed by: ALLERGY & IMMUNOLOGY

## 2022-03-28 PROCEDURE — 99204 OFFICE O/P NEW MOD 45 MIN: CPT | Mod: S$GLB,,, | Performed by: ALLERGY & IMMUNOLOGY

## 2022-03-28 PROCEDURE — 1126F AMNT PAIN NOTED NONE PRSNT: CPT | Mod: CPTII,S$GLB,, | Performed by: ALLERGY & IMMUNOLOGY

## 2022-03-28 PROCEDURE — 3072F PR LOW RISK FOR RETINOPATHY: ICD-10-PCS | Mod: CPTII,S$GLB,, | Performed by: ALLERGY & IMMUNOLOGY

## 2022-03-28 PROCEDURE — 99999 PR PBB SHADOW E&M-EST. PATIENT-LVL V: CPT | Mod: PBBFAC,,, | Performed by: ALLERGY & IMMUNOLOGY

## 2022-03-28 PROCEDURE — 86003 ALLG SPEC IGE CRUDE XTRC EA: CPT | Performed by: ALLERGY & IMMUNOLOGY

## 2022-03-28 PROCEDURE — 3008F PR BODY MASS INDEX (BMI) DOCUMENTED: ICD-10-PCS | Mod: CPTII,S$GLB,, | Performed by: ALLERGY & IMMUNOLOGY

## 2022-03-28 PROCEDURE — 1101F PR PT FALLS ASSESS DOC 0-1 FALLS W/OUT INJ PAST YR: ICD-10-PCS | Mod: CPTII,S$GLB,, | Performed by: ALLERGY & IMMUNOLOGY

## 2022-03-28 PROCEDURE — 3288F FALL RISK ASSESSMENT DOCD: CPT | Mod: CPTII,S$GLB,, | Performed by: ALLERGY & IMMUNOLOGY

## 2022-03-28 PROCEDURE — 4010F ACE/ARB THERAPY RXD/TAKEN: CPT | Mod: CPTII,S$GLB,, | Performed by: ALLERGY & IMMUNOLOGY

## 2022-03-28 PROCEDURE — 1159F MED LIST DOCD IN RCRD: CPT | Mod: CPTII,S$GLB,, | Performed by: ALLERGY & IMMUNOLOGY

## 2022-03-28 PROCEDURE — 86003 ALLG SPEC IGE CRUDE XTRC EA: CPT | Mod: 59 | Performed by: ALLERGY & IMMUNOLOGY

## 2022-03-28 PROCEDURE — 36415 COLL VENOUS BLD VENIPUNCTURE: CPT | Performed by: ALLERGY & IMMUNOLOGY

## 2022-03-28 PROCEDURE — 3288F PR FALLS RISK ASSESSMENT DOCUMENTED: ICD-10-PCS | Mod: CPTII,S$GLB,, | Performed by: ALLERGY & IMMUNOLOGY

## 2022-03-28 PROCEDURE — 3074F PR MOST RECENT SYSTOLIC BLOOD PRESSURE < 130 MM HG: ICD-10-PCS | Mod: CPTII,S$GLB,, | Performed by: ALLERGY & IMMUNOLOGY

## 2022-03-28 PROCEDURE — 3078F PR MOST RECENT DIASTOLIC BLOOD PRESSURE < 80 MM HG: ICD-10-PCS | Mod: CPTII,S$GLB,, | Performed by: ALLERGY & IMMUNOLOGY

## 2022-03-28 PROCEDURE — 82785 ASSAY OF IGE: CPT | Performed by: ALLERGY & IMMUNOLOGY

## 2022-03-28 PROCEDURE — 1159F PR MEDICATION LIST DOCUMENTED IN MEDICAL RECORD: ICD-10-PCS | Mod: CPTII,S$GLB,, | Performed by: ALLERGY & IMMUNOLOGY

## 2022-03-28 PROCEDURE — 1126F PR PAIN SEVERITY QUANTIFIED, NO PAIN PRESENT: ICD-10-PCS | Mod: CPTII,S$GLB,, | Performed by: ALLERGY & IMMUNOLOGY

## 2022-03-28 PROCEDURE — 3044F HG A1C LEVEL LT 7.0%: CPT | Mod: CPTII,S$GLB,, | Performed by: ALLERGY & IMMUNOLOGY

## 2022-03-28 PROCEDURE — 3044F PR MOST RECENT HEMOGLOBIN A1C LEVEL <7.0%: ICD-10-PCS | Mod: CPTII,S$GLB,, | Performed by: ALLERGY & IMMUNOLOGY

## 2022-03-28 PROCEDURE — 3074F SYST BP LT 130 MM HG: CPT | Mod: CPTII,S$GLB,, | Performed by: ALLERGY & IMMUNOLOGY

## 2022-03-28 PROCEDURE — 3078F DIAST BP <80 MM HG: CPT | Mod: CPTII,S$GLB,, | Performed by: ALLERGY & IMMUNOLOGY

## 2022-03-28 PROCEDURE — 3072F LOW RISK FOR RETINOPATHY: CPT | Mod: CPTII,S$GLB,, | Performed by: ALLERGY & IMMUNOLOGY

## 2022-03-28 PROCEDURE — 4010F PR ACE/ARB THEARPY RXD/TAKEN: ICD-10-PCS | Mod: CPTII,S$GLB,, | Performed by: ALLERGY & IMMUNOLOGY

## 2022-03-28 RX ORDER — EPINEPHRINE 0.3 MG/.3ML
1 INJECTION SUBCUTANEOUS ONCE
Qty: 2 EACH | Refills: 3 | Status: SHIPPED | OUTPATIENT
Start: 2022-03-28 | End: 2023-01-11

## 2022-03-28 RX ORDER — IPRATROPIUM BROMIDE 21 UG/1
2 SPRAY, METERED NASAL 3 TIMES DAILY
Qty: 20 ML | Refills: 5 | Status: SHIPPED | OUTPATIENT
Start: 2022-03-28 | End: 2022-08-22

## 2022-03-28 NOTE — PATIENT INSTRUCTIONS
Recommend strict avoidance of the food/foods that cause an allergic reaction after ingestion. I recommend avoiding foods that may contain the aforementioned food/foods or maybe contaminated with the aforementioned food. I recommend having an Epinephrine Auto-injector with you at all times. If needed, do not hesitate to use it. Place mid-lateral thigh and hold for 10 seconds. Call 911. You must go to the hospital via ambulance for a higher level of care and monitoring.  Do not stick your finger with the Epinephrine Auto-injector.  Do not leave the Epinephrine Auto-injector in a car and/or  hot/cold environment, as it can denature.

## 2022-03-28 NOTE — PROGRESS NOTES
"Subjective:       Patient ID: Lucia Barlow is a 72 y.o. female.    Referred by Dr. Sandoval    Chief Complaint:  Allergies      HPI: 72 year old female complaining of runny nose, nasal congestion for several years. She reports facial swelling after being outside for long periods. She denies tongue or throat swelling. She has tried medrol dose pack, Flonase and Singulair. The aforementioned have not helped. She reports that she has tried long acting antihistamines like Zyrtec, but they have not helped. Symptoms vary with season and are worst in the Spring.  She reports wearing a mask when outside.   Zyrtec(last taken this morning) in the morning and Singulair at night  She has not had sinus surgery.    Fish and shellfish "my heart swells up"  NO Epipen    Latex- contact dermatitis    Egg- "stomach cramps"    Past Medical History:   Diagnosis Date    Atherosclerosis of artery of both lower extremities 1/17/2014    Atherosclerosis of native coronary artery of native heart without angina pectoris 11/18/2016    Atherosclerotic PVD with intermittent claudication 1/17/2014    Chronic bronchitis     COPD (chronic obstructive pulmonary disease)     Diabetes with neurologic complications     Dyslipidemia associated with type 2 diabetes mellitus 6/14/2013    Dyslipidemia associated with type 2 diabetes mellitus     Ex-smoker     Gastroesophageal reflux disease without esophagitis 1/25/2019    Hyperlipidemia     Hypertension     NSCLC of left lung 11/19/2020    IVANIA (obstructive sleep apnea) 2/11/2021    Osteoporosis 1/16 rosita 1/18    Pneumothorax after biopsy 10/21/2020    PVD (peripheral vascular disease)     Renal manifestation of secondary diabetes mellitus     S/P peripheral artery angioplasty 2/7/2014    Seasonal allergic rhinitis due to pollen     Simple chronic bronchitis     Tobacco dependence     Type 2 diabetes mellitus with microalbuminuria, without long-term current use of insulin " 3/29/2019    Type 2 diabetes mellitus with peripheral vascular disease     Type 2 diabetes mellitus with stage 3 chronic kidney disease, without long-term current use of insulin 2/1/2019    Type 2 diabetes mellitus without retinopathy 2/1/2019    Urinary incontinence      Family History   Problem Relation Age of Onset    Stroke Father     Stroke Sister     Asthma Daughter     Diabetes Daughter     Breast cancer Daughter     Asthma Son      Current Outpatient Medications on File Prior to Visit   Medication Sig Dispense Refill    albuterol (PROVENTIL) 2.5 mg /3 mL (0.083 %) nebulizer solution Take 3 mLs (2.5 mg total) by nebulization every 6 (six) hours while awake. 270 mL 11    albuterol (PROVENTIL/VENTOLIN HFA) 90 mcg/actuation inhaler Inhale 2 puffs into the lungs every 4 (four) hours as needed for Wheezing or Shortness of Breath. 54 g 3    amlodipine-benazepril 5-20 mg (LOTREL) 5-20 mg per capsule Take 1 capsule by mouth 2 (two) times daily. 180 capsule 4    blood sugar diagnostic Strp 1 each by Misc.(Non-Drug; Combo Route) route 3 (three) times daily. [facility] test strips 100 each 3    blood-glucose meter kit Insurance preferred 1 each 0    butalbital-acetaminophen-caffeine -40 mg (FIORICET, ESGIC) -40 mg per tablet TAKE 1 TABLET BY MOUTH 3 (THREE) TIMES DAILY AS NEEDED FOR HEADACHES. (MAXIMUM OF 15 TABLETS PER MONTH) 30 tablet 3    calcium-vitamin D 600 mg(1,500mg) -400 unit Tab Take 2 tablets by mouth once daily. 180 tablet 4    cetirizine (ZYRTEC) 10 MG tablet Take 1 tablet (10 mg total) by mouth once daily. For sinus congestion 90 tablet 4    chlorthalidone (HYGROTEN) 25 MG Tab Take 1 tablet (25 mg total) by mouth once daily. 90 tablet 4    cilostazoL (PLETAL) 50 MG Tab Take 1 tablet (50 mg total) by mouth once daily. 60 tablet 11    clonazePAM (KLONOPIN) 0.25 MG TbDL Take 1 tablet (0.25 mg total) by mouth nightly. 30 tablet 5    empagliflozin (JARDIANCE) 25 mg tablet Take 1  tablet (25 mg total) by mouth once daily. 90 tablet 1    ezetimibe (ZETIA) 10 mg tablet Take 1 tablet (10 mg total) by mouth once daily. 90 tablet 4    fluticasone propionate (FLONASE) 50 mcg/actuation nasal spray 2 sprays (100 mcg total) by Each Nostril route once daily. 48 g 4    ipratropium-albuteroL (COMBIVENT)  mcg/actuation inhaler Inhale 2 puffs into the lungs every 4 (four) hours as needed for Wheezing or Shortness of Breath. Rescue 12 g 3    montelukast (SINGULAIR) 10 mg tablet Take 1 tablet (10 mg total) by mouth every evening. 90 tablet 4    ondansetron (ZOFRAN-ODT) 4 MG TbDL 1-2 tablets PO every 6 hours as needed for nausea/vomiting while completing bowel prep 4 tablet 0    pantoprazole (PROTONIX) 40 MG tablet Take 1 tablet (40 mg total) by mouth once daily. 90 tablet 4    potassium chloride SA (K-DUR,KLOR-CON) 20 MEQ tablet Take 1 tablet (20 mEq total) by mouth once daily. 90 tablet 4    rosuvastatin (CRESTOR) 20 MG tablet Take 1 tablet (20 mg total) by mouth once daily. 90 tablet 4    sod sulf-pot chloride-mag sulf (SUTAB) 1.479-0.188- 0.225 gram tablet Take 12 tablets by mouth once daily. Take according to package instructions with indicated amount of water. 24 tablet 0    levoFLOXacin (LEVAQUIN) 500 MG tablet Take 1 tablet (500 mg total) by mouth once daily. (Patient not taking: Reported on 3/28/2022) 10 tablet 0    predniSONE (DELTASONE) 20 MG tablet Prednisone 60 mg/ day for 3 days, 40 mg/day for 3 days,20 mg/ day for 3 days, (1/2 tablet )10 mg a day for 3 days. (Patient not taking: Reported on 3/28/2022) 20 tablet 0    varicella-zoster gE-AS01B, PF, (SHINGRIX) 50 mcg/0.5 mL injection Inject 0.5 mL (one dose) into muscle now; give second dose at least 2 months later (Patient not taking: Reported on 3/28/2022) 0.5 mL 1     No current facility-administered medications on file prior to visit.       Review of patient's allergies indicates:   Allergen Reactions    Iodine and iodide  containing products Swelling    Skin staples [surgical stainless steel] Swelling    Egg derived      Shortness breath, lip swelling    Fish containing products     Latex, natural rubber Swelling    Losartan Itching    Nickel     Pravastatin      40 mg causes nausea vomitting but 20 mg ok    Shellfish containing products Swelling     Environmental History: Pets in the home: none. She does not smoke. Grandson lives with her and smokes.  Review of Systems   Constitutional: Negative for chills and fever.   HENT: Positive for congestion, postnasal drip and rhinorrhea.    Eyes: Positive for discharge. Negative for itching.   Respiratory: Negative for chest tightness, shortness of breath and wheezing.    Cardiovascular: Negative for chest pain and leg swelling.   Gastrointestinal: Negative for nausea and vomiting.   Skin: Negative for rash and wound.        Dry skin   Allergic/Immunologic: Positive for environmental allergies and food allergies.   Neurological: Negative for facial asymmetry and speech difficulty.   Hematological: Negative for adenopathy. Does not bruise/bleed easily.   Psychiatric/Behavioral: Negative for behavioral problems and suicidal ideas.        Objective:    Physical Exam  Vitals reviewed.   Constitutional:       General: She is not in acute distress.     Appearance: Normal appearance. She is well-developed. She is not ill-appearing, toxic-appearing or diaphoretic.   HENT:      Head: Normocephalic and atraumatic.      Right Ear: Tympanic membrane, ear canal and external ear normal. There is no impacted cerumen.      Left Ear: Tympanic membrane, ear canal and external ear normal. There is no impacted cerumen.      Nose: Nose normal. No congestion or rhinorrhea.      Mouth/Throat:      Pharynx: No oropharyngeal exudate or posterior oropharyngeal erythema.   Eyes:      General: No scleral icterus.        Right eye: No discharge.         Left eye: No discharge.      Pupils: Pupils are equal,  round, and reactive to light.   Neck:      Thyroid: No thyromegaly.   Cardiovascular:      Rate and Rhythm: Normal rate and regular rhythm.      Heart sounds: Normal heart sounds. No murmur heard.    No friction rub. No gallop.   Pulmonary:      Effort: Pulmonary effort is normal. No respiratory distress.      Breath sounds: Normal breath sounds. No stridor. No wheezing, rhonchi or rales.   Chest:      Chest wall: No tenderness.   Abdominal:      General: Bowel sounds are normal. There is no distension.      Palpations: Abdomen is soft. There is no mass.      Tenderness: There is no abdominal tenderness. There is no guarding or rebound.      Hernia: No hernia is present.   Musculoskeletal:         General: No swelling, tenderness, deformity or signs of injury. Normal range of motion.      Cervical back: Normal range of motion and neck supple. No rigidity. No muscular tenderness.      Right lower leg: No edema.      Left lower leg: No edema.   Lymphadenopathy:      Cervical: No cervical adenopathy.   Skin:     General: Skin is warm.      Coloration: Skin is not jaundiced or pale.      Findings: No bruising or erythema.   Neurological:      General: No focal deficit present.      Mental Status: She is alert and oriented to person, place, and time.      Gait: Gait normal.   Psychiatric:         Mood and Affect: Mood normal.         Behavior: Behavior normal.         Thought Content: Thought content normal.         Judgment: Judgment normal.         Unable to skin prick test, as she is taking antihistamines.  Assessment:       1. Food allergy    2. Seasonal allergic rhinitis due to pollen    3. Latex allergy    4. 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 for 1 dose  Dispense: 2 each; Refill: 3  -     [place] IgE; Future; Expected date: 03/28/2022  -     ALLERGEN-CATFISH; Future; Expected date: 03/28/2022  -     Shrimp IgE; Future; Expected date: 03/28/2022  -     Lobster IgE; Future; Expected date: 03/28/2022  -     Crab IgE; Future; Expected date: 03/28/2022    Seasonal allergic rhinitis due to pollen  -     Ambulatory referral/consult to Allergy  -     IgE; Future; Expected date: 03/28/2022  -     [place] grass IgE; Future; Expected date: 03/28/2022  -     Aspergillus fumagatus IgE; Future; Expected date: 03/28/2022  -     Chaetomium globosum IgE; Future; Expected date: 03/28/2022  -     Cockroach, American IgE; Future; Expected date: 03/28/2022  -     Cladosporium IgE; Future; Expected date: 03/28/2022  -     Curvularia lunata IgE; Future; Expected date: 03/28/2022  -     D. farinae IgE; Future; Expected date: 03/28/2022  -     D. pteronyssinus IgE; Future; Expected date: 03/28/2022  -     Dog dander IgE; Future; Expected date: 03/28/2022  -     Plantain, English IgE; Future; Expected date: 03/28/2022  -     Eucalyptus IgE; Future; Expected date: 03/28/2022  -     Brown elder, rough IgE; Future; Expected date: 03/28/2022  -     Mugwort IgE; Future; Expected date: 03/28/2022  -     Nettle IgE; Future; Expected date: 03/28/2022  -     Orchard grass IgE; Future; Expected date: 03/28/2022  -     [place], western white IgE; Future; Expected date: 03/28/2022  -     [place], common IgE; Future; Expected date: 03/28/2022  -     Ragweed, short, common IgE; Future; Expected date: 03/28/2022  -     Red top grass IgE; Future; Expected date: 03/28/2022  -     Rye grass, cultivated IgE; Future; Expected date: 03/28/2022  -     Thistle, Russian IgE; Future; Expected date: 03/28/2022  -     Stemphyllium IgE; Future; Expected date: 03/28/2022  -     [place]ardo IgE; Future; Expected date: 03/28/2022  -     [place]lin grass IgE; Future; Expected date: 03/28/2022  -     Allergen, Pecan  Tree IgE; Future; Expected date: 03/28/2022  -     [place], black IgE; Future; Expected date: 03/28/2022  -     [place], bald IgE; Future; Expected date: 03/28/2022  -     Oak, white IgE; Future; Expected date: 03/28/2022  -     Allergen, [place]; Future; Expected date: 03/28/2022  -     Cat epithelium IgE; Future; Expected date: 03/28/2022  -     Allergen, [place] Celtis; Future; Expected date: 03/28/2022  -     Allergen, [place]m [place]edar; Future; Expected date: 03/28/2022  -     Allergen-[place]; Future; Expected date: 03/28/2022  -     RAST Allergen for [place]; Future; Expected date: 03/28/2022  -     RAST Allergen Maple ([place]); Future; Expected date: 03/28/2022  -     Allergen, White [place]; Future; Expected date: 03/28/2022  -     Allergen, [place]ow Grass ([employer] Blue); Future; Expected date: 03/28/2022  -     Allergen-[place]; Future; Expected date: 03/28/2022  -     RAST Allergen Epsom; Future; Expected date: 03/28/2022  -     RAST Allergen, Sheep [place]); Future; Expected date: 03/28/2022  -     Allergen-Alternaria Alternata; Future; Expected date: 03/28/2022  -     Allergen-[place]le [place]; Future; Expected date: 03/28/2022  -     Allergen, White [place]; Future; Expected date: 03/28/2022  -     ipratropium (ATROVENT) 21 mcg (0.03 %) nasal spray; 2 sprays by Nasal route 3 (three) times daily.  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    RTC 4-6 weeks or sooner, if needed    CHRIS BAUMAN                    Problems Address                                                 Amount and/or Complexity                                                                       Risk           4            [] One or more chronic illness with exacerbation,              [x] Moderate                                                                      [x] Moderate                 Progression, or side effects of treatment                            -test documents or independent historians                        Prescription drug management                []  2 or more stable chronic illnesses                                    [] Independent interpretation of tests                              Minor surgery with identifiable risk                [] 1 undiagnosed new problem with uncertain prognosis    [] Discussion or management of test results                    elective major surgery                [] 1 acute illness with                systemic symptoms                                                                                                                                                              [] 1 acute complicated injury                                                                                                                                          Elective major surgery                                                                                                                                                                                                                                                                                                                                                                                                  5            [] 1 or more chronic illnesses with severe exacerbation,     [] Extensive(two from below)                                         [] High                                                                                                               [] Independent interpretation of results                         Drug therapy requiring intensive                                                                                                                []Discussion of management or test interpretation           monitoring                                                                                                                                                                                                       Decision to de-escalate care                 [] 1 acute or chronic illness or injury that poses a threat                                                                                               Decision regarding hospitalization                                                                                                                                                                                                            CC: Dr. Sandoval

## 2022-03-29 LAB — AMER SYCAMORE IGE QN: <0.35 KU/L

## 2022-03-30 NOTE — TELEPHONE ENCOUNTER
Spoke with pt to inform her that medication glimepiride was discontinued pt stated she understand.//EDWIGE

## 2022-03-31 LAB
A ALTERNATA IGE QN: <0.1 KU/L
A FUMIGATUS IGE QN: 0.21 KU/L
ALLERGEN BOXELDER MAPLE TREE IGE: <0.1 KU/L
ALLERGEN CHAETOMIUM GLOBOSUM IGE: <0.1 KU/L
ALLERGEN LATEX IGE: <0.1 KU/L
ALLERGEN MAPLE (BOX ELDER) CLASS: NORMAL
ALLERGEN MULBERRY CLASS: NORMAL
ALLERGEN MULBERRY TREE IGE: <0.1 KU/L
ALLERGEN WHITE ASH TREE IGE: <0.1 KU/L
ALLERGEN WHITE ASH TREE IGE: <0.1 KU/L
ALLERGEN WHITE PINE TREE IGE: <0.1 KU/L
BAHIA GRASS IGE QN: <0.1 KU/L
BALD CYPRESS IGE QN: <0.1 KU/L
C HERBARUM IGE QN: <0.1 KU/L
C LUNATA IGE QN: <0.1 KU/L
CAT DANDER IGE QN: <0.1 KU/L
CATFISH IGE QN: <0.1 KU/L
CHAETOMIUM GLOB. CLASS: NORMAL
COCKLEBUR IGE QN: <0.1 KU/L
COCKSFOOT IGE QN: <0.1 KU/L
COMMON RAGWEED IGE QN: <0.1 KU/L
COTTONWOOD IGE QN: <0.1 KU/L
CRAB IGE QN: <0.1 KU/L
D FARINAE IGE QN: <0.1 KU/L
D PTERONYSS IGE QN: <0.1 KU/L
DEPRECATED A ALTERNATA IGE RAST QL: NORMAL
DEPRECATED A FUMIGATUS IGE RAST QL: ABNORMAL
DEPRECATED BAHIA GRASS IGE RAST QL: NORMAL
DEPRECATED BALD CYPRESS IGE RAST QL: NORMAL
DEPRECATED C HERBARUM IGE RAST QL: NORMAL
DEPRECATED C LUNATA IGE RAST QL: NORMAL
DEPRECATED CAT DANDER IGE RAST QL: NORMAL
DEPRECATED CATFISH IGE RAST QL: NORMAL
DEPRECATED COCKLEBUR IGE RAST QL: NORMAL
DEPRECATED COCKSFOOT IGE RAST QL: NORMAL
DEPRECATED COMMON RAGWEED IGE RAST QL: NORMAL
DEPRECATED COTTONWOOD IGE RAST QL: NORMAL
DEPRECATED CRAB IGE RAST QL: NORMAL
DEPRECATED D FARINAE IGE RAST QL: NORMAL
DEPRECATED D PTERONYSS IGE RAST QL: NORMAL
DEPRECATED DOG DANDER IGE RAST QL: NORMAL
DEPRECATED ELDER IGE RAST QL: NORMAL
DEPRECATED ENGL PLANTAIN IGE RAST QL: NORMAL
DEPRECATED GUM-TREE IGE RAST QL: NORMAL
DEPRECATED HACKBERRY TREE IGE RAST QL: NORMAL
DEPRECATED JOHNSON GRASS IGE RAST QL: NORMAL
DEPRECATED KENT BLUE GRASS IGE RAST QL: NORMAL
DEPRECATED LOBSTER IGE RAST QL: NORMAL
DEPRECATED LONDON PLANE IGE RAST QL: NORMAL
DEPRECATED MUGWORT IGE RAST QL: NORMAL
DEPRECATED NETTLE IGE RAST QL: NORMAL
DEPRECATED PECAN/HICK TREE IGE RAST QL: NORMAL
DEPRECATED PER RYE GRASS IGE RAST QL: NORMAL
DEPRECATED PRIVET IGE RAST QL: NORMAL
DEPRECATED RED TOP GRASS IGE RAST QL: NORMAL
DEPRECATED ROACH IGE RAST QL: NORMAL
DEPRECATED SALMON IGE RAST QL: NORMAL
DEPRECATED SALTWORT IGE RAST QL: NORMAL
DEPRECATED SHEEP SORREL IGE RAST QL: NORMAL
DEPRECATED SHRIMP IGE RAST QL: NORMAL
DEPRECATED SILVER BIRCH IGE RAST QL: NORMAL
DEPRECATED TIMOTHY IGE RAST QL: NORMAL
DEPRECATED WHITE OAK IGE RAST QL: NORMAL
DEPRECATED WILLOW IGE RAST QL: NORMAL
DOG DANDER IGE QN: <0.1 KU/L
ELDER IGE QN: <0.1 KU/L
ELM CEDAR CLASS: NORMAL
ELM CEDAR, IGE: <0.1 KU/L
ENGL PLANTAIN IGE QN: <0.1 KU/L
GUM-TREE IGE QN: <0.1 KU/L
HACKBERRY TREE IGE QN: <0.1 KU/L
JOHNSON GRASS IGE QN: <0.1 KU/L
KENT BLUE GRASS IGE QN: <0.1 KU/L
LATEX CLASS: NORMAL
LOBSTER IGE QN: <0.1 KU/L
LONDON PLANE IGE QN: <0.1 KU/L
MUGWORT IGE QN: <0.1 KU/L
NETTLE IGE QN: <0.1 KU/L
PECAN/HICK TREE IGE QN: <0.1 KU/L
PER RYE GRASS IGE QN: <0.1 KU/L
PRIVET IGE QN: <0.1 KU/L
RED TOP GRASS IGE QN: <0.1 KU/L
ROACH IGE QN: <0.1 KU/L
SALMON IGE QN: <0.1 KU/L
SALTWORT IGE QN: <0.1 KU/L
SHEEP SORREL IGE QN: <0.1 KU/L
SHRIMP IGE QN: <0.1 KU/L
SILVER BIRCH IGE QN: <0.1 KU/L
STEMPHYLIUM HERBARUM CLASS: NORMAL
STEMPHYLLIUM, IGE: <0.1 KU/L
TIMOTHY IGE QN: <0.1 KU/L
WHITE ASH CLASS: NORMAL
WHITE ASH CLASS: NORMAL
WHITE OAK IGE QN: <0.1 KU/L
WHITE PINE CLASS: NORMAL
WILLOW IGE QN: <0.1 KU/L

## 2022-04-03 NOTE — PROGRESS NOTES
Hi, Lucia.    I am happy to report that your recent breast imaging did NOT show evidence of cancer.    An annual mammogram is the best test to screen for breast cancer, but it is not perfect, and it can miss some cancers. So, even though your mammogram was normal, if you notice any lump or change in one of your breasts, please schedule an appointment with me for a proper evaluation.    Thanks for letting me care for you, and thanks for trusting [facility] with your healthcare needs.    Sincerely,    VICK Sandoval MD

## 2022-04-11 ENCOUNTER — TELEPHONE (OUTPATIENT)
Dept: GASTROENTEROLOGY | Facility: CLINIC | Age: 72
End: 2022-04-11
Payer: MEDICARE

## 2022-04-11 ENCOUNTER — PATIENT MESSAGE (OUTPATIENT)
Dept: ALLERGY | Facility: CLINIC | Age: 72
End: 2022-04-11
Payer: MEDICARE

## 2022-04-11 LAB
FINAL PATHOLOGIC DIAGNOSIS: NORMAL
GROSS: NORMAL
Lab: NORMAL
SUPPLEMENTAL DIAGNOSIS: NORMAL

## 2022-04-11 NOTE — TELEPHONE ENCOUNTER
----- Message from Paola Feldman MD sent at 4/11/2022  8:13 AM CDT -----  Polyp(s) removed are tubular adenoma(s).  Repeat colonoscopy in 5 years.    Stomach biopsies normal.

## 2022-04-12 ENCOUNTER — TELEPHONE (OUTPATIENT)
Dept: INTERNAL MEDICINE | Facility: CLINIC | Age: 72
End: 2022-04-12
Payer: MEDICARE

## 2022-04-13 DIAGNOSIS — J30.1 SEASONAL ALLERGIC RHINITIS DUE TO POLLEN: Primary | ICD-10-CM

## 2022-04-13 RX ORDER — LEVOCETIRIZINE DIHYDROCHLORIDE 5 MG/1
5 TABLET, FILM COATED ORAL NIGHTLY
Qty: 30 TABLET | Refills: 11 | Status: SHIPPED | OUTPATIENT
Start: 2022-04-13 | End: 2024-01-24

## 2022-04-14 ENCOUNTER — HOSPITAL ENCOUNTER (OUTPATIENT)
Dept: RADIOLOGY | Facility: HOSPITAL | Age: 72
Discharge: HOME | End: 2022-04-14
Attending: INTERNAL MEDICINE
Payer: MEDICARE

## 2022-04-14 ENCOUNTER — OFFICE VISIT (OUTPATIENT)
Dept: PULMONOLOGY | Facility: CLINIC | Age: 72
End: 2022-04-14
Payer: MEDICARE

## 2022-04-14 VITALS
DIASTOLIC BLOOD PRESSURE: 52 MMHG | SYSTOLIC BLOOD PRESSURE: 110 MMHG | BODY MASS INDEX: 24.09 KG/M2 | WEIGHT: 130.94 LBS | OXYGEN SATURATION: 94 % | HEART RATE: 100 BPM | HEIGHT: 62 IN | RESPIRATION RATE: 14 BRPM

## 2022-04-14 DIAGNOSIS — C34.92 NON-SMALL CELL CANCER OF LEFT LUNG: ICD-10-CM

## 2022-04-14 DIAGNOSIS — Z91.89 PULMONARY NODULE LESS THAN 6 MM IN DIAMETER WITH HIGH RISK FOR MALIGNANT NEOPLASM: ICD-10-CM

## 2022-04-14 DIAGNOSIS — R91.1 SOLITARY PULMONARY NODULE: ICD-10-CM

## 2022-04-14 DIAGNOSIS — R91.1 PULMONARY NODULE LESS THAN 6 MM IN DIAMETER WITH HIGH RISK FOR MALIGNANT NEOPLASM: ICD-10-CM

## 2022-04-14 DIAGNOSIS — J30.1 SEASONAL ALLERGIC RHINITIS DUE TO POLLEN: ICD-10-CM

## 2022-04-14 DIAGNOSIS — J44.9 CHRONIC OBSTRUCTIVE PULMONARY DISEASE, UNSPECIFIED COPD TYPE: ICD-10-CM

## 2022-04-14 DIAGNOSIS — J44.9 CHRONIC OBSTRUCTIVE PULMONARY DISEASE, UNSPECIFIED COPD TYPE: Primary | ICD-10-CM

## 2022-04-14 DIAGNOSIS — G47.33 OSA (OBSTRUCTIVE SLEEP APNEA): ICD-10-CM

## 2022-04-14 PROCEDURE — 1101F PR PT FALLS ASSESS DOC 0-1 FALLS W/OUT INJ PAST YR: ICD-10-PCS | Mod: CPTII,S$GLB,, | Performed by: INTERNAL MEDICINE

## 2022-04-14 PROCEDURE — 4010F ACE/ARB THERAPY RXD/TAKEN: CPT | Mod: CPTII,S$GLB,, | Performed by: INTERNAL MEDICINE

## 2022-04-14 PROCEDURE — 71250 CT THORAX DX C-: CPT | Mod: TC

## 2022-04-14 PROCEDURE — 4010F PR ACE/ARB THEARPY RXD/TAKEN: ICD-10-PCS | Mod: CPTII,S$GLB,, | Performed by: INTERNAL MEDICINE

## 2022-04-14 PROCEDURE — 3044F HG A1C LEVEL LT 7.0%: CPT | Mod: CPTII,S$GLB,, | Performed by: INTERNAL MEDICINE

## 2022-04-14 PROCEDURE — 71250 CT CHEST WITHOUT CONTRAST: ICD-10-PCS | Mod: 26,,, | Performed by: RADIOLOGY

## 2022-04-14 PROCEDURE — 3288F FALL RISK ASSESSMENT DOCD: CPT | Mod: CPTII,S$GLB,, | Performed by: INTERNAL MEDICINE

## 2022-04-14 PROCEDURE — 3078F PR MOST RECENT DIASTOLIC BLOOD PRESSURE < 80 MM HG: ICD-10-PCS | Mod: CPTII,S$GLB,, | Performed by: INTERNAL MEDICINE

## 2022-04-14 PROCEDURE — 3044F PR MOST RECENT HEMOGLOBIN A1C LEVEL <7.0%: ICD-10-PCS | Mod: CPTII,S$GLB,, | Performed by: INTERNAL MEDICINE

## 2022-04-14 PROCEDURE — 3074F SYST BP LT 130 MM HG: CPT | Mod: CPTII,S$GLB,, | Performed by: INTERNAL MEDICINE

## 2022-04-14 PROCEDURE — 71250 CT THORAX DX C-: CPT | Mod: 26,,, | Performed by: RADIOLOGY

## 2022-04-14 PROCEDURE — 99499 RISK ADDL DX/OHS AUDIT: ICD-10-PCS | Mod: S$GLB,,, | Performed by: INTERNAL MEDICINE

## 2022-04-14 PROCEDURE — 1159F MED LIST DOCD IN RCRD: CPT | Mod: CPTII,S$GLB,, | Performed by: INTERNAL MEDICINE

## 2022-04-14 PROCEDURE — 3008F BODY MASS INDEX DOCD: CPT | Mod: CPTII,S$GLB,, | Performed by: INTERNAL MEDICINE

## 2022-04-14 PROCEDURE — 99999 PR PBB SHADOW E&M-EST. PATIENT-LVL III: ICD-10-PCS | Mod: PBBFAC,,, | Performed by: INTERNAL MEDICINE

## 2022-04-14 PROCEDURE — 1101F PT FALLS ASSESS-DOCD LE1/YR: CPT | Mod: CPTII,S$GLB,, | Performed by: INTERNAL MEDICINE

## 2022-04-14 PROCEDURE — 3072F LOW RISK FOR RETINOPATHY: CPT | Mod: CPTII,S$GLB,, | Performed by: INTERNAL MEDICINE

## 2022-04-14 PROCEDURE — 3008F PR BODY MASS INDEX (BMI) DOCUMENTED: ICD-10-PCS | Mod: CPTII,S$GLB,, | Performed by: INTERNAL MEDICINE

## 2022-04-14 PROCEDURE — 3078F DIAST BP <80 MM HG: CPT | Mod: CPTII,S$GLB,, | Performed by: INTERNAL MEDICINE

## 2022-04-14 PROCEDURE — 99214 PR OFFICE/OUTPT VISIT, EST, LEVL IV, 30-39 MIN: ICD-10-PCS | Mod: 25,S$GLB,, | Performed by: INTERNAL MEDICINE

## 2022-04-14 PROCEDURE — 99499 UNLISTED E&M SERVICE: CPT | Mod: S$GLB,,, | Performed by: INTERNAL MEDICINE

## 2022-04-14 PROCEDURE — 99999 PR PBB SHADOW E&M-EST. PATIENT-LVL III: CPT | Mod: PBBFAC,,, | Performed by: INTERNAL MEDICINE

## 2022-04-14 PROCEDURE — 3288F PR FALLS RISK ASSESSMENT DOCUMENTED: ICD-10-PCS | Mod: CPTII,S$GLB,, | Performed by: INTERNAL MEDICINE

## 2022-04-14 PROCEDURE — 1159F PR MEDICATION LIST DOCUMENTED IN MEDICAL RECORD: ICD-10-PCS | Mod: CPTII,S$GLB,, | Performed by: INTERNAL MEDICINE

## 2022-04-14 PROCEDURE — 3072F PR LOW RISK FOR RETINOPATHY: ICD-10-PCS | Mod: CPTII,S$GLB,, | Performed by: INTERNAL MEDICINE

## 2022-04-14 PROCEDURE — 3074F PR MOST RECENT SYSTOLIC BLOOD PRESSURE < 130 MM HG: ICD-10-PCS | Mod: CPTII,S$GLB,, | Performed by: INTERNAL MEDICINE

## 2022-04-14 PROCEDURE — 99214 OFFICE O/P EST MOD 30 MIN: CPT | Mod: 25,S$GLB,, | Performed by: INTERNAL MEDICINE

## 2022-04-14 RX ORDER — FLUCONAZOLE 150 MG/1
TABLET ORAL
COMMUNITY
Start: 2022-02-16 | End: 2022-11-18 | Stop reason: SDUPTHER

## 2022-04-14 NOTE — PROGRESS NOTES
Subjective:     Patient ID: Lucia Barlow is a 72 y.o. female.    Chief Complaint:  Follow up for flung nodules    HPI     History of lung cancer:1.5 x 1.5 x 1.5cm moderately differentiated NSCLC, favor adenosquamous    Lung Nodule  She presents for evaluation and treatment of a lung mass. The patient reports that the imaging was performed to evaluate symptoms of dyspnea on exertion, productive cough, shortness of breath and wheezing which have been present for 3 months and are unchanged. Symptoms are exacerbated by walking and relieved by rest. The patient denies other associated symptoms. She has a history of 60 pack years. The patient has no known exposure to tuberculosis. The patient does have a history of cancer.        Past Medical History:   Diagnosis Date    Atherosclerosis of artery of both lower extremities 1/17/2014    Atherosclerosis of native coronary artery of native heart without angina pectoris 11/18/2016    Atherosclerotic PVD with intermittent claudication 1/17/2014    Chronic bronchitis     COPD (chronic obstructive pulmonary disease)     Diabetes with neurologic complications     Dyslipidemia associated with type 2 diabetes mellitus 6/14/2013    Dyslipidemia associated with type 2 diabetes mellitus     Ex-smoker     Gastroesophageal reflux disease without esophagitis 1/25/2019    Hyperlipidemia     Hypertension     NSCLC of left lung 11/19/2020    IVANIA (obstructive sleep apnea) 2/11/2021    Osteoporosis 1/16 rosita 1/18    Pneumothorax after biopsy 10/21/2020    PVD (peripheral vascular disease)     Renal manifestation of secondary diabetes mellitus     S/P peripheral artery angioplasty 2/7/2014    Seasonal allergic rhinitis due to pollen     Simple chronic bronchitis     Tobacco dependence     Type 2 diabetes mellitus with microalbuminuria, without long-term current use of insulin 3/29/2019    Type 2 diabetes mellitus with peripheral vascular disease     Type 2 diabetes  mellitus with stage 3 chronic kidney disease, without long-term current use of insulin 2/1/2019    Type 2 diabetes mellitus without retinopathy 2/1/2019    Urinary incontinence      Past Surgical History:   Procedure Laterality Date    ANGIOPLASTY Bilateral 01/24/2014    aortoiliac stenting     CARPAL TUNNEL RELEASE  2003    Henrry    COLECTOMY  approximate 2005    pt states 1in colon -dx with benign mass removed-states no colon cancer    COLONOSCOPY N/A 11/9/2016    Procedure: COLONOSCOPY;  Surgeon: Dmitri Sterling MD;  Location: [facility];  Service: Endoscopy;  Laterality: N/A;    COLONOSCOPY N/A 11/15/2019    Procedure: COLONOSCOPY;  Surgeon: Marko Vicente MD;  Location: [facility];  Service: Endoscopy;  Laterality: N/A;    COLONOSCOPY N/A 3/25/2022    Procedure: COLONOSCOPY;  Surgeon: Paola Feldman MD;  Location: [facility];  Service: Endoscopy;  Laterality: N/A;    ESOPHAGOGASTRODUODENOSCOPY N/A 3/25/2022    Procedure: EGD (ESOPHAGOGASTRODUODENOSCOPY);  Surgeon: Paola Feldman MD;  Location: [facility];  Service: Endoscopy;  Laterality: N/A;    HYSTERECTOMY      no cancer    INJECTION OF ANESTHETIC AGENT AROUND MULTIPLE INTERCOSTAL NERVES Left 11/19/2020    Procedure: BLOCK, NERVE, INTERCOSTAL, 2 OR MORE;  Surgeon: Amrit Flores MD;  Location: [facility] OR [address] [place] [address];  Service: Thoracic;  Laterality: Left;    OOPHORECTOMY      SURGICAL REMOVAL OF LYMPH NODE Left 11/19/2020    Procedure: EXCISION, LYMPH NODE;  Surgeon: Amrit Flores MD;  Location: [facility] OR [address] [place] [address];  Service: Thoracic;  Laterality: Left;  mediastinal lymph node disection    XI ROBOTIC RATS,WITH LOBECTOMY,LUNG Left 11/19/2020    Procedure: XI ROBOTIC RATS,WITH LOBECTOMY,LUNG;  Surgeon: Amrit Flores MD;  [facility];  Service: Thoracic;  Laterality: Left;  Lingulectomy.     Review of patient's allergies indicates:   Allergen Reactions    Iodine and iodide containing products Swelling    Skin staples  [surgical stainless steel] Swelling    Egg derived      Shortness breath, lip swelling    Fish containing products     Latex, natural rubber Swelling    Losartan Itching    Nickel     Pravastatin      40 mg causes nausea vomitting but 20 mg ok    Shellfish containing products Swelling     Current Outpatient Medications on File Prior to Visit   Medication Sig Dispense Refill    albuterol (PROVENTIL) 2.5 mg /3 mL (0.083 %) nebulizer solution Take 3 mLs (2.5 mg total) by nebulization every 6 (six) hours while awake. 270 mL 11    albuterol (PROVENTIL/VENTOLIN HFA) 90 mcg/actuation inhaler Inhale 2 puffs into the lungs every 4 (four) hours as needed for Wheezing or Shortness of Breath. 54 g 3    amlodipine-benazepril 5-20 mg (LOTREL) 5-20 mg per capsule Take 1 capsule by mouth 2 (two) times daily. 180 capsule 4    blood sugar diagnostic Strp 1 each by Misc.(Non-Drug; Combo Route) route 3 (three) times daily. [facility] test strips 100 each 3    blood-glucose meter kit Insurance preferred 1 each 0    butalbital-acetaminophen-caffeine -40 mg (FIORICET, ESGIC) -40 mg per tablet TAKE 1 TABLET BY MOUTH 3 (THREE) TIMES DAILY AS NEEDED FOR HEADACHES. (MAXIMUM OF 15 TABLETS PER MONTH) 30 tablet 3    calcium-vitamin D 600 mg(1,500mg) -400 unit Tab Take 2 tablets by mouth once daily. 180 tablet 4    cetirizine (ZYRTEC) 10 MG tablet Take 1 tablet (10 mg total) by mouth once daily. For sinus congestion 90 tablet 4    chlorthalidone (HYGROTEN) 25 MG Tab Take 1 tablet (25 mg total) by mouth once daily. 90 tablet 4    cilostazoL (PLETAL) 50 MG Tab Take 1 tablet (50 mg total) by mouth once daily. 60 tablet 11    clonazePAM (KLONOPIN) 0.25 MG TbDL Take 1 tablet (0.25 mg total) by mouth nightly. 30 tablet 5    empagliflozin (JARDIANCE) 25 mg tablet Take 1 tablet (25 mg total) by mouth once daily. 90 tablet 1    ezetimibe (ZETIA) 10 mg tablet Take 1 tablet (10 mg total) by mouth once daily. 90 tablet 4     fluconazole (DIFLUCAN) 150 MG Tab TAKE ONE TABLET BY MOUTH AS A ONE-TIME DOSE      fluticasone propionate (FLONASE) 50 mcg/actuation nasal spray 2 sprays (100 mcg total) by Each Nostril route once daily. 48 g 4    ipratropium (ATROVENT) 21 mcg (0.03 %) nasal spray 2 sprays by Nasal route 3 (three) times daily. 20 mL 5    ipratropium-albuteroL (COMBIVENT)  mcg/actuation inhaler Inhale 2 puffs into the lungs every 4 (four) hours as needed for Wheezing or Shortness of Breath. Rescue 12 g 3    levocetirizine (XYZAL) 5 MG tablet Take 1 tablet (5 mg total) by mouth every evening. 30 tablet 11    levoFLOXacin (LEVAQUIN) 500 MG tablet Take 1 tablet (500 mg total) by mouth once daily. 10 tablet 0    montelukast (SINGULAIR) 10 mg tablet Take 1 tablet (10 mg total) by mouth every evening. 90 tablet 4    ondansetron (ZOFRAN-ODT) 4 MG TbDL 1-2 tablets PO every 6 hours as needed for nausea/vomiting while completing bowel prep 4 tablet 0    pantoprazole (PROTONIX) 40 MG tablet Take 1 tablet (40 mg total) by mouth once daily. 90 tablet 4    potassium chloride SA (K-DUR,KLOR-CON) 20 MEQ tablet Take 1 tablet (20 mEq total) by mouth once daily. 90 tablet 4    predniSONE (DELTASONE) 20 MG tablet Prednisone 60 mg/ day for 3 days, 40 mg/day for 3 days,20 mg/ day for 3 days, (1/2 tablet )10 mg a day for 3 days. 20 tablet 0    rosuvastatin (CRESTOR) 20 MG tablet Take 1 tablet (20 mg total) by mouth once daily. 90 tablet 4    sod sulf-pot chloride-mag sulf (SUTAB) 1.479-0.188- 0.225 gram tablet Take 12 tablets by mouth once daily. Take according to package instructions with indicated amount of water. 24 tablet 0    EPINEPHrine (EPIPEN) 0.3 mg/0.3 mL AtIn Inject 0.3 mLs (0.3 mg total) into the muscle once. for 1 dose 2 each 3    varicella-zoster gE-AS01B, PF, (SHINGRIX) 50 mcg/0.5 mL injection Inject 0.5 mL (one dose) into muscle now; give second dose at least 2 months later (Patient not taking: Reported on 4/14/2022) 0.5  mL 1     No current facility-administered medications on file prior to visit.     Social History     Socioeconomic History    Marital status:     Number of children: 4   Occupational History    Occupation:    Tobacco Use    Smoking status: Former Smoker     Packs/day: 1.00     Years: 60.00     Pack years: 60.00     Types: Cigarettes     Start date: 1960     Quit date: 1/10/2020     Years since quittin.2    Smokeless tobacco: Never Used   Substance and Sexual Activity    Alcohol use: No     Alcohol/week: 0.0 standard drinks    Drug use: No    Sexual activity: Never   Social History Narrative    Son smoker, no pets in household.     Social Determinants of Health     Financial Resource Strain: Low Risk     Difficulty of Paying Living Expenses: Not very hard   Food Insecurity: No Food Insecurity    Worried About Running Out of Food in the Last Year: Never true    Ran Out of Food in the Last Year: Never true   Transportation Needs: No Transportation Needs    Lack of Transportation (Medical): No    Lack of Transportation (Non-Medical): No   Physical Activity: Unknown    Days of Exercise per Week: Patient refused    Minutes of Exercise per Session: 0 min   Stress: No Stress Concern Present    Feeling of Stress : Not at all   Social Connections: Unknown    Frequency of Communication with Friends and Family: More than three times a week    Frequency of Social Gatherings with Friends and Family: More than three times a week    Active Member of Clubs or Organizations: Yes    Attends Club or Organization Meetings: 1 to 4 times per year    Marital Status:    Housing Stability: Low Risk     Unable to Pay for Housing in the Last Year: No    Number of Places Lived in the Last Year: 1    Unstable Housing in the Last Year: No     Family History   Problem Relation Age of Onset    Stroke Father     Stroke Sister     Asthma Daughter     Diabetes Daughter     Breast cancer  "Daughter     Asthma Son        Review of Systems   Constitutional: Positive for fatigue. Negative for fever.   HENT: Positive for postnasal drip, rhinorrhea and congestion.    Eyes: Negative for redness and itching.   Respiratory: Positive for cough, sputum production, shortness of breath, dyspnea on extertion, use of rescue inhaler and Paroxysmal Nocturnal Dyspnea.    Cardiovascular: Negative for chest pain, palpitations and leg swelling.   Genitourinary: Negative for difficulty urinating and hematuria.   Endocrine: Negative for cold intolerance and heat intolerance.    Skin: Negative for rash.   Gastrointestinal: Negative for nausea and abdominal pain.   Neurological: Negative for dizziness, syncope, weakness and light-headedness.   Hematological: Negative for adenopathy. Does not bruise/bleed easily.   Psychiatric/Behavioral: Negative for sleep disturbance. The patient is not nervous/anxious.        Objective:      BP (!) 110/52   Pulse 100   Resp 14   Ht 5' 2" (1.575 m)   Wt 59.4 kg (130 lb 15.3 oz)   SpO2 (!) 94%   BMI 23.95 kg/m²   Physical Exam  Vitals and nursing note reviewed.   Constitutional:       Appearance: She is well-developed.   HENT:      Head: Normocephalic and atraumatic.      Nose: Nose normal.      Mouth/Throat:      Pharynx: No oropharyngeal exudate.   Eyes:      Conjunctiva/sclera: Conjunctivae normal.      Pupils: Pupils are equal, round, and reactive to light.   Neck:      Thyroid: No thyromegaly.      Vascular: No JVD.      Trachea: No tracheal deviation.   Cardiovascular:      Rate and Rhythm: Normal rate and regular rhythm.      Heart sounds: Normal heart sounds.   Pulmonary:      Effort: Pulmonary effort is normal. No respiratory distress.      Breath sounds: Examination of the right-lower field reveals wheezing. Examination of the left-lower field reveals wheezing. Decreased breath sounds and wheezing present. No rhonchi or rales.   Chest:      Chest wall: No tenderness. "   Abdominal:      General: Bowel sounds are normal.      Palpations: Abdomen is soft.   Musculoskeletal:         General: Normal range of motion.      Cervical back: Neck supple.   Lymphadenopathy:      Cervical: No cervical adenopathy.   Skin:     General: Skin is warm and dry.   Neurological:      Mental Status: She is alert and oriented to person, place, and time.       Personal Diagnostic Review  none pertinent    Pulmonary Studies Review 4/14/2022   SpO2 94   Ordering Provider -   Interpreting Provider -   Performing nurse/tech/RT -   Diagnosis -   Height 62   Weight 2095.25   BMI (Calculated) 23.9   Predicted Distance 297.44   Patient Race -   6MWT Status -   Patient Reported -   Was O2 used? -   6MW Distance walked (feet) -   Distance walked (meters) -   Did patient stop? -   How many times? -   Stop Time 1 -   Restart Time 1 -   Did patient restart? -   Type of assistive device(s) used? -   Is extra documentation required for this patient? -   Oxygen Saturation -   Supplemental Oxygen -   Heart Rate -   Blood Pressure -   Dom Dyspnea Rating  -   Oxygen Saturation -   Supplemental Oxygen -   Heart Rate -   Blood Pressure -   Dom Dyspnea Rating  -   Recovery Time (seconds) -   Oxygen Saturation -   Supplemental Oxygen -   Heart Rate -   Blood Pressure -   Dom Dyspnea Rating  -   Is procedure ready for interpretation? -   Did the patient stop or pause? -   How many times did the patient stop or pause? -   Stop Time 1 -   Restart Time 1 -   Pause Time 1 -   Total Time Walked (Calculated) -   Total Laps Walked -   Final Partial Lap Distance (feet) -   Total Distance Feet (Calculated) -   Total Distance Meters (Calculated) -   Predicted Distance Meters (Calculated) 435.58   Percentage of Predicted (Calculated) -   Peak VO2 (Calculated) -   Mets -   Has The Patient Had a Previous Six Minute Walk Test? -   Oxygen Qualification? -       CT Chest Without Contrast  Narrative: EXAMINATION:  CT CHEST WITHOUT  CONTRAST    CLINICAL HISTORY:  Abnormal xray - lung nodule, < 1 cm, mod-high risk; Chronic obstructive pulmonary disease, unspecified    TECHNIQUE:  Low dose axial images, sagittal and coronal reformations were obtained from the thoracic inlet to the lung bases. Contrast was not administered.    COMPARISON:  11/02/2021    FINDINGS:  Heart and Great vessels: Scattered atherosclerotic plaque noted involving the thoracic aorta and aortic branch vessels, including the coronary arteries.  No pericardial effusion.    Thoracic Adenopathy: Borderline enlarged left paratracheal lymph node is not significantly changed measuring 10 mm in short axis.  No new thoracic adenopathy demonstrated.    Lungs: Postoperative changes are again noted from partial left pneumonectomy.  8 mm solid noncalcified pulmonary nodule in the left lower lobe remains unchanged.  Previously described 4 mm sub solid pulmonary nodule in the right lower lobe does not definitively persist.  Multiple other smaller pulmonary nodules are unchanged.  No new or enlarging nodules demonstrated.  No parenchymal consolidations or pleural effusions.    Upper Abdomen: Gastric wall thickening along the greater curvature appears similar to prior.    Bones: No acute or suspicious osseous findings.    Miscellaneous: None  Impression: 1. Previously described new 4 mm sub solid right lower lobe pulmonary nodule does not persist.  2. Unchanged appearance of multiple additional bilateral pulmonary nodules measuring up to 8 mm in the left lower lobe.  3. Persistent gastric wall thickening.  Recommend correlation with endoscopy if not recently performed.  4. Other stable findings as above.    Electronically signed by: Marko Bhatti MD  Date:    04/14/2022  Time:    14:17      Office Spirometry Results:     No flowsheet data found.  Pulmonary Studies Review 4/14/2022   SpO2 94   Ordering Provider -   Interpreting Provider -   Performing nurse/tech/RT -   Diagnosis -   Height  62   Weight 2095.25   BMI (Calculated) 23.9   Predicted Distance 297.44   Patient Race -   6MWT Status -   Patient Reported -   Was O2 used? -   6MW Distance walked (feet) -   Distance walked (meters) -   Did patient stop? -   How many times? -   Stop Time 1 -   Restart Time 1 -   Did patient restart? -   Type of assistive device(s) used? -   Is extra documentation required for this patient? -   Oxygen Saturation -   Supplemental Oxygen -   Heart Rate -   Blood Pressure -   Dom Dyspnea Rating  -   Oxygen Saturation -   Supplemental Oxygen -   Heart Rate -   Blood Pressure -   Dom Dyspnea Rating  -   Recovery Time (seconds) -   Oxygen Saturation -   Supplemental Oxygen -   Heart Rate -   Blood Pressure -   Dom Dyspnea Rating  -   Is procedure ready for interpretation? -   Did the patient stop or pause? -   How many times did the patient stop or pause? -   Stop Time 1 -   Restart Time 1 -   Pause Time 1 -   Total Time Walked (Calculated) -   Total Laps Walked -   Final Partial Lap Distance (feet) -   Total Distance Feet (Calculated) -   Total Distance Meters (Calculated) -   Predicted Distance Meters (Calculated) 435.58   Percentage of Predicted (Calculated) -   Peak VO2 (Calculated) -   Mets -   Has The Patient Had a Previous Six Minute Walk Test? -   Oxygen Qualification? -         Assessment:            Chronic obstructive pulmonary disease, unspecified COPD type  -     CT Chest Without Contrast; Future; Expected date: 04/14/2022    Solitary pulmonary nodule  -     CT Chest Without Contrast; Future; Expected date: 04/14/2022    Pulmonary nodule less than 6 mm in diameter with high risk for malignant neoplasm - RIght lower lobe    IVANIA (obstructive sleep apnea)    Seasonal allergic rhinitis due to pollen    Non-small cell cancer of left lung          Outpatient Encounter Medications as of 4/14/2022   Medication Sig Dispense Refill    albuterol (PROVENTIL) 2.5 mg /3 mL (0.083 %) nebulizer solution Take 3 mLs (2.5  mg total) by nebulization every 6 (six) hours while awake. 270 mL 11    albuterol (PROVENTIL/VENTOLIN HFA) 90 mcg/actuation inhaler Inhale 2 puffs into the lungs every 4 (four) hours as needed for Wheezing or Shortness of Breath. 54 g 3    amlodipine-benazepril 5-20 mg (LOTREL) 5-20 mg per capsule Take 1 capsule by mouth 2 (two) times daily. 180 capsule 4    blood sugar diagnostic Strp 1 each by Misc.(Non-Drug; Combo Route) route 3 (three) times daily. [facility] test strips 100 each 3    blood-glucose meter kit Insurance preferred 1 each 0    butalbital-acetaminophen-caffeine -40 mg (FIORICET, ESGIC) -40 mg per tablet TAKE 1 TABLET BY MOUTH 3 (THREE) TIMES DAILY AS NEEDED FOR HEADACHES. (MAXIMUM OF 15 TABLETS PER MONTH) 30 tablet 3    calcium-vitamin D 600 mg(1,500mg) -400 unit Tab Take 2 tablets by mouth once daily. 180 tablet 4    cetirizine (ZYRTEC) 10 MG tablet Take 1 tablet (10 mg total) by mouth once daily. For sinus congestion 90 tablet 4    chlorthalidone (HYGROTEN) 25 MG Tab Take 1 tablet (25 mg total) by mouth once daily. 90 tablet 4    cilostazoL (PLETAL) 50 MG Tab Take 1 tablet (50 mg total) by mouth once daily. 60 tablet 11    clonazePAM (KLONOPIN) 0.25 MG TbDL Take 1 tablet (0.25 mg total) by mouth nightly. 30 tablet 5    empagliflozin (JARDIANCE) 25 mg tablet Take 1 tablet (25 mg total) by mouth once daily. 90 tablet 1    ezetimibe (ZETIA) 10 mg tablet Take 1 tablet (10 mg total) by mouth once daily. 90 tablet 4    fluconazole (DIFLUCAN) 150 MG Tab TAKE ONE TABLET BY MOUTH AS A ONE-TIME DOSE      fluticasone propionate (FLONASE) 50 mcg/actuation nasal spray 2 sprays (100 mcg total) by Each Nostril route once daily. 48 g 4    ipratropium (ATROVENT) 21 mcg (0.03 %) nasal spray 2 sprays by Nasal route 3 (three) times daily. 20 mL 5    ipratropium-albuteroL (COMBIVENT)  mcg/actuation inhaler Inhale 2 puffs into the lungs every 4 (four) hours as needed for Wheezing or  Shortness of Breath. Rescue 12 g 3    levocetirizine (XYZAL) 5 MG tablet Take 1 tablet (5 mg total) by mouth every evening. 30 tablet 11    levoFLOXacin (LEVAQUIN) 500 MG tablet Take 1 tablet (500 mg total) by mouth once daily. 10 tablet 0    montelukast (SINGULAIR) 10 mg tablet Take 1 tablet (10 mg total) by mouth every evening. 90 tablet 4    ondansetron (ZOFRAN-ODT) 4 MG TbDL 1-2 tablets PO every 6 hours as needed for nausea/vomiting while completing bowel prep 4 tablet 0    pantoprazole (PROTONIX) 40 MG tablet Take 1 tablet (40 mg total) by mouth once daily. 90 tablet 4    potassium chloride SA (K-DUR,KLOR-CON) 20 MEQ tablet Take 1 tablet (20 mEq total) by mouth once daily. 90 tablet 4    predniSONE (DELTASONE) 20 MG tablet Prednisone 60 mg/ day for 3 days, 40 mg/day for 3 days,20 mg/ day for 3 days, (1/2 tablet )10 mg a day for 3 days. 20 tablet 0    rosuvastatin (CRESTOR) 20 MG tablet Take 1 tablet (20 mg total) by mouth once daily. 90 tablet 4    sod sulf-pot chloride-mag sulf (SUTAB) 1.479-0.188- 0.225 gram tablet Take 12 tablets by mouth once daily. Take according to package instructions with indicated amount of water. 24 tablet 0    EPINEPHrine (EPIPEN) 0.3 mg/0.3 mL AtIn Inject 0.3 mLs (0.3 mg total) into the muscle once. for 1 dose 2 each 3    varicella-zoster gE-AS01B, PF, (SHINGRIX) 50 mcg/0.5 mL injection Inject 0.5 mL (one dose) into muscle now; give second dose at least 2 months later (Patient not taking: Reported on 4/14/2022) 0.5 mL 1     No facility-administered encounter medications on file as of 4/14/2022.     Plan:       Requested Prescriptions      No prescriptions requested or ordered in this encounter     Problem List Items Addressed This Visit     Chronic obstructive pulmonary disease - Primary    Relevant Orders    CT Chest Without Contrast    Non-small cell cancer of left lung    Overview     S/P robotic left lobectomy in November, 2020. Did not require chemotherapy or  radiation. Following with Dr. Aleman.  Pathology: 1.5 x 1.5 x 1.5cm moderately differentiated NSCLC, favor adenosquamous. Margin negative,3cm to closet margin. Levels 5,6,7,11,12= negative. pT1b N0. 1% PDL1 expression  Post-operative therapy: Surveillance           IVANIA (obstructive sleep apnea)    Overview     HOME SLEEP TEST 9/14/2020   PHYSICIAN INTERPRETATION AND COMMENTS: Findings are consistent with mild, non-positional obstructive sleep  apnea (IVANIA). Overall AHI was 9.0/hr with 5.8 hours data. Spo2 sasha was 84.7%.  Refuses CPAP - Intolerant of CPAP.           Pulmonary nodule less than 6 mm in diameter with high risk for malignant neoplasm - RIght lower lobe    Overview     Impression:     Postsurgical change of lingulectomy, stable.     Single new subsolid 4 mm right lower lobe pulmonary nodule.  Remaining pulmonary nodules appear stable.  Clinical concerns dictate the schedule for continued surveillance.     Diffuse mural thickening along the greater curvature of the stomach, nonspecific and possibly related to nondistention.  Underlying mass lesion cannot be excluded.        Electronically signed by: Cristina Peoples  Date:                                            11/02/2021  Time:                                           19:34           Seasonal allergic rhinitis due to pollen      Other Visit Diagnoses     Solitary pulmonary nodule        Relevant Orders    CT Chest Without Contrast             Follow up in about 6 months (around 10/14/2022) for Review CT/PET - on return visit.    MEDICAL DECISION MAKING: Moderate to high complexity.  Overall, the multiple problems listed are of moderate to high severity that may impact quality of life and activities of daily living. Side effects of medications, treatment plan as well as options and alternatives reviewed and discussed with patient. There was counseling of patient concerning these issues.    Total time spent in counseling and coordination of care - 30   minutes of total time spent on the encounter, which includes face to face time and non-face to face time preparing to see the patient (eg, review of tests), Obtaining and/or reviewing separately obtained history, Documenting clinical information in the electronic or other health record, Independently interpreting results (not separately reported) and communicating results to the patient/family/caregiver, or Care coordination (not separately reported).    Time was used in discussion of prognosis, risks, benefits of treatment, instructions and compliance with regimen . Discussion with other physicians and/or health care providers - home health or for use of durable medical equipment (oxygen, nebulizers, CPAP, BiPAP) occurred.

## 2022-05-24 ENCOUNTER — OFFICE VISIT (OUTPATIENT)
Dept: ALLERGY | Facility: CLINIC | Age: 72
End: 2022-05-24
Payer: MEDICARE

## 2022-05-24 VITALS
BODY MASS INDEX: 25.72 KG/M2 | HEIGHT: 62 IN | DIASTOLIC BLOOD PRESSURE: 64 MMHG | SYSTOLIC BLOOD PRESSURE: 132 MMHG | TEMPERATURE: 99 F | WEIGHT: 139.75 LBS | HEART RATE: 100 BPM

## 2022-05-24 DIAGNOSIS — Z91.040 LATEX ALLERGY: ICD-10-CM

## 2022-05-24 DIAGNOSIS — Z91.018 FOOD ALLERGY: Primary | ICD-10-CM

## 2022-05-24 DIAGNOSIS — R22.0 FACIAL SWELLING: ICD-10-CM

## 2022-05-24 DIAGNOSIS — J30.89 ALLERGIC RHINITIS DUE TO MOLD: ICD-10-CM

## 2022-05-24 PROCEDURE — 3075F PR MOST RECENT SYSTOLIC BLOOD PRESS GE 130-139MM HG: ICD-10-PCS | Mod: CPTII,S$GLB,, | Performed by: ALLERGY & IMMUNOLOGY

## 2022-05-24 PROCEDURE — 1126F AMNT PAIN NOTED NONE PRSNT: CPT | Mod: CPTII,S$GLB,, | Performed by: ALLERGY & IMMUNOLOGY

## 2022-05-24 PROCEDURE — 1101F PT FALLS ASSESS-DOCD LE1/YR: CPT | Mod: CPTII,S$GLB,, | Performed by: ALLERGY & IMMUNOLOGY

## 2022-05-24 PROCEDURE — 3075F SYST BP GE 130 - 139MM HG: CPT | Mod: CPTII,S$GLB,, | Performed by: ALLERGY & IMMUNOLOGY

## 2022-05-24 PROCEDURE — 3078F DIAST BP <80 MM HG: CPT | Mod: CPTII,S$GLB,, | Performed by: ALLERGY & IMMUNOLOGY

## 2022-05-24 PROCEDURE — 1159F MED LIST DOCD IN RCRD: CPT | Mod: CPTII,S$GLB,, | Performed by: ALLERGY & IMMUNOLOGY

## 2022-05-24 PROCEDURE — 3072F PR LOW RISK FOR RETINOPATHY: ICD-10-PCS | Mod: CPTII,S$GLB,, | Performed by: ALLERGY & IMMUNOLOGY

## 2022-05-24 PROCEDURE — 99999 PR PBB SHADOW E&M-EST. PATIENT-LVL III: ICD-10-PCS | Mod: PBBFAC,,, | Performed by: ALLERGY & IMMUNOLOGY

## 2022-05-24 PROCEDURE — 3288F PR FALLS RISK ASSESSMENT DOCUMENTED: ICD-10-PCS | Mod: CPTII,S$GLB,, | Performed by: ALLERGY & IMMUNOLOGY

## 2022-05-24 PROCEDURE — 4010F ACE/ARB THERAPY RXD/TAKEN: CPT | Mod: CPTII,S$GLB,, | Performed by: ALLERGY & IMMUNOLOGY

## 2022-05-24 PROCEDURE — 3044F PR MOST RECENT HEMOGLOBIN A1C LEVEL <7.0%: ICD-10-PCS | Mod: CPTII,S$GLB,, | Performed by: ALLERGY & IMMUNOLOGY

## 2022-05-24 PROCEDURE — 4010F PR ACE/ARB THEARPY RXD/TAKEN: ICD-10-PCS | Mod: CPTII,S$GLB,, | Performed by: ALLERGY & IMMUNOLOGY

## 2022-05-24 PROCEDURE — 3008F BODY MASS INDEX DOCD: CPT | Mod: CPTII,S$GLB,, | Performed by: ALLERGY & IMMUNOLOGY

## 2022-05-24 PROCEDURE — 3044F HG A1C LEVEL LT 7.0%: CPT | Mod: CPTII,S$GLB,, | Performed by: ALLERGY & IMMUNOLOGY

## 2022-05-24 PROCEDURE — 3078F PR MOST RECENT DIASTOLIC BLOOD PRESSURE < 80 MM HG: ICD-10-PCS | Mod: CPTII,S$GLB,, | Performed by: ALLERGY & IMMUNOLOGY

## 2022-05-24 PROCEDURE — 99214 PR OFFICE/OUTPT VISIT, EST, LEVL IV, 30-39 MIN: ICD-10-PCS | Mod: S$GLB,,, | Performed by: ALLERGY & IMMUNOLOGY

## 2022-05-24 PROCEDURE — 1126F PR PAIN SEVERITY QUANTIFIED, NO PAIN PRESENT: ICD-10-PCS | Mod: CPTII,S$GLB,, | Performed by: ALLERGY & IMMUNOLOGY

## 2022-05-24 PROCEDURE — 99214 OFFICE O/P EST MOD 30 MIN: CPT | Mod: S$GLB,,, | Performed by: ALLERGY & IMMUNOLOGY

## 2022-05-24 PROCEDURE — 3072F LOW RISK FOR RETINOPATHY: CPT | Mod: CPTII,S$GLB,, | Performed by: ALLERGY & IMMUNOLOGY

## 2022-05-24 PROCEDURE — 99999 PR PBB SHADOW E&M-EST. PATIENT-LVL III: CPT | Mod: PBBFAC,,, | Performed by: ALLERGY & IMMUNOLOGY

## 2022-05-24 PROCEDURE — 3008F PR BODY MASS INDEX (BMI) DOCUMENTED: ICD-10-PCS | Mod: CPTII,S$GLB,, | Performed by: ALLERGY & IMMUNOLOGY

## 2022-05-24 PROCEDURE — 3288F FALL RISK ASSESSMENT DOCD: CPT | Mod: CPTII,S$GLB,, | Performed by: ALLERGY & IMMUNOLOGY

## 2022-05-24 PROCEDURE — 1101F PR PT FALLS ASSESS DOC 0-1 FALLS W/OUT INJ PAST YR: ICD-10-PCS | Mod: CPTII,S$GLB,, | Performed by: ALLERGY & IMMUNOLOGY

## 2022-05-24 PROCEDURE — 1159F PR MEDICATION LIST DOCUMENTED IN MEDICAL RECORD: ICD-10-PCS | Mod: CPTII,S$GLB,, | Performed by: ALLERGY & IMMUNOLOGY

## 2022-05-24 RX ORDER — MINERAL OIL
180 ENEMA (ML) RECTAL DAILY
Qty: 90 TABLET | Refills: 3 | Status: SHIPPED | OUTPATIENT
Start: 2022-05-24 | End: 2023-07-12

## 2022-05-24 NOTE — PROGRESS NOTES
"Subjective:       Patient ID: Lucia Barlow is a 72 y.o. female.      Chief Complaint:  Follow-up      HPI 3/28/2022: 72 year old female complaining of runny nose, nasal congestion for several years. She reports facial swelling after being outside for long periods. She denies tongue or throat swelling. She has tried medrol dose pack, Flonase and Singulair. The aforementioned have not helped. She reports that she has tried long acting antihistamines like Zyrtec, but they have not helped. Symptoms vary with season and are worst in the Spring.  She reports wearing a mask when outside.   Zyrtec(last taken this morning) in the morning and Singulair at night  She has not had sinus surgery.    Fish and shellfish "my heart swells up"  NO Epipen    Latex- contact dermatitis    Egg- "stomach cramps"      HPI today:  Complaining of facial swelling last week of April, while seating outside. She denies throat or tongue swelling. Epipen and benadryl- 3 hours later, symptoms subsided.She did not go to the hospital. No swelling, since that day.  Avoiding fish, shellfish and egg  NO accidental ingestions  Avoiding latex  She feels Atrovent nasal spray is helping to alleviate her symptoms.        Past Medical History:   Diagnosis Date    Atherosclerosis of artery of both lower extremities 1/17/2014    Atherosclerosis of native coronary artery of native heart without angina pectoris 11/18/2016    Atherosclerotic PVD with intermittent claudication 1/17/2014    Chronic bronchitis     COPD (chronic obstructive pulmonary disease)     Diabetes with neurologic complications     Dyslipidemia associated with type 2 diabetes mellitus 6/14/2013    Dyslipidemia associated with type 2 diabetes mellitus     Ex-smoker     Gastroesophageal reflux disease without esophagitis 1/25/2019    Hyperlipidemia     Hypertension     NSCLC of left lung 11/19/2020    IVANIA (obstructive sleep apnea) 2/11/2021    Osteoporosis 1/16 rosita 1/18    " Pneumothorax after biopsy 10/21/2020    PVD (peripheral vascular disease)     Renal manifestation of secondary diabetes mellitus     S/P peripheral artery angioplasty 2/7/2014    Seasonal allergic rhinitis due to pollen     Simple chronic bronchitis     Tobacco dependence     Type 2 diabetes mellitus with microalbuminuria, without long-term current use of insulin 3/29/2019    Type 2 diabetes mellitus with peripheral vascular disease     Type 2 diabetes mellitus with stage 3 chronic kidney disease, without long-term current use of insulin 2/1/2019    Type 2 diabetes mellitus without retinopathy 2/1/2019    Urinary incontinence      Family History   Problem Relation Age of Onset    Stroke Father     Stroke Sister     Asthma Daughter     Diabetes Daughter     Breast cancer Daughter     Asthma Son      Current Outpatient Medications on File Prior to Visit   Medication Sig Dispense Refill    albuterol (PROVENTIL) 2.5 mg /3 mL (0.083 %) nebulizer solution Take 3 mLs (2.5 mg total) by nebulization every 6 (six) hours while awake. 270 mL 11    albuterol (PROVENTIL/VENTOLIN HFA) 90 mcg/actuation inhaler Inhale 2 puffs into the lungs every 4 (four) hours as needed for Wheezing or Shortness of Breath. 54 g 3    amlodipine-benazepril 5-20 mg (LOTREL) 5-20 mg per capsule Take 1 capsule by mouth 2 (two) times daily. 180 capsule 4    blood sugar diagnostic Strp 1 each by Misc.(Non-Drug; Combo Route) route 3 (three) times daily. [facility] test strips 100 each 3    blood-glucose meter kit Insurance preferred 1 each 0    butalbital-acetaminophen-caffeine -40 mg (FIORICET, ESGIC) -40 mg per tablet TAKE 1 TABLET BY MOUTH 3 (THREE) TIMES DAILY AS NEEDED FOR HEADACHES. (MAXIMUM OF 15 TABLETS PER MONTH) 30 tablet 3    calcium-vitamin D 600 mg(1,500mg) -400 unit Tab Take 2 tablets by mouth once daily. 180 tablet 4    cetirizine (ZYRTEC) 10 MG tablet Take 1 tablet (10 mg total) by mouth once daily. For  sinus congestion 90 tablet 4    chlorthalidone (HYGROTEN) 25 MG Tab Take 1 tablet (25 mg total) by mouth once daily. 90 tablet 4    cilostazoL (PLETAL) 50 MG Tab Take 1 tablet (50 mg total) by mouth once daily. 60 tablet 11    clonazePAM (KLONOPIN) 0.25 MG TbDL Take 1 tablet (0.25 mg total) by mouth nightly. 30 tablet 5    empagliflozin (JARDIANCE) 25 mg tablet Take 1 tablet (25 mg total) by mouth once daily. 90 tablet 1    ezetimibe (ZETIA) 10 mg tablet Take 1 tablet (10 mg total) by mouth once daily. 90 tablet 4    fluticasone propionate (FLONASE) 50 mcg/actuation nasal spray 2 sprays (100 mcg total) by Each Nostril route once daily. 48 g 4    ipratropium (ATROVENT) 21 mcg (0.03 %) nasal spray 2 sprays by Nasal route 3 (three) times daily. 20 mL 5    ipratropium-albuteroL (COMBIVENT)  mcg/actuation inhaler Inhale 2 puffs into the lungs every 4 (four) hours as needed for Wheezing or Shortness of Breath. Rescue 12 g 3    levocetirizine (XYZAL) 5 MG tablet Take 1 tablet (5 mg total) by mouth every evening. 30 tablet 11    montelukast (SINGULAIR) 10 mg tablet Take 1 tablet (10 mg total) by mouth every evening. 90 tablet 4    ondansetron (ZOFRAN-ODT) 4 MG TbDL 1-2 tablets PO every 6 hours as needed for nausea/vomiting while completing bowel prep 4 tablet 0    pantoprazole (PROTONIX) 40 MG tablet Take 1 tablet (40 mg total) by mouth once daily. 90 tablet 4    potassium chloride SA (K-DUR,KLOR-CON) 20 MEQ tablet Take 1 tablet (20 mEq total) by mouth once daily. 90 tablet 4    rosuvastatin (CRESTOR) 20 MG tablet Take 1 tablet (20 mg total) by mouth once daily. 90 tablet 4    sod sulf-pot chloride-mag sulf (SUTAB) 1.479-0.188- 0.225 gram tablet Take 12 tablets by mouth once daily. Take according to package instructions with indicated amount of water. 24 tablet 0    EPINEPHrine (EPIPEN) 0.3 mg/0.3 mL AtIn Inject 0.3 mLs (0.3 mg total) into the muscle once. for 1 dose 2 each 3    fluconazole (DIFLUCAN)  150 MG Tab TAKE ONE TABLET BY MOUTH AS A ONE-TIME DOSE      levoFLOXacin (LEVAQUIN) 500 MG tablet Take 1 tablet (500 mg total) by mouth once daily. (Patient not taking: Reported on 5/24/2022) 10 tablet 0    predniSONE (DELTASONE) 20 MG tablet Prednisone 60 mg/ day for 3 days, 40 mg/day for 3 days,20 mg/ day for 3 days, (1/2 tablet )10 mg a day for 3 days. (Patient not taking: Reported on 5/24/2022) 20 tablet 0    varicella-zoster gE-AS01B, PF, (SHINGRIX) 50 mcg/0.5 mL injection Inject 0.5 mL (one dose) into muscle now; give second dose at least 2 months later (Patient not taking: No sig reported) 0.5 mL 1     No current facility-administered medications on file prior to visit.       Review of patient's allergies indicates:   Allergen Reactions    Iodine and iodide containing products Swelling    Skin staples [surgical stainless steel] Swelling    Egg derived      Shortness breath, lip swelling    Fish containing products     Latex, natural rubber Swelling    Losartan Itching    Nickel     Pravastatin      40 mg causes nausea vomitting but 20 mg ok    Shellfish containing products Swelling     Environmental History: Pets in the home: none. She does not smoke. Grandson lives with her and smokes.  Review of Systems   Constitutional: Negative for chills and fever.   HENT: Positive for congestion, postnasal drip and rhinorrhea.    Eyes: Positive for discharge. Negative for itching.   Respiratory: Negative for chest tightness, shortness of breath and wheezing.    Cardiovascular: Negative for chest pain and leg swelling.   Gastrointestinal: Negative for nausea and vomiting.   Skin: Negative for rash and wound.        Dry skin   Allergic/Immunologic: Positive for environmental allergies and food allergies.   Neurological: Negative for facial asymmetry and speech difficulty.   Hematological: Negative for adenopathy. Does not bruise/bleed easily.   Psychiatric/Behavioral: Negative for behavioral problems and  suicidal ideas.        Objective:    Physical Exam  Vitals reviewed.   Constitutional:       General: She is not in acute distress.     Appearance: Normal appearance. She is well-developed. She is not ill-appearing, toxic-appearing or diaphoretic.   HENT:      Head: Normocephalic and atraumatic.      Right Ear: Tympanic membrane, ear canal and external ear normal. There is no impacted cerumen.      Left Ear: Tympanic membrane, ear canal and external ear normal. There is no impacted cerumen.      Nose: Nose normal. No congestion or rhinorrhea.      Mouth/Throat:      Pharynx: No oropharyngeal exudate or posterior oropharyngeal erythema.   Eyes:      General: No scleral icterus.        Right eye: No discharge.         Left eye: No discharge.      Pupils: Pupils are equal, round, and reactive to light.   Neck:      Thyroid: No thyromegaly.   Cardiovascular:      Rate and Rhythm: Normal rate and regular rhythm.      Heart sounds: Normal heart sounds. No murmur heard.    No friction rub. No gallop.   Pulmonary:      Effort: Pulmonary effort is normal. No respiratory distress.      Breath sounds: Normal breath sounds. No stridor. No wheezing, rhonchi or rales.   Chest:      Chest wall: No tenderness.   Abdominal:      General: Bowel sounds are normal. There is no distension.      Palpations: Abdomen is soft. There is no mass.      Tenderness: There is no abdominal tenderness. There is no guarding or rebound.      Hernia: No hernia is present.   Musculoskeletal:         General: No swelling, tenderness, deformity or signs of injury. Normal range of motion.      Cervical back: Normal range of motion and neck supple. No rigidity. No muscular tenderness.      Right lower leg: No edema.      Left lower leg: No edema.   Lymphadenopathy:      Cervical: No cervical adenopathy.   Skin:     General: Skin is warm.      Coloration: Skin is not jaundiced or pale.      Findings: No bruising or erythema.   Neurological:      General: No  focal deficit present.      Mental Status: She is alert and oriented to person, place, and time.      Gait: Gait normal.   Psychiatric:         Mood and Affect: Mood normal.         Behavior: Behavior normal.         Thought Content: Thought content normal.         Judgment: Judgment normal.           Assessment:       1. Food allergy    2. Allergic rhinitis due to mold    3. Latex allergy    4. Facial swelling         Plan:       Strict avoidance of eggs, fish, and shellfish, as well as their products  Epipen 2 pack- discussed use, care and administation    Food allergy    Allergic rhinitis due to mold    Latex allergy    Facial swelling  -     C1 Esterase Inhibitor Panel; Future; Expected date: 05/24/2022  -     C1 Esterase Inhibitor, Functional; Future; Expected date: 05/24/2022  -     C1Q Binding Assay; Future; Expected date: 05/24/2022  -     CBC Auto Differential; Future; Expected date: 05/24/2022  -     Comprehensive Metabolic Panel; Future; Expected date: 05/24/2022  -     C3 Complement; Future; Expected date: 05/24/2022  -     C4 Complement; Future; Expected date: 05/24/2022  -     Protein Electrophoresis, Serum; Future; Expected date: 05/24/2022  -     FACTOR 12 ASSAY; Future; Expected date: 05/24/2022    Recommend she stopped the ACE inhibitor due to facial swelling. Will forward to Dr. Sandoval.  Reviewed  Labs  Continue Singulair and Xyzal. Adding Fexofenadine  Continue Atrovent nasal spray  Recommend avoidance of latex and latex containing products.  RTC 4-6 weeks or sooner, if needed    CHRIS BAUMAN spent a total of 30 minutes on the day of the visit.  This includes face to face time and non-face to face time preparing to see the patient (eg, review of tests), obtaining and/or reviewing separately obtained history, documenting clinical information in the electronic or other health record, independently interpreting results and communicating results to the patient/family/caregiver, or care  coordinator.

## 2022-05-27 ENCOUNTER — LAB VISIT (OUTPATIENT)
Dept: LAB | Facility: HOSPITAL | Age: 72
End: 2022-05-27
Attending: INTERNAL MEDICINE
Payer: MEDICARE

## 2022-05-27 DIAGNOSIS — R22.0 FACIAL SWELLING: ICD-10-CM

## 2022-05-27 DIAGNOSIS — E78.5 DYSLIPIDEMIA ASSOCIATED WITH TYPE 2 DIABETES MELLITUS: Chronic | ICD-10-CM

## 2022-05-27 DIAGNOSIS — I70.213 ATHEROSCLEROSIS OF NATIVE ARTERY OF BOTH LOWER EXTREMITIES WITH INTERMITTENT CLAUDICATION: Chronic | ICD-10-CM

## 2022-05-27 DIAGNOSIS — E11.69 DYSLIPIDEMIA ASSOCIATED WITH TYPE 2 DIABETES MELLITUS: Chronic | ICD-10-CM

## 2022-05-27 LAB
ALBUMIN SERPL BCP-MCNC: 3.3 G/DL (ref 3.5–5.2)
ALBUMIN SERPL BCP-MCNC: 3.3 G/DL (ref 3.5–5.2)
ALP SERPL-CCNC: 104 U/L (ref 55–135)
ALP SERPL-CCNC: 104 U/L (ref 55–135)
ALT SERPL W/O P-5'-P-CCNC: 10 U/L (ref 10–44)
ALT SERPL W/O P-5'-P-CCNC: 10 U/L (ref 10–44)
ANION GAP SERPL CALC-SCNC: 11 MMOL/L (ref 8–16)
ANION GAP SERPL CALC-SCNC: 11 MMOL/L (ref 8–16)
AST SERPL-CCNC: 19 U/L (ref 10–40)
AST SERPL-CCNC: 19 U/L (ref 10–40)
BASOPHILS # BLD AUTO: 0.04 K/UL (ref 0–0.2)
BASOPHILS NFR BLD: 0.6 % (ref 0–1.9)
BILIRUB SERPL-MCNC: 0.2 MG/DL (ref 0.1–1)
BILIRUB SERPL-MCNC: 0.2 MG/DL (ref 0.1–1)
BUN SERPL-MCNC: 30 MG/DL (ref 8–23)
BUN SERPL-MCNC: 30 MG/DL (ref 8–23)
C3 SERPL-MCNC: 155 MG/DL (ref 50–180)
C4 SERPL-MCNC: 48 MG/DL (ref 11–44)
CALCIUM SERPL-MCNC: 9.6 MG/DL (ref 8.7–10.5)
CALCIUM SERPL-MCNC: 9.6 MG/DL (ref 8.7–10.5)
CHLORIDE SERPL-SCNC: 101 MMOL/L (ref 95–110)
CHLORIDE SERPL-SCNC: 101 MMOL/L (ref 95–110)
CHOLEST SERPL-MCNC: 148 MG/DL (ref 120–199)
CHOLEST/HDLC SERPL: 2.4 {RATIO} (ref 2–5)
CO2 SERPL-SCNC: 24 MMOL/L (ref 23–29)
CO2 SERPL-SCNC: 24 MMOL/L (ref 23–29)
CREAT SERPL-MCNC: 1.2 MG/DL (ref 0.5–1.4)
CREAT SERPL-MCNC: 1.2 MG/DL (ref 0.5–1.4)
DIFFERENTIAL METHOD: ABNORMAL
EOSINOPHIL # BLD AUTO: 0.1 K/UL (ref 0–0.5)
EOSINOPHIL NFR BLD: 1.7 % (ref 0–8)
ERYTHROCYTE [DISTWIDTH] IN BLOOD BY AUTOMATED COUNT: 23.2 % (ref 11.5–14.5)
EST. GFR  (AFRICAN AMERICAN): 51.4 ML/MIN/1.73 M^2
EST. GFR  (AFRICAN AMERICAN): 51.4 ML/MIN/1.73 M^2
EST. GFR  (NON AFRICAN AMERICAN): 44.6 ML/MIN/1.73 M^2
EST. GFR  (NON AFRICAN AMERICAN): 44.6 ML/MIN/1.73 M^2
ESTIMATED AVG GLUCOSE: 137 MG/DL (ref 68–131)
GLUCOSE SERPL-MCNC: 102 MG/DL (ref 70–110)
GLUCOSE SERPL-MCNC: 102 MG/DL (ref 70–110)
HBA1C MFR BLD: 6.4 % (ref 4–5.6)
HCT VFR BLD AUTO: 35.7 % (ref 37–48.5)
HDLC SERPL-MCNC: 62 MG/DL (ref 40–75)
HDLC SERPL: 41.9 % (ref 20–50)
HGB BLD-MCNC: 10.5 G/DL (ref 12–16)
IMM GRANULOCYTES # BLD AUTO: 0.01 K/UL (ref 0–0.04)
IMM GRANULOCYTES NFR BLD AUTO: 0.2 % (ref 0–0.5)
LDLC SERPL CALC-MCNC: 68.6 MG/DL (ref 63–159)
LYMPHOCYTES # BLD AUTO: 1.9 K/UL (ref 1–4.8)
LYMPHOCYTES NFR BLD: 29 % (ref 18–48)
MCH RBC QN AUTO: 23.9 PG (ref 27–31)
MCHC RBC AUTO-ENTMCNC: 29.4 G/DL (ref 32–36)
MCV RBC AUTO: 81 FL (ref 82–98)
MONOCYTES # BLD AUTO: 0.6 K/UL (ref 0.3–1)
MONOCYTES NFR BLD: 8.5 % (ref 4–15)
NEUTROPHILS # BLD AUTO: 3.9 K/UL (ref 1.8–7.7)
NEUTROPHILS NFR BLD: 60 % (ref 38–73)
NONHDLC SERPL-MCNC: 86 MG/DL
NRBC BLD-RTO: 0 /100 WBC
PLATELET # BLD AUTO: 359 K/UL (ref 150–450)
PMV BLD AUTO: 10.8 FL (ref 9.2–12.9)
POTASSIUM SERPL-SCNC: 4 MMOL/L (ref 3.5–5.1)
POTASSIUM SERPL-SCNC: 4 MMOL/L (ref 3.5–5.1)
PROT SERPL-MCNC: 7.3 G/DL (ref 6–8.4)
PROT SERPL-MCNC: 7.3 G/DL (ref 6–8.4)
RBC # BLD AUTO: 4.4 M/UL (ref 4–5.4)
SODIUM SERPL-SCNC: 136 MMOL/L (ref 136–145)
SODIUM SERPL-SCNC: 136 MMOL/L (ref 136–145)
TRIGL SERPL-MCNC: 87 MG/DL (ref 30–150)
WBC # BLD AUTO: 6.48 K/UL (ref 3.9–12.7)

## 2022-05-27 PROCEDURE — 86334 IMMUNOFIX E-PHORESIS SERUM: CPT | Mod: 26,,, | Performed by: PATHOLOGY

## 2022-05-27 PROCEDURE — 85025 COMPLETE CBC W/AUTO DIFF WBC: CPT | Performed by: ALLERGY & IMMUNOLOGY

## 2022-05-27 PROCEDURE — 84165 PATHOLOGIST INTERPRETATION SPE: ICD-10-PCS | Mod: 26,,, | Performed by: PATHOLOGY

## 2022-05-27 PROCEDURE — 80053 COMPREHEN METABOLIC PANEL: CPT | Performed by: INTERNAL MEDICINE

## 2022-05-27 PROCEDURE — 80061 LIPID PANEL: CPT | Performed by: INTERNAL MEDICINE

## 2022-05-27 PROCEDURE — 86334 PATHOLOGIST INTERPRETATION IFE: ICD-10-PCS | Mod: 26,,, | Performed by: PATHOLOGY

## 2022-05-27 PROCEDURE — 86160 COMPLEMENT ANTIGEN: CPT | Performed by: ALLERGY & IMMUNOLOGY

## 2022-05-27 PROCEDURE — 86332 IMMUNE COMPLEX ASSAY: CPT | Performed by: ALLERGY & IMMUNOLOGY

## 2022-05-27 PROCEDURE — 86160 COMPLEMENT ANTIGEN: CPT | Mod: 59 | Performed by: ALLERGY & IMMUNOLOGY

## 2022-05-27 PROCEDURE — 36415 COLL VENOUS BLD VENIPUNCTURE: CPT | Performed by: ALLERGY & IMMUNOLOGY

## 2022-05-27 PROCEDURE — 86161 COMPLEMENT/FUNCTION ACTIVITY: CPT | Performed by: ALLERGY & IMMUNOLOGY

## 2022-05-27 PROCEDURE — 84165 PROTEIN E-PHORESIS SERUM: CPT | Performed by: ALLERGY & IMMUNOLOGY

## 2022-05-27 PROCEDURE — 85280 CLOT FACTOR XII HAGEMAN: CPT | Performed by: ALLERGY & IMMUNOLOGY

## 2022-05-27 PROCEDURE — 84165 PROTEIN E-PHORESIS SERUM: CPT | Mod: 26,,, | Performed by: PATHOLOGY

## 2022-05-27 PROCEDURE — 83036 HEMOGLOBIN GLYCOSYLATED A1C: CPT | Performed by: INTERNAL MEDICINE

## 2022-05-27 PROCEDURE — 86334 IMMUNOFIX E-PHORESIS SERUM: CPT | Performed by: ALLERGY & IMMUNOLOGY

## 2022-05-30 LAB
ALBUMIN SERPL ELPH-MCNC: 3.53 G/DL (ref 3.35–5.55)
ALPHA1 GLOB SERPL ELPH-MCNC: 0.37 G/DL (ref 0.17–0.41)
ALPHA2 GLOB SERPL ELPH-MCNC: 1.26 G/DL (ref 0.43–0.99)
B-GLOBULIN SERPL ELPH-MCNC: 0.82 G/DL (ref 0.5–1.1)
FACT XII ACT/NOR PPP: 83 % (ref 30–130)
GAMMA GLOB SERPL ELPH-MCNC: 0.72 G/DL (ref 0.67–1.58)
PROT SERPL-MCNC: 6.7 G/DL (ref 6–8.4)

## 2022-05-31 ENCOUNTER — TELEPHONE (OUTPATIENT)
Dept: CARDIOLOGY | Facility: HOSPITAL | Age: 72
End: 2022-05-31
Payer: MEDICARE

## 2022-05-31 LAB
C1INH SERPL-MCNC: 49 MG/DL (ref 21–39)
INTERPRETATION SERPL IFE-IMP: NORMAL
PATHOLOGIST INTERPRETATION IFE: NORMAL
PATHOLOGIST INTERPRETATION SPE: NORMAL

## 2022-06-01 ENCOUNTER — PATIENT MESSAGE (OUTPATIENT)
Dept: HEMATOLOGY/ONCOLOGY | Facility: CLINIC | Age: 72
End: 2022-06-01
Payer: MEDICARE

## 2022-06-01 ENCOUNTER — TELEPHONE (OUTPATIENT)
Dept: HEMATOLOGY/ONCOLOGY | Facility: CLINIC | Age: 72
End: 2022-06-01
Payer: MEDICARE

## 2022-06-01 DIAGNOSIS — R77.8 ABNORMAL SPEP: Primary | ICD-10-CM

## 2022-06-01 NOTE — TELEPHONE ENCOUNTER
Left message in reference to Hematology referral from Dr. Moore and follow up scheduled with Dr. Simental on Friday, at 1:00 d/t already being an established patient.  Mychart message sent.     
Patient

## 2022-06-02 LAB
C1INH FUNCTIONAL/C1INH TOTAL MFR SERPL: >100 %
IC SERPL C1Q BIND-ACNC: 1.9 UG EQ/ML (ref 0–3.9)

## 2022-06-03 ENCOUNTER — HOSPITAL ENCOUNTER (OUTPATIENT)
Dept: RADIOLOGY | Facility: HOSPITAL | Age: 72
Discharge: HOME | End: 2022-06-03
Attending: FAMILY MEDICINE
Payer: MEDICARE

## 2022-06-03 ENCOUNTER — OFFICE VISIT (OUTPATIENT)
Dept: INTERNAL MEDICINE | Facility: CLINIC | Age: 72
End: 2022-06-03
Payer: MEDICARE

## 2022-06-03 ENCOUNTER — OFFICE VISIT (OUTPATIENT)
Dept: HEMATOLOGY/ONCOLOGY | Facility: CLINIC | Age: 72
End: 2022-06-03
Payer: MEDICARE

## 2022-06-03 ENCOUNTER — IMMUNIZATION (OUTPATIENT)
Dept: PHARMACY | Facility: CLINIC | Age: 72
End: 2022-06-03
Payer: MEDICARE

## 2022-06-03 VITALS
TEMPERATURE: 99 F | DIASTOLIC BLOOD PRESSURE: 61 MMHG | WEIGHT: 138.69 LBS | SYSTOLIC BLOOD PRESSURE: 120 MMHG | BODY MASS INDEX: 27.23 KG/M2 | OXYGEN SATURATION: 96 % | HEIGHT: 60 IN | RESPIRATION RATE: 20 BRPM | HEART RATE: 100 BPM

## 2022-06-03 VITALS
TEMPERATURE: 98 F | HEIGHT: 62 IN | WEIGHT: 137.56 LBS | HEART RATE: 97 BPM | DIASTOLIC BLOOD PRESSURE: 60 MMHG | OXYGEN SATURATION: 98 % | BODY MASS INDEX: 25.32 KG/M2 | SYSTOLIC BLOOD PRESSURE: 130 MMHG

## 2022-06-03 DIAGNOSIS — E11.59 HYPERTENSION ASSOCIATED WITH DIABETES: ICD-10-CM

## 2022-06-03 DIAGNOSIS — E11.29 TYPE 2 DIABETES MELLITUS WITH MICROALBUMINURIA, WITHOUT LONG-TERM CURRENT USE OF INSULIN: ICD-10-CM

## 2022-06-03 DIAGNOSIS — I70.213 ATHEROSCLEROSIS OF NATIVE ARTERY OF BOTH LOWER EXTREMITIES WITH INTERMITTENT CLAUDICATION: ICD-10-CM

## 2022-06-03 DIAGNOSIS — M79.645 PAIN OF LEFT THUMB: ICD-10-CM

## 2022-06-03 DIAGNOSIS — J30.1 SEASONAL ALLERGIC RHINITIS DUE TO POLLEN: ICD-10-CM

## 2022-06-03 DIAGNOSIS — I15.2 HYPERTENSION ASSOCIATED WITH DIABETES: ICD-10-CM

## 2022-06-03 DIAGNOSIS — E78.5 DYSLIPIDEMIA ASSOCIATED WITH TYPE 2 DIABETES MELLITUS: Chronic | ICD-10-CM

## 2022-06-03 DIAGNOSIS — E11.22 TYPE 2 DIABETES MELLITUS WITH STAGE 3A CHRONIC KIDNEY DISEASE, WITHOUT LONG-TERM CURRENT USE OF INSULIN: Primary | Chronic | ICD-10-CM

## 2022-06-03 DIAGNOSIS — Z23 NEED FOR VACCINATION: Primary | ICD-10-CM

## 2022-06-03 DIAGNOSIS — D50.0 IRON DEFICIENCY ANEMIA DUE TO CHRONIC BLOOD LOSS: Primary | ICD-10-CM

## 2022-06-03 DIAGNOSIS — M25.532 LEFT WRIST PAIN: ICD-10-CM

## 2022-06-03 DIAGNOSIS — K21.9 GASTROESOPHAGEAL REFLUX DISEASE WITHOUT ESOPHAGITIS: Chronic | ICD-10-CM

## 2022-06-03 DIAGNOSIS — R80.9 TYPE 2 DIABETES MELLITUS WITH MICROALBUMINURIA, WITHOUT LONG-TERM CURRENT USE OF INSULIN: ICD-10-CM

## 2022-06-03 DIAGNOSIS — I10 ESSENTIAL HYPERTENSION: Chronic | ICD-10-CM

## 2022-06-03 DIAGNOSIS — I25.10 ATHEROSCLEROSIS OF NATIVE CORONARY ARTERY OF NATIVE HEART WITHOUT ANGINA PECTORIS: ICD-10-CM

## 2022-06-03 DIAGNOSIS — N18.31 TYPE 2 DIABETES MELLITUS WITH STAGE 3A CHRONIC KIDNEY DISEASE, WITHOUT LONG-TERM CURRENT USE OF INSULIN: Primary | Chronic | ICD-10-CM

## 2022-06-03 DIAGNOSIS — M81.0 AGE-RELATED OSTEOPOROSIS WITHOUT CURRENT PATHOLOGICAL FRACTURE: ICD-10-CM

## 2022-06-03 DIAGNOSIS — G44.209 TENSION HEADACHE: ICD-10-CM

## 2022-06-03 DIAGNOSIS — Z23 NEED FOR PNEUMOCOCCAL VACCINE: ICD-10-CM

## 2022-06-03 DIAGNOSIS — D50.8 IRON DEFICIENCY ANEMIA SECONDARY TO INADEQUATE DIETARY IRON INTAKE: Chronic | ICD-10-CM

## 2022-06-03 DIAGNOSIS — Z09 NEED FOR CASE MANAGEMENT FOLLOW-UP: ICD-10-CM

## 2022-06-03 DIAGNOSIS — E11.69 DYSLIPIDEMIA ASSOCIATED WITH TYPE 2 DIABETES MELLITUS: Chronic | ICD-10-CM

## 2022-06-03 PROCEDURE — 73130 XR HAND COMPLETE 3 VIEW LEFT: ICD-10-PCS | Mod: 26,LT,, | Performed by: RADIOLOGY

## 2022-06-03 PROCEDURE — 73130 X-RAY EXAM OF HAND: CPT | Mod: 26,LT,, | Performed by: RADIOLOGY

## 2022-06-03 PROCEDURE — 4010F ACE/ARB THERAPY RXD/TAKEN: CPT | Mod: CPTII,S$GLB,, | Performed by: FAMILY MEDICINE

## 2022-06-03 PROCEDURE — 3078F PR MOST RECENT DIASTOLIC BLOOD PRESSURE < 80 MM HG: ICD-10-PCS | Mod: CPTII,S$GLB,, | Performed by: FAMILY MEDICINE

## 2022-06-03 PROCEDURE — 1159F PR MEDICATION LIST DOCUMENTED IN MEDICAL RECORD: ICD-10-PCS | Mod: CPTII,S$GLB,, | Performed by: FAMILY MEDICINE

## 2022-06-03 PROCEDURE — 3288F FALL RISK ASSESSMENT DOCD: CPT | Mod: CPTII,S$GLB,, | Performed by: FAMILY MEDICINE

## 2022-06-03 PROCEDURE — 99999 PR PBB SHADOW E&M-EST. PATIENT-LVL V: CPT | Mod: PBBFAC,,, | Performed by: FAMILY MEDICINE

## 2022-06-03 PROCEDURE — 1101F PR PT FALLS ASSESS DOC 0-1 FALLS W/OUT INJ PAST YR: ICD-10-PCS | Mod: CPTII,S$GLB,, | Performed by: INTERNAL MEDICINE

## 2022-06-03 PROCEDURE — 3072F PR LOW RISK FOR RETINOPATHY: ICD-10-PCS | Mod: CPTII,S$GLB,, | Performed by: FAMILY MEDICINE

## 2022-06-03 PROCEDURE — 3288F FALL RISK ASSESSMENT DOCD: CPT | Mod: CPTII,S$GLB,, | Performed by: INTERNAL MEDICINE

## 2022-06-03 PROCEDURE — 73110 X-RAY EXAM OF WRIST: CPT | Mod: TC,LT

## 2022-06-03 PROCEDURE — 99215 PR OFFICE/OUTPT VISIT, EST, LEVL V, 40-54 MIN: ICD-10-PCS | Mod: S$GLB,,, | Performed by: INTERNAL MEDICINE

## 2022-06-03 PROCEDURE — 3078F DIAST BP <80 MM HG: CPT | Mod: CPTII,S$GLB,, | Performed by: INTERNAL MEDICINE

## 2022-06-03 PROCEDURE — G0009 ADMIN PNEUMOCOCCAL VACCINE: HCPCS | Mod: S$GLB,,, | Performed by: FAMILY MEDICINE

## 2022-06-03 PROCEDURE — 3044F HG A1C LEVEL LT 7.0%: CPT | Mod: CPTII,S$GLB,, | Performed by: INTERNAL MEDICINE

## 2022-06-03 PROCEDURE — 3044F PR MOST RECENT HEMOGLOBIN A1C LEVEL <7.0%: ICD-10-PCS | Mod: CPTII,S$GLB,, | Performed by: FAMILY MEDICINE

## 2022-06-03 PROCEDURE — 99999 PR PBB SHADOW E&M-EST. PATIENT-LVL V: CPT | Mod: PBBFAC,,, | Performed by: INTERNAL MEDICINE

## 2022-06-03 PROCEDURE — 1101F PT FALLS ASSESS-DOCD LE1/YR: CPT | Mod: CPTII,S$GLB,, | Performed by: FAMILY MEDICINE

## 2022-06-03 PROCEDURE — 99215 OFFICE O/P EST HI 40 MIN: CPT | Mod: 25,S$GLB,, | Performed by: FAMILY MEDICINE

## 2022-06-03 PROCEDURE — 90677 PNEUMOCOCCAL CONJUGATE VACCINE 20-VALENT: ICD-10-PCS | Mod: S$GLB,,, | Performed by: FAMILY MEDICINE

## 2022-06-03 PROCEDURE — 3008F PR BODY MASS INDEX (BMI) DOCUMENTED: ICD-10-PCS | Mod: CPTII,S$GLB,, | Performed by: INTERNAL MEDICINE

## 2022-06-03 PROCEDURE — 3074F PR MOST RECENT SYSTOLIC BLOOD PRESSURE < 130 MM HG: ICD-10-PCS | Mod: CPTII,S$GLB,, | Performed by: INTERNAL MEDICINE

## 2022-06-03 PROCEDURE — 4010F ACE/ARB THERAPY RXD/TAKEN: CPT | Mod: CPTII,S$GLB,, | Performed by: INTERNAL MEDICINE

## 2022-06-03 PROCEDURE — 1160F RVW MEDS BY RX/DR IN RCRD: CPT | Mod: CPTII,S$GLB,, | Performed by: INTERNAL MEDICINE

## 2022-06-03 PROCEDURE — 73110 XR WRIST COMPLETE 3 VIEWS LEFT: ICD-10-PCS | Mod: 26,LT,, | Performed by: RADIOLOGY

## 2022-06-03 PROCEDURE — 1159F MED LIST DOCD IN RCRD: CPT | Mod: CPTII,S$GLB,, | Performed by: FAMILY MEDICINE

## 2022-06-03 PROCEDURE — 3072F LOW RISK FOR RETINOPATHY: CPT | Mod: CPTII,S$GLB,, | Performed by: INTERNAL MEDICINE

## 2022-06-03 PROCEDURE — G0009 PNEUMOCOCCAL CONJUGATE VACCINE 20-VALENT: ICD-10-PCS | Mod: S$GLB,,, | Performed by: FAMILY MEDICINE

## 2022-06-03 PROCEDURE — 3072F LOW RISK FOR RETINOPATHY: CPT | Mod: CPTII,S$GLB,, | Performed by: FAMILY MEDICINE

## 2022-06-03 PROCEDURE — 1125F AMNT PAIN NOTED PAIN PRSNT: CPT | Mod: CPTII,S$GLB,, | Performed by: FAMILY MEDICINE

## 2022-06-03 PROCEDURE — 4010F PR ACE/ARB THEARPY RXD/TAKEN: ICD-10-PCS | Mod: CPTII,S$GLB,, | Performed by: INTERNAL MEDICINE

## 2022-06-03 PROCEDURE — 99999 PR PBB SHADOW E&M-EST. PATIENT-LVL V: ICD-10-PCS | Mod: PBBFAC,,, | Performed by: FAMILY MEDICINE

## 2022-06-03 PROCEDURE — 1160F PR REVIEW ALL MEDS BY PRESCRIBER/CLIN PHARMACIST DOCUMENTED: ICD-10-PCS | Mod: CPTII,S$GLB,, | Performed by: INTERNAL MEDICINE

## 2022-06-03 PROCEDURE — 3072F PR LOW RISK FOR RETINOPATHY: ICD-10-PCS | Mod: CPTII,S$GLB,, | Performed by: INTERNAL MEDICINE

## 2022-06-03 PROCEDURE — 1159F MED LIST DOCD IN RCRD: CPT | Mod: CPTII,S$GLB,, | Performed by: INTERNAL MEDICINE

## 2022-06-03 PROCEDURE — 3288F PR FALLS RISK ASSESSMENT DOCUMENTED: ICD-10-PCS | Mod: CPTII,S$GLB,, | Performed by: FAMILY MEDICINE

## 2022-06-03 PROCEDURE — 3008F BODY MASS INDEX DOCD: CPT | Mod: CPTII,S$GLB,, | Performed by: FAMILY MEDICINE

## 2022-06-03 PROCEDURE — 3044F PR MOST RECENT HEMOGLOBIN A1C LEVEL <7.0%: ICD-10-PCS | Mod: CPTII,S$GLB,, | Performed by: INTERNAL MEDICINE

## 2022-06-03 PROCEDURE — 1125F AMNT PAIN NOTED PAIN PRSNT: CPT | Mod: CPTII,S$GLB,, | Performed by: INTERNAL MEDICINE

## 2022-06-03 PROCEDURE — 3075F PR MOST RECENT SYSTOLIC BLOOD PRESS GE 130-139MM HG: ICD-10-PCS | Mod: CPTII,S$GLB,, | Performed by: FAMILY MEDICINE

## 2022-06-03 PROCEDURE — 3078F PR MOST RECENT DIASTOLIC BLOOD PRESSURE < 80 MM HG: ICD-10-PCS | Mod: CPTII,S$GLB,, | Performed by: INTERNAL MEDICINE

## 2022-06-03 PROCEDURE — 3044F HG A1C LEVEL LT 7.0%: CPT | Mod: CPTII,S$GLB,, | Performed by: FAMILY MEDICINE

## 2022-06-03 PROCEDURE — 3008F PR BODY MASS INDEX (BMI) DOCUMENTED: ICD-10-PCS | Mod: CPTII,S$GLB,, | Performed by: FAMILY MEDICINE

## 2022-06-03 PROCEDURE — 1101F PR PT FALLS ASSESS DOC 0-1 FALLS W/OUT INJ PAST YR: ICD-10-PCS | Mod: CPTII,S$GLB,, | Performed by: FAMILY MEDICINE

## 2022-06-03 PROCEDURE — 3008F BODY MASS INDEX DOCD: CPT | Mod: CPTII,S$GLB,, | Performed by: INTERNAL MEDICINE

## 2022-06-03 PROCEDURE — 1125F PR PAIN SEVERITY QUANTIFIED, PAIN PRESENT: ICD-10-PCS | Mod: CPTII,S$GLB,, | Performed by: FAMILY MEDICINE

## 2022-06-03 PROCEDURE — 3288F PR FALLS RISK ASSESSMENT DOCUMENTED: ICD-10-PCS | Mod: CPTII,S$GLB,, | Performed by: INTERNAL MEDICINE

## 2022-06-03 PROCEDURE — 99999 PR PBB SHADOW E&M-EST. PATIENT-LVL V: ICD-10-PCS | Mod: PBBFAC,,, | Performed by: INTERNAL MEDICINE

## 2022-06-03 PROCEDURE — 99215 OFFICE O/P EST HI 40 MIN: CPT | Mod: S$GLB,,, | Performed by: INTERNAL MEDICINE

## 2022-06-03 PROCEDURE — 90677 PCV20 VACCINE IM: CPT | Mod: S$GLB,,, | Performed by: FAMILY MEDICINE

## 2022-06-03 PROCEDURE — 73110 X-RAY EXAM OF WRIST: CPT | Mod: 26,LT,, | Performed by: RADIOLOGY

## 2022-06-03 PROCEDURE — 1159F PR MEDICATION LIST DOCUMENTED IN MEDICAL RECORD: ICD-10-PCS | Mod: CPTII,S$GLB,, | Performed by: INTERNAL MEDICINE

## 2022-06-03 PROCEDURE — 1101F PT FALLS ASSESS-DOCD LE1/YR: CPT | Mod: CPTII,S$GLB,, | Performed by: INTERNAL MEDICINE

## 2022-06-03 PROCEDURE — 1125F PR PAIN SEVERITY QUANTIFIED, PAIN PRESENT: ICD-10-PCS | Mod: CPTII,S$GLB,, | Performed by: INTERNAL MEDICINE

## 2022-06-03 PROCEDURE — 73130 X-RAY EXAM OF HAND: CPT | Mod: TC,LT

## 2022-06-03 PROCEDURE — 99215 PR OFFICE/OUTPT VISIT, EST, LEVL V, 40-54 MIN: ICD-10-PCS | Mod: 25,S$GLB,, | Performed by: FAMILY MEDICINE

## 2022-06-03 PROCEDURE — 3074F SYST BP LT 130 MM HG: CPT | Mod: CPTII,S$GLB,, | Performed by: INTERNAL MEDICINE

## 2022-06-03 PROCEDURE — 4010F PR ACE/ARB THEARPY RXD/TAKEN: ICD-10-PCS | Mod: CPTII,S$GLB,, | Performed by: FAMILY MEDICINE

## 2022-06-03 PROCEDURE — 3075F SYST BP GE 130 - 139MM HG: CPT | Mod: CPTII,S$GLB,, | Performed by: FAMILY MEDICINE

## 2022-06-03 PROCEDURE — 3078F DIAST BP <80 MM HG: CPT | Mod: CPTII,S$GLB,, | Performed by: FAMILY MEDICINE

## 2022-06-03 RX ORDER — AMLODIPINE AND BENAZEPRIL HYDROCHLORIDE 5; 20 MG/1; MG/1
1 CAPSULE ORAL 2 TIMES DAILY
Qty: 180 CAPSULE | Refills: 2 | Status: SHIPPED | OUTPATIENT
Start: 2022-06-03 | End: 2022-06-23 | Stop reason: SINTOL

## 2022-06-03 RX ORDER — EZETIMIBE 10 MG/1
10 TABLET ORAL DAILY
Qty: 90 TABLET | Refills: 2 | Status: SHIPPED | OUTPATIENT
Start: 2022-06-03 | End: 2023-04-06

## 2022-06-03 RX ORDER — MULTIVITAMIN
2 TABLET ORAL DAILY
Qty: 180 TABLET | Refills: 4 | Status: SHIPPED | OUTPATIENT
Start: 2022-06-03 | End: 2023-04-19 | Stop reason: SDUPTHER

## 2022-06-03 RX ORDER — CETIRIZINE HYDROCHLORIDE 10 MG/1
10 TABLET ORAL DAILY
Qty: 90 TABLET | Refills: 4 | Status: CANCELLED | OUTPATIENT
Start: 2022-06-03 | End: 2023-06-03

## 2022-06-03 RX ORDER — CHLORTHALIDONE 25 MG/1
25 TABLET ORAL DAILY
Qty: 90 TABLET | Refills: 2 | Status: SHIPPED | OUTPATIENT
Start: 2022-06-03 | End: 2022-06-23 | Stop reason: SDUPTHER

## 2022-06-03 RX ORDER — ASPIRIN 81 MG/1
81 TABLET ORAL DAILY
Qty: 90 TABLET | Refills: 4 | Status: SHIPPED | OUTPATIENT
Start: 2022-06-03 | End: 2023-04-19 | Stop reason: SDUPTHER

## 2022-06-03 RX ORDER — ROSUVASTATIN CALCIUM 20 MG/1
20 TABLET, COATED ORAL NIGHTLY
Qty: 90 TABLET | Refills: 3 | Status: SHIPPED | OUTPATIENT
Start: 2022-06-03 | End: 2023-03-25

## 2022-06-03 RX ORDER — EMPAGLIFLOZIN 25 MG/1
25 TABLET, FILM COATED ORAL DAILY
Qty: 90 TABLET | Refills: 2 | Status: SHIPPED | OUTPATIENT
Start: 2022-06-03 | End: 2022-07-07

## 2022-06-03 RX ORDER — BUTALBITAL, ACETAMINOPHEN AND CAFFEINE 50; 325; 40 MG/1; MG/1; MG/1
TABLET ORAL
Qty: 30 TABLET | Refills: 3 | Status: SHIPPED | OUTPATIENT
Start: 2022-06-03 | End: 2023-04-19 | Stop reason: SDUPTHER

## 2022-06-03 RX ORDER — ROSUVASTATIN CALCIUM 20 MG/1
20 TABLET, COATED ORAL DAILY
Qty: 90 TABLET | Refills: 4 | Status: CANCELLED | OUTPATIENT
Start: 2022-06-03

## 2022-06-03 RX ORDER — PANTOPRAZOLE SODIUM 40 MG/1
40 TABLET, DELAYED RELEASE ORAL DAILY
Qty: 90 TABLET | Refills: 3 | Status: SHIPPED | OUTPATIENT
Start: 2022-06-03 | End: 2023-03-25

## 2022-06-03 RX ORDER — POTASSIUM CHLORIDE 20 MEQ/1
20 TABLET, EXTENDED RELEASE ORAL DAILY
Qty: 90 TABLET | Refills: 2 | Status: SHIPPED | OUTPATIENT
Start: 2022-06-03 | End: 2023-01-23

## 2022-06-03 NOTE — ASSESSMENT & PLAN NOTE
BP Readings from Last 6 Encounters:   06/03/22 130/60   05/24/22 132/64   04/14/22 (!) 110/52   03/28/22 (!) 114/58   03/25/22 118/60   03/09/22 (!) 120/52      Last 5 Patient Entered Readings                Current 30 Day Average: 132/70  Recent Readings 6/1/2022 5/30/2022 5/25/2022 5/17/2022 5/16/2022   SBP (mmHg) 137 132 134 126 137   DBP (mmHg) 73 67 68 67 81   Pulse 94 103 100 104 98                 Lab Results   Component Value Date    ESTGFRAFRICA 51.4 (A) 05/27/2022    ESTGFRAFRICA 51.4 (A) 05/27/2022    EGFRNONAA 44.6 (A) 05/27/2022    EGFRNONAA 44.6 (A) 05/27/2022    CREATININE 1.2 05/27/2022    CREATININE 1.2 05/27/2022    BUN 30 (H) 05/27/2022    BUN 30 (H) 05/27/2022    K 4.0 05/27/2022    K 4.0 05/27/2022     05/27/2022     05/27/2022     05/27/2022     05/27/2022     Results for orders placed or performed during the hospital encounter of 02/14/22   EKG 12-lead    Collection Time: 02/14/22  1:17 AM    Narrative    Test Reason : R06.02,    Vent. Rate : 099 BPM     Atrial Rate : 099 BPM     P-R Int : 116 ms          QRS Dur : 058 ms      QT Int : 334 ms       P-R-T Axes : 072 050 043 degrees     QTc Int : 428 ms    Normal sinus rhythm  Normal ECG  When compared with ECG of 28-MAY-2021 08:10,  No significant change was found  Confirmed by LEYDA IRVING MD ([id number]) on 2/14/2022 1:27:26 PM    Referred By: ARELY   SELF           Confirmed By:LEYDA IRVING MD

## 2022-06-03 NOTE — PROGRESS NOTES
Subjective:       Patient ID: Lucia Barlow is a 72 y.o. female.    Chief Complaint: No chief complaint on file.    HPI     New patient visit for iron deficiency anemia. Accompanied by her daughter. Normocytic anemia dates to 2 years ago. Sudden onset symptomatic anemia last month to Hgb 6.9 with MCV 75. Repeat 9.9 with MCV 78 after blood transfusion. CBC changes are a pattern of blood loss. Endoscopy and colonoscopy scheduled 3/9/2022. No gross blood loss. Reports significant fatigue and cold intolerance. Diet very rich in iron with beef and chicken liver at least twice weekly. Poor prior tolerance to oral iron due to severe constipation. 40lb weight loss, severe diarrhea, and weakness due to metformin last fall- off therapy now.      Follow-up Visit 6/3/2022  Patient improved clinically today from initial consult.  She actually does not recall our initial visit.  Seen for FAHAD- since then not on oral replacement but increased iron rich food in diet- beef and liver at least 4x weekly  CBC improved Hgb 6 to 10.5    Review of Systems   Constitutional: Positive for fatigue.   HENT: Negative.    Eyes: Negative.    Respiratory: Negative.    Cardiovascular: Negative.    Gastrointestinal: Negative.    Endocrine: Negative.    Genitourinary: Negative.    Musculoskeletal: Negative.    Integumentary:  Negative.   Allergic/Immunologic: Negative for environmental allergies, food allergies and immunocompromised state.   Neurological: Negative.    Hematological: Negative for adenopathy. Does not bruise/bleed easily.   Psychiatric/Behavioral: Negative.          Objective:      Physical Exam  Vitals and nursing note reviewed.   Constitutional:       Appearance: She is well-developed.   HENT:      Head: Normocephalic and atraumatic.   Eyes:      General: No scleral icterus.     Conjunctiva/sclera: Conjunctivae normal.   Cardiovascular:      Rate and Rhythm: Normal rate.   Pulmonary:      Effort: Pulmonary effort is normal. No  respiratory distress.   Abdominal:      General: There is no distension.      Palpations: Abdomen is soft.   Musculoskeletal:         General: Normal range of motion.      Cervical back: Normal range of motion and neck supple.   Skin:     General: Skin is warm and dry.   Neurological:      Mental Status: She is alert and oriented to person, place, and time.      Cranial Nerves: No cranial nerve deficit.   Psychiatric:         Behavior: Behavior normal.         Assessment:       Problem List Items Addressed This Visit    None         Plan:       CBC in recovery  Add CBC to labs check in November

## 2022-06-03 NOTE — PROGRESS NOTES
OFFICE VISIT 6/3/22  8:30 AM CDT    CHIEF COMPLAINT: Follow-up    Lucia appears to be doing well on her current medicines. Lucia reports preceiving no adverse side-effects and wants to continue current treatment.     New problem: She reports left radial wrist pain in the first dorsal compartment over the radial styloid. Onset over last month or so. No recent relevant trauma. Exam remarkable for tenderness in the same distribution and pain exacerbated by FInkelstein maneuver. We discussed differential diagnosis and risks and benefits of treatment options.    Review of [place] Board of Pharmacy Controlled Prescription Drug Monitoring database shows the following prescriptions (sorted by date filled).    04/20/2022 - Upfgja-Bhumahdz-Xehp17-325-40 Mg, QTY 30 (QS for 60 days), originally written 12/01/2021 by Radha Kerr.    02/07/2022 - Naphlc-Aexlbzew-Vgbj01-325-40 Mg, QTY 30 (QS for 60 days), originally written 12/01/2021 by Radha Kerr.    12/14/2021 - Itazvy-Qnmwhkfb-Xihi64-325-40 Mg, QTY 30 (QS for 60 days), originally written 12/01/2021 by Radha Kerr.    DIAGNOSES SPECIFICALLY EVALUATED AND TREATED THIS ENCOUNTER  1. Type 2 diabetes mellitus with stage 3a chronic kidney disease, without long-term current use of insulin  - Hemoglobin A1C; Future  - Comprehensive Metabolic Panel; Future  - Microalbumin/Creatinine Ratio, Urine; Future    2. Iron deficiency anemia secondary to inadequate dietary iron intake    3. Need for case management follow-up  - Ambulatory referral/consult to Outpatient Case Management    4. Hypertension associated with diabetes  - Comprehensive Metabolic Panel; Future    5. Type 2 diabetes mellitus with microalbuminuria, without long-term current use of insulin  - empagliflozin (JARDIANCE) 25 mg tablet; Take 1 tablet (25 mg total) by mouth once daily.  Dispense: 90 tablet; Refill: 2  - potassium chloride SA (K-DUR,KLOR-CON) 20 MEQ tablet; Take 1 tablet (20 mEq total) by mouth once daily.  Dispense: 90  tablet; Refill: 2  - Microalbumin/Creatinine Ratio, Urine; Future    6. Essential hypertension  - amlodipine-benazepril 5-20 mg (LOTREL) 5-20 mg per capsule; Take 1 capsule by mouth 2 (two) times daily.  Dispense: 180 capsule; Refill: 2  - chlorthalidone (HYGROTEN) 25 MG Tab; Take 1 tablet (25 mg total) by mouth once daily.  Dispense: 90 tablet; Refill: 2    7. Age-related osteoporosis without current pathological fracture  - calcium-vitamin D 600 mg-10 mcg (400 unit) Tab; Take 2 tablets by mouth once daily.  Dispense: 180 tablet; Refill: 4    8. Seasonal allergic rhinitis due to pollen    9. Dyslipidemia associated with type 2 diabetes mellitus  - ezetimibe (ZETIA) 10 mg tablet; Take 1 tablet (10 mg total) by mouth once daily.  Dispense: 90 tablet; Refill: 2  - rosuvastatin (CRESTOR) 20 MG tablet; Take 1 tablet (20 mg total) by mouth every evening.  Dispense: 90 tablet; Refill: 3  - Comprehensive Metabolic Panel; Future    10. Need for pneumococcal vaccine  - Pneumococcal Conjugate Vaccine (20 Valent) (IM)    11. Gastroesophageal reflux disease without esophagitis  - pantoprazole (PROTONIX) 40 MG tablet; Take 1 tablet (40 mg total) by mouth once daily.  Dispense: 90 tablet; Refill: 3    12. Atherosclerosis of native artery of both lower extremities with intermittent claudication  - rosuvastatin (CRESTOR) 20 MG tablet; Take 1 tablet (20 mg total) by mouth every evening.  Dispense: 90 tablet; Refill: 3    13. Left wrist pain  - X-Ray Wrist Complete 3 views Left; Future  - X-Ray Hand 3 View Left; Future  - Ambulatory referral/consult to Orthopedics; Future  - SPLINT FOR HOME USE    14. Pain of left thumb  - X-Ray Wrist Complete 3 views Left; Future  - X-Ray Hand 3 View Left; Future  - Ambulatory referral/consult to Orthopedics; Future  - SPLINT FOR HOME USE    15. Atherosclerosis of native coronary artery of native heart without angina pectoris  - aspirin (ECOTRIN) 81 MG EC tablet; Take 1 tablet (81 mg total) by mouth  once daily.  Dispense: 90 tablet; Refill: 4    16. Tension headache  - butalbital-acetaminophen-caffeine -40 mg (FIORICET, ESGIC) -40 mg per tablet; TAKE 1 TABLET BY MOUTH 3 (THREE) TIMES DAILY AS NEEDED FOR HEADACHES. (MAXIMUM OF 15 TABLETS PER MONTH)  Dispense: 30 tablet; Refill: 3   Check-out Instructions   PCV-20.   Clinic collect lab: Urine Microalbumin   Schedule other labs to be done in mid-November and schedule appointment with me a few days later.    Follow up in about 6 months (around 11/28/2022) for review test results, discuss treatment plan, re-evaluate problem(s) discussed today.   Future Appointments   Date Time Provider Department Center   6/3/2022 10:30 AM HGV XR2 HGH XRAY [facility]   6[facility]3/2022 11:10 [facility]   6/3/2022  1:00 PM Loren Simental MD [facility] HEM ONC [facility]   6/24/2022  8:40 AM Amalia Rosas MD [facility] VAS CAR [facility]   8/15/2022  8:00 AM Temitope Moore MD [facility] ALLERGY [facility]   10/26/2022 12:30 PM [facility] CT1 LIMIT 500 LBS [facility] CT SCAN [facility]   10/26/2022  1:00 PM Ramiro Aleman MD [facility] PULMSVC [facility]   11/16/2022  7:35 [facility]   11/18/2022  7:30 AM VICK Sandoval MD [facility] IM High [place]     Problem List Items Addressed This Visit     Atherosclerosis of native artery of both lower extremities with intermittent claudication (Chronic)    Overview     LE Arterial Ultrasound 06/18/2021  Right Lower Arterial  · CFA demonstrates moderate (50-75%) stenosis and has biphasic flow. CFA has plaque present. The plaque is calcific.   · PFA demonstrates moderate (50-75%) stenosis and has biphasic flow. PFA has plaque present. The plaque is calcific.   · SFAo demonstrates 50-75% stenosis and has biphasic flow. SFAo has plaque present. The plaque is calcific.   · SFAp has biphasic flow.   · SFAm has biphasic flow.   · SFAd has biphasic flow.   · POP has biphasic flow.   · AT has biphasic flow.   · TIB TRNK has  biphasic flow.   · PT has biphasic flow.   · PER has biphasic flow.  Left Lower Arterial  · CFA demonstrates severe (76-99%) stenosis and has biphasic flow. CFA has plaque present. The plaque is calcific.   · PFA demonstrates moderate (50-75%) stenosis and has biphasic flow.   · SFAo demonstrates 50-75% stenosis and has biphasic flow.   · SFAp has biphasic flow.   · SFAm has biphasic flow.   · SFAd has biphasic flow.   · POP has biphasic flow.   · AT has biphasic flow.   · TIB TRNK has biphasic flow.   · PT has biphasic flow.   · PER has biphasic flow.           Relevant Medications    rosuvastatin (CRESTOR) 20 MG tablet    Atherosclerosis of native coronary artery of native heart without angina pectoris    Overview     CT CHEST WITHOUT CONTRAST 11/27/2018  FINDINGS: Scattered atherosclerotic plaque noted involving the thoracic aorta and aortic branch vessels, including the coronary arteries.           Relevant Medications    aspirin (ECOTRIN) 81 MG EC tablet    Dyslipidemia associated with type 2 diabetes mellitus (Chronic)    Relevant Medications    ezetimibe (ZETIA) 10 mg tablet    rosuvastatin (CRESTOR) 20 MG tablet    Other Relevant Orders    Comprehensive Metabolic Panel    Gastroesophageal reflux disease without esophagitis (Chronic)    Relevant Medications    pantoprazole (PROTONIX) 40 MG tablet    Hypertension associated with diabetes    Current Assessment & Plan     BP Readings from Last 6 Encounters:   06/03/22 130/60   05/24/22 132/64   04/14/22 (!) 110/52   03/28/22 (!) 114/58   03/25/22 118/60   03/09/22 (!) 120/52      Last 5 Patient Entered Readings                Current 30 Day Average: 132/70  Recent Readings 6/1/2022 5/30/2022 5/25/2022 5/17/2022 5/16/2022   SBP (mmHg) 137 132 134 126 137   DBP (mmHg) 73 67 68 67 81   Pulse 94 103 100 104 98                 Lab Results   Component Value Date    ESTGFRAFRICA 51.4 (A) 05/27/2022    ESTGFRAFRICA 51.4 (A) 05/27/2022    EGFRNONAA 44.6 (A) 05/27/2022     EGFRNONAA 44.6 (A) 05/27/2022    CREATININE 1.2 05/27/2022    CREATININE 1.2 05/27/2022    BUN 30 (H) 05/27/2022    BUN 30 (H) 05/27/2022    K 4.0 05/27/2022    K 4.0 05/27/2022     05/27/2022     05/27/2022     05/27/2022     05/27/2022     Results for orders placed or performed during the hospital encounter of 02/14/22   EKG 12-lead    Collection Time: 02/14/22  1:17 AM    Narrative    Test Reason : R06.02,    Vent. Rate : 099 BPM     Atrial Rate : 099 BPM     P-R Int : 116 ms          QRS Dur : 058 ms      QT Int : 334 ms       P-R-T Axes : 072 050 043 degrees     QTc Int : 428 ms    Normal sinus rhythm  Normal ECG  When compared with ECG of 28-MAY-2021 08:10,  No significant change was found  Confirmed by LEYDA IRVING MD ([id number]) on 2/14/2022 1:27:26 PM    Referred By: [email or web address]   SELF           Confirmed By:LEYDA IRVING MD                Relevant Medications    amlodipine-benazepril 5-20 mg (LOTREL) 5-20 mg per capsule    chlorthalidone (HYGROTEN) 25 MG Tab    Other Relevant Orders    Comprehensive Metabolic Panel    Iron deficiency anemia secondary to inadequate dietary iron intake (Chronic)    Current Assessment & Plan     Lab Results   Component Value Date    HGB 10.5 (L) 05/27/2022    HGB 9.9 (L) 02/16/2022    HGB 6.9 (L) 02/14/2022    HGB 10.6 (L) 10/21/2020    HCT 35.7 (L) 05/27/2022    HCT 33.4 (L) 02/16/2022    HCT 23.4 (L) 02/14/2022    HCT 28 (L) 11/19/2020     Lab Results   Component Value Date    IRON 34 03/04/2022    TRANSFERRIN 274 03/04/2022    TIBC 406 03/04/2022    FESATURATED 8 (L) 03/04/2022     Future Appointments   Date Time Provider Department Center   6/3/2022  1:00 PM Loren Simental MD [facility] HEM ONC [facility] [place]               Osteoporosis    Overview     Alendronate therapy from 1/8/2014-1/25/2019.           Relevant Medications    calcium-vitamin D 600 mg-10 mcg (400 unit) Tab    Seasonal allergic rhinitis due to pollen    Tension headache    Relevant Medications     butalbital-acetaminophen-caffeine -40 mg (FIORICET, ESGIC) -40 mg per tablet    Type 2 diabetes mellitus with microalbuminuria, without long-term current use of insulin (Chronic)    Relevant Medications    empagliflozin (JARDIANCE) 25 mg tablet    potassium chloride SA (K-DUR,KLOR-CON) 20 MEQ tablet    Other Relevant Orders    Microalbumin/Creatinine Ratio, Urine    Type 2 diabetes mellitus with stage 3a chronic kidney disease, without long-term current use of insulin - Primary (Chronic)    Current Assessment & Plan     Diabetes Management Status    Statin: Taking  ACE/ARB: Taking    Screening or Prevention Patient's value Goal Complete/Controlled?   HgA1C Testing and Control   Lab Results   Component Value Date    HGBA1C 6.4 (H) 05/27/2022      Annually/Less than 8% Yes   Lipid profile : 05/27/2022 Annually Yes   LDL control Lab Results   Component Value Date    LDLCALC 68.6 05/27/2022    Annually/Less than 100 mg/dl  Yes   Nephropathy screening Lab Results   Component Value Date    LABMICR 11.0 03/10/2021     Lab Results   Component Value Date    PROTEINUA 1+ (A) 12/16/2018    Annually No   Blood pressure BP Readings from Last 1 Encounters:   06/03/22 130/60    Less than 140/90 Yes   Dilated retinal exam : 08/18/2021 Annually Yes   Foot exam   : 06/03/2022 Annually Yes     Lab Results   Component Value Date    HGBA1C 6.4 (H) 05/27/2022    HGBA1C 6.4 (H) 03/04/2022    HGBA1C 6.3 (H) 11/22/2021    ESTGFRAFRICA 51.4 (A) 05/27/2022    ESTGFRAFRICA 51.4 (A) 05/27/2022    EGFRNONAA 44.6 (A) 05/27/2022    EGFRNONAA 44.6 (A) 05/27/2022    MICALBCREAT 18.6 03/10/2021    LDLCALC 68.6 05/27/2022     No results found for: GLUTAMICACID, CPEPTIDE   Last 6 Patient Entered Readings                                          Most Recent A1c: 6.4% on 5/27/2022  (Goal: 8%)     Recent Readings 6/1/2022 5/30/2022 5/25/2022 5/17/2022 5/16/2022    Blood Glucose (mg/dL) 114 142 158 133 142         HEALTH MAINTENANCE:  Diabetic health maintenance interventions reviewed and are up to date except for:  There are no preventive care reminders to display for this patient.     DIABETIC FOOT EXAM: Inspection of feet reveals no open wounds, ulcerations, significant calluses, or evidence of arterial insufficiency. 10g monofilament sensation is intact in all 5 locations tested.            Relevant Orders    Hemoglobin A1C    Comprehensive Metabolic Panel    Microalbumin/Creatinine Ratio, Urine      Other Visit Diagnoses     Need for case management follow-up        Relevant Orders    Ambulatory referral/consult to Outpatient Case Management    Essential hypertension  (Chronic)       Relevant Medications    amlodipine-benazepril 5-20 mg (LOTREL) 5-20 mg per capsule    chlorthalidone (HYGROTEN) 25 MG Tab    Need for pneumococcal vaccine        Relevant Orders    Pneumococcal Conjugate Vaccine (20 Valent) (IM) (Completed)    Left wrist pain        Relevant Orders    X-Ray Wrist Complete 3 views Left    X-Ray Hand 3 View Left    Ambulatory referral/consult to Orthopedics    SPLINT FOR HOME USE    Pain of left thumb        Relevant Orders    X-Ray Wrist Complete 3 views Left    X-Ray Hand 3 View Left    Ambulatory referral/consult to Orthopedics    SPLINT FOR HOME USE      Unless noted herein, any chronic conditions are represented as and appear compensated/controlled and stable, and no other significant complaints or concerns were reported.    PRESCRIPTION DRUG MANAGEMENT  Outpatient Medications Prior to Visit   Medication Sig Dispense Refill    albuterol (PROVENTIL) 2.5 mg /3 mL (0.083 %) nebulizer solution Take 3 mLs (2.5 mg total) by nebulization every 6 (six) hours while awake. 270 mL 11    albuterol (PROVENTIL/VENTOLIN HFA) 90 mcg/actuation inhaler Inhale 2 puffs into the lungs every 4 (four) hours as needed for Wheezing or Shortness of Breath. 54 g 3    blood sugar diagnostic Strp 1 each by Misc.(Non-Drug; Combo Route) route 3 (three)  times daily. [facility] test strips 100 each 3    blood-glucose meter kit Insurance preferred 1 each 0    cetirizine (ZYRTEC) 10 MG tablet Take 1 tablet (10 mg total) by mouth once daily. For sinus congestion 90 tablet 4    cilostazoL (PLETAL) 50 MG Tab Take 1 tablet (50 mg total) by mouth once daily. 60 tablet 11    fexofenadine (ALLEGRA) 180 MG tablet Take 1 tablet (180 mg total) by mouth once daily. 90 tablet 3    fluconazole (DIFLUCAN) 150 MG Tab TAKE ONE TABLET BY MOUTH AS A ONE-TIME DOSE      fluticasone propionate (FLONASE) 50 mcg/actuation nasal spray 2 sprays (100 mcg total) by Each Nostril route once daily. 48 g 4    ipratropium (ATROVENT) 21 mcg (0.03 %) nasal spray 2 sprays by Nasal route 3 (three) times daily. 20 mL 5    ipratropium-albuteroL (COMBIVENT)  mcg/actuation inhaler Inhale 2 puffs into the lungs every 4 (four) hours as needed for Wheezing or Shortness of Breath. Rescue 12 g 3    levocetirizine (XYZAL) 5 MG tablet Take 1 tablet (5 mg total) by mouth every evening. 30 tablet 11    montelukast (SINGULAIR) 10 mg tablet Take 1 tablet (10 mg total) by mouth every evening. 90 tablet 4    ondansetron (ZOFRAN-ODT) 4 MG TbDL 1-2 tablets PO every 6 hours as needed for nausea/vomiting while completing bowel prep 4 tablet 0    sod sulf-pot chloride-mag sulf (SUTAB) 1.479-0.188- 0.225 gram tablet Take 12 tablets by mouth once daily. Take according to package instructions with indicated amount of water. 24 tablet 0    varicella-zoster gE-AS01B, PF, (SHINGRIX) 50 mcg/0.5 mL injection Inject 0.5 mL (one dose) into muscle now; give second dose at least 2 months later 0.5 mL 1    amlodipine-benazepril 5-20 mg (LOTREL) 5-20 mg per capsule Take 1 capsule by mouth 2 (two) times daily. 180 capsule 4    butalbital-acetaminophen-caffeine -40 mg (FIORICET, ESGIC) -40 mg per tablet TAKE 1 TABLET BY MOUTH 3 (THREE) TIMES DAILY AS NEEDED FOR HEADACHES. (MAXIMUM OF 15 TABLETS PER MONTH) 30  tablet 3    calcium-vitamin D 600 mg(1,500mg) -400 unit Tab Take 2 tablets by mouth once daily. 180 tablet 4    chlorthalidone (HYGROTEN) 25 MG Tab Take 1 tablet (25 mg total) by mouth once daily. 90 tablet 4    clonazePAM (KLONOPIN) 0.25 MG TbDL Take 1 tablet (0.25 mg total) by mouth nightly. 30 tablet 5    empagliflozin (JARDIANCE) 25 mg tablet Take 1 tablet (25 mg total) by mouth once daily. 90 tablet 1    ezetimibe (ZETIA) 10 mg tablet Take 1 tablet (10 mg total) by mouth once daily. 90 tablet 4    pantoprazole (PROTONIX) 40 MG tablet Take 1 tablet (40 mg total) by mouth once daily. 90 tablet 4    potassium chloride SA (K-DUR,KLOR-CON) 20 MEQ tablet Take 1 tablet (20 mEq total) by mouth once daily. 90 tablet 4    rosuvastatin (CRESTOR) 20 MG tablet Take 1 tablet (20 mg total) by mouth once daily. 90 tablet 4    EPINEPHrine (EPIPEN) 0.3 mg/0.3 mL AtIn Inject 0.3 mLs (0.3 mg total) into the muscle once. for 1 dose 2 each 3    levoFLOXacin (LEVAQUIN) 500 MG tablet Take 1 tablet (500 mg total) by mouth once daily. 10 tablet 0    predniSONE (DELTASONE) 20 MG tablet Prednisone 60 mg/ day for 3 days, 40 mg/day for 3 days,20 mg/ day for 3 days, (1/2 tablet )10 mg a day for 3 days. 20 tablet 0     No facility-administered medications prior to visit.     Medications Discontinued During This Encounter   Medication Reason    predniSONE (DELTASONE) 20 MG tablet Therapy completed    levoFLOXacin (LEVAQUIN) 500 MG tablet Therapy completed    clonazePAM (KLONOPIN) 0.25 MG TbDL Patient no longer taking    amlodipine-benazepril 5-20 mg (LOTREL) 5-20 mg per capsule Reorder    butalbital-acetaminophen-caffeine -40 mg (FIORICET, ESGIC) -40 mg per tablet Reorder    calcium-vitamin D 600 mg(1,500mg) -400 unit Tab Reorder    chlorthalidone (HYGROTEN) 25 MG Tab Reorder    ezetimibe (ZETIA) 10 mg tablet Reorder    pantoprazole (PROTONIX) 40 MG tablet Reorder    potassium chloride SA (K-DUR,KLOR-CON) 20  MEQ tablet Reorder    rosuvastatin (CRESTOR) 20 MG tablet Reorder    empagliflozin (JARDIANCE) 25 mg tablet Reorder     Medications Ordered This Encounter   Medications    amlodipine-benazepril 5-20 mg (LOTREL) 5-20 mg per capsule     Sig: Take 1 capsule by mouth 2 (two) times daily.     Dispense:  180 capsule     Refill:  2     -    aspirin (ECOTRIN) 81 MG EC tablet     Sig: Take 1 tablet (81 mg total) by mouth once daily.     Dispense:  90 tablet     Refill:  4     -    butalbital-acetaminophen-caffeine -40 mg (FIORICET, ESGIC) -40 mg per tablet     Sig: TAKE 1 TABLET BY MOUTH 3 (THREE) TIMES DAILY AS NEEDED FOR HEADACHES. (MAXIMUM OF 15 TABLETS PER MONTH)     Dispense:  30 tablet     Refill:  3    calcium-vitamin D 600 mg-10 mcg (400 unit) Tab     Sig: Take 2 tablets by mouth once daily.     Dispense:  180 tablet     Refill:  4     -    chlorthalidone (HYGROTEN) 25 MG Tab     Sig: Take 1 tablet (25 mg total) by mouth once daily.     Dispense:  90 tablet     Refill:  2     -    empagliflozin (JARDIANCE) 25 mg tablet     Sig: Take 1 tablet (25 mg total) by mouth once daily.     Dispense:  90 tablet     Refill:  2     -    ezetimibe (ZETIA) 10 mg tablet     Sig: Take 1 tablet (10 mg total) by mouth once daily.     Dispense:  90 tablet     Refill:  2     -    pantoprazole (PROTONIX) 40 MG tablet     Sig: Take 1 tablet (40 mg total) by mouth once daily.     Dispense:  90 tablet     Refill:  3     -    potassium chloride SA (K-DUR,KLOR-CON) 20 MEQ tablet     Sig: Take 1 tablet (20 mEq total) by mouth once daily.     Dispense:  90 tablet     Refill:  2     -    rosuvastatin (CRESTOR) 20 MG tablet     Sig: Take 1 tablet (20 mg total) by mouth every evening.     Dispense:  90 tablet     Refill:  3      Review of Systems   Respiratory: Negative for chest tightness and shortness of breath.    Cardiovascular: Negative for chest pain.   Endocrine: Negative for polydipsia and polyuria.      PHYSICAL  "EXAM  Vitals:    06/03/22 0822   BP: 130/60   BP Location: Left arm   Patient Position: Sitting   BP Method: Medium (Manual)   Pulse: 97   Temp: 98 °F (36.7 °C)   TempSrc: Tympanic   SpO2: 98%   Weight: 62.4 kg (137 lb 9.1 oz)   Height: 5' 2" (1.575 m)   Physical Exam  Vitals reviewed.   Constitutional:       General: She is not in acute distress.     Appearance: Normal appearance. She is not ill-appearing or diaphoretic.   Cardiovascular:      Rate and Rhythm: Normal rate and regular rhythm.   Pulmonary:      Effort: Pulmonary effort is normal.      Breath sounds: Normal breath sounds.   Skin:     General: Skin is warm and dry.      Capillary Refill: Capillary refill takes less than 2 seconds.   Neurological:      General: No focal deficit present.      Mental Status: She is alert and oriented to person, place, and time. Mental status is at baseline.   Psychiatric:         Mood and Affect: Mood normal.         Behavior: Behavior normal.         Judgment: Judgment normal.       Orders Placed This Encounter    SPLINT FOR HOME USE    X-Ray Wrist Complete 3 views Left    X-Ray Hand 3 View Left    Pneumococcal Conjugate Vaccine (20 Valent) (IM)    Hemoglobin A1C    Comprehensive Metabolic Panel    Microalbumin/Creatinine Ratio, Urine    Ambulatory referral/consult to Outpatient Case Management    Ambulatory referral/consult to Orthopedics    empagliflozin (JARDIANCE) 25 mg tablet    amlodipine-benazepril 5-20 mg (LOTREL) 5-20 mg per capsule    calcium-vitamin D 600 mg-10 mcg (400 unit) Tab    chlorthalidone (HYGROTEN) 25 MG Tab    ezetimibe (ZETIA) 10 mg tablet    pantoprazole (PROTONIX) 40 MG tablet    potassium chloride SA (K-DUR,KLOR-CON) 20 MEQ tablet    aspirin (ECOTRIN) 81 MG EC tablet    butalbital-acetaminophen-caffeine -40 mg (FIORICET, ESGIC) -40 mg per tablet    rosuvastatin (CRESTOR) 20 MG tablet     TOTAL TIME evaluating and managing this patient for this encounter was greater " "than or equal to 45 minutes. This time was spent personally by me on the following activities: review of nurse's notes, pre-charting, review of patient's past medical history, assessing age-appropriate health maintenance needs, review of any interval history, obtaining history from the patient, examination of the patient, review and interpretation of lab results, answering patient's questions about lab results, evaluation of the patient's response to treatment, medication reconciliation, review of [place] Board of Pharmacy Controlled Prescription Drug Monitoring database records for the patient, managing and/or ordering prescription medications, ordering labs, ordering imaging tests, educating patient and answering their questions about treatment plan, goals of treatment, and follow-up, educating patient about needed immunizations and final documentation of the visit. This time was exclusive of any separately billable procedures for this patient and exclusive of time spent treating any other patients.   Documentation entered by me for this encounter may have been done in part using speech-recognition technology. Although I have made an effort to ensure accuracy, "sound like" errors may exist and should be interpreted in context.   "

## 2022-06-03 NOTE — ASSESSMENT & PLAN NOTE
Diabetes Management Status    Statin: Taking  ACE/ARB: Taking    Screening or Prevention Patient's value Goal Complete/Controlled?   HgA1C Testing and Control   Lab Results   Component Value Date    HGBA1C 6.4 (H) 05/27/2022      Annually/Less than 8% Yes   Lipid profile : 05/27/2022 Annually Yes   LDL control Lab Results   Component Value Date    LDLCALC 68.6 05/27/2022    Annually/Less than 100 mg/dl  Yes   Nephropathy screening Lab Results   Component Value Date    LABMICR 11.0 03/10/2021     Lab Results   Component Value Date    PROTEINUA 1+ (A) 12/16/2018    Annually No   Blood pressure BP Readings from Last 1 Encounters:   06/03/22 130/60    Less than 140/90 Yes   Dilated retinal exam : 08/18/2021 Annually Yes   Foot exam   : 06/03/2022 Annually Yes     Lab Results   Component Value Date    HGBA1C 6.4 (H) 05/27/2022    HGBA1C 6.4 (H) 03/04/2022    HGBA1C 6.3 (H) 11/22/2021    ESTGFRAFRICA 51.4 (A) 05/27/2022    ESTGFRAFRICA 51.4 (A) 05/27/2022    EGFRNONAA 44.6 (A) 05/27/2022    EGFRNONAA 44.6 (A) 05/27/2022    MICALBCREAT 18.6 03/10/2021    LDLCALC 68.6 05/27/2022     No results found for: GLUTAMICACID, CPEPTIDE   Last 6 Patient Entered Readings                                          Most Recent A1c: 6.4% on 5/27/2022  (Goal: 8%)     Recent Readings 6/1/2022 5/30/2022 5/25/2022 5/17/2022 5/16/2022    Blood Glucose (mg/dL) 114 142 158 133 142         HEALTH MAINTENANCE: Diabetic health maintenance interventions reviewed and are up to date except for:  There are no preventive care reminders to display for this patient.     DIABETIC FOOT EXAM: Inspection of feet reveals no open wounds, ulcerations, significant calluses, or evidence of arterial insufficiency. 10g monofilament sensation is intact in all 5 locations tested.

## 2022-06-03 NOTE — ASSESSMENT & PLAN NOTE
Lab Results   Component Value Date    HGB 10.5 (L) 05/27/2022    HGB 9.9 (L) 02/16/2022    HGB 6.9 (L) 02/14/2022    HGB 10.6 (L) 10/21/2020    HCT 35.7 (L) 05/27/2022    HCT 33.4 (L) 02/16/2022    HCT 23.4 (L) 02/14/2022    HCT 28 (L) 11/19/2020     Lab Results   Component Value Date    IRON 34 03/04/2022    TRANSFERRIN 274 03/04/2022    TIBC 406 03/04/2022    FESATURATED 8 (L) 03/04/2022     Future Appointments   Date Time Provider Department Center   6/3/2022  1:00 PM Loren Simental MD [facility] [place]

## 2022-06-04 NOTE — PROGRESS NOTES
"Your x-rays showed "degenerative" or "wear-and-tear" type changes. The x-rays did NOT show any fracture or dislocation."

## 2022-06-15 ENCOUNTER — TELEPHONE (OUTPATIENT)
Dept: INTERNAL MEDICINE | Facility: CLINIC | Age: 72
End: 2022-06-15

## 2022-06-16 NOTE — TELEPHONE ENCOUNTER
@Kelsi Schrader, PharmD    Her allergist thinks her ACE-I may be causing facial swelling.    Would you please transition her off ACE-I to another category? Perhaps beta-blocker?    Thank you for your help caring for our patients!    -JOHN PAUL

## 2022-06-22 ENCOUNTER — OFFICE VISIT (OUTPATIENT)
Dept: ORTHOPEDICS | Facility: CLINIC | Age: 72
End: 2022-06-22
Payer: MEDICARE

## 2022-06-22 VITALS — WEIGHT: 138.69 LBS | BODY MASS INDEX: 27.23 KG/M2 | HEIGHT: 60 IN

## 2022-06-22 DIAGNOSIS — M18.12 ARTHRITIS OF CARPOMETACARPAL (CMC) JOINT OF LEFT THUMB: Primary | ICD-10-CM

## 2022-06-22 DIAGNOSIS — M65.4 DE QUERVAIN'S TENOSYNOVITIS, LEFT: ICD-10-CM

## 2022-06-22 PROCEDURE — 3060F POS MICROALBUMINURIA REV: CPT | Mod: CPTII,S$GLB,, | Performed by: ORTHOPAEDIC SURGERY

## 2022-06-22 PROCEDURE — 99999 PR PBB SHADOW E&M-EST. PATIENT-LVL II: CPT | Mod: PBBFAC,,, | Performed by: ORTHOPAEDIC SURGERY

## 2022-06-22 PROCEDURE — 1159F MED LIST DOCD IN RCRD: CPT | Mod: CPTII,S$GLB,, | Performed by: ORTHOPAEDIC SURGERY

## 2022-06-22 PROCEDURE — 3288F PR FALLS RISK ASSESSMENT DOCUMENTED: ICD-10-PCS | Mod: CPTII,S$GLB,, | Performed by: ORTHOPAEDIC SURGERY

## 2022-06-22 PROCEDURE — 3066F PR DOCUMENTATION OF TREATMENT FOR NEPHROPATHY: ICD-10-PCS | Mod: CPTII,S$GLB,, | Performed by: ORTHOPAEDIC SURGERY

## 2022-06-22 PROCEDURE — 99999 PR PBB SHADOW E&M-EST. PATIENT-LVL II: ICD-10-PCS | Mod: PBBFAC,,, | Performed by: ORTHOPAEDIC SURGERY

## 2022-06-22 PROCEDURE — 20550 TENDON SHEATH: ICD-10-PCS | Mod: 59,LT,S$GLB,

## 2022-06-22 PROCEDURE — 3072F PR LOW RISK FOR RETINOPATHY: ICD-10-PCS | Mod: CPTII,S$GLB,, | Performed by: ORTHOPAEDIC SURGERY

## 2022-06-22 PROCEDURE — 3066F NEPHROPATHY DOC TX: CPT | Mod: CPTII,S$GLB,, | Performed by: ORTHOPAEDIC SURGERY

## 2022-06-22 PROCEDURE — 3008F PR BODY MASS INDEX (BMI) DOCUMENTED: ICD-10-PCS | Mod: CPTII,S$GLB,, | Performed by: ORTHOPAEDIC SURGERY

## 2022-06-22 PROCEDURE — 20600 DRAIN/INJ JOINT/BURSA W/O US: CPT | Mod: 51,LT,S$GLB,

## 2022-06-22 PROCEDURE — 20550 NJX 1 TENDON SHEATH/LIGAMENT: CPT | Mod: 59,LT,S$GLB,

## 2022-06-22 PROCEDURE — 3072F LOW RISK FOR RETINOPATHY: CPT | Mod: CPTII,S$GLB,, | Performed by: ORTHOPAEDIC SURGERY

## 2022-06-22 PROCEDURE — 1160F PR REVIEW ALL MEDS BY PRESCRIBER/CLIN PHARMACIST DOCUMENTED: ICD-10-PCS | Mod: CPTII,S$GLB,, | Performed by: ORTHOPAEDIC SURGERY

## 2022-06-22 PROCEDURE — 3060F PR POS MICROALBUMINURIA RESULT DOCUMENTED/REVIEW: ICD-10-PCS | Mod: CPTII,S$GLB,, | Performed by: ORTHOPAEDIC SURGERY

## 2022-06-22 PROCEDURE — 1159F PR MEDICATION LIST DOCUMENTED IN MEDICAL RECORD: ICD-10-PCS | Mod: CPTII,S$GLB,, | Performed by: ORTHOPAEDIC SURGERY

## 2022-06-22 PROCEDURE — 4010F ACE/ARB THERAPY RXD/TAKEN: CPT | Mod: CPTII,S$GLB,, | Performed by: ORTHOPAEDIC SURGERY

## 2022-06-22 PROCEDURE — 1101F PR PT FALLS ASSESS DOC 0-1 FALLS W/OUT INJ PAST YR: ICD-10-PCS | Mod: CPTII,S$GLB,, | Performed by: ORTHOPAEDIC SURGERY

## 2022-06-22 PROCEDURE — 99203 PR OFFICE/OUTPT VISIT, NEW, LEVL III, 30-44 MIN: ICD-10-PCS | Mod: 25,S$GLB,, | Performed by: ORTHOPAEDIC SURGERY

## 2022-06-22 PROCEDURE — 99203 OFFICE O/P NEW LOW 30 MIN: CPT | Mod: 25,S$GLB,, | Performed by: ORTHOPAEDIC SURGERY

## 2022-06-22 PROCEDURE — 1125F PR PAIN SEVERITY QUANTIFIED, PAIN PRESENT: ICD-10-PCS | Mod: CPTII,S$GLB,, | Performed by: ORTHOPAEDIC SURGERY

## 2022-06-22 PROCEDURE — 20600 SMALL JOINT ASPIRATION/INJECTION: L THUMB CMC: ICD-10-PCS | Mod: 51,LT,S$GLB,

## 2022-06-22 PROCEDURE — 3044F PR MOST RECENT HEMOGLOBIN A1C LEVEL <7.0%: ICD-10-PCS | Mod: CPTII,S$GLB,, | Performed by: ORTHOPAEDIC SURGERY

## 2022-06-22 PROCEDURE — 1160F RVW MEDS BY RX/DR IN RCRD: CPT | Mod: CPTII,S$GLB,, | Performed by: ORTHOPAEDIC SURGERY

## 2022-06-22 PROCEDURE — 3288F FALL RISK ASSESSMENT DOCD: CPT | Mod: CPTII,S$GLB,, | Performed by: ORTHOPAEDIC SURGERY

## 2022-06-22 PROCEDURE — 4010F PR ACE/ARB THEARPY RXD/TAKEN: ICD-10-PCS | Mod: CPTII,S$GLB,, | Performed by: ORTHOPAEDIC SURGERY

## 2022-06-22 PROCEDURE — 3044F HG A1C LEVEL LT 7.0%: CPT | Mod: CPTII,S$GLB,, | Performed by: ORTHOPAEDIC SURGERY

## 2022-06-22 PROCEDURE — 1101F PT FALLS ASSESS-DOCD LE1/YR: CPT | Mod: CPTII,S$GLB,, | Performed by: ORTHOPAEDIC SURGERY

## 2022-06-22 PROCEDURE — 3008F BODY MASS INDEX DOCD: CPT | Mod: CPTII,S$GLB,, | Performed by: ORTHOPAEDIC SURGERY

## 2022-06-22 PROCEDURE — 1125F AMNT PAIN NOTED PAIN PRSNT: CPT | Mod: CPTII,S$GLB,, | Performed by: ORTHOPAEDIC SURGERY

## 2022-06-22 RX ORDER — TRIAMCINOLONE ACETONIDE 40 MG/ML
40 INJECTION, SUSPENSION INTRA-ARTICULAR; INTRAMUSCULAR
Status: DISCONTINUED | OUTPATIENT
Start: 2022-06-22 | End: 2022-06-22 | Stop reason: HOSPADM

## 2022-06-22 RX ADMIN — TRIAMCINOLONE ACETONIDE 40 MG: 40 INJECTION, SUSPENSION INTRA-ARTICULAR; INTRAMUSCULAR at 07:06

## 2022-06-22 NOTE — PROGRESS NOTES
SUBJECTIVE:        Chief Complaint: Pain of the Left Wrist and Pain of the Left Hand      Referring Provider: VICK Sandoval MD     History of Present Illness:  Patient is a 72 y.o. right hand dominant female who presents today with complaints of left wrist and left thumb pain. Onset was about 2 months ago. The pain is located on the radial side of the thumb and wrist over the radial styloid. Symptoms are aggravated by movement and alleviated with rest. The patient rates their pain as a 7/10 today. She has not tried anything for relief. The patient denies any fevers, chills, N/V, D/C and presents for evaluation.       Past Medical History:   Diagnosis Date    Atherosclerosis of artery of both lower extremities 1/17/2014    Atherosclerosis of native coronary artery of native heart without angina pectoris 11/18/2016    Atherosclerotic PVD with intermittent claudication 1/17/2014    Chronic bronchitis     COPD (chronic obstructive pulmonary disease)     Diabetes with neurologic complications     Dyslipidemia associated with type 2 diabetes mellitus 6/14/2013    Dyslipidemia associated with type 2 diabetes mellitus     Ex-smoker     Gastroesophageal reflux disease without esophagitis 1/25/2019    Hyperlipidemia     Hypertension     Iron deficiency anemia secondary to inadequate dietary iron intake 6/3/2022    NSCLC of left lung 11/19/2020    IVANIA (obstructive sleep apnea) 2/11/2021    Osteoporosis 1/16 rosita 1/18    Pneumothorax after biopsy 10/21/2020    PVD (peripheral vascular disease)     Renal manifestation of secondary diabetes mellitus     S/P peripheral artery angioplasty 2/7/2014    Seasonal allergic rhinitis due to pollen     Simple chronic bronchitis     Tobacco dependence     Type 2 diabetes mellitus with microalbuminuria, without long-term current use of insulin 3/29/2019    Type 2 diabetes mellitus with peripheral vascular disease     Type 2 diabetes mellitus with stage 3  chronic kidney disease, without long-term current use of insulin 2/1/2019    Type 2 diabetes mellitus without retinopathy 2/1/2019    Urinary incontinence      Past Surgical History:   Procedure Laterality Date    ANGIOPLASTY Bilateral 01/24/2014    aortoiliac stenting     CARPAL TUNNEL RELEASE  2003    [facility]    COLECTOMY  approximate 2005    pt states 1in colon -dx with benign mass removed-states no colon cancer    COLONOSCOPY N/A 11/9/2016    Procedure: COLONOSCOPY;  Surgeon: Dmitri Sterling MD;  Location: [facility];  Service: Endoscopy;  Laterality: N/A;    COLONOSCOPY N/A 11/15/2019    Procedure: COLONOSCOPY;  Surgeon: Marko Vicente MD;  Location: [facility];  Service: Endoscopy;  Laterality: N/A;    COLONOSCOPY N/A 3/25/2022    Procedure: COLONOSCOPY;  Surgeon: Paola Feldman MD;  Location: [facility];  Service: Endoscopy;  Laterality: N/A;    ESOPHAGOGASTRODUODENOSCOPY N/A 3/25/2022    Procedure: EGD (ESOPHAGOGASTRODUODENOSCOPY);  Surgeon: Paola Feldman MD;  Location: [facility];  Service: Endoscopy;  Laterality: N/A;    HYSTERECTOMY      no cancer    INJECTION OF ANESTHETIC AGENT AROUND MULTIPLE INTERCOSTAL NERVES Left 11/19/2020    Procedure: BLOCK, NERVE, INTERCOSTAL, 2 OR MORE;  Surgeon: Amrit Flores MD;  Location: [facility] OR [address] [place] [address];  Service: Thoracic;  Laterality: Left;    OOPHORECTOMY      SURGICAL REMOVAL OF LYMPH NODE Left 11/19/2020    Procedure: EXCISION, LYMPH NODE;  Surgeon: Amrit Flores MD;  [facility];  Service: Thoracic;  Laterality: Left;  mediastinal lymph node disection    XI ROBOTIC RATS,WITH LOBECTOMY,LUNG Left 11/19/2020    Procedure: XI ROBOTIC RATS,WITH LOBECTOMY,LUNG;  Surgeon: Amrit Flores MD;  [facility];  Service: Thoracic;  Laterality: Left;  Lingulectomy.     Review of patient's allergies indicates:   Allergen Reactions    Iodine and iodide containing products Swelling    Skin staples [surgical stainless  steel] Swelling    Egg derived      Shortness breath, lip swelling    Fish containing products     Latex, natural rubber Swelling    Losartan Itching    Nickel     Pravastatin      40 mg causes nausea vomitting but 20 mg ok    Shellfish containing products Swelling     Social History     Social History Narrative    Son smoker, no pets in household.     Family History   Problem Relation Age of Onset    Stroke Father     Stroke Sister     Asthma Daughter     Diabetes Daughter     Breast cancer Daughter     Asthma Son          Current Outpatient Medications:     albuterol (PROVENTIL) 2.5 mg /3 mL (0.083 %) nebulizer solution, Take 3 mLs (2.5 mg total) by nebulization every 6 (six) hours while awake., Disp: 270 mL, Rfl: 11    albuterol (PROVENTIL/VENTOLIN HFA) 90 mcg/actuation inhaler, Inhale 2 puffs into the lungs every 4 (four) hours as needed for Wheezing or Shortness of Breath., Disp: 54 g, Rfl: 3    amlodipine-benazepril 5-20 mg (LOTREL) 5-20 mg per capsule, Take 1 capsule by mouth 2 (two) times daily., Disp: 180 capsule, Rfl: 2    aspirin (ECOTRIN) 81 MG EC tablet, Take 1 tablet (81 mg total) by mouth once daily., Disp: 90 tablet, Rfl: 4    blood sugar diagnostic Strp, 1 each by Misc.(Non-Drug; Combo Route) route 3 (three) times daily. [employer] test strips, Disp: 100 each, Rfl: 3    blood-glucose meter kit, Insurance preferred, Disp: 1 each, Rfl: 0    butalbital-acetaminophen-caffeine -40 mg (FIORICET, ESGIC) -40 mg per tablet, TAKE 1 TABLET BY MOUTH 3 (THREE) TIMES DAILY AS NEEDED FOR HEADACHES. (MAXIMUM OF 15 TABLETS PER MONTH), Disp: 30 tablet, Rfl: 3    calcium-vitamin D 600 mg-10 mcg (400 unit) Tab, Take 2 tablets by mouth once daily., Disp: 180 tablet, Rfl: 4    cetirizine (ZYRTEC) 10 MG tablet, Take 1 tablet (10 mg total) by mouth once daily. For sinus congestion, Disp: 90 tablet, Rfl: 4    chlorthalidone (HYGROTEN) 25 MG Tab, Take 1 tablet (25 mg total) by mouth once  daily., Disp: 90 tablet, Rfl: 2    cilostazoL (PLETAL) 50 MG Tab, Take 1 tablet (50 mg total) by mouth once daily., Disp: 60 tablet, Rfl: 11    COVID-19 vac, moiz,Pfizer,,PF, (PFIZER COVID-19 MOIZ VACCN,PF,) 30 mcg/0.3 mL injection, Inject into the muscle., Disp: 0.3 mL, Rfl: 0    empagliflozin (JARDIANCE) 25 mg tablet, Take 1 tablet (25 mg total) by mouth once daily., Disp: 90 tablet, Rfl: 2    ezetimibe (ZETIA) 10 mg tablet, Take 1 tablet (10 mg total) by mouth once daily., Disp: 90 tablet, Rfl: 2    fexofenadine (ALLEGRA) 180 MG tablet, Take 1 tablet (180 mg total) by mouth once daily., Disp: 90 tablet, Rfl: 3    fluconazole (DIFLUCAN) 150 MG Tab, TAKE ONE TABLET BY MOUTH AS A ONE-TIME DOSE, Disp: , Rfl:     fluticasone propionate (FLONASE) 50 mcg/actuation nasal spray, 2 sprays (100 mcg total) by Each Nostril route once daily., Disp: 48 g, Rfl: 4    ipratropium (ATROVENT) 21 mcg (0.03 %) nasal spray, 2 sprays by Nasal route 3 (three) times daily., Disp: 20 mL, Rfl: 5    ipratropium-albuteroL (COMBIVENT)  mcg/actuation inhaler, Inhale 2 puffs into the lungs every 4 (four) hours as needed for Wheezing or Shortness of Breath. Rescue, Disp: 12 g, Rfl: 3    levocetirizine (XYZAL) 5 MG tablet, Take 1 tablet (5 mg total) by mouth every evening., Disp: 30 tablet, Rfl: 11    montelukast (SINGULAIR) 10 mg tablet, Take 1 tablet (10 mg total) by mouth every evening., Disp: 90 tablet, Rfl: 4    ondansetron (ZOFRAN-ODT) 4 MG TbDL, 1-2 tablets PO every 6 hours as needed for nausea/vomiting while completing bowel prep, Disp: 4 tablet, Rfl: 0    pantoprazole (PROTONIX) 40 MG tablet, Take 1 tablet (40 mg total) by mouth once daily., Disp: 90 tablet, Rfl: 3    potassium chloride SA (K-DUR,KLOR-CON) 20 MEQ tablet, Take 1 tablet (20 mEq total) by mouth once daily., Disp: 90 tablet, Rfl: 2    rosuvastatin (CRESTOR) 20 MG tablet, Take 1 tablet (20 mg total) by mouth every evening., Disp: 90 tablet, Rfl: 3    sod  sulf-pot chloride-mag sulf (SUTAB) 1.479-0.188- 0.225 gram tablet, Take 12 tablets by mouth once daily. Take according to package instructions with indicated amount of water., Disp: 24 tablet, Rfl: 0    varicella-zoster gE-AS01B, PF, (SHINGRIX) 50 mcg/0.5 mL injection, Inject 0.5 mL (one dose) into muscle now; give second dose at least 2 months later, Disp: 0.5 mL, Rfl: 1    EPINEPHrine (EPIPEN) 0.3 mg/0.3 mL AtIn, Inject 0.3 mLs (0.3 mg total) into the muscle once. for 1 dose, Disp: 2 each, Rfl: 3      Review of Systems:  Constitutional: Negative for chills and fever.   Respiratory: Negative for cough and shortness of breath.    Gastrointestinal: Negative for nausea and vomiting.   Skin: Negative for rash.   Neurological: Negative for dizziness and headaches.   Psychiatric/Behavioral: Negative for depression.   MSK as in HPI     OBJECTIVE:      Vital Signs (Most Recent):  Vitals:    06/22/22 0723   Weight: 62.9 kg (138 lb 10.7 oz)   Height: 5' (1.524 m)     Body mass index is 27.08 kg/m².      Physical Exam:  Constitutional: The patient appears well-developed and well-nourished. No distress.   Skin: No lesions appreciated  Head: Normocephalic and atraumatic.   Nose: Nose normal.   Ears: No deformities seen  Eyes: Conjunctivae and EOM are normal.   Neck: No tracheal deviation present.   Cardiovascular: Normal rate and intact distal pulses.    Pulmonary/Chest: Effort normal. No respiratory distress.   Abdominal: There is no guarding.   Neurological: The patient is alert.   Psychiatric: The patient has a normal mood and affect.             Left Hand/Wrist Exam     Inspection   Scars: Wrist - absent Hand -  absent  Effusion: Wrist - absent Hand -  absent    Pain   Wrist - The patient exhibits pain of the CMC.    Tenderness   The patient is tender to palpation of the radial area.     Tests   Tinel's sign (median nerve): negative  Finkelstein's test: positive  Carpal Tunnel Compression Test:  negative    Atrophy  Thenar:  Negative    Other     Sensory Exam  Median Distribution: normal  Ulnar Distribution: normal  Radial Distribution: normal    Comments:  Patient is tender to palpation over the radial side of wrist first dorsal compartment at level of radial styloid. She is also tender at the basal joint of the thumb. No motor or sensory deficits. No signs of infection.          Vascular Exam       Capillary Refill  Left Hand: normal capillary refill            Diagnostic Results:     Imaging:   XR Left Hand  FINDINGS:  There is no radiographic evidence of acute osseous, articular, or soft tissue abnormality.  Mild-to-moderate osteoarthritis noted at the 1st CMC joint.  Other very Mild scattered degenerative findings are seen throughout the DIP joints.  No erosive changes demonstrated.     Impression:  Degenerative findings as above    Dr. Burch independently viewed the patient's imaging as well as the radiology report.  Xrays of the patient's left hand demonstrate CMC arthritis, no evidence of any acute fractures or dislocations    ASSESSMENT/PLAN:        ICD-10-CM ICD-9-CM   1. Arthritis of carpometacarpal (CMC) joint of left thumb  M18.12 716.94   2. De Quervain's tenosynovitis, left  M65.4 727.04     Orders Placed This Encounter    Small Joint Aspiration/Injection: L thumb CMC    Tendon Sheath     Orders Placed This Encounter   Procedures    Small Joint Aspiration/Injection: L thumb CMC    Tendon Sheath       72 y.o. yo female with CMC arthritis of left thumb and De Quervain's left wrist  Plan: The patient and Dr. Burch had a thorough discussion today.  We discussed the working diagnosis as well as several other potential alternative diagnoses.  Treatment options were discussed, both conservative and surgical.  Conservative treatment options would include things such as activity modifications, workplace modifications, a period of rest, oral vs topical OTC and prescription anti-inflammatory  medications, occupational therapy, splinting/bracing, immobilization, corticosteroid injections, and others.  Surgical options were discussed as well.     -At this time, the patient would like to proceed with a trial of injections.  -Left De Quervain injection given today.  -Left CMC joint injection given today.  -Thumb spica splint given to use PRN  -RTC PRN    PROCEDURE:  I have explained the risks, benefits, and alternatives of the procedure in detail.  The patient voices understanding, gives consent, and all questions have been answered.  Pause for timeout. A sterile prep of the skin performed, then the left 1st dorsal compartment is injected from the radial approach using a 25 gauge needle with a combination of 0.5 cc 2% plain lidocaine and 0.5 cc kenalog.  The patient is cautioned and immediate relief of pain is secondary to the local anesthetic and will be temporary.  After the anesthetic wears off there may be a increase in pain that may last for a few hours or a few days and they should use ice to help alleviate this flair up of pain. Patient tolerated the procedure well.     PROCEDURE NOTE:  She has a diagnosis of CMC osteoarthritis. We have discussed surgical and non-surgical options at this point. Risks and benefits of corticosteroid injections discussed in detail. Patient wishes to proceed. After verbal consent and pause for timeout, the base of the thumb was prepped in sterile fashion. The left thumb CMC joint was injected with 0.5 cc Kenalog and 0.5 cc 2% plain lidocaine. The patient tolerated the injection well.       Should the patient's symptoms worsen, persist, or fail to improve they should return for reevaluation.    Marysol Horvath PA-C   [facility]      Please be aware that this note has been generated with the assistance of Audra voice-to-text.  Please excuse any spelling or grammatical errors.         DISCHARGE

## 2022-06-22 NOTE — PROCEDURES
Small Joint Aspiration/Injection: L thumb CMC    Date/Time: 6/22/2022 7:30 AM  Performed by: Marysol Horvath PA-C  Authorized by: Marysol Horvath PA-C     Consent Done?:  Yes (Verbal)  Indications:  Arthritis  Site marked: the procedure site was marked    Timeout: prior to procedure the correct patient, procedure, and site was verified    Prep: patient was prepped and draped in usual sterile fashion      Local anesthesia used?: Yes    Local anesthetic:  Lidocaine 2% without epinephrine  Anesthetic total (ml):  0.5    Location:  Thumb  Site:  L thumb CMC  Ultrasonic guidance for needle placement?: No    Needle size:  25 G  Approach:  Radial  Medications:  40 mg triamcinolone acetonide 40 mg/mL  Patient tolerance:  Patient tolerated the procedure well with no immediate complications    Tendon Sheath    Date/Time: 6/22/2022 7:30 AM  Performed by: Marysol Horvath PA-C  Authorized by: Marysol Horvath PA-C     Consent Done?:  Yes (Verbal)  Indications:  Pain  Site marked: the procedure site was marked    Timeout: prior to procedure the correct patient, procedure, and site was verified    Prep: patient was prepped and draped in usual sterile fashion      Local anesthesia used?: Yes    Local anesthetic:  Lidocaine 2% without epinephrine  Anesthetic total (ml):  0.5    Location:  Wrist  Site:  L first doral compartment  Ultrasonic guidance for needle placement?: No    Needle size:  25 G  Approach:  Radial  Medications:  40 mg triamcinolone acetonide 40 mg/mL  Patient tolerance:  Patient tolerated the procedure well with no immediate complications

## 2022-06-24 ENCOUNTER — OFFICE VISIT (OUTPATIENT)
Dept: CARDIOLOGY | Facility: CLINIC | Age: 72
End: 2022-06-24
Payer: MEDICARE

## 2022-06-24 VITALS
WEIGHT: 140.63 LBS | BODY MASS INDEX: 27.61 KG/M2 | OXYGEN SATURATION: 96 % | HEIGHT: 60 IN | DIASTOLIC BLOOD PRESSURE: 62 MMHG | HEART RATE: 95 BPM | SYSTOLIC BLOOD PRESSURE: 140 MMHG

## 2022-06-24 DIAGNOSIS — J44.9 CHRONIC OBSTRUCTIVE PULMONARY DISEASE, UNSPECIFIED COPD TYPE: ICD-10-CM

## 2022-06-24 DIAGNOSIS — E78.5 DYSLIPIDEMIA ASSOCIATED WITH TYPE 2 DIABETES MELLITUS: Chronic | ICD-10-CM

## 2022-06-24 DIAGNOSIS — I25.10 ATHEROSCLEROSIS OF NATIVE CORONARY ARTERY OF NATIVE HEART WITHOUT ANGINA PECTORIS: ICD-10-CM

## 2022-06-24 DIAGNOSIS — E11.59 HYPERTENSION ASSOCIATED WITH DIABETES: ICD-10-CM

## 2022-06-24 DIAGNOSIS — E11.51 TYPE 2 DIABETES MELLITUS WITH PERIPHERAL VASCULAR DISEASE: Chronic | ICD-10-CM

## 2022-06-24 DIAGNOSIS — Z98.62 S/P PERIPHERAL ARTERY ANGIOPLASTY: ICD-10-CM

## 2022-06-24 DIAGNOSIS — E11.22 TYPE 2 DIABETES MELLITUS WITH STAGE 3A CHRONIC KIDNEY DISEASE, WITHOUT LONG-TERM CURRENT USE OF INSULIN: Chronic | ICD-10-CM

## 2022-06-24 DIAGNOSIS — I15.2 HYPERTENSION ASSOCIATED WITH DIABETES: ICD-10-CM

## 2022-06-24 DIAGNOSIS — E11.69 DYSLIPIDEMIA ASSOCIATED WITH TYPE 2 DIABETES MELLITUS: Chronic | ICD-10-CM

## 2022-06-24 DIAGNOSIS — I70.213 ATHEROSCLEROSIS OF NATIVE ARTERY OF BOTH LOWER EXTREMITIES WITH INTERMITTENT CLAUDICATION: ICD-10-CM

## 2022-06-24 DIAGNOSIS — I35.0 NONRHEUMATIC AORTIC VALVE STENOSIS: ICD-10-CM

## 2022-06-24 DIAGNOSIS — E78.5 DYSLIPIDEMIA ASSOCIATED WITH TYPE 2 DIABETES MELLITUS: Primary | ICD-10-CM

## 2022-06-24 DIAGNOSIS — G47.33 OSA (OBSTRUCTIVE SLEEP APNEA): ICD-10-CM

## 2022-06-24 DIAGNOSIS — Z91.89 PULMONARY NODULE LESS THAN 6 MM IN DIAMETER WITH HIGH RISK FOR MALIGNANT NEOPLASM: ICD-10-CM

## 2022-06-24 DIAGNOSIS — I70.0 ATHEROSCLEROSIS OF AORTA: ICD-10-CM

## 2022-06-24 DIAGNOSIS — E11.69 DYSLIPIDEMIA ASSOCIATED WITH TYPE 2 DIABETES MELLITUS: Primary | ICD-10-CM

## 2022-06-24 DIAGNOSIS — I70.213 ATHEROSCLEROSIS OF NATIVE ARTERY OF BOTH LOWER EXTREMITIES WITH INTERMITTENT CLAUDICATION: Primary | Chronic | ICD-10-CM

## 2022-06-24 DIAGNOSIS — I77.1 TORTUOUS AORTA: ICD-10-CM

## 2022-06-24 DIAGNOSIS — E11.29 TYPE 2 DIABETES MELLITUS WITH MICROALBUMINURIA, WITHOUT LONG-TERM CURRENT USE OF INSULIN: Chronic | ICD-10-CM

## 2022-06-24 DIAGNOSIS — R91.1 PULMONARY NODULE LESS THAN 6 MM IN DIAMETER WITH HIGH RISK FOR MALIGNANT NEOPLASM: ICD-10-CM

## 2022-06-24 DIAGNOSIS — R80.9 TYPE 2 DIABETES MELLITUS WITH MICROALBUMINURIA, WITHOUT LONG-TERM CURRENT USE OF INSULIN: ICD-10-CM

## 2022-06-24 DIAGNOSIS — N18.31 TYPE 2 DIABETES MELLITUS WITH STAGE 3A CHRONIC KIDNEY DISEASE, WITHOUT LONG-TERM CURRENT USE OF INSULIN: Chronic | ICD-10-CM

## 2022-06-24 DIAGNOSIS — R80.9 TYPE 2 DIABETES MELLITUS WITH MICROALBUMINURIA, WITHOUT LONG-TERM CURRENT USE OF INSULIN: Chronic | ICD-10-CM

## 2022-06-24 DIAGNOSIS — E11.29 TYPE 2 DIABETES MELLITUS WITH MICROALBUMINURIA, WITHOUT LONG-TERM CURRENT USE OF INSULIN: ICD-10-CM

## 2022-06-24 PROCEDURE — 3044F PR MOST RECENT HEMOGLOBIN A1C LEVEL <7.0%: ICD-10-PCS | Mod: CPTII,S$GLB,, | Performed by: INTERNAL MEDICINE

## 2022-06-24 PROCEDURE — 3077F SYST BP >= 140 MM HG: CPT | Mod: CPTII,S$GLB,, | Performed by: INTERNAL MEDICINE

## 2022-06-24 PROCEDURE — 99499 UNLISTED E&M SERVICE: CPT | Mod: S$GLB,,, | Performed by: INTERNAL MEDICINE

## 2022-06-24 PROCEDURE — 99214 OFFICE O/P EST MOD 30 MIN: CPT | Mod: S$GLB,,, | Performed by: INTERNAL MEDICINE

## 2022-06-24 PROCEDURE — 3078F DIAST BP <80 MM HG: CPT | Mod: CPTII,S$GLB,, | Performed by: INTERNAL MEDICINE

## 2022-06-24 PROCEDURE — 1160F RVW MEDS BY RX/DR IN RCRD: CPT | Mod: CPTII,S$GLB,, | Performed by: INTERNAL MEDICINE

## 2022-06-24 PROCEDURE — 3077F PR MOST RECENT SYSTOLIC BLOOD PRESSURE >= 140 MM HG: ICD-10-PCS | Mod: CPTII,S$GLB,, | Performed by: INTERNAL MEDICINE

## 2022-06-24 PROCEDURE — 3060F POS MICROALBUMINURIA REV: CPT | Mod: CPTII,S$GLB,, | Performed by: INTERNAL MEDICINE

## 2022-06-24 PROCEDURE — 99499 RISK ADDL DX/OHS AUDIT: ICD-10-PCS | Mod: S$GLB,,, | Performed by: INTERNAL MEDICINE

## 2022-06-24 PROCEDURE — 1101F PT FALLS ASSESS-DOCD LE1/YR: CPT | Mod: CPTII,S$GLB,, | Performed by: INTERNAL MEDICINE

## 2022-06-24 PROCEDURE — 3288F FALL RISK ASSESSMENT DOCD: CPT | Mod: CPTII,S$GLB,, | Performed by: INTERNAL MEDICINE

## 2022-06-24 PROCEDURE — 4010F ACE/ARB THERAPY RXD/TAKEN: CPT | Mod: CPTII,S$GLB,, | Performed by: INTERNAL MEDICINE

## 2022-06-24 PROCEDURE — 3288F PR FALLS RISK ASSESSMENT DOCUMENTED: ICD-10-PCS | Mod: CPTII,S$GLB,, | Performed by: INTERNAL MEDICINE

## 2022-06-24 PROCEDURE — 3066F NEPHROPATHY DOC TX: CPT | Mod: CPTII,S$GLB,, | Performed by: INTERNAL MEDICINE

## 2022-06-24 PROCEDURE — 3078F PR MOST RECENT DIASTOLIC BLOOD PRESSURE < 80 MM HG: ICD-10-PCS | Mod: CPTII,S$GLB,, | Performed by: INTERNAL MEDICINE

## 2022-06-24 PROCEDURE — 99214 PR OFFICE/OUTPT VISIT, EST, LEVL IV, 30-39 MIN: ICD-10-PCS | Mod: S$GLB,,, | Performed by: INTERNAL MEDICINE

## 2022-06-24 PROCEDURE — 3044F HG A1C LEVEL LT 7.0%: CPT | Mod: CPTII,S$GLB,, | Performed by: INTERNAL MEDICINE

## 2022-06-24 PROCEDURE — 99999 PR PBB SHADOW E&M-EST. PATIENT-LVL III: CPT | Mod: PBBFAC,,, | Performed by: INTERNAL MEDICINE

## 2022-06-24 PROCEDURE — 1126F AMNT PAIN NOTED NONE PRSNT: CPT | Mod: CPTII,S$GLB,, | Performed by: INTERNAL MEDICINE

## 2022-06-24 PROCEDURE — 1159F MED LIST DOCD IN RCRD: CPT | Mod: CPTII,S$GLB,, | Performed by: INTERNAL MEDICINE

## 2022-06-24 PROCEDURE — 3008F PR BODY MASS INDEX (BMI) DOCUMENTED: ICD-10-PCS | Mod: CPTII,S$GLB,, | Performed by: INTERNAL MEDICINE

## 2022-06-24 PROCEDURE — 3072F PR LOW RISK FOR RETINOPATHY: ICD-10-PCS | Mod: CPTII,S$GLB,, | Performed by: INTERNAL MEDICINE

## 2022-06-24 PROCEDURE — 1159F PR MEDICATION LIST DOCUMENTED IN MEDICAL RECORD: ICD-10-PCS | Mod: CPTII,S$GLB,, | Performed by: INTERNAL MEDICINE

## 2022-06-24 PROCEDURE — 1101F PR PT FALLS ASSESS DOC 0-1 FALLS W/OUT INJ PAST YR: ICD-10-PCS | Mod: CPTII,S$GLB,, | Performed by: INTERNAL MEDICINE

## 2022-06-24 PROCEDURE — 3008F BODY MASS INDEX DOCD: CPT | Mod: CPTII,S$GLB,, | Performed by: INTERNAL MEDICINE

## 2022-06-24 PROCEDURE — 1160F PR REVIEW ALL MEDS BY PRESCRIBER/CLIN PHARMACIST DOCUMENTED: ICD-10-PCS | Mod: CPTII,S$GLB,, | Performed by: INTERNAL MEDICINE

## 2022-06-24 PROCEDURE — 3072F LOW RISK FOR RETINOPATHY: CPT | Mod: CPTII,S$GLB,, | Performed by: INTERNAL MEDICINE

## 2022-06-24 PROCEDURE — 3060F PR POS MICROALBUMINURIA RESULT DOCUMENTED/REVIEW: ICD-10-PCS | Mod: CPTII,S$GLB,, | Performed by: INTERNAL MEDICINE

## 2022-06-24 PROCEDURE — 1126F PR PAIN SEVERITY QUANTIFIED, NO PAIN PRESENT: ICD-10-PCS | Mod: CPTII,S$GLB,, | Performed by: INTERNAL MEDICINE

## 2022-06-24 PROCEDURE — 3066F PR DOCUMENTATION OF TREATMENT FOR NEPHROPATHY: ICD-10-PCS | Mod: CPTII,S$GLB,, | Performed by: INTERNAL MEDICINE

## 2022-06-24 PROCEDURE — 99999 PR PBB SHADOW E&M-EST. PATIENT-LVL III: ICD-10-PCS | Mod: PBBFAC,,, | Performed by: INTERNAL MEDICINE

## 2022-06-24 PROCEDURE — 4010F PR ACE/ARB THEARPY RXD/TAKEN: ICD-10-PCS | Mod: CPTII,S$GLB,, | Performed by: INTERNAL MEDICINE

## 2022-06-24 RX ORDER — CILOSTAZOL 100 MG/1
100 TABLET ORAL DAILY
Qty: 180 TABLET | Refills: 3 | Status: SHIPPED | OUTPATIENT
Start: 2022-06-24 | End: 2022-11-18 | Stop reason: DRUGHIGH

## 2022-06-24 NOTE — PROGRESS NOTES
Subjective:   Patient ID:  Lucia Barlow is a 72 y.o. female who presents for follow up of No chief complaint on file.      HPI  2020  A 69 yo female with pvd s/p pta copd smoker copd diabetes (stopped metformin due to gi side effects and lost weight) cad htn hlp ckd is here for f/u decreased smoking but has not stopped no claudication no chest pain or shortness of breath no chest pain has heart burn has been eating a lot of cheese/. Has no leg swelling. Tomatoes triggers heart burn.her a1c is in order however her lipids have increased has been eating a lot of cheese.      3/3/2021  Here for f/u had lung surgery for lung ca in November still has lefts  pain improving not smoking anymore still eating cheese compliant with meds has no claudication  Symptoms has rt hip pain that appears musculoskeletal. Has no angina tia chf . No syncope near syncope compliant with meds. Slat intake could improve.      2021  Has lingulectomy due  Lung cancer still has anterior left chest pain has seen  thoracic surgery has no angina she has now return of hip claudication over the past 5 months bilateral. She has to stop at 50 feet she can barely make it no discoloration or weakness in leg  Has no tia no chf has shortness of breath times  Her ekg is unchanged. She is not using cpap she returnesd her machine .     12/3/2021   Here for f/u she is cancer free in remission after he rlung surgery not smoking has no leg pain starting in her hips  All the way down posteriorely to the calves. She  Feels like tightness and cramps has to rest for relief this is only exertional. She is trying to be compliant with diet and emds. Has no nocturnal symptoms no discoloration in feet.     2022   here fro f/u not smoking has no limiting claudication taking pletal no bleeding issues compliant with meds lipids on target.  Past Medical History:   Diagnosis Date    Atherosclerosis of artery of both lower extremities 2014     Atherosclerosis of native coronary artery of native heart without angina pectoris 11/18/2016    Atherosclerotic PVD with intermittent claudication 1/17/2014    Chronic bronchitis     COPD (chronic obstructive pulmonary disease)     Diabetes with neurologic complications     Dyslipidemia associated with type 2 diabetes mellitus 6/14/2013    Dyslipidemia associated with type 2 diabetes mellitus     Ex-smoker     Gastroesophageal reflux disease without esophagitis 1/25/2019    Hyperlipidemia     Hypertension     Iron deficiency anemia secondary to inadequate dietary iron intake 6/3/2022    NSCLC of left lung 11/19/2020    IVANIA (obstructive sleep apnea) 2/11/2021    Osteoporosis 1/16 rosita 1/18    Pneumothorax after biopsy 10/21/2020    PVD (peripheral vascular disease)     Renal manifestation of secondary diabetes mellitus     S/P peripheral artery angioplasty 2/7/2014    Seasonal allergic rhinitis due to pollen     Simple chronic bronchitis     Tobacco dependence     Type 2 diabetes mellitus with microalbuminuria, without long-term current use of insulin 3/29/2019    Type 2 diabetes mellitus with peripheral vascular disease     Type 2 diabetes mellitus with stage 3 chronic kidney disease, without long-term current use of insulin 2/1/2019    Type 2 diabetes mellitus without retinopathy 2/1/2019    Urinary incontinence        Past Surgical History:   Procedure Laterality Date    ANGIOPLASTY Bilateral 01/24/2014    aortoiliac stenting     CARPAL TUNNEL RELEASE  2003    Henrry    COLECTOMY  approximate 2005    pt states 1in colon -dx with benign mass removed-states no colon cancer    COLONOSCOPY N/A 11/9/2016    Procedure: COLONOSCOPY;  Surgeon: Dmitri Sterling MD;  Location: [facility] ENDO;  Service: Endoscopy;  Laterality: N/A;    COLONOSCOPY N/A 11/15/2019    Procedure: COLONOSCOPY;  Surgeon: Marko Vicente MD;  Location: [facility] ENDO;  Service: Endoscopy;  Laterality: N/A;    COLONOSCOPY N/A  3/25/2022    Procedure: COLONOSCOPY;  Surgeon: Paola Feldman MD;  Location: [facility];  Service: Endoscopy;  Laterality: N/A;    ESOPHAGOGASTRODUODENOSCOPY N/A 3/25/2022    Procedure: EGD (ESOPHAGOGASTRODUODENOSCOPY);  Surgeon: Paola Feldman MD;  Location: [facility];  Service: Endoscopy;  Laterality: N/A;    HYSTERECTOMY      no cancer    INJECTION OF ANESTHETIC AGENT AROUND MULTIPLE INTERCOSTAL NERVES Left 2020    Procedure: BLOCK, NERVE, INTERCOSTAL, 2 OR MORE;  Surgeon: Amrit Flores MD;  Location: [facility] OR [address] [place] [address];  Service: Thoracic;  Laterality: Left;    OOPHORECTOMY      SURGICAL REMOVAL OF LYMPH NODE Left 2020    Procedure: EXCISION, LYMPH NODE;  Surgeon: Amrit Flores MD;  Location: [facility] OR [address] [place] [address];  Service: Thoracic;  Laterality: Left;  mediastinal lymph node disection    XI ROBOTIC RATS,WITH LOBECTOMY,LUNG Left 2020    Procedure: XI ROBOTIC RATS,WITH LOBECTOMY,LUNG;  Surgeon: Amrit Flores MD;  Location: [facility] OR [address] [place] [address];  Service: Thoracic;  Laterality: Left;  Lingulectomy.       Social History     Tobacco Use    Smoking status: Former Smoker     Packs/day: 1.00     Years: 60.00     Pack years: 60.00     Types: Cigarettes     Start date: 1960     Quit date: 1/10/2020     Years since quittin.4    Smokeless tobacco: Never Used   Substance Use Topics    Alcohol use: No     Alcohol/week: 0.0 standard drinks    Drug use: No       Family History   Problem Relation Age of Onset    Stroke Father     Stroke Sister     Asthma Daughter     Diabetes Daughter     Breast cancer Daughter     Asthma Son        Current Outpatient Medications   Medication Sig    albuterol (PROVENTIL) 2.5 mg /3 mL (0.083 %) nebulizer solution Take 3 mLs (2.5 mg total) by nebulization every 6 (six) hours while awake.    albuterol (PROVENTIL/VENTOLIN HFA) 90 mcg/actuation inhaler Inhale 2 puffs into the lungs every 4 (four) hours as needed for Wheezing or Shortness of Breath.     amLODIPine (NORVASC) 5 MG tablet Take 1 tablet (5 mg total) by mouth once daily.    blood sugar diagnostic Strp 1 each by Misc.(Non-Drug; Combo Route) route 3 (three) times daily. EALTH test strips    blood-glucose meter kit Insurance preferred    butalbital-acetaminophen-caffeine -40 mg (FIORICET, ESGIC) -40 mg per tablet TAKE 1 TABLET BY MOUTH 3 (THREE) TIMES DAILY AS NEEDED FOR HEADACHES. (MAXIMUM OF 15 TABLETS PER MONTH)    calcium-vitamin D 600 mg-10 mcg (400 unit) Tab Take 2 tablets by mouth once daily.    cetirizine (ZYRTEC) 10 MG tablet Take 1 tablet (10 mg total) by mouth once daily. For sinus congestion    chlorthalidone (HYGROTEN) 25 MG Tab Take 1 tablet (25 mg total) by mouth 2 (two) times daily.    cilostazoL (PLETAL) 50 MG Tab Take 1 tablet (50 mg total) by mouth once daily.    COVID-19 vac, moiz,Pfizer,,PF, (PFIZER COVID-19 MOIZ VACCN,PF,) 30 mcg/0.3 mL injection Inject into the muscle.    empagliflozin (JARDIANCE) 25 mg tablet Take 1 tablet (25 mg total) by mouth once daily.    ezetimibe (ZETIA) 10 mg tablet Take 1 tablet (10 mg total) by mouth once daily.    fexofenadine (ALLEGRA) 180 MG tablet Take 1 tablet (180 mg total) by mouth once daily.    fluconazole (DIFLUCAN) 150 MG Tab TAKE ONE TABLET BY MOUTH AS A ONE-TIME DOSE    fluticasone propionate (FLONASE) 50 mcg/actuation nasal spray 2 sprays (100 mcg total) by Each Nostril route once daily.    ipratropium (ATROVENT) 21 mcg (0.03 %) nasal spray 2 sprays by Nasal route 3 (three) times daily.    ipratropium-albuteroL (COMBIVENT)  mcg/actuation inhaler Inhale 2 puffs into the lungs every 4 (four) hours as needed for Wheezing or Shortness of Breath. Rescue    levocetirizine (XYZAL) 5 MG tablet Take 1 tablet (5 mg total) by mouth every evening.    montelukast (SINGULAIR) 10 mg tablet Take 1 tablet (10 mg total) by mouth every evening.    ondansetron (ZOFRAN-ODT) 4 MG TbDL 1-2 tablets PO every 6 hours as needed  for nausea/vomiting while completing bowel prep    pantoprazole (PROTONIX) 40 MG tablet Take 1 tablet (40 mg total) by mouth once daily.    potassium chloride SA (K-DUR,KLOR-CON) 20 MEQ tablet Take 1 tablet (20 mEq total) by mouth once daily.    rosuvastatin (CRESTOR) 20 MG tablet Take 1 tablet (20 mg total) by mouth every evening.    sod sulf-pot chloride-mag sulf (SUTAB) 1.479-0.188- 0.225 gram tablet Take 12 tablets by mouth once daily. Take according to package instructions with indicated amount of water.    valsartan (DIOVAN) 160 MG tablet Take 1 tablet (160 mg total) by mouth once daily.    varicella-zoster gE-AS01B, PF, (SHINGRIX) 50 mcg/0.5 mL injection Inject 0.5 mL (one dose) into muscle now; give second dose at least 2 months later    aspirin (ECOTRIN) 81 MG EC tablet Take 1 tablet (81 mg total) by mouth once daily. (Patient not taking: Reported on 6/24/2022)    EPINEPHrine (EPIPEN) 0.3 mg/0.3 mL AtIn Inject 0.3 mLs (0.3 mg total) into the muscle once. for 1 dose     No current facility-administered medications for this visit.     Current Outpatient Medications on File Prior to Visit   Medication Sig    albuterol (PROVENTIL) 2.5 mg /3 mL (0.083 %) nebulizer solution Take 3 mLs (2.5 mg total) by nebulization every 6 (six) hours while awake.    albuterol (PROVENTIL/VENTOLIN HFA) 90 mcg/actuation inhaler Inhale 2 puffs into the lungs every 4 (four) hours as needed for Wheezing or Shortness of Breath.    amLODIPine (NORVASC) 5 MG tablet Take 1 tablet (5 mg total) by mouth once daily.    blood sugar diagnostic Strp 1 each by Misc.(Non-Drug; Combo Route) route 3 (three) times daily. EALTH test strips    blood-glucose meter kit Insurance preferred    butalbital-acetaminophen-caffeine -40 mg (FIORICET, ESGIC) -40 mg per tablet TAKE 1 TABLET BY MOUTH 3 (THREE) TIMES DAILY AS NEEDED FOR HEADACHES. (MAXIMUM OF 15 TABLETS PER MONTH)    calcium-vitamin D 600 mg-10 mcg (400 unit) Tab Take 2  tablets by mouth once daily.    cetirizine (ZYRTEC) 10 MG tablet Take 1 tablet (10 mg total) by mouth once daily. For sinus congestion    chlorthalidone (HYGROTEN) 25 MG Tab Take 1 tablet (25 mg total) by mouth 2 (two) times daily.    cilostazoL (PLETAL) 50 MG Tab Take 1 tablet (50 mg total) by mouth once daily.    COVID-19 vac, moiz,Pfizer,,PF, (PFIZER COVID-19 MOIZ VACCN,PF,) 30 mcg/0.3 mL injection Inject into the muscle.    empagliflozin (JARDIANCE) 25 mg tablet Take 1 tablet (25 mg total) by mouth once daily.    ezetimibe (ZETIA) 10 mg tablet Take 1 tablet (10 mg total) by mouth once daily.    fexofenadine (ALLEGRA) 180 MG tablet Take 1 tablet (180 mg total) by mouth once daily.    fluconazole (DIFLUCAN) 150 MG Tab TAKE ONE TABLET BY MOUTH AS A ONE-TIME DOSE    fluticasone propionate (FLONASE) 50 mcg/actuation nasal spray 2 sprays (100 mcg total) by Each Nostril route once daily.    ipratropium (ATROVENT) 21 mcg (0.03 %) nasal spray 2 sprays by Nasal route 3 (three) times daily.    ipratropium-albuteroL (COMBIVENT)  mcg/actuation inhaler Inhale 2 puffs into the lungs every 4 (four) hours as needed for Wheezing or Shortness of Breath. Rescue    levocetirizine (XYZAL) 5 MG tablet Take 1 tablet (5 mg total) by mouth every evening.    montelukast (SINGULAIR) 10 mg tablet Take 1 tablet (10 mg total) by mouth every evening.    ondansetron (ZOFRAN-ODT) 4 MG TbDL 1-2 tablets PO every 6 hours as needed for nausea/vomiting while completing bowel prep    pantoprazole (PROTONIX) 40 MG tablet Take 1 tablet (40 mg total) by mouth once daily.    potassium chloride SA (K-DUR,KLOR-CON) 20 MEQ tablet Take 1 tablet (20 mEq total) by mouth once daily.    rosuvastatin (CRESTOR) 20 MG tablet Take 1 tablet (20 mg total) by mouth every evening.    sod sulf-pot chloride-mag sulf (SUTAB) 1.479-0.188- 0.225 gram tablet Take 12 tablets by mouth once daily. Take according to package instructions with indicated  amount of water.    valsartan (DIOVAN) 160 MG tablet Take 1 tablet (160 mg total) by mouth once daily.    varicella-zoster gE-AS01B, PF, (SHINGRIX) 50 mcg/0.5 mL injection Inject 0.5 mL (one dose) into muscle now; give second dose at least 2 months later    aspirin (ECOTRIN) 81 MG EC tablet Take 1 tablet (81 mg total) by mouth once daily. (Patient not taking: Reported on 6/24/2022)    EPINEPHrine (EPIPEN) 0.3 mg/0.3 mL AtIn Inject 0.3 mLs (0.3 mg total) into the muscle once. for 1 dose     No current facility-administered medications on file prior to visit.     Review of patient's allergies indicates:   Allergen Reactions    Iodine and iodide containing products Swelling    Skin staples [surgical stainless steel] Swelling    Egg derived      Shortness breath, lip swelling    Fish containing products     Latex, natural rubber Swelling    Losartan Itching    Nickel     Pravastatin      40 mg causes nausea vomitting but 20 mg ok    Shellfish containing products Swelling     Review of Systems   Constitutional: Negative for diaphoresis, malaise/fatigue and weight gain.   HENT: Negative for hoarse voice.    Eyes: Negative for double vision and visual disturbance.   Cardiovascular: Negative for chest pain, claudication, cyanosis, dyspnea on exertion, irregular heartbeat, leg swelling, near-syncope, orthopnea, palpitations, paroxysmal nocturnal dyspnea and syncope.   Respiratory: Negative for cough, hemoptysis, shortness of breath and snoring.    Hematologic/Lymphatic: Negative for bleeding problem. Does not bruise/bleed easily.   Skin: Negative for color change and poor wound healing.   Musculoskeletal: Positive for arthritis, back pain and joint pain. Negative for muscle cramps, muscle weakness and myalgias.   Gastrointestinal: Negative for bloating, abdominal pain, change in bowel habit, diarrhea, heartburn, hematemesis, hematochezia, melena and nausea.   Neurological: Positive for numbness and paresthesias.  Negative for excessive daytime sleepiness, dizziness, headaches, light-headedness, loss of balance and weakness.   Psychiatric/Behavioral: Negative for memory loss. The patient does not have insomnia.    Allergic/Immunologic: Negative for hives.       Objective:   Physical Exam  Constitutional:       General: She is not in acute distress.     Appearance: Normal appearance. She is well-developed. She is not ill-appearing.   HENT:      Head: Normocephalic and atraumatic.   Eyes:      General: No scleral icterus.     Pupils: Pupils are equal, round, and reactive to light.   Neck:      Thyroid: No thyromegaly.      Vascular: Normal carotid pulses. No carotid bruit, hepatojugular reflux or JVD.      Trachea: No tracheal deviation.   Cardiovascular:      Rate and Rhythm: Normal rate and regular rhythm.      Pulses:           Carotid pulses are 2+ on the right side and 2+ on the left side.       Radial pulses are 2+ on the right side and 2+ on the left side.        Femoral pulses are 2+ on the right side with bruit and 1+ on the left side with bruit.       Popliteal pulses are 1+ on the right side and 1+ on the left side.        Dorsalis pedis pulses are 1+ on the right side and 1+ on the left side.        Posterior tibial pulses are 1+ on the right side and 1+ on the left side.      Heart sounds: Murmur heard.    Harsh midsystolic murmur is present with a grade of 2/6 at the upper right sternal border radiating to the neck.    No friction rub. No gallop.   Pulmonary:      Effort: Pulmonary effort is normal. No respiratory distress.      Breath sounds: Normal breath sounds. No wheezing, rhonchi or rales.   Chest:      Chest wall: No tenderness.   Abdominal:      General: Bowel sounds are normal. There is no abdominal bruit.      Palpations: Abdomen is soft. There is no hepatomegaly or pulsatile mass.      Tenderness: There is no abdominal tenderness.   Musculoskeletal:      Right shoulder: No deformity.      Cervical back:  Normal range of motion and neck supple.   Skin:     General: Skin is warm and dry.      Findings: No erythema or rash.      Nails: There is no clubbing.   Neurological:      Mental Status: She is alert and oriented to person, place, and time.      Cranial Nerves: No cranial nerve deficit.      Coordination: Coordination normal.   Psychiatric:         Speech: Speech normal.         Behavior: Behavior normal.       Vitals:    06/24/22 0807 06/24/22 0808   BP: (!) 140/60 (!) 140/62   BP Location: Left arm Right arm   Patient Position: Sitting Sitting   BP Method: Large (Manual) Large (Manual)   Pulse: 95    SpO2: 96%    Weight: 63.8 kg (140 lb 10.5 oz)    Height: 5' (1.524 m)      Lab Results   Component Value Date    CHOL 148 05/27/2022    CHOL 142 11/22/2021    CHOL 141 08/24/2021     Lab Results   Component Value Date    HDL 62 05/27/2022    HDL 59 11/22/2021    HDL 68 08/24/2021     Lab Results   Component Value Date    LDLCALC 68.6 05/27/2022    LDLCALC 70.4 11/22/2021    LDLCALC 48.6 (L) 08/24/2021     Lab Results   Component Value Date    TRIG 87 05/27/2022    TRIG 63 11/22/2021    TRIG 122 08/24/2021     Lab Results   Component Value Date    CHOLHDL 41.9 05/27/2022    CHOLHDL 41.5 11/22/2021    CHOLHDL 48.2 08/24/2021       Chemistry        Component Value Date/Time     05/27/2022 0711     05/27/2022 0711    K 4.0 05/27/2022 0711    K 4.0 05/27/2022 0711     05/27/2022 0711     05/27/2022 0711    CO2 24 05/27/2022 0711    CO2 24 05/27/2022 0711    BUN 30 (H) 05/27/2022 0711    BUN 30 (H) 05/27/2022 0711    CREATININE 1.2 05/27/2022 0711    CREATININE 1.2 05/27/2022 0711     05/27/2022 0711     05/27/2022 0711        Component Value Date/Time    CALCIUM 9.6 05/27/2022 0711    CALCIUM 9.6 05/27/2022 0711    ALKPHOS 104 05/27/2022 0711    ALKPHOS 104 05/27/2022 0711    AST 19 05/27/2022 0711    AST 19 05/27/2022 0711    ALT 10 05/27/2022 0711    ALT 10 05/27/2022 0711     BILITOT 0.2 05/27/2022 0711    BILITOT 0.2 05/27/2022 0711    ESTGFRAFRICA 51.4 (A) 05/27/2022 0711    ESTGFRAFRICA 51.4 (A) 05/27/2022 0711    EGFRNONAA 44.6 (A) 05/27/2022 0711    EGFRNONAA 44.6 (A) 05/27/2022 0711        Lab Results   Component Value Date    HGBA1C 6.4 (H) 05/27/2022       Lab Results   Component Value Date    TSH 0.404 12/04/2020     Lab Results   Component Value Date    INR 0.9 10/21/2020    INR 0.9 08/10/2019    INR 0.9 12/16/2018     Lab Results   Component Value Date    WBC 6.48 05/27/2022    HGB 10.5 (L) 05/27/2022    HCT 35.7 (L) 05/27/2022    MCV 81 (L) 05/27/2022     05/27/2022     BMP  Sodium   Date Value Ref Range Status   05/27/2022 136 136 - 145 mmol/L Final   05/27/2022 136 136 - 145 mmol/L Final     Potassium   Date Value Ref Range Status   05/27/2022 4.0 3.5 - 5.1 mmol/L Final   05/27/2022 4.0 3.5 - 5.1 mmol/L Final     Chloride   Date Value Ref Range Status   05/27/2022 101 95 - 110 mmol/L Final   05/27/2022 101 95 - 110 mmol/L Final     CO2   Date Value Ref Range Status   05/27/2022 24 23 - 29 mmol/L Final   05/27/2022 24 23 - 29 mmol/L Final     BUN   Date Value Ref Range Status   05/27/2022 30 (H) 8 - 23 mg/dL Final   05/27/2022 30 (H) 8 - 23 mg/dL Final     Creatinine   Date Value Ref Range Status   05/27/2022 1.2 0.5 - 1.4 mg/dL Final   05/27/2022 1.2 0.5 - 1.4 mg/dL Final     Calcium   Date Value Ref Range Status   05/27/2022 9.6 8.7 - 10.5 mg/dL Final   05/27/2022 9.6 8.7 - 10.5 mg/dL Final     Anion Gap   Date Value Ref Range Status   05/27/2022 11 8 - 16 mmol/L Final   05/27/2022 11 8 - 16 mmol/L Final     eGFR if    Date Value Ref Range Status   05/27/2022 51.4 (A) >60 mL/min/1.73 m^2 Final   05/27/2022 51.4 (A) >60 mL/min/1.73 m^2 Final     eGFR if non    Date Value Ref Range Status   05/27/2022 44.6 (A) >60 mL/min/1.73 m^2 Final     Comment:     Calculation used to obtain the estimated glomerular filtration  rate (eGFR) is the  CKD-EPI equation.      05/27/2022 44.6 (A) >60 mL/min/1.73 m^2 Final     Comment:     Calculation used to obtain the estimated glomerular filtration  rate (eGFR) is the CKD-EPI equation.        CrCl cannot be calculated (Patient's most recent lab result is older than the maximum 7 days allowed.).  Details    Reading Physician Reading Date Result Priority   Dyllan Galvez MD  [phone] 12/22/2021 Routine     Narrative & Impression  EXAMINATION:  CTA RUNOFF ABD PEL BILAT LOWER EXT     CLINICAL HISTORY:  Claudication or leg ischemia;  Peripheral vascular disease, unspecified     TECHNIQUE:  CTA of the abdomen, pelvis, and lower extremities was performed after administration of intravenous contrast, 125 mL Omnipaque 350.     COMPARISON:  Chest CT exams dating back to January 2016     FINDINGS:  Angiographic findings:     Prominent atherosclerotic calcification within the abdominal aorta which is nonaneurysmal.  The celiac, superior mesenteric, renal, and inferior mesenteric arteries are patent without significant stenosis.  Patent stents are present within the distal abdominal aorta extending within the bilateral common iliacs.     Patchy atherosclerosis noted within the right external and internal iliac arteries without significant stenosis.  Atherosclerosis results in mild long segment stenosis of the right common femoral artery.  Mild-to-moderate multifocal stenoses are seen along the course of the right superficial femoral artery without high-grade stenosis or occlusion.  No significant stenosis identified within the right deep femoral artery.  Mild atherosclerotic plaque within the right popliteal artery without significant stenosis.  Atherosclerosis results in mild stenosis of the right tibioperoneal trunk.  The right anterior tibial, posterior tibial, and peroneal arteries are patent along their entire course with 3 vessel runoff noted to the right foot.     Patchy atherosclerosis within the left internal  iliac artery without significant stenosis.  No significant stenosis of the left external iliac artery.  There is prominent atherosclerosis within the left common femoral artery with focal high-grade stenosis noted just proximal to its bifurcation (series 2, image 569).  No significant stenosis of the left deep femoral artery.  Patchy atherosclerosis within the left superficial femoral artery results in areas of mild luminal stenosis.  Mild left popliteal artery atherosclerosis without significant stenosis.  Patchy atherosclerosis within the left tibioperoneal trunk without significant stenosis.  The left anterior tibial, posterior tibial, and peroneal arteries are patent demonstrating 3 vessel runoff to the left foot.     Non-angiographic findings:     There is a 6 mm nodule within the left lung base which is unchanged compared to chest CT exams dating back to 2018 (series 2, image 40).  Heart is normal in size.  No pericardial effusion.     Liver is normal without focal lesion.  Spleen and pancreas are normal.  Unchanged soft tissue thickening at the gallbladder fundus similar to prior exams dating back to 2016.  No intrahepatic or extrahepatic biliary ductal dilatation.  Stable small adrenal nodules.     No hydronephrosis or renal stone.  There are few small left renal cysts.  Bladder is normal.  The uterus is not visualized.  No adnexal mass.     Postsurgical changes noted within the colon.  No bowel wall thickening or evidence of bowel obstruction.  Stomach and duodenum are unremarkable.     There is a fat containing midline ventral abdominal wall hernia.  No free fluid or air within the abdomen and pelvis.  No lymphadenopathy.     No suspicious lytic or blastic osseous lesion.     Impression:     Patent aorta to common iliac stents.  Patchy atherosclerosis within the abdomen, pelvis, and bilateral lower extremities with focal high-grade stenosis within the left common femoral artery.  No high-grade arterial  stenosis or occlusion seen elsewhere.  Three-vessel contrast runoff preserved to both feet.        Electronically signed by: Dyllan Rice  Date:                                            12/22/2021  Time:                                           10:08             Exam Ended: 12/22/21 09:29 Last Resulted: 12/22/21 10:08                Assessment:     1. Atherosclerosis of native artery of both lower extremities with intermittent claudication    2. Chronic obstructive pulmonary disease, unspecified COPD type    3. Pulmonary nodule less than 6 mm in diameter with high risk for malignant neoplasm - RIght lower lobe    4. Atherosclerosis of aorta    5. Atherosclerosis of native coronary artery of native heart without angina pectoris    6. Dyslipidemia associated with type 2 diabetes mellitus    7. Hypertension associated with diabetes    8. Nonrheumatic aortic valve stenosis    9. S/P peripheral artery angioplasty    10. Tortuous aorta    11. Type 2 diabetes mellitus with microalbuminuria, without long-term current use of insulin    12. Type 2 diabetes mellitus with peripheral vascular disease    13. Type 2 diabetes mellitus with stage 3a chronic kidney disease, without long-term current use of insulin    14. IVANIA (obstructive sleep apnea)      pvd with non limiting claudication will increase pletal to 100 mg po bid she is not smoking continue exercise walking  hlp on target continue the same.  a1c on target counseled about continued diabetes compliance diet walking exercise.   ivania not using cpap she will address with sleep team. She feels she is chocking with it.  Copd per pulmonary.  Plan:     As per above  Continue current therapy  Cardiac low salt diet.  Risk factor modification and excercise program.  F/u in 6 months with lipid cmp a1c

## 2022-07-02 DIAGNOSIS — E11.29 TYPE 2 DIABETES MELLITUS WITH MICROALBUMINURIA, WITHOUT LONG-TERM CURRENT USE OF INSULIN: ICD-10-CM

## 2022-07-02 DIAGNOSIS — R80.9 TYPE 2 DIABETES MELLITUS WITH MICROALBUMINURIA, WITHOUT LONG-TERM CURRENT USE OF INSULIN: ICD-10-CM

## 2022-07-02 NOTE — TELEPHONE ENCOUNTER
No new care gaps identified.  [facility] Embedded Care Gaps. Reference number: [id number]. 7/02/2022   2:32:00 AM CDT

## 2022-07-03 NOTE — TELEPHONE ENCOUNTER
Refill Routing Note   Medication(s) are not appropriate for processing by [facility] for the following reason(s):      - Required laboratory values are abnormal    [facility] action(s):  Defer          Medication reconciliation completed: No     Appointments  past 12m or future 3m with PCP    Date Provider   Last Visit   6/3/2022 VICK Sandoval MD   Next Visit   11/18/2022 VICK Sandoval MD   ED visits in past 90 days: 0        Note composed:8:13 PM 07/02/2022

## 2022-07-07 RX ORDER — EMPAGLIFLOZIN 25 MG/1
25 TABLET, FILM COATED ORAL DAILY
Qty: 90 TABLET | Refills: 2 | Status: SHIPPED | OUTPATIENT
Start: 2022-07-07 | End: 2023-04-19 | Stop reason: SDUPTHER

## 2022-07-07 NOTE — TELEPHONE ENCOUNTER
REFILL REQUEST APPROVED.  Requested Prescriptions   Pending Prescriptions Disp Refills    JARDIANCE 25 mg tablet [Pharmacy Med Name: JARDIANCE 25 MG Tablet] 90 tablet 2     Sig: TAKE 1 TABLET (25 MG TOTAL) BY MOUTH ONCE DAILY.

## 2022-08-02 ENCOUNTER — OUTPATIENT CASE MANAGEMENT (OUTPATIENT)
Dept: ADMINISTRATIVE | Facility: OTHER | Age: 72
End: 2022-08-02
Payer: MEDICARE

## 2022-08-02 NOTE — LETTER
August 2, 2022    Lucia Barlow  [address] [id number]             Dear Ms. Barlow,    I am writing from the Outpatient Care Management Department at [facility].  I received a referral from Dr. Sandoval to contact you regarding any needs you may have.  I have been unable to reach you by phone.   Please contact me if you would like to discuss any needs.     I can be reached at [phone]  Monday thru Friday from 8:00am to 4:30pm.  [facility] also has a program with a nurse available 24/7 to answer questions or provide medical advice.  [facility] can be reached at [phone].     Sincerely,   Elder FISHER RN

## 2022-08-05 NOTE — PROGRESS NOTES
Outpatient Care Management  Patient Not Qualified    Patient: Lucia Barlow  MRN:  [id number]  Date of Service:  8/5/2022  Completed by:  Elder Juarez RN    Chief Complaint   Patient presents with    [facility] Chart Review     8/2/22 8/3/22 8/5/22    [facility] RN First Assessment Attempt     8/2/22 letter sent requesting callback    OPCM RN Second Assessment Attempt     8/3/22    Case Closure     8/5/22    O[facility] RN Third Assessment Attempt     8/5/22       Patient Summary     Program:  [facility] High Risk  Qualification Status:  No  Reason Not Qualified:  Unable to reach

## 2022-08-15 ENCOUNTER — OFFICE VISIT (OUTPATIENT)
Dept: ALLERGY | Facility: CLINIC | Age: 72
End: 2022-08-15
Payer: MEDICARE

## 2022-08-15 VITALS
BODY MASS INDEX: 26.05 KG/M2 | HEART RATE: 105 BPM | DIASTOLIC BLOOD PRESSURE: 68 MMHG | SYSTOLIC BLOOD PRESSURE: 128 MMHG | WEIGHT: 141.56 LBS | HEIGHT: 62 IN | TEMPERATURE: 98 F

## 2022-08-15 DIAGNOSIS — T50.8X5D ALLERGIC REACTION TO CONTRAST MATERIAL, SUBSEQUENT ENCOUNTER: ICD-10-CM

## 2022-08-15 DIAGNOSIS — Z91.040 LATEX ALLERGY: ICD-10-CM

## 2022-08-15 DIAGNOSIS — J30.89 ALLERGIC RHINITIS DUE TO MOLD: ICD-10-CM

## 2022-08-15 DIAGNOSIS — R22.0 FACIAL SWELLING: ICD-10-CM

## 2022-08-15 DIAGNOSIS — Z91.018 FOOD ALLERGY: Primary | ICD-10-CM

## 2022-08-15 PROCEDURE — 1126F PR PAIN SEVERITY QUANTIFIED, NO PAIN PRESENT: ICD-10-PCS | Mod: CPTII,S$GLB,, | Performed by: ALLERGY & IMMUNOLOGY

## 2022-08-15 PROCEDURE — 3078F DIAST BP <80 MM HG: CPT | Mod: CPTII,S$GLB,, | Performed by: ALLERGY & IMMUNOLOGY

## 2022-08-15 PROCEDURE — 3078F PR MOST RECENT DIASTOLIC BLOOD PRESSURE < 80 MM HG: ICD-10-PCS | Mod: CPTII,S$GLB,, | Performed by: ALLERGY & IMMUNOLOGY

## 2022-08-15 PROCEDURE — 99999 PR PBB SHADOW E&M-EST. PATIENT-LVL III: CPT | Mod: PBBFAC,,, | Performed by: ALLERGY & IMMUNOLOGY

## 2022-08-15 PROCEDURE — 99999 PR PBB SHADOW E&M-EST. PATIENT-LVL III: ICD-10-PCS | Mod: PBBFAC,,, | Performed by: ALLERGY & IMMUNOLOGY

## 2022-08-15 PROCEDURE — 1101F PT FALLS ASSESS-DOCD LE1/YR: CPT | Mod: CPTII,S$GLB,, | Performed by: ALLERGY & IMMUNOLOGY

## 2022-08-15 PROCEDURE — 1101F PR PT FALLS ASSESS DOC 0-1 FALLS W/OUT INJ PAST YR: ICD-10-PCS | Mod: CPTII,S$GLB,, | Performed by: ALLERGY & IMMUNOLOGY

## 2022-08-15 PROCEDURE — 1159F MED LIST DOCD IN RCRD: CPT | Mod: CPTII,S$GLB,, | Performed by: ALLERGY & IMMUNOLOGY

## 2022-08-15 PROCEDURE — 99214 OFFICE O/P EST MOD 30 MIN: CPT | Mod: S$GLB,,, | Performed by: ALLERGY & IMMUNOLOGY

## 2022-08-15 PROCEDURE — 3008F BODY MASS INDEX DOCD: CPT | Mod: CPTII,S$GLB,, | Performed by: ALLERGY & IMMUNOLOGY

## 2022-08-15 PROCEDURE — 3072F PR LOW RISK FOR RETINOPATHY: ICD-10-PCS | Mod: CPTII,S$GLB,, | Performed by: ALLERGY & IMMUNOLOGY

## 2022-08-15 PROCEDURE — 4010F PR ACE/ARB THEARPY RXD/TAKEN: ICD-10-PCS | Mod: CPTII,S$GLB,, | Performed by: ALLERGY & IMMUNOLOGY

## 2022-08-15 PROCEDURE — 3066F NEPHROPATHY DOC TX: CPT | Mod: CPTII,S$GLB,, | Performed by: ALLERGY & IMMUNOLOGY

## 2022-08-15 PROCEDURE — 3008F PR BODY MASS INDEX (BMI) DOCUMENTED: ICD-10-PCS | Mod: CPTII,S$GLB,, | Performed by: ALLERGY & IMMUNOLOGY

## 2022-08-15 PROCEDURE — 3066F PR DOCUMENTATION OF TREATMENT FOR NEPHROPATHY: ICD-10-PCS | Mod: CPTII,S$GLB,, | Performed by: ALLERGY & IMMUNOLOGY

## 2022-08-15 PROCEDURE — 3044F HG A1C LEVEL LT 7.0%: CPT | Mod: CPTII,S$GLB,, | Performed by: ALLERGY & IMMUNOLOGY

## 2022-08-15 PROCEDURE — 3074F SYST BP LT 130 MM HG: CPT | Mod: CPTII,S$GLB,, | Performed by: ALLERGY & IMMUNOLOGY

## 2022-08-15 PROCEDURE — 1159F PR MEDICATION LIST DOCUMENTED IN MEDICAL RECORD: ICD-10-PCS | Mod: CPTII,S$GLB,, | Performed by: ALLERGY & IMMUNOLOGY

## 2022-08-15 PROCEDURE — 3288F PR FALLS RISK ASSESSMENT DOCUMENTED: ICD-10-PCS | Mod: CPTII,S$GLB,, | Performed by: ALLERGY & IMMUNOLOGY

## 2022-08-15 PROCEDURE — 4010F ACE/ARB THERAPY RXD/TAKEN: CPT | Mod: CPTII,S$GLB,, | Performed by: ALLERGY & IMMUNOLOGY

## 2022-08-15 PROCEDURE — 1126F AMNT PAIN NOTED NONE PRSNT: CPT | Mod: CPTII,S$GLB,, | Performed by: ALLERGY & IMMUNOLOGY

## 2022-08-15 PROCEDURE — 3074F PR MOST RECENT SYSTOLIC BLOOD PRESSURE < 130 MM HG: ICD-10-PCS | Mod: CPTII,S$GLB,, | Performed by: ALLERGY & IMMUNOLOGY

## 2022-08-15 PROCEDURE — 3044F PR MOST RECENT HEMOGLOBIN A1C LEVEL <7.0%: ICD-10-PCS | Mod: CPTII,S$GLB,, | Performed by: ALLERGY & IMMUNOLOGY

## 2022-08-15 PROCEDURE — 99214 PR OFFICE/OUTPT VISIT, EST, LEVL IV, 30-39 MIN: ICD-10-PCS | Mod: S$GLB,,, | Performed by: ALLERGY & IMMUNOLOGY

## 2022-08-15 PROCEDURE — 3060F POS MICROALBUMINURIA REV: CPT | Mod: CPTII,S$GLB,, | Performed by: ALLERGY & IMMUNOLOGY

## 2022-08-15 PROCEDURE — 3288F FALL RISK ASSESSMENT DOCD: CPT | Mod: CPTII,S$GLB,, | Performed by: ALLERGY & IMMUNOLOGY

## 2022-08-15 PROCEDURE — 3072F LOW RISK FOR RETINOPATHY: CPT | Mod: CPTII,S$GLB,, | Performed by: ALLERGY & IMMUNOLOGY

## 2022-08-15 PROCEDURE — 3060F PR POS MICROALBUMINURIA RESULT DOCUMENTED/REVIEW: ICD-10-PCS | Mod: CPTII,S$GLB,, | Performed by: ALLERGY & IMMUNOLOGY

## 2022-08-15 NOTE — PROGRESS NOTES
"Subjective:       Patient ID: Lucia Barlow is a 72 y.o. female.      Chief Complaint:  Follow-up      HPI 3/28/2022: 72 year old female complaining of runny nose, nasal congestion for several years. She reports facial swelling after being outside for long periods. She denies tongue or throat swelling. She has tried medrol dose pack, Flonase and Singulair. The aforementioned have not helped. She reports that she has tried long acting antihistamines like Zyrtec, but they have not helped. Symptoms vary with season and are worst in the Spring.  She reports wearing a mask when outside.   Zyrtec(last taken this morning) in the morning and Singulair at night  She has not had sinus surgery.    Fish and shellfish "my heart swells up"  NO Epipen    Latex- contact dermatitis    Egg- "stomach cramps"      HPI 5/24/2022:  Complaining of facial swelling last week of April, while seating outside. She denies throat or tongue swelling. Epipen and benadryl- 3 hours later, symptoms subsided.She did not go to the hospital. No swelling, since that day.  Avoiding fish, shellfish and egg  NO accidental ingestions  Avoiding latex  She feels Atrovent nasal spray is helping to alleviate her symptoms.      HPI today: 72 year old female with intermittent facial swelling. 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 She has a food intolerance to egg. Latex causes a contact dermatitis. Allergic rhinitis to mold.    "Since you made him switch my medicine, I have not been swelling."  No episodes of facial swelling  Taking xyzal and Singulair as well as Atrovent nasal spray. Taking Fexofenadine  Not required epipen    Avoiding fish and shellfish  History of contrast dye reaction many years ago- "mild" per her report- treated with benadryl. Did not require Epinephrine      Past Medical History:   Diagnosis Date    Atherosclerosis of artery of both lower extremities 1/17/2014    Atherosclerosis of native coronary artery " of native heart without angina pectoris 11/18/2016    Atherosclerotic PVD with intermittent claudication 1/17/2014    Chronic bronchitis     COPD (chronic obstructive pulmonary disease)     Diabetes with neurologic complications     Dyslipidemia associated with type 2 diabetes mellitus 6/14/2013    Dyslipidemia associated with type 2 diabetes mellitus     Ex-smoker     Gastroesophageal reflux disease without esophagitis 1/25/2019    Hyperlipidemia     Hypertension     Iron deficiency anemia secondary to inadequate dietary iron intake 6/3/2022    NSCLC of left lung 11/19/2020    IVANIA (obstructive sleep apnea) 2/11/2021    Osteoporosis 1/16 rosita 1/18    Pneumothorax after biopsy 10/21/2020    PVD (peripheral vascular disease)     Renal manifestation of secondary diabetes mellitus     S/P peripheral artery angioplasty 2/7/2014    Seasonal allergic rhinitis due to pollen     Simple chronic bronchitis     Tobacco dependence     Type 2 diabetes mellitus with microalbuminuria, without long-term current use of insulin 3/29/2019    Type 2 diabetes mellitus with peripheral vascular disease     Type 2 diabetes mellitus with stage 3 chronic kidney disease, without long-term current use of insulin 2/1/2019    Type 2 diabetes mellitus without retinopathy 2/1/2019    Urinary incontinence      Family History   Problem Relation Age of Onset    Stroke Father     Stroke Sister     Asthma Daughter     Diabetes Daughter     Breast cancer Daughter     Asthma Son      Current Outpatient Medications on File Prior to Visit   Medication Sig Dispense Refill    albuterol (PROVENTIL) 2.5 mg /3 mL (0.083 %) nebulizer solution Take 3 mLs (2.5 mg total) by nebulization every 6 (six) hours while awake. 270 mL 11    albuterol (PROVENTIL/VENTOLIN HFA) 90 mcg/actuation inhaler Inhale 2 puffs into the lungs every 4 (four) hours as needed for Wheezing or Shortness of Breath. 54 g 3    amLODIPine (NORVASC) 5 MG tablet  Take 1 tablet (5 mg total) by mouth once daily. 90 tablet 1    aspirin (ECOTRIN) 81 MG EC tablet Take 1 tablet (81 mg total) by mouth once daily. 90 tablet 4    blood sugar diagnostic Strp 1 each by Misc.(Non-Drug; Combo Route) route 3 (three) times daily. [employer] test strips 100 each 3    blood-glucose meter kit Insurance preferred 1 each 0    butalbital-acetaminophen-caffeine -40 mg (FIORICET, ESGIC) -40 mg per tablet TAKE 1 TABLET BY MOUTH 3 (THREE) TIMES DAILY AS NEEDED FOR HEADACHES. (MAXIMUM OF 15 TABLETS PER MONTH) 30 tablet 3    calcium-vitamin D 600 mg-10 mcg (400 unit) Tab Take 2 tablets by mouth once daily. 180 tablet 4    cetirizine (ZYRTEC) 10 MG tablet Take 1 tablet (10 mg total) by mouth once daily. For sinus congestion 90 tablet 4    chlorthalidone (HYGROTEN) 25 MG Tab Take 1 tablet (25 mg total) by mouth 2 (two) times daily. 180 tablet 1    cilostazoL (PLETAL) 100 MG Tab Take 1 tablet (100 mg total) by mouth once daily. 180 tablet 3    COVID-19 vac, moiz,Pfizer,,PF, (PFIZER COVID-19 MOIZ VACCN,PF,) 30 mcg/0.3 mL injection Inject into the muscle. 0.3 mL 0    ezetimibe (ZETIA) 10 mg tablet Take 1 tablet (10 mg total) by mouth once daily. 90 tablet 2    fexofenadine (ALLEGRA) 180 MG tablet Take 1 tablet (180 mg total) by mouth once daily. 90 tablet 3    fluconazole (DIFLUCAN) 150 MG Tab TAKE ONE TABLET BY MOUTH AS A ONE-TIME DOSE      fluticasone propionate (FLONASE) 50 mcg/actuation nasal spray 2 sprays (100 mcg total) by Each Nostril route once daily. 48 g 4    ipratropium (ATROVENT) 21 mcg (0.03 %) nasal spray 2 sprays by Nasal route 3 (three) times daily. 20 mL 5    ipratropium-albuteroL (COMBIVENT)  mcg/actuation inhaler Inhale 2 puffs into the lungs every 4 (four) hours as needed for Wheezing or Shortness of Breath. Rescue 12 g 3    JARDIANCE 25 mg tablet TAKE 1 TABLET (25 MG TOTAL) BY MOUTH ONCE DAILY. 90 tablet 2    levocetirizine (XYZAL) 5 MG tablet Take 1  tablet (5 mg total) by mouth every evening. 30 tablet 11    montelukast (SINGULAIR) 10 mg tablet Take 1 tablet (10 mg total) by mouth every evening. 90 tablet 4    ondansetron (ZOFRAN-ODT) 4 MG TbDL 1-2 tablets PO every 6 hours as needed for nausea/vomiting while completing bowel prep 4 tablet 0    pantoprazole (PROTONIX) 40 MG tablet Take 1 tablet (40 mg total) by mouth once daily. 90 tablet 3    potassium chloride SA (K-DUR,KLOR-CON) 20 MEQ tablet Take 1 tablet (20 mEq total) by mouth once daily. 90 tablet 2    rosuvastatin (CRESTOR) 20 MG tablet Take 1 tablet (20 mg total) by mouth every evening. 90 tablet 3    sod sulf-pot chloride-mag sulf (SUTAB) 1.479-0.188- 0.225 gram tablet Take 12 tablets by mouth once daily. Take according to package instructions with indicated amount of water. 24 tablet 0    valsartan (DIOVAN) 160 MG tablet Take 1 tablet (160 mg total) by mouth once daily. 90 tablet 1    varicella-zoster gE-AS01B, PF, (SHINGRIX) 50 mcg/0.5 mL injection Inject 0.5 mL (one dose) into muscle now; give second dose at least 2 months later 0.5 mL 1    EPINEPHrine (EPIPEN) 0.3 mg/0.3 mL AtIn Inject 0.3 mLs (0.3 mg total) into the muscle once. for 1 dose 2 each 3     No current facility-administered medications on file prior to visit.       Review of patient's allergies indicates:   Allergen Reactions    Iodine and iodide containing products Swelling    Skin staples [surgical stainless steel] Swelling    Egg derived      Shortness breath, lip swelling    Fish containing products     Latex, natural rubber Swelling    Losartan Itching    Nickel     Pravastatin      40 mg causes nausea vomitting but 20 mg ok    Shellfish containing products Swelling     Environmental History: Pets in the home: none. She does not smoke. Grandson lives with her and smokes.  Review of Systems   HENT: Positive for congestion.    Respiratory: Negative for cough and shortness of breath.    Skin: Negative for itching and  rash.   Endo/Heme/Allergies: Positive for environmental allergies.   Psychiatric/Behavioral: Negative for suicidal ideas. The patient is not nervous/anxious.    All other systems reviewed and are negative.           Objective:    Physical Exam  Vitals reviewed.   Constitutional:       General: She is not in acute distress.     Appearance: Normal appearance. She is well-developed. She is not ill-appearing, toxic-appearing or diaphoretic.   HENT:      Head: Normocephalic and atraumatic.      Right Ear: Tympanic membrane, ear canal and external ear normal. There is no impacted cerumen.      Left Ear: Tympanic membrane, ear canal and external ear normal. There is no impacted cerumen.      Nose: Nose normal. No congestion or rhinorrhea.      Mouth/Throat:      Pharynx: No oropharyngeal exudate or posterior oropharyngeal erythema.   Eyes:      General: No scleral icterus.        Right eye: No discharge.         Left eye: No discharge.      Pupils: Pupils are equal, round, and reactive to light.   Neck:      Thyroid: No thyromegaly.   Cardiovascular:      Rate and Rhythm: Normal rate and regular rhythm.      Heart sounds: Normal heart sounds. No murmur heard.    No friction rub. No gallop.   Pulmonary:      Effort: Pulmonary effort is normal. No respiratory distress.      Breath sounds: Normal breath sounds. No stridor. No wheezing, rhonchi or rales.   Chest:      Chest wall: No tenderness.   Musculoskeletal:         General: No swelling, tenderness, deformity or signs of injury. Normal range of motion.      Cervical back: Normal range of motion and neck supple. No rigidity. No muscular tenderness.      Right lower leg: No edema.      Left lower leg: No edema.   Lymphadenopathy:      Cervical: No cervical adenopathy.   Skin:     General: Skin is warm.      Coloration: Skin is not jaundiced or pale.      Findings: No bruising or erythema.   Neurological:      General: No focal deficit present.      Mental Status: She is  alert and oriented to person, place, and time.      Gait: Gait normal.   Psychiatric:         Mood and Affect: Mood normal.         Behavior: Behavior normal.         Thought Content: Thought content normal.         Judgment: Judgment normal.     3/28/2022- allergies testing significant for Aspergillus.  Shellfish and fish were negative. Latex was negative as well.      Assessment:       1. Food allergy    2. Allergic rhinitis due to mold    3. Facial swelling    4. Latex allergy    5. Allergic reaction to contrast material, subsequent encounter         Plan:       Strict avoidance of eggs, fish, and shellfish, as well as their products  Epipen 2 pack- discussed use, care and administation    Food allergy    Allergic rhinitis due to mold    Facial swelling    Latex allergy    Allergic reaction to contrast material, subsequent encounter    Recommend continued avoidance of ACE inhibitors.    Continue Singulair, Fexofenadine, and Xyzal.   Continue Atrovent nasal spray  Recommend avoidance of latex and latex containing products.  Avoid contrast dye or premedicate, if needed and use low molecular weight dye.      RTC 6 months or sooner, if needed    CHRIS BAUMAN spent a total of 30 minutes on the day of the visit.  This includes face to face time and non-face to face time preparing to see the patient (eg, review of tests), obtaining and/or reviewing separately obtained history, documenting clinical information in the electronic or other health record, independently interpreting results and communicating results to the patient/family/caregiver, or care coordinator.

## 2022-08-19 ENCOUNTER — PATIENT MESSAGE (OUTPATIENT)
Dept: ADMINISTRATIVE | Facility: OTHER | Age: 72
End: 2022-08-19
Payer: MEDICARE

## 2022-08-31 ENCOUNTER — PATIENT MESSAGE (OUTPATIENT)
Dept: OTHER | Facility: OTHER | Age: 72
End: 2022-08-31
Payer: MEDICARE

## 2022-10-04 ENCOUNTER — PATIENT MESSAGE (OUTPATIENT)
Dept: PULMONOLOGY | Facility: CLINIC | Age: 72
End: 2022-10-04
Payer: MEDICARE

## 2022-10-04 DIAGNOSIS — J44.1 COPD EXACERBATION: Primary | ICD-10-CM

## 2022-10-05 DIAGNOSIS — Z78.0 MENOPAUSE: ICD-10-CM

## 2022-10-06 RX ORDER — DOXYCYCLINE 100 MG/1
100 CAPSULE ORAL EVERY 12 HOURS
Qty: 20 CAPSULE | Refills: 1 | Status: SHIPPED | OUTPATIENT
Start: 2022-10-06 | End: 2022-12-13 | Stop reason: SDUPTHER

## 2022-10-06 RX ORDER — PREDNISONE 20 MG/1
TABLET ORAL
Qty: 20 TABLET | Refills: 0 | Status: SHIPPED | OUTPATIENT
Start: 2022-10-06 | End: 2022-11-04 | Stop reason: SDUPTHER

## 2022-10-18 ENCOUNTER — PATIENT MESSAGE (OUTPATIENT)
Dept: ADMINISTRATIVE | Facility: HOSPITAL | Age: 72
End: 2022-10-18
Payer: MEDICARE

## 2022-10-26 ENCOUNTER — OFFICE VISIT (OUTPATIENT)
Dept: PULMONOLOGY | Facility: CLINIC | Age: 72
End: 2022-10-26
Payer: MEDICARE

## 2022-10-26 ENCOUNTER — HOSPITAL ENCOUNTER (OUTPATIENT)
Dept: RADIOLOGY | Facility: HOSPITAL | Age: 72
Discharge: HOME | End: 2022-10-26
Attending: INTERNAL MEDICINE
Payer: MEDICARE

## 2022-10-26 VITALS
OXYGEN SATURATION: 97 % | DIASTOLIC BLOOD PRESSURE: 56 MMHG | BODY MASS INDEX: 26.78 KG/M2 | SYSTOLIC BLOOD PRESSURE: 118 MMHG | WEIGHT: 145.5 LBS | RESPIRATION RATE: 20 BRPM | HEART RATE: 110 BPM | HEIGHT: 62 IN

## 2022-10-26 DIAGNOSIS — C34.92 NON-SMALL CELL CANCER OF LEFT LUNG: ICD-10-CM

## 2022-10-26 DIAGNOSIS — R91.1 PULMONARY NODULE, LEFT: Primary | ICD-10-CM

## 2022-10-26 DIAGNOSIS — R91.1 SOLITARY PULMONARY NODULE: ICD-10-CM

## 2022-10-26 DIAGNOSIS — J44.9 CHRONIC OBSTRUCTIVE PULMONARY DISEASE, UNSPECIFIED COPD TYPE: ICD-10-CM

## 2022-10-26 PROCEDURE — 99999 PR PBB SHADOW E&M-EST. PATIENT-LVL V: ICD-10-PCS | Mod: PBBFAC,,, | Performed by: INTERNAL MEDICINE

## 2022-10-26 PROCEDURE — 99999 PR PBB SHADOW E&M-EST. PATIENT-LVL V: CPT | Mod: PBBFAC,,, | Performed by: INTERNAL MEDICINE

## 2022-10-26 PROCEDURE — 3074F PR MOST RECENT SYSTOLIC BLOOD PRESSURE < 130 MM HG: ICD-10-PCS | Mod: CPTII,S$GLB,, | Performed by: INTERNAL MEDICINE

## 2022-10-26 PROCEDURE — 3066F PR DOCUMENTATION OF TREATMENT FOR NEPHROPATHY: ICD-10-PCS | Mod: CPTII,S$GLB,, | Performed by: INTERNAL MEDICINE

## 2022-10-26 PROCEDURE — 71250 CT THORAX DX C-: CPT | Mod: TC

## 2022-10-26 PROCEDURE — 1160F PR REVIEW ALL MEDS BY PRESCRIBER/CLIN PHARMACIST DOCUMENTED: ICD-10-PCS | Mod: CPTII,S$GLB,, | Performed by: INTERNAL MEDICINE

## 2022-10-26 PROCEDURE — 4010F PR ACE/ARB THEARPY RXD/TAKEN: ICD-10-PCS | Mod: CPTII,S$GLB,, | Performed by: INTERNAL MEDICINE

## 2022-10-26 PROCEDURE — 71250 CT CHEST WITHOUT CONTRAST: ICD-10-PCS | Mod: 26,,, | Performed by: RADIOLOGY

## 2022-10-26 PROCEDURE — 3066F NEPHROPATHY DOC TX: CPT | Mod: CPTII,S$GLB,, | Performed by: INTERNAL MEDICINE

## 2022-10-26 PROCEDURE — 3072F LOW RISK FOR RETINOPATHY: CPT | Mod: CPTII,S$GLB,, | Performed by: INTERNAL MEDICINE

## 2022-10-26 PROCEDURE — 3060F PR POS MICROALBUMINURIA RESULT DOCUMENTED/REVIEW: ICD-10-PCS | Mod: CPTII,S$GLB,, | Performed by: INTERNAL MEDICINE

## 2022-10-26 PROCEDURE — 99214 PR OFFICE/OUTPT VISIT, EST, LEVL IV, 30-39 MIN: ICD-10-PCS | Mod: S$GLB,,, | Performed by: INTERNAL MEDICINE

## 2022-10-26 PROCEDURE — 99214 OFFICE O/P EST MOD 30 MIN: CPT | Mod: S$GLB,,, | Performed by: INTERNAL MEDICINE

## 2022-10-26 PROCEDURE — 3288F FALL RISK ASSESSMENT DOCD: CPT | Mod: CPTII,S$GLB,, | Performed by: INTERNAL MEDICINE

## 2022-10-26 PROCEDURE — 1160F RVW MEDS BY RX/DR IN RCRD: CPT | Mod: CPTII,S$GLB,, | Performed by: INTERNAL MEDICINE

## 2022-10-26 PROCEDURE — 3060F POS MICROALBUMINURIA REV: CPT | Mod: CPTII,S$GLB,, | Performed by: INTERNAL MEDICINE

## 2022-10-26 PROCEDURE — 71250 CT THORAX DX C-: CPT | Mod: 26,,, | Performed by: RADIOLOGY

## 2022-10-26 PROCEDURE — 3044F PR MOST RECENT HEMOGLOBIN A1C LEVEL <7.0%: ICD-10-PCS | Mod: CPTII,S$GLB,, | Performed by: INTERNAL MEDICINE

## 2022-10-26 PROCEDURE — 3074F SYST BP LT 130 MM HG: CPT | Mod: CPTII,S$GLB,, | Performed by: INTERNAL MEDICINE

## 2022-10-26 PROCEDURE — 3288F PR FALLS RISK ASSESSMENT DOCUMENTED: ICD-10-PCS | Mod: CPTII,S$GLB,, | Performed by: INTERNAL MEDICINE

## 2022-10-26 PROCEDURE — 1101F PT FALLS ASSESS-DOCD LE1/YR: CPT | Mod: CPTII,S$GLB,, | Performed by: INTERNAL MEDICINE

## 2022-10-26 PROCEDURE — 3072F PR LOW RISK FOR RETINOPATHY: ICD-10-PCS | Mod: CPTII,S$GLB,, | Performed by: INTERNAL MEDICINE

## 2022-10-26 PROCEDURE — 3044F HG A1C LEVEL LT 7.0%: CPT | Mod: CPTII,S$GLB,, | Performed by: INTERNAL MEDICINE

## 2022-10-26 PROCEDURE — 3078F DIAST BP <80 MM HG: CPT | Mod: CPTII,S$GLB,, | Performed by: INTERNAL MEDICINE

## 2022-10-26 PROCEDURE — 4010F ACE/ARB THERAPY RXD/TAKEN: CPT | Mod: CPTII,S$GLB,, | Performed by: INTERNAL MEDICINE

## 2022-10-26 PROCEDURE — 1159F MED LIST DOCD IN RCRD: CPT | Mod: CPTII,S$GLB,, | Performed by: INTERNAL MEDICINE

## 2022-10-26 PROCEDURE — 1159F PR MEDICATION LIST DOCUMENTED IN MEDICAL RECORD: ICD-10-PCS | Mod: CPTII,S$GLB,, | Performed by: INTERNAL MEDICINE

## 2022-10-26 PROCEDURE — 1101F PR PT FALLS ASSESS DOC 0-1 FALLS W/OUT INJ PAST YR: ICD-10-PCS | Mod: CPTII,S$GLB,, | Performed by: INTERNAL MEDICINE

## 2022-10-26 PROCEDURE — 3078F PR MOST RECENT DIASTOLIC BLOOD PRESSURE < 80 MM HG: ICD-10-PCS | Mod: CPTII,S$GLB,, | Performed by: INTERNAL MEDICINE

## 2022-10-26 NOTE — PROGRESS NOTES
Subjective:     Patient ID: Lucia Barlow is a 72 y.o. female.    Chief Complaint:  Follow up for lung nodules    HPI  left lower lobe nodule noted to be of concern on CT of chest  S/p thoracotomy for left sided nonsmall cell lung cancer  History of lung cancer:1.5 x 1.5 x 1.5cm moderately differentiated NSCLC, favor adenosquamous    Lung Nodule  She presents for evaluation and treatment of a lung mass. The patient reports that the imaging was performed to evaluate symptoms of dyspnea on exertion, productive cough, shortness of breath and wheezing which have been present for 3 months and are unchanged. Symptoms are exacerbated by walking and relieved by rest. The patient denies other associated symptoms. She has a history of 60 pack years. The patient has no known exposure to tuberculosis. The patient does have a history of cancer.        Past Medical History:   Diagnosis Date    Atherosclerosis of artery of both lower extremities 1/17/2014    Atherosclerosis of native coronary artery of native heart without angina pectoris 11/18/2016    Atherosclerotic PVD with intermittent claudication 1/17/2014    Chronic bronchitis     COPD (chronic obstructive pulmonary disease)     Diabetes with neurologic complications     Dyslipidemia associated with type 2 diabetes mellitus 6/14/2013    Dyslipidemia associated with type 2 diabetes mellitus     Ex-smoker     Gastroesophageal reflux disease without esophagitis 1/25/2019    Hyperlipidemia     Hypertension     Iron deficiency anemia secondary to inadequate dietary iron intake 6/3/2022    NSCLC of left lung 11/19/2020    IVANIA (obstructive sleep apnea) 2/11/2021    Osteoporosis 1/16 rosita 1/18    Pneumothorax after biopsy 10/21/2020    PVD (peripheral vascular disease)     Renal manifestation of secondary diabetes mellitus     S/P peripheral artery angioplasty 2/7/2014    Seasonal allergic rhinitis due to pollen     Simple chronic bronchitis     Tobacco dependence     Type 2  diabetes mellitus with microalbuminuria, without long-term current use of insulin 3/29/2019    Type 2 diabetes mellitus with peripheral vascular disease     Type 2 diabetes mellitus with stage 3 chronic kidney disease, without long-term current use of insulin 2/1/2019    Type 2 diabetes mellitus without retinopathy 2/1/2019    Urinary incontinence      Past Surgical History:   Procedure Laterality Date    ANGIOPLASTY Bilateral 01/24/2014    aortoiliac stenting     CARPAL TUNNEL RELEASE  2003    [facility]    COLECTOMY  approximate 2005    pt states 1in colon -dx with benign mass removed-states no colon cancer    COLONOSCOPY N/A 11/9/2016    Procedure: COLONOSCOPY;  Surgeon: Dmitri Sterling MD;  Location: [facility] ENDO;  Service: Endoscopy;  Laterality: N/A;    COLONOSCOPY N/A 11/15/2019    Procedure: COLONOSCOPY;  Surgeon: Marko Vicente MD;  Location: [facility] ENDO;  Service: Endoscopy;  Laterality: N/A;    COLONOSCOPY N/A 3/25/2022    Procedure: COLONOSCOPY;  Surgeon: Paola Feldman MD;  Location: [facility];  Service: Endoscopy;  Laterality: N/A;    ESOPHAGOGASTRODUODENOSCOPY N/A 3/25/2022    Procedure: EGD (ESOPHAGOGASTRODUODENOSCOPY);  Surgeon: Paola Feldman MD;  Location: [facility];  Service: Endoscopy;  Laterality: N/A;    HYSTERECTOMY      no cancer    INJECTION OF ANESTHETIC AGENT AROUND MULTIPLE INTERCOSTAL NERVES Left 11/19/2020    Procedure: BLOCK, NERVE, INTERCOSTAL, 2 OR MORE;  Surgeon: Amrit Flores MD;  Location: [facility] OR [address] [place] [address];  Service: Thoracic;  Laterality: Left;    OOPHORECTOMY      SURGICAL REMOVAL OF LYMPH NODE Left 11/19/2020    Procedure: EXCISION, LYMPH NODE;  Surgeon: Amrit Flores MD;  Location: [facility] OR [address] [place] [address];  Service: Thoracic;  Laterality: Left;  mediastinal lymph node disection    XI ROBOTIC RATS,WITH LOBECTOMY,LUNG Left 11/19/2020    Procedure: XI ROBOTIC RATS,WITH LOBECTOMY,LUNG;  Surgeon: Amrit Flores MD;  Location: [facility] OR [address] [place] [address];  Service: Thoracic;   Laterality: Left;  Lingulectomy.     Review of patient's allergies indicates:   Allergen Reactions    Iodine and iodide containing products Swelling    Skin staples [surgical stainless steel] Swelling    Egg derived      Shortness breath, lip swelling    Fish containing products     Latex, natural rubber Swelling    Losartan Itching    Nickel     Pravastatin      40 mg causes nausea vomitting but 20 mg ok    Shellfish containing products Swelling     Current Outpatient Medications on File Prior to Visit   Medication Sig Dispense Refill    albuterol (PROVENTIL) 2.5 mg /3 mL (0.083 %) nebulizer solution Take 3 mLs (2.5 mg total) by nebulization every 6 (six) hours while awake. 270 mL 11    albuterol (PROVENTIL/VENTOLIN HFA) 90 mcg/actuation inhaler Inhale 2 puffs into the lungs every 4 (four) hours as needed for Wheezing or Shortness of Breath. 54 g 3    amLODIPine (NORVASC) 5 MG tablet Take 1 tablet (5 mg total) by mouth once daily. 90 tablet 1    aspirin (ECOTRIN) 81 MG EC tablet Take 1 tablet (81 mg total) by mouth once daily. 90 tablet 4    blood sugar diagnostic Strp 1 each by Misc.(Non-Drug; Combo Route) route 3 (three) times daily. [employer] test strips 100 each 3    blood-glucose meter kit Insurance preferred 1 each 0    butalbital-acetaminophen-caffeine -40 mg (FIORICET, ESGIC) -40 mg per tablet TAKE 1 TABLET BY MOUTH 3 (THREE) TIMES DAILY AS NEEDED FOR HEADACHES. (MAXIMUM OF 15 TABLETS PER MONTH) 30 tablet 3    calcium-vitamin D 600 mg-10 mcg (400 unit) Tab Take 2 tablets by mouth once daily. 180 tablet 4    cetirizine (ZYRTEC) 10 MG tablet Take 1 tablet (10 mg total) by mouth once daily. For sinus congestion 90 tablet 4    chlorthalidone (HYGROTEN) 25 MG Tab Take 1 tablet (25 mg total) by mouth 2 (two) times daily. 180 tablet 1    cilostazoL (PLETAL) 100 MG Tab Take 1 tablet (100 mg total) by mouth once daily. 180 tablet 3    COVID-19 vac, moiz,Pfizer,,PF, (PFIZER COVID-19 MOIZ VACCN,PF,) 30  mcg/0.3 mL injection Inject into the muscle. 0.3 mL 0    doxycycline (MONODOX) 100 MG capsule Take 1 capsule (100 mg total) by mouth every 12 (twelve) hours. 20 capsule 1    ezetimibe (ZETIA) 10 mg tablet Take 1 tablet (10 mg total) by mouth once daily. 90 tablet 2    fexofenadine (ALLEGRA) 180 MG tablet Take 1 tablet (180 mg total) by mouth once daily. 90 tablet 3    fluconazole (DIFLUCAN) 150 MG Tab TAKE ONE TABLET BY MOUTH AS A ONE-TIME DOSE      fluticasone propionate (FLONASE) 50 mcg/actuation nasal spray 2 sprays (100 mcg total) by Each Nostril route once daily. 48 g 4    ipratropium (ATROVENT) 21 mcg (0.03 %) nasal spray USE 2 SPRAYS NASALLY THREE TIMES DAILY 90 mL 1    ipratropium-albuteroL (COMBIVENT)  mcg/actuation inhaler Inhale 2 puffs into the lungs every 4 (four) hours as needed for Wheezing or Shortness of Breath. Rescue 12 g 3    JARDIANCE 25 mg tablet TAKE 1 TABLET (25 MG TOTAL) BY MOUTH ONCE DAILY. 90 tablet 2    levocetirizine (XYZAL) 5 MG tablet Take 1 tablet (5 mg total) by mouth every evening. 30 tablet 11    montelukast (SINGULAIR) 10 mg tablet Take 1 tablet (10 mg total) by mouth every evening. 90 tablet 4    ondansetron (ZOFRAN-ODT) 4 MG TbDL 1-2 tablets PO every 6 hours as needed for nausea/vomiting while completing bowel prep 4 tablet 0    pantoprazole (PROTONIX) 40 MG tablet Take 1 tablet (40 mg total) by mouth once daily. 90 tablet 3    potassium chloride SA (K-DUR,KLOR-CON) 20 MEQ tablet Take 1 tablet (20 mEq total) by mouth once daily. 90 tablet 2    predniSONE (DELTASONE) 20 MG tablet Prednisone 60 mg/ day for 3 days, 40 mg/day for 3 days,20 mg/ day for 3 days, (1/2 tablet )10 mg a day for 3 days. 20 tablet 0    rosuvastatin (CRESTOR) 20 MG tablet Take 1 tablet (20 mg total) by mouth every evening. 90 tablet 3    sod sulf-pot chloride-mag sulf (SUTAB) 1.479-0.188- 0.225 gram tablet Take 12 tablets by mouth once daily. Take according to package instructions with indicated amount  of water. 24 tablet 0    valsartan (DIOVAN) 160 MG tablet Take 1 tablet (160 mg total) by mouth once daily. 90 tablet 1    varicella-zoster gE-AS01B, PF, (SHINGRIX) 50 mcg/0.5 mL injection Inject 0.5 mL (one dose) into muscle now; give second dose at least 2 months later 0.5 mL 1    EPINEPHrine (EPIPEN) 0.3 mg/0.3 mL AtIn Inject 0.3 mLs (0.3 mg total) into the muscle once. for 1 dose 2 each 3     No current facility-administered medications on file prior to visit.     Social History     Socioeconomic History    Marital status:     Number of children: 4   Occupational History    Occupation:    Tobacco Use    Smoking status: Former     Packs/day: 1.00     Years: 60.00     Pack years: 60.00     Types: Cigarettes     Start date: 1960     Quit date: 1/10/2020     Years since quittin.7    Smokeless tobacco: Never   Substance and Sexual Activity    Alcohol use: No     Alcohol/week: 0.0 standard drinks    Drug use: No    Sexual activity: Never   Social History Narrative    Son smoker, no pets in household.     Social Determinants of Health     Financial Resource Strain: Medium Risk    Difficulty of Paying Living Expenses: Somewhat hard   Food Insecurity: Food Insecurity Present    Worried About Running Out of Food in the Last Year: Sometimes true    Ran Out of Food in the Last Year: Often true   Transportation Needs: No Transportation Needs    Lack of Transportation (Medical): No    Lack of Transportation (Non-Medical): No   Physical Activity: Inactive    Days of Exercise per Week: 0 days    Minutes of Exercise per Session: 0 min   Stress: No Stress Concern Present    Feeling of Stress : Not at all   Social Connections: Unknown    Frequency of Communication with Friends and Family: More than three times a week    Frequency of Social Gatherings with Friends and Family: Patient refused    Active Member of Clubs or Organizations: Yes    Attends Club or Organization Meetings: More than 4 times per year  "   Marital Status:    Housing Stability: Low Risk     Unable to Pay for Housing in the Last Year: No    Number of Places Lived in the Last Year: 1    Unstable Housing in the Last Year: No     Family History   Problem Relation Age of Onset    Stroke Father     Stroke Sister     Asthma Daughter     Diabetes Daughter     Breast cancer Daughter     Asthma Son        Review of Systems   Constitutional:  Positive for fatigue. Negative for fever.   HENT:  Positive for postnasal drip, rhinorrhea and congestion.    Eyes:  Negative for redness and itching.   Respiratory:  Positive for cough, sputum production, shortness of breath, dyspnea on extertion, use of rescue inhaler and Paroxysmal Nocturnal Dyspnea.    Cardiovascular:  Negative for chest pain, palpitations and leg swelling.   Genitourinary:  Negative for difficulty urinating and hematuria.   Endocrine:  Negative for cold intolerance and heat intolerance.    Skin:  Negative for rash.   Gastrointestinal:  Negative for nausea and abdominal pain.   Neurological:  Negative for dizziness, syncope, weakness and light-headedness.   Hematological:  Negative for adenopathy. Does not bruise/bleed easily.   Psychiatric/Behavioral:  Negative for sleep disturbance. The patient is not nervous/anxious.      Objective:      BP (!) 118/56   Pulse 110   Resp 20   Ht 5' 2" (1.575 m)   Wt 66 kg (145 lb 8.1 oz)   SpO2 97%   BMI 26.61 kg/m²   Physical Exam  Vitals and nursing note reviewed.   Constitutional:       Appearance: She is well-developed.   HENT:      Head: Normocephalic and atraumatic.      Nose: Nose normal.      Mouth/Throat:      Pharynx: No oropharyngeal exudate.   Eyes:      Conjunctiva/sclera: Conjunctivae normal.      Pupils: Pupils are equal, round, and reactive to light.   Neck:      Thyroid: No thyromegaly.      Vascular: No JVD.      Trachea: No tracheal deviation.   Cardiovascular:      Rate and Rhythm: Normal rate and regular rhythm.      Heart sounds: " Normal heart sounds.   Pulmonary:      Effort: Pulmonary effort is normal. No respiratory distress.      Breath sounds: Examination of the right-lower field reveals wheezing. Examination of the left-lower field reveals wheezing. Decreased breath sounds and wheezing present. No rhonchi or rales.   Chest:      Chest wall: No tenderness.   Abdominal:      General: Bowel sounds are normal.      Palpations: Abdomen is soft.   Musculoskeletal:         General: Normal range of motion.      Cervical back: Neck supple.   Lymphadenopathy:      Cervical: No cervical adenopathy.   Skin:     General: Skin is warm and dry.   Neurological:      Mental Status: She is alert and oriented to person, place, and time.     Personal Diagnostic Review  none pertinent    Pulmonary Studies Review 10/26/2022   SpO2 97   Ordering Provider -   Interpreting Provider -   Performing nurse/tech/RT -   Diagnosis -   Height 62   Weight 2328.06   BMI (Calculated) 26.6   Predicted Distance 280.6   Patient Race -   6MWT Status -   Patient Reported -   Was O2 used? -   6MW Distance walked (feet) -   Distance walked (meters) -   Did patient stop? -   How many times? -   Stop Time 1 -   Restart Time 1 -   Did patient restart? -   Type of assistive device(s) used? -   Is extra documentation required for this patient? -   Oxygen Saturation -   Supplemental Oxygen -   Heart Rate -   Blood Pressure -   Dom Dyspnea Rating  -   Oxygen Saturation -   Supplemental Oxygen -   Heart Rate -   Blood Pressure -   Dom Dyspnea Rating  -   Recovery Time (seconds) -   Oxygen Saturation -   Supplemental Oxygen -   Heart Rate -   Blood Pressure -   Dom Dyspnea Rating  -   Is procedure ready for interpretation? -   Did the patient stop or pause? -   How many times did the patient stop or pause? -   Stop Time 1 -   Restart Time 1 -   Pause Time 1 -   Total Time Walked (Calculated) -   Total Laps Walked -   Final Partial Lap Distance (feet) -   Total Distance Feet  (Calculated) -   Total Distance Meters (Calculated) -   Predicted Distance Meters (Calculated) 420.47   Percentage of Predicted (Calculated) -   Peak VO2 (Calculated) -   Mets -   Has The Patient Had a Previous Six Minute Walk Test? -   Oxygen Qualification? -       CT Chest Without Contrast  Narrative: EXAMINATION:  CT CHEST WITHOUT CONTRAST    CLINICAL HISTORY:  Abnormal xray - lung nodule, < 1 cm, mod-high risk;Hx of lung cancer resection; Solitary pulmonary nodule    TECHNIQUE:  Low dose axial images, sagittal and coronal reformations were obtained from the thoracic inlet to the lung bases. Contrast was not administered.    COMPARISON:  04/14/2022    FINDINGS:  No infiltrates or pleural effusions are identified.  Multiple noncalcified pulmonary nodules are again seen within either lung.  The largest and most suspicious of which is seen within the left lower lobe and measures approximately 8 mm by 7.5 mm.  The border of this nodule also appears to be minimally spiculated but again unchanged in appearance when compared to multiple prior studies.  There are postsurgical changes seen associated with the left mid to left upper lung zone.  Emphysematous changes noted within the bilateral upper lung zones.    Moderate vascular calcification seen involving the aorta.  Small pericardial effusion noted.  No enlarged mediastinal, hilar or axillary lymph nodes are identified.    The visualized upper abdomen is unremarkable in appearance.    No suspicious appearing osseus abnormalities are identified.  Impression: 1. Multiple noncalcified pulmonary nodules again seen within either lung.  The largest and most suspicious of which is located within the left lower lobe.  Although this nodule is stable in size, consideration should be given to consider sampling/resection due to the suspicious border especially given the history of prior carcinoma identified in the lingular lesion.    Electronically signed by: Salvador Arteaga  DO  Date:    10/26/2022  Time:    13:12      Office Spirometry Results:     No flowsheet data found.  Pulmonary Studies Review 10/26/2022   SpO2 97   Ordering Provider -   Interpreting Provider -   Performing nurse/tech/RT -   Diagnosis -   Height 62   Weight 2328.06   BMI (Calculated) 26.6   Predicted Distance 280.6   Patient Race -   6MWT Status -   Patient Reported -   Was O2 used? -   6MW Distance walked (feet) -   Distance walked (meters) -   Did patient stop? -   How many times? -   Stop Time 1 -   Restart Time 1 -   Did patient restart? -   Type of assistive device(s) used? -   Is extra documentation required for this patient? -   Oxygen Saturation -   Supplemental Oxygen -   Heart Rate -   Blood Pressure -   Dom Dyspnea Rating  -   Oxygen Saturation -   Supplemental Oxygen -   Heart Rate -   Blood Pressure -   Dom Dyspnea Rating  -   Recovery Time (seconds) -   Oxygen Saturation -   Supplemental Oxygen -   Heart Rate -   Blood Pressure -   Dom Dyspnea Rating  -   Is procedure ready for interpretation? -   Did the patient stop or pause? -   How many times did the patient stop or pause? -   Stop Time 1 -   Restart Time 1 -   Pause Time 1 -   Total Time Walked (Calculated) -   Total Laps Walked -   Final Partial Lap Distance (feet) -   Total Distance Feet (Calculated) -   Total Distance Meters (Calculated) -   Predicted Distance Meters (Calculated) 420.47   Percentage of Predicted (Calculated) -   Peak VO2 (Calculated) -   Mets -   Has The Patient Had a Previous Six Minute Walk Test? -   Oxygen Qualification? -         Assessment:            Pulmonary nodule, left  -     NM PET CT Routine FDG; Future; Expected date: 10/26/2022    Non-small cell cancer of left lung  -     NM PET CT Routine FDG; Future; Expected date: 10/26/2022        Outpatient Encounter Medications as of 10/26/2022   Medication Sig Dispense Refill    albuterol (PROVENTIL) 2.5 mg /3 mL (0.083 %) nebulizer solution Take 3 mLs (2.5 mg total)  by nebulization every 6 (six) hours while awake. 270 mL 11    albuterol (PROVENTIL/VENTOLIN HFA) 90 mcg/actuation inhaler Inhale 2 puffs into the lungs every 4 (four) hours as needed for Wheezing or Shortness of Breath. 54 g 3    amLODIPine (NORVASC) 5 MG tablet Take 1 tablet (5 mg total) by mouth once daily. 90 tablet 1    aspirin (ECOTRIN) 81 MG EC tablet Take 1 tablet (81 mg total) by mouth once daily. 90 tablet 4    blood sugar diagnostic Strp 1 each by Misc.(Non-Drug; Combo Route) route 3 (three) times daily. [employer] test strips 100 each 3    blood-glucose meter kit Insurance preferred 1 each 0    butalbital-acetaminophen-caffeine -40 mg (FIORICET, ESGIC) -40 mg per tablet TAKE 1 TABLET BY MOUTH 3 (THREE) TIMES DAILY AS NEEDED FOR HEADACHES. (MAXIMUM OF 15 TABLETS PER MONTH) 30 tablet 3    calcium-vitamin D 600 mg-10 mcg (400 unit) Tab Take 2 tablets by mouth once daily. 180 tablet 4    cetirizine (ZYRTEC) 10 MG tablet Take 1 tablet (10 mg total) by mouth once daily. For sinus congestion 90 tablet 4    chlorthalidone (HYGROTEN) 25 MG Tab Take 1 tablet (25 mg total) by mouth 2 (two) times daily. 180 tablet 1    cilostazoL (PLETAL) 100 MG Tab Take 1 tablet (100 mg total) by mouth once daily. 180 tablet 3    COVID-19 vac, moiz,Pfizer,,PF, (PFIZER COVID-19 MOIZ VACCN,PF,) 30 mcg/0.3 mL injection Inject into the muscle. 0.3 mL 0    doxycycline (MONODOX) 100 MG capsule Take 1 capsule (100 mg total) by mouth every 12 (twelve) hours. 20 capsule 1    ezetimibe (ZETIA) 10 mg tablet Take 1 tablet (10 mg total) by mouth once daily. 90 tablet 2    fexofenadine (ALLEGRA) 180 MG tablet Take 1 tablet (180 mg total) by mouth once daily. 90 tablet 3    fluconazole (DIFLUCAN) 150 MG Tab TAKE ONE TABLET BY MOUTH AS A ONE-TIME DOSE      fluticasone propionate (FLONASE) 50 mcg/actuation nasal spray 2 sprays (100 mcg total) by Each Nostril route once daily. 48 g 4    ipratropium (ATROVENT) 21 mcg (0.03 %) nasal spray USE  2 SPRAYS NASALLY THREE TIMES DAILY 90 mL 1    ipratropium-albuteroL (COMBIVENT)  mcg/actuation inhaler Inhale 2 puffs into the lungs every 4 (four) hours as needed for Wheezing or Shortness of Breath. Rescue 12 g 3    JARDIANCE 25 mg tablet TAKE 1 TABLET (25 MG TOTAL) BY MOUTH ONCE DAILY. 90 tablet 2    levocetirizine (XYZAL) 5 MG tablet Take 1 tablet (5 mg total) by mouth every evening. 30 tablet 11    montelukast (SINGULAIR) 10 mg tablet Take 1 tablet (10 mg total) by mouth every evening. 90 tablet 4    ondansetron (ZOFRAN-ODT) 4 MG TbDL 1-2 tablets PO every 6 hours as needed for nausea/vomiting while completing bowel prep 4 tablet 0    pantoprazole (PROTONIX) 40 MG tablet Take 1 tablet (40 mg total) by mouth once daily. 90 tablet 3    potassium chloride SA (K-DUR,KLOR-CON) 20 MEQ tablet Take 1 tablet (20 mEq total) by mouth once daily. 90 tablet 2    predniSONE (DELTASONE) 20 MG tablet Prednisone 60 mg/ day for 3 days, 40 mg/day for 3 days,20 mg/ day for 3 days, (1/2 tablet )10 mg a day for 3 days. 20 tablet 0    rosuvastatin (CRESTOR) 20 MG tablet Take 1 tablet (20 mg total) by mouth every evening. 90 tablet 3    sod sulf-pot chloride-mag sulf (SUTAB) 1.479-0.188- 0.225 gram tablet Take 12 tablets by mouth once daily. Take according to package instructions with indicated amount of water. 24 tablet 0    valsartan (DIOVAN) 160 MG tablet Take 1 tablet (160 mg total) by mouth once daily. 90 tablet 1    varicella-zoster gE-AS01B, PF, (SHINGRIX) 50 mcg/0.5 mL injection Inject 0.5 mL (one dose) into muscle now; give second dose at least 2 months later 0.5 mL 1    EPINEPHrine (EPIPEN) 0.3 mg/0.3 mL AtIn Inject 0.3 mLs (0.3 mg total) into the muscle once. for 1 dose 2 each 3     No facility-administered encounter medications on file as of 10/26/2022.     Plan:       Requested Prescriptions      No prescriptions requested or ordered in this encounter     Problem List Items Addressed This Visit       Non-small cell  cancer of left lung    Overview     S/P robotic left lobectomy in November, 2020. Did not require chemotherapy or radiation. Following with Dr. Aleman.  Pathology: 1.5 x 1.5 x 1.5cm moderately differentiated NSCLC, favor adenosquamous. Margin negative,3cm to closet margin. Levels 5,6,7,11,12= negative. pT1b N0. 1% PDL1 expression  Post-operative therapy: Surveillance         Relevant Orders    NM PET CT Routine FDG    Pulmonary nodule, left - 8 mm , high risk for lung cancer - Primary    Relevant Orders    NM PET CT Routine FDG        Follow up for Review CT/PET - on return visit, Review progress.    MEDICAL DECISION MAKING: Moderate to high complexity.  Overall, the multiple problems listed are of moderate to high severity that may impact quality of life and activities of daily living. Side effects of medications, treatment plan as well as options and alternatives reviewed and discussed with patient. There was counseling of patient concerning these issues.    Total time spent in counseling and coordination of care - 30  minutes of total time spent on the encounter, which includes face to face time and non-face to face time preparing to see the patient (eg, review of tests), Obtaining and/or reviewing separately obtained history, Documenting clinical information in the electronic or other health record, Independently interpreting results (not separately reported) and communicating results to the patient/family/caregiver, or Care coordination (not separately reported).    Time was used in discussion of prognosis, risks, benefits of treatment, instructions and compliance with regimen . Discussion with other physicians and/or health care providers - home health or for use of durable medical equipment (oxygen, nebulizers, CPAP, BiPAP) occurred.

## 2022-11-04 ENCOUNTER — OFFICE VISIT (OUTPATIENT)
Dept: PULMONOLOGY | Facility: CLINIC | Age: 72
End: 2022-11-04
Payer: MEDICARE

## 2022-11-04 ENCOUNTER — HOSPITAL ENCOUNTER (OUTPATIENT)
Dept: RADIOLOGY | Facility: HOSPITAL | Age: 72
Discharge: HOME | End: 2022-11-04
Attending: INTERNAL MEDICINE
Payer: MEDICARE

## 2022-11-04 VITALS
DIASTOLIC BLOOD PRESSURE: 80 MMHG | WEIGHT: 142.31 LBS | RESPIRATION RATE: 18 BRPM | BODY MASS INDEX: 26.19 KG/M2 | OXYGEN SATURATION: 96 % | HEART RATE: 58 BPM | SYSTOLIC BLOOD PRESSURE: 134 MMHG | HEIGHT: 62 IN

## 2022-11-04 DIAGNOSIS — R91.1 PULMONARY NODULE, LEFT: ICD-10-CM

## 2022-11-04 DIAGNOSIS — R91.1 SOLITARY PULMONARY NODULE: Primary | ICD-10-CM

## 2022-11-04 DIAGNOSIS — Z87.891 FORMER CIGARETTE SMOKER: Chronic | ICD-10-CM

## 2022-11-04 DIAGNOSIS — J44.1 COPD EXACERBATION: ICD-10-CM

## 2022-11-04 DIAGNOSIS — C34.92 NON-SMALL CELL CANCER OF LEFT LUNG: ICD-10-CM

## 2022-11-04 DIAGNOSIS — J32.8 OTHER CHRONIC SINUSITIS: ICD-10-CM

## 2022-11-04 DIAGNOSIS — J41.0 SIMPLE CHRONIC BRONCHITIS: ICD-10-CM

## 2022-11-04 PROCEDURE — 3075F PR MOST RECENT SYSTOLIC BLOOD PRESS GE 130-139MM HG: ICD-10-PCS | Mod: CPTII,S$GLB,, | Performed by: INTERNAL MEDICINE

## 2022-11-04 PROCEDURE — 3044F HG A1C LEVEL LT 7.0%: CPT | Mod: CPTII,S$GLB,, | Performed by: INTERNAL MEDICINE

## 2022-11-04 PROCEDURE — 1101F PR PT FALLS ASSESS DOC 0-1 FALLS W/OUT INJ PAST YR: ICD-10-PCS | Mod: CPTII,S$GLB,, | Performed by: INTERNAL MEDICINE

## 2022-11-04 PROCEDURE — 1101F PT FALLS ASSESS-DOCD LE1/YR: CPT | Mod: CPTII,S$GLB,, | Performed by: INTERNAL MEDICINE

## 2022-11-04 PROCEDURE — 3072F LOW RISK FOR RETINOPATHY: CPT | Mod: CPTII,S$GLB,, | Performed by: INTERNAL MEDICINE

## 2022-11-04 PROCEDURE — 3008F BODY MASS INDEX DOCD: CPT | Mod: CPTII,S$GLB,, | Performed by: INTERNAL MEDICINE

## 2022-11-04 PROCEDURE — 4010F PR ACE/ARB THEARPY RXD/TAKEN: ICD-10-PCS | Mod: CPTII,S$GLB,, | Performed by: INTERNAL MEDICINE

## 2022-11-04 PROCEDURE — 99999 PR PBB SHADOW E&M-EST. PATIENT-LVL V: CPT | Mod: PBBFAC,,, | Performed by: INTERNAL MEDICINE

## 2022-11-04 PROCEDURE — 78815 PET IMAGE W/CT SKULL-THIGH: CPT | Mod: 26,PS,, | Performed by: RADIOLOGY

## 2022-11-04 PROCEDURE — 99999 PR PBB SHADOW E&M-EST. PATIENT-LVL V: ICD-10-PCS | Mod: PBBFAC,,, | Performed by: INTERNAL MEDICINE

## 2022-11-04 PROCEDURE — 78815 PET IMAGE W/CT SKULL-THIGH: CPT | Mod: TC

## 2022-11-04 PROCEDURE — 99215 OFFICE O/P EST HI 40 MIN: CPT | Mod: S$GLB,,, | Performed by: INTERNAL MEDICINE

## 2022-11-04 PROCEDURE — 3066F PR DOCUMENTATION OF TREATMENT FOR NEPHROPATHY: ICD-10-PCS | Mod: CPTII,S$GLB,, | Performed by: INTERNAL MEDICINE

## 2022-11-04 PROCEDURE — 78815 NM PET CT ROUTINE: ICD-10-PCS | Mod: 26,PS,, | Performed by: RADIOLOGY

## 2022-11-04 PROCEDURE — 4010F ACE/ARB THERAPY RXD/TAKEN: CPT | Mod: CPTII,S$GLB,, | Performed by: INTERNAL MEDICINE

## 2022-11-04 PROCEDURE — 3066F NEPHROPATHY DOC TX: CPT | Mod: CPTII,S$GLB,, | Performed by: INTERNAL MEDICINE

## 2022-11-04 PROCEDURE — 99499 UNLISTED E&M SERVICE: CPT | Mod: HCNC,S$GLB,, | Performed by: INTERNAL MEDICINE

## 2022-11-04 PROCEDURE — 3079F DIAST BP 80-89 MM HG: CPT | Mod: CPTII,S$GLB,, | Performed by: INTERNAL MEDICINE

## 2022-11-04 PROCEDURE — 3079F PR MOST RECENT DIASTOLIC BLOOD PRESSURE 80-89 MM HG: ICD-10-PCS | Mod: CPTII,S$GLB,, | Performed by: INTERNAL MEDICINE

## 2022-11-04 PROCEDURE — 1159F PR MEDICATION LIST DOCUMENTED IN MEDICAL RECORD: ICD-10-PCS | Mod: CPTII,S$GLB,, | Performed by: INTERNAL MEDICINE

## 2022-11-04 PROCEDURE — 3060F POS MICROALBUMINURIA REV: CPT | Mod: CPTII,S$GLB,, | Performed by: INTERNAL MEDICINE

## 2022-11-04 PROCEDURE — 99215 PR OFFICE/OUTPT VISIT, EST, LEVL V, 40-54 MIN: ICD-10-PCS | Mod: S$GLB,,, | Performed by: INTERNAL MEDICINE

## 2022-11-04 PROCEDURE — 3288F PR FALLS RISK ASSESSMENT DOCUMENTED: ICD-10-PCS | Mod: CPTII,S$GLB,, | Performed by: INTERNAL MEDICINE

## 2022-11-04 PROCEDURE — 3288F FALL RISK ASSESSMENT DOCD: CPT | Mod: CPTII,S$GLB,, | Performed by: INTERNAL MEDICINE

## 2022-11-04 PROCEDURE — 3060F PR POS MICROALBUMINURIA RESULT DOCUMENTED/REVIEW: ICD-10-PCS | Mod: CPTII,S$GLB,, | Performed by: INTERNAL MEDICINE

## 2022-11-04 PROCEDURE — 3044F PR MOST RECENT HEMOGLOBIN A1C LEVEL <7.0%: ICD-10-PCS | Mod: CPTII,S$GLB,, | Performed by: INTERNAL MEDICINE

## 2022-11-04 PROCEDURE — 3075F SYST BP GE 130 - 139MM HG: CPT | Mod: CPTII,S$GLB,, | Performed by: INTERNAL MEDICINE

## 2022-11-04 PROCEDURE — 3072F PR LOW RISK FOR RETINOPATHY: ICD-10-PCS | Mod: CPTII,S$GLB,, | Performed by: INTERNAL MEDICINE

## 2022-11-04 PROCEDURE — 99499 RISK ADDL DX/OHS AUDIT: ICD-10-PCS | Mod: HCNC,S$GLB,, | Performed by: INTERNAL MEDICINE

## 2022-11-04 PROCEDURE — 1159F MED LIST DOCD IN RCRD: CPT | Mod: CPTII,S$GLB,, | Performed by: INTERNAL MEDICINE

## 2022-11-04 PROCEDURE — 3008F PR BODY MASS INDEX (BMI) DOCUMENTED: ICD-10-PCS | Mod: CPTII,S$GLB,, | Performed by: INTERNAL MEDICINE

## 2022-11-04 RX ORDER — PREDNISONE 20 MG/1
TABLET ORAL
Qty: 20 TABLET | Refills: 0 | Status: SHIPPED | OUTPATIENT
Start: 2022-11-04 | End: 2022-12-13 | Stop reason: SDUPTHER

## 2022-11-04 RX ORDER — AMOXICILLIN AND CLAVULANATE POTASSIUM 875; 125 MG/1; MG/1
1 TABLET, FILM COATED ORAL 2 TIMES DAILY
Qty: 42 TABLET | Refills: 0 | Status: SHIPPED | OUTPATIENT
Start: 2022-11-04 | End: 2023-02-07

## 2022-11-04 NOTE — LETTER
November 4, 2022      VICK Sandoval MD  [address]  [place] [address]           [facility]  [address] [place] [id number]  Phone: [phone]  Fax: [phone]          Patient: Lucia Barlow   MR Number: [id number]   YOB: 1950   Date of Visit: 11/4/2022           Thank you for referring Lucia Barlow to me for evaluation. Attached you will find relevant portions of my assessment and plan of care.    If you have questions, please do not hesitate to call me. I look forward to following Lucia Barlow along with you.    Sincerely,    Ramiro Aleman MD    Enclosure  CC:  Amrit Flores MD    If you would like to receive this communication electronically, please contact [email or web address] or [phone] to request [employer] access.    [employer] is a tool which provides read-only access to select patient information with whom you have a relationship. It's easy to use and provides real time access to review your patients record including encounter summaries, notes, results, and demographic information.    If you feel you have received this communication in error or would no longer like to receive these types of communications, please [email or web address] [email or web address]

## 2022-11-04 NOTE — PROGRESS NOTES
Subjective:     Patient ID: Lucia Barlow is a 72 y.o. female.    Chief Complaint:  PET scan review :Follow up for lung nodules    HPI  left lower lobe nodule noted to be of concern on CT of chest  S/p thoracotomy for left sided nonsmall cell lung cancer  History of lung cancer:1.5 x 1.5 x 1.5cm moderately differentiated NSCLC, favor adenosquamous    Lung Nodule  She presents for evaluation and treatment of a lung mass. The patient reports that the imaging was performed to evaluate symptoms of dyspnea on exertion, productive cough, shortness of breath and wheezing which have been present for 3 months and are unchanged. Symptoms are exacerbated by walking and relieved by rest. The patient denies other associated symptoms. She has a history of 60 pack years. The patient has no known exposure to tuberculosis. The patient does have a history of cancer.  Sinus Pain  Patient complains of clear rhinorrhea, congestion, cough, headaches, nasal congestion, post nasal drip, and sneezing. Onset of symptoms was 3 weeks ago. Symptoms have been gradually worsening since that time. She is drinking plenty of fluids.  Past history is significant for chronic bronchitis. Patient is former smoker, quit 2 years ago.      Past Medical History:   Diagnosis Date    Atherosclerosis of artery of both lower extremities 1/17/2014    Atherosclerosis of native coronary artery of native heart without angina pectoris 11/18/2016    Atherosclerotic PVD with intermittent claudication 1/17/2014    Chronic bronchitis     COPD (chronic obstructive pulmonary disease)     Diabetes with neurologic complications     Dyslipidemia associated with type 2 diabetes mellitus 6/14/2013    Dyslipidemia associated with type 2 diabetes mellitus     Ex-smoker     Gastroesophageal reflux disease without esophagitis 1/25/2019    Hyperlipidemia     Hypertension     Iron deficiency anemia secondary to inadequate dietary iron intake 6/3/2022    NSCLC of left lung  11/19/2020    IVANIA (obstructive sleep apnea) 2/11/2021    Osteoporosis 1/16 rosita 1/18    Pneumothorax after biopsy 10/21/2020    PVD (peripheral vascular disease)     Renal manifestation of secondary diabetes mellitus     S/P peripheral artery angioplasty 2/7/2014    Seasonal allergic rhinitis due to pollen     Simple chronic bronchitis     Tobacco dependence     Type 2 diabetes mellitus with microalbuminuria, without long-term current use of insulin 3/29/2019    Type 2 diabetes mellitus with peripheral vascular disease     Type 2 diabetes mellitus with stage 3 chronic kidney disease, without long-term current use of insulin 2/1/2019    Type 2 diabetes mellitus without retinopathy 2/1/2019    Urinary incontinence      Past Surgical History:   Procedure Laterality Date    ANGIOPLASTY Bilateral 01/24/2014    aortoiliac stenting     CARPAL TUNNEL RELEASE  2003    [facility]    COLECTOMY  approximate 2005    pt states 1in colon -dx with benign mass removed-states no colon cancer    COLONOSCOPY N/A 11/9/2016    Procedure: COLONOSCOPY;  Surgeon: Dmitri Sterling MD;  Location: [facility];  Service: Endoscopy;  Laterality: N/A;    COLONOSCOPY N/A 11/15/2019    Procedure: COLONOSCOPY;  Surgeon: Marko Vicente MD;  Location: [facility];  Service: Endoscopy;  Laterality: N/A;    COLONOSCOPY N/A 3/25/2022    Procedure: COLONOSCOPY;  Surgeon: Paola Feldman MD;  Location: [facility];  Service: Endoscopy;  Laterality: N/A;    ESOPHAGOGASTRODUODENOSCOPY N/A 3/25/2022    Procedure: EGD (ESOPHAGOGASTRODUODENOSCOPY);  Surgeon: Paola Feldman MD;  Location: [facility];  Service: Endoscopy;  Laterality: N/A;    HYSTERECTOMY      no cancer    INJECTION OF ANESTHETIC AGENT AROUND MULTIPLE INTERCOSTAL NERVES Left 11/19/2020    Procedure: BLOCK, NERVE, INTERCOSTAL, 2 OR MORE;  Surgeon: Amrit Flores MD;  Location: [facility] OR [address] [place] [address];  Service: Thoracic;  Laterality: Left;    OOPHORECTOMY      SURGICAL REMOVAL OF LYMPH NODE Left  11/19/2020    Procedure: EXCISION, LYMPH NODE;  Surgeon: Amrit Flores MD;  Location: [facility] OR 2ND FLR;  Service: Thoracic;  Laterality: Left;  mediastinal lymph node disection    XI ROBOTIC RATS,WITH LOBECTOMY,LUNG Left 11/19/2020    Procedure: XI ROBOTIC RATS,WITH LOBECTOMY,LUNG;  Surgeon: Amrit Flores MD;  Location: [facility] OR 2ND FLR;  Service: Thoracic;  Laterality: Left;  Lingulectomy.     Review of patient's allergies indicates:   Allergen Reactions    Iodine and iodide containing products Swelling    Skin staples [surgical stainless steel] Swelling    Egg derived      Shortness breath, lip swelling    Fish containing products     Latex, natural rubber Swelling    Losartan Itching    Nickel     Pravastatin      40 mg causes nausea vomitting but 20 mg ok    Shellfish containing products Swelling     Current Outpatient Medications on File Prior to Visit   Medication Sig Dispense Refill    albuterol (PROVENTIL) 2.5 mg /3 mL (0.083 %) nebulizer solution Take 3 mLs (2.5 mg total) by nebulization every 6 (six) hours while awake. 270 mL 11    albuterol (PROVENTIL/VENTOLIN HFA) 90 mcg/actuation inhaler Inhale 2 puffs into the lungs every 4 (four) hours as needed for Wheezing or Shortness of Breath. 54 g 3    amLODIPine (NORVASC) 5 MG tablet Take 1 tablet (5 mg total) by mouth once daily. 90 tablet 1    aspirin (ECOTRIN) 81 MG EC tablet Take 1 tablet (81 mg total) by mouth once daily. 90 tablet 4    blood sugar diagnostic Strp 1 each by Misc.(Non-Drug; Combo Route) route 3 (three) times daily. [employer] test strips 100 each 3    blood-glucose meter kit Insurance preferred 1 each 0    butalbital-acetaminophen-caffeine -40 mg (FIORICET, ESGIC) -40 mg per tablet TAKE 1 TABLET BY MOUTH 3 (THREE) TIMES DAILY AS NEEDED FOR HEADACHES. (MAXIMUM OF 15 TABLETS PER MONTH) 30 tablet 3    calcium-vitamin D 600 mg-10 mcg (400 unit) Tab Take 2 tablets by mouth once daily. 180 tablet 4    cetirizine (ZYRTEC) 10 MG  tablet Take 1 tablet (10 mg total) by mouth once daily. For sinus congestion 90 tablet 4    chlorthalidone (HYGROTEN) 25 MG Tab Take 1 tablet (25 mg total) by mouth 2 (two) times daily. 180 tablet 1    cilostazoL (PLETAL) 100 MG Tab Take 1 tablet (100 mg total) by mouth once daily. 180 tablet 3    COVID-19 vac, moiz,Pfizer,,PF, (PFIZER COVID-19 MOIZ VACCN,PF,) 30 mcg/0.3 mL injection Inject into the muscle. 0.3 mL 0    doxycycline (MONODOX) 100 MG capsule Take 1 capsule (100 mg total) by mouth every 12 (twelve) hours. 20 capsule 1    EPINEPHrine (EPIPEN) 0.3 mg/0.3 mL AtIn Inject 0.3 mLs (0.3 mg total) into the muscle once. for 1 dose 2 each 3    ezetimibe (ZETIA) 10 mg tablet Take 1 tablet (10 mg total) by mouth once daily. 90 tablet 2    fexofenadine (ALLEGRA) 180 MG tablet Take 1 tablet (180 mg total) by mouth once daily. 90 tablet 3    fluconazole (DIFLUCAN) 150 MG Tab TAKE ONE TABLET BY MOUTH AS A ONE-TIME DOSE      fluticasone propionate (FLONASE) 50 mcg/actuation nasal spray 2 sprays (100 mcg total) by Each Nostril route once daily. 48 g 4    ipratropium (ATROVENT) 21 mcg (0.03 %) nasal spray USE 2 SPRAYS NASALLY THREE TIMES DAILY 90 mL 1    ipratropium-albuteroL (COMBIVENT)  mcg/actuation inhaler Inhale 2 puffs into the lungs every 4 (four) hours as needed for Wheezing or Shortness of Breath. Rescue 12 g 3    JARDIANCE 25 mg tablet TAKE 1 TABLET (25 MG TOTAL) BY MOUTH ONCE DAILY. 90 tablet 2    levocetirizine (XYZAL) 5 MG tablet Take 1 tablet (5 mg total) by mouth every evening. 30 tablet 11    montelukast (SINGULAIR) 10 mg tablet Take 1 tablet (10 mg total) by mouth every evening. 90 tablet 4    ondansetron (ZOFRAN-ODT) 4 MG TbDL 1-2 tablets PO every 6 hours as needed for nausea/vomiting while completing bowel prep 4 tablet 0    pantoprazole (PROTONIX) 40 MG tablet Take 1 tablet (40 mg total) by mouth once daily. 90 tablet 3    potassium chloride SA (K-DUR,KLOR-CON) 20 MEQ tablet Take 1 tablet (20 mEq  total) by mouth once daily. 90 tablet 2    rosuvastatin (CRESTOR) 20 MG tablet Take 1 tablet (20 mg total) by mouth every evening. 90 tablet 3    sod sulf-pot chloride-mag sulf (SUTAB) 1.479-0.188- 0.225 gram tablet Take 12 tablets by mouth once daily. Take according to package instructions with indicated amount of water. 24 tablet 0    valsartan (DIOVAN) 160 MG tablet Take 1 tablet (160 mg total) by mouth once daily. 90 tablet 1    varicella-zoster gE-AS01B, PF, (SHINGRIX) 50 mcg/0.5 mL injection Inject 0.5 mL (one dose) into muscle now; give second dose at least 2 months later 0.5 mL 1     No current facility-administered medications on file prior to visit.     Social History     Socioeconomic History    Marital status:     Number of children: 4   Occupational History    Occupation:    Tobacco Use    Smoking status: Former     Packs/day: 1.00     Years: 60.00     Pack years: 60.00     Types: Cigarettes     Start date: 1960     Quit date: 1/10/2020     Years since quittin.8    Smokeless tobacco: Never   Substance and Sexual Activity    Alcohol use: No     Alcohol/week: 0.0 standard drinks    Drug use: No    Sexual activity: Never   Social History Narrative    Son smoker, no pets in household.     Social Determinants of Health     Financial Resource Strain: Medium Risk    Difficulty of Paying Living Expenses: Somewhat hard   Food Insecurity: Food Insecurity Present    Worried About Running Out of Food in the Last Year: Sometimes true    Ran Out of Food in the Last Year: Often true   Transportation Needs: No Transportation Needs    Lack of Transportation (Medical): No    Lack of Transportation (Non-Medical): No   Physical Activity: Inactive    Days of Exercise per Week: 0 days    Minutes of Exercise per Session: 0 min   Stress: No Stress Concern Present    Feeling of Stress : Not at all   Social Connections: Unknown    Frequency of Communication with Friends and Family: More than three times  "a week    Frequency of Social Gatherings with Friends and Family: Patient refused    Active Member of Clubs or Organizations: Yes    Attends Club or Organization Meetings: More than 4 times per year    Marital Status:    Housing Stability: Low Risk     Unable to Pay for Housing in the Last Year: No    Number of Places Lived in the Last Year: 1    Unstable Housing in the Last Year: No     Family History   Problem Relation Age of Onset    Stroke Father     Stroke Sister     Asthma Daughter     Diabetes Daughter     Breast cancer Daughter     Asthma Son        Review of Systems   Constitutional:  Positive for fatigue. Negative for fever.   HENT:  Positive for postnasal drip, rhinorrhea and congestion.    Eyes:  Negative for redness and itching.   Respiratory:  Positive for cough, sputum production, shortness of breath, dyspnea on extertion, use of rescue inhaler and Paroxysmal Nocturnal Dyspnea.    Cardiovascular:  Negative for chest pain, palpitations and leg swelling.   Genitourinary:  Negative for difficulty urinating and hematuria.   Endocrine:  Negative for cold intolerance and heat intolerance.    Skin:  Negative for rash.   Gastrointestinal:  Negative for nausea and abdominal pain.   Neurological:  Negative for dizziness, syncope, weakness and light-headedness.   Hematological:  Negative for adenopathy. Does not bruise/bleed easily.   Psychiatric/Behavioral:  Negative for sleep disturbance. The patient is not nervous/anxious.      Objective:      /80   Pulse (!) 58   Resp 18   Ht 5' 2" (1.575 m)   Wt 64.5 kg (142 lb 4.9 oz)   SpO2 96%   BMI 26.03 kg/m²   Physical Exam  Vitals and nursing note reviewed.   Constitutional:       Appearance: She is well-developed.   HENT:      Head: Normocephalic and atraumatic.      Nose: Congestion and rhinorrhea present.      Mouth/Throat:      Pharynx: No oropharyngeal exudate.   Eyes:      Conjunctiva/sclera: Conjunctivae normal.      Pupils: Pupils are " equal, round, and reactive to light.   Neck:      Thyroid: No thyromegaly.      Vascular: No JVD.      Trachea: No tracheal deviation.   Cardiovascular:      Rate and Rhythm: Normal rate and regular rhythm.      Heart sounds: Normal heart sounds.   Pulmonary:      Effort: Pulmonary effort is normal. No respiratory distress.      Breath sounds: Examination of the right-lower field reveals wheezing. Examination of the left-lower field reveals wheezing. Decreased breath sounds and wheezing present. No rhonchi or rales.   Chest:      Chest wall: No tenderness.   Abdominal:      General: Bowel sounds are normal.      Palpations: Abdomen is soft.   Musculoskeletal:         General: Normal range of motion.      Cervical back: Neck supple.   Lymphadenopathy:      Cervical: No cervical adenopathy.   Skin:     General: Skin is warm and dry.   Neurological:      Mental Status: She is alert and oriented to person, place, and time.     Personal Diagnostic Review  none pertinent    Pulmonary Studies Review 11/4/2022   SpO2 96   Ordering Provider -   Interpreting Provider -   Performing nurse/tech/RT -   Diagnosis -   Height 62   Weight 2276.91   BMI (Calculated) 26   Predicted Distance 284.34   Patient Race -   6MWT Status -   Patient Reported -   Was O2 used? -   6MW Distance walked (feet) -   Distance walked (meters) -   Did patient stop? -   How many times? -   Stop Time 1 -   Restart Time 1 -   Did patient restart? -   Type of assistive device(s) used? -   Is extra documentation required for this patient? -   Oxygen Saturation -   Supplemental Oxygen -   Heart Rate -   Blood Pressure -   Dom Dyspnea Rating  -   Oxygen Saturation -   Supplemental Oxygen -   Heart Rate -   Blood Pressure -   Dom Dyspnea Rating  -   Recovery Time (seconds) -   Oxygen Saturation -   Supplemental Oxygen -   Heart Rate -   Blood Pressure -   Dom Dyspnea Rating  -   Is procedure ready for interpretation? -   Did the patient stop or pause? -   How  many times did the patient stop or pause? -   Stop Time 1 -   Restart Time 1 -   Pause Time 1 -   Total Time Walked (Calculated) -   Total Laps Walked -   Final Partial Lap Distance (feet) -   Total Distance Feet (Calculated) -   Total Distance Meters (Calculated) -   Predicted Distance Meters (Calculated) 423.79   Percentage of Predicted (Calculated) -   Peak VO2 (Calculated) -   Mets -   Has The Patient Had a Previous Six Minute Walk Test? -   Oxygen Qualification? -       NM PET CT Routine FDG  Narrative: EXAMINATION:  NM PET CT ROUTINE    CLINICAL HISTORY:  Lung nodule, > 8mm;HIstory of lung cancer removed; Malignant neoplasm of unspecified part of left bronchus or lung    TECHNIQUE:  13.6 mCi of F18-FDG was administered intravenously in the left antecubital fossa.  After an approximately 60 min distribution time, PET/CT images were acquired from the skull base to the mid thigh. Transmission images were acquired to correct for attenuation using a whole body low-dose CT scan without contrast with the arms positioned above the head. Glycemia at the time of injection was 169 mg/dL.    COMPARISON:  09/30/2020, CT dated 10/26/2022    FINDINGS:  Quality of the study: Adequate.    Head neck: No abnormal foci of increased tracer uptake are present.    Chest: The previously described pulmonary nodule in the left lower lobe appears grossly stable in size allowing for differences in technique and demonstrates no significant elevated FDG uptake.  Postsurgical changes are noted in the left upper lobe.  No FDG avid lesions demonstrated in the thorax.    Abdomen and pelvis: No abnormal foci of increased tracer uptake are present.    Skeletal: No abnormal foci of increased tracer uptake are present.    Physiologic uptake of the tracer is present within the brain, salivary glands, myocardium, GI and  tracts.    Incidental CT findings: Fairly extensive atherosclerotic calcification is again noted throughout the neck, chest,  abdomen, and pelvis.  Stent grafts are again noted in the bilateral common iliac arteries.  Surgical anastomosis noted near the rectosigmoid junction.  Impression: 1. No FDG avid pulmonary nodules demonstrated.  Note that this does not exclude malignancy.  Continued follow-up recommended.    Electronically signed by: Marko Bhatti MD  Date:    11/04/2022  Time:    10:10      Office Spirometry Results:     No flowsheet data found.  Pulmonary Studies Review 11/4/2022   SpO2 96   Ordering Provider -   Interpreting Provider -   Performing nurse/tech/RT -   Diagnosis -   Height 62   Weight 2276.91   BMI (Calculated) 26   Predicted Distance 284.34   Patient Race -   6MWT Status -   Patient Reported -   Was O2 used? -   6MW Distance walked (feet) -   Distance walked (meters) -   Did patient stop? -   How many times? -   Stop Time 1 -   Restart Time 1 -   Did patient restart? -   Type of assistive device(s) used? -   Is extra documentation required for this patient? -   Oxygen Saturation -   Supplemental Oxygen -   Heart Rate -   Blood Pressure -   Dom Dyspnea Rating  -   Oxygen Saturation -   Supplemental Oxygen -   Heart Rate -   Blood Pressure -   Dom Dyspnea Rating  -   Recovery Time (seconds) -   Oxygen Saturation -   Supplemental Oxygen -   Heart Rate -   Blood Pressure -   Dom Dyspnea Rating  -   Is procedure ready for interpretation? -   Did the patient stop or pause? -   How many times did the patient stop or pause? -   Stop Time 1 -   Restart Time 1 -   Pause Time 1 -   Total Time Walked (Calculated) -   Total Laps Walked -   Final Partial Lap Distance (feet) -   Total Distance Feet (Calculated) -   Total Distance Meters (Calculated) -   Predicted Distance Meters (Calculated) 423.79   Percentage of Predicted (Calculated) -   Peak VO2 (Calculated) -   Mets -   Has The Patient Had a Previous Six Minute Walk Test? -   Oxygen Qualification? -         Assessment:            Solitary pulmonary nodule  -     CT  Chest Without Contrast; Future; Expected date: 11/04/2022    COPD exacerbation  -     predniSONE (DELTASONE) 20 MG tablet; Prednisone 60 mg/ day for 3 days, 40 mg/day for 3 days,20 mg/ day for 3 days, (1/2 tablet )10 mg a day for 3 days.  Dispense: 20 tablet; Refill: 0    Other chronic sinusitis  -     predniSONE (DELTASONE) 20 MG tablet; Prednisone 60 mg/ day for 3 days, 40 mg/day for 3 days,20 mg/ day for 3 days, (1/2 tablet )10 mg a day for 3 days.  Dispense: 20 tablet; Refill: 0  -     amoxicillin-clavulanate 875-125mg (AUGMENTIN) 875-125 mg per tablet; Take 1 tablet by mouth 2 (two) times daily. for 21 days  Dispense: 42 tablet; Refill: 0    Simple chronic bronchitis    Former heavy cigarette smoker    Non-small cell cancer of left lung        Outpatient Encounter Medications as of 11/4/2022   Medication Sig Dispense Refill    albuterol (PROVENTIL) 2.5 mg /3 mL (0.083 %) nebulizer solution Take 3 mLs (2.5 mg total) by nebulization every 6 (six) hours while awake. 270 mL 11    albuterol (PROVENTIL/VENTOLIN HFA) 90 mcg/actuation inhaler Inhale 2 puffs into the lungs every 4 (four) hours as needed for Wheezing or Shortness of Breath. 54 g 3    amLODIPine (NORVASC) 5 MG tablet Take 1 tablet (5 mg total) by mouth once daily. 90 tablet 1    amoxicillin-clavulanate 875-125mg (AUGMENTIN) 875-125 mg per tablet Take 1 tablet by mouth 2 (two) times daily. for 21 days 42 tablet 0    aspirin (ECOTRIN) 81 MG EC tablet Take 1 tablet (81 mg total) by mouth once daily. 90 tablet 4    blood sugar diagnostic Strp 1 each by Misc.(Non-Drug; Combo Route) route 3 (three) times daily. [employer] test strips 100 each 3    blood-glucose meter kit Insurance preferred 1 each 0    butalbital-acetaminophen-caffeine -40 mg (FIORICET, ESGIC) -40 mg per tablet TAKE 1 TABLET BY MOUTH 3 (THREE) TIMES DAILY AS NEEDED FOR HEADACHES. (MAXIMUM OF 15 TABLETS PER MONTH) 30 tablet 3    calcium-vitamin D 600 mg-10 mcg (400 unit) Tab Take 2  tablets by mouth once daily. 180 tablet 4    cetirizine (ZYRTEC) 10 MG tablet Take 1 tablet (10 mg total) by mouth once daily. For sinus congestion 90 tablet 4    chlorthalidone (HYGROTEN) 25 MG Tab Take 1 tablet (25 mg total) by mouth 2 (two) times daily. 180 tablet 1    cilostazoL (PLETAL) 100 MG Tab Take 1 tablet (100 mg total) by mouth once daily. 180 tablet 3    COVID-19 vac, moiz,Pfizer,,PF, (PFIZER COVID-19 MOIZ VACCN,PF,) 30 mcg/0.3 mL injection Inject into the muscle. 0.3 mL 0    doxycycline (MONODOX) 100 MG capsule Take 1 capsule (100 mg total) by mouth every 12 (twelve) hours. 20 capsule 1    EPINEPHrine (EPIPEN) 0.3 mg/0.3 mL AtIn Inject 0.3 mLs (0.3 mg total) into the muscle once. for 1 dose 2 each 3    ezetimibe (ZETIA) 10 mg tablet Take 1 tablet (10 mg total) by mouth once daily. 90 tablet 2    fexofenadine (ALLEGRA) 180 MG tablet Take 1 tablet (180 mg total) by mouth once daily. 90 tablet 3    fluconazole (DIFLUCAN) 150 MG Tab TAKE ONE TABLET BY MOUTH AS A ONE-TIME DOSE      fluticasone propionate (FLONASE) 50 mcg/actuation nasal spray 2 sprays (100 mcg total) by Each Nostril route once daily. 48 g 4    ipratropium (ATROVENT) 21 mcg (0.03 %) nasal spray USE 2 SPRAYS NASALLY THREE TIMES DAILY 90 mL 1    ipratropium-albuteroL (COMBIVENT)  mcg/actuation inhaler Inhale 2 puffs into the lungs every 4 (four) hours as needed for Wheezing or Shortness of Breath. Rescue 12 g 3    JARDIANCE 25 mg tablet TAKE 1 TABLET (25 MG TOTAL) BY MOUTH ONCE DAILY. 90 tablet 2    levocetirizine (XYZAL) 5 MG tablet Take 1 tablet (5 mg total) by mouth every evening. 30 tablet 11    montelukast (SINGULAIR) 10 mg tablet Take 1 tablet (10 mg total) by mouth every evening. 90 tablet 4    ondansetron (ZOFRAN-ODT) 4 MG TbDL 1-2 tablets PO every 6 hours as needed for nausea/vomiting while completing bowel prep 4 tablet 0    pantoprazole (PROTONIX) 40 MG tablet Take 1 tablet (40 mg total) by mouth once daily. 90 tablet 3     potassium chloride SA (K-DUR,KLOR-CON) 20 MEQ tablet Take 1 tablet (20 mEq total) by mouth once daily. 90 tablet 2    predniSONE (DELTASONE) 20 MG tablet Prednisone 60 mg/ day for 3 days, 40 mg/day for 3 days,20 mg/ day for 3 days, (1/2 tablet )10 mg a day for 3 days. 20 tablet 0    rosuvastatin (CRESTOR) 20 MG tablet Take 1 tablet (20 mg total) by mouth every evening. 90 tablet 3    sod sulf-pot chloride-mag sulf (SUTAB) 1.479-0.188- 0.225 gram tablet Take 12 tablets by mouth once daily. Take according to package instructions with indicated amount of water. 24 tablet 0    valsartan (DIOVAN) 160 MG tablet Take 1 tablet (160 mg total) by mouth once daily. 90 tablet 1    varicella-zoster gE-AS01B, PF, (SHINGRIX) 50 mcg/0.5 mL injection Inject 0.5 mL (one dose) into muscle now; give second dose at least 2 months later 0.5 mL 1    [DISCONTINUED] predniSONE (DELTASONE) 20 MG tablet Prednisone 60 mg/ day for 3 days, 40 mg/day for 3 days,20 mg/ day for 3 days, (1/2 tablet )10 mg a day for 3 days. 20 tablet 0     No facility-administered encounter medications on file as of 11/4/2022.     Plan:       Requested Prescriptions     Signed Prescriptions Disp Refills    predniSONE (DELTASONE) 20 MG tablet 20 tablet 0     Sig: Prednisone 60 mg/ day for 3 days, 40 mg/day for 3 days,20 mg/ day for 3 days, (1/2 tablet )10 mg a day for 3 days.    amoxicillin-clavulanate 875-125mg (AUGMENTIN) 875-125 mg per tablet 42 tablet 0     Sig: Take 1 tablet by mouth 2 (two) times daily. for 21 days     Problem List Items Addressed This Visit       Former heavy cigarette smoker (Chronic)    Overview     QUIT in September, 2020 (prior to lung surgery for cancer).         Chronic obstructive pulmonary disease    Non-small cell cancer of left lung    Overview     S/P robotic left lobectomy in November, 2020. Did not require chemotherapy or radiation. Following with Dr. Aleman.  Pathology: 1.5 x 1.5 x 1.5cm moderately differentiated NSCLC, favor  adenosquamous. Margin negative,3cm to closet margin. Levels 5,6,7,11,12= negative. pT1b N0. 1% PDL1 expression  Post-operative therapy: Surveillance          Other Visit Diagnoses       Solitary pulmonary nodule    -  Primary    Relevant Orders    CT Chest Without Contrast    COPD exacerbation        Relevant Medications    predniSONE (DELTASONE) 20 MG tablet    Other chronic sinusitis        Relevant Medications    predniSONE (DELTASONE) 20 MG tablet    amoxicillin-clavulanate 875-125mg (AUGMENTIN) 875-125 mg per tablet             Follow up in about 6 months (around 5/4/2023) for Review CT/PET - on return visit.    MEDICAL DECISION MAKING: Moderate to high complexity.  Overall, the multiple problems listed are of moderate to high severity that may impact quality of life and activities of daily living. Side effects of medications, treatment plan as well as options and alternatives reviewed and discussed with patient. There was counseling of patient concerning these issues.    Total time spent in counseling and coordination of care - 30  minutes of total time spent on the encounter, which includes face to face time and non-face to face time preparing to see the patient (eg, review of tests), Obtaining and/or reviewing separately obtained history, Documenting clinical information in the electronic or other health record, Independently interpreting results (not separately reported) and communicating results to the patient/family/caregiver, or Care coordination (not separately reported).    Time was used in discussion of prognosis, risks, benefits of treatment, instructions and compliance with regimen . Discussion with other physicians and/or health care providers - home health or for use of durable medical equipment (oxygen, nebulizers, CPAP, BiPAP) occurred.

## 2022-11-16 ENCOUNTER — LAB VISIT (OUTPATIENT)
Dept: LAB | Facility: HOSPITAL | Age: 72
End: 2022-11-16
Attending: FAMILY MEDICINE
Payer: MEDICARE

## 2022-11-16 DIAGNOSIS — I15.2 HYPERTENSION ASSOCIATED WITH DIABETES: ICD-10-CM

## 2022-11-16 DIAGNOSIS — E11.22 TYPE 2 DIABETES MELLITUS WITH STAGE 3A CHRONIC KIDNEY DISEASE, WITHOUT LONG-TERM CURRENT USE OF INSULIN: Chronic | ICD-10-CM

## 2022-11-16 DIAGNOSIS — E11.69 DYSLIPIDEMIA ASSOCIATED WITH TYPE 2 DIABETES MELLITUS: Chronic | ICD-10-CM

## 2022-11-16 DIAGNOSIS — E11.59 HYPERTENSION ASSOCIATED WITH DIABETES: ICD-10-CM

## 2022-11-16 DIAGNOSIS — D50.0 IRON DEFICIENCY ANEMIA DUE TO CHRONIC BLOOD LOSS: ICD-10-CM

## 2022-11-16 DIAGNOSIS — N18.31 TYPE 2 DIABETES MELLITUS WITH STAGE 3A CHRONIC KIDNEY DISEASE, WITHOUT LONG-TERM CURRENT USE OF INSULIN: Chronic | ICD-10-CM

## 2022-11-16 DIAGNOSIS — E78.5 DYSLIPIDEMIA ASSOCIATED WITH TYPE 2 DIABETES MELLITUS: Chronic | ICD-10-CM

## 2022-11-16 LAB
ALBUMIN SERPL BCP-MCNC: 3.4 G/DL (ref 3.5–5.2)
ALP SERPL-CCNC: 85 U/L (ref 55–135)
ALT SERPL W/O P-5'-P-CCNC: 21 U/L (ref 10–44)
ANION GAP SERPL CALC-SCNC: 13 MMOL/L (ref 8–16)
AST SERPL-CCNC: 16 U/L (ref 10–40)
BASOPHILS # BLD AUTO: 0.01 K/UL (ref 0–0.2)
BASOPHILS NFR BLD: 0.1 % (ref 0–1.9)
BILIRUB SERPL-MCNC: 0.3 MG/DL (ref 0.1–1)
BUN SERPL-MCNC: 43 MG/DL (ref 8–23)
CALCIUM SERPL-MCNC: 9.8 MG/DL (ref 8.7–10.5)
CHLORIDE SERPL-SCNC: 102 MMOL/L (ref 95–110)
CO2 SERPL-SCNC: 24 MMOL/L (ref 23–29)
CREAT SERPL-MCNC: 1.3 MG/DL (ref 0.5–1.4)
DIFFERENTIAL METHOD: ABNORMAL
EOSINOPHIL # BLD AUTO: 0 K/UL (ref 0–0.5)
EOSINOPHIL NFR BLD: 0 % (ref 0–8)
ERYTHROCYTE [DISTWIDTH] IN BLOOD BY AUTOMATED COUNT: 21.3 % (ref 11.5–14.5)
EST. GFR  (NO RACE VARIABLE): 43.1 ML/MIN/1.73 M^2
ESTIMATED AVG GLUCOSE: 169 MG/DL (ref 68–131)
GLUCOSE SERPL-MCNC: 187 MG/DL (ref 70–110)
HBA1C MFR BLD: 7.5 % (ref 4–5.6)
HCT VFR BLD AUTO: 39.4 % (ref 37–48.5)
HGB BLD-MCNC: 11.6 G/DL (ref 12–16)
IMM GRANULOCYTES # BLD AUTO: 0.06 K/UL (ref 0–0.04)
IMM GRANULOCYTES NFR BLD AUTO: 0.5 % (ref 0–0.5)
LYMPHOCYTES # BLD AUTO: 0.8 K/UL (ref 1–4.8)
LYMPHOCYTES NFR BLD: 7.7 % (ref 18–48)
MCH RBC QN AUTO: 24.5 PG (ref 27–31)
MCHC RBC AUTO-ENTMCNC: 29.4 G/DL (ref 32–36)
MCV RBC AUTO: 83 FL (ref 82–98)
MONOCYTES # BLD AUTO: 0.3 K/UL (ref 0.3–1)
MONOCYTES NFR BLD: 2.3 % (ref 4–15)
NEUTROPHILS # BLD AUTO: 9.8 K/UL (ref 1.8–7.7)
NEUTROPHILS NFR BLD: 89.4 % (ref 38–73)
NRBC BLD-RTO: 0 /100 WBC
PLATELET # BLD AUTO: 390 K/UL (ref 150–450)
PMV BLD AUTO: 10.9 FL (ref 9.2–12.9)
POTASSIUM SERPL-SCNC: 4.7 MMOL/L (ref 3.5–5.1)
PROT SERPL-MCNC: 7.1 G/DL (ref 6–8.4)
RBC # BLD AUTO: 4.73 M/UL (ref 4–5.4)
SODIUM SERPL-SCNC: 139 MMOL/L (ref 136–145)
WBC # BLD AUTO: 10.92 K/UL (ref 3.9–12.7)

## 2022-11-16 PROCEDURE — 83036 HEMOGLOBIN GLYCOSYLATED A1C: CPT | Performed by: FAMILY MEDICINE

## 2022-11-16 PROCEDURE — 36415 COLL VENOUS BLD VENIPUNCTURE: CPT | Performed by: FAMILY MEDICINE

## 2022-11-16 PROCEDURE — 80053 COMPREHEN METABOLIC PANEL: CPT | Performed by: FAMILY MEDICINE

## 2022-11-16 PROCEDURE — 85025 COMPLETE CBC W/AUTO DIFF WBC: CPT | Performed by: INTERNAL MEDICINE

## 2022-11-18 ENCOUNTER — OFFICE VISIT (OUTPATIENT)
Dept: INTERNAL MEDICINE | Facility: CLINIC | Age: 72
End: 2022-11-18
Payer: MEDICARE

## 2022-11-18 VITALS
BODY MASS INDEX: 26.78 KG/M2 | WEIGHT: 145.5 LBS | HEART RATE: 107 BPM | TEMPERATURE: 97 F | OXYGEN SATURATION: 100 % | HEIGHT: 62 IN | SYSTOLIC BLOOD PRESSURE: 142 MMHG | DIASTOLIC BLOOD PRESSURE: 60 MMHG

## 2022-11-18 DIAGNOSIS — R91.1 PULMONARY NODULE, LEFT: Chronic | ICD-10-CM

## 2022-11-18 DIAGNOSIS — E11.69 DYSLIPIDEMIA ASSOCIATED WITH TYPE 2 DIABETES MELLITUS: Primary | Chronic | ICD-10-CM

## 2022-11-18 DIAGNOSIS — E11.22 TYPE 2 DIABETES MELLITUS WITH STAGE 3B CHRONIC KIDNEY DISEASE, WITHOUT LONG-TERM CURRENT USE OF INSULIN: Chronic | ICD-10-CM

## 2022-11-18 DIAGNOSIS — E11.9 DIABETIC EYE EXAM: ICD-10-CM

## 2022-11-18 DIAGNOSIS — B37.31 RECURRENT CANDIDIASIS OF VAGINA: Chronic | ICD-10-CM

## 2022-11-18 DIAGNOSIS — E11.59 HYPERTENSION ASSOCIATED WITH DIABETES: ICD-10-CM

## 2022-11-18 DIAGNOSIS — E78.5 DYSLIPIDEMIA ASSOCIATED WITH TYPE 2 DIABETES MELLITUS: Primary | Chronic | ICD-10-CM

## 2022-11-18 DIAGNOSIS — N18.32 TYPE 2 DIABETES MELLITUS WITH STAGE 3B CHRONIC KIDNEY DISEASE, WITHOUT LONG-TERM CURRENT USE OF INSULIN: Chronic | ICD-10-CM

## 2022-11-18 DIAGNOSIS — Z01.00 DIABETIC EYE EXAM: ICD-10-CM

## 2022-11-18 DIAGNOSIS — Z09 NEED FOR CASE MANAGEMENT FOLLOW-UP: ICD-10-CM

## 2022-11-18 DIAGNOSIS — I70.213 ATHEROSCLEROSIS OF NATIVE ARTERY OF BOTH LOWER EXTREMITIES WITH INTERMITTENT CLAUDICATION: Chronic | ICD-10-CM

## 2022-11-18 DIAGNOSIS — I15.2 HYPERTENSION ASSOCIATED WITH DIABETES: ICD-10-CM

## 2022-11-18 PROCEDURE — 3008F PR BODY MASS INDEX (BMI) DOCUMENTED: ICD-10-PCS | Mod: CPTII,S$GLB,, | Performed by: FAMILY MEDICINE

## 2022-11-18 PROCEDURE — 99999 PR PBB SHADOW E&M-EST. PATIENT-LVL IV: CPT | Mod: PBBFAC,,, | Performed by: FAMILY MEDICINE

## 2022-11-18 PROCEDURE — 1160F RVW MEDS BY RX/DR IN RCRD: CPT | Mod: CPTII,S$GLB,, | Performed by: FAMILY MEDICINE

## 2022-11-18 PROCEDURE — 1160F PR REVIEW ALL MEDS BY PRESCRIBER/CLIN PHARMACIST DOCUMENTED: ICD-10-PCS | Mod: CPTII,S$GLB,, | Performed by: FAMILY MEDICINE

## 2022-11-18 PROCEDURE — 3072F PR LOW RISK FOR RETINOPATHY: ICD-10-PCS | Mod: CPTII,S$GLB,, | Performed by: FAMILY MEDICINE

## 2022-11-18 PROCEDURE — 3078F DIAST BP <80 MM HG: CPT | Mod: CPTII,S$GLB,, | Performed by: FAMILY MEDICINE

## 2022-11-18 PROCEDURE — 3077F PR MOST RECENT SYSTOLIC BLOOD PRESSURE >= 140 MM HG: ICD-10-PCS | Mod: CPTII,S$GLB,, | Performed by: FAMILY MEDICINE

## 2022-11-18 PROCEDURE — 1159F MED LIST DOCD IN RCRD: CPT | Mod: CPTII,S$GLB,, | Performed by: FAMILY MEDICINE

## 2022-11-18 PROCEDURE — 1101F PT FALLS ASSESS-DOCD LE1/YR: CPT | Mod: CPTII,S$GLB,, | Performed by: FAMILY MEDICINE

## 2022-11-18 PROCEDURE — 99214 OFFICE O/P EST MOD 30 MIN: CPT | Mod: S$GLB,,, | Performed by: FAMILY MEDICINE

## 2022-11-18 PROCEDURE — 1125F AMNT PAIN NOTED PAIN PRSNT: CPT | Mod: CPTII,S$GLB,, | Performed by: FAMILY MEDICINE

## 2022-11-18 PROCEDURE — 3008F BODY MASS INDEX DOCD: CPT | Mod: CPTII,S$GLB,, | Performed by: FAMILY MEDICINE

## 2022-11-18 PROCEDURE — 3288F FALL RISK ASSESSMENT DOCD: CPT | Mod: CPTII,S$GLB,, | Performed by: FAMILY MEDICINE

## 2022-11-18 PROCEDURE — 3072F LOW RISK FOR RETINOPATHY: CPT | Mod: CPTII,S$GLB,, | Performed by: FAMILY MEDICINE

## 2022-11-18 PROCEDURE — 3051F PR MOST RECENT HEMOGLOBIN A1C LEVEL 7.0 - < 8.0%: ICD-10-PCS | Mod: CPTII,S$GLB,, | Performed by: FAMILY MEDICINE

## 2022-11-18 PROCEDURE — 4010F PR ACE/ARB THEARPY RXD/TAKEN: ICD-10-PCS | Mod: CPTII,S$GLB,, | Performed by: FAMILY MEDICINE

## 2022-11-18 PROCEDURE — 3060F PR POS MICROALBUMINURIA RESULT DOCUMENTED/REVIEW: ICD-10-PCS | Mod: CPTII,S$GLB,, | Performed by: FAMILY MEDICINE

## 2022-11-18 PROCEDURE — 3060F POS MICROALBUMINURIA REV: CPT | Mod: CPTII,S$GLB,, | Performed by: FAMILY MEDICINE

## 2022-11-18 PROCEDURE — 1159F PR MEDICATION LIST DOCUMENTED IN MEDICAL RECORD: ICD-10-PCS | Mod: CPTII,S$GLB,, | Performed by: FAMILY MEDICINE

## 2022-11-18 PROCEDURE — 3288F PR FALLS RISK ASSESSMENT DOCUMENTED: ICD-10-PCS | Mod: CPTII,S$GLB,, | Performed by: FAMILY MEDICINE

## 2022-11-18 PROCEDURE — 3078F PR MOST RECENT DIASTOLIC BLOOD PRESSURE < 80 MM HG: ICD-10-PCS | Mod: CPTII,S$GLB,, | Performed by: FAMILY MEDICINE

## 2022-11-18 PROCEDURE — 4010F ACE/ARB THERAPY RXD/TAKEN: CPT | Mod: CPTII,S$GLB,, | Performed by: FAMILY MEDICINE

## 2022-11-18 PROCEDURE — 3066F PR DOCUMENTATION OF TREATMENT FOR NEPHROPATHY: ICD-10-PCS | Mod: CPTII,S$GLB,, | Performed by: FAMILY MEDICINE

## 2022-11-18 PROCEDURE — 3066F NEPHROPATHY DOC TX: CPT | Mod: CPTII,S$GLB,, | Performed by: FAMILY MEDICINE

## 2022-11-18 PROCEDURE — 99999 PR PBB SHADOW E&M-EST. PATIENT-LVL IV: ICD-10-PCS | Mod: PBBFAC,,, | Performed by: FAMILY MEDICINE

## 2022-11-18 PROCEDURE — 1125F PR PAIN SEVERITY QUANTIFIED, PAIN PRESENT: ICD-10-PCS | Mod: CPTII,S$GLB,, | Performed by: FAMILY MEDICINE

## 2022-11-18 PROCEDURE — 3051F HG A1C>EQUAL 7.0%<8.0%: CPT | Mod: CPTII,S$GLB,, | Performed by: FAMILY MEDICINE

## 2022-11-18 PROCEDURE — 1101F PR PT FALLS ASSESS DOC 0-1 FALLS W/OUT INJ PAST YR: ICD-10-PCS | Mod: CPTII,S$GLB,, | Performed by: FAMILY MEDICINE

## 2022-11-18 PROCEDURE — 99214 PR OFFICE/OUTPT VISIT, EST, LEVL IV, 30-39 MIN: ICD-10-PCS | Mod: S$GLB,,, | Performed by: FAMILY MEDICINE

## 2022-11-18 PROCEDURE — 3077F SYST BP >= 140 MM HG: CPT | Mod: CPTII,S$GLB,, | Performed by: FAMILY MEDICINE

## 2022-11-18 RX ORDER — CILOSTAZOL 50 MG/1
50 TABLET ORAL
Qty: 180 TABLET | Refills: 3 | Status: SHIPPED | OUTPATIENT
Start: 2022-11-18 | End: 2023-04-19 | Stop reason: SDUPTHER

## 2022-11-18 RX ORDER — AMLODIPINE BESYLATE 10 MG/1
10 TABLET ORAL DAILY
Qty: 90 TABLET | Refills: 3 | Status: SHIPPED | OUTPATIENT
Start: 2022-11-18 | End: 2023-04-19 | Stop reason: SDUPTHER

## 2022-11-18 RX ORDER — FLUCONAZOLE 150 MG/1
TABLET ORAL
Qty: 12 TABLET | Refills: 1 | Status: SHIPPED | OUTPATIENT
Start: 2022-11-18 | End: 2023-04-07

## 2022-11-18 RX ORDER — MICONAZOLE NITRATE 2 %
1 CREAM WITH APPLICATOR VAGINAL NIGHTLY
Qty: 135 G | Refills: 2 | Status: SHIPPED | OUTPATIENT
Start: 2022-11-18 | End: 2024-01-24

## 2022-11-18 NOTE — ASSESSMENT & PLAN NOTE
Diabetes Management Status    Statin: Taking  ACE/ARB: Taking    Screening or Prevention Patient's value Goal Complete/Controlled?   HgA1C Testing and Control   Lab Results   Component Value Date    HGBA1C 7.5 (H) 11/16/2022      Annually/Less than 8% Yes   Lipid profile : 05/27/2022 Annually Yes   LDL control Lab Results   Component Value Date    LDLCALC 68.6 05/27/2022    Annually/Less than 100 mg/dl  Yes   Nephropathy screening Lab Results   Component Value Date    LABMICR 26.0 06/03/2022     Lab Results   Component Value Date    PROTEINUA 1+ (A) 12/16/2018    Annually Yes   Blood pressure BP Readings from Last 1 Encounters:   11/18/22 (!) 142/60    Less than 140/90 No   Dilated retinal exam : 08/18/2021 Annually No   Foot exam   : 06/03/2022 Annually Yes     Lab Results   Component Value Date    HGBA1C 7.5 (H) 11/16/2022    HGBA1C 6.4 (H) 05/27/2022    HGBA1C 6.4 (H) 03/04/2022    EGFRNORACEVR 43.1 (A) 11/16/2022    MICALBCREAT 39.4 (H) 06/03/2022    LDLCALC 68.6 05/27/2022     No results found for: GLUTAMICACID, CPEPTIDE   Last 5 Patient Entered Readings                                          Most Recent A1c: 7.5% on 11/16/2022  (Goal: 8%)     Recent Readings 11/15/2022 11/9/2022 11/8/2022 10/30/2022 10/23/2022    Blood Glucose (mg/dL) 225 222 136 152 120         HEALTH MAINTENANCE: Diabetic health maintenance interventions reviewed and are up to date except for:      Topic    Eye Exam

## 2022-11-18 NOTE — PATIENT INSTRUCTIONS
You can call the [facility] Help Desk at [phone] if you need technical support for your digital medicine device. You can also get help at the [facility] in the 1st floor lobby of [facility].

## 2022-11-18 NOTE — ASSESSMENT & PLAN NOTE
Lab Results   Component Value Date    CHOL 148 05/27/2022    CHOL 142 11/22/2021    TRIG 87 05/27/2022    TRIG 63 11/22/2021    HDL 62 05/27/2022    HDL 59 11/22/2021    LDLCALC 68.6 05/27/2022    LDLCALC 70.4 11/22/2021    NONHDLCHOL 86 05/27/2022    NONHDLCHOL 83 11/22/2021    AST 16 11/16/2022    ALT 21 11/16/2022     The 10-year ASCVD risk score (Cristian NEGRON, et al., 2019) is: 29.6%    Values used to calculate the score:      Age: 72 years      Sex: Female      Is Non- : Yes      Diabetic: Yes      Tobacco smoker: No      Systolic Blood Pressure: 142 mmHg      Is BP treated: Yes      HDL Cholesterol: 62 mg/dL      Total Cholesterol: 148 mg/dL

## 2022-11-18 NOTE — PROGRESS NOTES
Recently on steroids, explaining A1c increase.  Jardiance causing recurring candidal vaginitis.      ***    ***    ***

## 2022-11-18 NOTE — ASSESSMENT & PLAN NOTE
BP Readings from Last 6 Encounters:   11/18/22 (!) 142/60   11/04/22 134/80   10/26/22 (!) 118/56   08/15/22 128/68   06/24/22 (!) 140/62   06/03/22 120/61      Last 5 Patient Entered Readings                Current 30 Day Average: 147/78  Recent Readings 11/15/2022 11/9/2022 11/8/2022 10/30/2022 10/24/2022   SBP (mmHg) 141 140 158 141 149   DBP (mmHg) 78 73 70 73 81   Pulse 118 107 96 114 115               Lab Results   Component Value Date    EGFRNORACEVR 43.1 (A) 11/16/2022    CREATININE 1.3 11/16/2022    BUN 43 (H) 11/16/2022    K 4.7 11/16/2022     11/16/2022     11/16/2022     Results for orders placed or performed during the hospital encounter of 02/14/22   EKG 12-lead    Collection Time: 02/14/22  1:17 AM    Narrative    Test Reason : R06.02,    Vent. Rate : 099 BPM     Atrial Rate : 099 BPM     P-R Int : 116 ms          QRS Dur : 058 ms      QT Int : 334 ms       P-R-T Axes : 072 050 043 degrees     QTc Int : 428 ms    Normal sinus rhythm  Normal ECG  When compared with ECG of 28-MAY-2021 08:10,  No significant change was found  Confirmed by LEYDA IRVING MD ([id number]) on 2/14/2022 1:27:26 PM    Referred By: [email or web address]   SELF           Confirmed By:LEYDA IRVING MD

## 2022-11-18 NOTE — PROGRESS NOTES
OFFICE VISIT 11/18/22  7:30 AM CST    CHIEF COMPLAINT: Follow-up      DIAGNOSES  1. Dyslipidemia associated with type 2 diabetes mellitus    2. Hypertension associated with diabetes    3. Type 2 diabetes mellitus with stage 3b chronic kidney disease, without long-term current use of insulin    4. Pulmonary nodule, left - 8 mm , high risk for lung cancer    5. Need for case management follow-up    6. 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 Atherosclerosis of native artery of both lower extremities with intermittent claudication    8. Diabetic eye exam      ORDERS SUMMARY  Orders Placed This Encounter    Ambulatory referral/consult to Ophthalmology    miconazole (MICOTIN) 2 % vaginal cream    fluconazole (DIFLUCAN) 150 MG Tab    cilostazoL (PLETAL) 50 MG Tab    amLODIPine (NORVASC) 10 MG tablet      Follow up in 5 months (on 4/18/2023) for re-evaluate problem(s) discussed today.     Future Appointments   Date Time Provider Department Center   11/23/2022  8:00 AM [facility] BMD1 [facility] D[facility]AB[facility]   1/4/2023  8:00 AM LABORATORY, [facility] LAB [facility]   1/6/2023  8:00 AM Amalia Rosas MD [facility] VAS CAR [facility]   2/13/2023  8:00 AM Temitope Moore MD [facility] ALLERGY [facility]   5/5/2023 10:00 AM [facility] CT1 LIMIT 500 LBS [facility] CT SCAN [place]   5/5/2023 10:40 AM Ramiro Aleman MD [facility]     ASSESSMENT & PLAN  Problem List Items Addressed This Visit       Dyslipidemia associated with type 2 diabetes mellitus - Primary (Chronic)    Current Assessment & Plan     Lab Results   Component Value Date    CHOL 148 05/27/2022    CHOL 142 11/22/2021    TRIG 87 05/27/2022    TRIG 63 11/22/2021    HDL 62 05/27/2022    HDL 59 11/22/2021    LDLCALC 68.6 05/27/2022    LDLCALC 70.4 11/22/2021    NONHDLCHOL 86 05/27/2022    NONHDLCHOL 83 11/22/2021    AST 16 11/16/2022    ALT 21 11/16/2022   The 10-year ASCVD risk score (Cristian NEGRON, et al., 2019) is: 29.6%    Values used to calculate the score:       Age: 72 years      Sex: Female      Is Non- : Yes      Diabetic: Yes      Tobacco smoker: No      Systolic Blood Pressure: 142 mmHg      Is BP treated: Yes      HDL Cholesterol: 62 mg/dL      Total Cholesterol: 148 mg/dL          Atherosclerosis of native artery of both lower extremities with intermittent claudication (Chronic)    Overview     LE Arterial Ultrasound 06/18/2021  Right Lower Arterial  CFA demonstrates moderate (50-75%) stenosis and has biphasic flow. CFA has plaque present. The plaque is calcific.   PFA demonstrates moderate (50-75%) stenosis and has biphasic flow. PFA has plaque present. The plaque is calcific.   SFAo demonstrates 50-75% stenosis and has biphasic flow. SFAo has plaque present. The plaque is calcific.   SFAp has biphasic flow.   SFAm has biphasic flow.   SFAd has biphasic flow.   POP has biphasic flow.   AT has biphasic flow.   TIB TRNK has biphasic flow.   PT has biphasic flow.   PER has biphasic flow.  Left Lower Arterial  CFA demonstrates severe (76-99%) stenosis and has biphasic flow. CFA has plaque present. The plaque is calcific.   PFA demonstrates moderate (50-75%) stenosis and has biphasic flow.   SFAo demonstrates 50-75% stenosis and has biphasic flow.   SFAp has biphasic flow.   SFAm has biphasic flow.   SFAd has biphasic flow.   POP has biphasic flow.   AT has biphasic flow.   TIB TRNK has biphasic flow.   PT has biphasic flow.   PER has biphasic flow.         Relevant Medications    cilostazoL (PLETAL) 50 MG Tab    Type 2 diabetes mellitus with stage 3b chronic kidney disease, without long-term current use of insulin (Chronic)    Current Assessment & Plan     Diabetes Management Status    Statin: Taking  ACE/ARB: Taking    Screening or Prevention Patient's value Goal Complete/Controlled?   HgA1C Testing and Control   Lab Results   Component Value Date    HGBA1C 7.5 (H) 11/16/2022      Annually/Less than 8% Yes   Lipid profile : 05/27/2022 Annually  Yes   LDL control Lab Results   Component Value Date    LDLCALC 68.6 05/27/2022    Annually/Less than 100 mg/dl  Yes   Nephropathy screening Lab Results   Component Value Date    LABMICR 26.0 06/03/2022     Lab Results   Component Value Date    PROTEINUA 1+ (A) 12/16/2018    Annually Yes   Blood pressure BP Readings from Last 1 Encounters:   11/18/22 (!) 142/60    Less than 140/90 No   Dilated retinal exam : 08/18/2021 Annually No   Foot exam   : 06/03/2022 Annually Yes     Lab Results   Component Value Date    HGBA1C 7.5 (H) 11/16/2022    HGBA1C 6.4 (H) 05/27/2022    HGBA1C 6.4 (H) 03/04/2022    EGFRNORACEVR 43.1 (A) 11/16/2022    MICALBCREAT 39.4 (H) 06/03/2022    LDLCALC 68.6 05/27/2022   No results found for: GLUTAMICACID, CPEPTIDE   Last 5 Patient Entered Readings                                          Most Recent A1c: 7.5% on 11/16/2022  (Goal: 8%)       Recent Readings 11/15/2022 11/9/2022 11/8/2022 10/30/2022 10/23/2022    Blood Glucose (mg/dL) 225 222 136 152 120      HEALTH MAINTENANCE: Diabetic health maintenance interventions reviewed and are up to date except for:      Topic    Eye Exam              Pulmonary nodule, left - 8 mm , high risk for lung cancer (Chronic)    Current Assessment & Plan     NM PET negative. Followed by pulmonology.         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 (Chronic)    Relevant Medications    miconazole (MICOTIN) 2 % vaginal cream    fluconazole (DIFLUCAN) 150 MG Tab    Hypertension associated with diabetes    Current Assessment & Plan     BP Readings from Last 6 Encounters:   11/18/22 (!) 142/60   11/04/22 134/80   10/26/22 (!) 118/56   08/15/22 128/68   06/24/22 (!) 140/62   06/03/22 120/61      Last 5 Patient Entered Readings                Current 30 Day Average: 147/78  Recent Readings 11/15/2022 11/9/2022 11/8/2022 10/30/2022 10/24/2022   SBP (mmHg) 141 140 158 141 149   DBP (mmHg) 78 73 70 73 81   Pulse 118 107 96 114 115                 Lab  Results   Component Value Date    EGFRNORACEVR 43.1 (A) 11/16/2022    CREATININE 1.3 11/16/2022    BUN 43 (H) 11/16/2022    K 4.7 11/16/2022     11/16/2022     11/16/2022     Results for orders placed or performed during the hospital encounter of 02/14/22   EKG 12-lead    Collection Time: 02/14/22  1:17 AM    Narrative    Test Reason : R06.02,    Vent. Rate : 099 BPM     Atrial Rate : 099 BPM     P-R Int : 116 ms          QRS Dur : 058 ms      QT Int : 334 ms       P-R-T Axes : 072 050 043 degrees     QTc Int : 428 ms    Normal sinus rhythm  Normal ECG  When compared with ECG of 28-MAY-2021 08:10,  No significant change was found  Confirmed by LEYDA IRVING MD ([id number]) on 2/14/2022 1:27:26 PM    Referred By: [email or web address]   SELF           Confirmed By:LEYDA IRVING MD              Relevant Medications    amLODIPine (NORVASC) 10 MG tablet     Other Visit Diagnoses       Need for case management follow-up        Diabetic eye exam        Relevant Orders    Ambulatory referral/consult to Ophthalmology          Unless noted herein, any chronic conditions are represented as and appear compensated/controlled and stable, and no other significant complaints or concerns were reported.    PRESCRIPTION DRUG MANAGEMENT  Outpatient Medications Prior to Visit   Medication Sig Dispense Refill    albuterol (PROVENTIL) 2.5 mg /3 mL (0.083 %) nebulizer solution Take 3 mLs (2.5 mg total) by nebulization every 6 (six) hours while awake. 270 mL 11    albuterol (PROVENTIL/VENTOLIN HFA) 90 mcg/actuation inhaler Inhale 2 puffs into the lungs every 4 (four) hours as needed for Wheezing or Shortness of Breath. 54 g 3    amoxicillin-clavulanate 875-125mg (AUGMENTIN) 875-125 mg per tablet Take 1 tablet by mouth 2 (two) times daily. for 21 days 42 tablet 0    aspirin (ECOTRIN) 81 MG EC tablet Take 1 tablet (81 mg total) by mouth once daily. 90 tablet 4    blood sugar diagnostic Strp 1 each by Misc.(Non-Drug; Combo Route) route 3  (three) times daily. [facility] test strips 100 each 3    blood-glucose meter kit Insurance preferred 1 each 0    butalbital-acetaminophen-caffeine -40 mg (FIORICET, ESGIC) -40 mg per tablet TAKE 1 TABLET BY MOUTH 3 (THREE) TIMES DAILY AS NEEDED FOR HEADACHES. (MAXIMUM OF 15 TABLETS PER MONTH) 30 tablet 3    calcium-vitamin D 600 mg-10 mcg (400 unit) Tab Take 2 tablets by mouth once daily. 180 tablet 4    cetirizine (ZYRTEC) 10 MG tablet Take 1 tablet (10 mg total) by mouth once daily. For sinus congestion 90 tablet 4    COVID-19 vac, moiz,Pfizer,,PF, (PFIZER COVID-19 MOIZ VACCN,PF,) 30 mcg/0.3 mL injection Inject into the muscle. 0.3 mL 0    ezetimibe (ZETIA) 10 mg tablet Take 1 tablet (10 mg total) by mouth once daily. 90 tablet 2    fexofenadine (ALLEGRA) 180 MG tablet Take 1 tablet (180 mg total) by mouth once daily. 90 tablet 3    fluticasone propionate (FLONASE) 50 mcg/actuation nasal spray 2 sprays (100 mcg total) by Each Nostril route once daily. 48 g 4    ipratropium (ATROVENT) 21 mcg (0.03 %) nasal spray USE 2 SPRAYS NASALLY THREE TIMES DAILY 90 mL 1    ipratropium-albuteroL (COMBIVENT)  mcg/actuation inhaler Inhale 2 puffs into the lungs every 4 (four) hours as needed for Wheezing or Shortness of Breath. Rescue 12 g 3    JARDIANCE 25 mg tablet TAKE 1 TABLET (25 MG TOTAL) BY MOUTH ONCE DAILY. 90 tablet 2    levocetirizine (XYZAL) 5 MG tablet Take 1 tablet (5 mg total) by mouth every evening. 30 tablet 11    montelukast (SINGULAIR) 10 mg tablet Take 1 tablet (10 mg total) by mouth every evening. 90 tablet 4    ondansetron (ZOFRAN-ODT) 4 MG TbDL 1-2 tablets PO every 6 hours as needed for nausea/vomiting while completing bowel prep 4 tablet 0    pantoprazole (PROTONIX) 40 MG tablet Take 1 tablet (40 mg total) by mouth once daily. 90 tablet 3    potassium chloride SA (K-DUR,KLOR-CON) 20 MEQ tablet Take 1 tablet (20 mEq total) by mouth once daily. 90 tablet 2    rosuvastatin (CRESTOR) 20 MG  tablet Take 1 tablet (20 mg total) by mouth every evening. 90 tablet 3    sod sulf-pot chloride-mag sulf (SUTAB) 1.479-0.188- 0.225 gram tablet Take 12 tablets by mouth once daily. Take according to package instructions with indicated amount of water. 24 tablet 0    valsartan (DIOVAN) 160 MG tablet Take 1 tablet (160 mg total) by mouth once daily. 90 tablet 1    varicella-zoster gE-AS01B, PF, (SHINGRIX) 50 mcg/0.5 mL injection Inject 0.5 mL (one dose) into muscle now; give second dose at least 2 months later 0.5 mL 1    amLODIPine (NORVASC) 5 MG tablet Take 1 tablet (5 mg total) by mouth once daily. 90 tablet 1    chlorthalidone (HYGROTEN) 25 MG Tab Take 1 tablet (25 mg total) by mouth 2 (two) times daily. 180 tablet 1    cilostazoL (PLETAL) 100 MG Tab Take 1 tablet (100 mg total) by mouth once daily. 180 tablet 3    doxycycline (MONODOX) 100 MG capsule Take 1 capsule (100 mg total) by mouth every 12 (twelve) hours. 20 capsule 1    fluconazole (DIFLUCAN) 150 MG Tab TAKE ONE TABLET BY MOUTH AS A ONE-TIME DOSE      predniSONE (DELTASONE) 20 MG tablet Prednisone 60 mg/ day for 3 days, 40 mg/day for 3 days,20 mg/ day for 3 days, (1/2 tablet )10 mg a day for 3 days. 20 tablet 0    EPINEPHrine (EPIPEN) 0.3 mg/0.3 mL AtIn Inject 0.3 mLs (0.3 mg total) into the muscle once. for 1 dose 2 each 3     No facility-administered medications prior to visit.     Medications Discontinued During This Encounter   Medication Reason    fluconazole (DIFLUCAN) 150 MG Tab Reorder    cilostazoL (PLETAL) 100 MG Tab Dose adjustment    amLODIPine (NORVASC) 5 MG tablet Dose adjustment     Medications Ordered This Encounter   Medications    amLODIPine (NORVASC) 10 MG tablet     Sig: Take 1 tablet (10 mg total) by mouth once daily.     Dispense:  90 tablet     Refill:  3    cilostazoL (PLETAL) 50 MG Tab     Sig: Take 1 tablet (50 mg total) by mouth 2 (two) times daily before meals.     Dispense:  180 tablet     Refill:  3    fluconazole (DIFLUCAN)  "150 MG Tab     Sig: Take 1 tablet (150 mg) by mouth as needed for yeast infection that doesn't respond to Monistat. One tablet = full course. Do not take more than 1 tablet per week.     Dispense:  12 tablet     Refill:  1    miconazole (MICOTIN) 2 % vaginal cream     Sig: Place 1 applicator vaginally every evening. Use as directed     Dispense:  135 g     Refill:  2     PHYSICAL EXAM  Vitals:    11/18/22 0740   BP: (!) 142/60   BP Location: Right arm   Patient Position: Sitting   BP Method: Large (Manual)   Pulse: 107   Temp: 97 °F (36.1 °C)   TempSrc: Tympanic   SpO2: 100%   Weight: 66 kg (145 lb 8.1 oz)   Height: 5' 2" (1.575 m)   CONSTITUTIONAL: Vital signs noted. Appears well.  PSYCH: Alert and conversant. Mood is grossly neutral. Affect appropriate.  PULM: Breathing unlabored.  HEART: Regular.     Documentation entered by me for this encounter may have been done in part using speech-recognition technology. Although I have made an effort to ensure accuracy, "sound like" errors may exist and should be interpreted in context.   "

## 2022-11-23 ENCOUNTER — APPOINTMENT (OUTPATIENT)
Dept: RADIOLOGY | Facility: HOSPITAL | Age: 72
End: 2022-11-23
Attending: FAMILY MEDICINE
Payer: MEDICARE

## 2022-11-23 ENCOUNTER — TELEPHONE (OUTPATIENT)
Dept: ADMINISTRATIVE | Facility: HOSPITAL | Age: 72
End: 2022-11-23
Payer: MEDICARE

## 2022-11-23 DIAGNOSIS — Z78.0 MENOPAUSE: ICD-10-CM

## 2022-11-23 PROCEDURE — 77080 DEXA BONE DENSITY SPINE HIP: ICD-10-PCS | Mod: 26,,, | Performed by: RADIOLOGY

## 2022-11-23 PROCEDURE — 77080 DXA BONE DENSITY AXIAL: CPT | Mod: 26,,, | Performed by: RADIOLOGY

## 2022-11-23 PROCEDURE — 77080 DXA BONE DENSITY AXIAL: CPT | Mod: TC

## 2022-12-11 ENCOUNTER — PATIENT MESSAGE (OUTPATIENT)
Dept: OTHER | Facility: OTHER | Age: 72
End: 2022-12-11
Payer: MEDICARE

## 2022-12-13 DIAGNOSIS — J32.8 OTHER CHRONIC SINUSITIS: ICD-10-CM

## 2022-12-13 DIAGNOSIS — J44.1 COPD EXACERBATION: ICD-10-CM

## 2022-12-13 RX ORDER — PREDNISONE 20 MG/1
TABLET ORAL
Qty: 20 TABLET | Refills: 0 | Status: SHIPPED | OUTPATIENT
Start: 2022-12-13 | End: 2023-02-06 | Stop reason: SDUPTHER

## 2022-12-13 RX ORDER — DOXYCYCLINE 100 MG/1
100 CAPSULE ORAL EVERY 12 HOURS
Qty: 20 CAPSULE | Refills: 1 | Status: SHIPPED | OUTPATIENT
Start: 2022-12-13 | End: 2023-02-06 | Stop reason: SDUPTHER

## 2022-12-22 ENCOUNTER — PATIENT OUTREACH (OUTPATIENT)
Dept: ADMINISTRATIVE | Facility: HOSPITAL | Age: 72
End: 2022-12-22
Payer: MEDICARE

## 2022-12-22 ENCOUNTER — TELEPHONE (OUTPATIENT)
Dept: INTERNAL MEDICINE | Facility: CLINIC | Age: 72
End: 2022-12-22
Payer: MEDICARE

## 2022-12-22 VITALS — DIASTOLIC BLOOD PRESSURE: 73 MMHG | SYSTOLIC BLOOD PRESSURE: 120 MMHG

## 2022-12-22 NOTE — PROGRESS NOTES
HTN REPORT: spoke with patient. She does take at home b/p readings. She gave remote reading of 120/73

## 2022-12-28 ENCOUNTER — OFFICE VISIT (OUTPATIENT)
Dept: OPHTHALMOLOGY | Facility: CLINIC | Age: 72
End: 2022-12-28
Payer: MEDICARE

## 2022-12-28 DIAGNOSIS — H25.12 NUCLEAR SCLEROSIS OF LEFT EYE: ICD-10-CM

## 2022-12-28 DIAGNOSIS — H25.11 NUCLEAR SCLEROSIS OF RIGHT EYE: ICD-10-CM

## 2022-12-28 DIAGNOSIS — H47.322 DRUSEN OF OPTIC DISC, LEFT: ICD-10-CM

## 2022-12-28 DIAGNOSIS — H52.7 REFRACTIVE DISORDER: ICD-10-CM

## 2022-12-28 DIAGNOSIS — E11.9 TYPE 2 DIABETES MELLITUS WITHOUT COMPLICATION, WITHOUT LONG-TERM CURRENT USE OF INSULIN: Primary | ICD-10-CM

## 2022-12-28 PROCEDURE — 3060F POS MICROALBUMINURIA REV: CPT | Mod: HCNC,CPTII,S$GLB, | Performed by: STUDENT IN AN ORGANIZED HEALTH CARE EDUCATION/TRAINING PROGRAM

## 2022-12-28 PROCEDURE — 3060F PR POS MICROALBUMINURIA RESULT DOCUMENTED/REVIEW: ICD-10-PCS | Mod: HCNC,CPTII,S$GLB, | Performed by: STUDENT IN AN ORGANIZED HEALTH CARE EDUCATION/TRAINING PROGRAM

## 2022-12-28 PROCEDURE — 3066F PR DOCUMENTATION OF TREATMENT FOR NEPHROPATHY: ICD-10-PCS | Mod: HCNC,CPTII,S$GLB, | Performed by: STUDENT IN AN ORGANIZED HEALTH CARE EDUCATION/TRAINING PROGRAM

## 2022-12-28 PROCEDURE — 1159F PR MEDICATION LIST DOCUMENTED IN MEDICAL RECORD: ICD-10-PCS | Mod: HCNC,CPTII,S$GLB, | Performed by: STUDENT IN AN ORGANIZED HEALTH CARE EDUCATION/TRAINING PROGRAM

## 2022-12-28 PROCEDURE — 99999 PR PBB SHADOW E&M-EST. PATIENT-LVL IV: CPT | Mod: PBBFAC,HCNC,, | Performed by: STUDENT IN AN ORGANIZED HEALTH CARE EDUCATION/TRAINING PROGRAM

## 2022-12-28 PROCEDURE — 3051F HG A1C>EQUAL 7.0%<8.0%: CPT | Mod: HCNC,CPTII,S$GLB, | Performed by: STUDENT IN AN ORGANIZED HEALTH CARE EDUCATION/TRAINING PROGRAM

## 2022-12-28 PROCEDURE — 2023F PR DILATED RETINAL EXAM W/O EVID OF RETINOPATHY: ICD-10-PCS | Mod: HCNC,CPTII,S$GLB, | Performed by: STUDENT IN AN ORGANIZED HEALTH CARE EDUCATION/TRAINING PROGRAM

## 2022-12-28 PROCEDURE — 4010F ACE/ARB THERAPY RXD/TAKEN: CPT | Mod: HCNC,CPTII,S$GLB, | Performed by: STUDENT IN AN ORGANIZED HEALTH CARE EDUCATION/TRAINING PROGRAM

## 2022-12-28 PROCEDURE — 99204 OFFICE O/P NEW MOD 45 MIN: CPT | Mod: HCNC,S$GLB,, | Performed by: STUDENT IN AN ORGANIZED HEALTH CARE EDUCATION/TRAINING PROGRAM

## 2022-12-28 PROCEDURE — 4010F PR ACE/ARB THEARPY RXD/TAKEN: ICD-10-PCS | Mod: HCNC,CPTII,S$GLB, | Performed by: STUDENT IN AN ORGANIZED HEALTH CARE EDUCATION/TRAINING PROGRAM

## 2022-12-28 PROCEDURE — 2023F DILAT RTA XM W/O RTNOPTHY: CPT | Mod: HCNC,CPTII,S$GLB, | Performed by: STUDENT IN AN ORGANIZED HEALTH CARE EDUCATION/TRAINING PROGRAM

## 2022-12-28 PROCEDURE — 3066F NEPHROPATHY DOC TX: CPT | Mod: HCNC,CPTII,S$GLB, | Performed by: STUDENT IN AN ORGANIZED HEALTH CARE EDUCATION/TRAINING PROGRAM

## 2022-12-28 PROCEDURE — 92015 DETERMINE REFRACTIVE STATE: CPT | Mod: HCNC,S$GLB,, | Performed by: STUDENT IN AN ORGANIZED HEALTH CARE EDUCATION/TRAINING PROGRAM

## 2022-12-28 PROCEDURE — 92250 COLOR FUNDUS PHOTOGRAPHY - OU - BOTH EYES: ICD-10-PCS | Mod: HCNC,S$GLB,, | Performed by: STUDENT IN AN ORGANIZED HEALTH CARE EDUCATION/TRAINING PROGRAM

## 2022-12-28 PROCEDURE — 1160F PR REVIEW ALL MEDS BY PRESCRIBER/CLIN PHARMACIST DOCUMENTED: ICD-10-PCS | Mod: HCNC,CPTII,S$GLB, | Performed by: STUDENT IN AN ORGANIZED HEALTH CARE EDUCATION/TRAINING PROGRAM

## 2022-12-28 PROCEDURE — 1159F MED LIST DOCD IN RCRD: CPT | Mod: HCNC,CPTII,S$GLB, | Performed by: STUDENT IN AN ORGANIZED HEALTH CARE EDUCATION/TRAINING PROGRAM

## 2022-12-28 PROCEDURE — 99204 PR OFFICE/OUTPT VISIT, NEW, LEVL IV, 45-59 MIN: ICD-10-PCS | Mod: HCNC,S$GLB,, | Performed by: STUDENT IN AN ORGANIZED HEALTH CARE EDUCATION/TRAINING PROGRAM

## 2022-12-28 PROCEDURE — 1160F RVW MEDS BY RX/DR IN RCRD: CPT | Mod: HCNC,CPTII,S$GLB, | Performed by: STUDENT IN AN ORGANIZED HEALTH CARE EDUCATION/TRAINING PROGRAM

## 2022-12-28 PROCEDURE — 3051F PR MOST RECENT HEMOGLOBIN A1C LEVEL 7.0 - < 8.0%: ICD-10-PCS | Mod: HCNC,CPTII,S$GLB, | Performed by: STUDENT IN AN ORGANIZED HEALTH CARE EDUCATION/TRAINING PROGRAM

## 2022-12-28 PROCEDURE — 92015 PR REFRACTION: ICD-10-PCS | Mod: HCNC,S$GLB,, | Performed by: STUDENT IN AN ORGANIZED HEALTH CARE EDUCATION/TRAINING PROGRAM

## 2022-12-28 PROCEDURE — 92250 FUNDUS PHOTOGRAPHY W/I&R: CPT | Mod: HCNC,S$GLB,, | Performed by: STUDENT IN AN ORGANIZED HEALTH CARE EDUCATION/TRAINING PROGRAM

## 2022-12-28 PROCEDURE — 99999 PR PBB SHADOW E&M-EST. PATIENT-LVL IV: ICD-10-PCS | Mod: PBBFAC,HCNC,, | Performed by: STUDENT IN AN ORGANIZED HEALTH CARE EDUCATION/TRAINING PROGRAM

## 2022-12-28 NOTE — PROGRESS NOTES
HPI     Diabetic Eye Exam     Additional comments: 1 yr DM           Comments    Patient states that OU tear constantly - no va changes OU - no pain/   discomfort OU  HgbA1c is 7.5 and BS has been up and down, due to Prednisone.       DM  NSC OU            Last edited by Madhavi Dominique MA on 12/28/2022  8:16 AM.            Assessment /Plan     For exam results, see Encounter Report.    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 Patient wishes to wait for surgery.  - monitor   Nuclear sclerosis of left eye    Refractive disorder- Mrx dispensed    Drusen of optic disc, left Long standing, follow with [facility]  stable, will see patient for HVF 24-2 in 6M      Return to clinic in 6M HVF 24-2, Optos color [facility]

## 2023-01-01 ENCOUNTER — PATIENT MESSAGE (OUTPATIENT)
Dept: OTHER | Facility: OTHER | Age: 73
End: 2023-01-01
Payer: MEDICARE

## 2023-01-04 ENCOUNTER — LAB VISIT (OUTPATIENT)
Dept: LAB | Facility: HOSPITAL | Age: 73
End: 2023-01-04
Attending: INTERNAL MEDICINE
Payer: MEDICARE

## 2023-01-04 DIAGNOSIS — E11.29 TYPE 2 DIABETES MELLITUS WITH MICROALBUMINURIA, WITHOUT LONG-TERM CURRENT USE OF INSULIN: ICD-10-CM

## 2023-01-04 DIAGNOSIS — E78.5 DYSLIPIDEMIA ASSOCIATED WITH TYPE 2 DIABETES MELLITUS: ICD-10-CM

## 2023-01-04 DIAGNOSIS — R80.9 TYPE 2 DIABETES MELLITUS WITH MICROALBUMINURIA, WITHOUT LONG-TERM CURRENT USE OF INSULIN: ICD-10-CM

## 2023-01-04 DIAGNOSIS — I70.213 ATHEROSCLEROSIS OF NATIVE ARTERY OF BOTH LOWER EXTREMITIES WITH INTERMITTENT CLAUDICATION: ICD-10-CM

## 2023-01-04 DIAGNOSIS — E11.69 DYSLIPIDEMIA ASSOCIATED WITH TYPE 2 DIABETES MELLITUS: ICD-10-CM

## 2023-01-04 LAB
ALBUMIN SERPL BCP-MCNC: 3.1 G/DL (ref 3.5–5.2)
ALP SERPL-CCNC: 92 U/L (ref 55–135)
ALT SERPL W/O P-5'-P-CCNC: 10 U/L (ref 10–44)
ANION GAP SERPL CALC-SCNC: 8 MMOL/L (ref 8–16)
AST SERPL-CCNC: 21 U/L (ref 10–40)
BILIRUB SERPL-MCNC: 0.2 MG/DL (ref 0.1–1)
BUN SERPL-MCNC: 16 MG/DL (ref 8–23)
CALCIUM SERPL-MCNC: 9 MG/DL (ref 8.7–10.5)
CHLORIDE SERPL-SCNC: 109 MMOL/L (ref 95–110)
CHOLEST SERPL-MCNC: 131 MG/DL (ref 120–199)
CHOLEST/HDLC SERPL: 2.1 {RATIO} (ref 2–5)
CO2 SERPL-SCNC: 25 MMOL/L (ref 23–29)
CREAT SERPL-MCNC: 1.1 MG/DL (ref 0.5–1.4)
EST. GFR  (NO RACE VARIABLE): 52.7 ML/MIN/1.73 M^2
ESTIMATED AVG GLUCOSE: 157 MG/DL (ref 68–131)
GLUCOSE SERPL-MCNC: 90 MG/DL (ref 70–110)
HBA1C MFR BLD: 7.1 % (ref 4–5.6)
HDLC SERPL-MCNC: 61 MG/DL (ref 40–75)
HDLC SERPL: 46.6 % (ref 20–50)
LDLC SERPL CALC-MCNC: 58.6 MG/DL (ref 63–159)
NONHDLC SERPL-MCNC: 70 MG/DL
POTASSIUM SERPL-SCNC: 3.9 MMOL/L (ref 3.5–5.1)
PROT SERPL-MCNC: 6.6 G/DL (ref 6–8.4)
SODIUM SERPL-SCNC: 142 MMOL/L (ref 136–145)
TRIGL SERPL-MCNC: 57 MG/DL (ref 30–150)

## 2023-01-04 PROCEDURE — 36415 COLL VENOUS BLD VENIPUNCTURE: CPT | Mod: HCNC | Performed by: INTERNAL MEDICINE

## 2023-01-04 PROCEDURE — 83036 HEMOGLOBIN GLYCOSYLATED A1C: CPT | Mod: HCNC | Performed by: INTERNAL MEDICINE

## 2023-01-04 PROCEDURE — 80061 LIPID PANEL: CPT | Mod: HCNC | Performed by: INTERNAL MEDICINE

## 2023-01-04 PROCEDURE — 80053 COMPREHEN METABOLIC PANEL: CPT | Mod: HCNC | Performed by: INTERNAL MEDICINE

## 2023-01-06 ENCOUNTER — OFFICE VISIT (OUTPATIENT)
Dept: CARDIOLOGY | Facility: CLINIC | Age: 73
End: 2023-01-06
Payer: MEDICARE

## 2023-01-06 VITALS
HEIGHT: 62 IN | SYSTOLIC BLOOD PRESSURE: 130 MMHG | DIASTOLIC BLOOD PRESSURE: 58 MMHG | WEIGHT: 145.75 LBS | BODY MASS INDEX: 26.82 KG/M2 | OXYGEN SATURATION: 96 % | HEART RATE: 98 BPM

## 2023-01-06 DIAGNOSIS — G47.33 OSA (OBSTRUCTIVE SLEEP APNEA): ICD-10-CM

## 2023-01-06 DIAGNOSIS — R91.1 PULMONARY NODULE LESS THAN 6 MM IN DIAMETER WITH HIGH RISK FOR MALIGNANT NEOPLASM: ICD-10-CM

## 2023-01-06 DIAGNOSIS — Z91.89 PULMONARY NODULE LESS THAN 6 MM IN DIAMETER WITH HIGH RISK FOR MALIGNANT NEOPLASM: ICD-10-CM

## 2023-01-06 DIAGNOSIS — D50.8 IRON DEFICIENCY ANEMIA SECONDARY TO INADEQUATE DIETARY IRON INTAKE: Chronic | ICD-10-CM

## 2023-01-06 DIAGNOSIS — E11.51 TYPE 2 DIABETES MELLITUS WITH PERIPHERAL VASCULAR DISEASE: Chronic | ICD-10-CM

## 2023-01-06 DIAGNOSIS — E78.5 DYSLIPIDEMIA ASSOCIATED WITH TYPE 2 DIABETES MELLITUS: Chronic | ICD-10-CM

## 2023-01-06 DIAGNOSIS — E11.22 TYPE 2 DIABETES MELLITUS WITH STAGE 3B CHRONIC KIDNEY DISEASE, WITHOUT LONG-TERM CURRENT USE OF INSULIN: Chronic | ICD-10-CM

## 2023-01-06 DIAGNOSIS — I70.213 ATHEROSCLEROSIS OF NATIVE ARTERY OF BOTH LOWER EXTREMITIES WITH INTERMITTENT CLAUDICATION: Primary | Chronic | ICD-10-CM

## 2023-01-06 DIAGNOSIS — J41.0 SIMPLE CHRONIC BRONCHITIS: ICD-10-CM

## 2023-01-06 DIAGNOSIS — E11.29 TYPE 2 DIABETES MELLITUS WITH MICROALBUMINURIA, WITHOUT LONG-TERM CURRENT USE OF INSULIN: Chronic | ICD-10-CM

## 2023-01-06 DIAGNOSIS — N18.32 TYPE 2 DIABETES MELLITUS WITH STAGE 3B CHRONIC KIDNEY DISEASE, WITHOUT LONG-TERM CURRENT USE OF INSULIN: Chronic | ICD-10-CM

## 2023-01-06 DIAGNOSIS — I25.10 ATHEROSCLEROSIS OF NATIVE CORONARY ARTERY OF NATIVE HEART WITHOUT ANGINA PECTORIS: ICD-10-CM

## 2023-01-06 DIAGNOSIS — E11.69 DYSLIPIDEMIA ASSOCIATED WITH TYPE 2 DIABETES MELLITUS: Chronic | ICD-10-CM

## 2023-01-06 DIAGNOSIS — Z87.891 FORMER CIGARETTE SMOKER: Chronic | ICD-10-CM

## 2023-01-06 DIAGNOSIS — R91.1 PULMONARY NODULE, LEFT: Chronic | ICD-10-CM

## 2023-01-06 DIAGNOSIS — E11.59 HYPERTENSION ASSOCIATED WITH DIABETES: ICD-10-CM

## 2023-01-06 DIAGNOSIS — I70.0 ATHEROSCLEROSIS OF AORTA: ICD-10-CM

## 2023-01-06 DIAGNOSIS — R80.9 TYPE 2 DIABETES MELLITUS WITH MICROALBUMINURIA, WITHOUT LONG-TERM CURRENT USE OF INSULIN: Chronic | ICD-10-CM

## 2023-01-06 DIAGNOSIS — I35.0 NONRHEUMATIC AORTIC VALVE STENOSIS: ICD-10-CM

## 2023-01-06 DIAGNOSIS — I15.2 HYPERTENSION ASSOCIATED WITH DIABETES: ICD-10-CM

## 2023-01-06 DIAGNOSIS — I77.1 TORTUOUS AORTA: ICD-10-CM

## 2023-01-06 PROCEDURE — 3008F PR BODY MASS INDEX (BMI) DOCUMENTED: ICD-10-PCS | Mod: HCNC,CPTII,S$GLB, | Performed by: INTERNAL MEDICINE

## 2023-01-06 PROCEDURE — 3051F HG A1C>EQUAL 7.0%<8.0%: CPT | Mod: HCNC,CPTII,S$GLB, | Performed by: INTERNAL MEDICINE

## 2023-01-06 PROCEDURE — 1159F MED LIST DOCD IN RCRD: CPT | Mod: HCNC,CPTII,S$GLB, | Performed by: INTERNAL MEDICINE

## 2023-01-06 PROCEDURE — 99214 PR OFFICE/OUTPT VISIT, EST, LEVL IV, 30-39 MIN: ICD-10-PCS | Mod: HCNC,S$GLB,, | Performed by: INTERNAL MEDICINE

## 2023-01-06 PROCEDURE — 3288F PR FALLS RISK ASSESSMENT DOCUMENTED: ICD-10-PCS | Mod: HCNC,CPTII,S$GLB, | Performed by: INTERNAL MEDICINE

## 2023-01-06 PROCEDURE — 3288F FALL RISK ASSESSMENT DOCD: CPT | Mod: HCNC,CPTII,S$GLB, | Performed by: INTERNAL MEDICINE

## 2023-01-06 PROCEDURE — 3008F BODY MASS INDEX DOCD: CPT | Mod: HCNC,CPTII,S$GLB, | Performed by: INTERNAL MEDICINE

## 2023-01-06 PROCEDURE — 3075F SYST BP GE 130 - 139MM HG: CPT | Mod: HCNC,CPTII,S$GLB, | Performed by: INTERNAL MEDICINE

## 2023-01-06 PROCEDURE — 3075F PR MOST RECENT SYSTOLIC BLOOD PRESS GE 130-139MM HG: ICD-10-PCS | Mod: HCNC,CPTII,S$GLB, | Performed by: INTERNAL MEDICINE

## 2023-01-06 PROCEDURE — 3078F DIAST BP <80 MM HG: CPT | Mod: HCNC,CPTII,S$GLB, | Performed by: INTERNAL MEDICINE

## 2023-01-06 PROCEDURE — 3051F PR MOST RECENT HEMOGLOBIN A1C LEVEL 7.0 - < 8.0%: ICD-10-PCS | Mod: HCNC,CPTII,S$GLB, | Performed by: INTERNAL MEDICINE

## 2023-01-06 PROCEDURE — 1101F PT FALLS ASSESS-DOCD LE1/YR: CPT | Mod: HCNC,CPTII,S$GLB, | Performed by: INTERNAL MEDICINE

## 2023-01-06 PROCEDURE — 99999 PR PBB SHADOW E&M-EST. PATIENT-LVL V: ICD-10-PCS | Mod: PBBFAC,HCNC,, | Performed by: INTERNAL MEDICINE

## 2023-01-06 PROCEDURE — 1101F PR PT FALLS ASSESS DOC 0-1 FALLS W/OUT INJ PAST YR: ICD-10-PCS | Mod: HCNC,CPTII,S$GLB, | Performed by: INTERNAL MEDICINE

## 2023-01-06 PROCEDURE — 1125F PR PAIN SEVERITY QUANTIFIED, PAIN PRESENT: ICD-10-PCS | Mod: HCNC,CPTII,S$GLB, | Performed by: INTERNAL MEDICINE

## 2023-01-06 PROCEDURE — 1159F PR MEDICATION LIST DOCUMENTED IN MEDICAL RECORD: ICD-10-PCS | Mod: HCNC,CPTII,S$GLB, | Performed by: INTERNAL MEDICINE

## 2023-01-06 PROCEDURE — 1125F AMNT PAIN NOTED PAIN PRSNT: CPT | Mod: HCNC,CPTII,S$GLB, | Performed by: INTERNAL MEDICINE

## 2023-01-06 PROCEDURE — 3078F PR MOST RECENT DIASTOLIC BLOOD PRESSURE < 80 MM HG: ICD-10-PCS | Mod: HCNC,CPTII,S$GLB, | Performed by: INTERNAL MEDICINE

## 2023-01-06 PROCEDURE — 3072F PR LOW RISK FOR RETINOPATHY: ICD-10-PCS | Mod: HCNC,CPTII,S$GLB, | Performed by: INTERNAL MEDICINE

## 2023-01-06 PROCEDURE — 99214 OFFICE O/P EST MOD 30 MIN: CPT | Mod: HCNC,S$GLB,, | Performed by: INTERNAL MEDICINE

## 2023-01-06 PROCEDURE — 3072F LOW RISK FOR RETINOPATHY: CPT | Mod: HCNC,CPTII,S$GLB, | Performed by: INTERNAL MEDICINE

## 2023-01-06 PROCEDURE — 99999 PR PBB SHADOW E&M-EST. PATIENT-LVL V: CPT | Mod: PBBFAC,HCNC,, | Performed by: INTERNAL MEDICINE

## 2023-01-06 NOTE — PROGRESS NOTES
Subjective:   Patient ID:  Lucia Barlow is a 72 y.o. female who presents for follow up of No chief complaint on file.      HPI  2020  A 71 yo female with pvd s/p pta copd smoker copd diabetes (stopped metformin due to gi side effects and lost weight) cad htn hlp ckd is here for f/u decreased smoking but has not stopped no claudication no chest pain or shortness of breath no chest pain has heart burn has been eating a lot of cheese/. Has no leg swelling. Tomatoes triggers heart burn.her a1c is in order however her lipids have increased has been eating a lot of cheese.      3/3/2021  Here for f/u had lung surgery for lung ca in November still has lefts  pain improving not smoking anymore still eating cheese compliant with meds has no claudication  Symptoms has rt hip pain that appears musculoskeletal. Has no angina tia chf . No syncope near syncope compliant with meds. Slat intake could improve.      2021  Has lingulectomy due  Lung cancer still has anterior left chest pain has seen  thoracic surgery has no angina she has now return of hip claudication over the past 5 months bilateral. She has to stop at 50 feet she can barely make it no discoloration or weakness in leg  Has no tia no chf has shortness of breath times  Her ekg is unchanged. She is not using cpap she returnesd her machine .     12/3/2021   Here for f/u she is cancer free in remission after he rlung surgery not smoking has no leg pain starting in her hips  All the way down posteriorely to the calves. She  Feels like tightness and cramps has to rest for relief this is only exertional. She is trying to be compliant with diet and emds. Has no nocturnal symptoms no discoloration in feet.      2022   here fro f/u not smoking has no limiting claudication taking pletal no bleeding issues compliant with meds lipids on target.    2023  Has left hip pain down the leg at rest and when walking. Her claudication is stable tolerated pletal  well not smoking tolerated meds well her rf are well modified. Has no chest pain or shortness of breath.  Past Medical History:   Diagnosis Date    Atherosclerosis of artery of both lower extremities 1/17/2014    Atherosclerosis of native coronary artery of native heart without angina pectoris 11/18/2016    Atherosclerotic PVD with intermittent claudication 1/17/2014    Chronic bronchitis     COPD (chronic obstructive pulmonary disease)     Diabetes with neurologic complications     Dyslipidemia associated with type 2 diabetes mellitus 6/14/2013    Dyslipidemia associated with type 2 diabetes mellitus     Ex-smoker     Gastroesophageal reflux disease without esophagitis 1/25/2019    Hyperlipidemia     Hypertension     Iron deficiency anemia secondary to inadequate dietary iron intake 6/3/2022    NSCLC of left lung 11/19/2020    IVANIA (obstructive sleep apnea) 2/11/2021    Osteoporosis 1/16 rosita 1/18    Pneumothorax after biopsy 10/21/2020    PVD (peripheral vascular disease)     Renal manifestation of secondary diabetes mellitus     S/P peripheral artery angioplasty 2/7/2014    Seasonal allergic rhinitis due to pollen     Simple chronic bronchitis     Tobacco dependence     Type 2 diabetes mellitus with microalbuminuria, without long-term current use of insulin 3/29/2019    Type 2 diabetes mellitus with peripheral vascular disease     Type 2 diabetes mellitus with stage 3 chronic kidney disease, without long-term current use of insulin 2/1/2019    Type 2 diabetes mellitus without retinopathy 2/1/2019    Urinary incontinence        Past Surgical History:   Procedure Laterality Date    ANGIOPLASTY Bilateral 01/24/2014    aortoiliac stenting     CARPAL TUNNEL RELEASE  2003    [facility]    COLECTOMY  approximate 2005    pt states 1in colon -dx with benign mass removed-states no colon cancer    COLONOSCOPY N/A 11/9/2016    Procedure: COLONOSCOPY;  Surgeon: Dmitri Sterling MD;  Location: [facility];  Service: Endoscopy;   Laterality: N/A;    COLONOSCOPY N/A 11/15/2019    Procedure: COLONOSCOPY;  Surgeon: Marko Vicente MD;  Location: [facility] ENDO;  Service: Endoscopy;  Laterality: N/A;    COLONOSCOPY N/A 3/25/2022    Procedure: COLONOSCOPY;  Surgeon: Paola Feldman MD;  Location: [facility] ENDO;  Service: Endoscopy;  Laterality: N/A;    ESOPHAGOGASTRODUODENOSCOPY N/A 3/25/2022    Procedure: EGD (ESOPHAGOGASTRODUODENOSCOPY);  Surgeon: Paola Feldman MD;  Location: [facility] ENDO;  Service: Endoscopy;  Laterality: N/A;    HYSTERECTOMY      no cancer    INJECTION OF ANESTHETIC AGENT AROUND MULTIPLE INTERCOSTAL NERVES Left 2020    Procedure: BLOCK, NERVE, INTERCOSTAL, 2 OR MORE;  Surgeon: Amrit Flores MD;  Location: [facility] OR [address] [place] [address];  Service: Thoracic;  Laterality: Left;    OOPHORECTOMY      SURGICAL REMOVAL OF LYMPH NODE Left 2020    Procedure: EXCISION, LYMPH NODE;  Surgeon: Amrit Flores MD;  Location: [facility] OR [address] [place] [address];  Service: Thoracic;  Laterality: Left;  mediastinal lymph node disection    XI ROBOTIC RATS,WITH LOBECTOMY,LUNG Left 2020    Procedure: XI ROBOTIC RATS,WITH LOBECTOMY,LUNG;  Surgeon: Amrit Flores MD;  Location: [facility] OR [address] [place] [address];  Service: Thoracic;  Laterality: Left;  Lingulectomy.       Social History     Tobacco Use    Smoking status: Former     Packs/day: 1.00     Years: 60.00     Pack years: 60.00     Types: Cigarettes     Start date: 1960     Quit date: 1/10/2020     Years since quittin.9    Smokeless tobacco: Never   Substance Use Topics    Alcohol use: No     Alcohol/week: 0.0 standard drinks    Drug use: No       Family History   Problem Relation Age of Onset    Stroke Father     Stroke Sister     Asthma Daughter     Diabetes Daughter     Breast cancer Daughter     Asthma Son        Current Outpatient Medications   Medication Sig    albuterol (PROVENTIL) 2.5 mg /3 mL (0.083 %) nebulizer solution Take 3 mLs (2.5 mg total) by nebulization every 6 (six) hours while awake.     albuterol (PROVENTIL/VENTOLIN HFA) 90 mcg/actuation inhaler Inhale 2 puffs into the lungs every 4 (four) hours as needed for Wheezing or Shortness of Breath.    amLODIPine (NORVASC) 10 MG tablet Take 1 tablet (10 mg total) by mouth once daily.    aspirin (ECOTRIN) 81 MG EC tablet Take 1 tablet (81 mg total) by mouth once daily.    blood sugar diagnostic Strp 1 each by Misc.(Non-Drug; Combo Route) route 3 (three) times daily. [employer] test strips    blood-glucose meter kit Insurance preferred    butalbital-acetaminophen-caffeine -40 mg (FIORICET, ESGIC) -40 mg per tablet TAKE 1 TABLET BY MOUTH 3 (THREE) TIMES DAILY AS NEEDED FOR HEADACHES. (MAXIMUM OF 15 TABLETS PER MONTH)    calcium-vitamin D 600 mg-10 mcg (400 unit) Tab Take 2 tablets by mouth once daily.    cetirizine (ZYRTEC) 10 MG tablet Take 1 tablet (10 mg total) by mouth once daily. For sinus congestion    chlorthalidone (HYGROTEN) 25 MG Tab TAKE 1 TABLET TWICE DAILY    cilostazoL (PLETAL) 50 MG Tab Take 1 tablet (50 mg total) by mouth 2 (two) times daily before meals.    COVID-19 vac, moiz,Pfizer,,PF, (PFIZER COVID-19 MOIZ VACCN,PF,) 30 mcg/0.3 mL injection Inject into the muscle.    doxycycline (MONODOX) 100 MG capsule Take 1 capsule (100 mg total) by mouth every 12 (twelve) hours.    EPINEPHrine (EPIPEN) 0.3 mg/0.3 mL AtIn Inject 0.3 mLs (0.3 mg total) into the muscle once. for 1 dose    ezetimibe (ZETIA) 10 mg tablet Take 1 tablet (10 mg total) by mouth once daily.    fexofenadine (ALLEGRA) 180 MG tablet Take 1 tablet (180 mg total) by mouth once daily.    fluconazole (DIFLUCAN) 150 MG Tab Take 1 tablet (150 mg) by mouth as needed for yeast infection that doesn't respond to Monistat. One tablet = full course. Do not take more than 1 tablet per week.    fluticasone propionate (FLONASE) 50 mcg/actuation nasal spray 2 sprays (100 mcg total) by Each Nostril route once daily.    ipratropium (ATROVENT) 21 mcg (0.03 %) nasal spray USE 2 SPRAYS NASALLY  THREE TIMES DAILY    ipratropium-albuteroL (COMBIVENT)  mcg/actuation inhaler Inhale 2 puffs into the lungs every 4 (four) hours as needed for Wheezing or Shortness of Breath. Rescue    JARDIANCE 25 mg tablet TAKE 1 TABLET (25 MG TOTAL) BY MOUTH ONCE DAILY.    levocetirizine (XYZAL) 5 MG tablet Take 1 tablet (5 mg total) by mouth every evening.    miconazole (MICOTIN) 2 % vaginal cream Place 1 applicator vaginally every evening. Use as directed    montelukast (SINGULAIR) 10 mg tablet Take 1 tablet (10 mg total) by mouth every evening.    ondansetron (ZOFRAN-ODT) 4 MG TbDL 1-2 tablets PO every 6 hours as needed for nausea/vomiting while completing bowel prep    pantoprazole (PROTONIX) 40 MG tablet Take 1 tablet (40 mg total) by mouth once daily.    potassium chloride SA (K-DUR,KLOR-CON) 20 MEQ tablet Take 1 tablet (20 mEq total) by mouth once daily.    predniSONE (DELTASONE) 20 MG tablet Prednisone 60 mg/ day for 3 days, 40 mg/day for 3 days,20 mg/ day for 3 days, (1/2 tablet )10 mg a day for 3 days.    rosuvastatin (CRESTOR) 20 MG tablet Take 1 tablet (20 mg total) by mouth every evening.    sod sulf-pot chloride-mag sulf (SUTAB) 1.479-0.188- 0.225 gram tablet Take 12 tablets by mouth once daily. Take according to package instructions with indicated amount of water.    valsartan (DIOVAN) 160 MG tablet TAKE 1 TABLET ONE TIME DAILY    varicella-zoster gE-AS01B, PF, (SHINGRIX) 50 mcg/0.5 mL injection Inject 0.5 mL (one dose) into muscle now; give second dose at least 2 months later     No current facility-administered medications for this visit.     Current Outpatient Medications on File Prior to Visit   Medication Sig    albuterol (PROVENTIL) 2.5 mg /3 mL (0.083 %) nebulizer solution Take 3 mLs (2.5 mg total) by nebulization every 6 (six) hours while awake.    albuterol (PROVENTIL/VENTOLIN HFA) 90 mcg/actuation inhaler Inhale 2 puffs into the lungs every 4 (four) hours as needed for Wheezing or Shortness of  Breath.    amLODIPine (NORVASC) 10 MG tablet Take 1 tablet (10 mg total) by mouth once daily.    aspirin (ECOTRIN) 81 MG EC tablet Take 1 tablet (81 mg total) by mouth once daily.    blood sugar diagnostic Strp 1 each by Misc.(Non-Drug; Combo Route) route 3 (three) times daily. EALTH test strips    blood-glucose meter kit Insurance preferred    butalbital-acetaminophen-caffeine -40 mg (FIORICET, ESGIC) -40 mg per tablet TAKE 1 TABLET BY MOUTH 3 (THREE) TIMES DAILY AS NEEDED FOR HEADACHES. (MAXIMUM OF 15 TABLETS PER MONTH)    calcium-vitamin D 600 mg-10 mcg (400 unit) Tab Take 2 tablets by mouth once daily.    cetirizine (ZYRTEC) 10 MG tablet Take 1 tablet (10 mg total) by mouth once daily. For sinus congestion    chlorthalidone (HYGROTEN) 25 MG Tab TAKE 1 TABLET TWICE DAILY    cilostazoL (PLETAL) 50 MG Tab Take 1 tablet (50 mg total) by mouth 2 (two) times daily before meals.    COVID-19 vac, moiz,Pfizer,,PF, (PFIZER COVID-19 MOIZ VACCN,PF,) 30 mcg/0.3 mL injection Inject into the muscle.    doxycycline (MONODOX) 100 MG capsule Take 1 capsule (100 mg total) by mouth every 12 (twelve) hours.    EPINEPHrine (EPIPEN) 0.3 mg/0.3 mL AtIn Inject 0.3 mLs (0.3 mg total) into the muscle once. for 1 dose    ezetimibe (ZETIA) 10 mg tablet Take 1 tablet (10 mg total) by mouth once daily.    fexofenadine (ALLEGRA) 180 MG tablet Take 1 tablet (180 mg total) by mouth once daily.    fluconazole (DIFLUCAN) 150 MG Tab Take 1 tablet (150 mg) by mouth as needed for yeast infection that doesn't respond to Monistat. One tablet = full course. Do not take more than 1 tablet per week.    fluticasone propionate (FLONASE) 50 mcg/actuation nasal spray 2 sprays (100 mcg total) by Each Nostril route once daily.    ipratropium (ATROVENT) 21 mcg (0.03 %) nasal spray USE 2 SPRAYS NASALLY THREE TIMES DAILY    ipratropium-albuteroL (COMBIVENT)  mcg/actuation inhaler Inhale 2 puffs into the lungs every 4 (four) hours as needed for  Wheezing or Shortness of Breath. Rescue    JARDIANCE 25 mg tablet TAKE 1 TABLET (25 MG TOTAL) BY MOUTH ONCE DAILY.    levocetirizine (XYZAL) 5 MG tablet Take 1 tablet (5 mg total) by mouth every evening.    miconazole (MICOTIN) 2 % vaginal cream Place 1 applicator vaginally every evening. Use as directed    montelukast (SINGULAIR) 10 mg tablet Take 1 tablet (10 mg total) by mouth every evening.    ondansetron (ZOFRAN-ODT) 4 MG TbDL 1-2 tablets PO every 6 hours as needed for nausea/vomiting while completing bowel prep    pantoprazole (PROTONIX) 40 MG tablet Take 1 tablet (40 mg total) by mouth once daily.    potassium chloride SA (K-DUR,KLOR-CON) 20 MEQ tablet Take 1 tablet (20 mEq total) by mouth once daily.    predniSONE (DELTASONE) 20 MG tablet Prednisone 60 mg/ day for 3 days, 40 mg/day for 3 days,20 mg/ day for 3 days, (1/2 tablet )10 mg a day for 3 days.    rosuvastatin (CRESTOR) 20 MG tablet Take 1 tablet (20 mg total) by mouth every evening.    sod sulf-pot chloride-mag sulf (SUTAB) 1.479-0.188- 0.225 gram tablet Take 12 tablets by mouth once daily. Take according to package instructions with indicated amount of water.    valsartan (DIOVAN) 160 MG tablet TAKE 1 TABLET ONE TIME DAILY    varicella-zoster gE-AS01B, PF, (SHINGRIX) 50 mcg/0.5 mL injection Inject 0.5 mL (one dose) into muscle now; give second dose at least 2 months later     No current facility-administered medications on file prior to visit.     Review of patient's allergies indicates:   Allergen Reactions    Iodine and iodide containing products Swelling    Skin staples [surgical stainless steel] Swelling    Egg derived      Shortness breath, lip swelling    Fish containing products     Latex, natural rubber Swelling    Losartan Itching    Nickel     Pravastatin      40 mg causes nausea vomitting but 20 mg ok    Shellfish containing products Swelling      Review of Systems   Constitutional: Negative for diaphoresis, malaise/fatigue and weight  gain.   HENT:  Negative for hoarse voice.    Eyes:  Negative for double vision and visual disturbance.   Cardiovascular:  Negative for chest pain, claudication, cyanosis, dyspnea on exertion, irregular heartbeat, leg swelling, near-syncope, orthopnea, palpitations, paroxysmal nocturnal dyspnea and syncope.   Respiratory:  Negative for cough, hemoptysis, shortness of breath and snoring.    Hematologic/Lymphatic: Negative for bleeding problem. Does not bruise/bleed easily.   Skin:  Negative for color change and poor wound healing.   Musculoskeletal:  Negative for muscle cramps, muscle weakness and myalgias.   Gastrointestinal:  Negative for bloating, abdominal pain, change in bowel habit, diarrhea, heartburn, hematemesis, hematochezia, melena and nausea.   Neurological:  Negative for excessive daytime sleepiness, dizziness, headaches, light-headedness, loss of balance, numbness and weakness.   Psychiatric/Behavioral:  Negative for memory loss. The patient does not have insomnia.    Allergic/Immunologic: Negative for hives.     Objective:   Physical Exam  Vitals and nursing note reviewed.   Constitutional:       General: She is not in acute distress.     Appearance: Normal appearance. She is well-developed. She is not ill-appearing.   HENT:      Head: Normocephalic and atraumatic.   Eyes:      General: No scleral icterus.     Pupils: Pupils are equal, round, and reactive to light.   Neck:      Thyroid: No thyromegaly.      Vascular: Normal carotid pulses. No carotid bruit, hepatojugular reflux or JVD.      Trachea: No tracheal deviation.   Cardiovascular:      Rate and Rhythm: Normal rate and regular rhythm.      Pulses:           Carotid pulses are 2+ on the right side and 2+ on the left side.       Radial pulses are 2+ on the right side and 2+ on the left side.        Femoral pulses are 2+ on the right side and 2+ on the left side.       Popliteal pulses are 1+ on the right side and 1+ on the left side.         "Dorsalis pedis pulses are 1+ on the right side and 1+ on the left side.        Posterior tibial pulses are 1+ on the right side and 1+ on the left side.      Heart sounds: Murmur heard.   Harsh midsystolic murmur is present with a grade of 2/6 at the upper right sternal border radiating to the neck.     No friction rub. No gallop.   Pulmonary:      Effort: Pulmonary effort is normal. No respiratory distress.      Breath sounds: Normal breath sounds. No wheezing, rhonchi or rales.   Chest:      Chest wall: No tenderness.   Abdominal:      General: Bowel sounds are normal. There is no abdominal bruit.      Palpations: Abdomen is soft. There is no hepatomegaly or pulsatile mass.      Tenderness: There is no abdominal tenderness.   Musculoskeletal:      Right shoulder: No deformity.      Cervical back: Normal range of motion and neck supple.   Skin:     General: Skin is warm and dry.      Findings: No erythema or rash.      Nails: There is no clubbing.   Neurological:      Mental Status: She is alert and oriented to person, place, and time.      Cranial Nerves: No cranial nerve deficit.      Coordination: Coordination normal.   Psychiatric:         Speech: Speech normal.         Behavior: Behavior normal.     Vitals:    01/06/23 0757 01/06/23 0758   BP: (!) 124/50 (!) 130/58   BP Location: Right arm Left arm   Patient Position: Sitting Sitting   BP Method: Medium (Manual) Medium (Manual)   Pulse: 98    SpO2: 96%    Weight: 66.1 kg (145 lb 11.6 oz)    Height: 5' 2" (1.575 m)      Lab Results   Component Value Date    CHOL 131 01/04/2023    CHOL 148 05/27/2022    CHOL 142 11/22/2021     Lab Results   Component Value Date    HDL 61 01/04/2023    HDL 62 05/27/2022    HDL 59 11/22/2021     Lab Results   Component Value Date    LDLCALC 58.6 (L) 01/04/2023    LDLCALC 68.6 05/27/2022    LDLCALC 70.4 11/22/2021     Lab Results   Component Value Date    TRIG 57 01/04/2023    TRIG 87 05/27/2022    TRIG 63 11/22/2021     Lab " Results   Component Value Date    CHOLHDL 46.6 01/04/2023    CHOLHDL 41.9 05/27/2022    CHOLHDL 41.5 11/22/2021       Chemistry        Component Value Date/Time     01/04/2023 0737    K 3.9 01/04/2023 0737     01/04/2023 0737    CO2 25 01/04/2023 0737    BUN 16 01/04/2023 0737    CREATININE 1.1 01/04/2023 0737    GLU 90 01/04/2023 0737        Component Value Date/Time    CALCIUM 9.0 01/04/2023 0737    ALKPHOS 92 01/04/2023 0737    AST 21 01/04/2023 0737    ALT 10 01/04/2023 0737    BILITOT 0.2 01/04/2023 0737    ESTGFRAFRICA 51.4 (A) 05/27/2022 0711    ESTGFRAFRICA 51.4 (A) 05/27/2022 0711    EGFRNONAA 44.6 (A) 05/27/2022 0711    EGFRNONAA 44.6 (A) 05/27/2022 0711        Lab Results   Component Value Date    HGBA1C 7.1 (H) 01/04/2023      Lab Results   Component Value Date    TSH 0.404 12/04/2020     Lab Results   Component Value Date    INR 0.9 10/21/2020    INR 0.9 08/10/2019    INR 0.9 12/16/2018     Lab Results   Component Value Date    WBC 10.92 11/16/2022    HGB 11.6 (L) 11/16/2022    HCT 39.4 11/16/2022    MCV 83 11/16/2022     11/16/2022     BMP  Sodium   Date Value Ref Range Status   01/04/2023 142 136 - 145 mmol/L Final     Potassium   Date Value Ref Range Status   01/04/2023 3.9 3.5 - 5.1 mmol/L Final     Chloride   Date Value Ref Range Status   01/04/2023 109 95 - 110 mmol/L Final     CO2   Date Value Ref Range Status   01/04/2023 25 23 - 29 mmol/L Final     BUN   Date Value Ref Range Status   01/04/2023 16 8 - 23 mg/dL Final     Creatinine   Date Value Ref Range Status   01/04/2023 1.1 0.5 - 1.4 mg/dL Final     Calcium   Date Value Ref Range Status   01/04/2023 9.0 8.7 - 10.5 mg/dL Final     Anion Gap   Date Value Ref Range Status   01/04/2023 8 8 - 16 mmol/L Final     eGFR if    Date Value Ref Range Status   05/27/2022 51.4 (A) >60 mL/min/1.73 m^2 Final   05/27/2022 51.4 (A) >60 mL/min/1.73 m^2 Final     eGFR if non    Date Value Ref Range Status    05/27/2022 44.6 (A) >60 mL/min/1.73 m^2 Final     Comment:     Calculation used to obtain the estimated glomerular filtration  rate (eGFR) is the CKD-EPI equation.      05/27/2022 44.6 (A) >60 mL/min/1.73 m^2 Final     Comment:     Calculation used to obtain the estimated glomerular filtration  rate (eGFR) is the CKD-EPI equation.        Estimated Creatinine Clearance: 40 mL/min (based on SCr of 1.1 mg/dL).    Assessment:     1. Atherosclerosis of native artery of both lower extremities with intermittent claudication    2. Simple chronic bronchitis    3. Pulmonary nodule less than 6 mm in diameter with high risk for malignant neoplasm - RIght lower lobe    4. Pulmonary nodule, left - 8 mm , high risk for lung cancer    5. Atherosclerosis of aorta    6. Atherosclerosis of native coronary artery of native heart without angina pectoris    7. Dyslipidemia associated with type 2 diabetes mellitus    8. Hypertension associated with diabetes    9. Nonrheumatic aortic valve stenosis    10. Tortuous aorta    11. Iron deficiency anemia secondary to inadequate dietary iron intake    12. Type 2 diabetes mellitus with microalbuminuria, without long-term current use of insulin    13. Type 2 diabetes mellitus with peripheral vascular disease    14. Type 2 diabetes mellitus with stage 3b chronic kidney disease, without long-term current use of insulin    15. IVANIA (obstructive sleep apnea)    16. Former heavy cigarette smoker    DIABETES CONTROL IMPROVED AFTER STOPPING STEROIDS STILL COUNSELED ABOUT BETTER COMPLIANCE CONTINUED EXERCISE WILL ENCOURAGE TO GET A1C LESS THAN 7    HTN CONTROLLED LOW SALT DIET EMPHASIZED.   AS MODERATE ASYMPTOMATIC WILL OBSERVE\ PVD WITH CLAUDICATION HAS NEW LEFT HIP[ PAIN WILL MAKE SURE SHE HAS  NO CHANGE IN HER PVD STATUS SHE IS TOLERATED PLETAL WELL AND KEEP ON EXERCISING WILL CHECK NON INVASIVE STUDIES.  IVANIA NOT USING CPAP UNABLE TO TOLERATE ENCOURAGED WEIGHT LOSS  HLP ON [employer]  CONTINUE SAME. DIET  COMPLIANCE EMPHASIZED.   Plan:   AKHIL    BILATERAL LOWER EXTREMITY DUPLEX   Continue current therapy  Cardiac low salt diet.  Risk factor modification and excercise program./WEIGHT LOSS  F/u in 6 months with lipid cmp ekg.

## 2023-01-10 ENCOUNTER — TELEPHONE (OUTPATIENT)
Dept: ADMINISTRATIVE | Facility: HOSPITAL | Age: 73
End: 2023-01-10
Payer: MEDICARE

## 2023-01-23 PROBLEM — E11.8 DIABETES MELLITUS TYPE 2 WITH COMPLICATIONS: Status: ACTIVE | Noted: 2023-01-23

## 2023-01-23 PROBLEM — I27.20 PULMONARY HYPERTENSION: Status: ACTIVE | Noted: 2023-01-23

## 2023-01-25 ENCOUNTER — TELEPHONE (OUTPATIENT)
Dept: CARDIOLOGY | Facility: CLINIC | Age: 73
End: 2023-01-25
Payer: MEDICARE

## 2023-01-25 ENCOUNTER — HOSPITAL ENCOUNTER (OUTPATIENT)
Dept: CARDIOLOGY | Facility: HOSPITAL | Age: 73
Discharge: HOME | End: 2023-01-25
Attending: INTERNAL MEDICINE
Payer: MEDICARE

## 2023-01-25 DIAGNOSIS — E11.22 TYPE 2 DIABETES MELLITUS WITH STAGE 3B CHRONIC KIDNEY DISEASE, WITHOUT LONG-TERM CURRENT USE OF INSULIN: ICD-10-CM

## 2023-01-25 DIAGNOSIS — N18.32 TYPE 2 DIABETES MELLITUS WITH STAGE 3B CHRONIC KIDNEY DISEASE, WITHOUT LONG-TERM CURRENT USE OF INSULIN: ICD-10-CM

## 2023-01-25 DIAGNOSIS — I70.213 ATHEROSCLEROSIS OF NATIVE ARTERY OF BOTH LOWER EXTREMITIES WITH INTERMITTENT CLAUDICATION: ICD-10-CM

## 2023-01-25 LAB
ABI POST MINUTES1: 5 MIN
ABI POST MINUTES2: 10 MIN
IMMEDIATE ARM BP: 194 MMHG
IMMEDIATE LEFT ABI: 0.52
IMMEDIATE LEFT TIBIAL: 101 MMHG
IMMEDIATE RIGHT ABI: 0.47
IMMEDIATE RIGHT TIBIAL: 91 MMHG
LEFT ABI: 0.9
LEFT ANT TIBIAL SYS PSV: 60 CM/S
LEFT ARM BP: 153 MMHG
LEFT CFA PSV: 110 CM/S
LEFT DORSALIS PEDIS: 140 MMHG
LEFT PERONEAL SYS PSV: 25 CM/S
LEFT POPLITEAL PSV: 55 CM/S
LEFT POST TIBIAL SYS PSV: 86 CM/S
LEFT POSTERIOR TIBIAL: 140 MMHG
LEFT PROFUNDA SYS PSV: 267 CM/S
LEFT SUPER FEMORAL DIST SYS PSV: 89 CM/S
LEFT SUPER FEMORAL MID SYS PSV: 135 CM/S
LEFT SUPER FEMORAL OSTIAL SYS PSV: 360 CM/S
LEFT SUPER FEMORAL PROX SYS PSV: 150 CM/S
LEFT TBI: 0.78
LEFT TIB/PER TRUNK SYS PSV: 78 CM/S
LEFT TOE PRESSURE: 122 MMHG
OHS CV LEFT LOWER EXTREMITY ABI (NO CALC): 0.9
OHS CV RIGHT ABI LOWER EXTREMITY (NO CALC): 0.94
POST1 ARM BP: 160 MMHG
POST1 LEFT ABI: 0.84
POST1 LEFT TIBIAL: 134 MMHG
POST1 RIGHT ABI: 0.79
POST1 RIGHT TIBIAL: 127 MMHG
POST2 ARM BP: 149 MMHG
POST2 LEFT ABI: 0.94
POST2 LEFT TIBIAL: 140 MMHG
POST2 RIGHT ABI: 0.89
POST2 RIGHT TIBIAL: 132 MMHG
RIGHT ABI: 0.94
RIGHT ANT TIBIAL SYS PSV: 64 CM/S
RIGHT ARM BP: 156 MMHG
RIGHT CFA PSV: 143 CM/S
RIGHT DORSALIS PEDIS: 138 MMHG
RIGHT PERONEAL SYS PSV: 36 CM/S
RIGHT POPLITEAL PSV: 52 CM/S
RIGHT POST TIBIAL SYS PSV: 65 CM/S
RIGHT POSTERIOR TIBIAL: 146 MMHG
RIGHT PROFUNDA SYS PSV: 248 CM/S
RIGHT SUPER FEMORAL DIST SYS PSV: 60 CM/S
RIGHT SUPER FEMORAL MID SYS PSV: 136 CM/S
RIGHT SUPER FEMORAL OSTIAL SYS PSV: 316 CM/S
RIGHT SUPER FEMORAL PROX SYS PSV: 101 CM/S
RIGHT TBI: 0.52
RIGHT TIB/PER TRUNK SYS PSV: 97 CM/S
RIGHT TOE PRESSURE: 81 MMHG
TREADMILL GRADE: 10 %
TREADMILL SPEED: 1.3 MPH
TREADMILL TIME: 2 MIN

## 2023-01-25 PROCEDURE — 93924 LWR XTR VASC STDY BILAT: CPT | Mod: 26,HCNC,, | Performed by: INTERNAL MEDICINE

## 2023-01-25 PROCEDURE — 93924 LWR XTR VASC STDY BILAT: CPT | Mod: HCNC

## 2023-01-25 PROCEDURE — 93924 ANKLE BRACHIAL INDICES (ABI): ICD-10-PCS | Mod: 26,HCNC,, | Performed by: INTERNAL MEDICINE

## 2023-01-25 PROCEDURE — 93925 LOWER EXTREMITY STUDY: CPT | Mod: HCNC

## 2023-01-25 PROCEDURE — 93925 CV US DOPPLER ARTERIAL LEGS BILATERAL (CUPID ONLY): ICD-10-PCS | Mod: 26,HCNC,, | Performed by: INTERNAL MEDICINE

## 2023-01-25 PROCEDURE — 93925 LOWER EXTREMITY STUDY: CPT | Mod: 26,HCNC,, | Performed by: INTERNAL MEDICINE

## 2023-01-25 NOTE — TELEPHONE ENCOUNTER
Patient contacted and was advised on the voicemail stating that the results have to be read by the provider first.  We will give her a call after the results have posted to our basket.

## 2023-01-30 ENCOUNTER — PATIENT MESSAGE (OUTPATIENT)
Dept: ADMINISTRATIVE | Facility: OTHER | Age: 73
End: 2023-01-30
Payer: MEDICARE

## 2023-01-31 ENCOUNTER — TELEPHONE (OUTPATIENT)
Dept: ADMINISTRATIVE | Facility: CLINIC | Age: 73
End: 2023-01-31
Payer: MEDICARE

## 2023-02-01 ENCOUNTER — OFFICE VISIT (OUTPATIENT)
Dept: INTERNAL MEDICINE | Facility: CLINIC | Age: 73
End: 2023-02-01
Payer: MEDICARE

## 2023-02-01 VITALS
RESPIRATION RATE: 20 BRPM | HEART RATE: 99 BPM | HEIGHT: 61 IN | WEIGHT: 143.75 LBS | DIASTOLIC BLOOD PRESSURE: 58 MMHG | SYSTOLIC BLOOD PRESSURE: 120 MMHG | BODY MASS INDEX: 27.14 KG/M2

## 2023-02-01 DIAGNOSIS — E11.8 DIABETES MELLITUS TYPE 2 WITH COMPLICATIONS: ICD-10-CM

## 2023-02-01 DIAGNOSIS — Z90.2 S/P LOBECTOMY OF LUNG: ICD-10-CM

## 2023-02-01 DIAGNOSIS — I70.213 ATHEROSCLEROSIS OF NATIVE ARTERY OF BOTH LOWER EXTREMITIES WITH INTERMITTENT CLAUDICATION: Chronic | ICD-10-CM

## 2023-02-01 DIAGNOSIS — J42 CHRONIC BRONCHITIS, UNSPECIFIED CHRONIC BRONCHITIS TYPE: ICD-10-CM

## 2023-02-01 DIAGNOSIS — E11.22 TYPE 2 DIABETES MELLITUS WITH STAGE 3B CHRONIC KIDNEY DISEASE, WITHOUT LONG-TERM CURRENT USE OF INSULIN: Chronic | ICD-10-CM

## 2023-02-01 DIAGNOSIS — Z86.010 HISTORY OF ADENOMATOUS POLYP OF COLON: ICD-10-CM

## 2023-02-01 DIAGNOSIS — I73.9 PVD (PERIPHERAL VASCULAR DISEASE): ICD-10-CM

## 2023-02-01 DIAGNOSIS — I25.10 ATHEROSCLEROSIS OF NATIVE CORONARY ARTERY OF NATIVE HEART WITHOUT ANGINA PECTORIS: ICD-10-CM

## 2023-02-01 DIAGNOSIS — R80.9 TYPE 2 DIABETES MELLITUS WITH MICROALBUMINURIA, WITHOUT LONG-TERM CURRENT USE OF INSULIN: Chronic | ICD-10-CM

## 2023-02-01 DIAGNOSIS — G47.33 OSA (OBSTRUCTIVE SLEEP APNEA): ICD-10-CM

## 2023-02-01 DIAGNOSIS — J30.1 SEASONAL ALLERGIC RHINITIS DUE TO POLLEN: ICD-10-CM

## 2023-02-01 DIAGNOSIS — Z91.89 PULMONARY NODULE LESS THAN 6 MM IN DIAMETER WITH HIGH RISK FOR MALIGNANT NEOPLASM: ICD-10-CM

## 2023-02-01 DIAGNOSIS — K57.90 DIVERTICULOSIS: ICD-10-CM

## 2023-02-01 DIAGNOSIS — C34.92 NON-SMALL CELL CANCER OF LEFT LUNG: ICD-10-CM

## 2023-02-01 DIAGNOSIS — E11.59 HYPERTENSION ASSOCIATED WITH DIABETES: ICD-10-CM

## 2023-02-01 DIAGNOSIS — R91.1 PULMONARY NODULE LESS THAN 6 MM IN DIAMETER WITH HIGH RISK FOR MALIGNANT NEOPLASM: ICD-10-CM

## 2023-02-01 DIAGNOSIS — E78.5 DYSLIPIDEMIA ASSOCIATED WITH TYPE 2 DIABETES MELLITUS: Chronic | ICD-10-CM

## 2023-02-01 DIAGNOSIS — K21.9 GASTROESOPHAGEAL REFLUX DISEASE WITHOUT ESOPHAGITIS: Chronic | ICD-10-CM

## 2023-02-01 DIAGNOSIS — J44.9 CHRONIC OBSTRUCTIVE PULMONARY DISEASE, UNSPECIFIED COPD TYPE: ICD-10-CM

## 2023-02-01 DIAGNOSIS — D50.8 IRON DEFICIENCY ANEMIA SECONDARY TO INADEQUATE DIETARY IRON INTAKE: Chronic | ICD-10-CM

## 2023-02-01 DIAGNOSIS — E11.51 TYPE 2 DIABETES MELLITUS WITH PERIPHERAL VASCULAR DISEASE: Chronic | ICD-10-CM

## 2023-02-01 DIAGNOSIS — R91.1 PULMONARY NODULE, LEFT: Chronic | ICD-10-CM

## 2023-02-01 DIAGNOSIS — I35.0 NONRHEUMATIC AORTIC VALVE STENOSIS: ICD-10-CM

## 2023-02-01 DIAGNOSIS — M81.0 OSTEOPOROSIS, UNSPECIFIED OSTEOPOROSIS TYPE, UNSPECIFIED PATHOLOGICAL FRACTURE PRESENCE: ICD-10-CM

## 2023-02-01 DIAGNOSIS — N18.32 TYPE 2 DIABETES MELLITUS WITH STAGE 3B CHRONIC KIDNEY DISEASE, WITHOUT LONG-TERM CURRENT USE OF INSULIN: Chronic | ICD-10-CM

## 2023-02-01 DIAGNOSIS — I77.1 TORTUOUS AORTA: ICD-10-CM

## 2023-02-01 DIAGNOSIS — Z74.09 OTHER REDUCED MOBILITY: ICD-10-CM

## 2023-02-01 DIAGNOSIS — I27.20 PULMONARY HYPERTENSION: ICD-10-CM

## 2023-02-01 DIAGNOSIS — Z98.62 S/P PERIPHERAL ARTERY ANGIOPLASTY: ICD-10-CM

## 2023-02-01 DIAGNOSIS — E11.29 TYPE 2 DIABETES MELLITUS WITH MICROALBUMINURIA, WITHOUT LONG-TERM CURRENT USE OF INSULIN: Chronic | ICD-10-CM

## 2023-02-01 DIAGNOSIS — Z00.00 ENCOUNTER FOR PREVENTATIVE ADULT HEALTH CARE EXAMINATION: Primary | ICD-10-CM

## 2023-02-01 DIAGNOSIS — E11.69 DYSLIPIDEMIA ASSOCIATED WITH TYPE 2 DIABETES MELLITUS: Chronic | ICD-10-CM

## 2023-02-01 DIAGNOSIS — I15.2 HYPERTENSION ASSOCIATED WITH DIABETES: ICD-10-CM

## 2023-02-01 DIAGNOSIS — I70.0 ATHEROSCLEROSIS OF AORTA: ICD-10-CM

## 2023-02-01 DIAGNOSIS — G44.209 TENSION HEADACHE: ICD-10-CM

## 2023-02-01 PROCEDURE — 3288F FALL RISK ASSESSMENT DOCD: CPT | Mod: HCNC,CPTII,S$GLB, | Performed by: NURSE PRACTITIONER

## 2023-02-01 PROCEDURE — 3074F SYST BP LT 130 MM HG: CPT | Mod: HCNC,CPTII,S$GLB, | Performed by: NURSE PRACTITIONER

## 2023-02-01 PROCEDURE — 1101F PT FALLS ASSESS-DOCD LE1/YR: CPT | Mod: HCNC,CPTII,S$GLB, | Performed by: NURSE PRACTITIONER

## 2023-02-01 PROCEDURE — 1159F MED LIST DOCD IN RCRD: CPT | Mod: HCNC,CPTII,S$GLB, | Performed by: NURSE PRACTITIONER

## 2023-02-01 PROCEDURE — 3051F PR MOST RECENT HEMOGLOBIN A1C LEVEL 7.0 - < 8.0%: ICD-10-PCS | Mod: HCNC,CPTII,S$GLB, | Performed by: NURSE PRACTITIONER

## 2023-02-01 PROCEDURE — 3288F PR FALLS RISK ASSESSMENT DOCUMENTED: ICD-10-PCS | Mod: HCNC,CPTII,S$GLB, | Performed by: NURSE PRACTITIONER

## 2023-02-01 PROCEDURE — 3072F PR LOW RISK FOR RETINOPATHY: ICD-10-PCS | Mod: HCNC,CPTII,S$GLB, | Performed by: NURSE PRACTITIONER

## 2023-02-01 PROCEDURE — 3078F DIAST BP <80 MM HG: CPT | Mod: HCNC,CPTII,S$GLB, | Performed by: NURSE PRACTITIONER

## 2023-02-01 PROCEDURE — 1160F PR REVIEW ALL MEDS BY PRESCRIBER/CLIN PHARMACIST DOCUMENTED: ICD-10-PCS | Mod: HCNC,CPTII,S$GLB, | Performed by: NURSE PRACTITIONER

## 2023-02-01 PROCEDURE — 1101F PR PT FALLS ASSESS DOC 0-1 FALLS W/OUT INJ PAST YR: ICD-10-PCS | Mod: HCNC,CPTII,S$GLB, | Performed by: NURSE PRACTITIONER

## 2023-02-01 PROCEDURE — 1160F RVW MEDS BY RX/DR IN RCRD: CPT | Mod: HCNC,CPTII,S$GLB, | Performed by: NURSE PRACTITIONER

## 2023-02-01 PROCEDURE — 1170F PR FUNCTIONAL STATUS ASSESSED: ICD-10-PCS | Mod: HCNC,CPTII,S$GLB, | Performed by: NURSE PRACTITIONER

## 2023-02-01 PROCEDURE — G0439 PPPS, SUBSEQ VISIT: HCPCS | Mod: HCNC,S$GLB,, | Performed by: NURSE PRACTITIONER

## 2023-02-01 PROCEDURE — 3078F PR MOST RECENT DIASTOLIC BLOOD PRESSURE < 80 MM HG: ICD-10-PCS | Mod: HCNC,CPTII,S$GLB, | Performed by: NURSE PRACTITIONER

## 2023-02-01 PROCEDURE — 99999 PR PBB SHADOW E&M-EST. PATIENT-LVL V: ICD-10-PCS | Mod: PBBFAC,HCNC,, | Performed by: NURSE PRACTITIONER

## 2023-02-01 PROCEDURE — G0439 PR MEDICARE ANNUAL WELLNESS SUBSEQUENT VISIT: ICD-10-PCS | Mod: HCNC,S$GLB,, | Performed by: NURSE PRACTITIONER

## 2023-02-01 PROCEDURE — 99999 PR PBB SHADOW E&M-EST. PATIENT-LVL V: CPT | Mod: PBBFAC,HCNC,, | Performed by: NURSE PRACTITIONER

## 2023-02-01 PROCEDURE — 3074F PR MOST RECENT SYSTOLIC BLOOD PRESSURE < 130 MM HG: ICD-10-PCS | Mod: HCNC,CPTII,S$GLB, | Performed by: NURSE PRACTITIONER

## 2023-02-01 PROCEDURE — 1170F FXNL STATUS ASSESSED: CPT | Mod: HCNC,CPTII,S$GLB, | Performed by: NURSE PRACTITIONER

## 2023-02-01 PROCEDURE — 1159F PR MEDICATION LIST DOCUMENTED IN MEDICAL RECORD: ICD-10-PCS | Mod: HCNC,CPTII,S$GLB, | Performed by: NURSE PRACTITIONER

## 2023-02-01 PROCEDURE — 3051F HG A1C>EQUAL 7.0%<8.0%: CPT | Mod: HCNC,CPTII,S$GLB, | Performed by: NURSE PRACTITIONER

## 2023-02-01 PROCEDURE — 3072F LOW RISK FOR RETINOPATHY: CPT | Mod: HCNC,CPTII,S$GLB, | Performed by: NURSE PRACTITIONER

## 2023-02-01 NOTE — PATIENT INSTRUCTIONS
Counseling and Referral of Other Preventative  (Italic type indicates deductible and co-insurance are waived)    Patient Name: Lucia Barlow  Today's Date: 2/1/2023    Health Maintenance       Date Due Completion Date    Shingles Vaccine (2 of 2) 01/08/2020 11/13/2019    Diabetes Urine Screening 06/03/2023 6/3/2022    Foot Exam 06/03/2023 6/3/2022 (Done)    Override on 6/3/2022: Done    Override on 3/10/2021: Done    Override on 2/19/2020: Done    Override on 12/13/2019: Done    Override on 1/25/2019: Done    Hemoglobin A1c 07/04/2023 1/4/2023    Eye Exam 12/28/2023 12/28/2022    Override on 12/28/2022: Done    Override on 2/1/2019: Done    Lipid Panel 01/04/2024 1/4/2023    High Dose Statin 01/06/2024 1/6/2023    Aspirin/Antiplatelet Therapy 01/06/2024 1/6/2023    DEXA Scan 11/23/2025 11/23/2022    Colonoscopy 03/25/2027 3/25/2022    TETANUS VACCINE 08/16/2029 8/16/2019        No orders of the defined types were placed in this encounter.    The following information is provided to all patients.  This information is to help you find resources for any of the problems found today that may be affecting your health:                Living healthy guide: www.[facility].gov      Understanding Diabetes: www.diabetes.org      Eating healthy: www.cdc.gov/healthyweight      CDC home safety checklist: www.cdc.gov/steadi/patient.html      Agency on Aging: www.goea.[place].[facility]      Alcoholics anonymous (AA): www.aa.org      Physical Activity: www.elmer.nih.gov/tv6yeyl      Tobacco use: www.quitwithusla.org

## 2023-02-01 NOTE — PROGRESS NOTES
"  Lucia Barolw presented for a  Medicare AWV and comprehensive Health Risk Assessment today. The following components were reviewed and updated:    Medical history  Family History  Social history  Allergies and Current Medications  Health Risk Assessment  Health Maintenance  Care Team         ** See Completed Assessments for Annual Wellness Visit within the encounter summary.**         The following assessments were completed:  Living Situation  CAGE  Depression Screening  Timed Get Up and Go  Whisper Test  Cognitive Function Screening  Nutrition Screening  ADL Screening  PAQ Screening        Vitals:    02/01/23 0755   BP: (!) 120/58   BP Location: Right arm   Patient Position: Sitting   Pulse: 99   Resp: 20   Weight: 65.2 kg (143 lb 11.8 oz)   Height:    Pain scale 5' 1" (1.549 m)    0 at this time but does report a Hx of chronic intermittent "migraine headaches"     Body mass index is 27.16 kg/m².  Physical Exam  Constitutional:       General: She is not in acute distress.     Appearance: She is not ill-appearing or diaphoretic.   HENT:      Head: Normocephalic and atraumatic.      Mouth/Throat:      Mouth: Mucous membranes are moist.   Eyes:      General:         Right eye: No discharge.         Left eye: No discharge.      Extraocular Movements: Extraocular movements intact.      Conjunctiva/sclera: Conjunctivae normal.   Cardiovascular:      Rate and Rhythm: Normal rate and regular rhythm.   Pulmonary:      Effort: Pulmonary effort is normal. No respiratory distress.   Musculoskeletal:      Cervical back: Normal range of motion and neck supple. No rigidity or tenderness.   Skin:     General: Skin is warm and dry.   Neurological:      General: No focal deficit present.      Mental Status: She is alert and oriented to person, place, and time. Mental status is at baseline.      Cranial Nerves: No cranial nerve deficit.   Psychiatric:         Mood and Affect: Mood normal.         Behavior: Behavior normal.         " Thought Content: Thought content normal.         Judgment: Judgment normal.           Diagnoses and health risks identified today and associated recommendations/orders:    1. Encounter for preventative adult health care examination  Review for opioid screening: Patient does not have Rx for Opioids  Review for substance use disorder: Patient does not use substance per chart    Patient states she does not drink alcohol    I offered to discuss advanced care planning, including how to pick a person who would make decisions for you if you were unable to make them for yourself, called a health care power of , and what kind of decisions you might make such as use of life sustaining treatments such as ventilators and tube feeding when faced with a life limiting illness recorded on a living will that they will need to know. (How you want to be cared for as you near the end of your natural life)     X Patient is interested in learning more about how to make advanced directives.  I provided them paperwork and offered to discuss this with them.    2. Non-small cell cancer of left lung, Hx robotic lung lobectomy 11/19/2020, Pulmonary nodule less than 6 mm in diameter with high risk for malignant neoplasm - RIght lower lobe, Pulmonary nodule, left - 8 mm , high risk for lung cancer, Chronic obstructive pulmonary disease, unspecified COPD type, Chronic bronchitis, unspecified chronic bronchitis type, IVANIA (obstructive sleep apnea), Pulmonary hypertension echo 5/2021  Monitor  Continue home albuterol, combivent  Follow up with Pulmonology as directed    3. Hypertension associated with diabetes  Monitor  Stable  Continue home norvasc, hygroten, diovan    4. Dyslipidemia associated with type 2 diabetes mellitus  Monitor  Stable  Continue home zetia, crestor    5. Type 2 diabetes mellitus with peripheral vascular disease, Atherosclerosis of aorta,  Tortuous aorta, Atherosclerosis of native artery of both lower extremities with  intermittent claudication, PVD (peripheral vascular disease), S/P peripheral artery angioplasty  Monitor  Follow up as directed  Continue home asa, zetia, crestor, pletal  Blood pressure control    6. Type 2 diabetes mellitus with microalbuminuria, without long-term current use of insulin, Diabetes mellitus type 2 with complications  Monitor  Stable  Continue home jardiance    7.  Osteoporosis, unspecified osteoporosis type, unspecified pathological fracture presence  Monitor  Follow up with PCP  Continue home calcium with Vit D    8. Seasonal allergic rhinitis due to pollen, Tension headache  Patient reports long standing problems with severe allergies and headaches  Monitor  Follow up with PCP  Continue home fiorecet, zyrtec, prn doxycycline, allegra, flonase, atrovent nasal spray, xyzal, singulair    9. Nonrheumatic aortic valve stenosis   Monitor  Follow up with PCP    10. Type 2 diabetes mellitus with stage 3b chronic kidney disease, without long-term current use of insulin  Monitor  Follow up with PCP    11.  Iron deficiency anemia secondary to inadequate dietary iron intake  Monitor  Follow up with PCP    12. Other reduced mobility  Monitor  Follow up with PCP     13. Diverticulosis, Gastroesophageal reflux disease without esophagitis,  History of adenomatous polyp of colon   Monitor  Follow up with GI as directed  Continue home protonix      Provided Lucia with a 5-10 year written screening schedule and personal prevention plan. Recommendations were developed using the USPSTF age appropriate recommendations. Education, counseling, and referrals were provided as needed. After Visit Summary printed and given to patient which includes a list of additional screenings\tests needed.    Follow up in about 1 year (around 2/1/2024) for Annual wellness visit.    ORI Alfaro

## 2023-02-07 DIAGNOSIS — Z00.00 ENCOUNTER FOR MEDICARE ANNUAL WELLNESS EXAM: ICD-10-CM

## 2023-02-09 DIAGNOSIS — Z00.00 ENCOUNTER FOR MEDICARE ANNUAL WELLNESS EXAM: ICD-10-CM

## 2023-02-13 ENCOUNTER — OFFICE VISIT (OUTPATIENT)
Dept: ALLERGY | Facility: CLINIC | Age: 73
End: 2023-02-13
Payer: MEDICARE

## 2023-02-13 VITALS
DIASTOLIC BLOOD PRESSURE: 65 MMHG | HEART RATE: 99 BPM | BODY MASS INDEX: 27.24 KG/M2 | SYSTOLIC BLOOD PRESSURE: 140 MMHG | TEMPERATURE: 98 F | WEIGHT: 144.19 LBS

## 2023-02-13 DIAGNOSIS — J30.89 ALLERGIC RHINITIS DUE TO MOLD: ICD-10-CM

## 2023-02-13 DIAGNOSIS — T50.8X5D ALLERGIC REACTION TO CONTRAST MATERIAL, SUBSEQUENT ENCOUNTER: ICD-10-CM

## 2023-02-13 DIAGNOSIS — Z91.018 FOOD ALLERGY: Primary | ICD-10-CM

## 2023-02-13 DIAGNOSIS — J30.1 SEASONAL ALLERGIC RHINITIS DUE TO POLLEN: ICD-10-CM

## 2023-02-13 DIAGNOSIS — Z91.040 LATEX ALLERGY: ICD-10-CM

## 2023-02-13 PROCEDURE — 1126F AMNT PAIN NOTED NONE PRSNT: CPT | Mod: HCNC,CPTII,S$GLB, | Performed by: ALLERGY & IMMUNOLOGY

## 2023-02-13 PROCEDURE — 99214 OFFICE O/P EST MOD 30 MIN: CPT | Mod: HCNC,S$GLB,, | Performed by: ALLERGY & IMMUNOLOGY

## 2023-02-13 PROCEDURE — 3077F PR MOST RECENT SYSTOLIC BLOOD PRESSURE >= 140 MM HG: ICD-10-PCS | Mod: HCNC,CPTII,S$GLB, | Performed by: ALLERGY & IMMUNOLOGY

## 2023-02-13 PROCEDURE — 3072F PR LOW RISK FOR RETINOPATHY: ICD-10-PCS | Mod: HCNC,CPTII,S$GLB, | Performed by: ALLERGY & IMMUNOLOGY

## 2023-02-13 PROCEDURE — 99214 PR OFFICE/OUTPT VISIT, EST, LEVL IV, 30-39 MIN: ICD-10-PCS | Mod: HCNC,S$GLB,, | Performed by: ALLERGY & IMMUNOLOGY

## 2023-02-13 PROCEDURE — 3072F LOW RISK FOR RETINOPATHY: CPT | Mod: HCNC,CPTII,S$GLB, | Performed by: ALLERGY & IMMUNOLOGY

## 2023-02-13 PROCEDURE — 3078F PR MOST RECENT DIASTOLIC BLOOD PRESSURE < 80 MM HG: ICD-10-PCS | Mod: HCNC,CPTII,S$GLB, | Performed by: ALLERGY & IMMUNOLOGY

## 2023-02-13 PROCEDURE — 3077F SYST BP >= 140 MM HG: CPT | Mod: HCNC,CPTII,S$GLB, | Performed by: ALLERGY & IMMUNOLOGY

## 2023-02-13 PROCEDURE — 99999 PR PBB SHADOW E&M-EST. PATIENT-LVL V: CPT | Mod: PBBFAC,HCNC,, | Performed by: ALLERGY & IMMUNOLOGY

## 2023-02-13 PROCEDURE — 3078F DIAST BP <80 MM HG: CPT | Mod: HCNC,CPTII,S$GLB, | Performed by: ALLERGY & IMMUNOLOGY

## 2023-02-13 PROCEDURE — 3051F HG A1C>EQUAL 7.0%<8.0%: CPT | Mod: HCNC,CPTII,S$GLB, | Performed by: ALLERGY & IMMUNOLOGY

## 2023-02-13 PROCEDURE — 1101F PT FALLS ASSESS-DOCD LE1/YR: CPT | Mod: HCNC,CPTII,S$GLB, | Performed by: ALLERGY & IMMUNOLOGY

## 2023-02-13 PROCEDURE — 1126F PR PAIN SEVERITY QUANTIFIED, NO PAIN PRESENT: ICD-10-PCS | Mod: HCNC,CPTII,S$GLB, | Performed by: ALLERGY & IMMUNOLOGY

## 2023-02-13 PROCEDURE — 3288F FALL RISK ASSESSMENT DOCD: CPT | Mod: HCNC,CPTII,S$GLB, | Performed by: ALLERGY & IMMUNOLOGY

## 2023-02-13 PROCEDURE — 99999 PR PBB SHADOW E&M-EST. PATIENT-LVL V: ICD-10-PCS | Mod: PBBFAC,HCNC,, | Performed by: ALLERGY & IMMUNOLOGY

## 2023-02-13 PROCEDURE — 3288F PR FALLS RISK ASSESSMENT DOCUMENTED: ICD-10-PCS | Mod: HCNC,CPTII,S$GLB, | Performed by: ALLERGY & IMMUNOLOGY

## 2023-02-13 PROCEDURE — 1101F PR PT FALLS ASSESS DOC 0-1 FALLS W/OUT INJ PAST YR: ICD-10-PCS | Mod: HCNC,CPTII,S$GLB, | Performed by: ALLERGY & IMMUNOLOGY

## 2023-02-13 PROCEDURE — 1159F PR MEDICATION LIST DOCUMENTED IN MEDICAL RECORD: ICD-10-PCS | Mod: HCNC,CPTII,S$GLB, | Performed by: ALLERGY & IMMUNOLOGY

## 2023-02-13 PROCEDURE — 1159F MED LIST DOCD IN RCRD: CPT | Mod: HCNC,CPTII,S$GLB, | Performed by: ALLERGY & IMMUNOLOGY

## 2023-02-13 PROCEDURE — 3051F PR MOST RECENT HEMOGLOBIN A1C LEVEL 7.0 - < 8.0%: ICD-10-PCS | Mod: HCNC,CPTII,S$GLB, | Performed by: ALLERGY & IMMUNOLOGY

## 2023-02-13 NOTE — PROGRESS NOTES
"Subjective:       Patient ID: Lucia Bralow is a 73 y.o. female.      Chief Complaint:  Follow-up      HPI today: 73 year old female here for follow up. She reports a clear runny nose when she bends forward. She reprots that Atrovent helps and she uses it twice a day.  Avoiding latex.  She avoids fish, shellfish- avoided since she was 6.  Not used Epipen, since she was seen here previously.  Avoiding eggs. She does eat bread.            HPI 3/28/2022: 72 year old female complaining of runny nose, nasal congestion for several years. She reports facial swelling after being outside for long periods. She denies tongue or throat swelling. She has tried medrol dose pack, Flonase and Singulair. The aforementioned have not helped. She reports that she has tried long acting antihistamines like Zyrtec, but they have not helped. Symptoms vary with season and are worst in the Spring.  She reports wearing a mask when outside.   Zyrtec(last taken this morning) in the morning and Singulair at night  She has not had sinus surgery.    Fish and shellfish "my heart swells up"  NO Epipen    Latex- contact dermatitis    Egg- "stomach cramps"      HPI 5/24/2022:  Complaining of facial swelling last week of April, while seating outside. She denies throat or tongue swelling. Epipen and benadryl- 3 hours later, symptoms subsided.She did not go to the hospital. No swelling, since that day.  Avoiding fish, shellfish and egg  NO accidental ingestions  Avoiding latex  She feels Atrovent nasal spray is helping to alleviate her symptoms.      HPI 8/15/2022: 72 year old female with intermittent facial swelling. 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 She has a food intolerance to egg. Latex causes a contact dermatitis. Allergic rhinitis to mold.    "Since you made him switch my medicine, I have not been swelling."  No episodes of facial swelling  Taking xyzal and Singulair as well as Atrovent nasal spray. Taking " "Fexofenadine  Not required epipen    Avoiding fish and shellfish  History of contrast dye reaction many years ago- "mild" per her report- treated with benadryl. Did not require Epinephrine      Past Medical History:   Diagnosis Date    Atherosclerosis of artery of both lower extremities 1/17/2014    Atherosclerosis of native coronary artery of native heart without angina pectoris 11/18/2016    Atherosclerotic PVD with intermittent claudication 1/17/2014    Chronic bronchitis     COPD (chronic obstructive pulmonary disease)     Diabetes with neurologic complications     Dyslipidemia associated with type 2 diabetes mellitus 6/14/2013    Dyslipidemia associated with type 2 diabetes mellitus     Ex-smoker     Gastroesophageal reflux disease without esophagitis 1/25/2019    Hyperlipidemia     Hypertension     Iron deficiency anemia secondary to inadequate dietary iron intake 6/3/2022    NSCLC of left lung 11/19/2020    IVANIA (obstructive sleep apnea) 2/11/2021    Osteoporosis 1/16 rosita 1/18    Pneumothorax after biopsy 10/21/2020    PVD (peripheral vascular disease)     Renal manifestation of secondary diabetes mellitus     S/P peripheral artery angioplasty 2/7/2014    Seasonal allergic rhinitis due to pollen     Simple chronic bronchitis     Tobacco dependence     Type 2 diabetes mellitus with microalbuminuria, without long-term current use of insulin 3/29/2019    Type 2 diabetes mellitus with peripheral vascular disease     Type 2 diabetes mellitus with stage 3 chronic kidney disease, without long-term current use of insulin 2/1/2019    Type 2 diabetes mellitus without retinopathy 2/1/2019    Urinary incontinence      Family History   Problem Relation Age of Onset    Stroke Father     Stroke Sister     Asthma Daughter     Diabetes Daughter     Breast cancer Daughter     Asthma Son      Current Outpatient Medications on File Prior to Visit   Medication Sig Dispense Refill    albuterol (PROVENTIL) 2.5 mg /3 mL (0.083 %) " nebulizer solution Take 3 mLs (2.5 mg total) by nebulization every 6 (six) hours while awake. 270 mL 11    albuterol (PROVENTIL/VENTOLIN HFA) 90 mcg/actuation inhaler Inhale 2 puffs into the lungs every 4 (four) hours as needed for Wheezing or Shortness of Breath. 54 g 3    amLODIPine (NORVASC) 10 MG tablet Take 1 tablet (10 mg total) by mouth once daily. 90 tablet 3    aspirin (ECOTRIN) 81 MG EC tablet Take 1 tablet (81 mg total) by mouth once daily. 90 tablet 4    blood sugar diagnostic Strp 1 each by Misc.(Non-Drug; Combo Route) route 3 (three) times daily. EALTH test strips 100 each 3    blood-glucose meter kit Insurance preferred 1 each 0    butalbital-acetaminophen-caffeine -40 mg (FIORICET, ESGIC) -40 mg per tablet TAKE 1 TABLET BY MOUTH 3 (THREE) TIMES DAILY AS NEEDED FOR HEADACHES. (MAXIMUM OF 15 TABLETS PER MONTH) 30 tablet 3    calcium-vitamin D 600 mg-10 mcg (400 unit) Tab Take 2 tablets by mouth once daily. 180 tablet 4    cetirizine (ZYRTEC) 10 MG tablet Take 1 tablet (10 mg total) by mouth once daily. For sinus congestion 90 tablet 4    chlorthalidone (HYGROTEN) 25 MG Tab TAKE 1 TABLET TWICE DAILY 180 tablet 1    cilostazoL (PLETAL) 50 MG Tab Take 1 tablet (50 mg total) by mouth 2 (two) times daily before meals. 180 tablet 3    doxycycline (MONODOX) 100 MG capsule Take 1 capsule (100 mg total) by mouth every 12 (twelve) hours. 20 capsule 1    EPINEPHrine (EPIPEN) 0.3 mg/0.3 mL AtIn INJECT INTO THE MUSCLE ONE TIME FOR 1 DOSE, AS DIRECTED 2 each 3    ezetimibe (ZETIA) 10 mg tablet Take 1 tablet (10 mg total) by mouth once daily. 90 tablet 2    fexofenadine (ALLEGRA) 180 MG tablet Take 1 tablet (180 mg total) by mouth once daily. 90 tablet 3    fluticasone propionate (FLONASE) 50 mcg/actuation nasal spray 2 sprays (100 mcg total) by Each Nostril route once daily. 48 g 4    ipratropium (ATROVENT) 21 mcg (0.03 %) nasal spray USE 2 SPRAYS NASALLY THREE TIMES DAILY 90 mL 1     ipratropium-albuteroL (COMBIVENT)  mcg/actuation inhaler Inhale 2 puffs into the lungs every 4 (four) hours as needed for Wheezing or Shortness of Breath. Rescue 12 g 3    JARDIANCE 25 mg tablet TAKE 1 TABLET (25 MG TOTAL) BY MOUTH ONCE DAILY. 90 tablet 2    levocetirizine (XYZAL) 5 MG tablet Take 1 tablet (5 mg total) by mouth every evening. 30 tablet 11    miconazole (MICOTIN) 2 % vaginal cream Place 1 applicator vaginally every evening. Use as directed 135 g 2    montelukast (SINGULAIR) 10 mg tablet Take 1 tablet (10 mg total) by mouth every evening. 90 tablet 4    ondansetron (ZOFRAN-ODT) 4 MG TbDL 1-2 tablets PO every 6 hours as needed for nausea/vomiting while completing bowel prep 4 tablet 0    pantoprazole (PROTONIX) 40 MG tablet Take 1 tablet (40 mg total) by mouth once daily. 90 tablet 3    potassium chloride SA (K-DUR,KLOR-CON) 20 MEQ tablet TAKE 1 TABLET ONE TIME DAILY 90 tablet 3    rosuvastatin (CRESTOR) 20 MG tablet Take 1 tablet (20 mg total) by mouth every evening. 90 tablet 3    sod sulf-pot chloride-mag sulf (SUTAB) 1.479-0.188- 0.225 gram tablet Take 12 tablets by mouth once daily. Take according to package instructions with indicated amount of water. 24 tablet 0    valsartan (DIOVAN) 160 MG tablet TAKE 1 TABLET ONE TIME DAILY 90 tablet 1    amoxicillin-clavulanate 875-125mg (AUGMENTIN) 875-125 mg per tablet TAKE 1 TABLET TWICE DAILY FOR 21 DAYS (Patient not taking: Reported on 2/13/2023) 42 tablet 0    COVID-19 vac, moiz,Pfizer,,PF, (PFIZER COVID-19 MOIZ VACCN,PF,) 30 mcg/0.3 mL injection Inject into the muscle. (Patient not taking: Reported on 2/13/2023) 0.3 mL 0    fluconazole (DIFLUCAN) 150 MG Tab Take 1 tablet (150 mg) by mouth as needed for yeast infection that doesn't respond to Monistat. One tablet = full course. Do not take more than 1 tablet per week. (Patient not taking: Reported on 2/13/2023) 12 tablet 1    predniSONE (DELTASONE) 20 MG tablet Prednisone 60 mg/ day for 3 days, 40  mg/day for 3 days,20 mg/ day for 3 days, (1/2 tablet )10 mg a day for 3 days. (Patient not taking: Reported on 2/13/2023) 20 tablet 0    varicella-zoster gE-AS01B, PF, (SHINGRIX) 50 mcg/0.5 mL injection Inject 0.5 mL (one dose) into muscle now; give second dose at least 2 months later (Patient not taking: Reported on 2/13/2023) 0.5 mL 1     No current facility-administered medications on file prior to visit.       Review of patient's allergies indicates:   Allergen Reactions    Iodine and iodide containing products Swelling    Skin staples [surgical stainless steel] Swelling    Egg derived      Shortness breath, lip swelling    Fish containing products     Latex, natural rubber Swelling    Losartan Itching    Nickel     Pravastatin      40 mg causes nausea vomitting but 20 mg ok    Shellfish containing products Swelling     Environmental History: Pets in the home: none. She does not smoke. Grandson lives with her and smokes.  Review of Systems   HENT:  Positive for congestion.    Respiratory:  Negative for cough and shortness of breath.    Skin:  Negative for itching and rash.   Endo/Heme/Allergies:  Positive for environmental allergies.   Psychiatric/Behavioral:  Negative for suicidal ideas. The patient is not nervous/anxious.    All other systems reviewed and are negative.         Objective:    Physical Exam  Vitals reviewed.   Constitutional:       General: She is not in acute distress.     Appearance: Normal appearance. She is well-developed. She is not ill-appearing, toxic-appearing or diaphoretic.   HENT:      Head: Normocephalic and atraumatic.      Right Ear: Tympanic membrane, ear canal and external ear normal. There is no impacted cerumen.      Left Ear: Tympanic membrane, ear canal and external ear normal. There is no impacted cerumen.      Nose: Nose normal. No congestion or rhinorrhea.      Mouth/Throat:      Pharynx: No oropharyngeal exudate or posterior oropharyngeal erythema.   Eyes:      General: No  scleral icterus.        Right eye: No discharge.         Left eye: No discharge.      Pupils: Pupils are equal, round, and reactive to light.   Neck:      Thyroid: No thyromegaly.   Cardiovascular:      Rate and Rhythm: Normal rate and regular rhythm.      Heart sounds: Normal heart sounds. No murmur heard.    No friction rub. No gallop.   Pulmonary:      Effort: Pulmonary effort is normal. No respiratory distress.      Breath sounds: Normal breath sounds. No stridor. No wheezing, rhonchi or rales.   Chest:      Chest wall: No tenderness.   Musculoskeletal:         General: No swelling, tenderness, deformity or signs of injury. Normal range of motion.      Cervical back: Normal range of motion and neck supple. No rigidity. No muscular tenderness.      Right lower leg: No edema.      Left lower leg: No edema.   Lymphadenopathy:      Cervical: No cervical adenopathy.   Skin:     General: Skin is warm.      Coloration: Skin is not jaundiced or pale.      Findings: No bruising or erythema.   Neurological:      General: No focal deficit present.      Mental Status: She is alert and oriented to person, place, and time.      Gait: Gait normal.   Psychiatric:         Mood and Affect: Mood normal.         Behavior: Behavior normal.         Thought Content: Thought content normal.         Judgment: Judgment normal.     3/28/2022- allergies testing significant for Aspergillus.  Shellfish and fish were negative. Latex was negative as well.      Assessment:       1. Food allergy    2. Seasonal allergic rhinitis due to pollen    3. Latex allergy    4. Allergic rhinitis due to mold    5. Allergic reaction to contrast material, subsequent encounter           Plan:       Strict avoidance of eggs, fish, and shellfish, as well as their products  Epipen 2 pack- discussed use, care and administation    Food allergy    Seasonal allergic rhinitis due to pollen    Latex allergy    Allergic rhinitis due to mold    Allergic reaction to  contrast material, subsequent encounter      Recommend continued avoidance of ACE inhibitors.    Continue Singulair, Fexofenadine, and Xyzal.   Continue Atrovent nasal spray and increase to three to four times a day.  Recommend avoidance of latex and latex containing products.  Avoid contrast dye or premedicate, if needed and use low molecular weight dye.      RTC 12 months or sooner, if needed    CHRIS BAUMAN                        Problems Address                                                 Amount and/or Complexity                                                                      Risk       3           [] 2 or more self-limited or minor problems                      [] Limited                                                                        [] Low                  [] 1 stable chronic illness                                                  Any combination of the two                                               OTC drugs                  []Acute, uncomplicated illness or injury                            Review of prior external notes from unique source           Minor surgery with no risk factors                                                                                                               [] 1 []2  []3+                                                                                                              Review of results from each unique test                                                                                                               [] 1 []2  [] 3+                                                                                                              Order of each unique test                                                                                                               [] 1 []2  [] 3+                                                                                                              Or                                                                                                              [] Assessment requiring an independent historian      4            [] One or more chronic illness with exacerbation,              [] Moderate                                                                      [x] Moderate                 Progression, or side effects of treatment                            -test documents or independent historians                        Prescription drug management                [x]  2 or more stable chronic illnesses                                    [] Independent interpretation of tests                              Minor surgery with identifiable risk                [] 1 undiagnosed new problem with uncertain prognosis    [] Discussion or management of test results                    elective major surgery                [] 1 acute illness with                systemic symptoms                                                                                                                                                              [] 1 acute complicated injury                                                                                                                                          Elective major surgery                                                                                                                                                                                                                                                                                                                                                                                                  5            [] 1 or more chronic illnesses with severe exacerbation,     [] Extensive(two from below)                                         [] High                                                                                                               [] Independent interpretation of results                         Drug  therapy requiring intensive                                                                                                               []Discussion of management or test interpretation           monitoring                                                                                                                                                                                                       Decision to de-escalate care                 [] 1 acute or chronic illness or injury that poses a threat                                                                                               Decision regarding hospitalization

## 2023-04-04 ENCOUNTER — TELEPHONE (OUTPATIENT)
Dept: INTERNAL MEDICINE | Facility: CLINIC | Age: 73
End: 2023-04-04
Payer: MEDICARE

## 2023-04-04 DIAGNOSIS — Z12.31 ENCOUNTER FOR SCREENING MAMMOGRAM FOR MALIGNANT NEOPLASM OF BREAST: Primary | ICD-10-CM

## 2023-04-04 NOTE — TELEPHONE ENCOUNTER
----- Message from Lucila Berman sent at 4/4/2023  9:48 AM CDT -----  Contact: Lucia Singleton called in to schedule their mammogram. Please place the order. She would like to schedule on April 19. Please call her back at [phone].     Thanks  TS

## 2023-04-14 ENCOUNTER — PATIENT MESSAGE (OUTPATIENT)
Dept: ADMINISTRATIVE | Facility: OTHER | Age: 73
End: 2023-04-14
Payer: MEDICARE

## 2023-04-19 ENCOUNTER — LAB VISIT (OUTPATIENT)
Dept: LAB | Facility: HOSPITAL | Age: 73
End: 2023-04-19
Attending: FAMILY MEDICINE
Payer: MEDICARE

## 2023-04-19 ENCOUNTER — OFFICE VISIT (OUTPATIENT)
Dept: INTERNAL MEDICINE | Facility: CLINIC | Age: 73
End: 2023-04-19
Payer: MEDICARE

## 2023-04-19 VITALS
SYSTOLIC BLOOD PRESSURE: 128 MMHG | BODY MASS INDEX: 27.22 KG/M2 | HEART RATE: 111 BPM | WEIGHT: 144.19 LBS | DIASTOLIC BLOOD PRESSURE: 50 MMHG | HEIGHT: 61 IN | OXYGEN SATURATION: 96 % | TEMPERATURE: 97 F

## 2023-04-19 DIAGNOSIS — I77.1 TORTUOUS AORTA: ICD-10-CM

## 2023-04-19 DIAGNOSIS — E11.69 DYSLIPIDEMIA ASSOCIATED WITH TYPE 2 DIABETES MELLITUS: Primary | Chronic | ICD-10-CM

## 2023-04-19 DIAGNOSIS — I70.213 ATHEROSCLEROSIS OF NATIVE ARTERY OF BOTH LOWER EXTREMITIES WITH INTERMITTENT CLAUDICATION: Chronic | ICD-10-CM

## 2023-04-19 DIAGNOSIS — M81.0 AGE-RELATED OSTEOPOROSIS WITHOUT CURRENT PATHOLOGICAL FRACTURE: ICD-10-CM

## 2023-04-19 DIAGNOSIS — I25.10 ATHEROSCLEROSIS OF NATIVE CORONARY ARTERY OF NATIVE HEART WITHOUT ANGINA PECTORIS: ICD-10-CM

## 2023-04-19 DIAGNOSIS — G44.209 TENSION HEADACHE: ICD-10-CM

## 2023-04-19 DIAGNOSIS — E11.22 TYPE 2 DIABETES MELLITUS WITH STAGE 3A CHRONIC KIDNEY DISEASE, WITHOUT LONG-TERM CURRENT USE OF INSULIN: Chronic | ICD-10-CM

## 2023-04-19 DIAGNOSIS — R80.9 TYPE 2 DIABETES MELLITUS WITH MICROALBUMINURIA, WITHOUT LONG-TERM CURRENT USE OF INSULIN: ICD-10-CM

## 2023-04-19 DIAGNOSIS — N18.31 TYPE 2 DIABETES MELLITUS WITH STAGE 3A CHRONIC KIDNEY DISEASE, WITHOUT LONG-TERM CURRENT USE OF INSULIN: Chronic | ICD-10-CM

## 2023-04-19 DIAGNOSIS — I10 ESSENTIAL HYPERTENSION: Chronic | ICD-10-CM

## 2023-04-19 DIAGNOSIS — E11.59 HYPERTENSION ASSOCIATED WITH DIABETES: ICD-10-CM

## 2023-04-19 DIAGNOSIS — E11.29 TYPE 2 DIABETES MELLITUS WITH MICROALBUMINURIA, WITHOUT LONG-TERM CURRENT USE OF INSULIN: ICD-10-CM

## 2023-04-19 DIAGNOSIS — J44.9 CHRONIC OBSTRUCTIVE PULMONARY DISEASE, UNSPECIFIED COPD TYPE: Chronic | ICD-10-CM

## 2023-04-19 DIAGNOSIS — I15.2 HYPERTENSION ASSOCIATED WITH DIABETES: ICD-10-CM

## 2023-04-19 DIAGNOSIS — I27.21 HIGH PULMONARY ARTERIAL PRESSURE: ICD-10-CM

## 2023-04-19 DIAGNOSIS — J30.1 SEASONAL ALLERGIC RHINITIS DUE TO POLLEN: ICD-10-CM

## 2023-04-19 DIAGNOSIS — E78.5 DYSLIPIDEMIA ASSOCIATED WITH TYPE 2 DIABETES MELLITUS: Primary | Chronic | ICD-10-CM

## 2023-04-19 PROBLEM — E11.8 DIABETES MELLITUS TYPE 2 WITH COMPLICATIONS: Status: RESOLVED | Noted: 2023-01-23 | Resolved: 2023-04-19

## 2023-04-19 LAB
ALBUMIN/CREAT UR: 87 UG/MG (ref 0–30)
CREAT UR-MCNC: 69 MG/DL (ref 15–325)
MICROALBUMIN UR DL<=1MG/L-MCNC: 60 UG/ML

## 2023-04-19 PROCEDURE — 1101F PR PT FALLS ASSESS DOC 0-1 FALLS W/OUT INJ PAST YR: ICD-10-PCS | Mod: CPTII,S$GLB,, | Performed by: FAMILY MEDICINE

## 2023-04-19 PROCEDURE — 3074F SYST BP LT 130 MM HG: CPT | Mod: CPTII,S$GLB,, | Performed by: FAMILY MEDICINE

## 2023-04-19 PROCEDURE — 4010F ACE/ARB THERAPY RXD/TAKEN: CPT | Mod: CPTII,S$GLB,, | Performed by: FAMILY MEDICINE

## 2023-04-19 PROCEDURE — 99214 PR OFFICE/OUTPT VISIT, EST, LEVL IV, 30-39 MIN: ICD-10-PCS | Mod: S$GLB,,, | Performed by: FAMILY MEDICINE

## 2023-04-19 PROCEDURE — 1160F RVW MEDS BY RX/DR IN RCRD: CPT | Mod: CPTII,S$GLB,, | Performed by: FAMILY MEDICINE

## 2023-04-19 PROCEDURE — 1159F MED LIST DOCD IN RCRD: CPT | Mod: CPTII,S$GLB,, | Performed by: FAMILY MEDICINE

## 2023-04-19 PROCEDURE — 3051F PR MOST RECENT HEMOGLOBIN A1C LEVEL 7.0 - < 8.0%: ICD-10-PCS | Mod: CPTII,S$GLB,, | Performed by: FAMILY MEDICINE

## 2023-04-19 PROCEDURE — 82570 ASSAY OF URINE CREATININE: CPT | Performed by: FAMILY MEDICINE

## 2023-04-19 PROCEDURE — 1125F PR PAIN SEVERITY QUANTIFIED, PAIN PRESENT: ICD-10-PCS | Mod: CPTII,S$GLB,, | Performed by: FAMILY MEDICINE

## 2023-04-19 PROCEDURE — 3072F PR LOW RISK FOR RETINOPATHY: ICD-10-PCS | Mod: CPTII,S$GLB,, | Performed by: FAMILY MEDICINE

## 2023-04-19 PROCEDURE — 3051F HG A1C>EQUAL 7.0%<8.0%: CPT | Mod: CPTII,S$GLB,, | Performed by: FAMILY MEDICINE

## 2023-04-19 PROCEDURE — 3288F PR FALLS RISK ASSESSMENT DOCUMENTED: ICD-10-PCS | Mod: CPTII,S$GLB,, | Performed by: FAMILY MEDICINE

## 2023-04-19 PROCEDURE — 1125F AMNT PAIN NOTED PAIN PRSNT: CPT | Mod: CPTII,S$GLB,, | Performed by: FAMILY MEDICINE

## 2023-04-19 PROCEDURE — 99999 PR PBB SHADOW E&M-EST. PATIENT-LVL IV: ICD-10-PCS | Mod: PBBFAC,,, | Performed by: FAMILY MEDICINE

## 2023-04-19 PROCEDURE — 3074F PR MOST RECENT SYSTOLIC BLOOD PRESSURE < 130 MM HG: ICD-10-PCS | Mod: CPTII,S$GLB,, | Performed by: FAMILY MEDICINE

## 2023-04-19 PROCEDURE — 1160F PR REVIEW ALL MEDS BY PRESCRIBER/CLIN PHARMACIST DOCUMENTED: ICD-10-PCS | Mod: CPTII,S$GLB,, | Performed by: FAMILY MEDICINE

## 2023-04-19 PROCEDURE — 1101F PT FALLS ASSESS-DOCD LE1/YR: CPT | Mod: CPTII,S$GLB,, | Performed by: FAMILY MEDICINE

## 2023-04-19 PROCEDURE — 1159F PR MEDICATION LIST DOCUMENTED IN MEDICAL RECORD: ICD-10-PCS | Mod: CPTII,S$GLB,, | Performed by: FAMILY MEDICINE

## 2023-04-19 PROCEDURE — 99214 OFFICE O/P EST MOD 30 MIN: CPT | Mod: S$GLB,,, | Performed by: FAMILY MEDICINE

## 2023-04-19 PROCEDURE — 3288F FALL RISK ASSESSMENT DOCD: CPT | Mod: CPTII,S$GLB,, | Performed by: FAMILY MEDICINE

## 2023-04-19 PROCEDURE — 4010F PR ACE/ARB THEARPY RXD/TAKEN: ICD-10-PCS | Mod: CPTII,S$GLB,, | Performed by: FAMILY MEDICINE

## 2023-04-19 PROCEDURE — 99999 PR PBB SHADOW E&M-EST. PATIENT-LVL IV: CPT | Mod: PBBFAC,,, | Performed by: FAMILY MEDICINE

## 2023-04-19 PROCEDURE — 3078F DIAST BP <80 MM HG: CPT | Mod: CPTII,S$GLB,, | Performed by: FAMILY MEDICINE

## 2023-04-19 PROCEDURE — 3072F LOW RISK FOR RETINOPATHY: CPT | Mod: CPTII,S$GLB,, | Performed by: FAMILY MEDICINE

## 2023-04-19 PROCEDURE — 3078F PR MOST RECENT DIASTOLIC BLOOD PRESSURE < 80 MM HG: ICD-10-PCS | Mod: CPTII,S$GLB,, | Performed by: FAMILY MEDICINE

## 2023-04-19 RX ORDER — ASPIRIN 81 MG/1
81 TABLET ORAL DAILY
Qty: 90 TABLET | Refills: 4 | Status: SHIPPED | OUTPATIENT
Start: 2023-04-19 | End: 2023-08-30 | Stop reason: SINTOL

## 2023-04-19 RX ORDER — ALBUTEROL SULFATE 90 UG/1
2 AEROSOL, METERED RESPIRATORY (INHALATION) EVERY 4 HOURS PRN
Qty: 54 G | Refills: 3 | Status: SHIPPED | OUTPATIENT
Start: 2023-04-19 | End: 2023-05-05 | Stop reason: SDUPTHER

## 2023-04-19 RX ORDER — IPRATROPIUM BROMIDE 21 UG/1
2 SPRAY, METERED NASAL 3 TIMES DAILY
Qty: 60 ML | Refills: 5 | Status: SHIPPED | OUTPATIENT
Start: 2023-04-19 | End: 2024-03-11

## 2023-04-19 RX ORDER — BUTALBITAL, ACETAMINOPHEN AND CAFFEINE 50; 325; 40 MG/1; MG/1; MG/1
TABLET ORAL
Qty: 30 TABLET | Refills: 3 | Status: SHIPPED | OUTPATIENT
Start: 2023-04-19

## 2023-04-19 RX ORDER — ROSUVASTATIN CALCIUM 20 MG/1
20 TABLET, COATED ORAL NIGHTLY
Qty: 90 TABLET | Refills: 3 | Status: SHIPPED | OUTPATIENT
Start: 2023-04-19 | End: 2024-03-11

## 2023-04-19 RX ORDER — FLUTICASONE PROPIONATE 50 MCG
2 SPRAY, SUSPENSION (ML) NASAL DAILY
Qty: 32 G | Refills: 5 | Status: SHIPPED | OUTPATIENT
Start: 2023-04-19 | End: 2023-05-05 | Stop reason: SDUPTHER

## 2023-04-19 RX ORDER — MULTIVITAMIN
2 TABLET ORAL DAILY
Qty: 180 TABLET | Refills: 4 | Status: SHIPPED | OUTPATIENT
Start: 2023-04-19

## 2023-04-19 RX ORDER — EZETIMIBE 10 MG/1
10 TABLET ORAL DAILY
Qty: 90 TABLET | Refills: 2 | Status: SHIPPED | OUTPATIENT
Start: 2023-04-19 | End: 2024-01-16

## 2023-04-19 RX ORDER — VALSARTAN 160 MG/1
160 TABLET ORAL DAILY
Qty: 90 TABLET | Refills: 3 | Status: SHIPPED | OUTPATIENT
Start: 2023-04-19 | End: 2023-05-05 | Stop reason: SINTOL

## 2023-04-19 RX ORDER — AMLODIPINE BESYLATE 10 MG/1
10 TABLET ORAL DAILY
Qty: 90 TABLET | Refills: 3 | Status: SHIPPED | OUTPATIENT
Start: 2023-04-19 | End: 2023-05-05 | Stop reason: SDUPTHER

## 2023-04-19 RX ORDER — CILOSTAZOL 50 MG/1
50 TABLET ORAL
Qty: 180 TABLET | Refills: 3 | Status: SHIPPED | OUTPATIENT
Start: 2023-04-19 | End: 2024-04-01

## 2023-04-19 RX ORDER — MONTELUKAST SODIUM 10 MG/1
10 TABLET ORAL NIGHTLY
Qty: 90 TABLET | Refills: 3 | Status: SHIPPED | OUTPATIENT
Start: 2023-04-19 | End: 2024-04-18

## 2023-04-19 RX ORDER — CHLORTHALIDONE 25 MG/1
25 TABLET ORAL 2 TIMES DAILY
Qty: 180 TABLET | Refills: 2 | Status: SHIPPED | OUTPATIENT
Start: 2023-04-19 | End: 2023-05-05 | Stop reason: ALTCHOICE

## 2023-04-19 NOTE — PROGRESS NOTES
"OFFICE VISIT 4/19/23  8:00 AM CDT    Subjective   CHIEF COMPLAINT: Follow-up    Refer to the history and data embedded within the "Assessment & Plan" section below for the status of the conditions evaluated and managed today. Unless noted otherwise, conditions appear compensated/controlled and stable.    Review of Systems   Respiratory:  Negative for chest tightness and shortness of breath.    Cardiovascular:  Negative for chest pain.   Endocrine: Negative for polydipsia and polyuria.       Assessment and Plan   1. Dyslipidemia associated with type 2 diabetes mellitus  -     ezetimibe (ZETIA) 10 mg tablet; Take 1 tablet (10 mg total) by mouth once daily.  Dispense: 90 tablet; Refill: 2  -     rosuvastatin (CRESTOR) 20 MG tablet; Take 1 tablet (20 mg total) by mouth every evening.  Dispense: 90 tablet; Refill: 3    2. Hypertension associated with diabetes  -     valsartan (DIOVAN) 160 MG tablet; Take 1 tablet (160 mg total) by mouth once daily.  Dispense: 90 tablet; Refill: 3  -     chlorthalidone (HYGROTEN) 25 MG Tab; Take 1 tablet (25 mg total) by mouth 2 (two) times daily.  Dispense: 180 tablet; Refill: 2  -     amLODIPine (NORVASC) 10 MG tablet; Take 1 tablet (10 mg total) by mouth once daily.  Dispense: 90 tablet; Refill: 3    3. Tortuous aorta  Overview:  XR CHEST PA AND LATERAL 09/13/2019  FINDINGS:  Aorta minimally tortuous.      Orders:  -     ezetimibe (ZETIA) 10 mg tablet; Take 1 tablet (10 mg total) by mouth once daily.  Dispense: 90 tablet; Refill: 2  -     rosuvastatin (CRESTOR) 20 MG tablet; Take 1 tablet (20 mg total) by mouth every evening.  Dispense: 90 tablet; Refill: 3  -     aspirin (ECOTRIN) 81 MG EC tablet; Take 1 tablet (81 mg total) by mouth once daily.  Dispense: 90 tablet; Refill: 4    4. Type 2 diabetes mellitus with stage 3a chronic kidney disease, without long-term current use of insulin  Assessment & Plan:  Diabetes Management Status    Statin: Taking  ACE/ARB: Taking    Screening or " Prevention Patient's value Goal Complete/Controlled?   HgA1C Testing and Control   Lab Results   Component Value Date    HGBA1C 7.1 (H) 01/04/2023      Annually/Less than 8% Yes   Lipid profile : 01/04/2023 Annually Yes   LDL control Lab Results   Component Value Date    LDLCALC 58.6 (L) 01/04/2023    Annually/Less than 100 mg/dl  Yes   Nephropathy screening Lab Results   Component Value Date    LABMICR 26.0 06/03/2022     Lab Results   Component Value Date    PROTEINUA 1+ (A) 12/16/2018    Annually Yes   Blood pressure BP Readings from Last 1 Encounters:   04/19/23 (!) 128/50    Less than 140/90 Yes   Dilated retinal exam : 12/28/2022 Annually Yes   Foot exam   : 06/03/2022 Annually Yes     Lab Results   Component Value Date    HGBA1C 7.1 (H) 01/04/2023    HGBA1C 7.5 (H) 11/16/2022    HGBA1C 6.4 (H) 05/27/2022    EGFRNORACEVR 52.7 (A) 01/04/2023    MICALBCREAT 39.4 (H) 06/03/2022    LDLCALC 58.6 (L) 01/04/2023     No results found for: GLUTAMICACID, CPEPTIDE   Last 5 Patient Entered Readings                                          Most Recent A1c: 7.1% on 1/4/2023  (Goal: 8%)       Recent Readings 3/23/2023 3/18/2023 3/11/2023 3/7/2023 3/3/2023    Blood Glucose (mg/dL) 168 230 162 235 202           HEALTH MAINTENANCE: Diabetic health maintenance interventions reviewed and are up to date except for:  There are no preventive care reminders to display for this patient.     Lab Results   Component Value Date    CREATININE 1.1 01/04/2023    EGFRNORACEVR 52.7 (A) 01/04/2023    ESTGFRAFRICA 51.4 (A) 05/27/2022    ESTGFRAFRICA 51.4 (A) 05/27/2022    EGFRNONAA 44.6 (A) 05/27/2022    EGFRNONAA 44.6 (A) 05/27/2022         Orders:  -     empagliflozin (JARDIANCE) 25 mg tablet; Take 1 tablet (25 mg total) by mouth once daily.  Dispense: 90 tablet; Refill: 2  -     Microalbumin/Creatinine Ratio, Urine; Future; Expected date: 04/19/2023    5. Chronic obstructive pulmonary disease, unspecified COPD type  -      ipratropium-albuteroL (COMBIVENT)  mcg/actuation inhaler; Inhale 2 puffs into the lungs every 4 (four) hours as needed for Wheezing or Shortness of Breath. Rescue  Dispense: 12 g; Refill: 3  -     albuterol (PROVENTIL/VENTOLIN HFA) 90 mcg/actuation inhaler; Inhale 2 puffs into the lungs every 4 (four) hours as needed for Wheezing or Shortness of Breath.  Dispense: 54 g; Refill: 3    6. High pulmonary arterial pressure  Overview:  Echo 6/18/21  · Concentric remodeling and normal systolic function.  · The estimated ejection fraction is 60%.  · Grade I left ventricular diastolic dysfunction.  · With normal right ventricular systolic function.  · Mild tricuspid regurgitation.  · There is mild aortic valve stenosis.  · Aortic valve area is 1.29 cm2; peak velocity is 2.51 m/s; mean gradient is 15 mmHg.  · Normal central venous pressure (3 mmHg).  · The estimated PA systolic pressure is 38 mmHg.       Orders:  -     valsartan (DIOVAN) 160 MG tablet; Take 1 tablet (160 mg total) by mouth once daily.  Dispense: 90 tablet; Refill: 3    7. Atherosclerosis of native artery of both lower extremities with intermittent claudication  Overview:  CV Ultrasound doppler arterial legs bilat 1/25/23  Meenakshi suggest mild disease bilaterally.  Bilateral moderat profunda disese in 50-75% range  Bilateral severe sfa disease in the 76-99% range  Waveforms are biphasic bilaterally    Orders:  -     ezetimibe (ZETIA) 10 mg tablet; Take 1 tablet (10 mg total) by mouth once daily.  Dispense: 90 tablet; Refill: 2  -     rosuvastatin (CRESTOR) 20 MG tablet; Take 1 tablet (20 mg total) by mouth every evening.  Dispense: 90 tablet; Refill: 3  -     cilostazoL (PLETAL) 50 MG Tab; Take 1 tablet (50 mg total) by mouth 2 (two) times daily before meals.  Dispense: 180 tablet; Refill: 3  -     aspirin (ECOTRIN) 81 MG EC tablet; Take 1 tablet (81 mg total) by mouth once daily.  Dispense: 90 tablet; Refill: 4    8. Seasonal allergic rhinitis due to  pollen  -     ipratropium (ATROVENT) 21 mcg (0.03 %) nasal spray; 2 sprays by Each Nostril route 3 (three) times daily.  Dispense: 60 mL; Refill: 5  -     montelukast (SINGULAIR) 10 mg tablet; Take 1 tablet (10 mg total) by mouth every evening.  Dispense: 90 tablet; Refill: 3  -     fluticasone propionate (FLONASE) 50 mcg/actuation nasal spray; 2 sprays (100 mcg total) by Each Nostril route once daily.  Dispense: 32 g; Refill: 5    9. Essential hypertension    10. Type 2 diabetes mellitus with microalbuminuria, without long-term current use of insulin  -     empagliflozin (JARDIANCE) 25 mg tablet; Take 1 tablet (25 mg total) by mouth once daily.  Dispense: 90 tablet; Refill: 2    11. Atherosclerosis of native coronary artery of native heart without angina pectoris  Overview:  CT CHEST WITHOUT CONTRAST 11/27/2018  FINDINGS: Scattered atherosclerotic plaque noted involving the thoracic aorta and aortic branch vessels, including the coronary arteries.    Orders:  -     ezetimibe (ZETIA) 10 mg tablet; Take 1 tablet (10 mg total) by mouth once daily.  Dispense: 90 tablet; Refill: 2  -     rosuvastatin (CRESTOR) 20 MG tablet; Take 1 tablet (20 mg total) by mouth every evening.  Dispense: 90 tablet; Refill: 3  -     aspirin (ECOTRIN) 81 MG EC tablet; Take 1 tablet (81 mg total) by mouth once daily.  Dispense: 90 tablet; Refill: 4    12. Tension headache  -     butalbital-acetaminophen-caffeine -40 mg (FIORICET, ESGIC) -40 mg per tablet; TAKE 1 TABLET BY MOUTH 3 (THREE) TIMES DAILY AS NEEDED FOR HEADACHES. (MAXIMUM OF 15 TABLETS PER MONTH)  Dispense: 30 tablet; Refill: 3    13. Age-related osteoporosis without current pathological fracture  Overview:  Alendronate therapy from 1/8/2014-1/25/2019.    Orders:  -     calcium-vitamin D 600 mg-10 mcg (400 unit) Tab; Take 2 tablets by mouth once daily.  Dispense: 180 tablet; Refill: 4    Unless noted herein, any chronic conditions are represented as and appear stable,  "and no other significant complaints or concerns were reported.    Follow up in about 6 months (around 10/19/2023) for re-evaluation.   Future Appointments   Date Time Provider Department Center   5/5/2023 10:00 AM [facility] CT1 LIMIT 500 LBS [facility] CT SCAN [facility]   5/5/2023 10:40 AM Ramiro Alemna MD [facility]   5/12/2023  8:30 AM [facility] [id number] [facility] MAMMO [facility]   6/28/2023  8:30 AM FIELDS, VISUAL-ONE [facility] OPHTHAL [facility]   6/28/2023  9:15 AM Toya Michelle MD [facility] OPHTHAL [facility]   6/30/2023  7:35 AM LABORATORY, [facility] LAB [facility]   7/14/2023  8:20 AM Amalia Rosas MD [facility] VAS CAR [facility]   2/13/2024  7:00 AM Temitope Moore MD [facility] ALLERGY [facility]     Objective   Vitals:    04/19/23 0812   BP: (!) 128/50   BP Location: Left arm   Patient Position: Sitting   BP Method: Large (Manual)   Pulse: (!) 111   Temp: 97.1 °F (36.2 °C)   TempSrc: Tympanic   SpO2: 96%   Weight: 65.4 kg (144 lb 2.9 oz)   Height: 5' 1" (1.549 m)   Physical Exam  Vitals reviewed.   Constitutional:       General: She is not in acute distress.     Appearance: Normal appearance. She is not ill-appearing or diaphoretic.   Cardiovascular:      Rate and Rhythm: Normal rate and regular rhythm.   Pulmonary:      Effort: Pulmonary effort is normal.      Breath sounds: Normal breath sounds.   Skin:     General: Skin is warm and dry.   Neurological:      Mental Status: She is alert and oriented to person, place, and time. Mental status is at baseline.   Psychiatric:         Mood and Affect: Mood normal.         Behavior: Behavior normal.         Judgment: Judgment normal.   DIABETIC FOOT EXAM: Inspection of feet reveals no open wounds, ulcerations, significant calluses, or evidence of arterial insufficiency. 10g monofilament sensation is intact in all 5 locations tested.      Documentation entered by me for this encounter may have been done in part using speech-recognition technology. Although I have made an effort to " "ensure accuracy, "sound like" errors may exist and should be interpreted in context.   "

## 2023-04-19 NOTE — ASSESSMENT & PLAN NOTE
Diabetes Management Status    Statin: Taking  ACE/ARB: Taking    Screening or Prevention Patient's value Goal Complete/Controlled?   HgA1C Testing and Control   Lab Results   Component Value Date    HGBA1C 7.1 (H) 01/04/2023      Annually/Less than 8% Yes   Lipid profile : 01/04/2023 Annually Yes   LDL control Lab Results   Component Value Date    LDLCALC 58.6 (L) 01/04/2023    Annually/Less than 100 mg/dl  Yes   Nephropathy screening Lab Results   Component Value Date    LABMICR 26.0 06/03/2022     Lab Results   Component Value Date    PROTEINUA 1+ (A) 12/16/2018    Annually Yes   Blood pressure BP Readings from Last 1 Encounters:   04/19/23 (!) 128/50    Less than 140/90 Yes   Dilated retinal exam : 12/28/2022 Annually Yes   Foot exam   : 06/03/2022 Annually Yes     Lab Results   Component Value Date    HGBA1C 7.1 (H) 01/04/2023    HGBA1C 7.5 (H) 11/16/2022    HGBA1C 6.4 (H) 05/27/2022    EGFRNORACEVR 52.7 (A) 01/04/2023    MICALBCREAT 39.4 (H) 06/03/2022    LDLCALC 58.6 (L) 01/04/2023     No results found for: GLUTAMICACID, CPEPTIDE   Last 5 Patient Entered Readings                                          Most Recent A1c: 7.1% on 1/4/2023  (Goal: 8%)     Recent Readings 3/23/2023 3/18/2023 3/11/2023 3/7/2023 3/3/2023    Blood Glucose (mg/dL) 168 230 162 235 202         HEALTH MAINTENANCE: Diabetic health maintenance interventions reviewed and are up to date except for:  There are no preventive care reminders to display for this patient.     Lab Results   Component Value Date    CREATININE 1.1 01/04/2023    EGFRNORACEVR 52.7 (A) 01/04/2023    ESTGFRAFRICA 51.4 (A) 05/27/2022    ESTGFRAFRICA 51.4 (A) 05/27/2022    EGFRNONAA 44.6 (A) 05/27/2022    EGFRNONAA 44.6 (A) 05/27/2022

## 2023-04-22 NOTE — PROGRESS NOTES
Results to be addressed at upcoming appointment(s) already scheduled.  Future Appointments  5/5/2023   10:00 AM   [facility] CT1 LIMIT 500 LBS     [facility] CT SCAN        [facility]  5/5/2023   10:40 AM   Ramiro Aleman MD         [facility] PULMSVC        [facility]  5/12/2023  8:30 AM    [facility] [id number]             [facility] MAMMO          [facility]  6/28/2023  8:30 AM    FIELDS, VISUAL-ONE         [facility] OPHTHAL        [facility]  6/28/2023  9:15 AM    Toya Michelle MD       [facility] OPHTHAL        [facility]  6/30/2023  7:35 AM    LABORATORY, [facility]           [facility] LAB            [facility]  7/14/2023  8:20 AM    Amalia Rosas MD         [facility] VAS CAR        [facility]  10/20/2023 7:30 AM    ZELDA Sandoval MD       [facility] IM             [facility]  2/13/2024  7:00 AM    Temitope Moore MD             [facility] ALLERGY        [facility]

## 2023-05-02 ENCOUNTER — TELEPHONE (OUTPATIENT)
Dept: INTERNAL MEDICINE | Facility: CLINIC | Age: 73
End: 2023-05-02
Payer: MEDICARE

## 2023-05-02 NOTE — TELEPHONE ENCOUNTER
----- Message from Addie Cassidy sent at 5/2/2023 10:41 AM CDT -----  Contact: Singh[facility]  Type:  Pharmacy Calling to Clarify an RX    Name of Caller:Singh  Pharmacy Name:[facility]  Prescription Name:albuterol (PROVENTIL/VENTOLIN HFA) 90 mcg/actuation inhaler and ipratropium-albuteroL (COMBIVENT)  mcg/actuation inhaler  What do they need to clarify?:Directions and notes   Best Call Back Number:[phone]  Additional Information: Request to clarify directions of combivent prescription and attached note. Please give pharmacy a call back to assist.  Thank you,  [facility]

## 2023-05-05 ENCOUNTER — HOSPITAL ENCOUNTER (OUTPATIENT)
Dept: RADIOLOGY | Facility: HOSPITAL | Age: 73
Discharge: HOME | End: 2023-05-05
Attending: INTERNAL MEDICINE
Payer: MEDICARE

## 2023-05-05 ENCOUNTER — OFFICE VISIT (OUTPATIENT)
Dept: PULMONOLOGY | Facility: CLINIC | Age: 73
End: 2023-05-05
Payer: MEDICARE

## 2023-05-05 VITALS
HEART RATE: 130 BPM | OXYGEN SATURATION: 92 % | WEIGHT: 146.06 LBS | HEIGHT: 61 IN | SYSTOLIC BLOOD PRESSURE: 138 MMHG | BODY MASS INDEX: 27.58 KG/M2 | RESPIRATION RATE: 19 BRPM | DIASTOLIC BLOOD PRESSURE: 78 MMHG

## 2023-05-05 DIAGNOSIS — J44.9 CHRONIC OBSTRUCTIVE PULMONARY DISEASE, UNSPECIFIED COPD TYPE: Chronic | ICD-10-CM

## 2023-05-05 DIAGNOSIS — R91.1 SOLITARY PULMONARY NODULE: ICD-10-CM

## 2023-05-05 DIAGNOSIS — J32.9 BACTERIAL SINUSITIS: ICD-10-CM

## 2023-05-05 DIAGNOSIS — J30.1 SEASONAL ALLERGIC RHINITIS DUE TO POLLEN: ICD-10-CM

## 2023-05-05 DIAGNOSIS — R91.1 PULMONARY NODULE LESS THAN 6 MM IN DIAMETER WITH HIGH RISK FOR MALIGNANT NEOPLASM: Primary | ICD-10-CM

## 2023-05-05 DIAGNOSIS — Z90.2 S/P LOBECTOMY OF LUNG: ICD-10-CM

## 2023-05-05 DIAGNOSIS — B96.89 BACTERIAL SINUSITIS: ICD-10-CM

## 2023-05-05 DIAGNOSIS — Z91.89 PULMONARY NODULE LESS THAN 6 MM IN DIAMETER WITH HIGH RISK FOR MALIGNANT NEOPLASM: Primary | ICD-10-CM

## 2023-05-05 DIAGNOSIS — E11.59 HYPERTENSION ASSOCIATED WITH DIABETES: ICD-10-CM

## 2023-05-05 DIAGNOSIS — I15.2 HYPERTENSION ASSOCIATED WITH DIABETES: ICD-10-CM

## 2023-05-05 DIAGNOSIS — R09.02 EXERCISE HYPOXEMIA: ICD-10-CM

## 2023-05-05 PROCEDURE — 3060F POS MICROALBUMINURIA REV: CPT | Mod: CPTII,S$GLB,, | Performed by: INTERNAL MEDICINE

## 2023-05-05 PROCEDURE — 99999 PR PBB SHADOW E&M-EST. PATIENT-LVL V: CPT | Mod: PBBFAC,,, | Performed by: INTERNAL MEDICINE

## 2023-05-05 PROCEDURE — 1101F PT FALLS ASSESS-DOCD LE1/YR: CPT | Mod: CPTII,S$GLB,, | Performed by: INTERNAL MEDICINE

## 2023-05-05 PROCEDURE — 3072F PR LOW RISK FOR RETINOPATHY: ICD-10-PCS | Mod: CPTII,S$GLB,, | Performed by: INTERNAL MEDICINE

## 2023-05-05 PROCEDURE — 3066F PR DOCUMENTATION OF TREATMENT FOR NEPHROPATHY: ICD-10-PCS | Mod: CPTII,S$GLB,, | Performed by: INTERNAL MEDICINE

## 2023-05-05 PROCEDURE — 3051F PR MOST RECENT HEMOGLOBIN A1C LEVEL 7.0 - < 8.0%: ICD-10-PCS | Mod: CPTII,S$GLB,, | Performed by: INTERNAL MEDICINE

## 2023-05-05 PROCEDURE — 3075F PR MOST RECENT SYSTOLIC BLOOD PRESS GE 130-139MM HG: ICD-10-PCS | Mod: CPTII,S$GLB,, | Performed by: INTERNAL MEDICINE

## 2023-05-05 PROCEDURE — 71250 CT CHEST WITHOUT CONTRAST: ICD-10-PCS | Mod: 26,,, | Performed by: RADIOLOGY

## 2023-05-05 PROCEDURE — 3078F PR MOST RECENT DIASTOLIC BLOOD PRESSURE < 80 MM HG: ICD-10-PCS | Mod: CPTII,S$GLB,, | Performed by: INTERNAL MEDICINE

## 2023-05-05 PROCEDURE — 1159F MED LIST DOCD IN RCRD: CPT | Mod: CPTII,S$GLB,, | Performed by: INTERNAL MEDICINE

## 2023-05-05 PROCEDURE — 3066F NEPHROPATHY DOC TX: CPT | Mod: CPTII,S$GLB,, | Performed by: INTERNAL MEDICINE

## 2023-05-05 PROCEDURE — 3075F SYST BP GE 130 - 139MM HG: CPT | Mod: CPTII,S$GLB,, | Performed by: INTERNAL MEDICINE

## 2023-05-05 PROCEDURE — 3060F PR POS MICROALBUMINURIA RESULT DOCUMENTED/REVIEW: ICD-10-PCS | Mod: CPTII,S$GLB,, | Performed by: INTERNAL MEDICINE

## 2023-05-05 PROCEDURE — 3288F FALL RISK ASSESSMENT DOCD: CPT | Mod: CPTII,S$GLB,, | Performed by: INTERNAL MEDICINE

## 2023-05-05 PROCEDURE — 3288F PR FALLS RISK ASSESSMENT DOCUMENTED: ICD-10-PCS | Mod: CPTII,S$GLB,, | Performed by: INTERNAL MEDICINE

## 2023-05-05 PROCEDURE — 99215 PR OFFICE/OUTPT VISIT, EST, LEVL V, 40-54 MIN: ICD-10-PCS | Mod: 25,S$GLB,, | Performed by: INTERNAL MEDICINE

## 2023-05-05 PROCEDURE — 71250 CT THORAX DX C-: CPT | Mod: TC

## 2023-05-05 PROCEDURE — 99215 OFFICE O/P EST HI 40 MIN: CPT | Mod: 25,S$GLB,, | Performed by: INTERNAL MEDICINE

## 2023-05-05 PROCEDURE — 3078F DIAST BP <80 MM HG: CPT | Mod: CPTII,S$GLB,, | Performed by: INTERNAL MEDICINE

## 2023-05-05 PROCEDURE — 3072F LOW RISK FOR RETINOPATHY: CPT | Mod: CPTII,S$GLB,, | Performed by: INTERNAL MEDICINE

## 2023-05-05 PROCEDURE — 4010F ACE/ARB THERAPY RXD/TAKEN: CPT | Mod: CPTII,S$GLB,, | Performed by: INTERNAL MEDICINE

## 2023-05-05 PROCEDURE — 99999 PR PBB SHADOW E&M-EST. PATIENT-LVL V: ICD-10-PCS | Mod: PBBFAC,,, | Performed by: INTERNAL MEDICINE

## 2023-05-05 PROCEDURE — 1101F PR PT FALLS ASSESS DOC 0-1 FALLS W/OUT INJ PAST YR: ICD-10-PCS | Mod: CPTII,S$GLB,, | Performed by: INTERNAL MEDICINE

## 2023-05-05 PROCEDURE — 4010F PR ACE/ARB THEARPY RXD/TAKEN: ICD-10-PCS | Mod: CPTII,S$GLB,, | Performed by: INTERNAL MEDICINE

## 2023-05-05 PROCEDURE — 3051F HG A1C>EQUAL 7.0%<8.0%: CPT | Mod: CPTII,S$GLB,, | Performed by: INTERNAL MEDICINE

## 2023-05-05 PROCEDURE — 1159F PR MEDICATION LIST DOCUMENTED IN MEDICAL RECORD: ICD-10-PCS | Mod: CPTII,S$GLB,, | Performed by: INTERNAL MEDICINE

## 2023-05-05 PROCEDURE — 71250 CT THORAX DX C-: CPT | Mod: 26,,, | Performed by: RADIOLOGY

## 2023-05-05 RX ORDER — FLUTICASONE PROPIONATE 50 MCG
2 SPRAY, SUSPENSION (ML) NASAL DAILY
Qty: 32 G | Refills: 5 | Status: SHIPPED | OUTPATIENT
Start: 2023-05-05 | End: 2024-05-04

## 2023-05-05 RX ORDER — AMLODIPINE BESYLATE 10 MG/1
10 TABLET ORAL DAILY
Qty: 90 TABLET | Refills: 3 | Status: SHIPPED | OUTPATIENT
Start: 2023-05-05 | End: 2024-05-04

## 2023-05-05 RX ORDER — FUROSEMIDE 20 MG/1
20 TABLET ORAL DAILY
Qty: 90 TABLET | Refills: 3 | Status: SHIPPED | OUTPATIENT
Start: 2023-05-05 | End: 2024-01-02

## 2023-05-05 RX ORDER — PREDNISONE 20 MG/1
TABLET ORAL
Qty: 20 TABLET | Refills: 0 | Status: SHIPPED | OUTPATIENT
Start: 2023-05-05

## 2023-05-05 RX ORDER — ALBUTEROL SULFATE 90 UG/1
2 AEROSOL, METERED RESPIRATORY (INHALATION) EVERY 4 HOURS PRN
Qty: 54 G | Refills: 3 | Status: SHIPPED | OUTPATIENT
Start: 2023-05-05 | End: 2024-05-04

## 2023-05-05 RX ORDER — AMOXICILLIN AND CLAVULANATE POTASSIUM 875; 125 MG/1; MG/1
1 TABLET, FILM COATED ORAL 2 TIMES DAILY
Qty: 20 TABLET | Refills: 0 | Status: SHIPPED | OUTPATIENT
Start: 2023-05-05 | End: 2023-05-15

## 2023-05-05 NOTE — PROGRESS NOTES
Subjective:     Patient ID: Lucia Barlow is a 73 y.o. female.    Chief Complaint:  CT  scan review :Follow up for lung nodules    HPI  left lower lobe nodule stabel on PET scan in past now to review CT of chest  S/p thoracotomy for left sided nonsmall cell lung cancer  History of lung cancer:1.5 x 1.5 x 1.5cm moderately differentiated NSCLC, favor adenosquamous    Lung Nodule  She presents for evaluation and treatment of a lung mass. The patient reports that the imaging was performed to evaluate symptoms of dyspnea on exertion, productive cough, shortness of breath and wheezing which have been present for 3 months and are unchanged. Symptoms are exacerbated by walking and relieved by rest. The patient denies other associated symptoms. She has a history of 60 pack years. The patient has no known exposure to tuberculosis. The patient does have a history of cancer.  Sinus Pain  Patient complains of clear rhinorrhea, congestion, cough, headaches, nasal congestion, post nasal drip, and sneezing. Onset of symptoms was 3 weeks ago. Symptoms have been gradually worsening since that time. She is drinking plenty of fluids.  Past history is significant for chronic bronchitis. Patient is former smoker, quit 2 years ago.    HYPERTENSION:    Concerned about fluid in feet and arms that started with VALSARTAN - itching and discomfort of skin  Stopped Valsartan 3 days - making fluid retention in legs and arms worse  Asking for diuretic    Past Medical History:   Diagnosis Date    Atherosclerosis of artery of both lower extremities 1/17/2014    Atherosclerosis of native coronary artery of native heart without angina pectoris 11/18/2016    Atherosclerotic PVD with intermittent claudication 1/17/2014    Chronic bronchitis     COPD (chronic obstructive pulmonary disease)     Diabetes with neurologic complications     Dyslipidemia associated with type 2 diabetes mellitus 6/14/2013    Dyslipidemia associated with type 2  diabetes mellitus     Ex-smoker     Gastroesophageal reflux disease without esophagitis 1/25/2019    Hyperlipidemia     Hypertension     Iron deficiency anemia secondary to inadequate dietary iron intake 6/3/2022    NSCLC of left lung 11/19/2020    IVANIA (obstructive sleep apnea) 2/11/2021    Osteoporosis 1/16 rosita 1/18    Pneumothorax after biopsy 10/21/2020    PVD (peripheral vascular disease)     Renal manifestation of secondary diabetes mellitus     S/P peripheral artery angioplasty 2/7/2014    Seasonal allergic rhinitis due to pollen     Simple chronic bronchitis     Tobacco dependence     Type 2 diabetes mellitus with microalbuminuria, without long-term current use of insulin 3/29/2019    Type 2 diabetes mellitus with peripheral vascular disease     Type 2 diabetes mellitus with stage 3 chronic kidney disease, without long-term current use of insulin 2/1/2019    Type 2 diabetes mellitus without retinopathy 2/1/2019    Urinary incontinence      Past Surgical History:   Procedure Laterality Date    ANGIOPLASTY Bilateral 01/24/2014    aortoiliac stenting     CARPAL TUNNEL RELEASE  2003    [facility]    COLECTOMY  approximate 2005    pt states 1in colon -dx with benign mass removed-states no colon cancer    COLONOSCOPY N/A 11/9/2016    Procedure: COLONOSCOPY;  Surgeon: Dmitri Sterling MD;  Location: [facility];  Service: Endoscopy;  Laterality: N/A;    COLONOSCOPY N/A 11/15/2019    Procedure: COLONOSCOPY;  Surgeon: Marko Vicente MD;  Location: [facility];  Service: Endoscopy;  Laterality: N/A;    COLONOSCOPY N/A 3/25/2022    Procedure: COLONOSCOPY;  Surgeon: Paola Feldman MD;  Location: [facility];  Service: Endoscopy;  Laterality: N/A;    ESOPHAGOGASTRODUODENOSCOPY N/A 3/25/2022    Procedure: EGD (ESOPHAGOGASTRODUODENOSCOPY);  Surgeon: Paola Feldman MD;  Location: [facility];  Service: Endoscopy;  Laterality: N/A;    HYSTERECTOMY      no cancer    INJECTION OF ANESTHETIC AGENT  AROUND MULTIPLE INTERCOSTAL NERVES Left 11/19/2020    Procedure: BLOCK, NERVE, INTERCOSTAL, 2 OR MORE;  Surgeon: Amrit Flores MD;  Location: [facility] OR 2ND FLR;  Service: Thoracic;  Laterality: Left;    OOPHORECTOMY      SURGICAL REMOVAL OF LYMPH NODE Left 11/19/2020    Procedure: EXCISION, LYMPH NODE;  Surgeon: Amrit Flores MD;  Location: [facility] OR 2ND F[facility];  Service: Thoracic;  Laterality: Left;  mediastinal lymph node disection    XI ROBOTIC RATS,WITH LOBECTOMY,LUNG Left 11/19/2020    Procedure: XI ROBOTIC RATS,WITH LOBECTOMY,LUNG;  Surgeon: Amrit Flores MD;  Location: [facility] OR 2ND FLR;  Service: Thoracic;  Laterality: Left;  Lingulectomy.     Review of patient's allergies indicates:   Allergen Reactions    Iodine and iodide containing products Swelling    Losartan Itching and Swelling     Very uncomfortable - high probability of allergic reaction due to swelling and itching    Skin staples [surgical stainless steel] Swelling    Egg derived      Shortness breath, lip swelling    Fish containing products     Latex, natural rubber Swelling    Nickel     Pravastatin      40 mg causes nausea vomitting but 20 mg ok    Shellfish containing products Swelling     Current Outpatient Medications on File Prior to Visit   Medication Sig Dispense Refill    aspirin (ECOTRIN) 81 MG EC tablet Take 1 tablet (81 mg total) by mouth once daily. 90 tablet 4    butalbital-acetaminophen-caffeine -40 mg (FIORICET, ESGIC) -40 mg per tablet TAKE 1 TABLET BY MOUTH 3 (THREE) TIMES DAILY AS NEEDED FOR HEADACHES. (MAXIMUM OF 15 TABLETS PER MONTH) 30 tablet 3    calcium-vitamin D 600 mg-10 mcg (400 unit) Tab Take 2 tablets by mouth once daily. 180 tablet 4    cetirizine (ZYRTEC) 10 MG tablet Take 1 tablet (10 mg total) by mouth once daily. For sinus congestion 90 tablet 4    cilostazoL (PLETAL) 50 MG Tab Take 1 tablet (50 mg total) by mouth 2 (two) times daily before meals. 180 tablet 3     empagliflozin (JARDIANCE) 25 mg tablet Take 1 tablet (25 mg total) by mouth once daily. 90 tablet 2    EPINEPHrine (EPIPEN) 0.3 mg/0.3 mL AtIn INJECT INTO THE MUSCLE ONE TIME FOR 1 DOSE, AS DIRECTED 2 each 3    ezetimibe (ZETIA) 10 mg tablet Take 1 tablet (10 mg total) by mouth once daily. 90 tablet 2    fexofenadine (ALLEGRA) 180 MG tablet Take 1 tablet (180 mg total) by mouth once daily. 90 tablet 3    fluconazole (DIFLUCAN) 150 MG Tab TAKE 1 TABLET AS NEEDED FOR YEAST INFECTION THAT DOES NOT RESPOND TO MONISTAT. DO NOT TAKE MORE THAN 1 TABLET PER WEEK 12 tablet 0    ipratropium (ATROVENT) 21 mcg (0.03 %) nasal spray 2 sprays by Each Nostril route 3 (three) times daily. 60 mL 5    miconazole (MICOTIN) 2 % vaginal cream Place 1 applicator vaginally every evening. Use as directed 135 g 2    montelukast (SINGULAIR) 10 mg tablet Take 1 tablet (10 mg total) by mouth every evening. 90 tablet 3    ondansetron (ZOFRAN-ODT) 4 MG TbDL 1-2 tablets PO every 6 hours as needed for nausea/vomiting while completing bowel prep 4 tablet 0    pantoprazole (PROTONIX) 40 MG tablet TAKE 1 TABLET ONE TIME DAILY 90 tablet 2    potassium chloride SA (K-DUR,KLOR-CON) 20 MEQ tablet TAKE 1 TABLET ONE TIME DAILY 90 tablet 3    rosuvastatin (CRESTOR) 20 MG tablet Take 1 tablet (20 mg total) by mouth every evening. 90 tablet 3    sod sulf-pot chloride-mag sulf (SUTAB) 1.479-0.188- 0.225 gram tablet Take 12 tablets by mouth once daily. Take according to package instructions with indicated amount of water. 24 tablet 0    varicella-zoster gE-AS01B, PF, (SHINGRIX) 50 mcg/0.5 mL injection Inject 0.5 mL (one dose) into muscle now; give second dose at least 2 months later 0.5 mL 1    [DISCONTINUED] albuterol (PROVENTIL/VENTOLIN HFA) 90 mcg/actuation inhaler Inhale 2 puffs into the lungs every 4 (four) hours as needed for Wheezing or Shortness of Breath. 54 g 3    [DISCONTINUED] amLODIPine (NORVASC) 10 MG tablet Take 1 tablet (10 mg  total) by mouth once daily. 90 tablet 3    [DISCONTINUED] chlorthalidone (HYGROTEN) 25 MG Tab Take 1 tablet (25 mg total) by mouth 2 (two) times daily. 180 tablet 2    [DISCONTINUED] fluticasone propionate (FLONASE) 50 mcg/actuation nasal spray 2 sprays (100 mcg total) by Each Nostril route once daily. 32 g 5    [DISCONTINUED] ipratropium-albuteroL (COMBIVENT)  mcg/actuation inhaler Inhale 2 puffs into the lungs every 4 (four) hours as needed for Wheezing or Shortness of Breath. Rescue 12 g 3    [DISCONTINUED] valsartan (DIOVAN) 160 MG tablet Take 1 tablet (160 mg total) by mouth once daily. 90 tablet 3    levocetirizine (XYZAL) 5 MG tablet Take 1 tablet (5 mg total) by mouth every evening. 30 tablet 11     No current facility-administered medications on file prior to visit.     Social History     Socioeconomic History    Marital status:     Number of children: 4   Occupational History    Occupation:    Tobacco Use    Smoking status: Former     Packs/day: 1.00     Years: 60.00     Pack years: 60.00     Types: Cigarettes     Start date: 1/1/1960     Quit date: 1/10/2020     Years since quitting: 3.3    Smokeless tobacco: Never   Substance and Sexual Activity    Alcohol use: No     Alcohol/week: 0.0 standard drinks    Drug use: No    Sexual activity: Never   Social History Narrative    Son smoker, no pets in household.     Social Determinants of Health     Financial Resource Strain: High Risk    Difficulty of Paying Living Expenses: Very hard   Food Insecurity: Food Insecurity Present    Worried About Running Out of Food in the Last Year: Often true    Ran Out of Food in the Last Year: Often true   Transportation Needs: No Transportation Needs    Lack of Transportation (Medical): No    Lack of Transportation (Non-Medical): No   Physical Activity: Inactive    Days of Exercise per Week: 0 days    Minutes of Exercise per Session: 0 min   Stress: Stress Concern Present     "Feeling of Stress : Rather much   Social Connections: Moderately Integrated    Frequency of Communication with Friends and Family: More than three times a week    Frequency of Social Gatherings with Friends and Family: Once a week    Attends Jewish Services: More than 4 times per year    Active Member of Clubs or Organizations: Yes    Attends Club or Organization Meetings: More than 4 times per year    Marital Status:    Housing Stability: Low Risk     Unable to Pay for Housing in the Last Year: No    Number of Places Lived in the Last Year: 1    Unstable Housing in the Last Year: No     Family History   Problem Relation Age of Onset    Stroke Father     Stroke Sister     Asthma Daughter     Diabetes Daughter     Breast cancer Daughter     Asthma Son        Review of Systems   Constitutional:  Positive for fatigue. Negative for fever.   HENT:  Positive for postnasal drip, rhinorrhea and congestion.    Eyes:  Negative for redness and itching.   Respiratory:  Positive for cough, sputum production, shortness of breath, dyspnea on extertion, use of rescue inhaler and Paroxysmal Nocturnal Dyspnea.    Cardiovascular:  Negative for chest pain, palpitations and leg swelling.   Genitourinary:  Negative for difficulty urinating and hematuria.   Endocrine:  Negative for cold intolerance and heat intolerance.    Skin:  Negative for rash.   Gastrointestinal:  Negative for nausea and abdominal pain.   Neurological:  Negative for dizziness, syncope, weakness and light-headedness.   Hematological:  Negative for adenopathy. Does not bruise/bleed easily.   Psychiatric/Behavioral:  Negative for sleep disturbance. The patient is not nervous/anxious.      Objective:      /78   Pulse (!) 130   Resp 19   Ht 5' 1" (1.549 m)   Wt 66.3 kg (146 lb 0.9 oz)   SpO2 (!) 92%   BMI 27.60 kg/m²   Physical Exam  Vitals and nursing note reviewed.   Constitutional:       Appearance: She is well-developed.   HENT:    "   Head: Normocephalic and atraumatic.      Nose: Congestion and rhinorrhea present.      Mouth/Throat:      Pharynx: No oropharyngeal exudate.   Eyes:      Conjunctiva/sclera: Conjunctivae normal.      Pupils: Pupils are equal, round, and reactive to light.   Neck:      Thyroid: No thyromegaly.      Vascular: No JVD.      Trachea: No tracheal deviation.   Cardiovascular:      Rate and Rhythm: Normal rate and regular rhythm.      Heart sounds: Normal heart sounds.   Pulmonary:      Effort: Pulmonary effort is normal. No respiratory distress.      Breath sounds: Examination of the right-lower field reveals wheezing. Examination of the left-lower field reveals wheezing. Decreased breath sounds and wheezing present. No rhonchi or rales.   Chest:      Chest wall: No tenderness.   Abdominal:      General: Bowel sounds are normal.      Palpations: Abdomen is soft.   Musculoskeletal:         General: Swelling present. Normal range of motion.      Cervical back: Neck supple.      Right lower leg: Edema present.      Left lower leg: Edema present.      Comments: Swelling in arms and legs   Lymphadenopathy:      Cervical: No cervical adenopathy.   Skin:     General: Skin is warm and dry.   Neurological:      Mental Status: She is alert and oriented to person, place, and time.     Personal Diagnostic Review  none pertinent    Pulmonary Studies Review 5/5/2023   SpO2 92   Ordering Provider -   Interpreting Provider -   Performing nurse/tech/RT -   Diagnosis -   Height 61   Weight 2336.88   BMI (Calculated) 27.6   Predicted Distance 268.53   Patient Race -   6MWT Status -   Patient Reported -   Was O2 used? -   6MW Distance walked (feet) -   Distance walked (meters) -   Did patient stop? -   How many times? -   Stop Time 1 -   Restart Time 1 -   Did patient restart? -   Type of assistive device(s) used? -   Is extra documentation required for this patient? -   Oxygen Saturation -   Supplemental Oxygen -   Heart Rate -   Blood  Pressure -   Dom Dyspnea Rating  -   Oxygen Saturation -   Supplemental Oxygen -   Heart Rate -   Blood Pressure -   Dom Dyspnea Rating  -   Recovery Time (seconds) -   Oxygen Saturation -   Supplemental Oxygen -   Heart Rate -   Blood Pressure -   Dom Dyspnea Rating  -   Is procedure ready for interpretation? -   Did the patient stop or pause? -   How many times did the patient stop or pause? -   Stop Time 1 -   Restart Time 1 -   Pause Time 1 -   Total Time Walked (Calculated) -   Total Laps Walked -   Final Partial Lap Distance (feet) -   Total Distance Feet (Calculated) -   Total Distance Meters (Calculated) -   Predicted Distance Meters (Calculated) 408.63   Percentage of Predicted (Calculated) -   Peak VO2 (Calculated) -   Mets -   Has The Patient Had a Previous Six Minute Walk Test? -   Oxygen Qualification? -       CT Chest Without Contrast  Narrative: EXAMINATION:  CT CHEST WITHOUT CONTRAST    CLINICAL HISTORY:  Abnormal xray - lung nodule, < 1 cm, mod-high risk; Solitary pulmonary nodule    TECHNIQUE:  The chest was surveyed from the apices through the posterior costophrenic angles .  Data was reconstructed for multiplanar images in axial, sagittal and coronal planes in for maximal intensity projection images in the axial plane.    COMPARISON:  10/26/2022    FINDINGS:  Base of Neck: No significant abnormality.    Airways: Patent.    Lungs:  Multiple noncalcified pulmonary nodules are again seen within either lung.  Stable nodule left lower lobe and measures approximately 8 mm by 7.5 mm.  The border of this nodule also appears to be minimally spiculated but again unchanged in appearance when compared to multiple prior studies.  There are postsurgical changes seen associated with the left mid to left upper lung zone.  Emphysematous changes noted within the bilateral upper lung zones.  Mild bronchiectasis lower lobes.    Pleura: No pleural fluid.No pleural calcification.    Varsha/Mediastinum: No pathologic  kala enlargement.    Esophagus: Normal.    Heart/pericardium: Normal size.  No pericardial effusion or calcification.    Pulmonary vasculature: Pulmonary arteries distribute normally.  There are four pulmonary veins.    Aorta: Left-sided aortic arch with 3 arterial branches.  The aorta maintains normal caliber, contour and course. There is no calcification of the thoracic aorta.  There is  no coronary artery calcification.    Thoracic soft tissues: Normal. Both breasts are present.    Bones: No acute fracture.  Moderate atherosclerotic disease.  No suspicious lytic or sclerotic lesion.    Upper Abdomen: No abnormality of the partially imaged upper abdomen.  Mild constipation.  Impression: No adverse interval changes from prior.    All CT scans at this facility use dose modulation, iterative reconstruction and/or weight based dosing when appropriate to reduce radiation dose to as low as reasonably achievable.    Electronically signed by: Kvng Desai MD  Date:    05/05/2023  Time:    10:22      Office Spirometry Results:     No flowsheet data found.  Pulmonary Studies Review 5/5/2023   SpO2 92   Ordering Provider -   Interpreting Provider -   Performing nurse/tech/RT -   Diagnosis -   Height 61   Weight 2336.88   BMI (Calculated) 27.6   Predicted Distance 268.53   Patient Race -   6MWT Status -   Patient Reported -   Was O2 used? -   6MW Distance walked (feet) -   Distance walked (meters) -   Did patient stop? -   How many times? -   Stop Time 1 -   Restart Time 1 -   Did patient restart? -   Type of assistive device(s) used? -   Is extra documentation required for this patient? -   Oxygen Saturation -   Supplemental Oxygen -   Heart Rate -   Blood Pressure -   Dom Dyspnea Rating  -   Oxygen Saturation -   Supplemental Oxygen -   Heart Rate -   Blood Pressure -   Dom Dyspnea Rating  -   Recovery Time (seconds) -   Oxygen Saturation -   Supplemental Oxygen -   Heart Rate -   Blood Pressure -   Dom Dyspnea Rating   -   Is procedure ready for interpretation? -   Did the patient stop or pause? -   How many times did the patient stop or pause? -   Stop Time 1 -   Restart Time 1 -   Pause Time 1 -   Total Time Walked (Calculated) -   Total Laps Walked -   Final Partial Lap Distance (feet) -   Total Distance Feet (Calculated) -   Total Distance Meters (Calculated) -   Predicted Distance Meters (Calculated) 408.63   Percentage of Predicted (Calculated) -   Peak VO2 (Calculated) -   Mets -   Has The Patient Had a Previous Six Minute Walk Test? -   Oxygen Qualification? -         Assessment:       Hypertension associated with diabetes  Side effects from VALSARTAN  Restart Norvasc  Lasix for edema  F/u with PCP    Pulmonary nodule less than 6 mm in diameter with high risk for malignant neoplasm - RIght lower lobe    Hypertension associated with diabetes  -     amLODIPine (NORVASC) 10 MG tablet; Take 1 tablet (10 mg total) by mouth once daily.  Dispense: 90 tablet; Refill: 3  -     furosemide (LASIX) 20 MG tablet; Take 1 tablet (20 mg total) by mouth once daily.  Dispense: 90 tablet; Refill: 3    Seasonal allergic rhinitis due to pollen  -     fluticasone propionate (FLONASE) 50 mcg/actuation nasal spray; 2 sprays (100 mcg total) by Each Nostril route once daily.  Dispense: 32 g; Refill: 5    Chronic obstructive pulmonary disease, unspecified COPD type  -     ipratropium-albuteroL (COMBIVENT)  mcg/actuation inhaler; Inhale 2 puffs into the lungs every 4 (four) hours as needed for Wheezing or Shortness of Breath. Rescue  Dispense: 12 g; Refill: 3  -     albuterol (PROVENTIL/VENTOLIN HFA) 90 mcg/actuation inhaler; Inhale 2 puffs into the lungs every 4 (four) hours as needed for Wheezing or Shortness of Breath.  Dispense: 54 g; Refill: 3  -     Six Minute Walk Test to qualify for Home Oxygen; Future  -     Spirometry with/without bronchodilator; Future; Expected date: 11/05/2023    Bacterial sinusitis  -     predniSONE (DELTASONE) 20  MG tablet; Prednisone 60 mg/ day for 3 days, 40 mg/day for 3 days,20 mg/ day for 3 days, (1/2 tablet )10 mg a day for 3 days.  Dispense: 20 tablet; Refill: 0  -     amoxicillin-clavulanate 875-125mg (AUGMENTIN) 875-125 mg per tablet; Take 1 tablet by mouth 2 (two) times daily. for 10 days  Dispense: 20 tablet; Refill: 0  -     fluticasone propionate (FLONASE) 50 mcg/actuation nasal spray; 2 sprays (100 mcg total) by Each Nostril route once daily.  Dispense: 32 g; Refill: 5    Solitary pulmonary nodule  -     CT Chest Without Contrast; Future; Expected date: 05/05/2023    Exercise hypoxemia  -     Six Minute Walk Test to qualify for Home Oxygen; Future    S/P lobectomy of lung-  Hx robotic lung lobectomy 11/19/2020          Outpatient Encounter Medications as of 5/5/2023   Medication Sig Dispense Refill    aspirin (ECOTRIN) 81 MG EC tablet Take 1 tablet (81 mg total) by mouth once daily. 90 tablet 4    butalbital-acetaminophen-caffeine -40 mg (FIORICET, ESGIC) -40 mg per tablet TAKE 1 TABLET BY MOUTH 3 (THREE) TIMES DAILY AS NEEDED FOR HEADACHES. (MAXIMUM OF 15 TABLETS PER MONTH) 30 tablet 3    calcium-vitamin D 600 mg-10 mcg (400 unit) Tab Take 2 tablets by mouth once daily. 180 tablet 4    cetirizine (ZYRTEC) 10 MG tablet Take 1 tablet (10 mg total) by mouth once daily. For sinus congestion 90 tablet 4    cilostazoL (PLETAL) 50 MG Tab Take 1 tablet (50 mg total) by mouth 2 (two) times daily before meals. 180 tablet 3    empagliflozin (JARDIANCE) 25 mg tablet Take 1 tablet (25 mg total) by mouth once daily. 90 tablet 2    EPINEPHrine (EPIPEN) 0.3 mg/0.3 mL AtIn INJECT INTO THE MUSCLE ONE TIME FOR 1 DOSE, AS DIRECTED 2 each 3    ezetimibe (ZETIA) 10 mg tablet Take 1 tablet (10 mg total) by mouth once daily. 90 tablet 2    fexofenadine (ALLEGRA) 180 MG tablet Take 1 tablet (180 mg total) by mouth once daily. 90 tablet 3    fluconazole (DIFLUCAN) 150 MG Tab TAKE 1 TABLET AS NEEDED FOR YEAST  INFECTION THAT DOES NOT RESPOND TO MONISTAT. DO NOT TAKE MORE THAN 1 TABLET PER WEEK 12 tablet 0    fluticasone propionate (FLONASE) 50 mcg/actuation nasal spray 2 sprays (100 mcg total) by Each Nostril route once daily. 32 g 5    ipratropium (ATROVENT) 21 mcg (0.03 %) nasal spray 2 sprays by Each Nostril route 3 (three) times daily. 60 mL 5    miconazole (MICOTIN) 2 % vaginal cream Place 1 applicator vaginally every evening. Use as directed 135 g 2    montelukast (SINGULAIR) 10 mg tablet Take 1 tablet (10 mg total) by mouth every evening. 90 tablet 3    ondansetron (ZOFRAN-ODT) 4 MG TbDL 1-2 tablets PO every 6 hours as needed for nausea/vomiting while completing bowel prep 4 tablet 0    pantoprazole (PROTONIX) 40 MG tablet TAKE 1 TABLET ONE TIME DAILY 90 tablet 2    potassium chloride SA (K-DUR,KLOR-CON) 20 MEQ tablet TAKE 1 TABLET ONE TIME DAILY 90 tablet 3    rosuvastatin (CRESTOR) 20 MG tablet Take 1 tablet (20 mg total) by mouth every evening. 90 tablet 3    sod sulf-pot chloride-mag sulf (SUTAB) 1.479-0.188- 0.225 gram tablet Take 12 tablets by mouth once daily. Take according to package instructions with indicated amount of water. 24 tablet 0    varicella-zoster gE-AS01B, PF, (SHINGRIX) 50 mcg/0.5 mL injection Inject 0.5 mL (one dose) into muscle now; give second dose at least 2 months later 0.5 mL 1    [DISCONTINUED] albuterol (PROVENTIL/VENTOLIN HFA) 90 mcg/actuation inhaler Inhale 2 puffs into the lungs every 4 (four) hours as needed for Wheezing or Shortness of Breath. 54 g 3    [DISCONTINUED] amLODIPine (NORVASC) 10 MG tablet Take 1 tablet (10 mg total) by mouth once daily. 90 tablet 3    [DISCONTINUED] chlorthalidone (HYGROTEN) 25 MG Tab Take 1 tablet (25 mg total) by mouth 2 (two) times daily. 180 tablet 2    [DISCONTINUED] fluticasone propionate (FLONASE) 50 mcg/actuation nasal spray 2 sprays (100 mcg total) by Each Nostril route once daily. 32 g 5    [DISCONTINUED]  ipratropium-albuteroL (COMBIVENT)  mcg/actuation inhaler Inhale 2 puffs into the lungs every 4 (four) hours as needed for Wheezing or Shortness of Breath. Rescue 12 g 3    [DISCONTINUED] valsartan (DIOVAN) 160 MG tablet Take 1 tablet (160 mg total) by mouth once daily. 90 tablet 3    albuterol (PROVENTIL/VENTOLIN HFA) 90 mcg/actuation inhaler Inhale 2 puffs into the lungs every 4 (four) hours as needed for Wheezing or Shortness of Breath. 54 g 3    amLODIPine (NORVASC) 10 MG tablet Take 1 tablet (10 mg total) by mouth once daily. 90 tablet 3    amoxicillin-clavulanate 875-125mg (AUGMENTIN) 875-125 mg per tablet Take 1 tablet by mouth 2 (two) times daily. for 10 days 20 tablet 0    furosemide (LASIX) 20 MG tablet Take 1 tablet (20 mg total) by mouth once daily. 90 tablet 3    ipratropium-albuteroL (COMBIVENT)  mcg/actuation inhaler Inhale 2 puffs into the lungs every 4 (four) hours as needed for Wheezing or Shortness of Breath. Rescue 12 g 3    levocetirizine (XYZAL) 5 MG tablet Take 1 tablet (5 mg total) by mouth every evening. 30 tablet 11    predniSONE (DELTASONE) 20 MG tablet Prednisone 60 mg/ day for 3 days, 40 mg/day for 3 days,20 mg/ day for 3 days, (1/2 tablet )10 mg a day for 3 days. 20 tablet 0    [DISCONTINUED] albuterol (PROVENTIL/VENTOLIN HFA) 90 mcg/actuation inhaler Inhale 2 puffs into the lungs every 4 (four) hours as needed for Wheezing or Shortness of Breath. 54 g 3    [DISCONTINUED] amLODIPine (NORVASC) 10 MG tablet Take 1 tablet (10 mg total) by mouth once daily. 90 tablet 3    [DISCONTINUED] amoxicillin-clavulanate 875-125mg (AUGMENTIN) 875-125 mg per tablet TAKE 1 TABLET TWICE DAILY FOR 21 DAYS 42 tablet 0    [DISCONTINUED] aspirin (ECOTRIN) 81 MG EC tablet Take 1 tablet (81 mg total) by mouth once daily. 90 tablet 4    [DISCONTINUED] blood sugar diagnostic Strp 1 each by Misc.(Non-Drug; Combo Route) route 3 (three) times daily. [facility] test strips 100 each 3     [DISCONTINUED] blood-glucose meter kit Insurance preferred 1 each 0    [DISCONTINUED] butalbital-acetaminophen-caffeine -40 mg (FIORICET, ESGIC) -40 mg per tablet TAKE 1 TABLET BY MOUTH 3 (THREE) TIMES DAILY AS NEEDED FOR HEADACHES. (MAXIMUM OF 15 TABLETS PER MONTH) 30 tablet 3    [DISCONTINUED] calcium-vitamin D 600 mg-10 mcg (400 unit) Tab Take 2 tablets by mouth once daily. 180 tablet 4    [DISCONTINUED] chlorthalidone (HYGROTEN) 25 MG Tab TAKE 1 TABLET TWICE DAILY 180 tablet 1    [DISCONTINUED] cilostazoL (PLETAL) 50 MG Tab Take 1 tablet (50 mg total) by mouth 2 (two) times daily before meals. 180 tablet 3    [DISCONTINUED] COVID-19 vac, moiz,Pfizer,,PF, (PFIZER COVID-19 MOIZ VACCN,PF,) 30 mcg/0.3 mL injection Inject into the muscle. 0.3 mL 0    [DISCONTINUED] doxycycline (MONODOX) 100 MG capsule Take 1 capsule (100 mg total) by mouth every 12 (twelve) hours. 20 capsule 1    [DISCONTINUED] ezetimibe (ZETIA) 10 mg tablet Take 1 tablet (10 mg total) by mouth once daily. 90 tablet 2    [DISCONTINUED] fluconazole (DIFLUCAN) 150 MG Tab Take 1 tablet (150 mg) by mouth as needed for yeast infection that doesn't respond to Monistat. One tablet = full course. Do not take more than 1 tablet per week. (Patient not taking: Reported on 2/13/2023) 12 tablet 1    [DISCONTINUED] fluticasone propionate (FLONASE) 50 mcg/actuation nasal spray USE 2 SPRAYS IN EACH NOSTRIL ONE TIME DAILY 48 g 4    [DISCONTINUED] ipratropium (ATROVENT) 21 mcg (0.03 %) nasal spray USE 2 SPRAYS NASALLY THREE TIMES DAILY 90 mL 1    [DISCONTINUED] ipratropium-albuteroL (COMBIVENT)  mcg/actuation inhaler Inhale 2 puffs into the lungs every 4 (four) hours as needed for Wheezing or Shortness of Breath. Rescue 12 g 3    [DISCONTINUED] JARDIANCE 25 mg tablet TAKE 1 TABLET (25 MG TOTAL) BY MOUTH ONCE DAILY. 90 tablet 2    [DISCONTINUED] montelukast (SINGULAIR) 10 mg tablet TAKE 1 TABLET EVERY EVENING 90 tablet 4     [DISCONTINUED] predniSONE (DELTASONE) 20 MG tablet Prednisone 60 mg/ day for 3 days, 40 mg/day for 3 days,20 mg/ day for 3 days, (1/2 tablet )10 mg a day for 3 days. 20 tablet 0    [DISCONTINUED] rosuvastatin (CRESTOR) 20 MG tablet TAKE 1 TABLET EVERY EVENING 90 tablet 2    [DISCONTINUED] valsartan (DIOVAN) 160 MG tablet TAKE 1 TABLET ONE TIME DAILY 90 tablet 1     No facility-administered encounter medications on file as of 2023.     Plan:       Requested Prescriptions     Signed Prescriptions Disp Refills    amLODIPine (NORVASC) 10 MG tablet 90 tablet 3     Sig: Take 1 tablet (10 mg total) by mouth once daily.    furosemide (LASIX) 20 MG tablet 90 tablet 3     Sig: Take 1 tablet (20 mg total) by mouth once daily.    predniSONE (DELTASONE) 20 MG tablet 20 tablet 0     Sig: Prednisone 60 mg/ day for 3 days, 40 mg/day for 3 days,20 mg/ day for 3 days, (1/2 tablet )10 mg a day for 3 days.    amoxicillin-clavulanate 875-125mg (AUGMENTIN) 875-125 mg per tablet 20 tablet 0     Sig: Take 1 tablet by mouth 2 (two) times daily. for 10 days    fluticasone propionate (FLONASE) 50 mcg/actuation nasal spray 32 g 5     Si sprays (100 mcg total) by Each Nostril route once daily.    ipratropium-albuteroL (COMBIVENT)  mcg/actuation inhaler 12 g 3     Sig: Inhale 2 puffs into the lungs every 4 (four) hours as needed for Wheezing or Shortness of Breath. Rescue    albuterol (PROVENTIL/VENTOLIN HFA) 90 mcg/actuation inhaler 54 g 3     Sig: Inhale 2 puffs into the lungs every 4 (four) hours as needed for Wheezing or Shortness of Breath.     Problem List Items Addressed This Visit       Chronic obstructive pulmonary disease (Chronic)    Relevant Medications    ipratropium-albuteroL (COMBIVENT)  mcg/actuation inhaler    albuterol (PROVENTIL/VENTOLIN HFA) 90 mcg/actuation inhaler    Other Relevant Orders    Six Minute Walk Test to qualify for Home Oxygen    Spirometry with/without bronchodilator    Hypertension  associated with diabetes    Current Assessment & Plan     Side effects from VALSARTAN  Restart Norvasc  Lasix for edema  F/u with PCP           Relevant Medications    amLODIPine (NORVASC) 10 MG tablet    furosemide (LASIX) 20 MG tablet    Pulmonary nodule less than 6 mm in diameter with high risk for malignant neoplasm - RIght lower lobe - Primary    Overview     Impression:     Postsurgical change of lingulectomy, stable.     Single new subsolid 4 mm right lower lobe pulmonary nodule.  Remaining pulmonary nodules appear stable.  Clinical concerns dictate the schedule for continued surveillance.     Diffuse mural thickening along the greater curvature of the stomach, nonspecific and possibly related to nondistention.  Underlying mass lesion cannot be excluded.        Electronically signed by: Cristina Peoples  Date:                                            11/02/2021  Time:                                           19:34           S/P lobectomy of lung-  Hx robotic lung lobectomy 11/19/2020    Seasonal allergic rhinitis due to pollen    Relevant Medications    fluticasone propionate (FLONASE) 50 mcg/actuation nasal spray     Other Visit Diagnoses       Bacterial sinusitis        Relevant Medications    predniSONE (DELTASONE) 20 MG tablet    amoxicillin-clavulanate 875-125mg (AUGMENTIN) 875-125 mg per tablet    fluticasone propionate (FLONASE) 50 mcg/actuation nasal spray    Solitary pulmonary nodule        Relevant Orders    CT Chest Without Contrast    Exercise hypoxemia        Relevant Orders    Six Minute Walk Test to qualify for Home Oxygen             Follow up in about 6 months (around 11/5/2023) for 6 min walk - on return, Review rick - on return.    MEDICAL DECISION MAKING: Moderate to high complexity.  Overall, the multiple problems listed are of moderate to high severity that may impact quality of life and activities of daily living. Side effects of medications, treatment plan as well as options and  alternatives reviewed and discussed with patient. There was counseling of patient concerning these issues.    Total time spent in counseling and coordination of care - 40  minutes of total time spent on the encounter, which includes face to face time and non-face to face time preparing to see the patient (eg, review of tests), Obtaining and/or reviewing separately obtained history, Documenting clinical information in the electronic or other health record, Independently interpreting results (not separately reported) and communicating results to the patient/family/caregiver, or Care coordination (not separately reported).    Time was used in discussion of prognosis, risks, benefits of treatment, instructions and compliance with regimen . Discussion with other physicians and/or health care providers - home health or for use of durable medical equipment (oxygen, nebulizers, CPAP, BiPAP) occurred.

## 2023-05-08 NOTE — TELEPHONE ENCOUNTER
Called pt and left a message for her to call back to r/s her 3/25 appt.   
[FreeTextEntry2] : MED REFILL

## 2023-05-12 ENCOUNTER — HOSPITAL ENCOUNTER (OUTPATIENT)
Dept: RADIOLOGY | Facility: HOSPITAL | Age: 73
Discharge: HOME | End: 2023-05-12
Attending: FAMILY MEDICINE
Payer: MEDICARE

## 2023-05-12 VITALS — WEIGHT: 146.19 LBS | HEIGHT: 61 IN | BODY MASS INDEX: 27.6 KG/M2

## 2023-05-12 DIAGNOSIS — Z12.31 ENCOUNTER FOR SCREENING MAMMOGRAM FOR MALIGNANT NEOPLASM OF BREAST: ICD-10-CM

## 2023-05-12 PROCEDURE — 77067 MAMMO DIGITAL SCREENING BILAT WITH TOMO: ICD-10-PCS | Mod: 26,,, | Performed by: RADIOLOGY

## 2023-05-12 PROCEDURE — 77067 SCR MAMMO BI INCL CAD: CPT | Mod: 26,,, | Performed by: RADIOLOGY

## 2023-05-12 PROCEDURE — 77063 BREAST TOMOSYNTHESIS BI: CPT | Mod: 26,,, | Performed by: RADIOLOGY

## 2023-05-12 PROCEDURE — 77063 MAMMO DIGITAL SCREENING BILAT WITH TOMO: ICD-10-PCS | Mod: 26,,, | Performed by: RADIOLOGY

## 2023-05-12 PROCEDURE — 77067 SCR MAMMO BI INCL CAD: CPT | Mod: TC

## 2023-05-15 DIAGNOSIS — I70.213 ATHEROSCLEROSIS OF NATIVE ARTERY OF BOTH LOWER EXTREMITIES WITH INTERMITTENT CLAUDICATION: Primary | ICD-10-CM

## 2023-05-17 ENCOUNTER — TELEPHONE (OUTPATIENT)
Dept: INTERNAL MEDICINE | Facility: CLINIC | Age: 73
End: 2023-05-17
Payer: MEDICARE

## 2023-05-17 NOTE — TELEPHONE ENCOUNTER
----- Message from ZELDA Sandoval MD sent at 5/16/2023  6:41 AM CDT -----  Lucia Flores.    I am happy to report that your recent breast imaging did NOT show evidence of cancer.    An annual mammogram is the best test to screen for breast cancer, but it is not perfect, and it can miss some cancers. So, even though your mammogram was normal, if you notice any lump or change in one of your breasts, please schedule an appointment with me for a proper evaluation.    Thanks for letting me care for you, and thanks for [facility].    Sincerely,    VICK Sandoval MD

## 2023-05-19 ENCOUNTER — TELEPHONE (OUTPATIENT)
Dept: CARDIOLOGY | Facility: CLINIC | Age: 73
End: 2023-05-19
Payer: MEDICARE

## 2023-05-19 ENCOUNTER — OFFICE VISIT (OUTPATIENT)
Dept: CARDIOLOGY | Facility: CLINIC | Age: 73
End: 2023-05-19
Payer: MEDICARE

## 2023-05-19 ENCOUNTER — HOSPITAL ENCOUNTER (OUTPATIENT)
Dept: CARDIOLOGY | Facility: HOSPITAL | Age: 73
Discharge: HOME | End: 2023-05-19
Attending: INTERNAL MEDICINE
Payer: MEDICARE

## 2023-05-19 VITALS
BODY MASS INDEX: 26.89 KG/M2 | HEART RATE: 105 BPM | WEIGHT: 142.44 LBS | SYSTOLIC BLOOD PRESSURE: 146 MMHG | OXYGEN SATURATION: 96 % | DIASTOLIC BLOOD PRESSURE: 60 MMHG | HEIGHT: 61 IN

## 2023-05-19 DIAGNOSIS — N18.31 TYPE 2 DIABETES MELLITUS WITH STAGE 3A CHRONIC KIDNEY DISEASE, WITHOUT LONG-TERM CURRENT USE OF INSULIN: Chronic | ICD-10-CM

## 2023-05-19 DIAGNOSIS — E11.22 TYPE 2 DIABETES MELLITUS WITH STAGE 3A CHRONIC KIDNEY DISEASE, WITHOUT LONG-TERM CURRENT USE OF INSULIN: Chronic | ICD-10-CM

## 2023-05-19 DIAGNOSIS — E11.51 TYPE 2 DIABETES MELLITUS WITH PERIPHERAL VASCULAR DISEASE: Chronic | ICD-10-CM

## 2023-05-19 DIAGNOSIS — Z90.2 S/P LOBECTOMY OF LUNG: ICD-10-CM

## 2023-05-19 DIAGNOSIS — C34.92 NON-SMALL CELL CANCER OF LEFT LUNG: ICD-10-CM

## 2023-05-19 DIAGNOSIS — E11.29 TYPE 2 DIABETES MELLITUS WITH MICROALBUMINURIA, WITHOUT LONG-TERM CURRENT USE OF INSULIN: Chronic | ICD-10-CM

## 2023-05-19 DIAGNOSIS — I27.21 HIGH PULMONARY ARTERIAL PRESSURE: Chronic | ICD-10-CM

## 2023-05-19 DIAGNOSIS — J44.9 CHRONIC OBSTRUCTIVE PULMONARY DISEASE, UNSPECIFIED COPD TYPE: Chronic | ICD-10-CM

## 2023-05-19 DIAGNOSIS — E78.5 DYSLIPIDEMIA ASSOCIATED WITH TYPE 2 DIABETES MELLITUS: Chronic | ICD-10-CM

## 2023-05-19 DIAGNOSIS — I35.0 NONRHEUMATIC AORTIC VALVE STENOSIS: ICD-10-CM

## 2023-05-19 DIAGNOSIS — I15.2 HYPERTENSION ASSOCIATED WITH DIABETES: ICD-10-CM

## 2023-05-19 DIAGNOSIS — I77.1 TORTUOUS AORTA: ICD-10-CM

## 2023-05-19 DIAGNOSIS — I70.213 ATHEROSCLEROSIS OF NATIVE ARTERY OF BOTH LOWER EXTREMITIES WITH INTERMITTENT CLAUDICATION: ICD-10-CM

## 2023-05-19 DIAGNOSIS — E11.69 DYSLIPIDEMIA ASSOCIATED WITH TYPE 2 DIABETES MELLITUS: Chronic | ICD-10-CM

## 2023-05-19 DIAGNOSIS — R94.31 ABNORMAL EKG: ICD-10-CM

## 2023-05-19 DIAGNOSIS — Z87.891 FORMER CIGARETTE SMOKER: Chronic | ICD-10-CM

## 2023-05-19 DIAGNOSIS — Z98.62 S/P PERIPHERAL ARTERY ANGIOPLASTY: ICD-10-CM

## 2023-05-19 DIAGNOSIS — R80.9 TYPE 2 DIABETES MELLITUS WITH MICROALBUMINURIA, WITHOUT LONG-TERM CURRENT USE OF INSULIN: Chronic | ICD-10-CM

## 2023-05-19 DIAGNOSIS — I70.0 ATHEROSCLEROSIS OF AORTA: ICD-10-CM

## 2023-05-19 DIAGNOSIS — R91.1 PULMONARY NODULE, LEFT: Chronic | ICD-10-CM

## 2023-05-19 DIAGNOSIS — G47.33 OSA (OBSTRUCTIVE SLEEP APNEA): ICD-10-CM

## 2023-05-19 DIAGNOSIS — R91.1 PULMONARY NODULE LESS THAN 6 MM IN DIAMETER WITH HIGH RISK FOR MALIGNANT NEOPLASM: ICD-10-CM

## 2023-05-19 DIAGNOSIS — E11.59 HYPERTENSION ASSOCIATED WITH DIABETES: ICD-10-CM

## 2023-05-19 DIAGNOSIS — I70.213 ATHEROSCLEROSIS OF NATIVE ARTERY OF BOTH LOWER EXTREMITIES WITH INTERMITTENT CLAUDICATION: Primary | Chronic | ICD-10-CM

## 2023-05-19 DIAGNOSIS — K21.9 GASTROESOPHAGEAL REFLUX DISEASE WITHOUT ESOPHAGITIS: Chronic | ICD-10-CM

## 2023-05-19 DIAGNOSIS — R06.02 SHORTNESS OF BREATH: ICD-10-CM

## 2023-05-19 DIAGNOSIS — I25.10 ATHEROSCLEROSIS OF NATIVE CORONARY ARTERY OF NATIVE HEART WITHOUT ANGINA PECTORIS: ICD-10-CM

## 2023-05-19 DIAGNOSIS — Z91.89 PULMONARY NODULE LESS THAN 6 MM IN DIAMETER WITH HIGH RISK FOR MALIGNANT NEOPLASM: ICD-10-CM

## 2023-05-19 DIAGNOSIS — I25.10 CORONARY ARTERY DISEASE INVOLVING NATIVE CORONARY ARTERY OF NATIVE HEART WITHOUT ANGINA PECTORIS: ICD-10-CM

## 2023-05-19 PROCEDURE — 3051F HG A1C>EQUAL 7.0%<8.0%: CPT | Mod: CPTII,,, | Performed by: INTERNAL MEDICINE

## 2023-05-19 PROCEDURE — 99215 PR OFFICE/OUTPT VISIT, EST, LEVL V, 40-54 MIN: ICD-10-PCS | Mod: ,,, | Performed by: INTERNAL MEDICINE

## 2023-05-19 PROCEDURE — 99215 OFFICE O/P EST HI 40 MIN: CPT | Mod: ,,, | Performed by: INTERNAL MEDICINE

## 2023-05-19 PROCEDURE — 93010 ELECTROCARDIOGRAM REPORT: CPT | Mod: ,,, | Performed by: INTERNAL MEDICINE

## 2023-05-19 PROCEDURE — 3066F NEPHROPATHY DOC TX: CPT | Mod: CPTII,,, | Performed by: INTERNAL MEDICINE

## 2023-05-19 PROCEDURE — 3288F PR FALLS RISK ASSESSMENT DOCUMENTED: ICD-10-PCS | Mod: CPTII,,, | Performed by: INTERNAL MEDICINE

## 2023-05-19 PROCEDURE — 3060F POS MICROALBUMINURIA REV: CPT | Mod: CPTII,,, | Performed by: INTERNAL MEDICINE

## 2023-05-19 PROCEDURE — 1101F PR PT FALLS ASSESS DOC 0-1 FALLS W/OUT INJ PAST YR: ICD-10-PCS | Mod: CPTII,,, | Performed by: INTERNAL MEDICINE

## 2023-05-19 PROCEDURE — 3288F FALL RISK ASSESSMENT DOCD: CPT | Mod: CPTII,,, | Performed by: INTERNAL MEDICINE

## 2023-05-19 PROCEDURE — 93010 EKG 12-LEAD: ICD-10-PCS | Mod: ,,, | Performed by: INTERNAL MEDICINE

## 2023-05-19 PROCEDURE — 3072F LOW RISK FOR RETINOPATHY: CPT | Mod: CPTII,,, | Performed by: INTERNAL MEDICINE

## 2023-05-19 PROCEDURE — 1159F PR MEDICATION LIST DOCUMENTED IN MEDICAL RECORD: ICD-10-PCS | Mod: CPTII,,, | Performed by: INTERNAL MEDICINE

## 2023-05-19 PROCEDURE — 3077F PR MOST RECENT SYSTOLIC BLOOD PRESSURE >= 140 MM HG: ICD-10-PCS | Mod: CPTII,,, | Performed by: INTERNAL MEDICINE

## 2023-05-19 PROCEDURE — 99999 PR PBB SHADOW E&M-EST. PATIENT-LVL V: CPT | Mod: PBBFAC,,, | Performed by: INTERNAL MEDICINE

## 2023-05-19 PROCEDURE — 1101F PT FALLS ASSESS-DOCD LE1/YR: CPT | Mod: CPTII,,, | Performed by: INTERNAL MEDICINE

## 2023-05-19 PROCEDURE — 1125F PR PAIN SEVERITY QUANTIFIED, PAIN PRESENT: ICD-10-PCS | Mod: CPTII,,, | Performed by: INTERNAL MEDICINE

## 2023-05-19 PROCEDURE — 99999 PR PBB SHADOW E&M-EST. PATIENT-LVL V: ICD-10-PCS | Mod: PBBFAC,,, | Performed by: INTERNAL MEDICINE

## 2023-05-19 PROCEDURE — 3078F DIAST BP <80 MM HG: CPT | Mod: CPTII,,, | Performed by: INTERNAL MEDICINE

## 2023-05-19 PROCEDURE — 93005 ELECTROCARDIOGRAM TRACING: CPT

## 2023-05-19 PROCEDURE — 3072F PR LOW RISK FOR RETINOPATHY: ICD-10-PCS | Mod: CPTII,,, | Performed by: INTERNAL MEDICINE

## 2023-05-19 PROCEDURE — 3078F PR MOST RECENT DIASTOLIC BLOOD PRESSURE < 80 MM HG: ICD-10-PCS | Mod: CPTII,,, | Performed by: INTERNAL MEDICINE

## 2023-05-19 PROCEDURE — 3051F PR MOST RECENT HEMOGLOBIN A1C LEVEL 7.0 - < 8.0%: ICD-10-PCS | Mod: CPTII,,, | Performed by: INTERNAL MEDICINE

## 2023-05-19 PROCEDURE — 1125F AMNT PAIN NOTED PAIN PRSNT: CPT | Mod: CPTII,,, | Performed by: INTERNAL MEDICINE

## 2023-05-19 PROCEDURE — 3060F PR POS MICROALBUMINURIA RESULT DOCUMENTED/REVIEW: ICD-10-PCS | Mod: CPTII,,, | Performed by: INTERNAL MEDICINE

## 2023-05-19 PROCEDURE — 3077F SYST BP >= 140 MM HG: CPT | Mod: CPTII,,, | Performed by: INTERNAL MEDICINE

## 2023-05-19 PROCEDURE — 4010F ACE/ARB THERAPY RXD/TAKEN: CPT | Mod: CPTII,,, | Performed by: INTERNAL MEDICINE

## 2023-05-19 PROCEDURE — 1159F MED LIST DOCD IN RCRD: CPT | Mod: CPTII,,, | Performed by: INTERNAL MEDICINE

## 2023-05-19 PROCEDURE — 4010F PR ACE/ARB THEARPY RXD/TAKEN: ICD-10-PCS | Mod: CPTII,,, | Performed by: INTERNAL MEDICINE

## 2023-05-19 PROCEDURE — 3066F PR DOCUMENTATION OF TREATMENT FOR NEPHROPATHY: ICD-10-PCS | Mod: CPTII,,, | Performed by: INTERNAL MEDICINE

## 2023-05-19 NOTE — TELEPHONE ENCOUNTER
.LVM for patient to call the office regarding results.      ----- Message from Amlaia Rosas MD sent at 5/19/2023  4:32 PM CDT -----  Lab ok

## 2023-05-19 NOTE — PROGRESS NOTES
Subjective:   Patient ID:  Lucia Barlow is a 73 y.o. female who presents for follow up of No chief complaint on file.      HPI  2020  A 71 yo female with pvd s/p pta copd smoker copd diabetes (stopped metformin due to gi side effects and lost weight) cad htn hlp ckd is here for f/u decreased smoking but has not stopped no claudication no chest pain or shortness of breath no chest pain has heart burn has been eating a lot of cheese/. Has no leg swelling. Tomatoes triggers heart burn.her a1c is in order however her lipids have increased has been eating a lot of cheese.      3/3/2021  Here for f/u had lung surgery for lung ca in November still has lefts  pain improving not smoking anymore still eating cheese compliant with meds has no claudication  Symptoms has rt hip pain that appears musculoskeletal. Has no angina tia chf . No syncope near syncope compliant with meds. Slat intake could improve.      2021  Has lingulectomy due  Lung cancer still has anterior left chest pain has seen  thoracic surgery has no angina she has now return of hip claudication over the past 5 months bilateral. She has to stop at 50 feet she can barely make it no discoloration or weakness in leg  Has no tia no chf has shortness of breath times  Her ekg is unchanged. She is not using cpap she returnesd her machine .     12/3/2021   Here for f/u she is cancer free in remission after he rlung surgery not smoking has no leg pain starting in her hips  All the way down posteriorely to the calves. She  Feels like tightness and cramps has to rest for relief this is only exertional. She is trying to be compliant with diet and emds. Has no nocturnal symptoms no discoloration in feet.      2022   here fro f/u not smoking has no limiting claudication taking pletal no bleeding issues compliant with meds lipids on target.     2023  Has left hip pain down the leg at rest and when walking. Her claudication is stable tolerated pletal  well not smoking tolerated meds well her rf are well modified. Has no chest pain or shortness of breath.      5/19/2023  Here frof /u early multiple complaints she is short of breath has swelling in face in legs hands and has sinus pressures  has sinus drip she was p[laced on steroids and duuretics. This has been on going for 1 month. She thinks jardiance is the reason she has been on jardiance for 1 year. She has been swollen since then has yeast infection . She is short of breath with exertion has no orthopnea pnd .SHE uses her inhaler for relief.   She ahs progression of her claudication she is barely able to walk 50 feet and that is very limiting to her her antionette with exercise she walked 1 minute her abui dropped significantly and required  10 minutes to go to baseline.   Her resting antionette dropped has bilateral dfa and sfa and cfa significant stenosis.   Past Medical History:   Diagnosis Date    Atherosclerosis of artery of both lower extremities 1/17/2014    Atherosclerosis of native coronary artery of native heart without angina pectoris 11/18/2016    Atherosclerotic PVD with intermittent claudication 1/17/2014    Chronic bronchitis     COPD (chronic obstructive pulmonary disease)     Diabetes with neurologic complications     Dyslipidemia associated with type 2 diabetes mellitus 6/14/2013    Dyslipidemia associated with type 2 diabetes mellitus     Ex-smoker     Gastroesophageal reflux disease without esophagitis 1/25/2019    Hyperlipidemia     Hypertension     Iron deficiency anemia secondary to inadequate dietary iron intake 6/3/2022    NSCLC of left lung 11/19/2020    IVANIA (obstructive sleep apnea) 2/11/2021    Osteoporosis 1/16 rosita 1/18    Pneumothorax after biopsy 10/21/2020    PVD (peripheral vascular disease)     Renal manifestation of secondary diabetes mellitus     S/P peripheral artery angioplasty 2/7/2014    Seasonal allergic rhinitis due to pollen     Simple chronic bronchitis     Tobacco dependence      Type 2 diabetes mellitus with microalbuminuria, without long-term current use of insulin 3/29/2019    Type 2 diabetes mellitus with peripheral vascular disease     Type 2 diabetes mellitus with stage 3 chronic kidney disease, without long-term current use of insulin 2/1/2019    Type 2 diabetes mellitus without retinopathy 2/1/2019    Urinary incontinence        Past Surgical History:   Procedure Laterality Date    ANGIOPLASTY Bilateral 01/24/2014    aortoiliac stenting     CARPAL TUNNEL RELEASE  2003    [facility]    COLECTOMY  approximate 2005    pt states 1in colon -dx with benign mass removed-states no colon cancer    COLONOSCOPY N/A 11/9/2016    Procedure: COLONOSCOPY;  Surgeon: Dmitri Sterling MD;  Location: [facility];  Service: Endoscopy;  Laterality: N/A;    COLONOSCOPY N/A 11/15/2019    Procedure: COLONOSCOPY;  Surgeon: Marko Vicente MD;  Location: [facility];  Service: Endoscopy;  Laterality: N/A;    COLONOSCOPY N/A 3/25/2022    Procedure: COLONOSCOPY;  Surgeon: Paola Feldman MD;  Location: [facility];  Service: Endoscopy;  Laterality: N/A;    ESOPHAGOGASTRODUODENOSCOPY N/A 3/25/2022    Procedure: EGD (ESOPHAGOGASTRODUODENOSCOPY);  Surgeon: Paola Feldman MD;  Location: [facility];  Service: Endoscopy;  Laterality: N/A;    HYSTERECTOMY      no cancer    INJECTION OF ANESTHETIC AGENT AROUND MULTIPLE INTERCOSTAL NERVES Left 11/19/2020    Procedure: BLOCK, NERVE, INTERCOSTAL, 2 OR MORE;  Surgeon: Amrit Flores MD;  Location: [facility] OR [address] [place] [address];  Service: Thoracic;  Laterality: Left;    OOPHORECTOMY      SURGICAL REMOVAL OF LYMPH NODE Left 11/19/2020    Procedure: EXCISION, LYMPH NODE;  Surgeon: Amrit Flores MD;  Location: [facility] OR [address] [place] [address];  Service: Thoracic;  Laterality: Left;  mediastinal lymph node disection    XI ROBOTIC RATS,WITH LOBECTOMY,LUNG Left 11/19/2020    Procedure: XI ROBOTIC RATS,WITH LOBECTOMY,LUNG;  Surgeon: Amrit Flores MD;  Location: [facility] OR [address] [place] [address];  Service:  Thoracic;  Laterality: Left;  Lingulectomy.       Social History     Tobacco Use    Smoking status: Former     Packs/day: 1.00     Years: 60.00     Pack years: 60.00     Types: Cigarettes     Start date: 1/1/1960     Quit date: 1/10/2020     Years since quitting: 3.3    Smokeless tobacco: Never   Substance Use Topics    Alcohol use: No     Alcohol/week: 0.0 standard drinks    Drug use: No       Family History   Problem Relation Age of Onset    Stroke Father     Stroke Sister     Asthma Daughter     Diabetes Daughter     Breast cancer Daughter     Asthma Son        Current Outpatient Medications   Medication Sig    albuterol (PROVENTIL/VENTOLIN HFA) 90 mcg/actuation inhaler Inhale 2 puffs into the lungs every 4 (four) hours as needed for Wheezing or Shortness of Breath.    amLODIPine (NORVASC) 10 MG tablet Take 1 tablet (10 mg total) by mouth once daily.    aspirin (ECOTRIN) 81 MG EC tablet Take 1 tablet (81 mg total) by mouth once daily.    butalbital-acetaminophen-caffeine -40 mg (FIORICET, ESGIC) -40 mg per tablet TAKE 1 TABLET BY MOUTH 3 (THREE) TIMES DAILY AS NEEDED FOR HEADACHES. (MAXIMUM OF 15 TABLETS PER MONTH)    calcium-vitamin D 600 mg-10 mcg (400 unit) Tab Take 2 tablets by mouth once daily.    cetirizine (ZYRTEC) 10 MG tablet Take 1 tablet (10 mg total) by mouth once daily. For sinus congestion    cilostazoL (PLETAL) 50 MG Tab Take 1 tablet (50 mg total) by mouth 2 (two) times daily before meals.    empagliflozin (JARDIANCE) 25 mg tablet Take 1 tablet (25 mg total) by mouth once daily.    ezetimibe (ZETIA) 10 mg tablet Take 1 tablet (10 mg total) by mouth once daily.    fexofenadine (ALLEGRA) 180 MG tablet Take 1 tablet (180 mg total) by mouth once daily.    fluconazole (DIFLUCAN) 150 MG Tab TAKE 1 TABLET AS NEEDED FOR YEAST INFECTION THAT DOES NOT RESPOND TO MONISTAT. DO NOT TAKE MORE THAN 1 TABLET PER WEEK    fluticasone propionate (FLONASE) 50 mcg/actuation nasal spray 2 sprays (100 mcg  total) by Each Nostril route once daily.    furosemide (LASIX) 20 MG tablet Take 1 tablet (20 mg total) by mouth once daily.    ipratropium (ATROVENT) 21 mcg (0.03 %) nasal spray 2 sprays by Each Nostril route 3 (three) times daily.    ipratropium-albuteroL (COMBIVENT)  mcg/actuation inhaler Inhale 2 puffs into the lungs every 4 (four) hours as needed for Wheezing or Shortness of Breath. Rescue    levocetirizine (XYZAL) 5 MG tablet Take 1 tablet (5 mg total) by mouth every evening.    miconazole (MICOTIN) 2 % vaginal cream Place 1 applicator vaginally every evening. Use as directed    montelukast (SINGULAIR) 10 mg tablet Take 1 tablet (10 mg total) by mouth every evening.    ondansetron (ZOFRAN-ODT) 4 MG TbDL 1-2 tablets PO every 6 hours as needed for nausea/vomiting while completing bowel prep    pantoprazole (PROTONIX) 40 MG tablet TAKE 1 TABLET ONE TIME DAILY    potassium chloride SA (K-DUR,KLOR-CON) 20 MEQ tablet TAKE 1 TABLET ONE TIME DAILY    rosuvastatin (CRESTOR) 20 MG tablet Take 1 tablet (20 mg total) by mouth every evening.    sod sulf-pot chloride-mag sulf (SUTAB) 1.479-0.188- 0.225 gram tablet Take 12 tablets by mouth once daily. Take according to package instructions with indicated amount of water.    EPINEPHrine (EPIPEN) 0.3 mg/0.3 mL AtIn INJECT INTO THE MUSCLE ONE TIME FOR 1 DOSE, AS DIRECTED    predniSONE (DELTASONE) 20 MG tablet Prednisone 60 mg/ day for 3 days, 40 mg/day for 3 days,20 mg/ day for 3 days, (1/2 tablet )10 mg a day for 3 days. (Patient not taking: Reported on 5/19/2023)    varicella-zoster gE-AS01B, PF, (SHINGRIX) 50 mcg/0.5 mL injection Inject 0.5 mL (one dose) into muscle now; give second dose at least 2 months later     No current facility-administered medications for this visit.     Current Outpatient Medications on File Prior to Visit   Medication Sig    albuterol (PROVENTIL/VENTOLIN HFA) 90 mcg/actuation inhaler Inhale 2 puffs into the lungs every 4 (four) hours as  needed for Wheezing or Shortness of Breath.    amLODIPine (NORVASC) 10 MG tablet Take 1 tablet (10 mg total) by mouth once daily.    aspirin (ECOTRIN) 81 MG EC tablet Take 1 tablet (81 mg total) by mouth once daily.    butalbital-acetaminophen-caffeine -40 mg (FIORICET, ESGIC) -40 mg per tablet TAKE 1 TABLET BY MOUTH 3 (THREE) TIMES DAILY AS NEEDED FOR HEADACHES. (MAXIMUM OF 15 TABLETS PER MONTH)    calcium-vitamin D 600 mg-10 mcg (400 unit) Tab Take 2 tablets by mouth once daily.    cetirizine (ZYRTEC) 10 MG tablet Take 1 tablet (10 mg total) by mouth once daily. For sinus congestion    cilostazoL (PLETAL) 50 MG Tab Take 1 tablet (50 mg total) by mouth 2 (two) times daily before meals.    empagliflozin (JARDIANCE) 25 mg tablet Take 1 tablet (25 mg total) by mouth once daily.    ezetimibe (ZETIA) 10 mg tablet Take 1 tablet (10 mg total) by mouth once daily.    fexofenadine (ALLEGRA) 180 MG tablet Take 1 tablet (180 mg total) by mouth once daily.    fluconazole (DIFLUCAN) 150 MG Tab TAKE 1 TABLET AS NEEDED FOR YEAST INFECTION THAT DOES NOT RESPOND TO MONISTAT. DO NOT TAKE MORE THAN 1 TABLET PER WEEK    fluticasone propionate (FLONASE) 50 mcg/actuation nasal spray 2 sprays (100 mcg total) by Each Nostril route once daily.    furosemide (LASIX) 20 MG tablet Take 1 tablet (20 mg total) by mouth once daily.    ipratropium (ATROVENT) 21 mcg (0.03 %) nasal spray 2 sprays by Each Nostril route 3 (three) times daily.    ipratropium-albuteroL (COMBIVENT)  mcg/actuation inhaler Inhale 2 puffs into the lungs every 4 (four) hours as needed for Wheezing or Shortness of Breath. Rescue    levocetirizine (XYZAL) 5 MG tablet Take 1 tablet (5 mg total) by mouth every evening.    miconazole (MICOTIN) 2 % vaginal cream Place 1 applicator vaginally every evening. Use as directed    montelukast (SINGULAIR) 10 mg tablet Take 1 tablet (10 mg total) by mouth every evening.    ondansetron (ZOFRAN-ODT) 4 MG TbDL 1-2 tablets  PO every 6 hours as needed for nausea/vomiting while completing bowel prep    pantoprazole (PROTONIX) 40 MG tablet TAKE 1 TABLET ONE TIME DAILY    potassium chloride SA (K-DUR,KLOR-CON) 20 MEQ tablet TAKE 1 TABLET ONE TIME DAILY    rosuvastatin (CRESTOR) 20 MG tablet Take 1 tablet (20 mg total) by mouth every evening.    sod sulf-pot chloride-mag sulf (SUTAB) 1.479-0.188- 0.225 gram tablet Take 12 tablets by mouth once daily. Take according to package instructions with indicated amount of water.    EPINEPHrine (EPIPEN) 0.3 mg/0.3 mL AtIn INJECT INTO THE MUSCLE ONE TIME FOR 1 DOSE, AS DIRECTED    predniSONE (DELTASONE) 20 MG tablet Prednisone 60 mg/ day for 3 days, 40 mg/day for 3 days,20 mg/ day for 3 days, (1/2 tablet )10 mg a day for 3 days. (Patient not taking: Reported on 5/19/2023)    varicella-zoster gE-AS01B, PF, (SHINGRIX) 50 mcg/0.5 mL injection Inject 0.5 mL (one dose) into muscle now; give second dose at least 2 months later     No current facility-administered medications on file prior to visit.       Review of Systems   Constitutional: Negative for diaphoresis, malaise/fatigue and weight gain.   HENT:  Negative for hoarse voice.    Eyes:  Negative for double vision and visual disturbance.   Cardiovascular:  Positive for claudication and dyspnea on exertion. Negative for chest pain, cyanosis, irregular heartbeat, leg swelling, near-syncope, orthopnea, palpitations, paroxysmal nocturnal dyspnea and syncope.   Respiratory:  Positive for shortness of breath. Negative for cough, hemoptysis and snoring.    Hematologic/Lymphatic: Negative for bleeding problem. Does not bruise/bleed easily.   Skin:  Negative for color change and poor wound healing.   Musculoskeletal:  Negative for muscle cramps, muscle weakness and myalgias.   Gastrointestinal:  Negative for bloating, abdominal pain, change in bowel habit, diarrhea, heartburn, hematemesis, hematochezia, melena and nausea.   Neurological:  Negative for excessive  daytime sleepiness, dizziness, headaches, light-headedness, loss of balance, numbness and weakness.   Psychiatric/Behavioral:  Negative for memory loss. The patient does not have insomnia.    Allergic/Immunologic: Negative for hives.     Objective:   Physical Exam  Constitutional:       General: She is not in acute distress.     Appearance: Normal appearance. She is well-developed. She is not ill-appearing.   HENT:      Head: Normocephalic and atraumatic.   Eyes:      General: No scleral icterus.     Pupils: Pupils are equal, round, and reactive to light.   Neck:      Thyroid: No thyromegaly.      Vascular: Normal carotid pulses. No carotid bruit, hepatojugular reflux or JVD.      Trachea: No tracheal deviation.   Cardiovascular:      Rate and Rhythm: Normal rate and regular rhythm.      Pulses:           Carotid pulses are 2+ on the right side and 2+ on the left side.       Radial pulses are 2+ on the left side.        Femoral pulses are 1+ on the right side with bruit and 1+ on the left side with bruit.       Popliteal pulses are 1+ on the right side and 1+ on the left side.        Dorsalis pedis pulses are 1+ on the right side and 1+ on the left side.        Posterior tibial pulses are 1+ on the right side and 1+ on the left side.      Heart sounds: Murmur heard.   Harsh midsystolic murmur is present with a grade of 2/6 at the upper right sternal border radiating to the neck.     No friction rub. No gallop.   Pulmonary:      Effort: Pulmonary effort is normal. No respiratory distress.      Breath sounds: Normal breath sounds. No wheezing, rhonchi or rales.   Chest:      Chest wall: No tenderness.   Abdominal:      General: Bowel sounds are normal. There is no abdominal bruit.      Palpations: Abdomen is soft. There is no hepatomegaly or pulsatile mass.      Tenderness: There is no abdominal tenderness.   Musculoskeletal:         General: Tenderness present.      Right shoulder: No deformity.      Cervical back:  "Normal range of motion and neck supple.      Right lower leg: Edema present.      Left lower leg: Edema present.   Skin:     General: Skin is warm and dry.      Findings: No erythema or rash.      Nails: There is no clubbing.   Neurological:      General: No focal deficit present.      Mental Status: She is alert and oriented to person, place, and time.      Cranial Nerves: No cranial nerve deficit.      Coordination: Coordination normal.   Psychiatric:         Mood and Affect: Mood normal.         Speech: Speech normal.         Behavior: Behavior normal.     Vitals:    05/19/23 0756 05/19/23 0758   BP: (!) 142/54 (!) 146/60   BP Location: Right arm Left arm   Patient Position: Sitting Sitting   BP Method: Medium (Manual) Medium (Manual)   Pulse: 105    SpO2: 96%    Weight: 64.6 kg (142 lb 6.7 oz)    Height: 5' 1" (1.549 m)      Lab Results   Component Value Date    CHOL 131 01/04/2023    CHOL 148 05/27/2022    CHOL 142 11/22/2021      Body mass index is 26.91 kg/m².   Lab Results   Component Value Date    HGBA1C 7.1 (H) 01/04/2023      BMP  Lab Results   Component Value Date     01/04/2023    K 3.9 01/04/2023     01/04/2023    CO2 25 01/04/2023    BUN 16 01/04/2023    CREATININE 1.1 01/04/2023    CALCIUM 9.0 01/04/2023    ANIONGAP 8 01/04/2023    EGFRNORACEVR 52.7 (A) 01/04/2023      Lab Results   Component Value Date    HDL 61 01/04/2023    HDL 62 05/27/2022    HDL 59 11/22/2021     Lab Results   Component Value Date    LDLCALC 58.6 (L) 01/04/2023    LDLCALC 68.6 05/27/2022    LDLCALC 70.4 11/22/2021     Lab Results   Component Value Date    TRIG 57 01/04/2023    TRIG 87 05/27/2022    TRIG 63 11/22/2021     Lab Results   Component Value Date    CHOLHDL 46.6 01/04/2023    CHOLHDL 41.9 05/27/2022    CHOLHDL 41.5 11/22/2021       Chemistry        Component Value Date/Time     01/04/2023 0737    K 3.9 01/04/2023 0737     01/04/2023 0737    CO2 25 01/04/2023 0737    BUN 16 01/04/2023 0737    " CREATININE 1.1 01/04/2023 0737    GLU 90 01/04/2023 0737        Component Value Date/Time    CALCIUM 9.0 01/04/2023 0737    ALKPHOS 92 01/04/2023 0737    AST 21 01/04/2023 0737    ALT 10 01/04/2023 0737    BILITOT 0.2 01/04/2023 0737    ESTGFRAFRICA 51.4 (A) 05/27/2022 0711    ESTGFRAFRICA 51.4 (A) 05/27/2022 0711    EGFRNONAA 44.6 (A) 05/27/2022 0711    EGFRNONAA 44.6 (A) 05/27/2022 0711          Lab Results   Component Value Date    TSH 0.404 12/04/2020     Lab Results   Component Value Date    INR 0.9 10/21/2020    INR 0.9 08/10/2019    INR 0.9 12/16/2018     Lab Results   Component Value Date    WBC 10.92 11/16/2022    HGB 11.6 (L) 11/16/2022    HCT 39.4 11/16/2022    MCV 83 11/16/2022     11/16/2022     BMP  Sodium   Date Value Ref Range Status   01/04/2023 142 136 - 145 mmol/L Final     Potassium   Date Value Ref Range Status   01/04/2023 3.9 3.5 - 5.1 mmol/L Final     Chloride   Date Value Ref Range Status   01/04/2023 109 95 - 110 mmol/L Final     CO2   Date Value Ref Range Status   01/04/2023 25 23 - 29 mmol/L Final     BUN   Date Value Ref Range Status   01/04/2023 16 8 - 23 mg/dL Final     Creatinine   Date Value Ref Range Status   01/04/2023 1.1 0.5 - 1.4 mg/dL Final     Calcium   Date Value Ref Range Status   01/04/2023 9.0 8.7 - 10.5 mg/dL Final     Anion Gap   Date Value Ref Range Status   01/04/2023 8 8 - 16 mmol/L Final     eGFR if    Date Value Ref Range Status   05/27/2022 51.4 (A) >60 mL/min/1.73 m^2 Final   05/27/2022 51.4 (A) >60 mL/min/1.73 m^2 Final     eGFR if non    Date Value Ref Range Status   05/27/2022 44.6 (A) >60 mL/min/1.73 m^2 Final     Comment:     Calculation used to obtain the estimated glomerular filtration  rate (eGFR) is the CKD-EPI equation.      05/27/2022 44.6 (A) >60 mL/min/1.73 m^2 Final     Comment:     Calculation used to obtain the estimated glomerular filtration  rate (eGFR) is the CKD-EPI equation.        CrCl cannot be  calculated (Patient's most recent lab result is older than the maximum 7 days allowed.).  Findings    AKHIL The right resting AKHIL reduction is mildly decreased.   The right ankle [DT] artery has triphasic flow.   The right ankle [DP] artery has triphasic flow.   The left resting AKHIL reduction is mildly decreased.   The left ankle [PT] artery has triphasic flow.  The left ankle [DP] artery has triphasic flow.  Exercise Study: treadmill test.    The exercise study was stopped due to left knee pain and patient request.   Symptoms: The patient complained of left knee pain after exercising for 1 minute and 26 seconds. After exercising for 1 minute and 54 seconds the patient requested to stopped due to left knee pain.   Immediately post exercise, the right AKHIL is severely decreased.  Immediately post exercise, the left AKHIL is severely decreased.  The second post exercise AKHIL is moderately reduced.   The final post exercise AKHIL is mildly reduced.   US Measurements - AKHIL    Right   Right arm  mmHg         Right posterior tibial 146 mmHg         Right dorsalis pedis 138 mmHg         Right AKHIL 0.94          Right toe pressure 81 mmHg         Right TBI 0.52           Left   Left arm  mmHg         Left posterior tibial 140 mmHg         Left dorsalis pedis 140 mmHg         Left AKHIL 0.9          Left toe pressure 122 mmHg         Left TBI 0.78               US Measurements - Stress AKHIL    Treadmill speed 1.3 mph         Treadmill grade 10 %         Treadmill time 2 min               US Measurements - Post Stress AKHIL    Immediate arm  mmHg         2nd post exercise arm  mmHg         2nd post exercise reading 5 min         3rd post exercise arm  mmHg         3rd post exercise reading 10 min          Right   Immediate right tibial 91 mmHg         Immediate right AKHIL 0.47          2nd post exercise right tibial 127 mmHg         2nd post exercise right AKHIL 0.79          3rd post exercise right tibial 132  mmHg         3rd post exercise right AKHIL 0.89           Left   Immediate left tibial 101 mmHg         Immediate left AKHIL 0.52          2nd post exercise left tibial 134 mmHg         2nd post exercise left AKHIL 0.84          3rd post exercise left tibial 140 mmHg         3rd post excercise left AKHIL       Conclusion    Akhil suggest mild disease bilaterally.  Bilateral moderat profunda disese in 50-75% range  Bilateral severe sfa disease in the 76-99% range  Waveforms are biphasic bilaterally    Narrative & Impression  EXAMINATION:  CT CHEST WITHOUT CONTRAST     CLINICAL HISTORY:  Abnormal xray - lung nodule, < 1 cm, mod-high risk; Solitary pulmonary nodule     TECHNIQUE:  The chest was surveyed from the apices through the posterior costophrenic angles .  Data was reconstructed for multiplanar images in axial, sagittal and coronal planes in for maximal intensity projection images in the axial plane.     COMPARISON:  10/26/2022     FINDINGS:  Base of Neck: No significant abnormality.     Airways: Patent.     Lungs:  Multiple noncalcified pulmonary nodules are again seen within either lung.  Stable nodule left lower lobe and measures approximately 8 mm by 7.5 mm.  The border of this nodule also appears to be minimally spiculated but again unchanged in appearance when compared to multiple prior studies.  There are postsurgical changes seen associated with the left mid to left upper lung zone.  Emphysematous changes noted within the bilateral upper lung zones.  Mild bronchiectasis lower lobes.     Pleura: No pleural fluid.No pleural calcification.     Varsha/Mediastinum: No pathologic kala enlargement.     Esophagus: Normal.     Heart/pericardium: Normal size.  No pericardial effusion or calcification.     Pulmonary vasculature: Pulmonary arteries distribute normally.  There are four pulmonary veins.     Aorta: Left-sided aortic arch with 3 arterial branches.  The aorta maintains normal caliber, contour and course. There is  no calcification of the thoracic aorta.  There is  no coronary artery calcification.     Thoracic soft tissues: Normal. Both breasts are present.     Bones: No acute fracture.  Moderate atherosclerotic disease.  No suspicious lytic or sclerotic lesion.     Upper Abdomen: No abnormality of the partially imaged upper abdomen.  Mild constipation.     Impression:     No adverse interval changes from prior.     All CT scans at this facility use dose modulation, iterative reconstruction and/or weight based dosing when appropriate to reduce radiation dose to as low as reasonably achievable.        Electronically signed by: Kvng Desai MD  Date:                                            05/05/2023  Time:                                           10:22           Exam Ended: 05/05/23 09:50 Last Resulted: 05/05/23 10:22            Summary    Concentric remodeling and normal systolic function.  The estimated ejection fraction is 60%.  Grade I left ventricular diastolic dysfunction.  With normal right ventricular systolic function.  Mild tricuspid regurgitation.  There is mild aortic valve stenosis.  Aortic valve area is 1.29 cm2; peak velocity is 2.51 m/s; mean gradient is 15 mmHg.  Normal central venous pressure (3 mmHg).  The estimated PA systolic pressure is 38 mmHg.  Assessment:     1. Atherosclerosis of native artery of both lower extremities with intermittent claudication    2. Chronic obstructive pulmonary disease, unspecified COPD type    3. High pulmonary arterial pressure    4. Pulmonary nodule less than 6 mm in diameter with high risk for malignant neoplasm - RIght lower lobe    5. Pulmonary nodule, left - 8 mm , high risk for lung cancer    6. S/P lobectomy of lung-  Hx robotic lung lobectomy 11/19/2020    7. Atherosclerosis of aorta    8. Dyslipidemia associated with type 2 diabetes mellitus    9. Atherosclerosis of native coronary artery of native heart without angina pectoris    10. Hypertension associated  with diabetes    11. Nonrheumatic aortic valve stenosis    12. S/P peripheral artery angioplasty    13. Tortuous aorta    14. Non-small cell cancer of left lung    15. Type 2 diabetes mellitus with microalbuminuria, without long-term current use of insulin    16. Type 2 diabetes mellitus with peripheral vascular disease    17. Type 2 diabetes mellitus with stage 3a chronic kidney disease, without long-term current use of insulin    18. Gastroesophageal reflux disease without esophagitis    19. IVANIA (obstructive sleep apnea)    20. Former heavy cigarette smoker      Here for complaints of swelling and diffuse aches recurrent uti  I thinks hse is having side effects from jardiance and I think she needs to discontinue the meds I will discuss with her pcp.  In addition she has exertional shortness of breath responding to inhalers was placed on steroids and diuretics that may be causing volume contraction leading to tachycardia . She seems to be drinking a lot of fluids and her leg edema is partially related to amlodipine it gets worse with standing and  at the end of day with improvement with leg elevation I will obtain an echo cmp bnp although the cts can she had recently does not show any edema.   As far as pvd is concerned her symptoms have progressed and she is very limited despite pletal and not smoking she has barely made 1 minute on treadmill exercise test and it required 10 minutes for the antionette and pressures to go back to baseline. Her disease by ultrasound suggest an inflow equivalent with significant bilateral cfa pfa ostial sfa disease. Will continue walking exercise and will address after we resolve her uti generalized symptoms.    She has progression of shortness of breath with history of AS will repeat echo  and cardiolite make sure no ischemia since she has  high probability cad since she has aortic atherosclerosis and coronary calcification on ct scan   Plan:   Echo cardiolite    Bnp cmp.  Stop jardiance     Leg elevation low salt diet   Phone review

## 2023-05-22 ENCOUNTER — TELEPHONE (OUTPATIENT)
Dept: CARDIOLOGY | Facility: CLINIC | Age: 73
End: 2023-05-22
Payer: MEDICARE

## 2023-05-22 NOTE — TELEPHONE ENCOUNTER
.LVM for patient to call the office regarding results. ----- Message from Amalia Rosas MD sent at 5/21/2023  8:14 AM CDT -----  No chf

## 2023-05-26 ENCOUNTER — HOSPITAL ENCOUNTER (OUTPATIENT)
Dept: CARDIOLOGY | Facility: HOSPITAL | Age: 73
Discharge: HOME | End: 2023-05-26
Attending: INTERNAL MEDICINE
Payer: MEDICARE

## 2023-05-26 VITALS — BODY MASS INDEX: 26.81 KG/M2 | HEIGHT: 61 IN | WEIGHT: 142 LBS

## 2023-05-26 DIAGNOSIS — R06.02 SHORTNESS OF BREATH: ICD-10-CM

## 2023-05-26 DIAGNOSIS — R94.31 ABNORMAL EKG: ICD-10-CM

## 2023-05-26 DIAGNOSIS — E11.22 TYPE 2 DIABETES MELLITUS WITH STAGE 3A CHRONIC KIDNEY DISEASE, WITHOUT LONG-TERM CURRENT USE OF INSULIN: ICD-10-CM

## 2023-05-26 DIAGNOSIS — N18.31 TYPE 2 DIABETES MELLITUS WITH STAGE 3A CHRONIC KIDNEY DISEASE, WITHOUT LONG-TERM CURRENT USE OF INSULIN: ICD-10-CM

## 2023-05-26 DIAGNOSIS — I35.0 NONRHEUMATIC AORTIC VALVE STENOSIS: ICD-10-CM

## 2023-05-26 LAB
AV INDEX (PROSTH): 0.47
AV MEAN GRADIENT: 17 MMHG
AV PEAK GRADIENT: 29 MMHG
AV VALVE AREA: 1.45 CM2
AV VELOCITY RATIO: 0.43
BSA FOR ECHO PROCEDURE: 1.66 M2
CV ECHO LV RWT: 0.52 CM
DOP CALC AO PEAK VEL: 2.67 M/S
DOP CALC AO VTI: 52.6 CM
DOP CALC LVOT AREA: 3.1 CM2
DOP CALC LVOT DIAMETER: 1.99 CM
DOP CALC LVOT PEAK VEL: 1.14 M/S
DOP CALC LVOT STROKE VOLUME: 76.47 CM3
DOP CALC RVOT PEAK VEL: 0.75 M/S
DOP CALC RVOT VTI: 14.1 CM
DOP CALCLVOT PEAK VEL VTI: 24.6 CM
E WAVE DECELERATION TIME: 189.38 MSEC
E/A RATIO: 0.69
E/E' RATIO: 15.38 M/S
ECHO LV POSTERIOR WALL: 1.05 CM (ref 0.6–1.1)
EJECTION FRACTION: 65 %
FRACTIONAL SHORTENING: 38 % (ref 28–44)
INTERVENTRICULAR SEPTUM: 1.06 CM (ref 0.6–1.1)
IVRT: 70.41 MSEC
LA MAJOR: 4.85 CM
LA MINOR: 4.7 CM
LA WIDTH: 3.6 CM
LEFT ATRIUM SIZE: 3.2 CM
LEFT ATRIUM VOLUME INDEX MOD: 26.9 ML/M2
LEFT ATRIUM VOLUME INDEX: 28.7 ML/M2
LEFT ATRIUM VOLUME MOD: 43.88 CM3
LEFT ATRIUM VOLUME: 46.75 CM3
LEFT INTERNAL DIMENSION IN SYSTOLE: 2.52 CM (ref 2.1–4)
LEFT VENTRICLE DIASTOLIC VOLUME INDEX: 44.35 ML/M2
LEFT VENTRICLE DIASTOLIC VOLUME: 72.29 ML
LEFT VENTRICLE MASS INDEX: 86 G/M2
LEFT VENTRICLE SYSTOLIC VOLUME INDEX: 14 ML/M2
LEFT VENTRICLE SYSTOLIC VOLUME: 22.78 ML
LEFT VENTRICULAR INTERNAL DIMENSION IN DIASTOLE: 4.05 CM (ref 3.5–6)
LEFT VENTRICULAR MASS: 139.81 G
LV LATERAL E/E' RATIO: 13.67 M/S
LV SEPTAL E/E' RATIO: 17.57 M/S
LVOT MG: 2.98 MMHG
LVOT MV: 0.81 CM/S
MV PEAK A VEL: 1.78 M/S
MV PEAK E VEL: 1.23 M/S
MV STENOSIS PRESSURE HALF TIME: 54.92 MS
MV VALVE AREA P 1/2 METHOD: 4.01 CM2
PISA TR MAX VEL: 2.93 M/S
PV MEAN GRADIENT: 1.12 MMHG
PV PEAK VELOCITY: 1.17 CM/S
RA MAJOR: 4.02 CM
RA PRESSURE: 3 MMHG
RA WIDTH: 3.51 CM
RIGHT VENTRICULAR END-DIASTOLIC DIMENSION: 2.68 CM
SINUS: 2.28 CM
STJ: 2.14 CM
TDI LATERAL: 0.09 M/S
TDI SEPTAL: 0.07 M/S
TDI: 0.08 M/S
TR MAX PG: 34 MMHG
TV REST PULMONARY ARTERY PRESSURE: 37 MMHG

## 2023-05-26 PROCEDURE — 93306 TTE W/DOPPLER COMPLETE: CPT

## 2023-05-26 PROCEDURE — 93306 TTE W/DOPPLER COMPLETE: CPT | Mod: 26,,, | Performed by: STUDENT IN AN ORGANIZED HEALTH CARE EDUCATION/TRAINING PROGRAM

## 2023-05-26 PROCEDURE — 93306 ECHO (CUPID ONLY): ICD-10-PCS | Mod: 26,,, | Performed by: STUDENT IN AN ORGANIZED HEALTH CARE EDUCATION/TRAINING PROGRAM

## 2023-05-29 ENCOUNTER — TELEPHONE (OUTPATIENT)
Dept: CARDIOLOGY | Facility: CLINIC | Age: 73
End: 2023-05-29
Payer: MEDICARE

## 2023-05-29 NOTE — TELEPHONE ENCOUNTER
Attempted to reach patient. LVM            Heart function is still normal   Ao valve is mildly narrow will observe

## 2023-06-07 ENCOUNTER — HOSPITAL ENCOUNTER (OUTPATIENT)
Dept: CARDIOLOGY | Facility: HOSPITAL | Age: 73
Discharge: HOME | End: 2023-06-07
Attending: INTERNAL MEDICINE
Payer: MEDICARE

## 2023-06-07 ENCOUNTER — HOSPITAL ENCOUNTER (OUTPATIENT)
Dept: RADIOLOGY | Facility: HOSPITAL | Age: 73
Discharge: HOME | End: 2023-06-07
Attending: INTERNAL MEDICINE
Payer: MEDICARE

## 2023-06-07 ENCOUNTER — TELEPHONE (OUTPATIENT)
Dept: CARDIOLOGY | Facility: CLINIC | Age: 73
End: 2023-06-07
Payer: MEDICARE

## 2023-06-07 DIAGNOSIS — I35.0 NONRHEUMATIC AORTIC VALVE STENOSIS: ICD-10-CM

## 2023-06-07 DIAGNOSIS — R94.31 ABNORMAL EKG: ICD-10-CM

## 2023-06-07 DIAGNOSIS — E11.22 TYPE 2 DIABETES MELLITUS WITH STAGE 3A CHRONIC KIDNEY DISEASE, WITHOUT LONG-TERM CURRENT USE OF INSULIN: Chronic | ICD-10-CM

## 2023-06-07 DIAGNOSIS — R06.02 SHORTNESS OF BREATH: ICD-10-CM

## 2023-06-07 DIAGNOSIS — N18.31 TYPE 2 DIABETES MELLITUS WITH STAGE 3A CHRONIC KIDNEY DISEASE, WITHOUT LONG-TERM CURRENT USE OF INSULIN: Chronic | ICD-10-CM

## 2023-06-07 LAB
CV STRESS BASE HR: 96 BPM
DIASTOLIC BLOOD PRESSURE: 44 MMHG
NUC REST EJECTION FRACTION: 70
NUC STRESS EJECTION FRACTION: 69 %
OHS CV CPX 85 PERCENT MAX PREDICTED HEART RATE MALE: 119
OHS CV CPX ESTIMATED METS: 1
OHS CV CPX MAX PREDICTED HEART RATE: 140
OHS CV CPX PATIENT IS FEMALE: 1
OHS CV CPX PATIENT IS MALE: 0
OHS CV CPX PEAK DIASTOLIC BLOOD PRESSURE: 56 MMHG
OHS CV CPX PEAK HEAR RATE: 133 BPM
OHS CV CPX PEAK RATE PRESSURE PRODUCT: NORMAL
OHS CV CPX PEAK SYSTOLIC BLOOD PRESSURE: 144 MMHG
OHS CV CPX PERCENT MAX PREDICTED HEART RATE ACHIEVED: 95
OHS CV CPX RATE PRESSURE PRODUCT PRESENTING: NORMAL
STRESS ECHO POST EXERCISE DUR MIN: 0 MINUTES
STRESS ECHO POST EXERCISE DUR SEC: 45 SECONDS
SYSTOLIC BLOOD PRESSURE: 123 MMHG

## 2023-06-07 PROCEDURE — 93018 NUCLEAR STRESS - CARDIOLOGY INTERPRETED (CUPID ONLY): ICD-10-PCS | Mod: ,,, | Performed by: INTERNAL MEDICINE

## 2023-06-07 PROCEDURE — 78452 NUCLEAR STRESS - CARDIOLOGY INTERPRETED (CUPID ONLY): ICD-10-PCS | Mod: 26,,, | Performed by: INTERNAL MEDICINE

## 2023-06-07 PROCEDURE — 63600175 PHARM REV CODE 636 W HCPCS: Performed by: INTERNAL MEDICINE

## 2023-06-07 PROCEDURE — 93016 CV STRESS TEST SUPVJ ONLY: CPT | Mod: ,,, | Performed by: INTERNAL MEDICINE

## 2023-06-07 PROCEDURE — 93018 CV STRESS TEST I&R ONLY: CPT | Mod: ,,, | Performed by: INTERNAL MEDICINE

## 2023-06-07 PROCEDURE — 93016 NUCLEAR STRESS - CARDIOLOGY INTERPRETED (CUPID ONLY): ICD-10-PCS | Mod: ,,, | Performed by: INTERNAL MEDICINE

## 2023-06-07 PROCEDURE — 78452 HT MUSCLE IMAGE SPECT MULT: CPT | Mod: 26,,, | Performed by: INTERNAL MEDICINE

## 2023-06-07 PROCEDURE — A9502 TC99M TETROFOSMIN: HCPCS

## 2023-06-07 PROCEDURE — 78452 HT MUSCLE IMAGE SPECT MULT: CPT

## 2023-06-07 PROCEDURE — 93017 CV STRESS TEST TRACING ONLY: CPT

## 2023-06-07 RX ORDER — REGADENOSON 0.08 MG/ML
0.4 INJECTION, SOLUTION INTRAVENOUS ONCE
Status: COMPLETED | OUTPATIENT
Start: 2023-06-07 | End: 2023-06-07

## 2023-06-07 RX ADMIN — REGADENOSON 0.4 MG: 0.08 INJECTION, SOLUTION INTRAVENOUS at 08:06

## 2023-06-07 NOTE — TELEPHONE ENCOUNTER
.LVM for patient to call the office regarding results.      ----- Message from Amalia Rosas MD sent at 6/7/2023  4:47 PM CDT -----  Negative stress test

## 2023-06-28 ENCOUNTER — LAB VISIT (OUTPATIENT)
Dept: LAB | Facility: HOSPITAL | Age: 73
End: 2023-06-28
Attending: INTERNAL MEDICINE
Payer: MEDICARE

## 2023-06-28 ENCOUNTER — OFFICE VISIT (OUTPATIENT)
Dept: OPHTHALMOLOGY | Facility: CLINIC | Age: 73
End: 2023-06-28
Payer: MEDICARE

## 2023-06-28 DIAGNOSIS — I70.213 ATHEROSCLEROSIS OF NATIVE ARTERY OF BOTH LOWER EXTREMITIES WITH INTERMITTENT CLAUDICATION: Chronic | ICD-10-CM

## 2023-06-28 DIAGNOSIS — E11.9 TYPE 2 DIABETES MELLITUS WITHOUT COMPLICATION, WITHOUT LONG-TERM CURRENT USE OF INSULIN: Primary | ICD-10-CM

## 2023-06-28 DIAGNOSIS — H25.11 NUCLEAR SCLEROSIS OF RIGHT EYE: ICD-10-CM

## 2023-06-28 DIAGNOSIS — E11.22 TYPE 2 DIABETES MELLITUS WITH STAGE 3B CHRONIC KIDNEY DISEASE, WITHOUT LONG-TERM CURRENT USE OF INSULIN: Chronic | ICD-10-CM

## 2023-06-28 DIAGNOSIS — N18.32 TYPE 2 DIABETES MELLITUS WITH STAGE 3B CHRONIC KIDNEY DISEASE, WITHOUT LONG-TERM CURRENT USE OF INSULIN: Chronic | ICD-10-CM

## 2023-06-28 DIAGNOSIS — H47.322 DRUSEN OF OPTIC DISC, LEFT: ICD-10-CM

## 2023-06-28 DIAGNOSIS — H25.12 NUCLEAR SCLEROSIS OF LEFT EYE: ICD-10-CM

## 2023-06-28 LAB
ALBUMIN SERPL BCP-MCNC: 3.2 G/DL (ref 3.5–5.2)
ALP SERPL-CCNC: 84 U/L (ref 55–135)
ALT SERPL W/O P-5'-P-CCNC: 11 U/L (ref 10–44)
ANION GAP SERPL CALC-SCNC: 14 MMOL/L (ref 8–16)
AST SERPL-CCNC: 20 U/L (ref 10–40)
BILIRUB SERPL-MCNC: 0.2 MG/DL (ref 0.1–1)
BUN SERPL-MCNC: 17 MG/DL (ref 8–23)
CALCIUM SERPL-MCNC: 9.6 MG/DL (ref 8.7–10.5)
CHLORIDE SERPL-SCNC: 104 MMOL/L (ref 95–110)
CHOLEST SERPL-MCNC: 146 MG/DL (ref 120–199)
CHOLEST/HDLC SERPL: 2.5 {RATIO} (ref 2–5)
CO2 SERPL-SCNC: 25 MMOL/L (ref 23–29)
CREAT SERPL-MCNC: 1.1 MG/DL (ref 0.5–1.4)
EST. GFR  (NO RACE VARIABLE): 52.4 ML/MIN/1.73 M^2
ESTIMATED AVG GLUCOSE: 137 MG/DL (ref 68–131)
GLUCOSE SERPL-MCNC: 119 MG/DL (ref 70–110)
HBA1C MFR BLD: 6.4 % (ref 4–5.6)
HDLC SERPL-MCNC: 58 MG/DL (ref 40–75)
HDLC SERPL: 39.7 % (ref 20–50)
LDLC SERPL CALC-MCNC: 74.2 MG/DL (ref 63–159)
NONHDLC SERPL-MCNC: 88 MG/DL
POTASSIUM SERPL-SCNC: 4.1 MMOL/L (ref 3.5–5.1)
PROT SERPL-MCNC: 7.3 G/DL (ref 6–8.4)
SODIUM SERPL-SCNC: 143 MMOL/L (ref 136–145)
TRIGL SERPL-MCNC: 69 MG/DL (ref 30–150)

## 2023-06-28 PROCEDURE — 4010F PR ACE/ARB THEARPY RXD/TAKEN: ICD-10-PCS | Mod: CPTII,S$GLB,, | Performed by: STUDENT IN AN ORGANIZED HEALTH CARE EDUCATION/TRAINING PROGRAM

## 2023-06-28 PROCEDURE — 80053 COMPREHEN METABOLIC PANEL: CPT | Performed by: INTERNAL MEDICINE

## 2023-06-28 PROCEDURE — 92083 HUMPHREY VISUAL FIELD - OU - BOTH EYES: ICD-10-PCS | Mod: S$GLB,,, | Performed by: STUDENT IN AN ORGANIZED HEALTH CARE EDUCATION/TRAINING PROGRAM

## 2023-06-28 PROCEDURE — 2023F PR DILATED RETINAL EXAM W/O EVID OF RETINOPATHY: ICD-10-PCS | Mod: CPTII,S$GLB,, | Performed by: STUDENT IN AN ORGANIZED HEALTH CARE EDUCATION/TRAINING PROGRAM

## 2023-06-28 PROCEDURE — 1160F PR REVIEW ALL MEDS BY PRESCRIBER/CLIN PHARMACIST DOCUMENTED: ICD-10-PCS | Mod: CPTII,S$GLB,, | Performed by: STUDENT IN AN ORGANIZED HEALTH CARE EDUCATION/TRAINING PROGRAM

## 2023-06-28 PROCEDURE — 83036 HEMOGLOBIN GLYCOSYLATED A1C: CPT | Performed by: INTERNAL MEDICINE

## 2023-06-28 PROCEDURE — 2023F DILAT RTA XM W/O RTNOPTHY: CPT | Mod: CPTII,S$GLB,, | Performed by: STUDENT IN AN ORGANIZED HEALTH CARE EDUCATION/TRAINING PROGRAM

## 2023-06-28 PROCEDURE — 92083 EXTENDED VISUAL FIELD XM: CPT | Mod: S$GLB,,, | Performed by: STUDENT IN AN ORGANIZED HEALTH CARE EDUCATION/TRAINING PROGRAM

## 2023-06-28 PROCEDURE — 80061 LIPID PANEL: CPT | Performed by: INTERNAL MEDICINE

## 2023-06-28 PROCEDURE — 3060F POS MICROALBUMINURIA REV: CPT | Mod: CPTII,S$GLB,, | Performed by: STUDENT IN AN ORGANIZED HEALTH CARE EDUCATION/TRAINING PROGRAM

## 2023-06-28 PROCEDURE — 99999 PR PBB SHADOW E&M-EST. PATIENT-LVL III: CPT | Mod: PBBFAC,,, | Performed by: STUDENT IN AN ORGANIZED HEALTH CARE EDUCATION/TRAINING PROGRAM

## 2023-06-28 PROCEDURE — 1159F MED LIST DOCD IN RCRD: CPT | Mod: CPTII,S$GLB,, | Performed by: STUDENT IN AN ORGANIZED HEALTH CARE EDUCATION/TRAINING PROGRAM

## 2023-06-28 PROCEDURE — 4010F ACE/ARB THERAPY RXD/TAKEN: CPT | Mod: CPTII,S$GLB,, | Performed by: STUDENT IN AN ORGANIZED HEALTH CARE EDUCATION/TRAINING PROGRAM

## 2023-06-28 PROCEDURE — 99214 PR OFFICE/OUTPT VISIT, EST, LEVL IV, 30-39 MIN: ICD-10-PCS | Mod: S$GLB,,, | Performed by: STUDENT IN AN ORGANIZED HEALTH CARE EDUCATION/TRAINING PROGRAM

## 2023-06-28 PROCEDURE — 1160F RVW MEDS BY RX/DR IN RCRD: CPT | Mod: CPTII,S$GLB,, | Performed by: STUDENT IN AN ORGANIZED HEALTH CARE EDUCATION/TRAINING PROGRAM

## 2023-06-28 PROCEDURE — 3066F PR DOCUMENTATION OF TREATMENT FOR NEPHROPATHY: ICD-10-PCS | Mod: CPTII,S$GLB,, | Performed by: STUDENT IN AN ORGANIZED HEALTH CARE EDUCATION/TRAINING PROGRAM

## 2023-06-28 PROCEDURE — 3060F PR POS MICROALBUMINURIA RESULT DOCUMENTED/REVIEW: ICD-10-PCS | Mod: CPTII,S$GLB,, | Performed by: STUDENT IN AN ORGANIZED HEALTH CARE EDUCATION/TRAINING PROGRAM

## 2023-06-28 PROCEDURE — 36415 COLL VENOUS BLD VENIPUNCTURE: CPT | Performed by: INTERNAL MEDICINE

## 2023-06-28 PROCEDURE — 3066F NEPHROPATHY DOC TX: CPT | Mod: CPTII,S$GLB,, | Performed by: STUDENT IN AN ORGANIZED HEALTH CARE EDUCATION/TRAINING PROGRAM

## 2023-06-28 PROCEDURE — 99999 PR PBB SHADOW E&M-EST. PATIENT-LVL III: ICD-10-PCS | Mod: PBBFAC,,, | Performed by: STUDENT IN AN ORGANIZED HEALTH CARE EDUCATION/TRAINING PROGRAM

## 2023-06-28 PROCEDURE — 3051F HG A1C>EQUAL 7.0%<8.0%: CPT | Mod: CPTII,S$GLB,, | Performed by: STUDENT IN AN ORGANIZED HEALTH CARE EDUCATION/TRAINING PROGRAM

## 2023-06-28 PROCEDURE — 1159F PR MEDICATION LIST DOCUMENTED IN MEDICAL RECORD: ICD-10-PCS | Mod: CPTII,S$GLB,, | Performed by: STUDENT IN AN ORGANIZED HEALTH CARE EDUCATION/TRAINING PROGRAM

## 2023-06-28 PROCEDURE — 99214 OFFICE O/P EST MOD 30 MIN: CPT | Mod: S$GLB,,, | Performed by: STUDENT IN AN ORGANIZED HEALTH CARE EDUCATION/TRAINING PROGRAM

## 2023-06-28 PROCEDURE — 3051F PR MOST RECENT HEMOGLOBIN A1C LEVEL 7.0 - < 8.0%: ICD-10-PCS | Mod: CPTII,S$GLB,, | Performed by: STUDENT IN AN ORGANIZED HEALTH CARE EDUCATION/TRAINING PROGRAM

## 2023-06-28 NOTE — PROGRESS NOTES
HPI     Diabetic Eye Exam     Additional comments: Pt states va is stable. Patient states her eyes are   always watery. Patient denies any pain or irritation at this time.           Comments    DM  NSC OU            Last edited by Adore Munoz on 6/28/2023  9:10 AM.            Assessment /Plan     For exam results, see Encounter Report.    Type 2 diabetes mellitus without complication, without long-term current use of insulin- last A1c 7.1   Strict BG control, f/u w/ PCP, and annual DFE  Stressed importance of DM control to preserve vision    Nuclear sclerosis of right eye  Nuclear sclerosis of left eye- VS. Patient wishes to wait for surgery.  - monitor     Drusen of optic disc, left  -    Long standing, stable and optos done stable and drusen shows on FAF      Return to clinic in 1 year DFE

## 2023-06-30 ENCOUNTER — TELEPHONE (OUTPATIENT)
Dept: CARDIOLOGY | Facility: CLINIC | Age: 73
End: 2023-06-30
Payer: MEDICARE

## 2023-06-30 NOTE — TELEPHONE ENCOUNTER
Lvm for pt to return call to clinic in regards to results:     Please phone patient. Labs reviewed. A1C improved. Creatinine stable. LDL slightly above goal at 74.      Continue same meds/mgmt. Follow-up as scheduled.

## 2023-07-12 RX ORDER — MINERAL OIL
ENEMA (ML) RECTAL
Qty: 90 TABLET | Refills: 3 | Status: SHIPPED | OUTPATIENT
Start: 2023-07-12

## 2023-07-14 ENCOUNTER — TELEPHONE (OUTPATIENT)
Dept: CARDIOLOGY | Facility: CLINIC | Age: 73
End: 2023-07-14

## 2023-07-14 ENCOUNTER — OFFICE VISIT (OUTPATIENT)
Dept: CARDIOLOGY | Facility: CLINIC | Age: 73
End: 2023-07-14
Payer: MEDICARE

## 2023-07-14 ENCOUNTER — LAB VISIT (OUTPATIENT)
Dept: LAB | Facility: HOSPITAL | Age: 73
End: 2023-07-14
Attending: INTERNAL MEDICINE
Payer: MEDICARE

## 2023-07-14 VITALS
OXYGEN SATURATION: 95 % | HEART RATE: 94 BPM | WEIGHT: 141.31 LBS | BODY MASS INDEX: 26.68 KG/M2 | SYSTOLIC BLOOD PRESSURE: 134 MMHG | HEIGHT: 61 IN | DIASTOLIC BLOOD PRESSURE: 58 MMHG

## 2023-07-14 DIAGNOSIS — N18.31 TYPE 2 DIABETES MELLITUS WITH STAGE 3A CHRONIC KIDNEY DISEASE, WITHOUT LONG-TERM CURRENT USE OF INSULIN: Chronic | ICD-10-CM

## 2023-07-14 DIAGNOSIS — I15.2 HYPERTENSION ASSOCIATED WITH DIABETES: ICD-10-CM

## 2023-07-14 DIAGNOSIS — I73.9 PVD (PERIPHERAL VASCULAR DISEASE): ICD-10-CM

## 2023-07-14 DIAGNOSIS — I25.10 CORONARY ARTERY CALCIFICATION: ICD-10-CM

## 2023-07-14 DIAGNOSIS — E11.22 TYPE 2 DIABETES MELLITUS WITH STAGE 3A CHRONIC KIDNEY DISEASE, WITHOUT LONG-TERM CURRENT USE OF INSULIN: Chronic | ICD-10-CM

## 2023-07-14 DIAGNOSIS — Z90.2 S/P LOBECTOMY OF LUNG: ICD-10-CM

## 2023-07-14 DIAGNOSIS — E11.29 TYPE 2 DIABETES MELLITUS WITH MICROALBUMINURIA, WITHOUT LONG-TERM CURRENT USE OF INSULIN: Chronic | ICD-10-CM

## 2023-07-14 DIAGNOSIS — I25.84 CORONARY ARTERY CALCIFICATION: ICD-10-CM

## 2023-07-14 DIAGNOSIS — I77.1 TORTUOUS AORTA: ICD-10-CM

## 2023-07-14 DIAGNOSIS — I35.0 NONRHEUMATIC AORTIC VALVE STENOSIS: ICD-10-CM

## 2023-07-14 DIAGNOSIS — Z87.891 FORMER CIGARETTE SMOKER: Chronic | ICD-10-CM

## 2023-07-14 DIAGNOSIS — C34.92 NON-SMALL CELL CANCER OF LEFT LUNG: ICD-10-CM

## 2023-07-14 DIAGNOSIS — E11.69 DYSLIPIDEMIA ASSOCIATED WITH TYPE 2 DIABETES MELLITUS: Chronic | ICD-10-CM

## 2023-07-14 DIAGNOSIS — D50.8 IRON DEFICIENCY ANEMIA SECONDARY TO INADEQUATE DIETARY IRON INTAKE: Chronic | ICD-10-CM

## 2023-07-14 DIAGNOSIS — I70.213 ATHEROSCLEROSIS OF NATIVE ARTERY OF BOTH LOWER EXTREMITIES WITH INTERMITTENT CLAUDICATION: Chronic | ICD-10-CM

## 2023-07-14 DIAGNOSIS — E11.51 TYPE 2 DIABETES MELLITUS WITH PERIPHERAL VASCULAR DISEASE: Chronic | ICD-10-CM

## 2023-07-14 DIAGNOSIS — R91.1 PULMONARY NODULE LESS THAN 6 MM IN DIAMETER WITH HIGH RISK FOR MALIGNANT NEOPLASM: ICD-10-CM

## 2023-07-14 DIAGNOSIS — J44.9 CHRONIC OBSTRUCTIVE PULMONARY DISEASE, UNSPECIFIED COPD TYPE: Chronic | ICD-10-CM

## 2023-07-14 DIAGNOSIS — I70.0 ATHEROSCLEROSIS OF AORTA: ICD-10-CM

## 2023-07-14 DIAGNOSIS — E78.5 DYSLIPIDEMIA ASSOCIATED WITH TYPE 2 DIABETES MELLITUS: Chronic | ICD-10-CM

## 2023-07-14 DIAGNOSIS — I27.21 HIGH PULMONARY ARTERIAL PRESSURE: Chronic | ICD-10-CM

## 2023-07-14 DIAGNOSIS — Z98.62 S/P PERIPHERAL ARTERY ANGIOPLASTY: ICD-10-CM

## 2023-07-14 DIAGNOSIS — E11.59 HYPERTENSION ASSOCIATED WITH DIABETES: ICD-10-CM

## 2023-07-14 DIAGNOSIS — R06.02 SHORTNESS OF BREATH: ICD-10-CM

## 2023-07-14 DIAGNOSIS — M79.609 PAIN IN EXTREMITY, UNSPECIFIED EXTREMITY: ICD-10-CM

## 2023-07-14 DIAGNOSIS — I25.10 CORONARY ARTERY CALCIFICATION: Primary | ICD-10-CM

## 2023-07-14 DIAGNOSIS — R91.1 PULMONARY NODULE, LEFT: Chronic | ICD-10-CM

## 2023-07-14 DIAGNOSIS — R94.31 ABNORMAL EKG: ICD-10-CM

## 2023-07-14 DIAGNOSIS — I25.84 CORONARY ARTERY CALCIFICATION: Primary | ICD-10-CM

## 2023-07-14 DIAGNOSIS — R94.31 ABNORMAL EKG: Primary | ICD-10-CM

## 2023-07-14 DIAGNOSIS — G47.33 OSA (OBSTRUCTIVE SLEEP APNEA): ICD-10-CM

## 2023-07-14 DIAGNOSIS — I25.10 CORONARY ARTERY DISEASE INVOLVING NATIVE CORONARY ARTERY OF NATIVE HEART WITHOUT ANGINA PECTORIS: ICD-10-CM

## 2023-07-14 DIAGNOSIS — Z91.89 PULMONARY NODULE LESS THAN 6 MM IN DIAMETER WITH HIGH RISK FOR MALIGNANT NEOPLASM: ICD-10-CM

## 2023-07-14 DIAGNOSIS — R80.9 TYPE 2 DIABETES MELLITUS WITH MICROALBUMINURIA, WITHOUT LONG-TERM CURRENT USE OF INSULIN: Chronic | ICD-10-CM

## 2023-07-14 LAB
ANION GAP SERPL CALC-SCNC: 8 MMOL/L (ref 8–16)
APTT PPP: 26.8 SEC (ref 21–32)
BASOPHILS # BLD AUTO: 0.05 K/UL (ref 0–0.2)
BASOPHILS NFR BLD: 0.6 % (ref 0–1.9)
BUN SERPL-MCNC: 11 MG/DL (ref 8–23)
CALCIUM SERPL-MCNC: 9.1 MG/DL (ref 8.7–10.5)
CHLORIDE SERPL-SCNC: 112 MMOL/L (ref 95–110)
CO2 SERPL-SCNC: 23 MMOL/L (ref 23–29)
CREAT SERPL-MCNC: 1.2 MG/DL (ref 0.5–1.4)
DIFFERENTIAL METHOD: ABNORMAL
EOSINOPHIL # BLD AUTO: 0.3 K/UL (ref 0–0.5)
EOSINOPHIL NFR BLD: 3.6 % (ref 0–8)
ERYTHROCYTE [DISTWIDTH] IN BLOOD BY AUTOMATED COUNT: 18.3 % (ref 11.5–14.5)
EST. GFR  (NO RACE VARIABLE): 47.2 ML/MIN/1.73 M^2
GLUCOSE SERPL-MCNC: 114 MG/DL (ref 70–110)
HCT VFR BLD AUTO: 28.6 % (ref 37–48.5)
HGB BLD-MCNC: 8.4 G/DL (ref 12–16)
IMM GRANULOCYTES # BLD AUTO: 0.02 K/UL (ref 0–0.04)
IMM GRANULOCYTES NFR BLD AUTO: 0.3 % (ref 0–0.5)
INR PPP: 1 (ref 0.8–1.2)
LYMPHOCYTES # BLD AUTO: 1.8 K/UL (ref 1–4.8)
LYMPHOCYTES NFR BLD: 22.1 % (ref 18–48)
MCH RBC QN AUTO: 25.1 PG (ref 27–31)
MCHC RBC AUTO-ENTMCNC: 29.4 G/DL (ref 32–36)
MCV RBC AUTO: 86 FL (ref 82–98)
MONOCYTES # BLD AUTO: 0.6 K/UL (ref 0.3–1)
MONOCYTES NFR BLD: 7.9 % (ref 4–15)
NEUTROPHILS # BLD AUTO: 5.2 K/UL (ref 1.8–7.7)
NEUTROPHILS NFR BLD: 65.5 % (ref 38–73)
NRBC BLD-RTO: 0 /100 WBC
PLATELET # BLD AUTO: 404 K/UL (ref 150–450)
PMV BLD AUTO: 10.1 FL (ref 9.2–12.9)
POTASSIUM SERPL-SCNC: 4.2 MMOL/L (ref 3.5–5.1)
PROTHROMBIN TIME: 10.3 SEC (ref 9–12.5)
RBC # BLD AUTO: 3.34 M/UL (ref 4–5.4)
SODIUM SERPL-SCNC: 143 MMOL/L (ref 136–145)
WBC # BLD AUTO: 7.95 K/UL (ref 3.9–12.7)

## 2023-07-14 PROCEDURE — 1125F AMNT PAIN NOTED PAIN PRSNT: CPT | Mod: HCNC,CPTII,S$GLB, | Performed by: INTERNAL MEDICINE

## 2023-07-14 PROCEDURE — 99215 PR OFFICE/OUTPT VISIT, EST, LEVL V, 40-54 MIN: ICD-10-PCS | Mod: HCNC,S$GLB,, | Performed by: INTERNAL MEDICINE

## 2023-07-14 PROCEDURE — 99999 PR PBB SHADOW E&M-EST. PATIENT-LVL V: CPT | Mod: PBBFAC,HCNC,, | Performed by: INTERNAL MEDICINE

## 2023-07-14 PROCEDURE — 3288F PR FALLS RISK ASSESSMENT DOCUMENTED: ICD-10-PCS | Mod: HCNC,CPTII,S$GLB, | Performed by: INTERNAL MEDICINE

## 2023-07-14 PROCEDURE — 3072F PR LOW RISK FOR RETINOPATHY: ICD-10-PCS | Mod: HCNC,CPTII,S$GLB, | Performed by: INTERNAL MEDICINE

## 2023-07-14 PROCEDURE — 1159F PR MEDICATION LIST DOCUMENTED IN MEDICAL RECORD: ICD-10-PCS | Mod: HCNC,CPTII,S$GLB, | Performed by: INTERNAL MEDICINE

## 2023-07-14 PROCEDURE — 1159F MED LIST DOCD IN RCRD: CPT | Mod: HCNC,CPTII,S$GLB, | Performed by: INTERNAL MEDICINE

## 2023-07-14 PROCEDURE — 3066F NEPHROPATHY DOC TX: CPT | Mod: HCNC,CPTII,S$GLB, | Performed by: INTERNAL MEDICINE

## 2023-07-14 PROCEDURE — 1101F PT FALLS ASSESS-DOCD LE1/YR: CPT | Mod: HCNC,CPTII,S$GLB, | Performed by: INTERNAL MEDICINE

## 2023-07-14 PROCEDURE — 3078F DIAST BP <80 MM HG: CPT | Mod: HCNC,CPTII,S$GLB, | Performed by: INTERNAL MEDICINE

## 2023-07-14 PROCEDURE — 3078F PR MOST RECENT DIASTOLIC BLOOD PRESSURE < 80 MM HG: ICD-10-PCS | Mod: HCNC,CPTII,S$GLB, | Performed by: INTERNAL MEDICINE

## 2023-07-14 PROCEDURE — 99999 PR PBB SHADOW E&M-EST. PATIENT-LVL V: ICD-10-PCS | Mod: PBBFAC,HCNC,, | Performed by: INTERNAL MEDICINE

## 2023-07-14 PROCEDURE — 3075F SYST BP GE 130 - 139MM HG: CPT | Mod: HCNC,CPTII,S$GLB, | Performed by: INTERNAL MEDICINE

## 2023-07-14 PROCEDURE — 3060F PR POS MICROALBUMINURIA RESULT DOCUMENTED/REVIEW: ICD-10-PCS | Mod: HCNC,CPTII,S$GLB, | Performed by: INTERNAL MEDICINE

## 2023-07-14 PROCEDURE — 3044F HG A1C LEVEL LT 7.0%: CPT | Mod: HCNC,CPTII,S$GLB, | Performed by: INTERNAL MEDICINE

## 2023-07-14 PROCEDURE — 1125F PR PAIN SEVERITY QUANTIFIED, PAIN PRESENT: ICD-10-PCS | Mod: HCNC,CPTII,S$GLB, | Performed by: INTERNAL MEDICINE

## 2023-07-14 PROCEDURE — 3044F PR MOST RECENT HEMOGLOBIN A1C LEVEL <7.0%: ICD-10-PCS | Mod: HCNC,CPTII,S$GLB, | Performed by: INTERNAL MEDICINE

## 2023-07-14 PROCEDURE — 85730 THROMBOPLASTIN TIME PARTIAL: CPT | Mod: HCNC | Performed by: INTERNAL MEDICINE

## 2023-07-14 PROCEDURE — 99215 OFFICE O/P EST HI 40 MIN: CPT | Mod: HCNC,S$GLB,, | Performed by: INTERNAL MEDICINE

## 2023-07-14 PROCEDURE — 1160F RVW MEDS BY RX/DR IN RCRD: CPT | Mod: HCNC,CPTII,S$GLB, | Performed by: INTERNAL MEDICINE

## 2023-07-14 PROCEDURE — 36415 COLL VENOUS BLD VENIPUNCTURE: CPT | Mod: HCNC | Performed by: INTERNAL MEDICINE

## 2023-07-14 PROCEDURE — 85610 PROTHROMBIN TIME: CPT | Mod: HCNC | Performed by: INTERNAL MEDICINE

## 2023-07-14 PROCEDURE — 80048 BASIC METABOLIC PNL TOTAL CA: CPT | Mod: HCNC | Performed by: INTERNAL MEDICINE

## 2023-07-14 PROCEDURE — 85025 COMPLETE CBC W/AUTO DIFF WBC: CPT | Mod: HCNC | Performed by: INTERNAL MEDICINE

## 2023-07-14 PROCEDURE — 4010F ACE/ARB THERAPY RXD/TAKEN: CPT | Mod: HCNC,CPTII,S$GLB, | Performed by: INTERNAL MEDICINE

## 2023-07-14 PROCEDURE — 1160F PR REVIEW ALL MEDS BY PRESCRIBER/CLIN PHARMACIST DOCUMENTED: ICD-10-PCS | Mod: HCNC,CPTII,S$GLB, | Performed by: INTERNAL MEDICINE

## 2023-07-14 PROCEDURE — 3066F PR DOCUMENTATION OF TREATMENT FOR NEPHROPATHY: ICD-10-PCS | Mod: HCNC,CPTII,S$GLB, | Performed by: INTERNAL MEDICINE

## 2023-07-14 PROCEDURE — 3072F LOW RISK FOR RETINOPATHY: CPT | Mod: HCNC,CPTII,S$GLB, | Performed by: INTERNAL MEDICINE

## 2023-07-14 PROCEDURE — 3288F FALL RISK ASSESSMENT DOCD: CPT | Mod: HCNC,CPTII,S$GLB, | Performed by: INTERNAL MEDICINE

## 2023-07-14 PROCEDURE — 1101F PR PT FALLS ASSESS DOC 0-1 FALLS W/OUT INJ PAST YR: ICD-10-PCS | Mod: HCNC,CPTII,S$GLB, | Performed by: INTERNAL MEDICINE

## 2023-07-14 PROCEDURE — 3075F PR MOST RECENT SYSTOLIC BLOOD PRESS GE 130-139MM HG: ICD-10-PCS | Mod: HCNC,CPTII,S$GLB, | Performed by: INTERNAL MEDICINE

## 2023-07-14 PROCEDURE — 4010F PR ACE/ARB THEARPY RXD/TAKEN: ICD-10-PCS | Mod: HCNC,CPTII,S$GLB, | Performed by: INTERNAL MEDICINE

## 2023-07-14 PROCEDURE — 3060F POS MICROALBUMINURIA REV: CPT | Mod: HCNC,CPTII,S$GLB, | Performed by: INTERNAL MEDICINE

## 2023-07-14 NOTE — TELEPHONE ENCOUNTER
Called patient and LVM  with date, time , location for procedure and instructions for premedication for iodine allergy.

## 2023-07-14 NOTE — PROGRESS NOTES
Subjective:   Patient ID:  Lucia Barlow is a 73 y.o. female who presents for follow up of No chief complaint on file.      HPI  2020  A 71 yo female with pvd s/p pta copd smoker copd diabetes (stopped metformin due to gi side effects and lost weight) cad htn hlp ckd is here for f/u decreased smoking but has not stopped no claudication no chest pain or shortness of breath no chest pain has heart burn has been eating a lot of cheese/. Has no leg swelling. Tomatoes triggers heart burn.her a1c is in order however her lipids have increased has been eating a lot of cheese.      3/3/2021  Here for f/u had lung surgery for lung ca in November still has lefts  pain improving not smoking anymore still eating cheese compliant with meds has no claudication  Symptoms has rt hip pain that appears musculoskeletal. Has no angina tia chf . No syncope near syncope compliant with meds. Slat intake could improve.      2021  Has lingulectomy due  Lung cancer still has anterior left chest pain has seen  thoracic surgery has no angina she has now return of hip claudication over the past 5 months bilateral. She has to stop at 50 feet she can barely make it no discoloration or weakness in leg  Has no tia no chf has shortness of breath times  Her ekg is unchanged. She is not using cpap she returnesd her machine .     12/3/2021   Here for f/u she is cancer free in remission after he rlung surgery not smoking has no leg pain starting in her hips  All the way down posteriorely to the calves. She  Feels like tightness and cramps has to rest for relief this is only exertional. She is trying to be compliant with diet and emds. Has no nocturnal symptoms no discoloration in feet.      2022   here fro f/u not smoking has no limiting claudication taking pletal no bleeding issues compliant with meds lipids on target.     2023  Has left hip pain down the leg at rest and when walking. Her claudication is stable tolerated pletal  well not smoking tolerated meds well her rf are well modified. Has no chest pain or shortness of breath.        5/19/2023  Here frof /u early multiple complaints she is short of breath has swelling in face in legs hands and has sinus pressures  has sinus drip she was p[laced on steroids and duuretics. This has been on going for 1 month. She thinks jardiance is the reason she has been on jardiance for 1 year. She has been swollen since then has yeast infection . She is short of breath with exertion has no orthopnea pnd .SHE uses her inhaler for relief.   She ahs progression of her claudication she is barely able to walk 50 feet and that is very limiting to her her antionette with exercise she walked 1 minute her abui dropped significantly and required  10 minutes to go to baseline.   Her resting antionette dropped has bilateral dfa and sfa and cfa significant stenosis.     7/13/2023  Continues with significant exertional dyspnea very limiting as well as very limiting claudication her leg swelling improved using inhalers help her shortness of breath .stopping jardiance has helped uti. Not using cpap.her bnp is normal.  Past Medical History:   Diagnosis Date    Abnormal EKG 5/19/2023    Atherosclerosis of artery of both lower extremities 1/17/2014    Atherosclerosis of native coronary artery of native heart without angina pectoris 11/18/2016    Atherosclerotic PVD with intermittent claudication 1/17/2014    Chronic bronchitis     COPD (chronic obstructive pulmonary disease)     Coronary artery disease involving native coronary artery of native heart without angina pectoris 5/19/2023    Diabetes with neurologic complications     Dyslipidemia associated with type 2 diabetes mellitus 6/14/2013    Dyslipidemia associated with type 2 diabetes mellitus     Ex-smoker     Gastroesophageal reflux disease without esophagitis 1/25/2019    Hyperlipidemia     Hypertension     Iron deficiency anemia secondary to inadequate dietary iron intake  6/3/2022    NSCLC of left lung 11/19/2020    IVANIA (obstructive sleep apnea) 2/11/2021    Osteoporosis 1/16 rosita 1/18    Pneumothorax after biopsy 10/21/2020    PVD (peripheral vascular disease)     Renal manifestation of secondary diabetes mellitus     S/P peripheral artery angioplasty 2/7/2014    Seasonal allergic rhinitis due to pollen     Shortness of breath 5/19/2023    Simple chronic bronchitis     Tobacco dependence     Type 2 diabetes mellitus with microalbuminuria, without long-term current use of insulin 3/29/2019    Type 2 diabetes mellitus with peripheral vascular disease     Type 2 diabetes mellitus with stage 3 chronic kidney disease, without long-term current use of insulin 2/1/2019    Type 2 diabetes mellitus without retinopathy 2/1/2019    Urinary incontinence        Past Surgical History:   Procedure Laterality Date    ANGIOPLASTY Bilateral 01/24/2014    aortoiliac stenting     CARPAL TUNNEL RELEASE  2003    [facility]    COLECTOMY  approximate 2005    pt states 1in colon -dx with benign mass removed-states no colon cancer    COLONOSCOPY N/A 11/9/2016    Procedure: COLONOSCOPY;  Surgeon: Dmitri Sterling MD;  Location: [facility];  Service: Endoscopy;  Laterality: N/A;    COLONOSCOPY N/A 11/15/2019    Procedure: COLONOSCOPY;  Surgeon: Marko Vicente MD;  Location: [facility];  Service: Endoscopy;  Laterality: N/A;    COLONOSCOPY N/A 3/25/2022    Procedure: COLONOSCOPY;  Surgeon: Paola Feldman MD;  Location: [facility];  Service: Endoscopy;  Laterality: N/A;    ESOPHAGOGASTRODUODENOSCOPY N/A 3/25/2022    Procedure: EGD (ESOPHAGOGASTRODUODENOSCOPY);  Surgeon: Paola Feldman MD;  Location: [facility];  Service: Endoscopy;  Laterality: N/A;    HYSTERECTOMY      no cancer    INJECTION OF ANESTHETIC AGENT AROUND MULTIPLE INTERCOSTAL NERVES Left 11/19/2020    Procedure: BLOCK, NERVE, INTERCOSTAL, 2 OR MORE;  Surgeon: Amrit Flores MD;  Location: [facility] OR [address] [place] [address];  Service: Thoracic;  Laterality: Left;     OOPHORECTOMY      SURGICAL REMOVAL OF LYMPH NODE Left 11/19/2020    Procedure: EXCISION, LYMPH NODE;  Surgeon: Amrit Flores MD;  [facility];  Service: Thoracic;  Laterality: Left;  mediastinal lymph node disection    XI ROBOTIC RATS,WITH LOBECTOMY,LUNG Left 11/19/2020    Procedure: XI ROBOTIC RATS,WITH LOBECTOMY,LUNG;  Surgeon: Amrit Flores MD;  [facility];  Service: Thoracic;  Laterality: Left;  Lingulectomy.       Social History     Tobacco Use    Smoking status: Former     Packs/day: 1.00     Years: 60.00     Pack years: 60.00     Types: Cigarettes     Start date: 1/1/1960     Quit date: 1/10/2020     Years since quitting: 3.5    Smokeless tobacco: Never   Substance Use Topics    Alcohol use: No     Alcohol/week: 0.0 standard drinks    Drug use: No       Family History   Problem Relation Age of Onset    Stroke Father     Stroke Sister     Asthma Daughter     Diabetes Daughter     Breast cancer Daughter     Asthma Son        Current Outpatient Medications   Medication Sig    albuterol (PROVENTIL/VENTOLIN HFA) 90 mcg/actuation inhaler Inhale 2 puffs into the lungs every 4 (four) hours as needed for Wheezing or Shortness of Breath.    amLODIPine (NORVASC) 10 MG tablet Take 1 tablet (10 mg total) by mouth once daily.    aspirin (ECOTRIN) 81 MG EC tablet Take 1 tablet (81 mg total) by mouth once daily.    butalbital-acetaminophen-caffeine -40 mg (FIORICET, ESGIC) -40 mg per tablet TAKE 1 TABLET BY MOUTH 3 (THREE) TIMES DAILY AS NEEDED FOR HEADACHES. (MAXIMUM OF 15 TABLETS PER MONTH)    calcium-vitamin D 600 mg-10 mcg (400 unit) Tab Take 2 tablets by mouth once daily.    cetirizine (ZYRTEC) 10 MG tablet Take 1 tablet (10 mg total) by mouth once daily. For sinus congestion    cilostazoL (PLETAL) 50 MG Tab Take 1 tablet (50 mg total) by mouth 2 (two) times daily before meals.    empagliflozin (JARDIANCE) 25 mg tablet Take 1 tablet (25 mg total) by mouth once  daily.    EPINEPHrine (EPIPEN) 0.3 mg/0.3 mL AtIn INJECT INTO THE MUSCLE ONE TIME FOR 1 DOSE, AS DIRECTED    ezetimibe (ZETIA) 10 mg tablet Take 1 tablet (10 mg total) by mouth once daily.    fexofenadine (ALLEGRA) 180 MG tablet TAKE 1 TABLET ONE TIME DAILY    fluconazole (DIFLUCAN) 150 MG Tab TAKE 1 TABLET AS NEEDED FOR YEAST INFECTION THAT DOES NOT RESPOND TO MONISTAT. DO NOT TAKE MORE THAN 1 TABLET PER WEEK    fluticasone propionate (FLONASE) 50 mcg/actuation nasal spray 2 sprays (100 mcg total) by Each Nostril route once daily.    furosemide (LASIX) 20 MG tablet Take 1 tablet (20 mg total) by mouth once daily.    ipratropium (ATROVENT) 21 mcg (0.03 %) nasal spray 2 sprays by Each Nostril route 3 (three) times daily.    ipratropium-albuteroL (COMBIVENT)  mcg/actuation inhaler Inhale 2 puffs into the lungs every 4 (four) hours as needed for Wheezing or Shortness of Breath. Rescue    levocetirizine (XYZAL) 5 MG tablet Take 1 tablet (5 mg total) by mouth every evening.    miconazole (MICOTIN) 2 % vaginal cream Place 1 applicator vaginally every evening. Use as directed    montelukast (SINGULAIR) 10 mg tablet Take 1 tablet (10 mg total) by mouth every evening.    ondansetron (ZOFRAN-ODT) 4 MG TbDL 1-2 tablets PO every 6 hours as needed for nausea/vomiting while completing bowel prep    pantoprazole (PROTONIX) 40 MG tablet TAKE 1 TABLET ONE TIME DAILY    potassium chloride SA (K-DUR,KLOR-CON) 20 MEQ tablet TAKE 1 TABLET ONE TIME DAILY    rosuvastatin (CRESTOR) 20 MG tablet Take 1 tablet (20 mg total) by mouth every evening.    sod sulf-pot chloride-mag sulf (SUTAB) 1.479-0.188- 0.225 gram tablet Take 12 tablets by mouth once daily. Take according to package instructions with indicated amount of water.    predniSONE (DELTASONE) 20 MG tablet Prednisone 60 mg/ day for 3 days, 40 mg/day for 3 days,20 mg/ day for 3 days, (1/2 tablet )10 mg a day for 3 days. (Patient not taking: Reported on 5/19/2023)     varicella-zoster gE-AS01B, PF, (SHINGRIX) 50 mcg/0.5 mL injection Inject 0.5 mL (one dose) into muscle now; give second dose at least 2 months later     No current facility-administered medications for this visit.     Current Outpatient Medications on File Prior to Visit   Medication Sig    albuterol (PROVENTIL/VENTOLIN HFA) 90 mcg/actuation inhaler Inhale 2 puffs into the lungs every 4 (four) hours as needed for Wheezing or Shortness of Breath.    amLODIPine (NORVASC) 10 MG tablet Take 1 tablet (10 mg total) by mouth once daily.    aspirin (ECOTRIN) 81 MG EC tablet Take 1 tablet (81 mg total) by mouth once daily.    butalbital-acetaminophen-caffeine -40 mg (FIORICET, ESGIC) -40 mg per tablet TAKE 1 TABLET BY MOUTH 3 (THREE) TIMES DAILY AS NEEDED FOR HEADACHES. (MAXIMUM OF 15 TABLETS PER MONTH)    calcium-vitamin D 600 mg-10 mcg (400 unit) Tab Take 2 tablets by mouth once daily.    cetirizine (ZYRTEC) 10 MG tablet Take 1 tablet (10 mg total) by mouth once daily. For sinus congestion    cilostazoL (PLETAL) 50 MG Tab Take 1 tablet (50 mg total) by mouth 2 (two) times daily before meals.    empagliflozin (JARDIANCE) 25 mg tablet Take 1 tablet (25 mg total) by mouth once daily.    EPINEPHrine (EPIPEN) 0.3 mg/0.3 mL AtIn INJECT INTO THE MUSCLE ONE TIME FOR 1 DOSE, AS DIRECTED    ezetimibe (ZETIA) 10 mg tablet Take 1 tablet (10 mg total) by mouth once daily.    fexofenadine (ALLEGRA) 180 MG tablet TAKE 1 TABLET ONE TIME DAILY    fluconazole (DIFLUCAN) 150 MG Tab TAKE 1 TABLET AS NEEDED FOR YEAST INFECTION THAT DOES NOT RESPOND TO MONISTAT. DO NOT TAKE MORE THAN 1 TABLET PER WEEK    fluticasone propionate (FLONASE) 50 mcg/actuation nasal spray 2 sprays (100 mcg total) by Each Nostril route once daily.    furosemide (LASIX) 20 MG tablet Take 1 tablet (20 mg total) by mouth once daily.    ipratropium (ATROVENT) 21 mcg (0.03 %) nasal spray 2 sprays by Each Nostril route 3 (three) times daily.     ipratropium-albuteroL (COMBIVENT)  mcg/actuation inhaler Inhale 2 puffs into the lungs every 4 (four) hours as needed for Wheezing or Shortness of Breath. Rescue    levocetirizine (XYZAL) 5 MG tablet Take 1 tablet (5 mg total) by mouth every evening.    miconazole (MICOTIN) 2 % vaginal cream Place 1 applicator vaginally every evening. Use as directed    montelukast (SINGULAIR) 10 mg tablet Take 1 tablet (10 mg total) by mouth every evening.    ondansetron (ZOFRAN-ODT) 4 MG TbDL 1-2 tablets PO every 6 hours as needed for nausea/vomiting while completing bowel prep    pantoprazole (PROTONIX) 40 MG tablet TAKE 1 TABLET ONE TIME DAILY    potassium chloride SA (K-DUR,KLOR-CON) 20 MEQ tablet TAKE 1 TABLET ONE TIME DAILY    rosuvastatin (CRESTOR) 20 MG tablet Take 1 tablet (20 mg total) by mouth every evening.    sod sulf-pot chloride-mag sulf (SUTAB) 1.479-0.188- 0.225 gram tablet Take 12 tablets by mouth once daily. Take according to package instructions with indicated amount of water.    predniSONE (DELTASONE) 20 MG tablet Prednisone 60 mg/ day for 3 days, 40 mg/day for 3 days,20 mg/ day for 3 days, (1/2 tablet )10 mg a day for 3 days. (Patient not taking: Reported on 5/19/2023)    varicella-zoster gE-AS01B, PF, (SHINGRIX) 50 mcg/0.5 mL injection Inject 0.5 mL (one dose) into muscle now; give second dose at least 2 months later     No current facility-administered medications on file prior to visit.     Review of patient's allergies indicates:   Allergen Reactions    Iodine and iodide containing products Swelling    Losartan Itching and Swelling     Very uncomfortable - high probability of allergic reaction due to swelling and itching    Skin staples [surgical stainless steel] Swelling    Egg derived      Shortness breath, lip swelling    Fish containing products     Latex, natural rubber Swelling    Nickel     Pravastatin      40 mg causes nausea vomitting but 20 mg ok    Shellfish containing products Swelling       Review of Systems   Constitutional: Negative for diaphoresis, malaise/fatigue and weight gain.   HENT:  Negative for hoarse voice.    Eyes:  Negative for double vision and visual disturbance.   Cardiovascular:  Positive for dyspnea on exertion. Negative for chest pain, claudication, cyanosis, irregular heartbeat, leg swelling, near-syncope, orthopnea, palpitations, paroxysmal nocturnal dyspnea and syncope.   Respiratory:  Positive for shortness of breath. Negative for cough, hemoptysis and snoring.    Hematologic/Lymphatic: Negative for bleeding problem. Does not bruise/bleed easily.   Skin:  Negative for color change and poor wound healing.   Musculoskeletal:  Negative for muscle cramps, muscle weakness and myalgias.   Gastrointestinal:  Negative for bloating, abdominal pain, change in bowel habit, diarrhea, heartburn, hematemesis, hematochezia, melena and nausea.   Neurological:  Negative for excessive daytime sleepiness, dizziness, headaches, light-headedness, loss of balance, numbness and weakness.   Psychiatric/Behavioral:  Negative for memory loss. The patient does not have insomnia.    Allergic/Immunologic: Negative for hives.     Objective:   Physical Exam  Vitals and nursing note reviewed.   Constitutional:       General: She is not in acute distress.     Appearance: Normal appearance. She is well-developed. She is not ill-appearing.   HENT:      Head: Normocephalic and atraumatic.   Eyes:      General: No scleral icterus.     Pupils: Pupils are equal, round, and reactive to light.   Neck:      Thyroid: No thyromegaly.      Vascular: Normal carotid pulses. No carotid bruit, hepatojugular reflux or JVD.      Trachea: No tracheal deviation.   Cardiovascular:      Rate and Rhythm: Normal rate and regular rhythm.      Pulses:           Carotid pulses are 2+ on the right side and 2+ on the left side.       Radial pulses are 2+ on the right side and 2+ on the left side.        Femoral pulses are 1+ on the right  "side with bruit and 1+ on the left side with bruit.       Popliteal pulses are 1+ on the right side and 1+ on the left side.        Dorsalis pedis pulses are 1+ on the right side and 1+ on the left side.        Posterior tibial pulses are 1+ on the right side and 1+ on the left side.      Heart sounds: Murmur heard.   Harsh midsystolic murmur is present with a grade of 2/6 at the upper right sternal border radiating to the neck.     No friction rub. No gallop.   Pulmonary:      Effort: Pulmonary effort is normal. No respiratory distress.      Breath sounds: Normal breath sounds. No wheezing, rhonchi or rales.   Chest:      Chest wall: No tenderness.   Abdominal:      General: Bowel sounds are normal. There is no abdominal bruit.      Palpations: Abdomen is soft. There is no hepatomegaly or pulsatile mass.      Tenderness: There is no abdominal tenderness.   Musculoskeletal:      Right shoulder: No deformity.      Cervical back: Normal range of motion and neck supple.   Skin:     General: Skin is warm and dry.      Findings: No erythema or rash.      Nails: There is no clubbing.   Neurological:      Mental Status: She is alert and oriented to person, place, and time.      Cranial Nerves: No cranial nerve deficit.      Coordination: Coordination normal.   Psychiatric:         Speech: Speech normal.         Behavior: Behavior normal.     Vitals:    07/14/23 0816 07/14/23 0818   BP: (!) 138/54 (!) 134/58   BP Location: Right arm Left arm   Patient Position: Sitting Sitting   BP Method: Medium (Manual) Medium (Manual)   Pulse: 94    SpO2: 95%    Weight: 64.1 kg (141 lb 5 oz)    Height: 5' 1" (1.549 m)      Lab Results   Component Value Date    CHOL 146 06/28/2023    CHOL 131 01/04/2023    CHOL 148 05/27/2022      Body mass index is 26.7 kg/m².   Lab Results   Component Value Date    HGBA1C 6.4 (H) 06/28/2023      BMP  Lab Results   Component Value Date     06/28/2023    K 4.1 06/28/2023     06/28/2023    CO2 " 25 06/28/2023    BUN 17 06/28/2023    CREATININE 1.1 06/28/2023    CALCIUM 9.6 06/28/2023    ANIONGAP 14 06/28/2023    EGFRNORACEVR 52.4 (A) 06/28/2023      Lab Results   Component Value Date    HDL 58 06/28/2023    HDL 61 01/04/2023    HDL 62 05/27/2022     Lab Results   Component Value Date    LDLCALC 74.2 06/28/2023    LDLCALC 58.6 (L) 01/04/2023    LDLCALC 68.6 05/27/2022     Lab Results   Component Value Date    TRIG 69 06/28/2023    TRIG 57 01/04/2023    TRIG 87 05/27/2022     Lab Results   Component Value Date    CHOLHDL 39.7 06/28/2023    CHOLHDL 46.6 01/04/2023    CHOLHDL 41.9 05/27/2022       Chemistry        Component Value Date/Time     06/28/2023 0940    K 4.1 06/28/2023 0940     06/28/2023 0940    CO2 25 06/28/2023 0940    BUN 17 06/28/2023 0940    CREATININE 1.1 06/28/2023 0940     (H) 06/28/2023 0940        Component Value Date/Time    CALCIUM 9.6 06/28/2023 0940    ALKPHOS 84 06/28/2023 0940    AST 20 06/28/2023 0940    ALT 11 06/28/2023 0940    BILITOT 0.2 06/28/2023 0940    ESTGFRAFRICA 51.4 (A) 05/27/2022 0711    ESTGFRAFRICA 51.4 (A) 05/27/2022 0711    EGFRNONAA 44.6 (A) 05/27/2022 0711    EGFRNONAA 44.6 (A) 05/27/2022 0711          Lab Results   Component Value Date    TSH 0.404 12/04/2020     Lab Results   Component Value Date    INR 0.9 10/21/2020    INR 0.9 08/10/2019    INR 0.9 12/16/2018     Lab Results   Component Value Date    WBC 10.92 11/16/2022    HGB 11.6 (L) 11/16/2022    HCT 39.4 11/16/2022    MCV 83 11/16/2022     11/16/2022     BMP  Sodium   Date Value Ref Range Status   06/28/2023 143 136 - 145 mmol/L Final     Potassium   Date Value Ref Range Status   06/28/2023 4.1 3.5 - 5.1 mmol/L Final     Chloride   Date Value Ref Range Status   06/28/2023 104 95 - 110 mmol/L Final     CO2   Date Value Ref Range Status   06/28/2023 25 23 - 29 mmol/L Final     BUN   Date Value Ref Range Status   06/28/2023 17 8 - 23 mg/dL Final     Creatinine   Date Value Ref Range  Status   06/28/2023 1.1 0.5 - 1.4 mg/dL Final     Calcium   Date Value Ref Range Status   06/28/2023 9.6 8.7 - 10.5 mg/dL Final     Anion Gap   Date Value Ref Range Status   06/28/2023 14 8 - 16 mmol/L Final     eGFR if    Date Value Ref Range Status   05/27/2022 51.4 (A) >60 mL/min/1.73 m^2 Final   05/27/2022 51.4 (A) >60 mL/min/1.73 m^2 Final     eGFR if non    Date Value Ref Range Status   05/27/2022 44.6 (A) >60 mL/min/1.73 m^2 Final     Comment:     Calculation used to obtain the estimated glomerular filtration  rate (eGFR) is the CKD-EPI equation.      05/27/2022 44.6 (A) >60 mL/min/1.73 m^2 Final     Comment:     Calculation used to obtain the estimated glomerular filtration  rate (eGFR) is the CKD-EPI equation.        CrCl cannot be calculated (Patient's most recent lab result is older than the maximum 7 days allowed.).  Summary    The left ventricle is normal in size with concentric remodeling and normal systolic function.  Grade I left ventricular diastolic dysfunction.  The estimated ejection fraction is 65%.  Normal right ventricular size with normal right ventricular systolic function.  There is mild aortic valve stenosis.  Aortic valve area is 1.45 cm2; peak velocity is 2.67 m/s; mean gradient is 17 mmHg.  Mild tricuspid regurgitation.  Normal central venous pressure (3 mmHg).  The estimated PA systolic pressure is 37 mmHg.  Conclusion         Normal myocardial perfusion scan. There is no evidence of myocardial ischemia or infarction.    The gated perfusion images showed an ejection fraction of 70% at rest. The gated perfusion images showed an ejection fraction of 69% post stress.    There is normal wall motion at rest and post stress.    The ECG portion of the study is negative for ischemia.    The patient reported no chest pain during the stress test.  Assessment:     1. Atherosclerosis of native artery of both lower extremities with intermittent claudication    2.  Chronic obstructive pulmonary disease, unspecified COPD type    3. High pulmonary arterial pressure    4. Pulmonary nodule less than 6 mm in diameter with high risk for malignant neoplasm - RIght lower lobe    5. Pulmonary nodule, left - 8 mm , high risk for lung cancer    6. S/P lobectomy of lung-  Hx robotic lung lobectomy 11/19/2020    7. Atherosclerosis of aorta    8. Dyslipidemia associated with type 2 diabetes mellitus    9. Atherosclerosis of native coronary artery of native heart without angina pectoris    10. Hypertension associated with diabetes    11. Nonrheumatic aortic valve stenosis    12. S/P peripheral artery angioplasty    13. Tortuous aorta    14. Non-small cell cancer of left lung    15. Type 2 diabetes mellitus with microalbuminuria, without long-term current use of insulin    16. Type 2 diabetes mellitus with peripheral vascular disease    17. Type 2 diabetes mellitus with stage 3a chronic kidney disease, without long-term current use of insulin    18. Gastroesophageal reflux disease without esophagitis    19. IVANIA (obstructive sleep apnea)    20. Former heavy cigarette smoker      I am concerned that her shortness of breath is not only pulmonary related she has a change in her exertional capacity has not been able to perform daily activity very limited with minimal exertion. She has significant lung disease untreated apnea I think with her pvd as coronary calcification in lad it is beneficial to perform a rt and lhc despite iodine allergy to make this si not anginal equivalent or balanced ischemia despite a negative cardiolite  She is agreeable I answered all her questions   I have explained the risks, benefits , and alternatives of the procedure in detail.the patient voices understanding and all questions have been answered.the patient agrees to proceed as planned. \   She will be premedicated for dye allergy.   Plan:   As per above    Rt lhc/ptca.   Premedicate for dye allergy.

## 2023-07-17 ENCOUNTER — TELEPHONE (OUTPATIENT)
Dept: CARDIOLOGY | Facility: CLINIC | Age: 73
End: 2023-07-17
Payer: MEDICARE

## 2023-07-17 ENCOUNTER — PATIENT MESSAGE (OUTPATIENT)
Dept: CARDIOLOGY | Facility: CLINIC | Age: 73
End: 2023-07-17
Payer: MEDICARE

## 2023-07-17 NOTE — TELEPHONE ENCOUNTER
Spoke to patient's daughter due to patient's  phone going straight to [facility]. I informed her that procedure is on hold for now per Dr. Rosas due to anemia and patient needs to follow up with her PCP. She does not need to take the pre med on the 19 or come to the hospital on the 20 th. Patient's daughter verbalized an understanding and will relay the message.----- Message from Amalia Rosas MD sent at 7/17/2023 11:28 AM CDT -----  WANT TO PUT ON HOLD DO NOT LIKE THE ANEMIA ISSUE I TEXTED HER PCP   ----- Message -----  From: Nancy Hood LPN  Sent: 7/17/2023  10:45 AM CDT  To: Amalia Rosas MD    You wan tot cancel her procedure for the 20th?  ----- Message -----  From: Amalia Rosas MD  Sent: 7/17/2023   7:32 AM CDT  To: Ralph FARFAN Staff    I HAVE REVIEWED HER LABS SHE IS ANEMIC AND HAS SIGNIFICANT DROP IN HEMATOCRIT WILL PLACE ON HOLD UNTIL ANEMIA IS FIGURED OUT

## 2023-07-18 ENCOUNTER — TELEPHONE (OUTPATIENT)
Dept: INTERNAL MEDICINE | Facility: CLINIC | Age: 73
End: 2023-07-18
Payer: MEDICARE

## 2023-07-18 NOTE — TELEPHONE ENCOUNTER
----- Message from Sharee Hyman sent at 7/18/2023  2:20 PM CDT -----  Contact: patient @ portal message  [email or web address]     Patient is calling for Medical Advice regarding  Iron deficiency anemia     How long has patient had these symptoms:07/14/2023    Pharmacy name and phone#:      [phone] - [place] - [address]  [address] [place] [address]  Phone: [phone] Fax: [phone]          Would like response via [employer]:  call     Comments:

## 2023-07-19 ENCOUNTER — PATIENT MESSAGE (OUTPATIENT)
Dept: INTERNAL MEDICINE | Facility: CLINIC | Age: 73
End: 2023-07-19
Payer: MEDICARE

## 2023-07-20 ENCOUNTER — TELEPHONE (OUTPATIENT)
Dept: INTERNAL MEDICINE | Facility: CLINIC | Age: 73
End: 2023-07-20
Payer: MEDICARE

## 2023-07-20 NOTE — TELEPHONE ENCOUNTER
----- Message from Leela Mcrae sent at 7/20/2023  1:25 PM CDT -----  Contact: Daughter  Patients daughter Debora is calling to clarify what dosage of Iron tablet that the patient OTC. Please call back [phone]

## 2023-07-21 ENCOUNTER — OFFICE VISIT (OUTPATIENT)
Dept: HEMATOLOGY/ONCOLOGY | Facility: CLINIC | Age: 73
End: 2023-07-21
Payer: MEDICAID

## 2023-07-21 ENCOUNTER — LAB VISIT (OUTPATIENT)
Dept: LAB | Facility: HOSPITAL | Age: 73
End: 2023-07-21
Attending: INTERNAL MEDICINE
Payer: MEDICARE

## 2023-07-21 VITALS
HEART RATE: 109 BPM | DIASTOLIC BLOOD PRESSURE: 62 MMHG | OXYGEN SATURATION: 96 % | BODY MASS INDEX: 26.22 KG/M2 | SYSTOLIC BLOOD PRESSURE: 130 MMHG | TEMPERATURE: 98 F | WEIGHT: 138.88 LBS | HEIGHT: 61 IN | RESPIRATION RATE: 20 BRPM

## 2023-07-21 DIAGNOSIS — D50.0 IRON DEFICIENCY ANEMIA DUE TO CHRONIC BLOOD LOSS: ICD-10-CM

## 2023-07-21 DIAGNOSIS — D51.3 OTHER DIETARY VITAMIN B12 DEFICIENCY ANEMIA: ICD-10-CM

## 2023-07-21 DIAGNOSIS — D52.0 DIETARY FOLATE DEFICIENCY ANEMIA: ICD-10-CM

## 2023-07-21 DIAGNOSIS — D50.0 IRON DEFICIENCY ANEMIA DUE TO CHRONIC BLOOD LOSS: Primary | ICD-10-CM

## 2023-07-21 LAB
BASOPHILS # BLD AUTO: 0.05 K/UL (ref 0–0.2)
BASOPHILS NFR BLD: 0.6 % (ref 0–1.9)
DIFFERENTIAL METHOD: ABNORMAL
EOSINOPHIL # BLD AUTO: 0.2 K/UL (ref 0–0.5)
EOSINOPHIL NFR BLD: 1.9 % (ref 0–8)
ERYTHROCYTE [DISTWIDTH] IN BLOOD BY AUTOMATED COUNT: 18.1 % (ref 11.5–14.5)
FERRITIN SERPL-MCNC: 29 NG/ML (ref 20–300)
FOLATE SERPL-MCNC: 16.1 NG/ML (ref 4–24)
HCT VFR BLD AUTO: 28.5 % (ref 37–48.5)
HGB BLD-MCNC: 8.6 G/DL (ref 12–16)
IMM GRANULOCYTES # BLD AUTO: 0.02 K/UL (ref 0–0.04)
IMM GRANULOCYTES NFR BLD AUTO: 0.2 % (ref 0–0.5)
IRON SERPL-MCNC: 21 UG/DL (ref 30–160)
LYMPHOCYTES # BLD AUTO: 2.3 K/UL (ref 1–4.8)
LYMPHOCYTES NFR BLD: 26.3 % (ref 18–48)
MCH RBC QN AUTO: 25 PG (ref 27–31)
MCHC RBC AUTO-ENTMCNC: 30.2 G/DL (ref 32–36)
MCV RBC AUTO: 83 FL (ref 82–98)
MONOCYTES # BLD AUTO: 0.5 K/UL (ref 0.3–1)
MONOCYTES NFR BLD: 5.6 % (ref 4–15)
NEUTROPHILS # BLD AUTO: 5.6 K/UL (ref 1.8–7.7)
NEUTROPHILS NFR BLD: 65.4 % (ref 38–73)
NRBC BLD-RTO: 0 /100 WBC
PLATELET # BLD AUTO: 417 K/UL (ref 150–450)
PMV BLD AUTO: 9.7 FL (ref 9.2–12.9)
RBC # BLD AUTO: 3.44 M/UL (ref 4–5.4)
SATURATED IRON: 4 % (ref 20–50)
TOTAL IRON BINDING CAPACITY: 472 UG/DL (ref 250–450)
TRANSFERRIN SERPL-MCNC: 319 MG/DL (ref 200–375)
VIT B12 SERPL-MCNC: 965 PG/ML (ref 210–950)
WBC # BLD AUTO: 8.57 K/UL (ref 3.9–12.7)

## 2023-07-21 PROCEDURE — 4010F ACE/ARB THERAPY RXD/TAKEN: CPT | Mod: HCNC,CPTII,S$GLB, | Performed by: INTERNAL MEDICINE

## 2023-07-21 PROCEDURE — 1125F AMNT PAIN NOTED PAIN PRSNT: CPT | Mod: HCNC,CPTII,S$GLB, | Performed by: INTERNAL MEDICINE

## 2023-07-21 PROCEDURE — 3066F PR DOCUMENTATION OF TREATMENT FOR NEPHROPATHY: ICD-10-PCS | Mod: HCNC,CPTII,S$GLB, | Performed by: INTERNAL MEDICINE

## 2023-07-21 PROCEDURE — 36415 COLL VENOUS BLD VENIPUNCTURE: CPT | Mod: HCNC | Performed by: INTERNAL MEDICINE

## 2023-07-21 PROCEDURE — 3078F DIAST BP <80 MM HG: CPT | Mod: HCNC,CPTII,S$GLB, | Performed by: INTERNAL MEDICINE

## 2023-07-21 PROCEDURE — 3075F PR MOST RECENT SYSTOLIC BLOOD PRESS GE 130-139MM HG: ICD-10-PCS | Mod: HCNC,CPTII,S$GLB, | Performed by: INTERNAL MEDICINE

## 2023-07-21 PROCEDURE — 3044F HG A1C LEVEL LT 7.0%: CPT | Mod: HCNC,CPTII,S$GLB, | Performed by: INTERNAL MEDICINE

## 2023-07-21 PROCEDURE — 1159F MED LIST DOCD IN RCRD: CPT | Mod: HCNC,CPTII,S$GLB, | Performed by: INTERNAL MEDICINE

## 2023-07-21 PROCEDURE — 84165 PATHOLOGIST INTERPRETATION SPE: ICD-10-PCS | Mod: 26,HCNC,, | Performed by: PATHOLOGY

## 2023-07-21 PROCEDURE — 4010F PR ACE/ARB THEARPY RXD/TAKEN: ICD-10-PCS | Mod: HCNC,CPTII,S$GLB, | Performed by: INTERNAL MEDICINE

## 2023-07-21 PROCEDURE — 1101F PT FALLS ASSESS-DOCD LE1/YR: CPT | Mod: HCNC,CPTII,S$GLB, | Performed by: INTERNAL MEDICINE

## 2023-07-21 PROCEDURE — 99999 PR PBB SHADOW E&M-EST. PATIENT-LVL V: ICD-10-PCS | Mod: PBBFAC,HCNC,, | Performed by: INTERNAL MEDICINE

## 2023-07-21 PROCEDURE — 82728 ASSAY OF FERRITIN: CPT | Mod: HCNC | Performed by: INTERNAL MEDICINE

## 2023-07-21 PROCEDURE — 3072F LOW RISK FOR RETINOPATHY: CPT | Mod: HCNC,CPTII,S$GLB, | Performed by: INTERNAL MEDICINE

## 2023-07-21 PROCEDURE — 3075F SYST BP GE 130 - 139MM HG: CPT | Mod: HCNC,CPTII,S$GLB, | Performed by: INTERNAL MEDICINE

## 2023-07-21 PROCEDURE — 3060F POS MICROALBUMINURIA REV: CPT | Mod: HCNC,CPTII,S$GLB, | Performed by: INTERNAL MEDICINE

## 2023-07-21 PROCEDURE — 3078F PR MOST RECENT DIASTOLIC BLOOD PRESSURE < 80 MM HG: ICD-10-PCS | Mod: HCNC,CPTII,S$GLB, | Performed by: INTERNAL MEDICINE

## 2023-07-21 PROCEDURE — 3044F PR MOST RECENT HEMOGLOBIN A1C LEVEL <7.0%: ICD-10-PCS | Mod: HCNC,CPTII,S$GLB, | Performed by: INTERNAL MEDICINE

## 2023-07-21 PROCEDURE — 3288F FALL RISK ASSESSMENT DOCD: CPT | Mod: HCNC,CPTII,S$GLB, | Performed by: INTERNAL MEDICINE

## 2023-07-21 PROCEDURE — 1101F PR PT FALLS ASSESS DOC 0-1 FALLS W/OUT INJ PAST YR: ICD-10-PCS | Mod: HCNC,CPTII,S$GLB, | Performed by: INTERNAL MEDICINE

## 2023-07-21 PROCEDURE — 82607 VITAMIN B-12: CPT | Mod: HCNC | Performed by: INTERNAL MEDICINE

## 2023-07-21 PROCEDURE — 3072F PR LOW RISK FOR RETINOPATHY: ICD-10-PCS | Mod: HCNC,CPTII,S$GLB, | Performed by: INTERNAL MEDICINE

## 2023-07-21 PROCEDURE — 84466 ASSAY OF TRANSFERRIN: CPT | Mod: HCNC | Performed by: INTERNAL MEDICINE

## 2023-07-21 PROCEDURE — 85025 COMPLETE CBC W/AUTO DIFF WBC: CPT | Mod: HCNC | Performed by: INTERNAL MEDICINE

## 2023-07-21 PROCEDURE — 3066F NEPHROPATHY DOC TX: CPT | Mod: HCNC,CPTII,S$GLB, | Performed by: INTERNAL MEDICINE

## 2023-07-21 PROCEDURE — 84165 PROTEIN E-PHORESIS SERUM: CPT | Mod: 26,HCNC,, | Performed by: PATHOLOGY

## 2023-07-21 PROCEDURE — 83540 ASSAY OF IRON: CPT | Mod: HCNC | Performed by: INTERNAL MEDICINE

## 2023-07-21 PROCEDURE — 99999 PR PBB SHADOW E&M-EST. PATIENT-LVL V: CPT | Mod: PBBFAC,HCNC,, | Performed by: INTERNAL MEDICINE

## 2023-07-21 PROCEDURE — 1125F PR PAIN SEVERITY QUANTIFIED, PAIN PRESENT: ICD-10-PCS | Mod: HCNC,CPTII,S$GLB, | Performed by: INTERNAL MEDICINE

## 2023-07-21 PROCEDURE — 1159F PR MEDICATION LIST DOCUMENTED IN MEDICAL RECORD: ICD-10-PCS | Mod: HCNC,CPTII,S$GLB, | Performed by: INTERNAL MEDICINE

## 2023-07-21 PROCEDURE — 84165 PROTEIN E-PHORESIS SERUM: CPT | Mod: HCNC | Performed by: INTERNAL MEDICINE

## 2023-07-21 PROCEDURE — 99214 PR OFFICE/OUTPT VISIT, EST, LEVL IV, 30-39 MIN: ICD-10-PCS | Mod: HCNC,S$GLB,, | Performed by: INTERNAL MEDICINE

## 2023-07-21 PROCEDURE — 3288F PR FALLS RISK ASSESSMENT DOCUMENTED: ICD-10-PCS | Mod: HCNC,CPTII,S$GLB, | Performed by: INTERNAL MEDICINE

## 2023-07-21 PROCEDURE — 99214 OFFICE O/P EST MOD 30 MIN: CPT | Mod: HCNC,S$GLB,, | Performed by: INTERNAL MEDICINE

## 2023-07-21 PROCEDURE — 3060F PR POS MICROALBUMINURIA RESULT DOCUMENTED/REVIEW: ICD-10-PCS | Mod: HCNC,CPTII,S$GLB, | Performed by: INTERNAL MEDICINE

## 2023-07-21 PROCEDURE — 82746 ASSAY OF FOLIC ACID SERUM: CPT | Mod: HCNC | Performed by: INTERNAL MEDICINE

## 2023-07-21 PROCEDURE — 83521 IG LIGHT CHAINS FREE EACH: CPT | Mod: HCNC | Performed by: INTERNAL MEDICINE

## 2023-07-21 RX ORDER — CILOSTAZOL 100 MG/1
TABLET ORAL
COMMUNITY
Start: 2023-04-06 | End: 2023-12-20 | Stop reason: SDUPTHER

## 2023-07-21 RX ORDER — CHLORTHALIDONE 25 MG/1
TABLET ORAL
COMMUNITY
Start: 2023-07-12

## 2023-07-21 RX ORDER — VALSARTAN 160 MG/1
160 TABLET ORAL
COMMUNITY
Start: 2023-07-09

## 2023-07-21 RX ORDER — FAMOTIDINE 40 MG/1
TABLET, FILM COATED ORAL
COMMUNITY
Start: 2023-07-14

## 2023-07-21 RX ORDER — REGADENOSON 0.08 MG/ML
INJECTION, SOLUTION INTRAVENOUS
COMMUNITY
Start: 2023-06-07

## 2023-07-21 NOTE — PROGRESS NOTES
Subjective:       Patient ID: Lucia Barlow is a 73 y.o. female.    Chief Complaint: No chief complaint on file.    HPI     New patient visit for iron deficiency anemia. Accompanied by her daughter. Normocytic anemia dates to 2 years ago. Sudden onset symptomatic anemia last month to Hgb 6.9 with MCV 75. Repeat 9.9 with MCV 78 after blood transfusion. CBC changes are a pattern of blood loss. Endoscopy and colonoscopy scheduled 3/9/2022. No gross blood loss. Reports significant fatigue and cold intolerance. Diet very rich in iron with beef and chicken liver at least twice weekly. Poor prior tolerance to oral iron due to severe constipation. 40lb weight loss, severe diarrhea, and weakness due to metformin last fall- off therapy now.      Follow-up Visit 6/3/2022  Patient improved clinically today from initial consult.  She actually does not recall our initial visit.  Seen for FAHAD- since then not on oral replacement but increased iron rich food in diet- beef and liver at least 4x weekly  CBC improved Hgb 6 to 10.5    Follow-up Visit  Recurrent severe, normocytic anemia  Reports fatigue  Denies gross blood loss  Just started OTC 65mg elemental iron with Vitamin C  EGD and Colonoscopy normal 3/2022      Review of Systems   Constitutional:  Positive for fatigue.   HENT: Negative.     Eyes: Negative.    Respiratory: Negative.     Cardiovascular: Negative.    Gastrointestinal: Negative.    Endocrine: Negative.    Genitourinary: Negative.    Musculoskeletal: Negative.    Integumentary:  Negative.   Allergic/Immunologic: Negative for environmental allergies, food allergies and immunocompromised state.   Neurological: Negative.    Hematological:  Negative for adenopathy. Does not bruise/bleed easily.   Psychiatric/Behavioral: Negative.         Objective:      Physical Exam  Vitals and nursing note reviewed.   Constitutional:       Appearance: She is well-developed.   HENT:      Head: Normocephalic and atraumatic.   Eyes:       General: No scleral icterus.     Conjunctiva/sclera: Conjunctivae normal.   Cardiovascular:      Rate and Rhythm: Normal rate.   Pulmonary:      Effort: Pulmonary effort is normal. No respiratory distress.   Abdominal:      General: There is no distension.      Palpations: Abdomen is soft.   Musculoskeletal:         General: Normal range of motion.      Cervical back: Normal range of motion and neck supple.   Skin:     General: Skin is warm and dry.   Neurological:      Mental Status: She is alert and oriented to person, place, and time.      Cranial Nerves: No cranial nerve deficit.   Psychiatric:         Behavior: Behavior normal.       Assessment:       Problem List Items Addressed This Visit    None  Visit Diagnoses       Iron deficiency anemia due to chronic blood loss    -  Primary    Relevant Orders    CBC W/ AUTO DIFFERENTIAL    FERRITIN    Iron and TIBC    VITAMIN B12    Folate    PROTEIN ELECTROPHORESIS, SERUM    Immunoglobulin Free LT Chains Blood    Other dietary vitamin B12 deficiency anemia        Relevant Orders    VITAMIN B12    Dietary folate deficiency anemia        Relevant Orders    Folate            Plan:       Continue oral OTC iron  Update CBC today with iron, ferritin, B12, folate, SPEP, and free light chains  Will need to consider repeat endoscopy if iron levels are low again    A total of 20 minutes was spent in pre-visit chart review, personal interpretation of labs and imaging, and medication review. Total visit time 30 minutes, >50 % counseling.

## 2023-07-21 NOTE — PROGRESS NOTES
Route Chart for Scheduling    BMT Chart Routing      Follow up with physician    Follow up with [facility]    Provider visit type    Infusion scheduling note    Injection scheduling note    Labs CBC, ferritin, iron and TIBC, folate and vitamin B12   Scheduling:  Preferred lab:  Lab interval:  labs today   Imaging    Pharmacy appointment    Other referrals

## 2023-07-24 LAB
ALBUMIN SERPL ELPH-MCNC: 3.56 G/DL (ref 3.35–5.55)
ALPHA1 GLOB SERPL ELPH-MCNC: 0.41 G/DL (ref 0.17–0.41)
ALPHA2 GLOB SERPL ELPH-MCNC: 1.37 G/DL (ref 0.43–0.99)
B-GLOBULIN SERPL ELPH-MCNC: 0.86 G/DL (ref 0.5–1.1)
GAMMA GLOB SERPL ELPH-MCNC: 0.7 G/DL (ref 0.67–1.58)
KAPPA LC SER QL IA: 3.62 MG/DL (ref 0.33–1.94)
KAPPA LC/LAMBDA SER IA: 1.72 (ref 0.26–1.65)
LAMBDA LC SER QL IA: 2.1 MG/DL (ref 0.57–2.63)
PROT SERPL-MCNC: 6.9 G/DL (ref 6–8.4)

## 2023-07-25 LAB — PATHOLOGIST INTERPRETATION SPE: NORMAL

## 2023-07-26 ENCOUNTER — OFFICE VISIT (OUTPATIENT)
Dept: INTERNAL MEDICINE | Facility: CLINIC | Age: 73
End: 2023-07-26
Payer: MEDICARE

## 2023-07-26 ENCOUNTER — TELEPHONE (OUTPATIENT)
Dept: INTERNAL MEDICINE | Facility: CLINIC | Age: 73
End: 2023-07-26

## 2023-07-26 ENCOUNTER — LAB VISIT (OUTPATIENT)
Dept: LAB | Facility: HOSPITAL | Age: 73
End: 2023-07-26
Payer: MEDICARE

## 2023-07-26 VITALS
DIASTOLIC BLOOD PRESSURE: 60 MMHG | OXYGEN SATURATION: 99 % | HEIGHT: 61 IN | TEMPERATURE: 98 F | HEART RATE: 100 BPM | WEIGHT: 139.56 LBS | SYSTOLIC BLOOD PRESSURE: 130 MMHG | BODY MASS INDEX: 26.35 KG/M2

## 2023-07-26 DIAGNOSIS — N30.00 ACUTE CYSTITIS WITHOUT HEMATURIA: ICD-10-CM

## 2023-07-26 DIAGNOSIS — Z91.89 PULMONARY NODULE LESS THAN 6 MM IN DIAMETER WITH HIGH RISK FOR MALIGNANT NEOPLASM: ICD-10-CM

## 2023-07-26 DIAGNOSIS — R91.1 PULMONARY NODULE LESS THAN 6 MM IN DIAMETER WITH HIGH RISK FOR MALIGNANT NEOPLASM: ICD-10-CM

## 2023-07-26 DIAGNOSIS — B37.31 YEAST VAGINITIS: ICD-10-CM

## 2023-07-26 DIAGNOSIS — C34.92 NON-SMALL CELL CANCER OF LEFT LUNG: ICD-10-CM

## 2023-07-26 DIAGNOSIS — I15.2 HYPERTENSION ASSOCIATED WITH DIABETES: ICD-10-CM

## 2023-07-26 DIAGNOSIS — Z90.2 S/P LOBECTOMY OF LUNG: ICD-10-CM

## 2023-07-26 DIAGNOSIS — Z87.891 FORMER CIGARETTE SMOKER: Chronic | ICD-10-CM

## 2023-07-26 DIAGNOSIS — R06.02 SHORTNESS OF BREATH: ICD-10-CM

## 2023-07-26 DIAGNOSIS — I25.10 CORONARY ARTERY DISEASE INVOLVING NATIVE CORONARY ARTERY OF NATIVE HEART WITHOUT ANGINA PECTORIS: ICD-10-CM

## 2023-07-26 DIAGNOSIS — E11.59 HYPERTENSION ASSOCIATED WITH DIABETES: ICD-10-CM

## 2023-07-26 DIAGNOSIS — D62 ACUTE BLOOD LOSS ANEMIA: ICD-10-CM

## 2023-07-26 DIAGNOSIS — D62 ACUTE BLOOD LOSS ANEMIA: Primary | ICD-10-CM

## 2023-07-26 LAB
BACTERIA #/AREA URNS HPF: ABNORMAL /HPF
BILIRUB UR QL STRIP: NEGATIVE
CLARITY UR: ABNORMAL
COLOR UR: YELLOW
GLUCOSE UR QL STRIP: ABNORMAL
HGB UR QL STRIP: NEGATIVE
KETONES UR QL STRIP: NEGATIVE
LEUKOCYTE ESTERASE UR QL STRIP: NEGATIVE
MICROSCOPIC COMMENT: ABNORMAL
NITRITE UR QL STRIP: NEGATIVE
PH UR STRIP: 6 [PH] (ref 5–8)
PROT UR QL STRIP: NEGATIVE
SP GR UR STRIP: 1.01 (ref 1–1.03)
SQUAMOUS #/AREA URNS HPF: 25 /HPF
URN SPEC COLLECT METH UR: ABNORMAL
WBC #/AREA URNS HPF: 6 /HPF (ref 0–5)
YEAST URNS QL MICRO: ABNORMAL

## 2023-07-26 PROCEDURE — 3060F PR POS MICROALBUMINURIA RESULT DOCUMENTED/REVIEW: ICD-10-PCS | Mod: HCNC,CPTII,S$GLB, | Performed by: PHYSICIAN ASSISTANT

## 2023-07-26 PROCEDURE — 3072F LOW RISK FOR RETINOPATHY: CPT | Mod: HCNC,CPTII,S$GLB, | Performed by: PHYSICIAN ASSISTANT

## 2023-07-26 PROCEDURE — 99999 PR PBB SHADOW E&M-EST. PATIENT-LVL V: CPT | Mod: PBBFAC,HCNC,, | Performed by: PHYSICIAN ASSISTANT

## 2023-07-26 PROCEDURE — 3288F FALL RISK ASSESSMENT DOCD: CPT | Mod: HCNC,CPTII,S$GLB, | Performed by: PHYSICIAN ASSISTANT

## 2023-07-26 PROCEDURE — 3078F DIAST BP <80 MM HG: CPT | Mod: HCNC,CPTII,S$GLB, | Performed by: PHYSICIAN ASSISTANT

## 2023-07-26 PROCEDURE — 3075F SYST BP GE 130 - 139MM HG: CPT | Mod: HCNC,CPTII,S$GLB, | Performed by: PHYSICIAN ASSISTANT

## 2023-07-26 PROCEDURE — 3288F PR FALLS RISK ASSESSMENT DOCUMENTED: ICD-10-PCS | Mod: HCNC,CPTII,S$GLB, | Performed by: PHYSICIAN ASSISTANT

## 2023-07-26 PROCEDURE — 1125F PR PAIN SEVERITY QUANTIFIED, PAIN PRESENT: ICD-10-PCS | Mod: HCNC,CPTII,S$GLB, | Performed by: PHYSICIAN ASSISTANT

## 2023-07-26 PROCEDURE — 3075F PR MOST RECENT SYSTOLIC BLOOD PRESS GE 130-139MM HG: ICD-10-PCS | Mod: HCNC,CPTII,S$GLB, | Performed by: PHYSICIAN ASSISTANT

## 2023-07-26 PROCEDURE — 3066F PR DOCUMENTATION OF TREATMENT FOR NEPHROPATHY: ICD-10-PCS | Mod: HCNC,CPTII,S$GLB, | Performed by: PHYSICIAN ASSISTANT

## 2023-07-26 PROCEDURE — 3072F PR LOW RISK FOR RETINOPATHY: ICD-10-PCS | Mod: HCNC,CPTII,S$GLB, | Performed by: PHYSICIAN ASSISTANT

## 2023-07-26 PROCEDURE — 99214 PR OFFICE/OUTPT VISIT, EST, LEVL IV, 30-39 MIN: ICD-10-PCS | Mod: HCNC,S$GLB,, | Performed by: PHYSICIAN ASSISTANT

## 2023-07-26 PROCEDURE — 1159F MED LIST DOCD IN RCRD: CPT | Mod: HCNC,CPTII,S$GLB, | Performed by: PHYSICIAN ASSISTANT

## 2023-07-26 PROCEDURE — 99214 OFFICE O/P EST MOD 30 MIN: CPT | Mod: HCNC,S$GLB,, | Performed by: PHYSICIAN ASSISTANT

## 2023-07-26 PROCEDURE — 99999 PR PBB SHADOW E&M-EST. PATIENT-LVL V: ICD-10-PCS | Mod: PBBFAC,HCNC,, | Performed by: PHYSICIAN ASSISTANT

## 2023-07-26 PROCEDURE — 4010F PR ACE/ARB THEARPY RXD/TAKEN: ICD-10-PCS | Mod: HCNC,CPTII,S$GLB, | Performed by: PHYSICIAN ASSISTANT

## 2023-07-26 PROCEDURE — 3078F PR MOST RECENT DIASTOLIC BLOOD PRESSURE < 80 MM HG: ICD-10-PCS | Mod: HCNC,CPTII,S$GLB, | Performed by: PHYSICIAN ASSISTANT

## 2023-07-26 PROCEDURE — 1101F PT FALLS ASSESS-DOCD LE1/YR: CPT | Mod: HCNC,CPTII,S$GLB, | Performed by: PHYSICIAN ASSISTANT

## 2023-07-26 PROCEDURE — 81000 URINALYSIS NONAUTO W/SCOPE: CPT | Mod: HCNC | Performed by: PHYSICIAN ASSISTANT

## 2023-07-26 PROCEDURE — 87086 URINE CULTURE/COLONY COUNT: CPT | Mod: HCNC | Performed by: PHYSICIAN ASSISTANT

## 2023-07-26 PROCEDURE — 1125F AMNT PAIN NOTED PAIN PRSNT: CPT | Mod: HCNC,CPTII,S$GLB, | Performed by: PHYSICIAN ASSISTANT

## 2023-07-26 PROCEDURE — 4010F ACE/ARB THERAPY RXD/TAKEN: CPT | Mod: HCNC,CPTII,S$GLB, | Performed by: PHYSICIAN ASSISTANT

## 2023-07-26 PROCEDURE — 1160F RVW MEDS BY RX/DR IN RCRD: CPT | Mod: HCNC,CPTII,S$GLB, | Performed by: PHYSICIAN ASSISTANT

## 2023-07-26 PROCEDURE — 1160F PR REVIEW ALL MEDS BY PRESCRIBER/CLIN PHARMACIST DOCUMENTED: ICD-10-PCS | Mod: HCNC,CPTII,S$GLB, | Performed by: PHYSICIAN ASSISTANT

## 2023-07-26 PROCEDURE — 3060F POS MICROALBUMINURIA REV: CPT | Mod: HCNC,CPTII,S$GLB, | Performed by: PHYSICIAN ASSISTANT

## 2023-07-26 PROCEDURE — 3044F PR MOST RECENT HEMOGLOBIN A1C LEVEL <7.0%: ICD-10-PCS | Mod: HCNC,CPTII,S$GLB, | Performed by: PHYSICIAN ASSISTANT

## 2023-07-26 PROCEDURE — 1159F PR MEDICATION LIST DOCUMENTED IN MEDICAL RECORD: ICD-10-PCS | Mod: HCNC,CPTII,S$GLB, | Performed by: PHYSICIAN ASSISTANT

## 2023-07-26 PROCEDURE — 3066F NEPHROPATHY DOC TX: CPT | Mod: HCNC,CPTII,S$GLB, | Performed by: PHYSICIAN ASSISTANT

## 2023-07-26 PROCEDURE — 1101F PR PT FALLS ASSESS DOC 0-1 FALLS W/OUT INJ PAST YR: ICD-10-PCS | Mod: HCNC,CPTII,S$GLB, | Performed by: PHYSICIAN ASSISTANT

## 2023-07-26 PROCEDURE — 3044F HG A1C LEVEL LT 7.0%: CPT | Mod: HCNC,CPTII,S$GLB, | Performed by: PHYSICIAN ASSISTANT

## 2023-07-26 RX ORDER — FLUCONAZOLE 150 MG/1
150 TABLET ORAL
Qty: 2 TABLET | Refills: 0 | Status: SHIPPED | OUTPATIENT
Start: 2023-07-26 | End: 2023-08-03

## 2023-07-26 RX ORDER — CEPHALEXIN 500 MG/1
500 CAPSULE ORAL EVERY 12 HOURS
Qty: 20 CAPSULE | Refills: 0 | Status: SHIPPED | OUTPATIENT
Start: 2023-07-26 | End: 2023-08-05

## 2023-07-26 NOTE — PROGRESS NOTES
Subjective:      Patient ID: Lucia Barlow is a 73 y.o. female.    Chief Complaint: Follow-up    HPI  Here today for a follow up to discuss her recent anemia. Was supposed to have a cardiac cath with cardiology but was unable to due to acute anemia. Pt had similar anemia last year. EGD last year was negative for any bleeding.   Taking over the counter iron supplement with vitamin C once daily. Would like to know how much she needs to be taking and if over the counter is ok.     Reports leg cramps, shortness of breath, and fatigue.   Denies any vag bleeding, blood in stool, blood in the urine. Does eat meat.     Patient Active Problem List   Diagnosis    Former heavy cigarette smoker    Hypertension associated with diabetes    Dyslipidemia associated with type 2 diabetes mellitus    Osteoporosis    Atherosclerosis of native artery of both lower extremities with intermittent claudication    S/P peripheral artery angioplasty    History of adenomatous polyp of colon    Nonrheumatic aortic valve stenosis    Atherosclerosis of aorta    Coronary artery calcification    PVD (peripheral vascular disease)    Type 2 diabetes mellitus with peripheral vascular disease    Gastroesophageal reflux disease without esophagitis    Type 2 diabetes mellitus with stage 3a chronic kidney disease, without long-term current use of insulin    Type 2 diabetes mellitus with microalbuminuria, without long-term current use of insulin    Chronic obstructive pulmonary disease    Seasonal allergic rhinitis due to pollen    Tension headache    Tortuous aorta    Diverticulosis    Non-small cell cancer of left lung    IVANIA (obstructive sleep apnea)    Pulmonary nodule less than 6 mm in diameter with high risk for malignant neoplasm - RIght lower lobe    Iron deficiency anemia secondary to inadequate dietary iron intake    Pulmonary nodule, left - 8 mm , high risk for lung cancer    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    High  pulmonary arterial pressure    Other reduced mobility    S/P lobectomy of lung-  Hx robotic lung lobectomy 11/19/2020    Abnormal EKG    Shortness of breath    Coronary artery disease involving native coronary artery of native heart without angina pectoris         Current Outpatient Medications:     albuterol (PROVENTIL/VENTOLIN HFA) 90 mcg/actuation inhaler, Inhale 2 puffs into the lungs every 4 (four) hours as needed for Wheezing or Shortness of Breath., Disp: 54 g, Rfl: 3    amLODIPine (NORVASC) 10 MG tablet, Take 1 tablet (10 mg total) by mouth once daily., Disp: 90 tablet, Rfl: 3    aspirin (ECOTRIN) 81 MG EC tablet, Take 1 tablet (81 mg total) by mouth once daily., Disp: 90 tablet, Rfl: 4    butalbital-acetaminophen-caffeine -40 mg (FIORICET, ESGIC) -40 mg per tablet, TAKE 1 TABLET BY MOUTH 3 (THREE) TIMES DAILY AS NEEDED FOR HEADACHES. (MAXIMUM OF 15 TABLETS PER MONTH), Disp: 30 tablet, Rfl: 3    calcium-vitamin D 600 mg-10 mcg (400 unit) Tab, Take 2 tablets by mouth once daily., Disp: 180 tablet, Rfl: 4    cetirizine (ZYRTEC) 10 MG tablet, Take 1 tablet (10 mg total) by mouth once daily. For sinus congestion, Disp: 90 tablet, Rfl: 4    chlorthalidone (HYGROTEN) 25 MG Tab, Take by mouth., Disp: , Rfl:     cilostazoL (PLETAL) 100 MG Tab, , Disp: , Rfl:     cilostazoL (PLETAL) 50 MG Tab, Take 1 tablet (50 mg total) by mouth 2 (two) times daily before meals., Disp: 180 tablet, Rfl: 3    EPINEPHrine (EPIPEN) 0.3 mg/0.3 mL AtIn, INJECT INTO THE MUSCLE ONE TIME FOR 1 DOSE, AS DIRECTED, Disp: 2 each, Rfl: 3    ezetimibe (ZETIA) 10 mg tablet, Take 1 tablet (10 mg total) by mouth once daily., Disp: 90 tablet, Rfl: 2    famotidine (PEPCID) 40 MG tablet, Take by mouth., Disp: , Rfl:     fexofenadine (ALLEGRA) 180 MG tablet, TAKE 1 TABLET ONE TIME DAILY, Disp: 90 tablet, Rfl: 3    fluconazole (DIFLUCAN) 150 MG Tab, TAKE 1 TABLET AS NEEDED FOR YEAST INFECTION THAT DOES NOT RESPOND TO MONISTAT. DO NOT TAKE MORE  THAN 1 TABLET PER WEEK, Disp: 12 tablet, Rfl: 0    fluticasone propionate (FLONASE) 50 mcg/actuation nasal spray, 2 sprays (100 mcg total) by Each Nostril route once daily., Disp: 32 g, Rfl: 5    furosemide (LASIX) 20 MG tablet, Take 1 tablet (20 mg total) by mouth once daily., Disp: 90 tablet, Rfl: 3    ipratropium (ATROVENT) 21 mcg (0.03 %) nasal spray, 2 sprays by Each Nostril route 3 (three) times daily., Disp: 60 mL, Rfl: 5    ipratropium-albuteroL (COMBIVENT)  mcg/actuation inhaler, Inhale 2 puffs into the lungs every 4 (four) hours as needed for Wheezing or Shortness of Breath. Rescue, Disp: 12 g, Rfl: 3    LEXISCAN 0.4 mg/5 mL Syrg, , Disp: , Rfl:     miconazole (MICOTIN) 2 % vaginal cream, Place 1 applicator vaginally every evening. Use as directed, Disp: 135 g, Rfl: 2    montelukast (SINGULAIR) 10 mg tablet, Take 1 tablet (10 mg total) by mouth every evening., Disp: 90 tablet, Rfl: 3    ondansetron (ZOFRAN-ODT) 4 MG TbDL, 1-2 tablets PO every 6 hours as needed for nausea/vomiting while completing bowel prep, Disp: 4 tablet, Rfl: 0    pantoprazole (PROTONIX) 40 MG tablet, TAKE 1 TABLET ONE TIME DAILY, Disp: 90 tablet, Rfl: 2    potassium chloride SA (K-DUR,KLOR-CON) 20 MEQ tablet, TAKE 1 TABLET ONE TIME DAILY, Disp: 90 tablet, Rfl: 3    predniSONE (DELTASONE) 20 MG tablet, Prednisone 60 mg/ day for 3 days, 40 mg/day for 3 days,20 mg/ day for 3 days, (1/2 tablet )10 mg a day for 3 days., Disp: 20 tablet, Rfl: 0    rosuvastatin (CRESTOR) 20 MG tablet, Take 1 tablet (20 mg total) by mouth every evening., Disp: 90 tablet, Rfl: 3    sod sulf-pot chloride-mag sulf (SUTAB) 1.479-0.188- 0.225 gram tablet, Take 12 tablets by mouth once daily. Take according to package instructions with indicated amount of water., Disp: 24 tablet, Rfl: 0    valsartan (DIOVAN) 160 MG tablet, Take 160 mg by mouth., Disp: , Rfl:     empagliflozin (JARDIANCE) 25 mg tablet, Take 1 tablet (25 mg total) by mouth once daily. (Patient  "not taking: Reported on 7/26/2023), Disp: 90 tablet, Rfl: 2    levocetirizine (XYZAL) 5 MG tablet, Take 1 tablet (5 mg total) by mouth every evening., Disp: 30 tablet, Rfl: 11    varicella-zoster [id number], PF, (SHINGRIX) 50 mcg/0.5 mL injection, Inject 0.5 mL (one dose) into muscle now; give second dose at least 2 months later (Patient not taking: Reported on 7/26/2023), Disp: 0.5 mL, Rfl: 1    Review of Systems   Constitutional:  Positive for activity change and fatigue. Negative for appetite change, chills, diaphoresis, fever and unexpected weight change.   HENT: Negative.  Negative for congestion, hearing loss, postnasal drip, rhinorrhea, sore throat, trouble swallowing and voice change.    Eyes: Negative.  Negative for visual disturbance.   Respiratory:  Positive for cough, chest tightness and shortness of breath. Negative for choking, wheezing and stridor.    Cardiovascular:  Negative for chest pain, palpitations and leg swelling.   Gastrointestinal:  Negative for abdominal distention, abdominal pain, blood in stool, constipation, diarrhea, nausea and vomiting.   Endocrine: Negative for cold intolerance, heat intolerance, polydipsia and polyuria.   Genitourinary: Negative.  Negative for difficulty urinating and frequency.   Musculoskeletal:  Negative for arthralgias, back pain, gait problem, joint swelling and myalgias.   Skin:  Negative for color change, pallor, rash and wound.   Neurological:  Negative for dizziness, tremors, weakness, light-headedness, numbness and headaches.   Hematological:  Negative for adenopathy.   Psychiatric/Behavioral:  Negative for behavioral problems, confusion, self-injury, sleep disturbance and suicidal ideas. The patient is not nervous/anxious.    Objective:   /60 (BP Location: Left arm, Patient Position: Sitting, BP Method: Medium (Manual))   Pulse 100   Temp 97.9 °F (36.6 °C) (Tympanic)   Ht 5' 1" (1.549 m)   Wt 63.3 kg (139 lb 8.8 oz)   SpO2 99%   BMI 26.37 kg/m² "     Physical Exam  Vitals reviewed.   Constitutional:       General: She is not in acute distress.     Appearance: Normal appearance. She is well-developed. She is not ill-appearing, toxic-appearing or diaphoretic.   HENT:      Head: Normocephalic and atraumatic.      Right Ear: External ear normal.      Left Ear: External ear normal.      Nose: Nose normal.   Eyes:      Conjunctiva/sclera: Conjunctivae normal.      Pupils: Pupils are equal, round, and reactive to light.   Cardiovascular:      Rate and Rhythm: Normal rate and regular rhythm.      Heart sounds: Normal heart sounds. No murmur heard.    No friction rub. No gallop.   Pulmonary:      Effort: Pulmonary effort is normal. No respiratory distress.      Breath sounds: Normal breath sounds. No wheezing or rales.   Chest:      Chest wall: No tenderness.   Abdominal:      General: There is no distension.      Palpations: Abdomen is soft.      Tenderness: There is no abdominal tenderness.   Musculoskeletal:         General: Normal range of motion.      Cervical back: Normal range of motion and neck supple.   Lymphadenopathy:      Cervical: No cervical adenopathy.   Skin:     General: Skin is warm and dry.      Capillary Refill: Capillary refill takes less than 2 seconds.      Findings: No rash.   Neurological:      Mental Status: She is alert and oriented to person, place, and time.      Motor: No weakness.      Coordination: Coordination normal.      Gait: Gait normal.   Psychiatric:         Mood and Affect: Mood normal.         Behavior: Behavior normal.         Thought Content: Thought content normal.         Judgment: Judgment normal.     Lab Visit on 07/21/2023   Component Date Value Ref Range Status    WBC 07/21/2023 8.57  3.90 - 12.70 K/uL Final    RBC 07/21/2023 3.44 (L)  4.00 - 5.40 M/uL Final    Hemoglobin 07/21/2023 8.6 (L)  12.0 - 16.0 g/dL Final    Hematocrit 07/21/2023 28.5 (L)  37.0 - 48.5 % Final    MCV 07/21/2023 83  82 - 98 fL Final    MCH  07/21/2023 25.0 (L)  27.0 - 31.0 pg Final    MCHC 07/21/2023 30.2 (L)  32.0 - 36.0 g/dL Final    RDW 07/21/2023 18.1 (H)  11.5 - 14.5 % Final    Platelets 07/21/2023 417  150 - 450 K/uL Final    MPV 07/21/2023 9.7  9.2 - 12.9 fL Final    Immature Granulocytes 07/21/2023 0.2  0.0 - 0.5 % Final    Gran # (ANC) 07/21/2023 5.6  1.8 - 7.7 K/uL Final    Immature Grans (Abs) 07/21/2023 0.02  0.00 - 0.04 K/uL Final    Comment: Mild elevation in immature granulocytes is non specific and   can be seen in a variety of conditions including stress response,   acute inflammation, trauma and pregnancy. Correlation with other   laboratory and clinical findings is essential.      Lymph # 07/21/2023 2.3  1.0 - 4.8 K/uL Final    Mono # 07/21/2023 0.5  0.3 - 1.0 K/uL Final    Eos # 07/21/2023 0.2  0.0 - 0.5 K/uL Final    Baso # 07/21/2023 0.05  0.00 - 0.20 K/uL Final    nRBC 07/21/2023 0  0 /100 WBC Final    Gran % 07/21/2023 65.4  38.0 - 73.0 % Final    Lymph % 07/21/2023 26.3  18.0 - 48.0 % Final    Mono % 07/21/2023 5.6  4.0 - 15.0 % Final    Eosinophil % 07/21/2023 1.9  0.0 - 8.0 % Final    Basophil % 07/21/2023 0.6  0.0 - 1.9 % Final    Differential Method 07/21/2023 Automated   Final    Ferritin 07/21/2023 29  20.0 - 300.0 ng/mL Final    Iron 07/21/2023 21 (L)  30 - 160 ug/dL Final    Transferrin 07/21/2023 319  200 - 375 mg/dL Final    TIBC 07/21/2023 472 (H)  250 - 450 ug/dL Final    Saturated Iron 07/21/2023 4 (L)  20 - 50 % Final    Vitamin B-12 07/21/2023 965 (H)  210 - 950 pg/mL Final    Folate 07/21/2023 16.1  4.0 - 24.0 ng/mL Final    Protein, Serum 07/21/2023 6.9  6.0 - 8.4 g/dL Final    Comment: Serum protein electrophoresis and immunofixation results should be   interpreted in clinical context in that some therapeutic agents can   result   in false positive results (example, daratumumab). Correlation with   the   patient s therapeutic regimen is required.      Albumin 07/21/2023 3.56  3.35 - 5.55 g/dL Final    Alpha-1  07/21/2023 0.41  0.17 - 0.41 g/dL Final    Alpha-2 07/21/2023 1.37 (H)  0.43 - 0.99 g/dL Final    Beta 07/21/2023 0.86  0.50 - 1.10 g/dL Final    Gamma 07/21/2023 0.70  0.67 - 1.58 g/dL Final    Kappa Free Light Chains 07/21/2023 3.62 (H)  0.33 - 1.94 mg/dL Final    Lambda Free Light Chains 07/21/2023 2.10  0.57 - 2.63 mg/dL Final    Kappa/Lambda FLC Ratio 07/21/2023 1.72 (H)  0.26 - 1.65 Final    Comment: Undetected antigen excess is a rare event but cannot   be excluded. If these free light chain results do not   agree with other clinical or laboratory findings or   if the sample is from a patient that has previously   demonstrated antigen excess, discuss with the testing   laboratory.   Results should always be interpreted in conjunction   with other laboratory tests and clinical evidence.      Pathologist Interpretation SPE 07/21/2023 REVIEWED   Final    Comment:   Electronically reviewed and signed by:  Luz Clinton MD  Signed on 07/25/23 at 09:37  Normal total protein. Normal gamma globulins are decreased. There is   a faint paraprotein band in gamma = 0.19 g/dL, previously 0.18 g/dL.      Lab Visit on 07/14/2023   Component Date Value Ref Range Status    aPTT 07/14/2023 26.8  21.0 - 32.0 sec Final    Comment: Refer to local heparin nomogram for intensity/dose specific   therapeutic   range.      Sodium 07/14/2023 143  136 - 145 mmol/L Final    Potassium 07/14/2023 4.2  3.5 - 5.1 mmol/L Final    Chloride 07/14/2023 112 (H)  95 - 110 mmol/L Final    CO2 07/14/2023 23  23 - 29 mmol/L Final    Glucose 07/14/2023 114 (H)  70 - 110 mg/dL Final    BUN 07/14/2023 11  8 - 23 mg/dL Final    Creatinine 07/14/2023 1.2  0.5 - 1.4 mg/dL Final    Calcium 07/14/2023 9.1  8.7 - 10.5 mg/dL Final    Anion Gap 07/14/2023 8  8 - 16 mmol/L Final    eGFR 07/14/2023 47.2 (A)  >60 mL/min/1.73 m^2 Final    WBC 07/14/2023 7.95  3.90 - 12.70 K/uL Final    RBC 07/14/2023 3.34 (L)  4.00 - 5.40 M/uL Final    Hemoglobin 07/14/2023  8.4 (L)  12.0 - 16.0 g/dL Final    Hematocrit 07/14/2023 28.6 (L)  37.0 - 48.5 % Final    MCV 07/14/2023 86  82 - 98 fL Final    MCH 07/14/2023 25.1 (L)  27.0 - 31.0 pg Final    MCHC 07/14/2023 29.4 (L)  32.0 - 36.0 g/dL Final    RDW 07/14/2023 18.3 (H)  11.5 - 14.5 % Final    Platelets 07/14/2023 404  150 - 450 K/uL Final    MPV 07/14/2023 10.1  9.2 - 12.9 fL Final    Immature Granulocytes 07/14/2023 0.3  0.0 - 0.5 % Final    Gran # (ANC) 07/14/2023 5.2  1.8 - 7.7 K/uL Final    Immature Grans (Abs) 07/14/2023 0.02  0.00 - 0.04 K/uL Final    Comment: Mild elevation in immature granulocytes is non specific and   can be seen in a variety of conditions including stress response,   acute inflammation, trauma and pregnancy. Correlation with other   laboratory and clinical findings is essential.      Lymph # 07/14/2023 1.8  1.0 - 4.8 K/uL Final    Mono # 07/14/2023 0.6  0.3 - 1.0 K/uL Final    Eos # 07/14/2023 0.3  0.0 - 0.5 K/uL Final    Baso # 07/14/2023 0.05  0.00 - 0.20 K/uL Final    nRBC 07/14/2023 0  0 /100 WBC Final    Gran % 07/14/2023 65.5  38.0 - 73.0 % Final    Lymph % 07/14/2023 22.1  18.0 - 48.0 % Final    Mono % 07/14/2023 7.9  4.0 - 15.0 % Final    Eosinophil % 07/14/2023 3.6  0.0 - 8.0 % Final    Basophil % 07/14/2023 0.6  0.0 - 1.9 % Final    Differential Method 07/14/2023 Automated   Final    Prothrombin Time 07/14/2023 10.3  9.0 - 12.5 sec Final    INR 07/14/2023 1.0  0.8 - 1.2 Final    Comment: Coumadin Therapy:  2.0 - 3.0 for INR for all indicators except mechanical heart valves  and antiphospholipid syndromes which should use 2.5 - 3.5.     Lab Visit on 06/28/2023   Component Date Value Ref Range Status    Sodium 06/28/2023 143  136 - 145 mmol/L Final    Potassium 06/28/2023 4.1  3.5 - 5.1 mmol/L Final    Chloride 06/28/2023 104  95 - 110 mmol/L Final    CO2 06/28/2023 25  23 - 29 mmol/L Final    Glucose 06/28/2023 119 (H)  70 - 110 mg/dL Final    BUN 06/28/2023 17  8 - 23 mg/dL Final    Creatinine  06/28/2023 1.1  0.5 - 1.4 mg/dL Final    Calcium 06/28/2023 9.6  8.7 - 10.5 mg/dL Final    Total Protein 06/28/2023 7.3  6.0 - 8.4 g/dL Final    Albumin 06/28/2023 3.2 (L)  3.5 - 5.2 g/dL Final    Total Bilirubin 06/28/2023 0.2  0.1 - 1.0 mg/dL Final    Comment: For infants and newborns, interpretation of results should be based  on gestational age, weight and in agreement with clinical  observations.    Premature Infant recommended reference ranges:  Up to 24 hours.............<8.0 mg/dL  Up to 48 hours............<12.0 mg/dL  3-5 days..................<15.0 mg/dL  6-29 days.................<15.0 mg/dL      Alkaline Phosphatase 06/28/2023 84  55 - 135 U/L Final    AST 06/28/2023 20  10 - 40 U/L Final    ALT 06/28/2023 11  10 - 44 U/L Final    eGFR 06/28/2023 52.4 (A)  >60 mL/min/1.73 m^2 Final    Anion Gap 06/28/2023 14  8 - 16 mmol/L Final    Cholesterol 06/28/2023 146  120 - 199 mg/dL Final    Comment: The National Cholesterol Education Program (NCEP) has set the  following guidelines (reference ranges) for Cholesterol:  Optimal.....................<200 mg/dL  Borderline High.............200-239 mg/dL  High........................> or = 240 mg/dL      Triglycerides 06/28/2023 69  30 - 150 mg/dL Final    Comment: The National Cholesterol Education Program (NCEP) has set the  following guidelines (reference values) for triglycerides:  Normal......................<150 mg/dL  Borderline High.............150-199 mg/dL  High........................200-499 mg/dL      HDL 06/28/2023 58  40 - 75 mg/dL Final    Comment: The National Cholesterol Education Program (NCEP) has set the  following guidelines (reference values) for HDL Cholesterol:  Low...............<40 mg/dL  Optimal...........>60 mg/dL      LDL Cholesterol 06/28/2023 74.2  63.0 - 159.0 mg/dL Final    Comment: The National Cholesterol Education Program (NCEP) has set the  following guidelines (reference values) for LDL  Cholesterol:  Optimal.......................<130 mg/dL  Borderline High...............130-159 mg/dL  High..........................160-189 mg/dL  Very High.....................>190 mg/dL      HDL/Cholesterol Ratio 06/28/2023 39.7  20.0 - 50.0 % Final    Total Cholesterol/HDL Ratio 06/28/2023 2.5  2.0 - 5.0 Final    Non-HDL Cholesterol 06/28/2023 88  mg/dL Final    Comment: Risk category and Non-HDL cholesterol goals:  Coronary heart disease (CHD)or equivalent (10-year risk of CHD >20%):  Non-HDL cholesterol goal     <130 mg/dL  Two or more CHD risk factors and 10-year risk of CHD <= 20%:  Non-HDL cholesterol goal     <160 mg/dL  0 to 1 CHD risk factor:  Non-HDL cholesterol goal     <190 mg/dL      Hemoglobin A1C 06/28/2023 6.4 (H)  4.0 - 5.6 % Final    Comment: ADA Screening Guidelines:  5.7-6.4%  Consistent with prediabetes  >or=6.5%  Consistent with diabetes    High levels of fetal hemoglobin interfere with the HbA1C  assay. Heterozygous hemoglobin variants (HbS, HgC, etc)do  not significantly interfere with this assay.   However, presence of multiple variants may affect accuracy.      Estimated Avg Glucose 06/28/2023 137 (H)  68 - 131 mg/dL Final       Assessment:     1. Acute blood loss anemia    2. Coronary artery disease involving native coronary artery of native heart without angina pectoris    3. Shortness of breath    4. S/P lobectomy of lung-  Hx robotic lung lobectomy 11/19/2020    5. Pulmonary nodule less than 6 mm in diameter with high risk for malignant neoplasm - RIght lower lobe    6. Non-small cell cancer of left lung    7. Hypertension associated with diabetes    8. Former heavy cigarette smoker      Plan:   Acute blood loss anemia  -     CBC Auto Differential; Future; Expected date: 08/26/2023  -     Ferritin; Future; Expected date: 08/26/2023  -     IRON AND TIBC; Future; Expected date: 08/26/2023  -     Urinalysis; Future; Expected date: 07/26/2023  -     Occult blood x 1, stool; Future;  Expected date: 07/26/2023  -     Occult blood x 1, stool; Future; Expected date: 07/26/2023  -     Occult blood x 1, stool; Future; Expected date: 07/26/2023    Coronary artery disease involving native coronary artery of native heart without angina pectoris    Shortness of breath    S/P lobectomy of lung-  Hx robotic lung lobectomy 11/19/2020    Pulmonary nodule less than 6 mm in diameter with high risk for malignant neoplasm - RIght lower lobe    Non-small cell cancer of left lung    Hypertension associated with diabetes    Former heavy cigarette smoker      -continue 65mg iron tablet otc with vitamin C. Try taking BID.   It is best to take iron supplements on an empty stomach. One hour before or two hours after meals. If this causes you some abdominal discomfort, ok to take with a small amount of food. Do not take iron with TUMs, calcium, milk, or dairy products because this can decrease the efficacy of the medication.   -recheck cbc in 1 month    -stool for blood asap  -will message hem/onc to see what their recommendations are as well.     Follow up in about 4 weeks (around 8/23/2023), or if symptoms worsen or fail to improve.

## 2023-07-28 LAB — BACTERIA UR CULT: NORMAL

## 2023-08-02 ENCOUNTER — HOSPITAL ENCOUNTER (OUTPATIENT)
Dept: RADIOLOGY | Facility: HOSPITAL | Age: 73
Discharge: HOME | End: 2023-08-02
Attending: ORTHOPAEDIC SURGERY
Payer: MEDICARE

## 2023-08-02 ENCOUNTER — OFFICE VISIT (OUTPATIENT)
Dept: ORTHOPEDICS | Facility: CLINIC | Age: 73
End: 2023-08-02
Payer: MEDICARE

## 2023-08-02 VITALS — WEIGHT: 139.56 LBS | HEIGHT: 61 IN | BODY MASS INDEX: 26.35 KG/M2

## 2023-08-02 DIAGNOSIS — M79.642 BILATERAL HAND PAIN: ICD-10-CM

## 2023-08-02 DIAGNOSIS — M79.642 BILATERAL HAND PAIN: Primary | ICD-10-CM

## 2023-08-02 DIAGNOSIS — M18.12 ARTHRITIS OF CARPOMETACARPAL (CMC) JOINT OF LEFT THUMB: Primary | ICD-10-CM

## 2023-08-02 DIAGNOSIS — M79.641 BILATERAL HAND PAIN: ICD-10-CM

## 2023-08-02 DIAGNOSIS — M79.641 BILATERAL HAND PAIN: Primary | ICD-10-CM

## 2023-08-02 PROCEDURE — 3060F POS MICROALBUMINURIA REV: CPT | Mod: HCNC,CPTII,S$GLB, | Performed by: ORTHOPAEDIC SURGERY

## 2023-08-02 PROCEDURE — 1159F MED LIST DOCD IN RCRD: CPT | Mod: HCNC,CPTII,S$GLB, | Performed by: ORTHOPAEDIC SURGERY

## 2023-08-02 PROCEDURE — 99999 PR PBB SHADOW E&M-EST. PATIENT-LVL IV: CPT | Mod: PBBFAC,HCNC,, | Performed by: ORTHOPAEDIC SURGERY

## 2023-08-02 PROCEDURE — 3044F PR MOST RECENT HEMOGLOBIN A1C LEVEL <7.0%: ICD-10-PCS | Mod: HCNC,CPTII,S$GLB, | Performed by: ORTHOPAEDIC SURGERY

## 2023-08-02 PROCEDURE — 1125F AMNT PAIN NOTED PAIN PRSNT: CPT | Mod: HCNC,CPTII,S$GLB, | Performed by: ORTHOPAEDIC SURGERY

## 2023-08-02 PROCEDURE — 99213 OFFICE O/P EST LOW 20 MIN: CPT | Mod: HCNC,25,S$GLB, | Performed by: ORTHOPAEDIC SURGERY

## 2023-08-02 PROCEDURE — 3072F LOW RISK FOR RETINOPATHY: CPT | Mod: HCNC,CPTII,S$GLB, | Performed by: ORTHOPAEDIC SURGERY

## 2023-08-02 PROCEDURE — 99213 PR OFFICE/OUTPT VISIT, EST, LEVL III, 20-29 MIN: ICD-10-PCS | Mod: HCNC,25,S$GLB, | Performed by: ORTHOPAEDIC SURGERY

## 2023-08-02 PROCEDURE — 3060F PR POS MICROALBUMINURIA RESULT DOCUMENTED/REVIEW: ICD-10-PCS | Mod: HCNC,CPTII,S$GLB, | Performed by: ORTHOPAEDIC SURGERY

## 2023-08-02 PROCEDURE — 73130 X-RAY EXAM OF HAND: CPT | Mod: TC,50,HCNC

## 2023-08-02 PROCEDURE — 1160F PR REVIEW ALL MEDS BY PRESCRIBER/CLIN PHARMACIST DOCUMENTED: ICD-10-PCS | Mod: HCNC,CPTII,S$GLB, | Performed by: ORTHOPAEDIC SURGERY

## 2023-08-02 PROCEDURE — 1125F PR PAIN SEVERITY QUANTIFIED, PAIN PRESENT: ICD-10-PCS | Mod: HCNC,CPTII,S$GLB, | Performed by: ORTHOPAEDIC SURGERY

## 2023-08-02 PROCEDURE — 1101F PR PT FALLS ASSESS DOC 0-1 FALLS W/OUT INJ PAST YR: ICD-10-PCS | Mod: HCNC,CPTII,S$GLB, | Performed by: ORTHOPAEDIC SURGERY

## 2023-08-02 PROCEDURE — 20600 DRAIN/INJ JOINT/BURSA W/O US: CPT | Mod: HCNC,LT,S$GLB, | Performed by: ORTHOPAEDIC SURGERY

## 2023-08-02 PROCEDURE — 4010F PR ACE/ARB THEARPY RXD/TAKEN: ICD-10-PCS | Mod: HCNC,CPTII,S$GLB, | Performed by: ORTHOPAEDIC SURGERY

## 2023-08-02 PROCEDURE — 99999 PR PBB SHADOW E&M-EST. PATIENT-LVL IV: ICD-10-PCS | Mod: PBBFAC,HCNC,, | Performed by: ORTHOPAEDIC SURGERY

## 2023-08-02 PROCEDURE — 3288F FALL RISK ASSESSMENT DOCD: CPT | Mod: HCNC,CPTII,S$GLB, | Performed by: ORTHOPAEDIC SURGERY

## 2023-08-02 PROCEDURE — 1101F PT FALLS ASSESS-DOCD LE1/YR: CPT | Mod: HCNC,CPTII,S$GLB, | Performed by: ORTHOPAEDIC SURGERY

## 2023-08-02 PROCEDURE — 20600 SMALL JOINT ASPIRATION/INJECTION: L THUMB CMC: ICD-10-PCS | Mod: HCNC,LT,S$GLB, | Performed by: ORTHOPAEDIC SURGERY

## 2023-08-02 PROCEDURE — 73130 XR HAND COMPLETE 3 VIEWS BILATERAL: ICD-10-PCS | Mod: 26,50,HCNC, | Performed by: RADIOLOGY

## 2023-08-02 PROCEDURE — 3066F NEPHROPATHY DOC TX: CPT | Mod: HCNC,CPTII,S$GLB, | Performed by: ORTHOPAEDIC SURGERY

## 2023-08-02 PROCEDURE — 73130 X-RAY EXAM OF HAND: CPT | Mod: 26,50,HCNC, | Performed by: RADIOLOGY

## 2023-08-02 PROCEDURE — 1160F RVW MEDS BY RX/DR IN RCRD: CPT | Mod: HCNC,CPTII,S$GLB, | Performed by: ORTHOPAEDIC SURGERY

## 2023-08-02 PROCEDURE — 1159F PR MEDICATION LIST DOCUMENTED IN MEDICAL RECORD: ICD-10-PCS | Mod: HCNC,CPTII,S$GLB, | Performed by: ORTHOPAEDIC SURGERY

## 2023-08-02 PROCEDURE — 3044F HG A1C LEVEL LT 7.0%: CPT | Mod: HCNC,CPTII,S$GLB, | Performed by: ORTHOPAEDIC SURGERY

## 2023-08-02 PROCEDURE — 4010F ACE/ARB THERAPY RXD/TAKEN: CPT | Mod: HCNC,CPTII,S$GLB, | Performed by: ORTHOPAEDIC SURGERY

## 2023-08-02 PROCEDURE — 3066F PR DOCUMENTATION OF TREATMENT FOR NEPHROPATHY: ICD-10-PCS | Mod: HCNC,CPTII,S$GLB, | Performed by: ORTHOPAEDIC SURGERY

## 2023-08-02 PROCEDURE — 3288F PR FALLS RISK ASSESSMENT DOCUMENTED: ICD-10-PCS | Mod: HCNC,CPTII,S$GLB, | Performed by: ORTHOPAEDIC SURGERY

## 2023-08-02 PROCEDURE — 3072F PR LOW RISK FOR RETINOPATHY: ICD-10-PCS | Mod: HCNC,CPTII,S$GLB, | Performed by: ORTHOPAEDIC SURGERY

## 2023-08-02 RX ORDER — TRIAMCINOLONE ACETONIDE 40 MG/ML
40 INJECTION, SUSPENSION INTRA-ARTICULAR; INTRAMUSCULAR
Status: DISCONTINUED | OUTPATIENT
Start: 2023-08-02 | End: 2023-08-02 | Stop reason: HOSPADM

## 2023-08-02 RX ADMIN — TRIAMCINOLONE ACETONIDE 40 MG: 40 INJECTION, SUSPENSION INTRA-ARTICULAR; INTRAMUSCULAR at 09:08

## 2023-08-02 NOTE — PROCEDURES
Small Joint Aspiration/Injection: L thumb CMC    Date/Time: 8/2/2023 9:00 AM    Performed by: Ayaan Burch MD  Authorized by: Ayaan Burch MD    Consent Done?:  Yes (Verbal)  Indications:  Pain  Site marked: the procedure site was marked    Timeout: prior to procedure the correct patient, procedure, and site was verified    Prep: patient was prepped and draped in usual sterile fashion      Local anesthesia used?: Yes    Local anesthetic:  Lidocaine 2% without epinephrine  Anesthetic total (ml):  0.5    Location:  Thumb  Site:  L thumb CMC  Ultrasonic guidance for needle placement?: No    Needle size:  25 G  Approach:  Dorsal  Medications:  40 mg triamcinolone acetonide 40 mg/mL

## 2023-08-02 NOTE — PROGRESS NOTES
Subjective:     Patient ID: Lucia Barlow is a 73 y.o. female.    Chief Complaint: Pain of the Left Hand      HPI:  The patient is a 73-year-old female with a left thumb basal joint arthritis.  She was last injected 06/22/2022 with good results until recently requests reinjection today.  She also had her de Quervain tendonitis injection at that time but that problem has not recurred.  Past Medical History:   Diagnosis Date    Abnormal EKG 5/19/2023    Atherosclerosis of artery of both lower extremities 1/17/2014    Atherosclerosis of native coronary artery of native heart without angina pectoris 11/18/2016    Atherosclerotic PVD with intermittent claudication 1/17/2014    Chronic bronchitis     COPD (chronic obstructive pulmonary disease)     Coronary artery disease involving native coronary artery of native heart without angina pectoris 5/19/2023    Diabetes with neurologic complications     Dyslipidemia associated with type 2 diabetes mellitus 6/14/2013    Dyslipidemia associated with type 2 diabetes mellitus     Ex-smoker     Gastroesophageal reflux disease without esophagitis 1/25/2019    Hyperlipidemia     Hypertension     Iron deficiency anemia secondary to inadequate dietary iron intake 6/3/2022    NSCLC of left lung 11/19/2020    IVANIA (obstructive sleep apnea) 2/11/2021    Osteoporosis 1/16 rosita 1/18    Pneumothorax after biopsy 10/21/2020    PVD (peripheral vascular disease)     Renal manifestation of secondary diabetes mellitus     S/P peripheral artery angioplasty 2/7/2014    Seasonal allergic rhinitis due to pollen     Shortness of breath 5/19/2023    Simple chronic bronchitis     Tobacco dependence     Type 2 diabetes mellitus with microalbuminuria, without long-term current use of insulin 3/29/2019    Type 2 diabetes mellitus with peripheral vascular disease     Type 2 diabetes mellitus with stage 3 chronic kidney disease, without long-term current use of insulin 2/1/2019    Type 2 diabetes mellitus  without retinopathy 2/1/2019    Urinary incontinence      Past Surgical History:   Procedure Laterality Date    ANGIOPLASTY Bilateral 01/24/2014    aortoiliac stenting     CARPAL TUNNEL RELEASE  2003    [facility]    COLECTOMY  approximate 2005    pt states 1in colon -dx with benign mass removed-states no colon cancer    COLONOSCOPY N/A 11/9/2016    Procedure: COLONOSCOPY;  Surgeon: Dmitri Sterling MD;  Location: [facility] ENDO;  Service: Endoscopy;  Laterality: N/A;    COLONOSCOPY N/A 11/15/2019    Procedure: COLONOSCOPY;  Surgeon: Marko Vicente MD;  Location: [facility] ENDO;  Service: Endoscopy;  Laterality: N/A;    COLONOSCOPY N/A 3/25/2022    Procedure: COLONOSCOPY;  Surgeon: Paola Feldman MD;  Location: [facility];  Service: Endoscopy;  Laterality: N/A;    ESOPHAGOGASTRODUODENOSCOPY N/A 3/25/2022    Procedure: EGD (ESOPHAGOGASTRODUODENOSCOPY);  Surgeon: Paola Feldman MD;  Location: [facility];  Service: Endoscopy;  Laterality: N/A;    HYSTERECTOMY      no cancer    INJECTION OF ANESTHETIC AGENT AROUND MULTIPLE INTERCOSTAL NERVES Left 11/19/2020    Procedure: BLOCK, NERVE, INTERCOSTAL, 2 OR MORE;  Surgeon: Amrit Flores MD;  Location: [facility] OR [address] [place] [address];  Service: Thoracic;  Laterality: Left;    OOPHORECTOMY      SURGICAL REMOVAL OF LYMPH NODE Left 11/19/2020    Procedure: EXCISION, LYMPH NODE;  Surgeon: Amrit Flores MD;  [facility];  Service: Thoracic;  Laterality: Left;  mediastinal lymph node disection    XI ROBOTIC RATS,WITH LOBECTOMY,LUNG Left 11/19/2020    Procedure: XI ROBOTIC RATS,WITH LOBECTOMY,LUNG;  Surgeon: Amrit Flores MD;  [facility];  Service: Thoracic;  Laterality: Left;  Lingulectomy.     Family History   Problem Relation Age of Onset    Stroke Father     Stroke Sister     Asthma Daughter     Diabetes Daughter     Breast cancer Daughter     Asthma Son      Social History     Socioeconomic History    Marital status:     Number of children: 4    Occupational History    Occupation:    Tobacco Use    Smoking status: Former     Current packs/day: 0.00     Average packs/day: 1 pack/day for 60.0 years (60.0 ttl pk-yrs)     Types: Cigarettes     Start date: 1/1/1960     Quit date: 1/10/2020     Years since quitting: 3.5    Smokeless tobacco: Former   Substance and Sexual Activity    Alcohol use: No     Alcohol/week: 0.0 standard drinks of alcohol    Drug use: No    Sexual activity: Never   Social History Narrative    Son smoker, no pets in household.     Social Determinants of Health     Financial Resource Strain: Low Risk  (7/20/2023)    Overall Financial Resource Strain (CARDIA)     Difficulty of Paying Living Expenses: Not very hard   Recent Concern: Financial Resource Strain - Medium Risk (7/13/2023)    Overall Financial Resource Strain (CARDIA)     Difficulty of Paying Living Expenses: Somewhat hard   Food Insecurity: Food Insecurity Present (7/20/2023)    Hunger Vital Sign     Worried About Running Out of Food in the Last Year: Sometimes true     Ran Out of Food in the Last Year: Sometimes true   Transportation Needs: No Transportation Needs (7/20/2023)    PRAPARE - Transportation     Lack of Transportation (Medical): No     Lack of Transportation (Non-Medical): No   Physical Activity: Unknown (7/20/2023)    Exercise Vital Sign     Days of Exercise per Week: Patient refused     Minutes of Exercise per Session: 0 min   Recent Concern: Physical Activity - Inactive (5/12/2023)    Exercise Vital Sign     Days of Exercise per Week: 0 days     Minutes of Exercise per Session: 0 min   Stress: No Stress Concern Present (7/20/2023)    [place] [employer] - Occupational Stress Questionnaire     Feeling of Stress : Only a little   Social Connections: Moderately Integrated (7/20/2023)    Social Connection and Isolation Panel [NHANES]     Frequency of Communication with Friends and Family: Three times a week     Frequency of Social  Gatherings with Friends and Family: Three times a week     Attends [employer] Services: More than 4 times per year     Active Member of Clubs or Organizations: Yes     Attends Club or Organization Meetings: 1 to 4 times per year     Marital Status:    Housing Stability: Low Risk  (7/20/2023)    Housing Stability Vital Sign     Unable to Pay for Housing in the Last Year: No     Number of Places Lived in the Last Year: 1     Unstable Housing in the Last Year: No     Medication List with Changes/Refills   Current Medications    ALBUTEROL (PROVENTIL/VENTOLIN HFA) 90 MCG/ACTUATION INHALER    Inhale 2 puffs into the lungs every 4 (four) hours as needed for Wheezing or Shortness of Breath.    AMLODIPINE (NORVASC) 10 MG TABLET    Take 1 tablet (10 mg total) by mouth once daily.    ASPIRIN (ECOTRIN) 81 MG EC TABLET    Take 1 tablet (81 mg total) by mouth once daily.    BUTALBITAL-ACETAMINOPHEN-CAFFEINE -40 MG (FIORICET, ESGIC) -40 MG PER TABLET    TAKE 1 TABLET BY MOUTH 3 (THREE) TIMES DAILY AS NEEDED FOR HEADACHES. (MAXIMUM OF 15 TABLETS PER MONTH)    CALCIUM-VITAMIN D 600 MG-10 MCG (400 UNIT) TAB    Take 2 tablets by mouth once daily.    CEPHALEXIN (KEFLEX) 500 MG CAPSULE    Take 1 capsule (500 mg total) by mouth every 12 (twelve) hours. for 10 days    CETIRIZINE (ZYRTEC) 10 MG TABLET    Take 1 tablet (10 mg total) by mouth once daily. For sinus congestion    CHLORTHALIDONE (HYGROTEN) 25 MG TAB    Take by mouth.    CILOSTAZOL (PLETAL) 100 MG TAB        CILOSTAZOL (PLETAL) 50 MG TAB    Take 1 tablet (50 mg total) by mouth 2 (two) times daily before meals.    EMPAGLIFLOZIN (JARDIANCE) 25 MG TABLET    Take 1 tablet (25 mg total) by mouth once daily.    EPINEPHRINE (EPIPEN) 0.3 MG/0.3 ML ATIN    INJECT INTO THE MUSCLE ONE TIME FOR 1 DOSE, AS DIRECTED    EZETIMIBE (ZETIA) 10 MG TABLET    Take 1 tablet (10 mg total) by mouth once daily.    FAMOTIDINE (PEPCID) 40 MG TABLET    Take by mouth.    FEXOFENADINE  (ALLEGRA) 180 MG TABLET    TAKE 1 TABLET ONE TIME DAILY    FLUCONAZOLE (DIFLUCAN) 150 MG TAB    TAKE 1 TABLET AS NEEDED FOR YEAST INFECTION THAT DOES NOT RESPOND TO MONISTAT. DO NOT TAKE MORE THAN 1 TABLET PER WEEK    FLUCONAZOLE (DIFLUCAN) 150 MG TAB    Take 1 tablet (150 mg total) by mouth every 7 days. for 2 doses    FLUTICASONE PROPIONATE (FLONASE) 50 MCG/ACTUATION NASAL SPRAY    2 sprays (100 mcg total) by Each Nostril route once daily.    FUROSEMIDE (LASIX) 20 MG TABLET    Take 1 tablet (20 mg total) by mouth once daily.    IPRATROPIUM (ATROVENT) 21 MCG (0.03 %) NASAL SPRAY    2 sprays by Each Nostril route 3 (three) times daily.    IPRATROPIUM-ALBUTEROL (COMBIVENT)  MCG/ACTUATION INHALER    Inhale 2 puffs into the lungs every 4 (four) hours as needed for Wheezing or Shortness of Breath. Rescue    LEVOCETIRIZINE (XYZAL) 5 MG TABLET    Take 1 tablet (5 mg total) by mouth every evening.    LEXISCAN 0.4 MG/5 ML SYRG        MICONAZOLE (MICOTIN) 2 % VAGINAL CREAM    Place 1 applicator vaginally every evening. Use as directed    MONTELUKAST (SINGULAIR) 10 MG TABLET    Take 1 tablet (10 mg total) by mouth every evening.    ONDANSETRON (ZOFRAN-ODT) 4 MG TBDL    1-2 tablets PO every 6 hours as needed for nausea/vomiting while completing bowel prep    PANTOPRAZOLE (PROTONIX) 40 MG TABLET    TAKE 1 TABLET ONE TIME DAILY    POTASSIUM CHLORIDE SA (K-DUR,KLOR-CON) 20 MEQ TABLET    TAKE 1 TABLET ONE TIME DAILY    PREDNISONE (DELTASONE) 20 MG TABLET    Prednisone 60 mg/ day for 3 days, 40 mg/day for 3 days,20 mg/ day for 3 days, (1/2 tablet )10 mg a day for 3 days.    ROSUVASTATIN (CRESTOR) 20 MG TABLET    Take 1 tablet (20 mg total) by mouth every evening.    SOD SULF-POT CHLORIDE-MAG SULF (SUTAB) 1.479-0.188- 0.225 GRAM TABLET    Take 12 tablets by mouth once daily. Take according to package instructions with indicated amount of water.    VALSARTAN (DIOVAN) 160 MG TABLET    Take 160 mg by mouth.    VARICELLA-ZOSTER  GE-AS01B, PF, (SHINGRIX) 50 MCG/0.5 ML INJECTION    Inject 0.5 mL (one dose) into muscle now; give second dose at least 2 months later     Review of patient's allergies indicates:   Allergen Reactions    Iodine and iodide containing products Swelling    Losartan Itching and Swelling     Very uncomfortable - high probability of allergic reaction due to swelling and itching    Skin staples [surgical stainless steel] Swelling    Egg derived      Shortness breath, lip swelling    Fish containing products     Jardiance [empagliflozin]     Latex, natural rubber Swelling    Nickel     Pravastatin      40 mg causes nausea vomitting but 20 mg ok    Shellfish containing products Swelling     Review of Systems   Constitutional: Negative for malaise/fatigue.   HENT:  Negative for hearing loss.    Eyes:  Negative for double vision and visual disturbance.   Cardiovascular:  Positive for chest pain and claudication.   Respiratory:  Positive for shortness of breath and sleep disturbances due to breathing.    Endocrine: Negative for cold intolerance.   Hematologic/Lymphatic: Does not bruise/bleed easily.   Skin:  Negative for poor wound healing and suspicious lesions.   Musculoskeletal:  Positive for arthritis, joint pain and joint swelling. Negative for gout.   Gastrointestinal:  Positive for heartburn. Negative for nausea and vomiting.   Genitourinary:  Negative for dysuria.   Neurological:  Positive for headaches. Negative for numbness, paresthesias and sensory change.   Psychiatric/Behavioral:  Positive for substance abuse. Negative for depression and memory loss. The patient is not nervous/anxious.    Allergic/Immunologic: Negative for persistent infections.       Objective:   Body mass index is 26.37 kg/m².  There were no vitals filed for this visit.             General    Constitutional: She is oriented to person, place, and time. She appears well-developed and well-nourished. No distress.   HENT:   Head: Normocephalic.    Eyes: EOM are normal.   Pulmonary/Chest: Effort normal.   Neurological: She is oriented to person, place, and time.   Psychiatric: She has a normal mood and affect.         Left Hand/Wrist Exam     Inspection   Scars: Wrist - absent Hand -  absent  Effusion: Wrist - absent Hand -  absent    Pain   Wrist - The patient exhibits pain of the CMC.    Other     Sensory Exam  Median Distribution: normal  Ulnar Distribution: normal  Radial Distribution: normal    Comments:  The patient has tenderness left thumb basal joint with a positive axial circumduction grind test.          Vascular Exam       Capillary Refill  Left Hand: normal capillary refill          Relevant imaging results reviewed and interpreted by me, discussed with the patient and / or family today radiographs both hands showed basal joint arthritis and multiple small joint degenerative change  Assessment:     Encounter Diagnosis   Name Primary?    Arthritis of carpometacarpal (CMC) joint of left thumb Yes        Plan:       The patient injected left thumb basal joint with 0.5 cc of Kenalog and 0.5 cc of 2% plain lidocaine under sterile technique.  She will wait at least 3 months between injections.              Disclaimer: This note was prepared using a voice recognition system and is likely to have sound alike errors within the text.

## 2023-08-04 DIAGNOSIS — D62 ACUTE BLOOD LOSS ANEMIA: Primary | ICD-10-CM

## 2023-08-04 DIAGNOSIS — D50.0 IRON DEFICIENCY ANEMIA DUE TO CHRONIC BLOOD LOSS: Primary | ICD-10-CM

## 2023-08-16 DIAGNOSIS — B37.31 RECURRENT CANDIDIASIS OF VAGINA: Chronic | ICD-10-CM

## 2023-08-16 NOTE — TELEPHONE ENCOUNTER
Refill Routing Note   Medication(s) are not appropriate for processing by [facility] for the following reason(s):      Medication outside of protocol    [facility] action(s):  Route Care Due:  None identified            Appointments  past 12m or future 3m with PCP    Date Provider   Last Visit   4/19/2023 ZELDA Sandoval MD   Next Visit   10/20/2023 ZELDA Sandoval MD   ED visits in past 90 days: 0        Note composed:8:34 AM 08/16/2023

## 2023-08-18 RX ORDER — FLUCONAZOLE 150 MG/1
TABLET ORAL
Qty: 12 TABLET | Refills: 0 | OUTPATIENT
Start: 2023-08-18

## 2023-08-18 NOTE — TELEPHONE ENCOUNTER
Refill not approved. REASON: There is potential serious drug interaction between this drug and cilostazoL (PLETAL).

## 2023-08-30 ENCOUNTER — OFFICE VISIT (OUTPATIENT)
Dept: INTERNAL MEDICINE | Facility: CLINIC | Age: 73
End: 2023-08-30
Payer: MEDICARE

## 2023-08-30 ENCOUNTER — LAB VISIT (OUTPATIENT)
Dept: LAB | Facility: HOSPITAL | Age: 73
End: 2023-08-30
Attending: PHYSICIAN ASSISTANT
Payer: MEDICARE

## 2023-08-30 VITALS
WEIGHT: 136.69 LBS | OXYGEN SATURATION: 97 % | DIASTOLIC BLOOD PRESSURE: 56 MMHG | TEMPERATURE: 98 F | BODY MASS INDEX: 25.81 KG/M2 | HEART RATE: 95 BPM | HEIGHT: 61 IN | SYSTOLIC BLOOD PRESSURE: 118 MMHG

## 2023-08-30 DIAGNOSIS — D50.8 IRON DEFICIENCY ANEMIA SECONDARY TO INADEQUATE DIETARY IRON INTAKE: Chronic | ICD-10-CM

## 2023-08-30 DIAGNOSIS — E11.51 TYPE 2 DIABETES MELLITUS WITH PERIPHERAL VASCULAR DISEASE: Primary | Chronic | ICD-10-CM

## 2023-08-30 DIAGNOSIS — D62 ACUTE BLOOD LOSS ANEMIA: ICD-10-CM

## 2023-08-30 DIAGNOSIS — K21.9 GASTROESOPHAGEAL REFLUX DISEASE WITHOUT ESOPHAGITIS: Chronic | ICD-10-CM

## 2023-08-30 LAB
BASOPHILS # BLD AUTO: 0.03 K/UL (ref 0–0.2)
BASOPHILS NFR BLD: 0.4 % (ref 0–1.9)
DIFFERENTIAL METHOD: ABNORMAL
EOSINOPHIL # BLD AUTO: 0.2 K/UL (ref 0–0.5)
EOSINOPHIL NFR BLD: 2.2 % (ref 0–8)
ERYTHROCYTE [DISTWIDTH] IN BLOOD BY AUTOMATED COUNT: 19.3 % (ref 11.5–14.5)
FERRITIN SERPL-MCNC: 37 NG/ML (ref 20–300)
HCT VFR BLD AUTO: 38.8 % (ref 37–48.5)
HGB BLD-MCNC: 11.5 G/DL (ref 12–16)
IMM GRANULOCYTES # BLD AUTO: 0.01 K/UL (ref 0–0.04)
IMM GRANULOCYTES NFR BLD AUTO: 0.1 % (ref 0–0.5)
IRON SERPL-MCNC: 37 UG/DL (ref 30–160)
LYMPHOCYTES # BLD AUTO: 1.8 K/UL (ref 1–4.8)
LYMPHOCYTES NFR BLD: 25.9 % (ref 18–48)
MCH RBC QN AUTO: 25.6 PG (ref 27–31)
MCHC RBC AUTO-ENTMCNC: 29.6 G/DL (ref 32–36)
MCV RBC AUTO: 86 FL (ref 82–98)
MONOCYTES # BLD AUTO: 0.4 K/UL (ref 0.3–1)
MONOCYTES NFR BLD: 5.8 % (ref 4–15)
NEUTROPHILS # BLD AUTO: 4.4 K/UL (ref 1.8–7.7)
NEUTROPHILS NFR BLD: 65.6 % (ref 38–73)
NRBC BLD-RTO: 0 /100 WBC
PLATELET # BLD AUTO: 267 K/UL (ref 150–450)
PMV BLD AUTO: 11.5 FL (ref 9.2–12.9)
RBC # BLD AUTO: 4.49 M/UL (ref 4–5.4)
SATURATED IRON: 9 % (ref 20–50)
TOTAL IRON BINDING CAPACITY: 411 UG/DL (ref 250–450)
TRANSFERRIN SERPL-MCNC: 278 MG/DL (ref 200–375)
WBC # BLD AUTO: 6.76 K/UL (ref 3.9–12.7)

## 2023-08-30 PROCEDURE — 3044F HG A1C LEVEL LT 7.0%: CPT | Mod: HCNC,CPTII,S$GLB, | Performed by: PHYSICIAN ASSISTANT

## 2023-08-30 PROCEDURE — 1101F PR PT FALLS ASSESS DOC 0-1 FALLS W/OUT INJ PAST YR: ICD-10-PCS | Mod: HCNC,CPTII,S$GLB, | Performed by: PHYSICIAN ASSISTANT

## 2023-08-30 PROCEDURE — 4010F PR ACE/ARB THEARPY RXD/TAKEN: ICD-10-PCS | Mod: HCNC,CPTII,S$GLB, | Performed by: PHYSICIAN ASSISTANT

## 2023-08-30 PROCEDURE — 83540 ASSAY OF IRON: CPT | Mod: HCNC | Performed by: PHYSICIAN ASSISTANT

## 2023-08-30 PROCEDURE — 3078F DIAST BP <80 MM HG: CPT | Mod: HCNC,CPTII,S$GLB, | Performed by: PHYSICIAN ASSISTANT

## 2023-08-30 PROCEDURE — 3074F PR MOST RECENT SYSTOLIC BLOOD PRESSURE < 130 MM HG: ICD-10-PCS | Mod: HCNC,CPTII,S$GLB, | Performed by: PHYSICIAN ASSISTANT

## 2023-08-30 PROCEDURE — 3074F SYST BP LT 130 MM HG: CPT | Mod: HCNC,CPTII,S$GLB, | Performed by: PHYSICIAN ASSISTANT

## 2023-08-30 PROCEDURE — 1126F AMNT PAIN NOTED NONE PRSNT: CPT | Mod: HCNC,CPTII,S$GLB, | Performed by: PHYSICIAN ASSISTANT

## 2023-08-30 PROCEDURE — 3044F PR MOST RECENT HEMOGLOBIN A1C LEVEL <7.0%: ICD-10-PCS | Mod: HCNC,CPTII,S$GLB, | Performed by: PHYSICIAN ASSISTANT

## 2023-08-30 PROCEDURE — 82728 ASSAY OF FERRITIN: CPT | Mod: HCNC | Performed by: PHYSICIAN ASSISTANT

## 2023-08-30 PROCEDURE — 99213 OFFICE O/P EST LOW 20 MIN: CPT | Mod: HCNC,S$GLB,, | Performed by: PHYSICIAN ASSISTANT

## 2023-08-30 PROCEDURE — 3288F FALL RISK ASSESSMENT DOCD: CPT | Mod: HCNC,CPTII,S$GLB, | Performed by: PHYSICIAN ASSISTANT

## 2023-08-30 PROCEDURE — 99999 PR PBB SHADOW E&M-EST. PATIENT-LVL III: CPT | Mod: PBBFAC,HCNC,, | Performed by: PHYSICIAN ASSISTANT

## 2023-08-30 PROCEDURE — 3288F PR FALLS RISK ASSESSMENT DOCUMENTED: ICD-10-PCS | Mod: HCNC,CPTII,S$GLB, | Performed by: PHYSICIAN ASSISTANT

## 2023-08-30 PROCEDURE — 3060F PR POS MICROALBUMINURIA RESULT DOCUMENTED/REVIEW: ICD-10-PCS | Mod: HCNC,CPTII,S$GLB, | Performed by: PHYSICIAN ASSISTANT

## 2023-08-30 PROCEDURE — 99999 PR PBB SHADOW E&M-EST. PATIENT-LVL III: ICD-10-PCS | Mod: PBBFAC,HCNC,, | Performed by: PHYSICIAN ASSISTANT

## 2023-08-30 PROCEDURE — 1160F RVW MEDS BY RX/DR IN RCRD: CPT | Mod: HCNC,CPTII,S$GLB, | Performed by: PHYSICIAN ASSISTANT

## 2023-08-30 PROCEDURE — 3078F PR MOST RECENT DIASTOLIC BLOOD PRESSURE < 80 MM HG: ICD-10-PCS | Mod: HCNC,CPTII,S$GLB, | Performed by: PHYSICIAN ASSISTANT

## 2023-08-30 PROCEDURE — 99213 PR OFFICE/OUTPT VISIT, EST, LEVL III, 20-29 MIN: ICD-10-PCS | Mod: HCNC,S$GLB,, | Performed by: PHYSICIAN ASSISTANT

## 2023-08-30 PROCEDURE — 4010F ACE/ARB THERAPY RXD/TAKEN: CPT | Mod: HCNC,CPTII,S$GLB, | Performed by: PHYSICIAN ASSISTANT

## 2023-08-30 PROCEDURE — 1159F PR MEDICATION LIST DOCUMENTED IN MEDICAL RECORD: ICD-10-PCS | Mod: HCNC,CPTII,S$GLB, | Performed by: PHYSICIAN ASSISTANT

## 2023-08-30 PROCEDURE — 1160F PR REVIEW ALL MEDS BY PRESCRIBER/CLIN PHARMACIST DOCUMENTED: ICD-10-PCS | Mod: HCNC,CPTII,S$GLB, | Performed by: PHYSICIAN ASSISTANT

## 2023-08-30 PROCEDURE — 85025 COMPLETE CBC W/AUTO DIFF WBC: CPT | Mod: HCNC | Performed by: PHYSICIAN ASSISTANT

## 2023-08-30 PROCEDURE — 1101F PT FALLS ASSESS-DOCD LE1/YR: CPT | Mod: HCNC,CPTII,S$GLB, | Performed by: PHYSICIAN ASSISTANT

## 2023-08-30 PROCEDURE — 1159F MED LIST DOCD IN RCRD: CPT | Mod: HCNC,CPTII,S$GLB, | Performed by: PHYSICIAN ASSISTANT

## 2023-08-30 PROCEDURE — 3066F NEPHROPATHY DOC TX: CPT | Mod: HCNC,CPTII,S$GLB, | Performed by: PHYSICIAN ASSISTANT

## 2023-08-30 PROCEDURE — 36415 COLL VENOUS BLD VENIPUNCTURE: CPT | Mod: HCNC | Performed by: PHYSICIAN ASSISTANT

## 2023-08-30 PROCEDURE — 3066F PR DOCUMENTATION OF TREATMENT FOR NEPHROPATHY: ICD-10-PCS | Mod: HCNC,CPTII,S$GLB, | Performed by: PHYSICIAN ASSISTANT

## 2023-08-30 PROCEDURE — 1126F PR PAIN SEVERITY QUANTIFIED, NO PAIN PRESENT: ICD-10-PCS | Mod: HCNC,CPTII,S$GLB, | Performed by: PHYSICIAN ASSISTANT

## 2023-08-30 PROCEDURE — 84466 ASSAY OF TRANSFERRIN: CPT | Mod: HCNC | Performed by: PHYSICIAN ASSISTANT

## 2023-08-30 PROCEDURE — 3060F POS MICROALBUMINURIA REV: CPT | Mod: HCNC,CPTII,S$GLB, | Performed by: PHYSICIAN ASSISTANT

## 2023-08-30 RX ORDER — SEMAGLUTIDE 0.68 MG/ML
INJECTION, SOLUTION SUBCUTANEOUS
Qty: 3 ML | Refills: 0 | Status: SHIPPED | OUTPATIENT
Start: 2023-08-30 | End: 2023-09-27 | Stop reason: SDUPTHER

## 2023-08-30 NOTE — PROGRESS NOTES
Subjective:      Patient ID: Lucia Barlow is a 73 y.o. female.    Chief Complaint: Follow-up (1 month )    HPI  Here today for a follow up for her anemia.   Scheduled to see GI next week.   Blood stool negative.   REcheck labs today.  Pt reports improved energy since being on the iron twice daily.   Stopped taking jardiance due to swelling and recurrent yeast infections. Not currently on anything for diabetes. Blood sugar this morning was 118.   Also stopped asa due to abdominal pains and anemia.   Pt reports feeling much better since stopping asa and jardiance.   No fam hx of medullary thyroid cancer or multiple endocrine neoplasia syndrome. No personal history of acute pancreatitis.     Patient Active Problem List   Diagnosis    Former heavy cigarette smoker    Hypertension associated with diabetes    Dyslipidemia associated with type 2 diabetes mellitus    Osteoporosis    Atherosclerosis of native artery of both lower extremities with intermittent claudication    S/P peripheral artery angioplasty    History of adenomatous polyp of colon    Nonrheumatic aortic valve stenosis    Atherosclerosis of aorta    Coronary artery calcification    PVD (peripheral vascular disease)    Type 2 diabetes mellitus with peripheral vascular disease    Gastroesophageal reflux disease without esophagitis    Type 2 diabetes mellitus with stage 3a chronic kidney disease, without long-term current use of insulin    Type 2 diabetes mellitus with microalbuminuria, without long-term current use of insulin    Chronic obstructive pulmonary disease    Seasonal allergic rhinitis due to pollen    Tension headache    Tortuous aorta    Diverticulosis    Non-small cell cancer of left lung    IVANIA (obstructive sleep apnea)    Pulmonary nodule less than 6 mm in diameter with high risk for malignant neoplasm - RIght lower lobe    Iron deficiency anemia secondary to inadequate dietary iron intake    Pulmonary nodule, left - 8 mm , high risk for  lung cancer    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    High pulmonary arterial pressure    Other reduced mobility    S/P lobectomy of lung-  Hx robotic lung lobectomy 11/19/2020    Abnormal EKG    Shortness of breath    Coronary artery disease involving native coronary artery of native heart without angina pectoris         Current Outpatient Medications:     albuterol (PROVENTIL/VENTOLIN HFA) 90 mcg/actuation inhaler, Inhale 2 puffs into the lungs every 4 (four) hours as needed for Wheezing or Shortness of Breath., Disp: 54 g, Rfl: 3    amLODIPine (NORVASC) 10 MG tablet, Take 1 tablet (10 mg total) by mouth once daily., Disp: 90 tablet, Rfl: 3    butalbital-acetaminophen-caffeine -40 mg (FIORICET, ESGIC) -40 mg per tablet, TAKE 1 TABLET BY MOUTH 3 (THREE) TIMES DAILY AS NEEDED FOR HEADACHES. (MAXIMUM OF 15 TABLETS PER MONTH), Disp: 30 tablet, Rfl: 3    calcium-vitamin D 600 mg-10 mcg (400 unit) Tab, Take 2 tablets by mouth once daily., Disp: 180 tablet, Rfl: 4    cetirizine (ZYRTEC) 10 MG tablet, Take 1 tablet (10 mg total) by mouth once daily. For sinus congestion, Disp: 90 tablet, Rfl: 4    chlorthalidone (HYGROTEN) 25 MG Tab, Take by mouth., Disp: , Rfl:     cilostazoL (PLETAL) 100 MG Tab, , Disp: , Rfl:     cilostazoL (PLETAL) 50 MG Tab, Take 1 tablet (50 mg total) by mouth 2 (two) times daily before meals., Disp: 180 tablet, Rfl: 3    EPINEPHrine (EPIPEN) 0.3 mg/0.3 mL AtIn, INJECT INTO THE MUSCLE ONE TIME FOR 1 DOSE, AS DIRECTED, Disp: 2 each, Rfl: 3    ezetimibe (ZETIA) 10 mg tablet, Take 1 tablet (10 mg total) by mouth once daily., Disp: 90 tablet, Rfl: 2    famotidine (PEPCID) 40 MG tablet, Take by mouth., Disp: , Rfl:     fexofenadine (ALLEGRA) 180 MG tablet, TAKE 1 TABLET ONE TIME DAILY, Disp: 90 tablet, Rfl: 3    fluconazole (DIFLUCAN) 150 MG Tab, TAKE 1 TABLET AS NEEDED FOR YEAST INFECTION THAT DOES NOT RESPOND TO MONISTAT. DO NOT TAKE MORE THAN 1 TABLET PER WEEK, Disp: 12  tablet, Rfl: 0    fluticasone propionate (FLONASE) 50 mcg/actuation nasal spray, 2 sprays (100 mcg total) by Each Nostril route once daily., Disp: 32 g, Rfl: 5    furosemide (LASIX) 20 MG tablet, Take 1 tablet (20 mg total) by mouth once daily., Disp: 90 tablet, Rfl: 3    ipratropium (ATROVENT) 21 mcg (0.03 %) nasal spray, 2 sprays by Each Nostril route 3 (three) times daily., Disp: 60 mL, Rfl: 5    ipratropium-albuteroL (COMBIVENT)  mcg/actuation inhaler, Inhale 2 puffs into the lungs every 4 (four) hours as needed for Wheezing or Shortness of Breath. Rescue, Disp: 12 g, Rfl: 3    LEXISCAN 0.4 mg/5 mL Syrg, , Disp: , Rfl:     miconazole (MICOTIN) 2 % vaginal cream, Place 1 applicator vaginally every evening. Use as directed, Disp: 135 g, Rfl: 2    montelukast (SINGULAIR) 10 mg tablet, Take 1 tablet (10 mg total) by mouth every evening., Disp: 90 tablet, Rfl: 3    ondansetron (ZOFRAN-ODT) 4 MG TbDL, 1-2 tablets PO every 6 hours as needed for nausea/vomiting while completing bowel prep, Disp: 4 tablet, Rfl: 0    pantoprazole (PROTONIX) 40 MG tablet, TAKE 1 TABLET ONE TIME DAILY, Disp: 90 tablet, Rfl: 2    potassium chloride SA (K-DUR,KLOR-CON) 20 MEQ tablet, TAKE 1 TABLET ONE TIME DAILY, Disp: 90 tablet, Rfl: 3    predniSONE (DELTASONE) 20 MG tablet, Prednisone 60 mg/ day for 3 days, 40 mg/day for 3 days,20 mg/ day for 3 days, (1/2 tablet )10 mg a day for 3 days., Disp: 20 tablet, Rfl: 0    rosuvastatin (CRESTOR) 20 MG tablet, Take 1 tablet (20 mg total) by mouth every evening., Disp: 90 tablet, Rfl: 3    sod sulf-pot chloride-mag sulf (SUTAB) 1.479-0.188- 0.225 gram tablet, Take 12 tablets by mouth once daily. Take according to package instructions with indicated amount of water., Disp: 24 tablet, Rfl: 0    valsartan (DIOVAN) 160 MG tablet, Take 160 mg by mouth., Disp: , Rfl:     varicella-zoster [id number], PF, (SHINGRIX) 50 mcg/0.5 mL injection, Inject 0.5 mL (one dose) into muscle now; give second dose at  "least 2 months later, Disp: 0.5 mL, Rfl: 1    levocetirizine (XYZAL) 5 MG tablet, Take 1 tablet (5 mg total) by mouth every evening., Disp: 30 tablet, Rfl: 11    semaglutide (OZEMPIC) 0.25 mg or 0.5 mg (2 mg/3 mL) pen injector, Inject 0.25 mg into the skin every 7 days for 14 days, THEN 0.5 mg every 7 days for 14 days., Disp: 3 mL, Rfl: 0    Review of Systems   Constitutional:  Negative for activity change, appetite change, chills, diaphoresis, fatigue, fever and unexpected weight change.   HENT: Negative.  Negative for congestion, hearing loss, postnasal drip, rhinorrhea, sore throat, trouble swallowing and voice change.    Eyes: Negative.  Negative for visual disturbance.   Respiratory: Negative.  Negative for cough, choking, chest tightness and shortness of breath.    Cardiovascular:  Negative for chest pain, palpitations and leg swelling.   Gastrointestinal:  Negative for abdominal distention, abdominal pain, blood in stool, constipation, diarrhea, nausea and vomiting.   Endocrine: Negative for cold intolerance, heat intolerance, polydipsia and polyuria.   Genitourinary: Negative.  Negative for difficulty urinating and frequency.   Musculoskeletal:  Negative for arthralgias, back pain, gait problem, joint swelling and myalgias.   Skin:  Negative for color change, pallor, rash and wound.   Neurological:  Negative for dizziness, tremors, weakness, light-headedness, numbness and headaches.   Hematological:  Negative for adenopathy.   Psychiatric/Behavioral:  Negative for behavioral problems, confusion, self-injury, sleep disturbance and suicidal ideas. The patient is not nervous/anxious.      Objective:   BP (!) 118/56 (BP Location: Left arm, Patient Position: Sitting, BP Method: Large (Manual))   Pulse 95   Temp 97.6 °F (36.4 °C) (Tympanic)   Ht 5' 1" (1.549 m)   Wt 62 kg (136 lb 11 oz)   SpO2 97%   BMI 25.83 kg/m²     Physical Exam  Vitals reviewed.   Constitutional:       General: She is not in acute " distress.     Appearance: Normal appearance. She is well-developed. She is not ill-appearing, toxic-appearing or diaphoretic.   HENT:      Head: Normocephalic and atraumatic.      Right Ear: External ear normal.      Left Ear: External ear normal.      Nose: Nose normal.   Eyes:      Conjunctiva/sclera: Conjunctivae normal.      Pupils: Pupils are equal, round, and reactive to light.   Cardiovascular:      Rate and Rhythm: Normal rate and regular rhythm.      Heart sounds: Normal heart sounds. No murmur heard.     No friction rub. No gallop.   Pulmonary:      Effort: Pulmonary effort is normal. No respiratory distress.      Breath sounds: Normal breath sounds. No wheezing or rales.   Chest:      Chest wall: No tenderness.   Abdominal:      General: There is no distension.      Palpations: Abdomen is soft.      Tenderness: There is no abdominal tenderness.   Musculoskeletal:         General: Normal range of motion.      Cervical back: Normal range of motion and neck supple.   Lymphadenopathy:      Cervical: No cervical adenopathy.   Skin:     General: Skin is warm and dry.      Capillary Refill: Capillary refill takes less than 2 seconds.      Findings: No rash.   Neurological:      Mental Status: She is alert and oriented to person, place, and time.      Motor: No weakness.      Coordination: Coordination normal.      Gait: Gait normal.   Psychiatric:         Mood and Affect: Mood normal.         Behavior: Behavior normal.         Thought Content: Thought content normal.         Judgment: Judgment normal.         Assessment:     1. Type 2 diabetes mellitus with peripheral vascular disease    2. Iron deficiency anemia secondary to inadequate dietary iron intake    3. Gastroesophageal reflux disease without esophagitis      Plan:   Type 2 diabetes mellitus with peripheral vascular disease  -     semaglutide (OZEMPIC) 0.25 mg or 0.5 mg (2 mg/3 mL) pen injector; Inject 0.25 mg into the skin every 7 days for 14 days,  THEN 0.5 mg every 7 days for 14 days.  Dispense: 3 mL; Refill: 0    Iron deficiency anemia secondary to inadequate dietary iron intake    Gastroesophageal reflux disease without esophagitis    -recheck labs today  -SE of ozempic discussed. How to administer discussed.   -recheck in 2 weeks. If tolerating well will increase to 1mg.     Follow up if symptoms worsen or fail to improve.

## 2023-09-27 ENCOUNTER — OFFICE VISIT (OUTPATIENT)
Dept: INTERNAL MEDICINE | Facility: CLINIC | Age: 73
End: 2023-09-27
Payer: MEDICARE

## 2023-09-27 VITALS
RESPIRATION RATE: 16 BRPM | SYSTOLIC BLOOD PRESSURE: 124 MMHG | OXYGEN SATURATION: 100 % | WEIGHT: 133.63 LBS | HEIGHT: 61 IN | HEART RATE: 104 BPM | TEMPERATURE: 98 F | DIASTOLIC BLOOD PRESSURE: 66 MMHG | BODY MASS INDEX: 25.23 KG/M2

## 2023-09-27 DIAGNOSIS — E11.51 TYPE 2 DIABETES MELLITUS WITH PERIPHERAL VASCULAR DISEASE: Chronic | ICD-10-CM

## 2023-09-27 DIAGNOSIS — Z23 NEED FOR INFLUENZA VACCINATION: Primary | ICD-10-CM

## 2023-09-27 PROCEDURE — 3060F PR POS MICROALBUMINURIA RESULT DOCUMENTED/REVIEW: ICD-10-PCS | Mod: HCNC,CPTII,S$GLB, | Performed by: PHYSICIAN ASSISTANT

## 2023-09-27 PROCEDURE — 3044F PR MOST RECENT HEMOGLOBIN A1C LEVEL <7.0%: ICD-10-PCS | Mod: HCNC,CPTII,S$GLB, | Performed by: PHYSICIAN ASSISTANT

## 2023-09-27 PROCEDURE — 4010F ACE/ARB THERAPY RXD/TAKEN: CPT | Mod: HCNC,CPTII,S$GLB, | Performed by: PHYSICIAN ASSISTANT

## 2023-09-27 PROCEDURE — 1126F AMNT PAIN NOTED NONE PRSNT: CPT | Mod: HCNC,CPTII,S$GLB, | Performed by: PHYSICIAN ASSISTANT

## 2023-09-27 PROCEDURE — 1159F MED LIST DOCD IN RCRD: CPT | Mod: HCNC,CPTII,S$GLB, | Performed by: PHYSICIAN ASSISTANT

## 2023-09-27 PROCEDURE — G0008 FLU VACCINE - QUADRIVALENT (MDCK) PRESERVATIVE FREE IM: ICD-10-PCS | Mod: HCNC,S$GLB,, | Performed by: PHYSICIAN ASSISTANT

## 2023-09-27 PROCEDURE — 90674 CCIIV4 VAC NO PRSV 0.5 ML IM: CPT | Mod: HCNC,S$GLB,, | Performed by: PHYSICIAN ASSISTANT

## 2023-09-27 PROCEDURE — 3288F PR FALLS RISK ASSESSMENT DOCUMENTED: ICD-10-PCS | Mod: HCNC,CPTII,S$GLB, | Performed by: PHYSICIAN ASSISTANT

## 2023-09-27 PROCEDURE — 3074F PR MOST RECENT SYSTOLIC BLOOD PRESSURE < 130 MM HG: ICD-10-PCS | Mod: HCNC,CPTII,S$GLB, | Performed by: PHYSICIAN ASSISTANT

## 2023-09-27 PROCEDURE — 3066F PR DOCUMENTATION OF TREATMENT FOR NEPHROPATHY: ICD-10-PCS | Mod: HCNC,CPTII,S$GLB, | Performed by: PHYSICIAN ASSISTANT

## 2023-09-27 PROCEDURE — 3044F HG A1C LEVEL LT 7.0%: CPT | Mod: HCNC,CPTII,S$GLB, | Performed by: PHYSICIAN ASSISTANT

## 2023-09-27 PROCEDURE — 3288F FALL RISK ASSESSMENT DOCD: CPT | Mod: HCNC,CPTII,S$GLB, | Performed by: PHYSICIAN ASSISTANT

## 2023-09-27 PROCEDURE — G0008 ADMIN INFLUENZA VIRUS VAC: HCPCS | Mod: HCNC,S$GLB,, | Performed by: PHYSICIAN ASSISTANT

## 2023-09-27 PROCEDURE — 3060F POS MICROALBUMINURIA REV: CPT | Mod: HCNC,CPTII,S$GLB, | Performed by: PHYSICIAN ASSISTANT

## 2023-09-27 PROCEDURE — 1101F PR PT FALLS ASSESS DOC 0-1 FALLS W/OUT INJ PAST YR: ICD-10-PCS | Mod: HCNC,CPTII,S$GLB, | Performed by: PHYSICIAN ASSISTANT

## 2023-09-27 PROCEDURE — 90674 FLU VACCINE - QUADRIVALENT (MDCK) PRESERVATIVE FREE IM: ICD-10-PCS | Mod: HCNC,S$GLB,, | Performed by: PHYSICIAN ASSISTANT

## 2023-09-27 PROCEDURE — 1126F PR PAIN SEVERITY QUANTIFIED, NO PAIN PRESENT: ICD-10-PCS | Mod: HCNC,CPTII,S$GLB, | Performed by: PHYSICIAN ASSISTANT

## 2023-09-27 PROCEDURE — 3078F PR MOST RECENT DIASTOLIC BLOOD PRESSURE < 80 MM HG: ICD-10-PCS | Mod: HCNC,CPTII,S$GLB, | Performed by: PHYSICIAN ASSISTANT

## 2023-09-27 PROCEDURE — 3074F SYST BP LT 130 MM HG: CPT | Mod: HCNC,CPTII,S$GLB, | Performed by: PHYSICIAN ASSISTANT

## 2023-09-27 PROCEDURE — 1101F PT FALLS ASSESS-DOCD LE1/YR: CPT | Mod: HCNC,CPTII,S$GLB, | Performed by: PHYSICIAN ASSISTANT

## 2023-09-27 PROCEDURE — 1159F PR MEDICATION LIST DOCUMENTED IN MEDICAL RECORD: ICD-10-PCS | Mod: HCNC,CPTII,S$GLB, | Performed by: PHYSICIAN ASSISTANT

## 2023-09-27 PROCEDURE — 99213 OFFICE O/P EST LOW 20 MIN: CPT | Mod: HCNC,S$GLB,, | Performed by: PHYSICIAN ASSISTANT

## 2023-09-27 PROCEDURE — 3066F NEPHROPATHY DOC TX: CPT | Mod: HCNC,CPTII,S$GLB, | Performed by: PHYSICIAN ASSISTANT

## 2023-09-27 PROCEDURE — 99999 PR PBB SHADOW E&M-EST. PATIENT-LVL V: ICD-10-PCS | Mod: PBBFAC,HCNC,, | Performed by: PHYSICIAN ASSISTANT

## 2023-09-27 PROCEDURE — 4010F PR ACE/ARB THEARPY RXD/TAKEN: ICD-10-PCS | Mod: HCNC,CPTII,S$GLB, | Performed by: PHYSICIAN ASSISTANT

## 2023-09-27 PROCEDURE — 99213 PR OFFICE/OUTPT VISIT, EST, LEVL III, 20-29 MIN: ICD-10-PCS | Mod: HCNC,S$GLB,, | Performed by: PHYSICIAN ASSISTANT

## 2023-09-27 PROCEDURE — 1160F RVW MEDS BY RX/DR IN RCRD: CPT | Mod: HCNC,CPTII,S$GLB, | Performed by: PHYSICIAN ASSISTANT

## 2023-09-27 PROCEDURE — 99999 PR PBB SHADOW E&M-EST. PATIENT-LVL V: CPT | Mod: PBBFAC,HCNC,, | Performed by: PHYSICIAN ASSISTANT

## 2023-09-27 PROCEDURE — 1160F PR REVIEW ALL MEDS BY PRESCRIBER/CLIN PHARMACIST DOCUMENTED: ICD-10-PCS | Mod: HCNC,CPTII,S$GLB, | Performed by: PHYSICIAN ASSISTANT

## 2023-09-27 PROCEDURE — 3078F DIAST BP <80 MM HG: CPT | Mod: HCNC,CPTII,S$GLB, | Performed by: PHYSICIAN ASSISTANT

## 2023-09-27 RX ORDER — SEMAGLUTIDE 0.68 MG/ML
0.25 INJECTION, SOLUTION SUBCUTANEOUS
Qty: 4.5 ML | Refills: 3 | Status: SHIPPED | OUTPATIENT
Start: 2023-09-27 | End: 2024-08-28

## 2023-09-27 NOTE — PROGRESS NOTES
Subjective:      Patient ID: Lucia Barlow is a 73 y.o. female.    Chief Complaint: Follow-up    HPI  Here today for a follow up after starting on ozempic. Had to stop the jardiance due to recurrent yeast infections. Doing much better off the medication. Also had to stop ASA due to frequent GERD.   Doing great on the 0.25 of ozempic. Blood sugar .   Admits to a reduced appetite. 3lb weight loss.   No issues/concerns with the medication. Would like to stay on at current dose.     Wt Readings from Last 3 Encounters:   09/27/23 0837 60.6 kg (133 lb 9.6 oz)   08/30/23 0817 62 kg (136 lb 11 oz)   08/02/23 0821 63.3 kg (139 lb 8.8 oz)      Patient Active Problem List   Diagnosis    Former heavy cigarette smoker    Hypertension associated with diabetes    Dyslipidemia associated with type 2 diabetes mellitus    Osteoporosis    Atherosclerosis of native artery of both lower extremities with intermittent claudication    S/P peripheral artery angioplasty    History of adenomatous polyp of colon    Nonrheumatic aortic valve stenosis    Atherosclerosis of aorta    Coronary artery calcification    PVD (peripheral vascular disease)    Type 2 diabetes mellitus with peripheral vascular disease    Gastroesophageal reflux disease without esophagitis    Type 2 diabetes mellitus with stage 3a chronic kidney disease, without long-term current use of insulin    Type 2 diabetes mellitus with microalbuminuria, without long-term current use of insulin    Chronic obstructive pulmonary disease    Seasonal allergic rhinitis due to pollen    Tension headache    Tortuous aorta    Diverticulosis    Non-small cell cancer of left lung    IVANIA (obstructive sleep apnea)    Pulmonary nodule less than 6 mm in diameter with high risk for malignant neoplasm - RIght lower lobe    Iron deficiency anemia secondary to inadequate dietary iron intake    Pulmonary nodule, left - 8 mm , high risk for lung cancer    Recurrent candidiasis of vagina  associated with SGLT2-I    High pulmonary arterial pressure    Other reduced mobility    S/P lobectomy of lung-  Hx robotic lung lobectomy 11/19/2020    Abnormal EKG    Shortness of breath    Coronary artery disease involving native coronary artery of native heart without angina pectoris         Current Outpatient Medications:     albuterol (PROVENTIL/VENTOLIN HFA) 90 mcg/actuation inhaler, Inhale 2 puffs into the lungs every 4 (four) hours as needed for Wheezing or Shortness of Breath., Disp: 54 g, Rfl: 3    amLODIPine (NORVASC) 10 MG tablet, Take 1 tablet (10 mg total) by mouth once daily., Disp: 90 tablet, Rfl: 3    butalbital-acetaminophen-caffeine -40 mg (FIORICET, ESGIC) -40 mg per tablet, TAKE 1 TABLET BY MOUTH 3 (THREE) TIMES DAILY AS NEEDED FOR HEADACHES. (MAXIMUM OF 15 TABLETS PER MONTH), Disp: 30 tablet, Rfl: 3    calcium-vitamin D 600 mg-10 mcg (400 unit) Tab, Take 2 tablets by mouth once daily., Disp: 180 tablet, Rfl: 4    cetirizine (ZYRTEC) 10 MG tablet, Take 1 tablet (10 mg total) by mouth once daily. For sinus congestion, Disp: 90 tablet, Rfl: 4    chlorthalidone (HYGROTEN) 25 MG Tab, Take by mouth., Disp: , Rfl:     cilostazoL (PLETAL) 100 MG Tab, , Disp: , Rfl:     cilostazoL (PLETAL) 50 MG Tab, Take 1 tablet (50 mg total) by mouth 2 (two) times daily before meals., Disp: 180 tablet, Rfl: 3    EPINEPHrine (EPIPEN) 0.3 mg/0.3 mL AtIn, INJECT INTO THE MUSCLE ONE TIME FOR 1 DOSE, AS DIRECTED, Disp: 2 each, Rfl: 3    ezetimibe (ZETIA) 10 mg tablet, Take 1 tablet (10 mg total) by mouth once daily., Disp: 90 tablet, Rfl: 2    famotidine (PEPCID) 40 MG tablet, Take by mouth., Disp: , Rfl:     fexofenadine (ALLEGRA) 180 MG tablet, TAKE 1 TABLET ONE TIME DAILY, Disp: 90 tablet, Rfl: 3    fluconazole (DIFLUCAN) 150 MG Tab, TAKE 1 TABLET AS NEEDED FOR YEAST INFECTION THAT DOES NOT RESPOND TO MONISTAT. DO NOT TAKE MORE THAN 1 TABLET PER WEEK, Disp: 12 tablet, Rfl: 0    fluticasone propionate  (FLONASE) 50 mcg/actuation nasal spray, 2 sprays (100 mcg total) by Each Nostril route once daily., Disp: 32 g, Rfl: 5    furosemide (LASIX) 20 MG tablet, Take 1 tablet (20 mg total) by mouth once daily., Disp: 90 tablet, Rfl: 3    ipratropium (ATROVENT) 21 mcg (0.03 %) nasal spray, 2 sprays by Each Nostril route 3 (three) times daily., Disp: 60 mL, Rfl: 5    ipratropium-albuteroL (COMBIVENT)  mcg/actuation inhaler, Inhale 2 puffs into the lungs every 4 (four) hours as needed for Wheezing or Shortness of Breath. Rescue, Disp: 12 g, Rfl: 3    levocetirizine (XYZAL) 5 MG tablet, Take 1 tablet (5 mg total) by mouth every evening., Disp: 30 tablet, Rfl: 11    LEXISCAN 0.4 mg/5 mL Syrg, , Disp: , Rfl:     montelukast (SINGULAIR) 10 mg tablet, Take 1 tablet (10 mg total) by mouth every evening., Disp: 90 tablet, Rfl: 3    ondansetron (ZOFRAN-ODT) 4 MG TbDL, 1-2 tablets PO every 6 hours as needed for nausea/vomiting while completing bowel prep, Disp: 4 tablet, Rfl: 0    miconazole (MICOTIN) 2 % vaginal cream, Place 1 applicator vaginally every evening. Use as directed (Patient not taking: Reported on 9/27/2023), Disp: 135 g, Rfl: 2    pantoprazole (PROTONIX) 40 MG tablet, TAKE 1 TABLET ONE TIME DAILY, Disp: 90 tablet, Rfl: 2    potassium chloride SA (K-DUR,KLOR-CON) 20 MEQ tablet, TAKE 1 TABLET ONE TIME DAILY, Disp: 90 tablet, Rfl: 3    predniSONE (DELTASONE) 20 MG tablet, Prednisone 60 mg/ day for 3 days, 40 mg/day for 3 days,20 mg/ day for 3 days, (1/2 tablet )10 mg a day for 3 days., Disp: 20 tablet, Rfl: 0    rosuvastatin (CRESTOR) 20 MG tablet, Take 1 tablet (20 mg total) by mouth every evening., Disp: 90 tablet, Rfl: 3    semaglutide (OZEMPIC) 0.25 mg or 0.5 mg (2 mg/3 mL) pen injector, Inject 0.25 mg into the skin every 7 days., Disp: 4.5 mL, Rfl: 3    sod sulf-pot chloride-mag sulf (SUTAB) 1.479-0.188- 0.225 gram tablet, Take 12 tablets by mouth once daily. Take according to package instructions with  "indicated amount of water., Disp: 24 tablet, Rfl: 0    valsartan (DIOVAN) 160 MG tablet, Take 160 mg by mouth., Disp: , Rfl:     varicella-zoster [id number], PF, (SHINGRIX) 50 mcg/0.5 mL injection, Inject 0.5 mL (one dose) into muscle now; give second dose at least 2 months later, Disp: 0.5 mL, Rfl: 1    Review of Systems   Constitutional:  Positive for appetite change. Negative for activity change, chills, diaphoresis, fatigue, fever and unexpected weight change.   HENT: Negative.  Negative for congestion, hearing loss, postnasal drip, rhinorrhea, sore throat, trouble swallowing and voice change.    Eyes: Negative.  Negative for visual disturbance.   Respiratory: Negative.  Negative for cough, choking, chest tightness and shortness of breath.    Cardiovascular:  Negative for chest pain, palpitations and leg swelling.   Gastrointestinal:  Negative for abdominal distention, abdominal pain, blood in stool, constipation, diarrhea, nausea and vomiting.   Endocrine: Negative for cold intolerance, heat intolerance, polydipsia and polyuria.   Genitourinary: Negative.  Negative for difficulty urinating and frequency.   Musculoskeletal:  Negative for arthralgias, back pain, gait problem, joint swelling and myalgias.   Skin:  Negative for color change, pallor, rash and wound.   Neurological:  Negative for dizziness, tremors, weakness, light-headedness, numbness and headaches.   Hematological:  Negative for adenopathy.   Psychiatric/Behavioral:  Negative for behavioral problems, confusion, self-injury, sleep disturbance and suicidal ideas. The patient is not nervous/anxious.      Objective:   /66 (BP Location: Left arm, Patient Position: Sitting, BP Method: Medium (Manual))   Pulse 104   Temp 97.5 °F (36.4 °C) (Tympanic)   Resp 16   Ht 5' 1" (1.549 m)   Wt 60.6 kg (133 lb 9.6 oz)   SpO2 100%   BMI 25.24 kg/m²     Physical Exam  Vitals and nursing note reviewed.   Constitutional:       General: She is not in acute " distress.     Appearance: Normal appearance. She is well-developed. She is not ill-appearing, toxic-appearing or diaphoretic.   HENT:      Head: Normocephalic and atraumatic.   Cardiovascular:      Rate and Rhythm: Normal rate and regular rhythm.      Heart sounds: Normal heart sounds. No murmur heard.     No friction rub. No gallop.   Pulmonary:      Effort: Pulmonary effort is normal. No respiratory distress.      Breath sounds: Normal breath sounds. No wheezing or rales.   Musculoskeletal:         General: Normal range of motion.   Skin:     General: Skin is warm.      Capillary Refill: Capillary refill takes less than 2 seconds.      Findings: No rash.   Neurological:      Mental Status: She is alert and oriented to person, place, and time.      Motor: No weakness.      Gait: Gait normal.   Psychiatric:         Mood and Affect: Mood normal.         Behavior: Behavior normal.         Thought Content: Thought content normal.         Judgment: Judgment normal.         Assessment:     1. Need for influenza vaccination    2. Type 2 diabetes mellitus with peripheral vascular disease      Plan:   Need for influenza vaccination  -     Cancel: Influenza (FLUBLOK) - Quadrivalent (Recombinant) *Preferred* (PF) - egg allergy    Type 2 diabetes mellitus with peripheral vascular disease  -     semaglutide (OZEMPIC) 0.25 mg or 0.5 mg (2 mg/3 mL) pen injector; Inject 0.25 mg into the skin every 7 days.  Dispense: 4.5 mL; Refill: 3  -     Hemoglobin A1C; Future; Expected date: 12/27/2023    Other orders  -     Influenza - Quadrivalent (MDCK) (PF)    -follow up with pcp in 3 months  -recheck a1c in 3 months just before pcp appt  -cancel pcp follow up next month and reschedule for 3 months.   -diabetes stable and controlled on ozempic at 0.25.     Follow up in about 3 months (around 12/27/2023), or if symptoms worsen or fail to improve.

## 2023-10-06 ENCOUNTER — OFFICE VISIT (OUTPATIENT)
Dept: GASTROENTEROLOGY | Facility: CLINIC | Age: 73
End: 2023-10-06
Payer: MEDICARE

## 2023-10-06 VITALS
BODY MASS INDEX: 24.8 KG/M2 | SYSTOLIC BLOOD PRESSURE: 130 MMHG | WEIGHT: 131.38 LBS | HEART RATE: 104 BPM | DIASTOLIC BLOOD PRESSURE: 48 MMHG | HEIGHT: 61 IN

## 2023-10-06 DIAGNOSIS — D50.9 IRON DEFICIENCY ANEMIA, UNSPECIFIED IRON DEFICIENCY ANEMIA TYPE: Primary | ICD-10-CM

## 2023-10-06 PROCEDURE — 99213 PR OFFICE/OUTPT VISIT, EST, LEVL III, 20-29 MIN: ICD-10-PCS | Mod: HCNC,S$GLB,, | Performed by: NURSE PRACTITIONER

## 2023-10-06 PROCEDURE — 3078F DIAST BP <80 MM HG: CPT | Mod: HCNC,CPTII,S$GLB, | Performed by: NURSE PRACTITIONER

## 2023-10-06 PROCEDURE — 3078F PR MOST RECENT DIASTOLIC BLOOD PRESSURE < 80 MM HG: ICD-10-PCS | Mod: HCNC,CPTII,S$GLB, | Performed by: NURSE PRACTITIONER

## 2023-10-06 PROCEDURE — 1125F AMNT PAIN NOTED PAIN PRSNT: CPT | Mod: HCNC,CPTII,S$GLB, | Performed by: NURSE PRACTITIONER

## 2023-10-06 PROCEDURE — 3066F NEPHROPATHY DOC TX: CPT | Mod: HCNC,CPTII,S$GLB, | Performed by: NURSE PRACTITIONER

## 2023-10-06 PROCEDURE — 3044F PR MOST RECENT HEMOGLOBIN A1C LEVEL <7.0%: ICD-10-PCS | Mod: HCNC,CPTII,S$GLB, | Performed by: NURSE PRACTITIONER

## 2023-10-06 PROCEDURE — 1159F MED LIST DOCD IN RCRD: CPT | Mod: HCNC,CPTII,S$GLB, | Performed by: NURSE PRACTITIONER

## 2023-10-06 PROCEDURE — 3288F FALL RISK ASSESSMENT DOCD: CPT | Mod: HCNC,CPTII,S$GLB, | Performed by: NURSE PRACTITIONER

## 2023-10-06 PROCEDURE — 3044F HG A1C LEVEL LT 7.0%: CPT | Mod: HCNC,CPTII,S$GLB, | Performed by: NURSE PRACTITIONER

## 2023-10-06 PROCEDURE — 1125F PR PAIN SEVERITY QUANTIFIED, PAIN PRESENT: ICD-10-PCS | Mod: HCNC,CPTII,S$GLB, | Performed by: NURSE PRACTITIONER

## 2023-10-06 PROCEDURE — 1101F PR PT FALLS ASSESS DOC 0-1 FALLS W/OUT INJ PAST YR: ICD-10-PCS | Mod: HCNC,CPTII,S$GLB, | Performed by: NURSE PRACTITIONER

## 2023-10-06 PROCEDURE — 99213 OFFICE O/P EST LOW 20 MIN: CPT | Mod: HCNC,S$GLB,, | Performed by: NURSE PRACTITIONER

## 2023-10-06 PROCEDURE — 3075F PR MOST RECENT SYSTOLIC BLOOD PRESS GE 130-139MM HG: ICD-10-PCS | Mod: HCNC,CPTII,S$GLB, | Performed by: NURSE PRACTITIONER

## 2023-10-06 PROCEDURE — 3060F PR POS MICROALBUMINURIA RESULT DOCUMENTED/REVIEW: ICD-10-PCS | Mod: HCNC,CPTII,S$GLB, | Performed by: NURSE PRACTITIONER

## 2023-10-06 PROCEDURE — 1159F PR MEDICATION LIST DOCUMENTED IN MEDICAL RECORD: ICD-10-PCS | Mod: HCNC,CPTII,S$GLB, | Performed by: NURSE PRACTITIONER

## 2023-10-06 PROCEDURE — 3075F SYST BP GE 130 - 139MM HG: CPT | Mod: HCNC,CPTII,S$GLB, | Performed by: NURSE PRACTITIONER

## 2023-10-06 PROCEDURE — 1160F PR REVIEW ALL MEDS BY PRESCRIBER/CLIN PHARMACIST DOCUMENTED: ICD-10-PCS | Mod: HCNC,CPTII,S$GLB, | Performed by: NURSE PRACTITIONER

## 2023-10-06 PROCEDURE — 99999 PR PBB SHADOW E&M-EST. PATIENT-LVL V: ICD-10-PCS | Mod: PBBFAC,HCNC,, | Performed by: NURSE PRACTITIONER

## 2023-10-06 PROCEDURE — 3288F PR FALLS RISK ASSESSMENT DOCUMENTED: ICD-10-PCS | Mod: HCNC,CPTII,S$GLB, | Performed by: NURSE PRACTITIONER

## 2023-10-06 PROCEDURE — 1101F PT FALLS ASSESS-DOCD LE1/YR: CPT | Mod: HCNC,CPTII,S$GLB, | Performed by: NURSE PRACTITIONER

## 2023-10-06 PROCEDURE — 4010F PR ACE/ARB THEARPY RXD/TAKEN: ICD-10-PCS | Mod: HCNC,CPTII,S$GLB, | Performed by: NURSE PRACTITIONER

## 2023-10-06 PROCEDURE — 99999 PR PBB SHADOW E&M-EST. PATIENT-LVL V: CPT | Mod: PBBFAC,HCNC,, | Performed by: NURSE PRACTITIONER

## 2023-10-06 PROCEDURE — 3060F POS MICROALBUMINURIA REV: CPT | Mod: HCNC,CPTII,S$GLB, | Performed by: NURSE PRACTITIONER

## 2023-10-06 PROCEDURE — 1160F RVW MEDS BY RX/DR IN RCRD: CPT | Mod: HCNC,CPTII,S$GLB, | Performed by: NURSE PRACTITIONER

## 2023-10-06 PROCEDURE — 3066F PR DOCUMENTATION OF TREATMENT FOR NEPHROPATHY: ICD-10-PCS | Mod: HCNC,CPTII,S$GLB, | Performed by: NURSE PRACTITIONER

## 2023-10-06 PROCEDURE — 4010F ACE/ARB THERAPY RXD/TAKEN: CPT | Mod: HCNC,CPTII,S$GLB, | Performed by: NURSE PRACTITIONER

## 2023-10-06 NOTE — PROGRESS NOTES
Clinic Follow Up:  [facility] Follow Up Note    Reason for Follow Up:  The encounter diagnosis was Iron deficiency anemia, unspecified iron deficiency anemia type.    PCP: ZELDA Sandoval   [address] [facility] / [place] [id number]    HPI:  This is a 73 y.o. female here for follow up of anemia.   Onset: 2-3 years ago  Type of anemia: iron deficiency   Last Hgb: 11.5 (8/30/23). Did have drop to 6.9 in June 2023 and received PRBC.   Iron studies: iron deficiency documented in 7/2023. Improved some with iron sat still low at 9 on 8/30/23  Blood thinners: no-- was taking ASA 81 mg but unable to tolerate. It burns stomach. She takes pantoprazole 40 mg and Pepcid at the time of taking ASA and still with burning.   Any overt GI bleeding: no  Workup:   - SFOB negative (7/26/23)   - EGD/colonoscopy 3/2022- polyp removed. Recall in 5 years. No evidence of GI bleeding.   - Colonoscopy done in 2019- multiple polyp. Recall 5 years.     Review of Systems   Constitutional:  Negative for activity change and appetite change.        As per interval history above   Cardiovascular:  Negative for chest pain.   Gastrointestinal:  Negative for abdominal pain, anal bleeding, blood in stool, constipation, diarrhea, nausea and vomiting.   Skin:  Negative for color change and rash.       Medical History:  Past Medical History:   Diagnosis Date    Abnormal EKG 5/19/2023    Atherosclerosis of artery of both lower extremities 1/17/2014    Atherosclerosis of native coronary artery of native heart without angina pectoris 11/18/2016    Atherosclerotic PVD with intermittent claudication 1/17/2014    Chronic bronchitis     COPD (chronic obstructive pulmonary disease)     Coronary artery disease involving native coronary artery of native heart without angina pectoris 5/19/2023    Diabetes with neurologic complications     Dyslipidemia associated with type 2 diabetes mellitus 6/14/2013    Dyslipidemia associated with type 2  diabetes mellitus     Ex-smoker     Gastroesophageal reflux disease without esophagitis 1/25/2019    Hyperlipidemia     Hypertension     Iron deficiency anemia secondary to inadequate dietary iron intake 6/3/2022    NSCLC of left lung 11/19/2020    IVANIA (obstructive sleep apnea) 2/11/2021    Osteoporosis 1/16 rosita 1/18    Pneumothorax after biopsy 10/21/2020    PVD (peripheral vascular disease)     Renal manifestation of secondary diabetes mellitus     S/P peripheral artery angioplasty 2/7/2014    Seasonal allergic rhinitis due to pollen     Shortness of breath 5/19/2023    Simple chronic bronchitis     Tobacco dependence     Type 2 diabetes mellitus with microalbuminuria, without long-term current use of insulin 3/29/2019    Type 2 diabetes mellitus with peripheral vascular disease     Type 2 diabetes mellitus with stage 3 chronic kidney disease, without long-term current use of insulin 2/1/2019    Type 2 diabetes mellitus without retinopathy 2/1/2019    Urinary incontinence        Surgical History:   Past Surgical History:   Procedure Laterality Date    ANGIOPLASTY Bilateral 01/24/2014    aortoiliac stenting     CARPAL TUNNEL RELEASE  2003    [facility]    COLECTOMY  approximate 2005    pt states 1in colon -dx with benign mass removed-states no colon cancer    COLONOSCOPY N/A 11/9/2016    Procedure: COLONOSCOPY;  Surgeon: Dmitri Sterling MD;  Location: [facility];  Service: Endoscopy;  Laterality: N/A;    COLONOSCOPY N/A 11/15/2019    Procedure: COLONOSCOPY;  Surgeon: Marko Vicente MD;  Location: [facility];  Service: Endoscopy;  Laterality: N/A;    COLONOSCOPY N/A 3/25/2022    Procedure: COLONOSCOPY;  Surgeon: Paola Feldman MD;  Location: [facility];  Service: Endoscopy;  Laterality: N/A;    ESOPHAGOGASTRODUODENOSCOPY N/A 3/25/2022    Procedure: EGD (ESOPHAGOGASTRODUODENOSCOPY);  Surgeon: Paola Feldman MD;  Location: [facility];  Service: Endoscopy;  Laterality: N/A;    HYSTERECTOMY      no cancer    INJECTION  OF ANESTHETIC AGENT AROUND MULTIPLE INTERCOSTAL NERVES Left 11/19/2020    Procedure: BLOCK, NERVE, INTERCOSTAL, 2 OR MORE;  Surgeon: Amrit Flores MD;  [facility];  Service: Thoracic;  Laterality: Left;    OOPHORECTOMY      SURGICAL REMOVAL OF LYMPH NODE Left 11/19/2020    Procedure: EXCISION, LYMPH NODE;  Surgeon: Amrit Flores MD;  Location: [facility] OR [facility];  Service: Thoracic;  Laterality: Left;  mediastinal lymph node disection    XI ROBOTIC RATS,WITH LOBECTOMY,LUNG Left 11/19/2020    Procedure: XI ROBOTIC RATS,WITH LOBECTOMY,LUNG;  Surgeon: Amrit Flores MD;  [facility];  Service: Thoracic;  Laterality: Left;  Lingulectomy.       Family History:   Family History   Problem Relation Age of Onset    Stroke Father     Stroke Sister     Asthma Daughter     Diabetes Daughter     Breast cancer Daughter     Asthma Son        Social History:   Social History     Tobacco Use    Smoking status: Former     Current packs/day: 0.00     Average packs/day: 1 pack/day for 60.0 years (60.0 ttl pk-yrs)     Types: Cigarettes     Start date: 1/1/1960     Quit date: 1/10/2020     Years since quitting: 3.7    Smokeless tobacco: Former   Substance Use Topics    Alcohol use: No     Alcohol/week: 0.0 standard drinks of alcohol    Drug use: No       Allergies:   Review of patient's allergies indicates:   Allergen Reactions    Iodine and iodide containing products Swelling    Losartan Itching and Swelling     Very uncomfortable - high probability of allergic reaction due to swelling and itching    Skin staples [surgical stainless steel] Swelling    Egg derived      Shortness breath, lip swelling    Fish containing products     Jardiance [empagliflozin]     Latex, natural rubber Swelling    Nickel     Pravastatin      40 mg causes nausea vomitting but 20 mg ok    Shellfish containing products Swelling       Home Medications:  Current Outpatient Medications on File Prior to Visit   Medication  Sig Dispense Refill    albuterol (PROVENTIL/VENTOLIN HFA) 90 mcg/actuation inhaler Inhale 2 puffs into the lungs every 4 (four) hours as needed for Wheezing or Shortness of Breath. 54 g 3    amLODIPine (NORVASC) 10 MG tablet Take 1 tablet (10 mg total) by mouth once daily. 90 tablet 3    butalbital-acetaminophen-caffeine -40 mg (FIORICET, ESGIC) -40 mg per tablet TAKE 1 TABLET BY MOUTH 3 (THREE) TIMES DAILY AS NEEDED FOR HEADACHES. (MAXIMUM OF 15 TABLETS PER MONTH) 30 tablet 3    calcium-vitamin D 600 mg-10 mcg (400 unit) Tab Take 2 tablets by mouth once daily. 180 tablet 4    cetirizine (ZYRTEC) 10 MG tablet Take 1 tablet (10 mg total) by mouth once daily. For sinus congestion 90 tablet 4    chlorthalidone (HYGROTEN) 25 MG Tab Take by mouth.      cilostazoL (PLETAL) 100 MG Tab       cilostazoL (PLETAL) 50 MG Tab Take 1 tablet (50 mg total) by mouth 2 (two) times daily before meals. 180 tablet 3    EPINEPHrine (EPIPEN) 0.3 mg/0.3 mL AtIn INJECT INTO THE MUSCLE ONE TIME FOR 1 DOSE, AS DIRECTED 2 each 3    ezetimibe (ZETIA) 10 mg tablet Take 1 tablet (10 mg total) by mouth once daily. 90 tablet 2    famotidine (PEPCID) 40 MG tablet Take by mouth.      fexofenadine (ALLEGRA) 180 MG tablet TAKE 1 TABLET ONE TIME DAILY 90 tablet 3    fluconazole (DIFLUCAN) 150 MG Tab TAKE 1 TABLET AS NEEDED FOR YEAST INFECTION THAT DOES NOT RESPOND TO MONISTAT. DO NOT TAKE MORE THAN 1 TABLET PER WEEK 12 tablet 0    fluticasone propionate (FLONASE) 50 mcg/actuation nasal spray 2 sprays (100 mcg total) by Each Nostril route once daily. 32 g 5    furosemide (LASIX) 20 MG tablet Take 1 tablet (20 mg total) by mouth once daily. 90 tablet 3    ipratropium (ATROVENT) 21 mcg (0.03 %) nasal spray 2 sprays by Each Nostril route 3 (three) times daily. 60 mL 5    ipratropium-albuteroL (COMBIVENT)  mcg/actuation inhaler Inhale 2 puffs into the lungs every 4 (four) hours as needed for Wheezing or Shortness of Breath. Rescue 12 g 3     "LEXISCAN 0.4 mg/5 mL Syrg       miconazole (MICOTIN) 2 % vaginal cream Place 1 applicator vaginally every evening. Use as directed 135 g 2    montelukast (SINGULAIR) 10 mg tablet Take 1 tablet (10 mg total) by mouth every evening. 90 tablet 3    ondansetron (ZOFRAN-ODT) 4 MG TbDL 1-2 tablets PO every 6 hours as needed for nausea/vomiting while completing bowel prep 4 tablet 0    pantoprazole (PROTONIX) 40 MG tablet TAKE 1 TABLET ONE TIME DAILY 90 tablet 2    potassium chloride SA (K-DUR,KLOR-CON) 20 MEQ tablet TAKE 1 TABLET ONE TIME DAILY 90 tablet 3    predniSONE (DELTASONE) 20 MG tablet Prednisone 60 mg/ day for 3 days, 40 mg/day for 3 days,20 mg/ day for 3 days, (1/2 tablet )10 mg a day for 3 days. 20 tablet 0    rosuvastatin (CRESTOR) 20 MG tablet Take 1 tablet (20 mg total) by mouth every evening. 90 tablet 3    semaglutide (OZEMPIC) 0.25 mg or 0.5 mg (2 mg/3 mL) pen injector Inject 0.25 mg into the skin every 7 days. 4.5 mL 3    valsartan (DIOVAN) 160 MG tablet Take 160 mg by mouth.      levocetirizine (XYZAL) 5 MG tablet Take 1 tablet (5 mg total) by mouth every evening. 30 tablet 11    sod sulf-pot chloride-mag sulf (SUTAB) 1.479-0.188- 0.225 gram tablet Take 12 tablets by mouth once daily. Take according to package instructions with indicated amount of water. (Patient not taking: Reported on 10/6/2023) 24 tablet 0    varicella-zoster gE-AS01B, PF, (SHINGRIX) 50 mcg/0.5 mL injection Inject 0.5 mL (one dose) into muscle now; give second dose at least 2 months later (Patient not taking: Reported on 10/6/2023) 0.5 mL 1     No current facility-administered medications on file prior to visit.       BP (!) 130/48 (BP Location: Left arm, Patient Position: Sitting, BP Method: Medium (Manual))   Pulse 104   Ht 5' 1" (1.549 m)   Wt 59.6 kg (131 lb 6.3 oz)   BMI 24.83 kg/m²   Body mass index is 24.83 kg/m².  Physical Exam  Constitutional:       General: She is not in acute distress.  Cardiovascular:      Rate and " Rhythm: Regular rhythm. Tachycardia present.      Heart sounds: Normal heart sounds. No murmur heard.  Pulmonary:      Effort: Pulmonary effort is normal. No respiratory distress.      Breath sounds: Normal breath sounds.   Neurological:      Mental Status: She is alert.   Psychiatric:         Mood and Affect: Mood normal.         Behavior: Behavior normal.         Labs: Pertinent labs reviewed.  CRC Screening: up to date     Assessment:   1. Iron deficiency anemia, unspecified iron deficiency anemia type    EGD and colonoscopy done 3/2022 for anemia was negative for source of blood loss.     Recommendations:   - VCE to visualize the small bowel.   - if negative, FAHAD is not 2/2 GI bleeding and would recommend further workup/management per hem/onc    Iron deficiency anemia, unspecified iron deficiency anemia type  -     Case Request Endoscopy: IMAGING PROCEDURE, GI TRACT, INTRALUMINAL, VIA CAPSULE    Return to Clinic:  Follow up to be determined after results/ procedure(s).     Thank you for the opportunity to participate in the care of this patient.  HITESH Campbell

## 2023-10-09 ENCOUNTER — PATIENT MESSAGE (OUTPATIENT)
Dept: ENDOSCOPY | Facility: HOSPITAL | Age: 73
End: 2023-10-09
Payer: MEDICARE

## 2023-10-12 ENCOUNTER — PATIENT MESSAGE (OUTPATIENT)
Dept: ENDOSCOPY | Facility: HOSPITAL | Age: 73
End: 2023-10-12
Payer: MEDICARE

## 2023-10-16 ENCOUNTER — PATIENT MESSAGE (OUTPATIENT)
Dept: INTERNAL MEDICINE | Facility: CLINIC | Age: 73
End: 2023-10-16
Payer: MEDICARE

## 2023-10-25 NOTE — TELEPHONE ENCOUNTER
TO MY TEAM:  Please identify a nurse or  in the endoscopy lab who is able to respond to her question, then forward and reassign this to them.  Explain that the patient is likely to not attend her 11/1/23 endoscopy if this is not resolved before then.  Express my gratitude for their help, and tell them to secure chat message me if they need any new orders from me.  Thanks!

## 2023-11-08 ENCOUNTER — HOSPITAL ENCOUNTER (OUTPATIENT)
Facility: HOSPITAL | Age: 73
Discharge: HOME | End: 2023-11-08
Attending: INTERNAL MEDICINE | Admitting: INTERNAL MEDICINE
Payer: MEDICARE

## 2023-11-08 PROCEDURE — 91110 GI TRC IMG INTRAL ESOPH-ILE: CPT | Mod: TC,HCNC | Performed by: INTERNAL MEDICINE

## 2023-11-09 ENCOUNTER — TELEPHONE (OUTPATIENT)
Dept: GASTROENTEROLOGY | Facility: CLINIC | Age: 73
End: 2023-11-09
Payer: MEDICARE

## 2023-11-09 DIAGNOSIS — T18.9XXA FOREIGN BODY IN DIGESTIVE TRACT, INITIAL ENCOUNTER: Primary | ICD-10-CM

## 2023-11-09 PROCEDURE — 91110 PR GI TRACT CAPSULE ENDOSCOPY: ICD-10-PCS | Mod: 26,HCNC,, | Performed by: INTERNAL MEDICINE

## 2023-11-09 PROCEDURE — 91110 GI TRC IMG INTRAL ESOPH-ILE: CPT | Mod: 26,HCNC,, | Performed by: INTERNAL MEDICINE

## 2023-11-09 NOTE — TELEPHONE ENCOUNTER
----- Message from Jodi Reece MD sent at 11/9/2023  1:47 PM CST -----  Regarding: KUB for VCE  Hi,    Patient's video capsule was not seen entering into colon on video footage. KUB ordered to ensure the capsule passed appropriately. Please notify patient and assist with scheduling the xray.     Thanks,        Dr. Reece

## 2023-11-09 NOTE — [employer] PATIENT INSTRUCTIONS
Discharge Summary/Instructions after an Endoscopic Procedure  Patient Name: Lucia Barlow  Patient MRN: [id number]  Patient YOB: 1950 Wednesday, November 8, 2023 Jodi Reece MD  Dear patient,  As a result of recent federal legislation (The Federal Cures Act), you may   receive lab or pathology results from your procedure in your [facility]   account before your physician is able to contact you. Your physician or   their representative will relay the results to you with their   recommendations at their soonest availability.  Thank you,  RESTRICTIONS:  During your procedure today, you received medications for sedation.  These   medications may affect your judgment, balance and coordination.  Therefore,   for 24 hours, you have the following restrictions:   - DO NOT drive a car, operate machinery, make legal/financial decisions,   sign important papers or drink alcohol.    ACTIVITY:  Today: no heavy lifting, straining or running due to procedural   sedation/anesthesia.  The following day: return to full activity including work.  DIET:  Eat and drink normally unless instructed otherwise.     TREATMENT FOR COMMON SIDE EFFECTS:  - Mild abdominal pain, nausea, belching, bloating or excessive gas:  rest,   eat lightly and use a heating pad.  - Sore Throat: treat with throat lozenges and/or gargle with warm salt   water.  - Because air was used during the procedure, expelling large amounts of air   from your rectum or belching is normal.  - If a bowel prep was taken, you may not have a bowel movement for 1-3 days.    This is normal.  SYMPTOMS TO WATCH FOR AND REPORT TO YOUR PHYSICIAN:  1. Abdominal pain or bloating, other than gas cramps.  2. Chest pain.  3. Back pain.  4. Signs of infection such as: chills or fever occurring within 24 hours   after the procedure.  5. Rectal bleeding, which would show as bright red, maroon, or black stools.   (A tablespoon of blood from the rectum is not serious, especially  if   hemorrhoids are present.)  6. Vomiting.  7. Weakness or dizziness.  GO DIRECTLY TO THE NEAREST EMERGENCY ROOM IF YOU HAVE ANY OF THE FOLLOWING:      Difficulty breathing              Chills and/or fever over 101 F   Persistent vomiting and/or vomiting blood   Severe abdominal pain   Severe chest pain   Black, tarry stools   Bleeding- more than one tablespoon   Any other symptom or condition that you feel may need urgent attention  Your doctor recommends these additional instructions:  If any biopsies were taken, your doctors clinic will contact you in 1 to 2   weeks with any results.  - KUB now to ensure capsule has passed out of small bowel  - Consider push enteroscopy vs balloon enteroscopy for further evaluation if   anemia continues  For questions, problems or results please call your physician Jodi Reece MD at Work:  [phone]  If you have any questions about the above instructions, call the GI   department at [phone] or call the endoscopy unit at [phone]   from 7am until 3 pm.  [facility] [place], EMERGENCY ROOM PHONE NUMBER:   [phone]  IF A COMPLICATION OR EMERGENCY SITUATION ARISES AND YOU ARE UNABLE TO REACH   YOUR PHYSICIAN - GO DIRECTLY TO THE EMERGENCY ROOM.  I have read or have had read to me these discharge instructions for my   procedure and have received a written copy.  I understand these   instructions and will follow-up with my physician if I have any questions.     __________________________________       _____________________________________  Nurse Signature                                          Patient/Designated   Responsible Party Signature  MD Jodi Medina MD  11/9/2023 1:46:02 PM  [employer]

## 2023-11-09 NOTE — TELEPHONE ENCOUNTER
Attempt made to reach pt via number listed regarding scheduling x-ray per , No answer , unable to leave VM

## 2023-11-10 ENCOUNTER — PATIENT MESSAGE (OUTPATIENT)
Dept: GASTROENTEROLOGY | Facility: CLINIC | Age: 73
End: 2023-11-10
Payer: MEDICARE

## 2023-11-10 ENCOUNTER — TELEPHONE (OUTPATIENT)
Dept: GASTROENTEROLOGY | Facility: CLINIC | Age: 73
End: 2023-11-10
Payer: MEDICARE

## 2023-11-10 NOTE — TELEPHONE ENCOUNTER
Attempt made to reach  via numbers listed yielded no answer; VM left for pt to return call at her convenience.

## 2023-11-28 DIAGNOSIS — D50.9 IRON DEFICIENCY ANEMIA, UNSPECIFIED IRON DEFICIENCY ANEMIA TYPE: Primary | ICD-10-CM

## 2023-11-28 DIAGNOSIS — K55.20 AVM (ARTERIOVENOUS MALFORMATION) OF SMALL BOWEL, ACQUIRED: ICD-10-CM

## 2023-11-29 ENCOUNTER — HOSPITAL ENCOUNTER (OUTPATIENT)
Dept: RADIOLOGY | Facility: HOSPITAL | Age: 73
Discharge: HOME | End: 2023-11-29
Attending: INTERNAL MEDICINE
Payer: MEDICARE

## 2023-11-29 DIAGNOSIS — T18.9XXA FOREIGN BODY IN DIGESTIVE TRACT, INITIAL ENCOUNTER: ICD-10-CM

## 2023-11-29 PROCEDURE — 74018 RADEX ABDOMEN 1 VIEW: CPT | Mod: 26,HCNC,, | Performed by: RADIOLOGY

## 2023-11-29 PROCEDURE — 74018 RADEX ABDOMEN 1 VIEW: CPT | Mod: TC,HCNC

## 2023-11-29 PROCEDURE — 74018 XR ABDOMEN AP 1 VIEW: ICD-10-PCS | Mod: 26,HCNC,, | Performed by: RADIOLOGY

## 2023-12-05 ENCOUNTER — TELEPHONE (OUTPATIENT)
Dept: OPHTHALMOLOGY | Facility: CLINIC | Age: 73
End: 2023-12-05
Payer: MEDICARE

## 2023-12-14 DIAGNOSIS — I25.10 CORONARY ARTERY DISEASE INVOLVING NATIVE CORONARY ARTERY OF NATIVE HEART WITHOUT ANGINA PECTORIS: Primary | ICD-10-CM

## 2023-12-20 ENCOUNTER — HOSPITAL ENCOUNTER (OUTPATIENT)
Dept: CARDIOLOGY | Facility: HOSPITAL | Age: 73
Discharge: HOME | End: 2023-12-20
Attending: INTERNAL MEDICINE
Payer: MEDICARE

## 2023-12-20 ENCOUNTER — OFFICE VISIT (OUTPATIENT)
Dept: CARDIOLOGY | Facility: CLINIC | Age: 73
End: 2023-12-20
Payer: MEDICARE

## 2023-12-20 VITALS
WEIGHT: 129 LBS | HEART RATE: 92 BPM | HEIGHT: 61 IN | DIASTOLIC BLOOD PRESSURE: 71 MMHG | BODY MASS INDEX: 24.35 KG/M2 | OXYGEN SATURATION: 97 % | SYSTOLIC BLOOD PRESSURE: 147 MMHG

## 2023-12-20 DIAGNOSIS — R91.1 PULMONARY NODULE, LEFT: Chronic | ICD-10-CM

## 2023-12-20 DIAGNOSIS — Z87.891 FORMER CIGARETTE SMOKER: Chronic | ICD-10-CM

## 2023-12-20 DIAGNOSIS — R80.9 TYPE 2 DIABETES MELLITUS WITH MICROALBUMINURIA, WITHOUT LONG-TERM CURRENT USE OF INSULIN: Chronic | ICD-10-CM

## 2023-12-20 DIAGNOSIS — E78.5 DYSLIPIDEMIA ASSOCIATED WITH TYPE 2 DIABETES MELLITUS: Chronic | ICD-10-CM

## 2023-12-20 DIAGNOSIS — Z86.010 HISTORY OF ADENOMATOUS POLYP OF COLON: ICD-10-CM

## 2023-12-20 DIAGNOSIS — I15.2 HYPERTENSION ASSOCIATED WITH DIABETES: ICD-10-CM

## 2023-12-20 DIAGNOSIS — I70.213 ATHEROSCLEROSIS OF NATIVE ARTERY OF BOTH LOWER EXTREMITIES WITH INTERMITTENT CLAUDICATION: Primary | Chronic | ICD-10-CM

## 2023-12-20 DIAGNOSIS — Z90.2 S/P LOBECTOMY OF LUNG: ICD-10-CM

## 2023-12-20 DIAGNOSIS — I73.9 PVD (PERIPHERAL VASCULAR DISEASE): ICD-10-CM

## 2023-12-20 DIAGNOSIS — E11.69 DYSLIPIDEMIA ASSOCIATED WITH TYPE 2 DIABETES MELLITUS: Chronic | ICD-10-CM

## 2023-12-20 DIAGNOSIS — R94.31 ABNORMAL EKG: ICD-10-CM

## 2023-12-20 DIAGNOSIS — C34.92 NON-SMALL CELL CANCER OF LEFT LUNG: ICD-10-CM

## 2023-12-20 DIAGNOSIS — I25.10 CORONARY ARTERY DISEASE INVOLVING NATIVE CORONARY ARTERY OF NATIVE HEART WITHOUT ANGINA PECTORIS: ICD-10-CM

## 2023-12-20 DIAGNOSIS — R06.02 SHORTNESS OF BREATH: ICD-10-CM

## 2023-12-20 DIAGNOSIS — J44.9 CHRONIC OBSTRUCTIVE PULMONARY DISEASE, UNSPECIFIED COPD TYPE: Chronic | ICD-10-CM

## 2023-12-20 DIAGNOSIS — E11.22 TYPE 2 DIABETES MELLITUS WITH STAGE 3A CHRONIC KIDNEY DISEASE, WITHOUT LONG-TERM CURRENT USE OF INSULIN: Chronic | ICD-10-CM

## 2023-12-20 DIAGNOSIS — I27.21 HIGH PULMONARY ARTERIAL PRESSURE: Chronic | ICD-10-CM

## 2023-12-20 DIAGNOSIS — E11.51 TYPE 2 DIABETES MELLITUS WITH PERIPHERAL VASCULAR DISEASE: Chronic | ICD-10-CM

## 2023-12-20 DIAGNOSIS — E11.29 TYPE 2 DIABETES MELLITUS WITH MICROALBUMINURIA, WITHOUT LONG-TERM CURRENT USE OF INSULIN: Chronic | ICD-10-CM

## 2023-12-20 DIAGNOSIS — D50.8 IRON DEFICIENCY ANEMIA SECONDARY TO INADEQUATE DIETARY IRON INTAKE: Chronic | ICD-10-CM

## 2023-12-20 DIAGNOSIS — I35.0 NONRHEUMATIC AORTIC VALVE STENOSIS: ICD-10-CM

## 2023-12-20 DIAGNOSIS — Z98.62 S/P PERIPHERAL ARTERY ANGIOPLASTY: ICD-10-CM

## 2023-12-20 DIAGNOSIS — B37.31 RECURRENT CANDIDIASIS OF VAGINA: Chronic | ICD-10-CM

## 2023-12-20 DIAGNOSIS — N18.31 TYPE 2 DIABETES MELLITUS WITH STAGE 3A CHRONIC KIDNEY DISEASE, WITHOUT LONG-TERM CURRENT USE OF INSULIN: Chronic | ICD-10-CM

## 2023-12-20 DIAGNOSIS — K21.9 GASTROESOPHAGEAL REFLUX DISEASE WITHOUT ESOPHAGITIS: Chronic | ICD-10-CM

## 2023-12-20 DIAGNOSIS — G47.33 OSA (OBSTRUCTIVE SLEEP APNEA): ICD-10-CM

## 2023-12-20 DIAGNOSIS — I25.84 CORONARY ARTERY CALCIFICATION: ICD-10-CM

## 2023-12-20 DIAGNOSIS — I25.10 CORONARY ARTERY CALCIFICATION: ICD-10-CM

## 2023-12-20 DIAGNOSIS — E11.59 HYPERTENSION ASSOCIATED WITH DIABETES: ICD-10-CM

## 2023-12-20 PROCEDURE — 3066F NEPHROPATHY DOC TX: CPT | Mod: HCNC,CPTII,S$GLB, | Performed by: INTERNAL MEDICINE

## 2023-12-20 PROCEDURE — 3044F PR MOST RECENT HEMOGLOBIN A1C LEVEL <7.0%: ICD-10-PCS | Mod: HCNC,CPTII,S$GLB, | Performed by: INTERNAL MEDICINE

## 2023-12-20 PROCEDURE — 1126F PR PAIN SEVERITY QUANTIFIED, NO PAIN PRESENT: ICD-10-PCS | Mod: HCNC,CPTII,S$GLB, | Performed by: INTERNAL MEDICINE

## 2023-12-20 PROCEDURE — 99214 PR OFFICE/OUTPT VISIT, EST, LEVL IV, 30-39 MIN: ICD-10-PCS | Mod: HCNC,S$GLB,, | Performed by: INTERNAL MEDICINE

## 2023-12-20 PROCEDURE — 3060F PR POS MICROALBUMINURIA RESULT DOCUMENTED/REVIEW: ICD-10-PCS | Mod: HCNC,CPTII,S$GLB, | Performed by: INTERNAL MEDICINE

## 2023-12-20 PROCEDURE — 1126F AMNT PAIN NOTED NONE PRSNT: CPT | Mod: HCNC,CPTII,S$GLB, | Performed by: INTERNAL MEDICINE

## 2023-12-20 PROCEDURE — 1101F PR PT FALLS ASSESS DOC 0-1 FALLS W/OUT INJ PAST YR: ICD-10-PCS | Mod: HCNC,CPTII,S$GLB, | Performed by: INTERNAL MEDICINE

## 2023-12-20 PROCEDURE — 3066F PR DOCUMENTATION OF TREATMENT FOR NEPHROPATHY: ICD-10-PCS | Mod: HCNC,CPTII,S$GLB, | Performed by: INTERNAL MEDICINE

## 2023-12-20 PROCEDURE — 3077F PR MOST RECENT SYSTOLIC BLOOD PRESSURE >= 140 MM HG: ICD-10-PCS | Mod: HCNC,CPTII,S$GLB, | Performed by: INTERNAL MEDICINE

## 2023-12-20 PROCEDURE — 99999 PR PBB SHADOW E&M-EST. PATIENT-LVL III: ICD-10-PCS | Mod: PBBFAC,HCNC,, | Performed by: INTERNAL MEDICINE

## 2023-12-20 PROCEDURE — 1160F PR REVIEW ALL MEDS BY PRESCRIBER/CLIN PHARMACIST DOCUMENTED: ICD-10-PCS | Mod: HCNC,CPTII,S$GLB, | Performed by: INTERNAL MEDICINE

## 2023-12-20 PROCEDURE — 93010 ELECTROCARDIOGRAM REPORT: CPT | Mod: HCNC,,, | Performed by: STUDENT IN AN ORGANIZED HEALTH CARE EDUCATION/TRAINING PROGRAM

## 2023-12-20 PROCEDURE — 4010F PR ACE/ARB THEARPY RXD/TAKEN: ICD-10-PCS | Mod: HCNC,CPTII,S$GLB, | Performed by: INTERNAL MEDICINE

## 2023-12-20 PROCEDURE — 4010F ACE/ARB THERAPY RXD/TAKEN: CPT | Mod: HCNC,CPTII,S$GLB, | Performed by: INTERNAL MEDICINE

## 2023-12-20 PROCEDURE — 99999 PR PBB SHADOW E&M-EST. PATIENT-LVL III: CPT | Mod: PBBFAC,HCNC,, | Performed by: INTERNAL MEDICINE

## 2023-12-20 PROCEDURE — 1159F PR MEDICATION LIST DOCUMENTED IN MEDICAL RECORD: ICD-10-PCS | Mod: HCNC,CPTII,S$GLB, | Performed by: INTERNAL MEDICINE

## 2023-12-20 PROCEDURE — 93010 EKG 12-LEAD: ICD-10-PCS | Mod: HCNC,,, | Performed by: STUDENT IN AN ORGANIZED HEALTH CARE EDUCATION/TRAINING PROGRAM

## 2023-12-20 PROCEDURE — 99214 OFFICE O/P EST MOD 30 MIN: CPT | Mod: HCNC,S$GLB,, | Performed by: INTERNAL MEDICINE

## 2023-12-20 PROCEDURE — 3288F PR FALLS RISK ASSESSMENT DOCUMENTED: ICD-10-PCS | Mod: HCNC,CPTII,S$GLB, | Performed by: INTERNAL MEDICINE

## 2023-12-20 PROCEDURE — 3077F SYST BP >= 140 MM HG: CPT | Mod: HCNC,CPTII,S$GLB, | Performed by: INTERNAL MEDICINE

## 2023-12-20 PROCEDURE — 3044F HG A1C LEVEL LT 7.0%: CPT | Mod: HCNC,CPTII,S$GLB, | Performed by: INTERNAL MEDICINE

## 2023-12-20 PROCEDURE — 3060F POS MICROALBUMINURIA REV: CPT | Mod: HCNC,CPTII,S$GLB, | Performed by: INTERNAL MEDICINE

## 2023-12-20 PROCEDURE — 3078F PR MOST RECENT DIASTOLIC BLOOD PRESSURE < 80 MM HG: ICD-10-PCS | Mod: HCNC,CPTII,S$GLB, | Performed by: INTERNAL MEDICINE

## 2023-12-20 PROCEDURE — 1159F MED LIST DOCD IN RCRD: CPT | Mod: HCNC,CPTII,S$GLB, | Performed by: INTERNAL MEDICINE

## 2023-12-20 PROCEDURE — 1101F PT FALLS ASSESS-DOCD LE1/YR: CPT | Mod: HCNC,CPTII,S$GLB, | Performed by: INTERNAL MEDICINE

## 2023-12-20 PROCEDURE — 3078F DIAST BP <80 MM HG: CPT | Mod: HCNC,CPTII,S$GLB, | Performed by: INTERNAL MEDICINE

## 2023-12-20 PROCEDURE — 1160F RVW MEDS BY RX/DR IN RCRD: CPT | Mod: HCNC,CPTII,S$GLB, | Performed by: INTERNAL MEDICINE

## 2023-12-20 PROCEDURE — 93005 ELECTROCARDIOGRAM TRACING: CPT | Mod: HCNC

## 2023-12-20 PROCEDURE — 3288F FALL RISK ASSESSMENT DOCD: CPT | Mod: HCNC,CPTII,S$GLB, | Performed by: INTERNAL MEDICINE

## 2023-12-20 NOTE — PROGRESS NOTES
Subjective:   Patient ID:  Lucia Barlow is a 73 y.o. female who presents for follow up of No chief complaint on file.      HPI  2020  A 69 yo female with pvd s/p pta copd smoker copd diabetes (stopped metformin due to gi side effects and lost weight) cad htn hlp ckd is here for f/u decreased smoking but has not stopped no claudication no chest pain or shortness of breath no chest pain has heart burn has been eating a lot of cheese/. Has no leg swelling. Tomatoes triggers heart burn.her a1c is in order however her lipids have increased has been eating a lot of cheese.      3/3/2021  Here for f/u had lung surgery for lung ca in November still has lefts  pain improving not smoking anymore still eating cheese compliant with meds has no claudication  Symptoms has rt hip pain that appears musculoskeletal. Has no angina tia chf . No syncope near syncope compliant with meds. Slat intake could improve.      2021  Has lingulectomy due  Lung cancer still has anterior left chest pain has seen  thoracic surgery has no angina she has now return of hip claudication over the past 5 months bilateral. She has to stop at 50 feet she can barely make it no discoloration or weakness in leg  Has no tia no chf has shortness of breath times  Her ekg is unchanged. She is not using cpap she returnesd her machine .     12/3/2021   Here for f/u she is cancer free in remission after he rlung surgery not smoking has no leg pain starting in her hips  All the way down posteriorely to the calves. She  Feels like tightness and cramps has to rest for relief this is only exertional. She is trying to be compliant with diet and emds. Has no nocturnal symptoms no discoloration in feet.      2022   here fro f/u not smoking has no limiting claudication taking pletal no bleeding issues compliant with meds lipids on target.     2023  Has left hip pain down the leg at rest and when walking. Her claudication is stable tolerated pletal  well not smoking tolerated meds well her rf are well modified. Has no chest pain or shortness of breath.        5/19/2023  Here frof /u early multiple complaints she is short of breath has swelling in face in legs hands and has sinus pressures  has sinus drip she was p[laced on steroids and duuretics. This has been on going for 1 month. She thinks jardiance is the reason she has been on jardiance for 1 year. She has been swollen since then has yeast infection . She is short of breath with exertion has no orthopnea pnd .SHE uses her inhaler for relief.   She ahs progression of her claudication she is barely able to walk 50 feet and that is very limiting to her her antionette with exercise she walked 1 minute her abui dropped significantly and required  10 minutes to go to baseline.   Her resting antionette dropped has bilateral dfa and sfa and cfa significant stenosis.      7/13/2023  Continues with significant exertional dyspnea very limiting as well as very limiting claudication her leg swelling improved using inhalers help her shortness of breath .stopping jardiance has helped uti. Not using cpap.her bnp is normal.    12/20/2023  HERE FOR F/U   Still smoking   Htn labile has not taken her meds this morning that is why her bp is elevated.    Denies any new symptoms. Her anemia was addressed her claudication improved,.   Has lost family member she is grieving.  Past Medical History:   Diagnosis Date    Abnormal EKG 5/19/2023    Atherosclerosis of artery of both lower extremities 1/17/2014    Atherosclerosis of native coronary artery of native heart without angina pectoris 11/18/2016    Atherosclerotic PVD with intermittent claudication 1/17/2014    Chronic bronchitis     COPD (chronic obstructive pulmonary disease)     Coronary artery disease involving native coronary artery of native heart without angina pectoris 5/19/2023    Diabetes with neurologic complications     Dyslipidemia associated with type 2 diabetes mellitus  6/14/2013    Dyslipidemia associated with type 2 diabetes mellitus     Ex-smoker     Gastroesophageal reflux disease without esophagitis 1/25/2019    Hyperlipidemia     Hypertension     Iron deficiency anemia secondary to inadequate dietary iron intake 6/3/2022    NSCLC of left lung 11/19/2020    IVANIA (obstructive sleep apnea) 2/11/2021    Osteoporosis 1/16 rosita 1/18    Pneumothorax after biopsy 10/21/2020    PVD (peripheral vascular disease)     Renal manifestation of secondary diabetes mellitus     S/P peripheral artery angioplasty 2/7/2014    Seasonal allergic rhinitis due to pollen     Shortness of breath 5/19/2023    Simple chronic bronchitis     Tobacco dependence     Type 2 diabetes mellitus with microalbuminuria, without long-term current use of insulin 3/29/2019    Type 2 diabetes mellitus with peripheral vascular disease     Type 2 diabetes mellitus with stage 3 chronic kidney disease, without long-term current use of insulin 2/1/2019    Type 2 diabetes mellitus without retinopathy 2/1/2019    Urinary incontinence        Past Surgical History:   Procedure Laterality Date    ANGIOPLASTY Bilateral 01/24/2014    aortoiliac stenting     CARPAL TUNNEL RELEASE  2003    [facility]    COLECTOMY  approximate 2005    pt states 1in colon -dx with benign mass removed-states no colon cancer    COLONOSCOPY N/A 11/9/2016    Procedure: COLONOSCOPY;  Surgeon: Dmitri Sterling MD;  Location: [facility];  Service: Endoscopy;  Laterality: N/A;    COLONOSCOPY N/A 11/15/2019    Procedure: COLONOSCOPY;  Surgeon: Marok Vicente MD;  Location: [facility];  Service: Endoscopy;  Laterality: N/A;    COLONOSCOPY N/A 3/25/2022    Procedure: COLONOSCOPY;  Surgeon: Paola Feldman MD;  Location: [facility];  Service: Endoscopy;  Laterality: N/A;    ESOPHAGOGASTRODUODENOSCOPY N/A 3/25/2022    Procedure: EGD (ESOPHAGOGASTRODUODENOSCOPY);  Surgeon: Paola Feldman MD;  Location: [facility];  Service: Endoscopy;  Laterality: N/A;     HYSTERECTOMY      no cancer    INJECTION OF ANESTHETIC AGENT AROUND MULTIPLE INTERCOSTAL NERVES Left 11/19/2020    Procedure: BLOCK, NERVE, INTERCOSTAL, 2 OR MORE;  Surgeon: Amrit Flores MD;  Location: [facility] OR 2ND FLR;  Service: Thoracic;  Laterality: Left;    INTRALUMINAL GASTROINTESTINAL TRACT IMAGING VIA CAPSULE N/A 11/8/2023    Procedure: IMAGING PROCEDURE, GI TRACT, INTRALUMINAL, VIA CAPSULE;  Surgeon: [place], First Available;  Location: [facility] ENDO;  Service: Endoscopy;  Laterality: N/A;    OOPHORECTOMY      SURGICAL REMOVAL OF LYMPH NODE Left 11/19/2020    Procedure: EXCISION, LYMPH NODE;  Surgeon: Amrit Flores MD;  Location: [facility] OR 2ND FLR;  Service: Thoracic;  Laterality: Left;  mediastinal lymph node disection    XI ROBOTIC RATS,WITH LOBECTOMY,LUNG Left 11/19/2020    Procedure: XI ROBOTIC RATS,WITH LOBECTOMY,LUNG;  Surgeon: Amrit Flores MD;  Location: [facility] OR 2ND FLR;  Service: Thoracic;  Laterality: Left;  Lingulectomy.       Social History     Tobacco Use    Smoking status: Former     Current packs/day: 0.00     Average packs/day: 1 pack/day for 60.0 years (60.0 ttl pk-yrs)     Types: Cigarettes     Start date: 1/1/1960     Quit date: 1/10/2020     Years since quitting: 3.9    Smokeless tobacco: Former   Substance Use Topics    Alcohol use: No     Alcohol/week: 0.0 standard drinks of alcohol    Drug use: No       Family History   Problem Relation Age of Onset    Stroke Father     Stroke Sister     Asthma Daughter     Diabetes Daughter     Breast cancer Daughter     Asthma Son        Current Outpatient Medications   Medication Sig    albuterol (PROVENTIL/VENTOLIN HFA) 90 mcg/actuation inhaler Inhale 2 puffs into the lungs every 4 (four) hours as needed for Wheezing or Shortness of Breath.    amLODIPine (NORVASC) 10 MG tablet Take 1 tablet (10 mg total) by mouth once daily.    butalbital-acetaminophen-caffeine -40 mg (FIORICET, ESGIC) -40 mg per tablet TAKE 1 TABLET BY  MOUTH 3 (THREE) TIMES DAILY AS NEEDED FOR HEADACHES. (MAXIMUM OF 15 TABLETS PER MONTH)    calcium-vitamin D 600 mg-10 mcg (400 unit) Tab Take 2 tablets by mouth once daily.    cetirizine (ZYRTEC) 10 MG tablet Take 1 tablet (10 mg total) by mouth once daily. For sinus congestion    chlorthalidone (HYGROTEN) 25 MG Tab Take by mouth.    cilostazoL (PLETAL) 50 MG Tab Take 1 tablet (50 mg total) by mouth 2 (two) times daily before meals.    EPINEPHrine (EPIPEN) 0.3 mg/0.3 mL AtIn INJECT INTO THE MUSCLE ONE TIME FOR 1 DOSE, AS DIRECTED    ezetimibe (ZETIA) 10 mg tablet Take 1 tablet (10 mg total) by mouth once daily.    famotidine (PEPCID) 40 MG tablet Take by mouth.    fexofenadine (ALLEGRA) 180 MG tablet TAKE 1 TABLET ONE TIME DAILY    fluconazole (DIFLUCAN) 150 MG Tab TAKE 1 TABLET AS NEEDED FOR YEAST INFECTION THAT DOES NOT RESPOND TO MONISTAT. DO NOT TAKE MORE THAN 1 TABLET PER WEEK    fluticasone propionate (FLONASE) 50 mcg/actuation nasal spray 2 sprays (100 mcg total) by Each Nostril route once daily.    furosemide (LASIX) 20 MG tablet Take 1 tablet (20 mg total) by mouth once daily.    ipratropium (ATROVENT) 21 mcg (0.03 %) nasal spray 2 sprays by Each Nostril route 3 (three) times daily.    ipratropium-albuteroL (COMBIVENT)  mcg/actuation inhaler Inhale 2 puffs into the lungs every 4 (four) hours as needed for Wheezing or Shortness of Breath. Rescue    levocetirizine (XYZAL) 5 MG tablet Take 1 tablet (5 mg total) by mouth every evening.    LEXISCAN 0.4 mg/5 mL Syrg     miconazole (MICOTIN) 2 % vaginal cream Place 1 applicator vaginally every evening. Use as directed    montelukast (SINGULAIR) 10 mg tablet Take 1 tablet (10 mg total) by mouth every evening.    ondansetron (ZOFRAN-ODT) 4 MG TbDL 1-2 tablets PO every 6 hours as needed for nausea/vomiting while completing bowel prep    pantoprazole (PROTONIX) 40 MG tablet TAKE 1 TABLET ONE TIME DAILY    potassium chloride SA (K-DUR,KLOR-CON) 20 MEQ tablet TAKE  1 TABLET ONE TIME DAILY    rosuvastatin (CRESTOR) 20 MG tablet Take 1 tablet (20 mg total) by mouth every evening.    semaglutide (OZEMPIC) 0.25 mg or 0.5 mg (2 mg/3 mL) pen injector Inject 0.25 mg into the skin every 7 days.    sod sulf-pot chloride-mag sulf (SUTAB) 1.479-0.188- 0.225 gram tablet Take 12 tablets by mouth once daily. Take according to package instructions with indicated amount of water.    valsartan (DIOVAN) 160 MG tablet Take 160 mg by mouth.    predniSONE (DELTASONE) 20 MG tablet Prednisone 60 mg/ day for 3 days, 40 mg/day for 3 days,20 mg/ day for 3 days, (1/2 tablet )10 mg a day for 3 days. (Patient not taking: Reported on 12/20/2023)    varicella-zoster gE-AS01B, PF, (SHINGRIX) 50 mcg/0.5 mL injection Inject 0.5 mL (one dose) into muscle now; give second dose at least 2 months later (Patient not taking: Reported on 10/6/2023)     No current facility-administered medications for this visit.     Current Outpatient Medications on File Prior to Visit   Medication Sig    albuterol (PROVENTIL/VENTOLIN HFA) 90 mcg/actuation inhaler Inhale 2 puffs into the lungs every 4 (four) hours as needed for Wheezing or Shortness of Breath.    amLODIPine (NORVASC) 10 MG tablet Take 1 tablet (10 mg total) by mouth once daily.    butalbital-acetaminophen-caffeine -40 mg (FIORICET, ESGIC) -40 mg per tablet TAKE 1 TABLET BY MOUTH 3 (THREE) TIMES DAILY AS NEEDED FOR HEADACHES. (MAXIMUM OF 15 TABLETS PER MONTH)    calcium-vitamin D 600 mg-10 mcg (400 unit) Tab Take 2 tablets by mouth once daily.    cetirizine (ZYRTEC) 10 MG tablet Take 1 tablet (10 mg total) by mouth once daily. For sinus congestion    chlorthalidone (HYGROTEN) 25 MG Tab Take by mouth.    cilostazoL (PLETAL) 50 MG Tab Take 1 tablet (50 mg total) by mouth 2 (two) times daily before meals.    EPINEPHrine (EPIPEN) 0.3 mg/0.3 mL AtIn INJECT INTO THE MUSCLE ONE TIME FOR 1 DOSE, AS DIRECTED    ezetimibe (ZETIA) 10 mg tablet Take 1 tablet (10 mg  total) by mouth once daily.    famotidine (PEPCID) 40 MG tablet Take by mouth.    fexofenadine (ALLEGRA) 180 MG tablet TAKE 1 TABLET ONE TIME DAILY    fluconazole (DIFLUCAN) 150 MG Tab TAKE 1 TABLET AS NEEDED FOR YEAST INFECTION THAT DOES NOT RESPOND TO MONISTAT. DO NOT TAKE MORE THAN 1 TABLET PER WEEK    fluticasone propionate (FLONASE) 50 mcg/actuation nasal spray 2 sprays (100 mcg total) by Each Nostril route once daily.    furosemide (LASIX) 20 MG tablet Take 1 tablet (20 mg total) by mouth once daily.    ipratropium (ATROVENT) 21 mcg (0.03 %) nasal spray 2 sprays by Each Nostril route 3 (three) times daily.    ipratropium-albuteroL (COMBIVENT)  mcg/actuation inhaler Inhale 2 puffs into the lungs every 4 (four) hours as needed for Wheezing or Shortness of Breath. Rescue    levocetirizine (XYZAL) 5 MG tablet Take 1 tablet (5 mg total) by mouth every evening.    LEXISCAN 0.4 mg/5 mL Syrg     miconazole (MICOTIN) 2 % vaginal cream Place 1 applicator vaginally every evening. Use as directed    montelukast (SINGULAIR) 10 mg tablet Take 1 tablet (10 mg total) by mouth every evening.    ondansetron (ZOFRAN-ODT) 4 MG TbDL 1-2 tablets PO every 6 hours as needed for nausea/vomiting while completing bowel prep    pantoprazole (PROTONIX) 40 MG tablet TAKE 1 TABLET ONE TIME DAILY    potassium chloride SA (K-DUR,KLOR-CON) 20 MEQ tablet TAKE 1 TABLET ONE TIME DAILY    rosuvastatin (CRESTOR) 20 MG tablet Take 1 tablet (20 mg total) by mouth every evening.    semaglutide (OZEMPIC) 0.25 mg or 0.5 mg (2 mg/3 mL) pen injector Inject 0.25 mg into the skin every 7 days.    sod sulf-pot chloride-mag sulf (SUTAB) 1.479-0.188- 0.225 gram tablet Take 12 tablets by mouth once daily. Take according to package instructions with indicated amount of water.    valsartan (DIOVAN) 160 MG tablet Take 160 mg by mouth.    predniSONE (DELTASONE) 20 MG tablet Prednisone 60 mg/ day for 3 days, 40 mg/day for 3 days,20 mg/ day for 3 days, (1/2  tablet )10 mg a day for 3 days. (Patient not taking: Reported on 12/20/2023)    varicella-zoster gE-AS01B, PF, (SHINGRIX) 50 mcg/0.5 mL injection Inject 0.5 mL (one dose) into muscle now; give second dose at least 2 months later (Patient not taking: Reported on 10/6/2023)    [DISCONTINUED] cilostazoL (PLETAL) 100 MG Tab      No current facility-administered medications on file prior to visit.     Review of patient's allergies indicates:   Allergen Reactions    Iodine and iodide containing products Swelling    Losartan Itching and Swelling     Very uncomfortable - high probability of allergic reaction due to swelling and itching    Skin staples [surgical stainless steel] Swelling    Egg derived      Shortness breath, lip swelling    Fish containing products     Jardiance [empagliflozin]     Latex, natural rubber Swelling    Nickel     Pravastatin      40 mg causes nausea vomitting but 20 mg ok    Shellfish containing products Swelling      Review of Systems   Constitutional: Negative for diaphoresis, malaise/fatigue and weight gain.   HENT:  Negative for hoarse voice.    Eyes:  Negative for double vision and visual disturbance.   Cardiovascular:  Negative for chest pain, claudication, cyanosis, dyspnea on exertion, irregular heartbeat, leg swelling, near-syncope, orthopnea, palpitations, paroxysmal nocturnal dyspnea and syncope.   Respiratory:  Negative for cough, hemoptysis, shortness of breath and snoring.    Hematologic/Lymphatic: Negative for bleeding problem. Does not bruise/bleed easily.   Skin:  Negative for color change and poor wound healing.   Musculoskeletal:  Negative for muscle cramps, muscle weakness and myalgias.   Gastrointestinal:  Negative for bloating, abdominal pain, change in bowel habit, diarrhea, heartburn, hematemesis, hematochezia, melena and nausea.   Neurological:  Negative for excessive daytime sleepiness, dizziness, headaches, light-headedness, loss of balance, numbness and weakness.  "  Psychiatric/Behavioral:  Negative for memory loss. The patient does not have insomnia.    Allergic/Immunologic: Negative for hives.       Objective:   Physical Exam  Vitals:    12/20/23 1252 12/20/23 1253   BP: (!) 151/78 (!) 147/71   BP Location: Left arm Right arm   Patient Position: Sitting Sitting   Pulse: 92    SpO2: 97%    Weight: 58.5 kg (128 lb 15.5 oz)    Height: 5' 1" (1.549 m)    Vitals and nursing note reviewed.   Constitutional:       General: She is not in acute distress.     Appearance: Normal appearance. She is well-developed. She is not ill-appearing.   HENT:      Head: Normocephalic and atraumatic.   Eyes:      General: No scleral icterus.     Pupils: Pupils are equal, round, and reactive to light.   Neck:      Thyroid: No thyromegaly.      Vascular: Normal carotid pulses. No carotid bruit, hepatojugular reflux or JVD.      Trachea: No tracheal deviation.   Cardiovascular:      Rate and Rhythm: Normal rate and regular rhythm.      Pulses:           Carotid pulses are 2+ on the right side and 2+ on the left side.       Radial pulses are 2+ on the right side and 2+ on the left side.        Femoral pulses are 1+ on the right side with bruit and 1+ on the left side with bruit.       Popliteal pulses are 1+ on the right side and 1+ on the left side.        Dorsalis pedis pulses are 1+ on the right side and 1+ on the left side.        Posterior tibial pulses are 1+ on the right side and 1+ on the left side.      Heart sounds: Murmur heard.   Harsh midsystolic murmur is present with a grade of 2/6 at the upper right sternal border radiating to the neck.     No friction rub. No gallop.   Pulmonary:      Effort: Pulmonary effort is normal. No respiratory distress.      Breath sounds: Normal breath sounds. No wheezing, rhonchi or rales.   Chest:      Chest wall: No tenderness.   Abdominal:      General: Bowel sounds are normal. There is no abdominal bruit.      Palpations: Abdomen is soft. There is no " hepatomegaly or pulsatile mass.      Tenderness: There is no abdominal tenderness.   Musculoskeletal:      Right shoulder: No deformity.      Cervical back: Normal range of motion and neck supple.   Skin:     General: Skin is warm and dry.      Findings: No erythema or rash.      Nails: There is no clubbing.   Neurological:      Mental Status: She is alert and oriented to person, place, and time.      Cranial Nerves: No cranial nerve deficit.      Coordination: Coordination normal.   Psychiatric:         Speech: Speech normal.         Behavior: Behavior normal.           Lab Results   Component Value Date    CHOL 146 06/28/2023    CHOL 131 01/04/2023    CHOL 148 05/27/2022      Body mass index is 24.37 kg/m².   Lab Results   Component Value Date    HGBA1C 5.6 11/29/2023      BMP  Lab Results   Component Value Date     07/14/2023    K 4.2 07/14/2023     (H) 07/14/2023    CO2 23 07/14/2023    BUN 11 07/14/2023    CREATININE 1.2 07/14/2023    CALCIUM 9.1 07/14/2023    ANIONGAP 8 07/14/2023    EGFRNORACEVR 47.2 (A) 07/14/2023      Lab Results   Component Value Date    HDL 58 06/28/2023    HDL 61 01/04/2023    HDL 62 05/27/2022     Lab Results   Component Value Date    LDLCALC 74.2 06/28/2023    LDLCALC 58.6 (L) 01/04/2023    LDLCALC 68.6 05/27/2022     Lab Results   Component Value Date    TRIG 69 06/28/2023    TRIG 57 01/04/2023    TRIG 87 05/27/2022     Lab Results   Component Value Date    CHOLHDL 39.7 06/28/2023    CHOLHDL 46.6 01/04/2023    CHOLHDL 41.9 05/27/2022       Chemistry        Component Value Date/Time     07/14/2023 0918    K 4.2 07/14/2023 0918     (H) 07/14/2023 0918    CO2 23 07/14/2023 0918    BUN 11 07/14/2023 0918    CREATININE 1.2 07/14/2023 0918     (H) 07/14/2023 0918        Component Value Date/Time    CALCIUM 9.1 07/14/2023 0918    ALKPHOS 84 06/28/2023 0940    AST 20 06/28/2023 0940    ALT 11 06/28/2023 0940    BILITOT 0.2 06/28/2023 0940    ESTGFRAFRICA 51.4  (A) 05/27/2022 0711    ESTGFRAFRICA 51.4 (A) 05/27/2022 0711    EGFRNONAA 44.6 (A) 05/27/2022 0711    EGFRNONAA 44.6 (A) 05/27/2022 0711          Lab Results   Component Value Date    TSH 0.404 12/04/2020     Lab Results   Component Value Date    INR 1.0 07/14/2023    INR 0.9 10/21/2020    INR 0.9 08/10/2019     Lab Results   Component Value Date    WBC 5.53 11/29/2023    HGB 12.9 11/29/2023    HCT 40.6 11/29/2023    MCV 91 11/29/2023     11/29/2023     BMP  Sodium   Date Value Ref Range Status   07/14/2023 143 136 - 145 mmol/L Final     Potassium   Date Value Ref Range Status   07/14/2023 4.2 3.5 - 5.1 mmol/L Final     Chloride   Date Value Ref Range Status   07/14/2023 112 (H) 95 - 110 mmol/L Final     CO2   Date Value Ref Range Status   07/14/2023 23 23 - 29 mmol/L Final     BUN   Date Value Ref Range Status   07/14/2023 11 8 - 23 mg/dL Final     Creatinine   Date Value Ref Range Status   07/14/2023 1.2 0.5 - 1.4 mg/dL Final     Calcium   Date Value Ref Range Status   07/14/2023 9.1 8.7 - 10.5 mg/dL Final     Anion Gap   Date Value Ref Range Status   07/14/2023 8 8 - 16 mmol/L Final     eGFR if    Date Value Ref Range Status   05/27/2022 51.4 (A) >60 mL/min/1.73 m^2 Final   05/27/2022 51.4 (A) >60 mL/min/1.73 m^2 Final     eGFR if non    Date Value Ref Range Status   05/27/2022 44.6 (A) >60 mL/min/1.73 m^2 Final     Comment:     Calculation used to obtain the estimated glomerular filtration  rate (eGFR) is the CKD-EPI equation.      05/27/2022 44.6 (A) >60 mL/min/1.73 m^2 Final     Comment:     Calculation used to obtain the estimated glomerular filtration  rate (eGFR) is the CKD-EPI equation.        CrCl cannot be calculated (Patient's most recent lab result is older than the maximum 7 days allowed.).    Assessment:     1. Atherosclerosis of native artery of both lower extremities with intermittent claudication    2. Former heavy cigarette smoker    3. Dyslipidemia  associated with type 2 diabetes mellitus    4. Type 2 diabetes mellitus with peripheral vascular disease    5. Type 2 diabetes mellitus with stage 3a chronic kidney disease, without long-term current use of insulin    6. Gastroesophageal reflux disease without esophagitis    7. Type 2 diabetes mellitus with microalbuminuria, without long-term current use of insulin    8. Chronic obstructive pulmonary disease, unspecified COPD type    9. Iron deficiency anemia secondary to inadequate dietary iron intake    10. Pulmonary nodule, left - 8 mm , high risk for lung cancer    11. High pulmonary arterial pressure    12. Hypertension associated with diabetes    13. 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    14. History of adenomatous polyp of colon    15. S/P peripheral artery angioplasty    16. Nonrheumatic aortic valve stenosis    17. Coronary artery calcification    18. PVD (peripheral vascular disease)    19. Non-small cell cancer of left lung    20. ANJUM (obstructive sleep apnea)    21. S/P lobectomy of lung-  Hx robotic lung lobectomy 2020    22. Abnormal EKG    23. Shortness of breath    24. Coronary artery disease involving native coronary artery of native heart without angina pectoris      Pvd with claudication improved after addressing anemia continue the same walking exercise and stop smoking she was counseled.   Htn labile did not take ehr meds and was in  ate a lot of salty food counseled about compliance will bring back in 1 month for bp review.  Anjum not using cpap remphasized the issue she cannot tolerate it due to dryness.counseled about importance of cpap.  Diabetes controlled continue the same stable  Hlp on target continue same meds stable emphasized compliance.  FAHAD being addressed with hematology continue iron pills.    Plan:   Continue current therapy  Cardiac low salt diet.  Risk factor modification and excercise program.  Smoking cessation counseling  F/u in 6 months with  lipid cmp A1c  Mid level in 1 month.

## 2024-01-02 DIAGNOSIS — E11.59 HYPERTENSION ASSOCIATED WITH DIABETES: ICD-10-CM

## 2024-01-02 DIAGNOSIS — I15.2 HYPERTENSION ASSOCIATED WITH DIABETES: ICD-10-CM

## 2024-01-02 RX ORDER — FUROSEMIDE 20 MG/1
20 TABLET ORAL DAILY
Qty: 90 TABLET | Refills: 3 | Status: SHIPPED | OUTPATIENT
Start: 2024-01-02

## 2024-01-14 DIAGNOSIS — E78.5 DYSLIPIDEMIA ASSOCIATED WITH TYPE 2 DIABETES MELLITUS: Chronic | ICD-10-CM

## 2024-01-14 DIAGNOSIS — I77.1 TORTUOUS AORTA: ICD-10-CM

## 2024-01-14 DIAGNOSIS — K21.9 GASTROESOPHAGEAL REFLUX DISEASE WITHOUT ESOPHAGITIS: Chronic | ICD-10-CM

## 2024-01-14 DIAGNOSIS — E11.69 DYSLIPIDEMIA ASSOCIATED WITH TYPE 2 DIABETES MELLITUS: Chronic | ICD-10-CM

## 2024-01-14 DIAGNOSIS — R80.9 TYPE 2 DIABETES MELLITUS WITH MICROALBUMINURIA, WITHOUT LONG-TERM CURRENT USE OF INSULIN: ICD-10-CM

## 2024-01-14 DIAGNOSIS — I25.10 ATHEROSCLEROSIS OF NATIVE CORONARY ARTERY OF NATIVE HEART WITHOUT ANGINA PECTORIS: ICD-10-CM

## 2024-01-14 DIAGNOSIS — I70.213 ATHEROSCLEROSIS OF NATIVE ARTERY OF BOTH LOWER EXTREMITIES WITH INTERMITTENT CLAUDICATION: Chronic | ICD-10-CM

## 2024-01-14 DIAGNOSIS — E11.29 TYPE 2 DIABETES MELLITUS WITH MICROALBUMINURIA, WITHOUT LONG-TERM CURRENT USE OF INSULIN: ICD-10-CM

## 2024-01-14 DIAGNOSIS — Z91.018 FOOD ALLERGY: ICD-10-CM

## 2024-01-16 RX ORDER — PANTOPRAZOLE SODIUM 40 MG/1
40 TABLET, DELAYED RELEASE ORAL
Qty: 90 TABLET | Refills: 0 | Status: SHIPPED | OUTPATIENT
Start: 2024-01-16 | End: 2024-03-30

## 2024-01-16 RX ORDER — POTASSIUM CHLORIDE 20 MEQ/1
20 TABLET, EXTENDED RELEASE ORAL
Qty: 90 TABLET | Refills: 0 | Status: SHIPPED | OUTPATIENT
Start: 2024-01-16 | End: 2024-03-30

## 2024-01-16 RX ORDER — EZETIMIBE 10 MG/1
10 TABLET ORAL
Qty: 90 TABLET | Refills: 0 | Status: SHIPPED | OUTPATIENT
Start: 2024-01-16 | End: 2024-03-30

## 2024-01-16 RX ORDER — EPINEPHRINE 0.3 MG/.3ML
INJECTION SUBCUTANEOUS
Qty: 2 EACH | Refills: 3 | Status: SHIPPED | OUTPATIENT
Start: 2024-01-16

## 2024-01-16 NOTE — TELEPHONE ENCOUNTER
Refill Decision Note   Lucia Cain  is requesting a refill authorization.  Brief Assessment and Rationale for Refill:  Approve     Medication Therapy Plan:         Comments:     Note composed:3:36 AM 01/16/2024

## 2024-01-16 NOTE — TELEPHONE ENCOUNTER
No care due was identified.  [employer] Embedded Care Due Messages. Reference number: [id number].   1/16/2024 3:32:30 AM CST

## 2024-01-24 ENCOUNTER — OFFICE VISIT (OUTPATIENT)
Dept: CARDIOLOGY | Facility: CLINIC | Age: 74
End: 2024-01-24
Payer: MEDICARE

## 2024-01-24 VITALS
OXYGEN SATURATION: 97 % | DIASTOLIC BLOOD PRESSURE: 78 MMHG | BODY MASS INDEX: 25.31 KG/M2 | HEIGHT: 61 IN | SYSTOLIC BLOOD PRESSURE: 149 MMHG | WEIGHT: 134.06 LBS | HEART RATE: 100 BPM

## 2024-01-24 DIAGNOSIS — I73.9 PVD (PERIPHERAL VASCULAR DISEASE): ICD-10-CM

## 2024-01-24 DIAGNOSIS — R94.31 ABNORMAL EKG: ICD-10-CM

## 2024-01-24 DIAGNOSIS — I25.84 CORONARY ARTERY CALCIFICATION: ICD-10-CM

## 2024-01-24 DIAGNOSIS — I77.1 TORTUOUS AORTA: ICD-10-CM

## 2024-01-24 DIAGNOSIS — I35.0 NONRHEUMATIC AORTIC VALVE STENOSIS: ICD-10-CM

## 2024-01-24 DIAGNOSIS — E11.59 HYPERTENSION ASSOCIATED WITH DIABETES: Primary | ICD-10-CM

## 2024-01-24 DIAGNOSIS — I15.2 HYPERTENSION ASSOCIATED WITH DIABETES: Primary | ICD-10-CM

## 2024-01-24 DIAGNOSIS — I25.10 CORONARY ARTERY CALCIFICATION: ICD-10-CM

## 2024-01-24 DIAGNOSIS — E78.5 DYSLIPIDEMIA ASSOCIATED WITH TYPE 2 DIABETES MELLITUS: Chronic | ICD-10-CM

## 2024-01-24 DIAGNOSIS — Z98.62 S/P PERIPHERAL ARTERY ANGIOPLASTY: ICD-10-CM

## 2024-01-24 DIAGNOSIS — E11.69 DYSLIPIDEMIA ASSOCIATED WITH TYPE 2 DIABETES MELLITUS: Chronic | ICD-10-CM

## 2024-01-24 DIAGNOSIS — G47.33 OSA (OBSTRUCTIVE SLEEP APNEA): ICD-10-CM

## 2024-01-24 DIAGNOSIS — D50.8 IRON DEFICIENCY ANEMIA SECONDARY TO INADEQUATE DIETARY IRON INTAKE: Chronic | ICD-10-CM

## 2024-01-24 DIAGNOSIS — R91.1 PULMONARY NODULE, LEFT: Chronic | ICD-10-CM

## 2024-01-24 DIAGNOSIS — Z90.2 S/P LOBECTOMY OF LUNG: ICD-10-CM

## 2024-01-24 DIAGNOSIS — I70.0 ATHEROSCLEROSIS OF AORTA: ICD-10-CM

## 2024-01-24 PROCEDURE — 99999 PR PBB SHADOW E&M-EST. PATIENT-LVL V: CPT | Mod: PBBFAC,HCNC,, | Performed by: PHYSICIAN ASSISTANT

## 2024-01-24 PROCEDURE — 3077F SYST BP >= 140 MM HG: CPT | Mod: HCNC,CPTII,S$GLB, | Performed by: PHYSICIAN ASSISTANT

## 2024-01-24 PROCEDURE — 3288F FALL RISK ASSESSMENT DOCD: CPT | Mod: HCNC,CPTII,S$GLB, | Performed by: PHYSICIAN ASSISTANT

## 2024-01-24 PROCEDURE — 3072F LOW RISK FOR RETINOPATHY: CPT | Mod: HCNC,CPTII,S$GLB, | Performed by: PHYSICIAN ASSISTANT

## 2024-01-24 PROCEDURE — 99214 OFFICE O/P EST MOD 30 MIN: CPT | Mod: HCNC,S$GLB,, | Performed by: PHYSICIAN ASSISTANT

## 2024-01-24 PROCEDURE — 1101F PT FALLS ASSESS-DOCD LE1/YR: CPT | Mod: HCNC,CPTII,S$GLB, | Performed by: PHYSICIAN ASSISTANT

## 2024-01-24 PROCEDURE — 1159F MED LIST DOCD IN RCRD: CPT | Mod: HCNC,CPTII,S$GLB, | Performed by: PHYSICIAN ASSISTANT

## 2024-01-24 PROCEDURE — 1125F AMNT PAIN NOTED PAIN PRSNT: CPT | Mod: HCNC,CPTII,S$GLB, | Performed by: PHYSICIAN ASSISTANT

## 2024-01-24 PROCEDURE — 3078F DIAST BP <80 MM HG: CPT | Mod: HCNC,CPTII,S$GLB, | Performed by: PHYSICIAN ASSISTANT

## 2024-01-24 NOTE — PROGRESS NOTES
Subjective:   Patient ID:  Lucia Barlow is a 73 y.o. female who presents for follow-up of BP check    HPI  Ms. Barlow is a 73 year old female patient whose current medical conditions include COPD, PVD s/p PTA, DM type II, CAD, HTN, hyperlipidemia, and CKD who presents today for follow-up. Patient previously seen by Dr. Rosas and noted to have elevated BP. She returns today and states she is doing well overall. No change in CV status. Chronic SOB, unchanged. No recent bouts of exertional heaviness or tightness. Occasional GERD. Claudication symptoms improved since her iron has been repleted, able to ambulate and has even been able to ride her bike. No LH, dizziness, near syncope, or syncope. Does suffer from migraines/sinus headaches, worse with weather changes. No s/s of TIA/CVA. BP elevated today but patient did not have any  medications. Smokes every now and then.    Review of Systems   Constitutional: Negative for chills, decreased appetite, fever and malaise/fatigue.   HENT:  Positive for congestion. Negative for hoarse voice and sore throat.    Eyes:  Negative for blurred vision and discharge.   Cardiovascular:  Negative for chest pain, claudication, cyanosis, dyspnea on exertion, irregular heartbeat, leg swelling, near-syncope, orthopnea, palpitations and paroxysmal nocturnal dyspnea.   Respiratory:  Negative for cough, hemoptysis, shortness of breath, snoring, sputum production and wheezing.    Endocrine: Negative for cold intolerance and heat intolerance.   Hematologic/Lymphatic: Negative for bleeding problem. Does not bruise/bleed easily.   Skin:  Negative for rash.   Musculoskeletal:  Positive for arthritis and joint pain. Negative for back pain, joint swelling, muscle cramps, muscle weakness and myalgias.   Gastrointestinal:  Negative for abdominal pain, constipation, diarrhea, heartburn, melena and nausea.   Genitourinary:  Negative for hematuria.   Neurological:  Positive for headaches. Negative for  "dizziness, focal weakness, light-headedness, loss of balance, numbness, paresthesias, seizures and weakness.   Psychiatric/Behavioral:  Negative for memory loss. The patient does not have insomnia.    Allergic/Immunologic: Negative for hives.     BP (!) 149/78 (BP Location: Left arm, Patient Position: Sitting)   Pulse 100   Ht 5' 1" (1.549 m)   Wt 60.8 kg (134 lb 0.6 oz)   SpO2 97%   BMI 25.33 kg/m²     Objective:   Physical Exam  Vitals and nursing note reviewed.   Constitutional:       General: She is not in acute distress.     Appearance: Normal appearance. She is well-developed. She is not diaphoretic.   HENT:      Head: Normocephalic and atraumatic.   Eyes:      General:         Right eye: No discharge.         Left eye: No discharge.      Pupils: Pupils are equal, round, and reactive to light.   Cardiovascular:      Rate and Rhythm: Normal rate and regular rhythm.      Heart sounds: Normal heart sounds, S1 normal and S2 normal. No murmur heard.  Pulmonary:      Effort: Pulmonary effort is normal. No respiratory distress.      Breath sounds: Normal breath sounds. No wheezing or rales.   Abdominal:      General: There is no distension.   Musculoskeletal:      Right lower leg: No edema.      Left lower leg: No edema.   Skin:     General: Skin is warm and dry.      Findings: No erythema.   Neurological:      General: No focal deficit present.      Mental Status: She is alert and oriented to person, place, and time.   Psychiatric:         Mood and Affect: Mood normal.         Behavior: Behavior normal.         Thought Content: Thought content normal.         Chemistry        Component Value Date/Time     07/14/2023 0918    K 4.2 07/14/2023 0918     (H) 07/14/2023 0918    CO2 23 07/14/2023 0918    BUN 11 07/14/2023 0918    CREATININE 1.2 07/14/2023 0918     (H) 07/14/2023 0918        Component Value Date/Time    CALCIUM 9.1 07/14/2023 0918    ALKPHOS 84 06/28/2023 0940    AST 20 06/28/2023 " 0940    ALT 11 06/28/2023 0940    BILITOT 0.2 06/28/2023 0940    ESTGFRAFRICA 51.4 (A) 05/27/2022 0711    ESTGFRAFRICA 51.4 (A) 05/27/2022 0711    EGFRNONAA 44.6 (A) 05/27/2022 0711    EGFRNONAA 44.6 (A) 05/27/2022 0711        Lab Results   Component Value Date    CHOL 146 06/28/2023    CHOL 131 01/04/2023    CHOL 148 05/27/2022     Lab Results   Component Value Date    HDL 58 06/28/2023    HDL 61 01/04/2023    HDL 62 05/27/2022     Lab Results   Component Value Date    LDLCALC 74.2 06/28/2023    LDLCALC 58.6 (L) 01/04/2023    LDLCALC 68.6 05/27/2022     Lab Results   Component Value Date    TRIG 69 06/28/2023    TRIG 57 01/04/2023    TRIG 87 05/27/2022       Lab Results   Component Value Date    CHOLHDL 39.7 06/28/2023    CHOLHDL 46.6 01/04/2023    CHOLHDL 41.9 05/27/2022     Lab Results   Component Value Date    HGBA1C 5.6 11/29/2023     MPI stress test 6/23  Conclusion         Normal myocardial perfusion scan. There is no evidence of myocardial ischemia or infarction.    The gated perfusion images showed an ejection fraction of 70% at rest. The gated perfusion images showed an ejection fraction of 69% post stress.    There is normal wall motion at rest and post stress.    The ECG portion of the study is negative for ischemia.    The patient reported no chest pain during the stress test.         TTE Results 5/26/23  Summary    The left ventricle is normal in size with concentric remodeling and normal systolic function.  Grade I left ventricular diastolic dysfunction.  The estimated ejection fraction is 65%.  Normal right ventricular size with normal right ventricular systolic function.  There is mild aortic valve stenosis.  Aortic valve area is 1.45 cm2; peak velocity is 2.67 m/s; mean gradient is 17 mmHg.  Mild tricuspid regurgitation.  Normal central venous pressure (3 mmHg).  The estimated PA systolic pressure is 37 mmHg.  Assessment:      1. Hypertension associated with diabetes    2. Pulmonary nodule, left -  8 mm , high risk for lung cancer    3. S/P lobectomy of lung-  Hx robotic lung lobectomy 11/19/2020    4. Abnormal EKG    5. Atherosclerosis of aorta    6. Coronary artery calcification    7. Dyslipidemia associated with type 2 diabetes mellitus    8. Nonrheumatic aortic valve stenosis    9. PVD (peripheral vascular disease)    10. S/P peripheral artery angioplasty    11. Tortuous aorta    12. Iron deficiency anemia secondary to inadequate dietary iron intake    13. IVANIA (obstructive sleep apnea)      Patient stable CV wise. No change in status. BP elevated but she did not take meds this AM prior to appointment. Will keep med therapy the same for now and have her f/u in 3 months. Advised to be sure to take meds before next appt.  Plan:   -Continue current medical management and risk factor modification  -Cardiac, low salt diet  -Ambulate  -RTC 3 months, take meds prior to appt        RTC 3 months for BP check

## 2024-02-13 ENCOUNTER — OFFICE VISIT (OUTPATIENT)
Dept: ALLERGY | Facility: CLINIC | Age: 74
End: 2024-02-13
Payer: MEDICARE

## 2024-02-13 VITALS
HEART RATE: 104 BPM | TEMPERATURE: 97 F | SYSTOLIC BLOOD PRESSURE: 179 MMHG | WEIGHT: 131.38 LBS | DIASTOLIC BLOOD PRESSURE: 96 MMHG | BODY MASS INDEX: 24.83 KG/M2

## 2024-02-13 DIAGNOSIS — Z91.040 LATEX ALLERGY: ICD-10-CM

## 2024-02-13 DIAGNOSIS — J30.89 ALLERGIC RHINITIS DUE TO MOLD: ICD-10-CM

## 2024-02-13 DIAGNOSIS — Z91.018 FOOD ALLERGY: Primary | ICD-10-CM

## 2024-02-13 DIAGNOSIS — Z72.0 TOBACCO ABUSE: ICD-10-CM

## 2024-02-13 DIAGNOSIS — R03.0 ELEVATED BLOOD PRESSURE READING: ICD-10-CM

## 2024-02-13 DIAGNOSIS — T50.8X5D ALLERGIC REACTION TO CONTRAST MATERIAL, SUBSEQUENT ENCOUNTER: ICD-10-CM

## 2024-02-13 DIAGNOSIS — H10.13 ALLERGIC CONJUNCTIVITIS OF BOTH EYES: ICD-10-CM

## 2024-02-13 PROCEDURE — 99999 PR PBB SHADOW E&M-EST. PATIENT-LVL V: CPT | Mod: PBBFAC,HCNC,, | Performed by: ALLERGY & IMMUNOLOGY

## 2024-02-13 PROCEDURE — 1126F AMNT PAIN NOTED NONE PRSNT: CPT | Mod: HCNC,CPTII,S$GLB, | Performed by: ALLERGY & IMMUNOLOGY

## 2024-02-13 PROCEDURE — 99215 OFFICE O/P EST HI 40 MIN: CPT | Mod: HCNC,S$GLB,, | Performed by: ALLERGY & IMMUNOLOGY

## 2024-02-13 PROCEDURE — 3077F SYST BP >= 140 MM HG: CPT | Mod: HCNC,CPTII,S$GLB, | Performed by: ALLERGY & IMMUNOLOGY

## 2024-02-13 PROCEDURE — 1159F MED LIST DOCD IN RCRD: CPT | Mod: HCNC,CPTII,S$GLB, | Performed by: ALLERGY & IMMUNOLOGY

## 2024-02-13 PROCEDURE — 3288F FALL RISK ASSESSMENT DOCD: CPT | Mod: HCNC,CPTII,S$GLB, | Performed by: ALLERGY & IMMUNOLOGY

## 2024-02-13 PROCEDURE — 1101F PT FALLS ASSESS-DOCD LE1/YR: CPT | Mod: HCNC,CPTII,S$GLB, | Performed by: ALLERGY & IMMUNOLOGY

## 2024-02-13 PROCEDURE — 3080F DIAST BP >= 90 MM HG: CPT | Mod: HCNC,CPTII,S$GLB, | Performed by: ALLERGY & IMMUNOLOGY

## 2024-02-13 PROCEDURE — 3072F LOW RISK FOR RETINOPATHY: CPT | Mod: HCNC,CPTII,S$GLB, | Performed by: ALLERGY & IMMUNOLOGY

## 2024-02-13 RX ORDER — OLOPATADINE HYDROCHLORIDE 1 MG/ML
1 SOLUTION/ DROPS OPHTHALMIC 2 TIMES DAILY
Qty: 5 ML | Refills: 2 | Status: SHIPPED | OUTPATIENT
Start: 2024-02-13 | End: 2025-02-12

## 2024-02-13 NOTE — PROGRESS NOTES
"Subjective:       Patient ID: Lucia Barlow is a 74 y.o. female.      Chief Complaint:  Follow-up (Doing well over all. Complains of nasal congestion off and on, thick, clear discharge when blowing.)      HPI 2/13/2024: 74 year old female- allergic rhinitis- mold, food allergy (fish, eggs, strawberries and shellfish), latex allergy, contrast dye allergy  2 weeks ago- steroids for sinus infection ("My throat was so sore")  Nasal congestion  Atrovent nasal spray BID- which helps  She reports watery and itchy eyes.    Avoiding latex, fish and shellfish- avoided, since she was a child  Egg- "tongue tied". She can eat eggs cooked at high temperatures  Used Epipen twice due to eating strawberries- "I know I am allergic to strawberries." She reports itching and facial swelling after eating them.  Avoiding strawberries- occurred 2 months ago. She can eat them in a cake/cooked.  Currently on antihistamines    No contrasted studies          She reports that she has not taken her BP medications.      HPI 2/13/2023: 73 year old female here for follow up. She reports a clear runny nose when she bends forward. She reprots that Atrovent helps and she uses it twice a day.  Avoiding latex.  She avoids fish, shellfish- avoided since she was 6.  Not used Epipen, since she was seen here previously.  Avoiding eggs. She does eat bread.            HPI 3/28/2022: 72 year old female complaining of runny nose, nasal congestion for several years. She reports facial swelling after being outside for long periods. She denies tongue or throat swelling. She has tried medrol dose pack, Flonase and Singulair. The aforementioned have not helped. She reports that she has tried long acting antihistamines like Zyrtec, but they have not helped. Symptoms vary with season and are worst in the Spring.  She reports wearing a mask when outside.   Zyrtec(last taken this morning) in the morning and Singulair at night  She has not had sinus surgery.    Fish " "and shellfish "my heart swells up"  NO Epipen    Latex- contact dermatitis    Egg- "stomach cramps"      HPI 5/24/2022:  Complaining of facial swelling last week of April, while seating outside. She denies throat or tongue swelling. Epipen and benadryl- 3 hours later, symptoms subsided.She did not go to the hospital. No swelling, since that day.  Avoiding fish, shellfish and egg  NO accidental ingestions  Avoiding latex  She feels Atrovent nasal spray is helping to alleviate her symptoms.      HPI 8/15/2022: 72 year old female with intermittent facial swelling. 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 She has a food intolerance to egg. Latex causes a contact dermatitis. Allergic rhinitis to mold.    "Since you made him switch my medicine, I have not been swelling."  No episodes of facial swelling  Taking xyzal and Singulair as well as Atrovent nasal spray. Taking Fexofenadine  Not required epipen    Avoiding fish and shellfish  History of contrast dye reaction many years ago- "mild" per her report- treated with benadryl. Did not require Epinephrine      Past Medical History:   Diagnosis Date    Abnormal EKG 5/19/2023    Atherosclerosis of artery of both lower extremities 1/17/2014    Atherosclerosis of native coronary artery of native heart without angina pectoris 11/18/2016    Atherosclerotic PVD with intermittent claudication 1/17/2014    Chronic bronchitis     COPD (chronic obstructive pulmonary disease)     Coronary artery disease involving native coronary artery of native heart without angina pectoris 5/19/2023    Diabetes with neurologic complications     Dyslipidemia associated with type 2 diabetes mellitus 6/14/2013    Dyslipidemia associated with type 2 diabetes mellitus     Ex-smoker     Gastroesophageal reflux disease without esophagitis 1/25/2019    Hyperlipidemia     Hypertension     Iron deficiency anemia secondary to inadequate dietary iron intake 6/3/2022    NSCLC of left " lung 11/19/2020    IVANIA (obstructive sleep apnea) 2/11/2021    Osteoporosis 1/16 rosita 1/18    Pneumothorax after biopsy 10/21/2020    PVD (peripheral vascular disease)     Renal manifestation of secondary diabetes mellitus     S/P peripheral artery angioplasty 2/7/2014    Seasonal allergic rhinitis due to pollen     Shortness of breath 5/19/2023    Simple chronic bronchitis     Tobacco dependence     Type 2 diabetes mellitus with microalbuminuria, without long-term current use of insulin 3/29/2019    Type 2 diabetes mellitus with peripheral vascular disease     Type 2 diabetes mellitus with stage 3 chronic kidney disease, without long-term current use of insulin 2/1/2019    Type 2 diabetes mellitus without retinopathy 2/1/2019    Urinary incontinence      Family History   Problem Relation Age of Onset    Stroke Father     Stroke Sister     Asthma Daughter     Diabetes Daughter     Breast cancer Daughter     Asthma Son      Current Outpatient Medications on File Prior to Visit   Medication Sig Dispense Refill    albuterol (PROVENTIL/VENTOLIN HFA) 90 mcg/actuation inhaler Inhale 2 puffs into the lungs every 4 (four) hours as needed for Wheezing or Shortness of Breath. 54 g 3    amLODIPine (NORVASC) 10 MG tablet Take 1 tablet (10 mg total) by mouth once daily. 90 tablet 3    butalbital-acetaminophen-caffeine -40 mg (FIORICET, ESGIC) -40 mg per tablet TAKE 1 TABLET BY MOUTH 3 (THREE) TIMES DAILY AS NEEDED FOR HEADACHES. (MAXIMUM OF 15 TABLETS PER MONTH) 30 tablet 3    calcium-vitamin D 600 mg-10 mcg (400 unit) Tab Take 2 tablets by mouth once daily. 180 tablet 4    cetirizine (ZYRTEC) 10 MG tablet Take 1 tablet (10 mg total) by mouth once daily. For sinus congestion 90 tablet 4    chlorthalidone (HYGROTEN) 25 MG Tab Take by mouth.      cilostazoL (PLETAL) 50 MG Tab Take 1 tablet (50 mg total) by mouth 2 (two) times daily before meals. 180 tablet 3    EPINEPHrine (EPIPEN) 0.3 mg/0.3 mL AtIn INJECT INTO THE  MUSCLE ONE TIME FOR 1 DOSE, AS DIRECTED 2 each 3    ezetimibe (ZETIA) 10 mg tablet TAKE 1 TABLET ONE TIME DAILY 90 tablet 0    famotidine (PEPCID) 40 MG tablet Take by mouth.      fexofenadine (ALLEGRA) 180 MG tablet TAKE 1 TABLET ONE TIME DAILY 90 tablet 3    fluticasone propionate (FLONASE) 50 mcg/actuation nasal spray 2 sprays (100 mcg total) by Each Nostril route once daily. 32 g 5    furosemide (LASIX) 20 MG tablet Take 1 tablet (20 mg total) by mouth once daily. 90 tablet 3    ipratropium (ATROVENT) 21 mcg (0.03 %) nasal spray 2 sprays by Each Nostril route 3 (three) times daily. 60 mL 5    ipratropium-albuteroL (COMBIVENT)  mcg/actuation inhaler Inhale 2 puffs into the lungs every 4 (four) hours as needed for Wheezing or Shortness of Breath. Rescue 12 g 3    LEXISCAN 0.4 mg/5 mL Syrg       montelukast (SINGULAIR) 10 mg tablet Take 1 tablet (10 mg total) by mouth every evening. 90 tablet 3    ondansetron (ZOFRAN-ODT) 4 MG TbDL 1-2 tablets PO every 6 hours as needed for nausea/vomiting while completing bowel prep 4 tablet 0    pantoprazole (PROTONIX) 40 MG tablet TAKE 1 TABLET EVERY DAY 90 tablet 0    potassium chloride SA (K-DUR,KLOR-CON) 20 MEQ tablet TAKE 1 TABLET EVERY DAY 90 tablet 0    rosuvastatin (CRESTOR) 20 MG tablet Take 1 tablet (20 mg total) by mouth every evening. 90 tablet 3    semaglutide (OZEMPIC) 0.25 mg or 0.5 mg (2 mg/3 mL) pen injector Inject 0.25 mg into the skin every 7 days. 4.5 mL 3    valsartan (DIOVAN) 160 MG tablet Take 160 mg by mouth.      levocetirizine (XYZAL) 5 MG tablet Take 1 tablet (5 mg total) by mouth every evening. 30 tablet 11    predniSONE (DELTASONE) 20 MG tablet Prednisone 60 mg/ day for 3 days, 40 mg/day for 3 days,20 mg/ day for 3 days, (1/2 tablet )10 mg a day for 3 days. (Patient not taking: Reported on 2/13/2024) 20 tablet 0    sod sulf-pot chloride-mag sulf (SUTAB) 1.479-0.188- 0.225 gram tablet Take 12 tablets by mouth once daily. Take according to package  instructions with indicated amount of water. (Patient not taking: Reported on 2/13/2024) 24 tablet 0    varicella-zoster gE-AS01B, PF, (SHINGRIX) 50 mcg/0.5 mL injection Inject 0.5 mL (one dose) into muscle now; give second dose at least 2 months later (Patient not taking: Reported on 10/6/2023) 0.5 mL 1     No current facility-administered medications on file prior to visit.       Review of patient's allergies indicates:   Allergen Reactions    Iodine and iodide containing products Swelling    Losartan Itching and Swelling     Very uncomfortable - high probability of allergic reaction due to swelling and itching    Skin staples [surgical stainless steel] Swelling    Egg derived      Shortness breath, lip swelling    Fish containing products     Jardiance [empagliflozin]     Latex, natural rubber Swelling    Nickel     Pravastatin      40 mg causes nausea vomitting but 20 mg ok    Shellfish containing products Swelling     Environmental History: Pets in the home: none. She does not smoke. Grandson lives with her and smokes.  Review of Systems   HENT:  Positive for congestion.    Respiratory:  Negative for cough and shortness of breath.    Skin:  Negative for itching and rash.   Endo/Heme/Allergies:  Positive for environmental allergies.   Psychiatric/Behavioral:  Negative for suicidal ideas. The patient is not nervous/anxious.    All other systems reviewed and are negative.           Objective:    Physical Exam  Vitals reviewed.   Constitutional:       General: She is not in acute distress.     Appearance: Normal appearance. She is well-developed. She is not ill-appearing, toxic-appearing or diaphoretic.   HENT:      Head: Normocephalic and atraumatic.      Right Ear: Tympanic membrane, ear canal and external ear normal. There is no impacted cerumen.      Left Ear: Tympanic membrane, ear canal and external ear normal. There is no impacted cerumen.      Nose: Nose normal. No congestion or rhinorrhea.       Mouth/Throat:      Pharynx: No oropharyngeal exudate or posterior oropharyngeal erythema.   Eyes:      General: No scleral icterus.        Right eye: No discharge.         Left eye: No discharge.      Pupils: Pupils are equal, round, and reactive to light.   Neck:      Thyroid: No thyromegaly.   Cardiovascular:      Rate and Rhythm: Normal rate and regular rhythm.      Heart sounds: Normal heart sounds. No murmur heard.    No friction rub. No gallop.   Pulmonary:      Effort: Pulmonary effort is normal. No respiratory distress.      Breath sounds: Normal breath sounds. No stridor. No wheezing, rhonchi or rales.   Chest:      Chest wall: No tenderness.   Musculoskeletal:         General: No swelling, tenderness, deformity or signs of injury. Normal range of motion.      Cervical back: Normal range of motion and neck supple. No rigidity. No muscular tenderness.      Right lower leg: No edema.      Left lower leg: No edema.   Lymphadenopathy:      Cervical: No cervical adenopathy.   Skin:     General: Skin is warm.      Coloration: Skin is not jaundiced or pale.      Findings: No bruising or erythema.   Neurological:      General: No focal deficit present.      Mental Status: She is alert and oriented to person, place, and time.      Gait: Gait normal.   Psychiatric:         Mood and Affect: Mood normal.         Behavior: Behavior normal.         Thought Content: Thought content normal.         Judgment: Judgment normal.     3/28/2022- allergies testing significant for Aspergillus.  Shellfish and fish were negative. Latex was negative as well.      Assessment:       1. Food allergy    2. Allergic rhinitis due to mold    3. Latex allergy    4. Allergic reaction to contrast material, subsequent encounter    5. Elevated blood pressure reading    6. Allergic conjunctivitis of both eyes    7. Tobacco abuse             Plan:       Strict avoidance of eggs, fish, strawberries, and shellfish, as well as their products  Epipen  2 pack- discussed use, care and administation    Food allergy    Allergic rhinitis due to mold    Latex allergy    Allergic reaction to contrast material, subsequent encounter    Elevated blood pressure reading    Allergic conjunctivitis of both eyes  -     olopatadine (PATANOL) 0.1 % ophthalmic solution; Place 1 drop into both eyes 2 (two) times daily.  Dispense: 5 mL; Refill: 2    Tobacco abuse    Discussed- allergy labs negative for IgE to fish and shellfish    Recommend continued avoidance of ACE inhibitors.    Continue Singulair, Fexofenadine, and Xyzal. Advised to avoid oral antihistamines 7 days prior to her next appointment, will perform skin prick testing to fish and shellfish. If negative can do an oral challenge in the office.  Continue Atrovent nasal spray and increase to three to four times a day.  Recommend avoidance of latex and latex containing products.  Avoid contrast dye or premedicate, if needed and use low molecular weight dye.      RTC 4 weeks for skin prick testing to fish and shellfish or sooner, if needed    CHRIS BAUMAN spent a total of 41 minutes on the day of the visit.This includes face to face time and non-face to face time preparing to see the patient (eg, review of tests), obtaining and/or reviewing separately obtained history, documenting clinical information in the electronic or other health record, independently interpreting results and communicating results to the patient/family/caregiver, or care coordinator.

## 2024-02-13 NOTE — PATIENT INSTRUCTIONS
Advised to avoid oral antihistamines 7 days prior to her next appointment, will perform skin prick testing to fish and shellfish. If negative can do an oral challenge in the office.

## 2024-03-01 ENCOUNTER — OFFICE VISIT (OUTPATIENT)
Dept: ALLERGY | Facility: CLINIC | Age: 74
End: 2024-03-01
Payer: MEDICARE

## 2024-03-01 VITALS
SYSTOLIC BLOOD PRESSURE: 137 MMHG | TEMPERATURE: 99 F | WEIGHT: 128.31 LBS | HEIGHT: 61 IN | DIASTOLIC BLOOD PRESSURE: 68 MMHG | HEART RATE: 114 BPM | BODY MASS INDEX: 24.22 KG/M2

## 2024-03-01 DIAGNOSIS — Z91.012 EGG ALLERGY: ICD-10-CM

## 2024-03-01 DIAGNOSIS — J30.89 ALLERGIC RHINITIS DUE TO MOLD: ICD-10-CM

## 2024-03-01 DIAGNOSIS — Z91.018 FOOD ALLERGY: ICD-10-CM

## 2024-03-01 DIAGNOSIS — Z91.013 SHELLFISH ALLERGY: ICD-10-CM

## 2024-03-01 DIAGNOSIS — Z91.013 FISH ALLERGY: Primary | ICD-10-CM

## 2024-03-01 DIAGNOSIS — Z72.0 TOBACCO ABUSE: ICD-10-CM

## 2024-03-01 PROCEDURE — 99215 OFFICE O/P EST HI 40 MIN: CPT | Mod: HCNC,25,S$GLB, | Performed by: ALLERGY & IMMUNOLOGY

## 2024-03-01 PROCEDURE — 99999 PR PBB SHADOW E&M-EST. PATIENT-LVL III: CPT | Mod: PBBFAC,HCNC,, | Performed by: ALLERGY & IMMUNOLOGY

## 2024-03-01 PROCEDURE — 3078F DIAST BP <80 MM HG: CPT | Mod: HCNC,CPTII,S$GLB, | Performed by: ALLERGY & IMMUNOLOGY

## 2024-03-01 PROCEDURE — 1159F MED LIST DOCD IN RCRD: CPT | Mod: HCNC,CPTII,S$GLB, | Performed by: ALLERGY & IMMUNOLOGY

## 2024-03-01 PROCEDURE — 1125F AMNT PAIN NOTED PAIN PRSNT: CPT | Mod: HCNC,CPTII,S$GLB, | Performed by: ALLERGY & IMMUNOLOGY

## 2024-03-01 PROCEDURE — 95004 PERQ TESTS W/ALRGNC XTRCS: CPT | Mod: HCNC,S$GLB,, | Performed by: ALLERGY & IMMUNOLOGY

## 2024-03-01 PROCEDURE — 3072F LOW RISK FOR RETINOPATHY: CPT | Mod: HCNC,CPTII,S$GLB, | Performed by: ALLERGY & IMMUNOLOGY

## 2024-03-01 PROCEDURE — 3075F SYST BP GE 130 - 139MM HG: CPT | Mod: HCNC,CPTII,S$GLB, | Performed by: ALLERGY & IMMUNOLOGY

## 2024-03-01 NOTE — PROGRESS NOTES
"Subjective:       Patient ID: Lucia Barlow is a 74 y.o. female.      Chief Complaint:  Other (Here for skin prick allergy testing to fish and shellfish)      HPI 3/1/2024: 74 year old female with a history of allergic rhinitis (mold) and food allergy- fish, eggs, strawberries and shellfish.  She states that she wants allergy testing, but she does not plan to eat fish or shellfish.          HPI 2/13/2024: 74 year old female- allergic rhinitis- mold, food allergy (fish, eggs, strawberries and shellfish), latex allergy, contrast dye allergy  2 weeks ago- steroids for sinus infection ("My throat was so sore")  Nasal congestion  Atrovent nasal spray BID- which helps  She reports watery and itchy eyes.    Avoiding latex, fish and shellfish- avoided, since she was a child  Egg- "tongue tied". She can eat eggs cooked at high temperatures  Used Epipen twice due to eating strawberries- "I know I am allergic to strawberries." She reports itching and facial swelling after eating them.  Avoiding strawberries- occurred 2 months ago. She can eat them in a cake/cooked.  Currently on antihistamines    No contrasted studies    She reports that she has not taken her BP medications.            Past Medical History:   Diagnosis Date    Abnormal EKG 5/19/2023    Atherosclerosis of artery of both lower extremities 1/17/2014    Atherosclerosis of native coronary artery of native heart without angina pectoris 11/18/2016    Atherosclerotic PVD with intermittent claudication 1/17/2014    Chronic bronchitis     COPD (chronic obstructive pulmonary disease)     Coronary artery disease involving native coronary artery of native heart without angina pectoris 5/19/2023    Diabetes with neurologic complications     Dyslipidemia associated with type 2 diabetes mellitus 6/14/2013    Dyslipidemia associated with type 2 diabetes mellitus     Ex-smoker     Gastroesophageal reflux disease without esophagitis 1/25/2019    Hyperlipidemia     Hypertension "     Iron deficiency anemia secondary to inadequate dietary iron intake 6/3/2022    NSCLC of left lung 11/19/2020    IVANIA (obstructive sleep apnea) 2/11/2021    Osteoporosis 1/16 rosita 1/18    Pneumothorax after biopsy 10/21/2020    PVD (peripheral vascular disease)     Renal manifestation of secondary diabetes mellitus     S/P peripheral artery angioplasty 2/7/2014    Seasonal allergic rhinitis due to pollen     Shortness of breath 5/19/2023    Simple chronic bronchitis     Tobacco dependence     Type 2 diabetes mellitus with microalbuminuria, without long-term current use of insulin 3/29/2019    Type 2 diabetes mellitus with peripheral vascular disease     Type 2 diabetes mellitus with stage 3 chronic kidney disease, without long-term current use of insulin 2/1/2019    Type 2 diabetes mellitus without retinopathy 2/1/2019    Urinary incontinence      Family History   Problem Relation Age of Onset    Stroke Father     Stroke Sister     Asthma Daughter     Diabetes Daughter     Breast cancer Daughter     Asthma Son      Current Outpatient Medications on File Prior to Visit   Medication Sig Dispense Refill    albuterol (PROVENTIL/VENTOLIN HFA) 90 mcg/actuation inhaler Inhale 2 puffs into the lungs every 4 (four) hours as needed for Wheezing or Shortness of Breath. 54 g 3    amLODIPine (NORVASC) 10 MG tablet Take 1 tablet (10 mg total) by mouth once daily. 90 tablet 3    butalbital-acetaminophen-caffeine -40 mg (FIORICET, ESGIC) -40 mg per tablet TAKE 1 TABLET BY MOUTH 3 (THREE) TIMES DAILY AS NEEDED FOR HEADACHES. (MAXIMUM OF 15 TABLETS PER MONTH) 30 tablet 3    calcium-vitamin D 600 mg-10 mcg (400 unit) Tab Take 2 tablets by mouth once daily. 180 tablet 4    cetirizine (ZYRTEC) 10 MG tablet Take 1 tablet (10 mg total) by mouth once daily. For sinus congestion 90 tablet 4    chlorthalidone (HYGROTEN) 25 MG Tab Take by mouth.      cilostazoL (PLETAL) 50 MG Tab Take 1 tablet (50 mg total) by mouth 2 (two)  times daily before meals. 180 tablet 3    EPINEPHrine (EPIPEN) 0.3 mg/0.3 mL AtIn INJECT INTO THE MUSCLE ONE TIME FOR 1 DOSE, AS DIRECTED 2 each 3    ezetimibe (ZETIA) 10 mg tablet TAKE 1 TABLET ONE TIME DAILY 90 tablet 0    famotidine (PEPCID) 40 MG tablet Take by mouth.      fexofenadine (ALLEGRA) 180 MG tablet TAKE 1 TABLET ONE TIME DAILY 90 tablet 3    fluticasone propionate (FLONASE) 50 mcg/actuation nasal spray 2 sprays (100 mcg total) by Each Nostril route once daily. 32 g 5    furosemide (LASIX) 20 MG tablet Take 1 tablet (20 mg total) by mouth once daily. 90 tablet 3    ipratropium (ATROVENT) 21 mcg (0.03 %) nasal spray 2 sprays by Each Nostril route 3 (three) times daily. 60 mL 5    ipratropium-albuteroL (COMBIVENT)  mcg/actuation inhaler Inhale 2 puffs into the lungs every 4 (four) hours as needed for Wheezing or Shortness of Breath. Rescue 12 g 3    LEXISCAN 0.4 mg/5 mL Syrg       montelukast (SINGULAIR) 10 mg tablet Take 1 tablet (10 mg total) by mouth every evening. 90 tablet 3    olopatadine (PATANOL) 0.1 % ophthalmic solution Place 1 drop into both eyes 2 (two) times daily. 5 mL 2    ondansetron (ZOFRAN-ODT) 4 MG TbDL 1-2 tablets PO every 6 hours as needed for nausea/vomiting while completing bowel prep 4 tablet 0    pantoprazole (PROTONIX) 40 MG tablet TAKE 1 TABLET EVERY DAY 90 tablet 0    potassium chloride SA (K-DUR,KLOR-CON) 20 MEQ tablet TAKE 1 TABLET EVERY DAY 90 tablet 0    predniSONE (DELTASONE) 20 MG tablet Prednisone 60 mg/ day for 3 days, 40 mg/day for 3 days,20 mg/ day for 3 days, (1/2 tablet )10 mg a day for 3 days. 20 tablet 0    rosuvastatin (CRESTOR) 20 MG tablet Take 1 tablet (20 mg total) by mouth every evening. 90 tablet 3    semaglutide (OZEMPIC) 0.25 mg or 0.5 mg (2 mg/3 mL) pen injector Inject 0.25 mg into the skin every 7 days. 4.5 mL 3    sod sulf-pot chloride-mag sulf (SUTAB) 1.479-0.188- 0.225 gram tablet Take 12 tablets by mouth once daily. Take according to package  instructions with indicated amount of water. 24 tablet 0    valsartan (DIOVAN) 160 MG tablet Take 160 mg by mouth.      varicella-zoster gE-AS01B, PF, (SHINGRIX) 50 mcg/0.5 mL injection Inject 0.5 mL (one dose) into muscle now; give second dose at least 2 months later 0.5 mL 1    levocetirizine (XYZAL) 5 MG tablet Take 1 tablet (5 mg total) by mouth every evening. 30 tablet 11     No current facility-administered medications on file prior to visit.       Review of patient's allergies indicates:   Allergen Reactions    Iodine and iodide containing products Swelling    Losartan Itching and Swelling     Very uncomfortable - high probability of allergic reaction due to swelling and itching    Skin staples [surgical stainless steel] Swelling    Egg derived      Shortness breath, lip swelling    Fish containing products     Jardiance [empagliflozin]     Latex, natural rubber Swelling    Nickel     Pravastatin      40 mg causes nausea vomitting but 20 mg ok    Shellfish containing products Swelling     Environmental History: Pets in the home: none. She does not smoke. Grandson lives with her and smokes.  Review of Systems   HENT:  Positive for congestion.    Respiratory:  Negative for cough and shortness of breath.    Skin:  Negative for itching and rash.   Endo/Heme/Allergies:  Positive for environmental allergies.   Psychiatric/Behavioral:  Negative for suicidal ideas. The patient is not nervous/anxious.    All other systems reviewed and are negative.           Objective:    Physical Exam  Vitals reviewed.   Constitutional:       General: She is not in acute distress.     Appearance: Normal appearance. She is well-developed. She is not ill-appearing, toxic-appearing or diaphoretic.   HENT:      Head: Normocephalic and atraumatic.      Right Ear: Tympanic membrane, ear canal and external ear normal. There is no impacted cerumen.      Left Ear: Tympanic membrane, ear canal and external ear normal. There is no impacted  cerumen.      Nose: Nose normal. No congestion or rhinorrhea.      Mouth/Throat:      Pharynx: No oropharyngeal exudate or posterior oropharyngeal erythema.   Eyes:      General: No scleral icterus.        Right eye: No discharge.         Left eye: No discharge.      Pupils: Pupils are equal, round, and reactive to light.   Neck:      Thyroid: No thyromegaly.   Cardiovascular:      Rate and Rhythm: Normal rate and regular rhythm.      Heart sounds: Normal heart sounds. No murmur heard.    No friction rub. No gallop.   Pulmonary:      Effort: Pulmonary effort is normal. No respiratory distress.      Breath sounds: Normal breath sounds. No stridor. No wheezing, rhonchi or rales.   Chest:      Chest wall: No tenderness.   Musculoskeletal:         General: No swelling, tenderness, deformity or signs of injury. Normal range of motion.      Cervical back: Normal range of motion and neck supple. No rigidity. No muscular tenderness.      Right lower leg: No edema.      Left lower leg: No edema.   Lymphadenopathy:      Cervical: No cervical adenopathy.   Skin:     General: Skin is warm.      Coloration: Skin is not jaundiced or pale.      Findings: No bruising or erythema.   Neurological:      General: No focal deficit present.      Mental Status: She is alert and oriented to person, place, and time.      Gait: Gait normal.   Psychiatric:         Mood and Affect: Mood normal.         Behavior: Behavior normal.         Thought Content: Thought content normal.         Judgment: Judgment normal.       Allergy Skin prick testing  Histamine 6x7, 10 x10  Saline 4x3  She had an appropriate response to positive and negative controls.  She had a negative response to the following:  Shrimp, lobster, tuna, crab, catfish, oyster, salmon, trout, codfish, clam, scallops                  Assessment:       1. Fish allergy    2. Shellfish allergy    3. Egg allergy    4. Food allergy    5. Allergic rhinitis due to mold    6. Tobacco abuse                Plan:       Strict avoidance of eggs, fish, strawberries, and shellfish, as well as their products  Epipen 2 pack- discussed use, care and administation    Fish allergy    Shellfish allergy    Egg allergy    Food allergy    Allergic rhinitis due to mold    Tobacco abuse          Allergy Skin prick testing was negative to fish and shellfish. Offered an oral challenge to fish and shellfish in the office, she declined.    Continue current medications        RTC  6 months or sooner, if needed  CHRIS BAUMAN spent a total of 41 minutes on the day of the visit.This includes face to face time and non-face to face time preparing to see the patient (eg, review of tests), obtaining and/or reviewing separately obtained history, documenting clinical information in the electronic or other health record, independently interpreting results and communicating results to the patient/family/caregiver, or care coordinator.

## 2024-03-10 DIAGNOSIS — E11.69 DYSLIPIDEMIA ASSOCIATED WITH TYPE 2 DIABETES MELLITUS: Chronic | ICD-10-CM

## 2024-03-10 DIAGNOSIS — E78.5 DYSLIPIDEMIA ASSOCIATED WITH TYPE 2 DIABETES MELLITUS: Chronic | ICD-10-CM

## 2024-03-10 DIAGNOSIS — I70.213 ATHEROSCLEROSIS OF NATIVE ARTERY OF BOTH LOWER EXTREMITIES WITH INTERMITTENT CLAUDICATION: Chronic | ICD-10-CM

## 2024-03-10 DIAGNOSIS — J30.1 SEASONAL ALLERGIC RHINITIS DUE TO POLLEN: ICD-10-CM

## 2024-03-10 DIAGNOSIS — I77.1 TORTUOUS AORTA: ICD-10-CM

## 2024-03-10 DIAGNOSIS — I25.10 ATHEROSCLEROSIS OF NATIVE CORONARY ARTERY OF NATIVE HEART WITHOUT ANGINA PECTORIS: ICD-10-CM

## 2024-03-10 NOTE — TELEPHONE ENCOUNTER
No care due was identified.  Health [facility] Embedded Care Due Messages. Reference number: [id number].   3/10/2024 5:07:49 AM CDT

## 2024-03-11 RX ORDER — ROSUVASTATIN CALCIUM 20 MG/1
20 TABLET, COATED ORAL NIGHTLY
Qty: 90 TABLET | Refills: 0 | Status: SHIPPED | OUTPATIENT
Start: 2024-03-11 | End: 2024-05-23

## 2024-03-11 RX ORDER — IPRATROPIUM BROMIDE 21 UG/1
2 SPRAY, METERED NASAL 3 TIMES DAILY
Qty: 90 ML | Refills: 0 | Status: SHIPPED | OUTPATIENT
Start: 2024-03-11 | End: 2024-05-14

## 2024-03-11 NOTE — TELEPHONE ENCOUNTER
Refill Decision Note   Lucia Cain  is requesting a refill authorization.  Brief Assessment and Rationale for Refill:  Approve     Medication Therapy Plan:         Alert overridden per protocol: Yes   Comments:     Note composed:11:25 AM 03/11/2024

## 2024-03-29 DIAGNOSIS — I25.10 ATHEROSCLEROSIS OF NATIVE CORONARY ARTERY OF NATIVE HEART WITHOUT ANGINA PECTORIS: ICD-10-CM

## 2024-03-29 DIAGNOSIS — I70.213 ATHEROSCLEROSIS OF NATIVE ARTERY OF BOTH LOWER EXTREMITIES WITH INTERMITTENT CLAUDICATION: Chronic | ICD-10-CM

## 2024-03-29 DIAGNOSIS — E11.69 DYSLIPIDEMIA ASSOCIATED WITH TYPE 2 DIABETES MELLITUS: Chronic | ICD-10-CM

## 2024-03-29 DIAGNOSIS — E11.29 TYPE 2 DIABETES MELLITUS WITH MICROALBUMINURIA, WITHOUT LONG-TERM CURRENT USE OF INSULIN: ICD-10-CM

## 2024-03-29 DIAGNOSIS — E78.5 DYSLIPIDEMIA ASSOCIATED WITH TYPE 2 DIABETES MELLITUS: Chronic | ICD-10-CM

## 2024-03-29 DIAGNOSIS — I77.1 TORTUOUS AORTA: ICD-10-CM

## 2024-03-29 DIAGNOSIS — K21.9 GASTROESOPHAGEAL REFLUX DISEASE WITHOUT ESOPHAGITIS: Chronic | ICD-10-CM

## 2024-03-29 DIAGNOSIS — R80.9 TYPE 2 DIABETES MELLITUS WITH MICROALBUMINURIA, WITHOUT LONG-TERM CURRENT USE OF INSULIN: ICD-10-CM

## 2024-03-29 NOTE — TELEPHONE ENCOUNTER
Care Due:                  Date            Visit Type   Department     Provider  --------------------------------------------------------------------------------                                EP -                              PRIMARY      [facility] INTER[facility]  Last Visit: 04-      CARE ([facility])   MEDICINE       Abhishek Sandoval  Next Visit: None Scheduled  None         None Found                                                            Last  Test          Frequency    Reason                     Performed    Due Date  --------------------------------------------------------------------------------    CMP.........  12 months..  ezetimibe, rosuvastatin..  06- 06-    Lipid Panel.  12 months..  ezetimibe, rosuvastatin..  06- 06-    Health [facility] Embedded Care Due Messages. Reference number: [id number].   3/29/2024 1:53:01 AM CDT

## 2024-03-30 RX ORDER — EZETIMIBE 10 MG/1
10 TABLET ORAL
Qty: 90 TABLET | Refills: 0 | Status: SHIPPED | OUTPATIENT
Start: 2024-03-30 | End: 2024-06-13

## 2024-03-30 RX ORDER — POTASSIUM CHLORIDE 20 MEQ/1
20 TABLET, EXTENDED RELEASE ORAL
Qty: 90 TABLET | Refills: 0 | Status: SHIPPED | OUTPATIENT
Start: 2024-03-30 | End: 2024-06-13

## 2024-03-30 RX ORDER — PANTOPRAZOLE SODIUM 40 MG/1
40 TABLET, DELAYED RELEASE ORAL
Qty: 90 TABLET | Refills: 0 | Status: SHIPPED | OUTPATIENT
Start: 2024-03-30 | End: 2024-06-13

## 2024-03-30 NOTE — TELEPHONE ENCOUNTER
Refill Routing Note   Medication(s) are not appropriate for processing by [facility] for the following reason(s):        Outside of protocol    [facility] action(s):  Approve  Route   Requires labs : Yes      Medication Therapy Plan: Cilostazol out of protocol      Appointments  past 12m or future 3m with PCP    Date Provider   Last Visit   4/19/2023 ZELDA Sandoval MD   Next Visit   Visit date not found ZELDA Sandoval MD   ED visits in past 90 days: 0        Note composed:2:57 PM 03/30/2024

## 2024-04-01 RX ORDER — CILOSTAZOL 50 MG/1
50 TABLET ORAL
Qty: 180 TABLET | Refills: 0 | Status: SHIPPED | OUTPATIENT
Start: 2024-04-01 | End: 2024-06-13

## 2024-04-01 NOTE — TELEPHONE ENCOUNTER
Limited refill approved.  Appointment (virtual or in-office) required for more refills.    TO MY TEAM: Please help Lucia schedule appointment before they will run out of their medicine.

## 2024-04-04 NOTE — TELEPHONE ENCOUNTER
No care due was identified.  Health [facility] Embedded Care Due Messages. Reference number: [id number].   4/04/2024 4:01:43 AM CDT   (3) slightly limited

## 2024-04-04 NOTE — TELEPHONE ENCOUNTER
Refill Routing Note   Medication(s) are not appropriate for processing by [facility] for the following reason(s):        No active prescription written by provider    [facility] action(s):  Defer             Appointments  past 12m or future 3m with PCP    Date Provider   Last Visit   4/19/2023 ZELDA Sandoval MD   Next Visit   Visit date not found ZELDA Sandoval MD   ED visits in past 90 days: 0        Note composed:12:37 PM 04/04/2024

## 2024-04-05 RX ORDER — CHLORTHALIDONE 25 MG/1
25 TABLET ORAL 2 TIMES DAILY
Qty: 180 TABLET | Refills: 0 | Status: SHIPPED | OUTPATIENT
Start: 2024-04-05 | End: 2024-06-13

## 2024-04-05 NOTE — TELEPHONE ENCOUNTER
REFILL REQUEST APPROVED.  Requested Prescriptions   Pending Prescriptions Disp Refills    chlorthalidone (HYGROTEN) 25 MG Tab [Pharmacy Med Name: CHLORTHALIDONE 25 MG Tablet] 180 tablet 0     Sig: TAKE 1 TABLET TWICE DAILY

## 2024-04-26 ENCOUNTER — OFFICE VISIT (OUTPATIENT)
Dept: CARDIOLOGY | Facility: CLINIC | Age: 74
End: 2024-04-26
Payer: MEDICARE

## 2024-04-26 ENCOUNTER — LAB VISIT (OUTPATIENT)
Dept: LAB | Facility: HOSPITAL | Age: 74
End: 2024-04-26
Attending: FAMILY MEDICINE
Payer: MEDICARE

## 2024-04-26 VITALS
HEART RATE: 98 BPM | BODY MASS INDEX: 23.39 KG/M2 | HEIGHT: 61 IN | WEIGHT: 123.88 LBS | DIASTOLIC BLOOD PRESSURE: 62 MMHG | OXYGEN SATURATION: 95 % | SYSTOLIC BLOOD PRESSURE: 120 MMHG

## 2024-04-26 DIAGNOSIS — I77.1 TORTUOUS AORTA: ICD-10-CM

## 2024-04-26 DIAGNOSIS — I25.84 CORONARY ARTERY CALCIFICATION: ICD-10-CM

## 2024-04-26 DIAGNOSIS — E11.22 TYPE 2 DIABETES MELLITUS WITH STAGE 3A CHRONIC KIDNEY DISEASE, WITHOUT LONG-TERM CURRENT USE OF INSULIN: Chronic | ICD-10-CM

## 2024-04-26 DIAGNOSIS — I70.0 ATHEROSCLEROSIS OF AORTA: ICD-10-CM

## 2024-04-26 DIAGNOSIS — I25.10 CORONARY ARTERY CALCIFICATION: ICD-10-CM

## 2024-04-26 DIAGNOSIS — I35.0 NONRHEUMATIC AORTIC VALVE STENOSIS: ICD-10-CM

## 2024-04-26 DIAGNOSIS — I70.213 ATHEROSCLEROSIS OF NATIVE ARTERY OF BOTH LOWER EXTREMITIES WITH INTERMITTENT CLAUDICATION: Chronic | ICD-10-CM

## 2024-04-26 DIAGNOSIS — E11.29 TYPE 2 DIABETES MELLITUS WITH MICROALBUMINURIA, WITHOUT LONG-TERM CURRENT USE OF INSULIN: Chronic | ICD-10-CM

## 2024-04-26 DIAGNOSIS — I25.10 CORONARY ARTERY DISEASE INVOLVING NATIVE CORONARY ARTERY OF NATIVE HEART WITHOUT ANGINA PECTORIS: ICD-10-CM

## 2024-04-26 DIAGNOSIS — R06.02 SHORTNESS OF BREATH: ICD-10-CM

## 2024-04-26 DIAGNOSIS — Z98.62 S/P PERIPHERAL ARTERY ANGIOPLASTY: ICD-10-CM

## 2024-04-26 DIAGNOSIS — N18.31 TYPE 2 DIABETES MELLITUS WITH STAGE 3A CHRONIC KIDNEY DISEASE, WITHOUT LONG-TERM CURRENT USE OF INSULIN: Chronic | ICD-10-CM

## 2024-04-26 DIAGNOSIS — Z87.891 FORMER CIGARETTE SMOKER: Chronic | ICD-10-CM

## 2024-04-26 DIAGNOSIS — I15.2 HYPERTENSION ASSOCIATED WITH DIABETES: ICD-10-CM

## 2024-04-26 DIAGNOSIS — Z90.2 S/P LOBECTOMY OF LUNG: ICD-10-CM

## 2024-04-26 DIAGNOSIS — R80.9 TYPE 2 DIABETES MELLITUS WITH MICROALBUMINURIA, WITHOUT LONG-TERM CURRENT USE OF INSULIN: ICD-10-CM

## 2024-04-26 DIAGNOSIS — J44.9 CHRONIC OBSTRUCTIVE PULMONARY DISEASE, UNSPECIFIED COPD TYPE: Chronic | ICD-10-CM

## 2024-04-26 DIAGNOSIS — R94.31 ABNORMAL EKG: ICD-10-CM

## 2024-04-26 DIAGNOSIS — I27.21 HIGH PULMONARY ARTERIAL PRESSURE: ICD-10-CM

## 2024-04-26 DIAGNOSIS — E11.29 TYPE 2 DIABETES MELLITUS WITH MICROALBUMINURIA, WITHOUT LONG-TERM CURRENT USE OF INSULIN: ICD-10-CM

## 2024-04-26 DIAGNOSIS — E11.69 DYSLIPIDEMIA ASSOCIATED WITH TYPE 2 DIABETES MELLITUS: Primary | Chronic | ICD-10-CM

## 2024-04-26 DIAGNOSIS — G47.33 OSA (OBSTRUCTIVE SLEEP APNEA): ICD-10-CM

## 2024-04-26 DIAGNOSIS — E78.5 DYSLIPIDEMIA ASSOCIATED WITH TYPE 2 DIABETES MELLITUS: Primary | Chronic | ICD-10-CM

## 2024-04-26 DIAGNOSIS — C34.92 NON-SMALL CELL CANCER OF LEFT LUNG: ICD-10-CM

## 2024-04-26 DIAGNOSIS — E11.59 HYPERTENSION ASSOCIATED WITH DIABETES: ICD-10-CM

## 2024-04-26 DIAGNOSIS — E11.51 TYPE 2 DIABETES MELLITUS WITH PERIPHERAL VASCULAR DISEASE: Chronic | ICD-10-CM

## 2024-04-26 DIAGNOSIS — R80.9 TYPE 2 DIABETES MELLITUS WITH MICROALBUMINURIA, WITHOUT LONG-TERM CURRENT USE OF INSULIN: Chronic | ICD-10-CM

## 2024-04-26 DIAGNOSIS — I73.9 PVD (PERIPHERAL VASCULAR DISEASE): ICD-10-CM

## 2024-04-26 LAB
ALBUMIN/CREAT UR: 150 UG/MG (ref 0–30)
CREAT UR-MCNC: 70 MG/DL (ref 15–325)
MICROALBUMIN UR DL<=1MG/L-MCNC: 105 UG/ML

## 2024-04-26 PROCEDURE — 99214 OFFICE O/P EST MOD 30 MIN: CPT | Mod: HCNC,S$GLB,, | Performed by: INTERNAL MEDICINE

## 2024-04-26 PROCEDURE — 3072F LOW RISK FOR RETINOPATHY: CPT | Mod: HCNC,CPTII,S$GLB, | Performed by: INTERNAL MEDICINE

## 2024-04-26 PROCEDURE — 1159F MED LIST DOCD IN RCRD: CPT | Mod: HCNC,CPTII,S$GLB, | Performed by: INTERNAL MEDICINE

## 2024-04-26 PROCEDURE — 1160F RVW MEDS BY RX/DR IN RCRD: CPT | Mod: HCNC,CPTII,S$GLB, | Performed by: INTERNAL MEDICINE

## 2024-04-26 PROCEDURE — 3078F DIAST BP <80 MM HG: CPT | Mod: HCNC,CPTII,S$GLB, | Performed by: INTERNAL MEDICINE

## 2024-04-26 PROCEDURE — 99999 PR PBB SHADOW E&M-EST. PATIENT-LVL V: CPT | Mod: PBBFAC,HCNC,, | Performed by: INTERNAL MEDICINE

## 2024-04-26 PROCEDURE — 82043 UR ALBUMIN QUANTITATIVE: CPT | Mod: HCNC | Performed by: FAMILY MEDICINE

## 2024-04-26 PROCEDURE — 3074F SYST BP LT 130 MM HG: CPT | Mod: HCNC,CPTII,S$GLB, | Performed by: INTERNAL MEDICINE

## 2024-04-26 NOTE — PROGRESS NOTES
Subjective:   Patient ID:  Lucia Barlow is a 74 y.o. female who presents for follow up of Coronary Artery Disease and Hypertension      HPI  2020  A 69 yo female with pvd s/p pta copd smoker copd diabetes (stopped metformin due to gi side effects and lost weight) cad htn hlp ckd is here for f/u decreased smoking but has not stopped no claudication no chest pain or shortness of breath no chest pain has heart burn has been eating a lot of cheese/. Has no leg swelling. Tomatoes triggers heart burn.her a1c is in order however her lipids have increased has been eating a lot of cheese.      3/3/2021  Here for f/u had lung surgery for lung ca in November still has lefts  pain improving not smoking anymore still eating cheese compliant with meds has no claudication  Symptoms has rt hip pain that appears musculoskeletal. Has no angina tia chf . No syncope near syncope compliant with meds. Slat intake could improve.      2021  Has lingulectomy due  Lung cancer still has anterior left chest pain has seen  thoracic surgery has no angina she has now return of hip claudication over the past 5 months bilateral. She has to stop at 50 feet she can barely make it no discoloration or weakness in leg  Has no tia no chf has shortness of breath times  Her ekg is unchanged. She is not using cpap she returnesd her machine .     12/3/2021   Here for f/u she is cancer free in remission after he rlung surgery not smoking has no leg pain starting in her hips  All the way down posteriorely to the calves. She  Feels like tightness and cramps has to rest for relief this is only exertional. She is trying to be compliant with diet and emds. Has no nocturnal symptoms no discoloration in feet.      2022   here fro f/u not smoking has no limiting claudication taking pletal no bleeding issues compliant with meds lipids on target.     2023  Has left hip pain down the leg at rest and when walking. Her claudication is stable  tolerated pletal well not smoking tolerated meds well her rf are well modified. Has no chest pain or shortness of breath.        5/19/2023  Here frof /u early multiple complaints she is short of breath has swelling in face in legs hands and has sinus pressures  has sinus drip she was p[laced on steroids and duuretics. This has been on going for 1 month. She thinks jardiance is the reason she has been on jardiance for 1 year. She has been swollen since then has yeast infection . She is short of breath with exertion has no orthopnea pnd .SHE uses her inhaler for relief.   She ahs progression of her claudication she is barely able to walk 50 feet and that is very limiting to her her antionette with exercise she walked 1 minute her abui dropped significantly and required  10 minutes to go to baseline.   Her resting antionette dropped has bilateral dfa and sfa and cfa significant stenosis.      7/13/2023  Continues with significant exertional dyspnea very limiting as well as very limiting claudication her leg swelling improved using inhalers help her shortness of breath .stopping jardiance has helped uti. Not using cpap.her bnp is normal.     12/20/2023  HERE FOR F/U   Still smoking   Htn labile has not taken her meds this morning that is why her bp is elevated.    Denies any new symptoms. Her anemia was addressed her claudication improved,.   Has lost family member she is grieving.  Ladi PIKE 1/24/2024  Ms. Barlow is a 73 year old female patient whose current medical conditions include COPD, PVD s/p PTA, DM type II, CAD, HTN, hyperlipidemia, and CKD who presents today for follow-up. Patient previously seen by Dr. Rosas and noted to have elevated BP. She returns today and states she is doing well overall. No change in CV status. Chronic SOB, unchanged. No recent bouts of exertional heaviness or tightness. Occasional GERD. Claudication symptoms improved since her iron has been repleted, able to ambulate and has even been able  to ride her bike. No LH, dizziness, near syncope, or syncope. Does suffer from migraines/sinus headaches, worse with weather changes. No s/s of TIA/CVA. BP elevated today but patient did not have any  medications. Smokes every now and then.     4/26/2024   Here for f/u has been less upset about the family member loss her htn is controlled denies shortness of breath claudication. She is asymptomatic otherwise.   Past Medical History:   Diagnosis Date    Abnormal EKG 5/19/2023    Atherosclerosis of artery of both lower extremities 1/17/2014    Atherosclerosis of native coronary artery of native heart without angina pectoris 11/18/2016    Atherosclerotic PVD with intermittent claudication 1/17/2014    Chronic bronchitis     COPD (chronic obstructive pulmonary disease)     Coronary artery disease involving native coronary artery of native heart without angina pectoris 5/19/2023    Diabetes with neurologic complications     Dyslipidemia associated with type 2 diabetes mellitus 6/14/2013    Dyslipidemia associated with type 2 diabetes mellitus     Ex-smoker     Gastroesophageal reflux disease without esophagitis 1/25/2019    Hyperlipidemia     Hypertension     Iron deficiency anemia secondary to inadequate dietary iron intake 6/3/2022    NSCLC of left lung 11/19/2020    IVANIA (obstructive sleep apnea) 2/11/2021    Osteoporosis 1/16 rosita 1/18    Pneumothorax after biopsy 10/21/2020    PVD (peripheral vascular disease)     Renal manifestation of secondary diabetes mellitus     S/P peripheral artery angioplasty 2/7/2014    Seasonal allergic rhinitis due to pollen     Shortness of breath 5/19/2023    Simple chronic bronchitis     Tobacco dependence     Type 2 diabetes mellitus with microalbuminuria, without long-term current use of insulin 3/29/2019    Type 2 diabetes mellitus with peripheral vascular disease     Type 2 diabetes mellitus with stage 3 chronic kidney disease, without long-term current use of insulin 2/1/2019     Type 2 diabetes mellitus without retinopathy 2/1/2019    Urinary incontinence        Past Surgical History:   Procedure Laterality Date    ANGIOPLASTY Bilateral 01/24/2014    aortoiliac stenting     CARPAL TUNNEL RELEASE  2003    [facility]    COLECTOMY  approximate 2005    pt states 1in colon -dx with benign mass removed-states no colon cancer    COLONOSCOPY N/A 11/9/2016    Procedure: COLONOSCOPY;  Surgeon: Dmitri Sterling MD;  Location: [facility];  Service: Endoscopy;  Laterality: N/A;    COLONOSCOPY N/A 11/15/2019    Procedure: COLONOSCOPY;  Surgeon: Marko Vicente MD;  Location: [facility] ENDO;  Service: Endoscopy;  Laterality: N/A;    COLONOSCOPY N/A 3/25/2022    Procedure: COLONOSCOPY;  Surgeon: Paola Feldman MD;  Location: [facility];  Service: Endoscopy;  Laterality: N/A;    ESOPHAGOGASTRODUODENOSCOPY N/A 3/25/2022    Procedure: EGD (ESOPHAGOGASTRODUODENOSCOPY);  Surgeon: Paola Feldman MD;  Location: [facility];  Service: Endoscopy;  Laterality: N/A;    HYSTERECTOMY      no cancer    INJECTION OF ANESTHETIC AGENT AROUND MULTIPLE INTERCOSTAL NERVES Left 11/19/2020    Procedure: BLOCK, NERVE, INTERCOSTAL, 2 OR MORE;  Surgeon: Amrit Flores MD;  Location: [facility] OR [address] [place] [address];  Service: Thoracic;  Laterality: Left;    INTRALUMINAL GASTROINTESTINAL TRACT IMAGING VIA CAPSULE N/A 11/8/2023    Procedure: IMAGING PROCEDURE, GI TRACT, INTRALUMINAL, VIA CAPSULE;  Surgeon: Seymour, First Available;  Location: [facility] ENDO;  Service: Endoscopy;  Laterality: N/A;    OOPHORECTOMY      SURGICAL REMOVAL OF LYMPH NODE Left 11/19/2020    Procedure: EXCISION, LYMPH NODE;  Surgeon: Amrit Flores MD;  [facility];  Service: Thoracic;  Laterality: Left;  mediastinal lymph node disection    XI ROBOTIC RATS,WITH LOBECTOMY,LUNG Left 11/19/2020    Procedure: XI ROBOTIC RATS,WITH LOBECTOMY,LUNG;  Surgeon: Amrit Flores MD;  [facility];  Service: Thoracic;  Laterality: Left;  Lingulectomy.        Social History     Tobacco Use    Smoking status: Former     Current packs/day: 0.00     Average packs/day: 1 pack/day for 60.0 years (60.0 ttl pk-yrs)     Types: Cigarettes     Start date: 1960     Quit date: 1/10/2020     Years since quittin.2    Smokeless tobacco: Former   Substance Use Topics    Alcohol use: No     Alcohol/week: 0.0 standard drinks of alcohol    Drug use: No       Family History   Problem Relation Name Age of Onset    Stroke Father      Stroke Sister      Asthma Daughter      Diabetes Daughter      Breast cancer Daughter      Asthma Son         Current Outpatient Medications   Medication Sig Dispense Refill    albuterol (PROVENTIL/VENTOLIN HFA) 90 mcg/actuation inhaler Inhale 2 puffs into the lungs every 4 (four) hours as needed for Wheezing or Shortness of Breath. 54 g 3    amLODIPine (NORVASC) 10 MG tablet Take 1 tablet (10 mg total) by mouth once daily. 90 tablet 3    butalbital-acetaminophen-caffeine -40 mg (FIORICET, ESGIC) -40 mg per tablet TAKE 1 TABLET BY MOUTH 3 (THREE) TIMES DAILY AS NEEDED FOR HEADACHES. (MAXIMUM OF 15 TABLETS PER MONTH) 30 tablet 3    calcium-vitamin D 600 mg-10 mcg (400 unit) Tab Take 2 tablets by mouth once daily. 180 tablet 4    cetirizine (ZYRTEC) 10 MG tablet Take 1 tablet (10 mg total) by mouth once daily. For sinus congestion 90 tablet 4    chlorthalidone (HYGROTEN) 25 MG Tab TAKE 1 TABLET TWICE DAILY 180 tablet 0    cilostazoL (PLETAL) 50 MG Tab TAKE 1 TABLET TWICE DAILY BEFORE MEALS 180 tablet 0    EPINEPHrine (EPIPEN) 0.3 mg/0.3 mL AtIn INJECT INTO THE MUSCLE ONE TIME FOR 1 DOSE, AS DIRECTED 2 each 3    ezetimibe (ZETIA) 10 mg tablet TAKE 1 TABLET EVERY DAY 90 tablet 0    famotidine (PEPCID) 40 MG tablet Take by mouth.      fexofenadine (ALLEGRA) 180 MG tablet TAKE 1 TABLET ONE TIME DAILY 90 tablet 3    fluticasone propionate (FLONASE) 50 mcg/actuation nasal spray 2 sprays (100 mcg total) by Each Nostril route once daily. 32 g 5     furosemide (LASIX) 20 MG tablet Take 1 tablet (20 mg total) by mouth once daily. (Patient taking differently: Take 20 mg by mouth daily as needed.) 90 tablet 3    ipratropium (ATROVENT) 21 mcg (0.03 %) nasal spray USE 2 SPRAYS IN EACH NOSTRIL THREE TIMES DAILY 90 mL 0    ipratropium-albuteroL (COMBIVENT)  mcg/actuation inhaler Inhale 2 puffs into the lungs every 4 (four) hours as needed for Wheezing or Shortness of Breath. Rescue 12 g 3    levocetirizine (XYZAL) 5 MG tablet Take 1 tablet (5 mg total) by mouth every evening. 30 tablet 11    olopatadine (PATANOL) 0.1 % ophthalmic solution Place 1 drop into both eyes 2 (two) times daily. 5 mL 2    ondansetron (ZOFRAN-ODT) 4 MG TbDL 1-2 tablets PO every 6 hours as needed for nausea/vomiting while completing bowel prep 4 tablet 0    pantoprazole (PROTONIX) 40 MG tablet TAKE 1 TABLET EVERY DAY 90 tablet 0    potassium chloride SA (K-DUR,KLOR-CON) 20 MEQ tablet TAKE 1 TABLET EVERY DAY 90 tablet 0    predniSONE (DELTASONE) 20 MG tablet Prednisone 60 mg/ day for 3 days, 40 mg/day for 3 days,20 mg/ day for 3 days, (1/2 tablet )10 mg a day for 3 days. (Patient taking differently: 3 (three) times daily as needed. Prednisone 60 mg/ day for 3 days, 40 mg/day for 3 days,20 mg/ day for 3 days, (1/2 tablet )10 mg a day for 3 days.) 20 tablet 0    rosuvastatin (CRESTOR) 20 MG tablet TAKE 1 TABLET EVERY EVENING 90 tablet 0    semaglutide (OZEMPIC) 0.25 mg or 0.5 mg (2 mg/3 mL) pen injector Inject 0.25 mg into the skin every 7 days. 4.5 mL 3    sod sulf-pot chloride-mag sulf (SUTAB) 1.479-0.188- 0.225 gram tablet Take 12 tablets by mouth once daily. Take according to package instructions with indicated amount of water. 24 tablet 0    valsartan (DIOVAN) 160 MG tablet Take 160 mg by mouth.      varicella-zoster gE-AS01B, PF, (SHINGRIX) 50 mcg/0.5 mL injection Inject 0.5 mL (one dose) into muscle now; give second dose at least 2 months later 0.5 mL 1    LEXISCAN 0.4 mg/5 mL Syrg         No current facility-administered medications for this visit.     Current Outpatient Medications   Medication Sig Dispense Refill    albuterol (PROVENTIL/VENTOLIN HFA) 90 mcg/actuation inhaler Inhale 2 puffs into the lungs every 4 (four) hours as needed for Wheezing or Shortness of Breath. 54 g 3    amLODIPine (NORVASC) 10 MG tablet Take 1 tablet (10 mg total) by mouth once daily. 90 tablet 3    butalbital-acetaminophen-caffeine -40 mg (FIORICET, ESGIC) -40 mg per tablet TAKE 1 TABLET BY MOUTH 3 (THREE) TIMES DAILY AS NEEDED FOR HEADACHES. (MAXIMUM OF 15 TABLETS PER MONTH) 30 tablet 3    calcium-vitamin D 600 mg-10 mcg (400 unit) Tab Take 2 tablets by mouth once daily. 180 tablet 4    cetirizine (ZYRTEC) 10 MG tablet Take 1 tablet (10 mg total) by mouth once daily. For sinus congestion 90 tablet 4    chlorthalidone (HYGROTEN) 25 MG Tab TAKE 1 TABLET TWICE DAILY 180 tablet 0    cilostazoL (PLETAL) 50 MG Tab TAKE 1 TABLET TWICE DAILY BEFORE MEALS 180 tablet 0    EPINEPHrine (EPIPEN) 0.3 mg/0.3 mL AtIn INJECT INTO THE MUSCLE ONE TIME FOR 1 DOSE, AS DIRECTED 2 each 3    ezetimibe (ZETIA) 10 mg tablet TAKE 1 TABLET EVERY DAY 90 tablet 0    famotidine (PEPCID) 40 MG tablet Take by mouth.      fexofenadine (ALLEGRA) 180 MG tablet TAKE 1 TABLET ONE TIME DAILY 90 tablet 3    fluticasone propionate (FLONASE) 50 mcg/actuation nasal spray 2 sprays (100 mcg total) by Each Nostril route once daily. 32 g 5    furosemide (LASIX) 20 MG tablet Take 1 tablet (20 mg total) by mouth once daily. (Patient taking differently: Take 20 mg by mouth daily as needed.) 90 tablet 3    ipratropium (ATROVENT) 21 mcg (0.03 %) nasal spray USE 2 SPRAYS IN EACH NOSTRIL THREE TIMES DAILY 90 mL 0    ipratropium-albuteroL (COMBIVENT)  mcg/actuation inhaler Inhale 2 puffs into the lungs every 4 (four) hours as needed for Wheezing or Shortness of Breath. Rescue 12 g 3    levocetirizine (XYZAL) 5 MG tablet Take 1 tablet (5 mg total) by  mouth every evening. 30 tablet 11    olopatadine (PATANOL) 0.1 % ophthalmic solution Place 1 drop into both eyes 2 (two) times daily. 5 mL 2    ondansetron (ZOFRAN-ODT) 4 MG TbDL 1-2 tablets PO every 6 hours as needed for nausea/vomiting while completing bowel prep 4 tablet 0    pantoprazole (PROTONIX) 40 MG tablet TAKE 1 TABLET EVERY DAY 90 tablet 0    potassium chloride SA (K-DUR,KLOR-CON) 20 MEQ tablet TAKE 1 TABLET EVERY DAY 90 tablet 0    predniSONE (DELTASONE) 20 MG tablet Prednisone 60 mg/ day for 3 days, 40 mg/day for 3 days,20 mg/ day for 3 days, (1/2 tablet )10 mg a day for 3 days. (Patient taking differently: 3 (three) times daily as needed. Prednisone 60 mg/ day for 3 days, 40 mg/day for 3 days,20 mg/ day for 3 days, (1/2 tablet )10 mg a day for 3 days.) 20 tablet 0    rosuvastatin (CRESTOR) 20 MG tablet TAKE 1 TABLET EVERY EVENING 90 tablet 0    semaglutide (OZEMPIC) 0.25 mg or 0.5 mg (2 mg/3 mL) pen injector Inject 0.25 mg into the skin every 7 days. 4.5 mL 3    sod sulf-pot chloride-mag sulf (SUTAB) 1.479-0.188- 0.225 gram tablet Take 12 tablets by mouth once daily. Take according to package instructions with indicated amount of water. 24 tablet 0    valsartan (DIOVAN) 160 MG tablet Take 160 mg by mouth.      varicella-zoster gE-AS01B, PF, (SHINGRIX) 50 mcg/0.5 mL injection Inject 0.5 mL (one dose) into muscle now; give second dose at least 2 months later 0.5 mL 1    LEXISCAN 0.4 mg/5 mL Syrg        No current facility-administered medications for this visit.     Review of patient's allergies indicates:   Allergen Reactions    Iodine and iodide containing products Swelling    Losartan Itching and Swelling     Very uncomfortable - high probability of allergic reaction due to swelling and itching    Skin staples [surgical stainless steel] Swelling    Egg derived      Shortness breath, lip swelling    Fish containing products     Jardiance [empagliflozin]     Latex, natural rubber Swelling    Nickel      Pravastatin      40 mg causes nausea vomitting but 20 mg ok    Shellfish containing products Swelling      Review of Systems   Constitutional: Negative for diaphoresis, malaise/fatigue and weight gain.   HENT:  Negative for hoarse voice.    Eyes:  Negative for double vision and visual disturbance.   Cardiovascular:  Negative for chest pain, claudication, cyanosis, dyspnea on exertion, irregular heartbeat, leg swelling, near-syncope, orthopnea, palpitations, paroxysmal nocturnal dyspnea and syncope.   Respiratory:  Negative for cough, hemoptysis, shortness of breath and snoring.    Hematologic/Lymphatic: Negative for bleeding problem. Does not bruise/bleed easily.   Skin:  Negative for color change and poor wound healing.   Musculoskeletal:  Negative for muscle cramps, muscle weakness and myalgias.   Gastrointestinal:  Negative for bloating, abdominal pain, change in bowel habit, diarrhea, heartburn, hematemesis, hematochezia, melena and nausea.   Neurological:  Negative for excessive daytime sleepiness, dizziness, headaches, light-headedness, loss of balance, numbness and weakness.   Psychiatric/Behavioral:  Negative for memory loss. The patient does not have insomnia.    Allergic/Immunologic: Negative for hives.       Objective:   Physical Exam  Vitals and nursing note reviewed.   Constitutional:       General: She is not in acute distress.     Appearance: Normal appearance. She is well-developed. She is not ill-appearing.   HENT:      Head: Normocephalic and atraumatic.   Eyes:      General: No scleral icterus.     Pupils: Pupils are equal, round, and reactive to light.   Neck:      Thyroid: No thyromegaly.      Vascular: Normal carotid pulses. No carotid bruit, hepatojugular reflux or JVD.      Trachea: No tracheal deviation.   Cardiovascular:      Rate and Rhythm: Normal rate and regular rhythm.      Pulses:           Carotid pulses are 2+ on the right side and 2+ on the left side.       Radial pulses are 2+ on  "the right side and 2+ on the left side.        Femoral pulses are 1+ on the right side with bruit and 1+ on the left side with bruit.       Popliteal pulses are 1+ on the right side and 1+ on the left side.        Dorsalis pedis pulses are 1+ on the right side and 1+ on the left side.        Posterior tibial pulses are 1+ on the right side and 1+ on the left side.      Heart sounds: Murmur heard.      Harsh midsystolic murmur is present with a grade of 2/6 at the upper right sternal border radiating to the neck.      High-pitched blowing decrescendo early diastolic murmur is present with a grade of 1/4 at the upper right sternal border radiating to the apex.      No friction rub. No gallop.   Pulmonary:      Effort: Pulmonary effort is normal. No respiratory distress.      Breath sounds: Normal breath sounds. No wheezing, rhonchi or rales.   Chest:      Chest wall: No tenderness.   Abdominal:      General: Bowel sounds are normal. There is no abdominal bruit.      Palpations: Abdomen is soft. There is no hepatomegaly or pulsatile mass.      Tenderness: There is no abdominal tenderness.   Musculoskeletal:      Right shoulder: No deformity.      Cervical back: Normal range of motion and neck supple.   Skin:     General: Skin is warm and dry.      Findings: No erythema or rash.      Nails: There is no clubbing.   Neurological:      Mental Status: She is alert and oriented to person, place, and time.      Cranial Nerves: No cranial nerve deficit.      Coordination: Coordination normal.   Psychiatric:         Speech: Speech normal.         Behavior: Behavior normal.       Vitals:    04/26/24 0903 04/26/24 0906   BP: (!) 124/56 120/62   BP Location: Right arm Left arm   Patient Position: Sitting Sitting   BP Method: Medium (Manual) Medium (Manual)   Pulse: 98    SpO2: 95%    Weight: 56.2 kg (123 lb 14.4 oz)    Height: 5' 1" (1.549 m)      Lab Results   Component Value Date    CHOL 146 06/28/2023    CHOL 131 01/04/2023    " CHOL 148 05/27/2022      Body mass index is 23.41 kg/m².   Lab Results   Component Value Date    HGBA1C 5.6 11/29/2023      BMP  Lab Results   Component Value Date     07/14/2023    K 4.2 07/14/2023     (H) 07/14/2023    CO2 23 07/14/2023    BUN 11 07/14/2023    CREATININE 1.2 07/14/2023    CALCIUM 9.1 07/14/2023    ANIONGAP 8 07/14/2023    EGFRNORACEVR 47.2 (A) 07/14/2023      Lab Results   Component Value Date    HDL 58 06/28/2023    HDL 61 01/04/2023    HDL 62 05/27/2022     Lab Results   Component Value Date    LDLCALC 74.2 06/28/2023    LDLCALC 58.6 (L) 01/04/2023    LDLCALC 68.6 05/27/2022     Lab Results   Component Value Date    TRIG 69 06/28/2023    TRIG 57 01/04/2023    TRIG 87 05/27/2022     Lab Results   Component Value Date    CHOLHDL 39.7 06/28/2023    CHOLHDL 46.6 01/04/2023    CHOLHDL 41.9 05/27/2022       Chemistry        Component Value Date/Time     07/14/2023 0918    K 4.2 07/14/2023 0918     (H) 07/14/2023 0918    CO2 23 07/14/2023 0918    BUN 11 07/14/2023 0918    CREATININE 1.2 07/14/2023 0918     (H) 07/14/2023 0918        Component Value Date/Time    CALCIUM 9.1 07/14/2023 0918    ALKPHOS 84 06/28/2023 0940    AST 20 06/28/2023 0940    ALT 11 06/28/2023 0940    BILITOT 0.2 06/28/2023 0940    ESTGFRAFRICA 51.4 (A) 05/27/2022 0711    ESTGFRAFRICA 51.4 (A) 05/27/2022 0711    EGFRNONAA 44.6 (A) 05/27/2022 0711    EGFRNONAA 44.6 (A) 05/27/2022 0711          Lab Results   Component Value Date    TSH 0.404 12/04/2020     Lab Results   Component Value Date    INR 1.0 07/14/2023    INR 0.9 10/21/2020    INR 0.9 08/10/2019     Lab Results   Component Value Date    WBC 5.53 11/29/2023    HGB 12.9 11/29/2023    HCT 40.6 11/29/2023    MCV 91 11/29/2023     11/29/2023     BMP  Sodium   Date Value Ref Range Status   07/14/2023 143 136 - 145 mmol/L Final     Potassium   Date Value Ref Range Status   07/14/2023 4.2 3.5 - 5.1 mmol/L Final     Chloride   Date Value Ref  Range Status   07/14/2023 112 (H) 95 - 110 mmol/L Final     CO2   Date Value Ref Range Status   07/14/2023 23 23 - 29 mmol/L Final     BUN   Date Value Ref Range Status   07/14/2023 11 8 - 23 mg/dL Final     Creatinine   Date Value Ref Range Status   07/14/2023 1.2 0.5 - 1.4 mg/dL Final     Calcium   Date Value Ref Range Status   07/14/2023 9.1 8.7 - 10.5 mg/dL Final     Anion Gap   Date Value Ref Range Status   07/14/2023 8 8 - 16 mmol/L Final     eGFR if    Date Value Ref Range Status   05/27/2022 51.4 (A) >60 mL/min/1.73 m^2 Final   05/27/2022 51.4 (A) >60 mL/min/1.73 m^2 Final     eGFR if non    Date Value Ref Range Status   05/27/2022 44.6 (A) >60 mL/min/1.73 m^2 Final     Comment:     Calculation used to obtain the estimated glomerular filtration  rate (eGFR) is the CKD-EPI equation.      05/27/2022 44.6 (A) >60 mL/min/1.73 m^2 Final     Comment:     Calculation used to obtain the estimated glomerular filtration  rate (eGFR) is the CKD-EPI equation.        CrCl cannot be calculated (Patient's most recent lab result is older than the maximum 7 days allowed.).    Assessment:     1. Dyslipidemia associated with type 2 diabetes mellitus    2. High pulmonary arterial pressure    3. Atherosclerosis of native artery of both lower extremities with intermittent claudication    4. Type 2 diabetes mellitus with peripheral vascular disease    5. Type 2 diabetes mellitus with stage 3a chronic kidney disease, without long-term current use of insulin    6. Former heavy cigarette smoker    7. Type 2 diabetes mellitus with microalbuminuria, without long-term current use of insulin    8. Chronic obstructive pulmonary disease, unspecified COPD type    9. Hypertension associated with diabetes    10. S/P peripheral artery angioplasty    11. Nonrheumatic aortic valve stenosis    12. Atherosclerosis of aorta    13. Coronary artery calcification    14. PVD (peripheral vascular disease)    15. Tortuous  aorta    16. Non-small cell cancer of left lung    17. IVANIA (obstructive sleep apnea)    18. Coronary artery disease involving native coronary artery of native heart without angina pectoris    19. Abnormal EKG    20. S/P lobectomy of lung-  Hx robotic lung lobectomy 11/19/2020    21. Shortness of breath    Diabetes well controlled A1c on target continue compliance   Htn controlled low salt diet emphasized continue same  Pvd no claudication she is exercising compliant on appropriate therapy still smoking intermittent counseled about cessation  Hlp on target stable continue the same.   As asymptomatic will continue clinical monitoring.  Cad asymptomatic continue rf modification      Plan:   Continue current therapy  Cardiac low salt diet.  Risk factor modification and excercise program.  Smoking cessation counseling  F/u in 6 months with lipid cmp a1c

## 2024-04-27 DIAGNOSIS — I27.21 HIGH PULMONARY ARTERIAL PRESSURE: Primary | Chronic | ICD-10-CM

## 2024-04-27 DIAGNOSIS — R80.9 TYPE 2 DIABETES MELLITUS WITH MICROALBUMINURIA, WITHOUT LONG-TERM CURRENT USE OF INSULIN: Chronic | ICD-10-CM

## 2024-04-27 DIAGNOSIS — I15.2 HYPERTENSION ASSOCIATED WITH DIABETES: ICD-10-CM

## 2024-04-27 DIAGNOSIS — B96.89 BACTERIAL SINUSITIS: ICD-10-CM

## 2024-04-27 DIAGNOSIS — E11.59 HYPERTENSION ASSOCIATED WITH DIABETES: ICD-10-CM

## 2024-04-27 DIAGNOSIS — N18.31 TYPE 2 DIABETES MELLITUS WITH STAGE 3A CHRONIC KIDNEY DISEASE, WITHOUT LONG-TERM CURRENT USE OF INSULIN: Chronic | ICD-10-CM

## 2024-04-27 DIAGNOSIS — E11.22 TYPE 2 DIABETES MELLITUS WITH STAGE 3A CHRONIC KIDNEY DISEASE, WITHOUT LONG-TERM CURRENT USE OF INSULIN: Chronic | ICD-10-CM

## 2024-04-27 DIAGNOSIS — J32.9 BACTERIAL SINUSITIS: ICD-10-CM

## 2024-04-27 DIAGNOSIS — E11.29 TYPE 2 DIABETES MELLITUS WITH MICROALBUMINURIA, WITHOUT LONG-TERM CURRENT USE OF INSULIN: Chronic | ICD-10-CM

## 2024-04-27 DIAGNOSIS — J30.1 SEASONAL ALLERGIC RHINITIS DUE TO POLLEN: ICD-10-CM

## 2024-04-27 NOTE — TELEPHONE ENCOUNTER
No care due was identified.  [facility] Embedded Care Due Messages. Reference number: [id number].   4/27/2024 2:39:37 AM CDT

## 2024-04-28 NOTE — TELEPHONE ENCOUNTER
Refill Routing Note   Medication(s) are not appropriate for processing by [facility] for the following reason(s):        No active prescription written by provider    [facility] action(s):  Defer        Medication Therapy Plan: D/C 5/5/23 due to side effects      Appointments  past 12m or future 3m with PCP    Date Provider   Last Visit   4/19/2023 ZELDA Sandoval MD   Next Visit   7/12/2024 ZELDA Sandoval MD   ED visits in past 90 days: 0        Note composed:3:44 AM 04/28/2024

## 2024-04-30 RX ORDER — FLUTICASONE PROPIONATE 50 MCG
2 SPRAY, SUSPENSION (ML) NASAL
Qty: 48 G | Refills: 3 | Status: SHIPPED | OUTPATIENT
Start: 2024-04-30 | End: 2024-05-01 | Stop reason: SDUPTHER

## 2024-05-01 ENCOUNTER — OFFICE VISIT (OUTPATIENT)
Dept: PULMONOLOGY | Facility: CLINIC | Age: 74
End: 2024-05-01
Payer: MEDICARE

## 2024-05-01 ENCOUNTER — PATIENT MESSAGE (OUTPATIENT)
Dept: INTERNAL MEDICINE | Facility: CLINIC | Age: 74
End: 2024-05-01
Payer: MEDICARE

## 2024-05-01 ENCOUNTER — HOSPITAL ENCOUNTER (OUTPATIENT)
Dept: RADIOLOGY | Facility: HOSPITAL | Age: 74
Discharge: HOME | End: 2024-05-01
Attending: INTERNAL MEDICINE
Payer: MEDICARE

## 2024-05-01 VITALS
WEIGHT: 121.69 LBS | SYSTOLIC BLOOD PRESSURE: 120 MMHG | BODY MASS INDEX: 22.98 KG/M2 | HEIGHT: 61 IN | DIASTOLIC BLOOD PRESSURE: 68 MMHG | HEART RATE: 86 BPM | RESPIRATION RATE: 18 BRPM | OXYGEN SATURATION: 98 %

## 2024-05-01 DIAGNOSIS — J44.9 CHRONIC OBSTRUCTIVE PULMONARY DISEASE, UNSPECIFIED COPD TYPE: Chronic | ICD-10-CM

## 2024-05-01 DIAGNOSIS — Z91.89 PULMONARY NODULE LESS THAN 6 MM IN DIAMETER WITH HIGH RISK FOR MALIGNANT NEOPLASM: ICD-10-CM

## 2024-05-01 DIAGNOSIS — J30.1 SEASONAL ALLERGIC RHINITIS DUE TO POLLEN: ICD-10-CM

## 2024-05-01 DIAGNOSIS — R91.8 LUNG NODULE, MULTIPLE: ICD-10-CM

## 2024-05-01 DIAGNOSIS — R09.02 EXERCISE HYPOXEMIA: ICD-10-CM

## 2024-05-01 DIAGNOSIS — R91.1 PULMONARY NODULE LESS THAN 6 MM IN DIAMETER WITH HIGH RISK FOR MALIGNANT NEOPLASM: ICD-10-CM

## 2024-05-01 DIAGNOSIS — J32.9 BACTERIAL SINUSITIS: ICD-10-CM

## 2024-05-01 DIAGNOSIS — R91.1 SOLITARY PULMONARY NODULE: ICD-10-CM

## 2024-05-01 DIAGNOSIS — R91.1 PULMONARY NODULE, LEFT: Chronic | ICD-10-CM

## 2024-05-01 DIAGNOSIS — B96.89 BACTERIAL SINUSITIS: ICD-10-CM

## 2024-05-01 DIAGNOSIS — C34.92 NON-SMALL CELL CANCER OF LEFT LUNG: Primary | ICD-10-CM

## 2024-05-01 PROCEDURE — 3074F SYST BP LT 130 MM HG: CPT | Mod: HCNC,CPTII,S$GLB, | Performed by: INTERNAL MEDICINE

## 2024-05-01 PROCEDURE — 1159F MED LIST DOCD IN RCRD: CPT | Mod: HCNC,CPTII,S$GLB, | Performed by: INTERNAL MEDICINE

## 2024-05-01 PROCEDURE — 3078F DIAST BP <80 MM HG: CPT | Mod: HCNC,CPTII,S$GLB, | Performed by: INTERNAL MEDICINE

## 2024-05-01 PROCEDURE — 1160F RVW MEDS BY RX/DR IN RCRD: CPT | Mod: HCNC,CPTII,S$GLB, | Performed by: INTERNAL MEDICINE

## 2024-05-01 PROCEDURE — 3072F LOW RISK FOR RETINOPATHY: CPT | Mod: HCNC,CPTII,S$GLB, | Performed by: INTERNAL MEDICINE

## 2024-05-01 PROCEDURE — 71250 CT THORAX DX C-: CPT | Mod: 26,HCNC,, | Performed by: RADIOLOGY

## 2024-05-01 PROCEDURE — 3288F FALL RISK ASSESSMENT DOCD: CPT | Mod: HCNC,CPTII,S$GLB, | Performed by: INTERNAL MEDICINE

## 2024-05-01 PROCEDURE — 71250 CT THORAX DX C-: CPT | Mod: TC,HCNC

## 2024-05-01 PROCEDURE — 99215 OFFICE O/P EST HI 40 MIN: CPT | Mod: HCNC,S$GLB,, | Performed by: INTERNAL MEDICINE

## 2024-05-01 PROCEDURE — 1101F PT FALLS ASSESS-DOCD LE1/YR: CPT | Mod: HCNC,CPTII,S$GLB, | Performed by: INTERNAL MEDICINE

## 2024-05-01 PROCEDURE — 99999 PR PBB SHADOW E&M-EST. PATIENT-LVL V: CPT | Mod: PBBFAC,HCNC,, | Performed by: INTERNAL MEDICINE

## 2024-05-01 RX ORDER — MONTELUKAST SODIUM 10 MG/1
10 TABLET ORAL NIGHTLY
Qty: 90 TABLET | Refills: 3 | Status: SHIPPED | OUTPATIENT
Start: 2024-05-01 | End: 2025-05-01

## 2024-05-01 RX ORDER — LEVOCETIRIZINE DIHYDROCHLORIDE 5 MG/1
5 TABLET, FILM COATED ORAL NIGHTLY
Qty: 90 TABLET | Refills: 3 | Status: SHIPPED | OUTPATIENT
Start: 2024-05-01 | End: 2025-05-01

## 2024-05-01 RX ORDER — PREDNISONE 20 MG/1
TABLET ORAL
Qty: 20 TABLET | Refills: 0 | Status: SHIPPED | OUTPATIENT
Start: 2024-05-01

## 2024-05-01 RX ORDER — FLUTICASONE PROPIONATE 50 MCG
2 SPRAY, SUSPENSION (ML) NASAL DAILY
Qty: 48 G | Refills: 3 | Status: SHIPPED | OUTPATIENT
Start: 2024-05-01

## 2024-05-01 RX ORDER — DOXYCYCLINE 100 MG/1
100 CAPSULE ORAL EVERY 12 HOURS
Qty: 20 CAPSULE | Refills: 0 | Status: SHIPPED | OUTPATIENT
Start: 2024-05-01

## 2024-05-01 NOTE — PROGRESS NOTES
Subjective:     Patient ID: Lucia Barlow is a 74 y.o. female.    Chief Complaint:  CT  scan review :Follow up for lung nodules    HPI   74 y.o. history of lung cancer, Chronic Obstructive Pulmonary Disease and sinusitis c/o post nasal drip and sinus congestion . Her to review CT of chest.  History of  left lower lobe nodule stabel on PET scan in past now to review CT of chest  S/p thoracotomy for left sided nonsmall cell lung cancer  History of lung cancer:1.5 x 1.5 x 1.5cm moderately differentiated NSCLC, favor adenosquamous    Lung Nodule  She presents for evaluation and treatment of a lung mass. The patient reports that the imaging was performed to evaluate symptoms of dyspnea on exertion, productive cough, shortness of breath and wheezing which have been present for 3 months and are unchanged. Symptoms are exacerbated by walking and relieved by rest. The patient denies other associated symptoms. She has a history of 60 pack years. The patient has no known exposure to tuberculosis. The patient does have a history of cancer.  Sinus Pain  Patient complains of clear rhinorrhea, congestion, cough, headaches, nasal congestion, post nasal drip, and sneezing. Onset of symptoms was 3 - 4 weeks ago. Symptoms have been gradually worsening since that time. She is drinking plenty of fluids.  Past history is significant for chronic bronchitis. Patient is former smoker, quit 2 years ago.      Past Medical History:   Diagnosis Date    Abnormal EKG 5/19/2023    Atherosclerosis of artery of both lower extremities 1/17/2014    Atherosclerosis of native coronary artery of native heart without angina pectoris 11/18/2016    Atherosclerotic PVD with intermittent claudication 1/17/2014    Chronic bronchitis     COPD (chronic obstructive pulmonary disease)     Coronary artery disease involving native coronary artery of native heart without angina pectoris 5/19/2023    Diabetes with neurologic complications     Dyslipidemia associated  with type 2 diabetes mellitus 6/14/2013    Dyslipidemia associated with type 2 diabetes mellitus     Ex-smoker     Gastroesophageal reflux disease without esophagitis 1/25/2019    Hyperlipidemia     Hypertension     Iron deficiency anemia secondary to inadequate dietary iron intake 6/3/2022    NSCLC of left lung 11/19/2020    IVANIA (obstructive sleep apnea) 2/11/2021    Osteoporosis 1/16 rosita 1/18    Pneumothorax after biopsy 10/21/2020    PVD (peripheral vascular disease)     Renal manifestation of secondary diabetes mellitus     S/P peripheral artery angioplasty 2/7/2014    Seasonal allergic rhinitis due to pollen     Shortness of breath 5/19/2023    Simple chronic bronchitis     Tobacco dependence     Type 2 diabetes mellitus with microalbuminuria, without long-term current use of insulin 3/29/2019    Type 2 diabetes mellitus with peripheral vascular disease     Type 2 diabetes mellitus with stage 3 chronic kidney disease, without long-term current use of insulin 2/1/2019    Type 2 diabetes mellitus without retinopathy 2/1/2019    Urinary incontinence      Past Surgical History:   Procedure Laterality Date    ANGIOPLASTY Bilateral 01/24/2014    aortoiliac stenting     CARPAL TUNNEL RELEASE  2003    [facility]    COLECTOMY  approximate 2005    pt states 1in colon -dx with benign mass removed-states no colon cancer    COLONOSCOPY N/A 11/9/2016    Procedure: COLONOSCOPY;  Surgeon: Dmitri Sterling MD;  Location[facility];  Service: Endoscopy;  Laterality: N/A;    COLONOSCOPY N/A 11/15/2019    Procedure: COLONOSCOPY;  Surgeon: Marko Vicente MD;  Location: [facility];  Service: Endoscopy;  Laterality: N/A;    COLONOSCOPY N/A 3/25/2022    Procedure: COLONOSCOPY;  Surgeon: Paola Feldman MD;  Location: [facility];  Service: Endoscopy;  Laterality: N/A;    ESOPHAGOGASTRODUODENOSCOPY N/A 3/25/2022    Procedure: EGD (ESOPHAGOGASTRODUODENOSCOPY);  Surgeon: Paola Feldman MD;  Location: [facility];  Service: Endoscopy;   Laterality: N/A;    HYSTERECTOMY      no cancer    INJECTION OF ANESTHETIC AGENT AROUND MULTIPLE INTERCOSTAL NERVES Left 11/19/2020    Procedure: BLOCK, NERVE, INTERCOSTAL, 2 OR MORE;  Surgeon: Amrit Flores MD;  Location: [facility] OR 2ND FLR;  Service: Thoracic;  Laterality: Left;    INTRALUMINAL GASTROINTESTINAL TRACT IMAGING VIA CAPSULE N/A 11/8/2023    Procedure: IMAGING PROCEDURE, GI TRACT, INTRALUMINAL, VIA CAPSULE;  Surgeon: [employer], First Available;  Location: [facility] ENDO;  Service: Endoscopy;  Laterality: N/A;    OOPHORECTOMY      SURGICAL REMOVAL OF LYMPH NODE Left 11/19/2020    Procedure: EXCISION, LYMPH NODE;  Surgeon: Amrit Flores MD;  Location: [facility] OR 2ND FLR;  Service: Thoracic;  Laterality: Left;  mediastinal lymph node disection    XI ROBOTIC RATS,WITH LOBECTOMY,LUNG Left 11/19/2020    Procedure: XI ROBOTIC RATS,WITH LOBECTOMY,LUNG;  Surgeon: Amrit Flores MD;  Location: [facility] OR [facility] FLR;  Service: Thoracic;  Laterality: Left;  Lingulectomy.     Review of patient's allergies indicates:   Allergen Reactions    Iodine and iodide containing products Swelling    Losartan Itching and Swelling     Very uncomfortable - high probability of allergic reaction due to swelling and itching    Skin staples [surgical stainless steel] Swelling    Egg derived      Shortness breath, lip swelling    Fish containing products     Jardiance [empagliflozin]     Latex, natural rubber Swelling    Nickel     Pravastatin      40 mg causes nausea vomitting but 20 mg ok    Shellfish containing products Swelling     Current Outpatient Medications on File Prior to Visit   Medication Sig Dispense Refill    amLODIPine (NORVASC) 10 MG tablet Take 1 tablet (10 mg total) by mouth once daily. 90 tablet 3    butalbital-acetaminophen-caffeine -40 mg (FIORICET, ESGIC) -40 mg per tablet TAKE 1 TABLET BY MOUTH 3 (THREE) TIMES DAILY AS NEEDED FOR HEADACHES. (MAXIMUM OF 15 TABLETS PER MONTH) 30 tablet 3     calcium-vitamin D 600 mg-10 mcg (400 unit) Tab Take 2 tablets by mouth once daily. 180 tablet 4    cetirizine (ZYRTEC) 10 MG tablet Take 1 tablet (10 mg total) by mouth once daily. For sinus congestion 90 tablet 4    chlorthalidone (HYGROTEN) 25 MG Tab TAKE 1 TABLET TWICE DAILY 180 tablet 0    cilostazoL (PLETAL) 50 MG Tab TAKE 1 TABLET TWICE DAILY BEFORE MEALS 180 tablet 0    EPINEPHrine (EPIPEN) 0.3 mg/0.3 mL AtIn INJECT INTO THE MUSCLE ONE TIME FOR 1 DOSE, AS DIRECTED 2 each 3    ezetimibe (ZETIA) 10 mg tablet TAKE 1 TABLET EVERY DAY 90 tablet 0    famotidine (PEPCID) 40 MG tablet Take by mouth.      furosemide (LASIX) 20 MG tablet Take 1 tablet (20 mg total) by mouth once daily. (Patient taking differently: Take 20 mg by mouth daily as needed.) 90 tablet 3    ipratropium (ATROVENT) 21 mcg (0.03 %) nasal spray USE 2 SPRAYS IN EACH NOSTRIL THREE TIMES DAILY 90 mL 0    LEXISCAN 0.4 mg/5 mL Syrg       olopatadine (PATANOL) 0.1 % ophthalmic solution Place 1 drop into both eyes 2 (two) times daily. 5 mL 2    ondansetron (ZOFRAN-ODT) 4 MG TbDL 1-2 tablets PO every 6 hours as needed for nausea/vomiting while completing bowel prep 4 tablet 0    pantoprazole (PROTONIX) 40 MG tablet TAKE 1 TABLET EVERY DAY 90 tablet 0    potassium chloride SA (K-DUR,KLOR-CON) 20 MEQ tablet TAKE 1 TABLET EVERY DAY 90 tablet 0    rosuvastatin (CRESTOR) 20 MG tablet TAKE 1 TABLET EVERY EVENING 90 tablet 0    semaglutide (OZEMPIC) 0.25 mg or 0.5 mg (2 mg/3 mL) pen injector Inject 0.25 mg into the skin every 7 days. 4.5 mL 3    sod sulf-pot chloride-mag sulf (SUTAB) 1.479-0.188- 0.225 gram tablet Take 12 tablets by mouth once daily. Take according to package instructions with indicated amount of water. 24 tablet 0    valsartan (DIOVAN) 160 MG tablet Take 160 mg by mouth.      varicella-zoster gE-AS01B, PF, (SHINGRIX) 50 mcg/0.5 mL injection Inject 0.5 mL (one dose) into muscle now; give second dose at least 2 months later 0.5 mL 1     [DISCONTINUED] albuterol (PROVENTIL/VENTOLIN HFA) 90 mcg/actuation inhaler Inhale 2 puffs into the lungs every 4 (four) hours as needed for Wheezing or Shortness of Breath. 54 g 3    [DISCONTINUED] fexofenadine (ALLEGRA) 180 MG tablet TAKE 1 TABLET ONE TIME DAILY 90 tablet 3    [DISCONTINUED] fluticasone propionate (FLONASE) 50 mcg/actuation nasal spray USE 2 SPRAYS IN EACH NOSTRIL ONE TIME DAILY 48 g 3    [DISCONTINUED] ipratropium-albuteroL (COMBIVENT)  mcg/actuation inhaler Inhale 2 puffs into the lungs every 4 (four) hours as needed for Wheezing or Shortness of Breath. Rescue 12 g 3    [DISCONTINUED] predniSONE (DELTASONE) 20 MG tablet Prednisone 60 mg/ day for 3 days, 40 mg/day for 3 days,20 mg/ day for 3 days, (1/2 tablet )10 mg a day for 3 days. (Patient taking differently: 3 (three) times daily as needed. Prednisone 60 mg/ day for 3 days, 40 mg/day for 3 days,20 mg/ day for 3 days, (1/2 tablet )10 mg a day for 3 days.) 20 tablet 0    [DISCONTINUED] levocetirizine (XYZAL) 5 MG tablet Take 1 tablet (5 mg total) by mouth every evening. 30 tablet 11    [DISCONTINUED] montelukast (SINGULAIR) 10 mg tablet Take 1 tablet (10 mg total) by mouth every evening. 90 tablet 3     No current facility-administered medications on file prior to visit.     Social History     Socioeconomic History    Marital status:     Number of children: 4   Occupational History    Occupation:    Tobacco Use    Smoking status: Former     Current packs/day: 0.00     Average packs/day: 1 pack/day for 60.0 years (60.0 ttl pk-yrs)     Types: Cigarettes     Start date: 1960     Quit date: 1/10/2020     Years since quittin.3    Smokeless tobacco: Former   Substance and Sexual Activity    Alcohol use: No     Alcohol/week: 0.0 standard drinks of alcohol    Drug use: No    Sexual activity: Never   Social History Narrative    Son smoker, no pets in household.     Social Determinants of Health     Financial Resource  Strain: High Risk (12/19/2023)    Overall Financial Resource Strain (CARDIA)     Difficulty of Paying Living Expenses: Very hard   Food Insecurity: Food Insecurity Present (12/19/2023)    Hunger Vital Sign     Worried About Running Out of Food in the Last Year: Often true     Ran Out of Food in the Last Year: Often true   Transportation Needs: No Transportation Needs (12/19/2023)    PRAPARE - Transportation     Lack of Transportation (Medical): No     Lack of Transportation (Non-Medical): No   Physical Activity: Inactive (12/19/2023)    Exercise Vital Sign     Days of Exercise per Week: 0 days     Minutes of Exercise per Session: 0 min   Stress: No Stress Concern Present (12/19/2023)    [employer] [place] - Occupational Stress Questionnaire     Feeling of Stress : Only a little   Housing Stability: Low Risk  (12/19/2023)    Housing Stability Vital Sign     Unable to Pay for Housing in the Last Year: No     Number of Places Lived in the Last Year: 1     Unstable Housing in the Last Year: No     Family History   Problem Relation Name Age of Onset    Stroke Father      Stroke Sister      Asthma Daughter      Diabetes Daughter      Breast cancer Daughter      Asthma Son         Review of Systems   Constitutional:  Positive for fatigue. Negative for fever.   HENT:  Positive for postnasal drip, rhinorrhea and congestion.    Eyes:  Negative for redness and itching.   Respiratory:  Positive for cough, sputum production, shortness of breath, dyspnea on extertion, use of rescue inhaler and Paroxysmal Nocturnal Dyspnea.    Cardiovascular:  Negative for chest pain, palpitations and leg swelling.   Genitourinary:  Negative for difficulty urinating and hematuria.   Endocrine:  Negative for cold intolerance and heat intolerance.    Skin:  Negative for rash.   Gastrointestinal:  Negative for nausea and abdominal pain.   Neurological:  Negative for dizziness, syncope, weakness and light-headedness.  "  Hematological:  Negative for adenopathy. Does not bruise/bleed easily.   Psychiatric/Behavioral:  Negative for sleep disturbance. The patient is not nervous/anxious.        Objective:      /68   Pulse 86   Resp 18   Ht 5' 1" (1.549 m)   Wt 55.2 kg (121 lb 11.1 oz)   SpO2 98%   BMI 22.99 kg/m²   Physical Exam  Vitals and nursing note reviewed.   Constitutional:       Appearance: She is well-developed.   HENT:      Head: Normocephalic and atraumatic.      Nose: Congestion and rhinorrhea present.      Mouth/Throat:      Pharynx: No oropharyngeal exudate.   Eyes:      Conjunctiva/sclera: Conjunctivae normal.      Pupils: Pupils are equal, round, and reactive to light.   Neck:      Thyroid: No thyromegaly.      Vascular: No JVD.      Trachea: No tracheal deviation.   Cardiovascular:      Rate and Rhythm: Normal rate and regular rhythm.      Heart sounds: Normal heart sounds.   Pulmonary:      Effort: Pulmonary effort is normal. No respiratory distress.      Breath sounds: Examination of the right-lower field reveals wheezing. Examination of the left-lower field reveals wheezing. Decreased breath sounds and wheezing present. No rhonchi or rales.   Chest:      Chest wall: No tenderness.   Abdominal:      General: Bowel sounds are normal.      Palpations: Abdomen is soft.   Musculoskeletal:         General: Swelling present. Normal range of motion.      Cervical back: Neck supple.      Right lower leg: Edema present.      Left lower leg: Edema present.      Comments: Swelling in arms and legs   Lymphadenopathy:      Cervical: No cervical adenopathy.   Skin:     General: Skin is warm and dry.   Neurological:      Mental Status: She is alert and oriented to person, place, and time.       Personal Diagnostic Review  none pertinent        5/1/2024    10:55 AM   Pulmonary Studies Review   SpO2 98 %   Height 5' 1" (1.549 m)   Weight 55.2 kg (121 lb 11.1 oz)   BMI (Calculated) 23   Predicted Distance 291.4 " "  Predicted Distance Meters (Calculated) 428.15 meters       X-Ray Abdomen AP 1 View  Narrative: EXAMINATION:  XR ABDOMEN AP 1 VIEW    CLINICAL HISTORY:  Ensure video capsule has passed out of small bowel and into colon;Foreign body of alimentary tract, part unspecified, initial encounter    TECHNIQUE:  Flat and erect AP views of the abdomen were performed.    COMPARISON:  None    FINDINGS:  Bowel gas pattern is nonobstructive.  Moderate to significant colonic fecal material present.  Arterial vascular calcifications present with aortic bifurcation/common iliac stents noted.  No suspicious mass-effect or calcifications present.  Osseous structures intact.  Lung bases clear.  Impression: Constipation.  No radiopaque foreign body visualized to suggest retained video capsule.    Electronically signed by: Karel Kaufman MD  Date:    11/29/2023  Time:    08:28      Office Spirometry Results:         5/1/2024    10:55 AM 4/26/2024     9:03 AM 3/1/2024     7:12 AM 2/13/2024     6:57 AM 1/24/2024     7:35 AM 12/20/2023    12:52 PM 10/6/2023     8:11 AM   Pulmonary Function Tests   SpO2 98 % 95 %   97 % 97 %    Height 5' 1" (1.549 m) 5' 1" (1.549 m) 5' 0.5" (1.537 m)  5' 1" (1.549 m) 5' 1" (1.549 m) 5' 1" (1.549 m)   Weight 55.2 kg (121 lb 11.1 oz) 56.2 kg (123 lb 14.4 oz) 58.2 kg (128 lb 4.9 oz) 59.6 kg (131 lb 6.3 oz) 60.8 kg (134 lb 0.6 oz) 58.5 kg (128 lb 15.5 oz) 59.6 kg (131 lb 6.3 oz)   BMI (Calculated) 23 23.4 24.6  25.3 24.4 24.8         5/1/2024    10:55 AM   Pulmonary Studies Review   SpO2 98 %   Height 5' 1" (1.549 m)   Weight 55.2 kg (121 lb 11.1 oz)   BMI (Calculated) 23   Predicted Distance 291.4   Predicted Distance Meters (Calculated) 428.15 meters         Assessment:              Non-small cell cancer of left lung    Seasonal allergic rhinitis due to pollen  -     fluticasone propionate (FLONASE) 50 mcg/actuation nasal spray; 2 sprays (100 mcg total) by Each Nostril route once daily.  Dispense: 48 g; " Refill: 3  -     predniSONE (DELTASONE) 20 MG tablet; Prednisone 60 mg/ day for 3 days, 40 mg/day for 3 days,20 mg/ day for 3 days, (1/2 tablet )10 mg a day for 3 days.  Dispense: 20 tablet; Refill: 0  -     doxycycline (MONODOX) 100 MG capsule; Take 1 capsule (100 mg total) by mouth every 12 (twelve) hours.  Dispense: 20 capsule; Refill: 0  -     levocetirizine (XYZAL) 5 MG tablet; Take 1 tablet (5 mg total) by mouth every evening.  Dispense: 90 tablet; Refill: 3  -     montelukast (SINGULAIR) 10 mg tablet; Take 1 tablet (10 mg total) by mouth every evening.  Dispense: 90 tablet; Refill: 3    Bacterial sinusitis  -     doxycycline (MONODOX) 100 MG capsule; Take 1 capsule (100 mg total) by mouth every 12 (twelve) hours.  Dispense: 20 capsule; Refill: 0    Chronic obstructive pulmonary disease, unspecified COPD type  -     ipratropium-albuteroL (COMBIVENT)  mcg/actuation inhaler; Inhale 2 puffs into the lungs every 4 (four) hours as needed for Wheezing or Shortness of Breath. Rescue  Dispense: 12 g; Refill: 3  -     Six Minute Walk Test to qualify for Home Oxygen; Future    Pulmonary nodule less than 6 mm in diameter with high risk for malignant neoplasm    Lung nodule, multiple  -     CT Chest Without Contrast; Future; Expected date: 05/01/2024    Exercise hypoxemia  -     Six Minute Walk Test to qualify for Home Oxygen; Future    Pulmonary nodule, left - 8 mm , high risk for lung cancer            Outpatient Encounter Medications as of 5/1/2024   Medication Sig Dispense Refill    amLODIPine (NORVASC) 10 MG tablet Take 1 tablet (10 mg total) by mouth once daily. 90 tablet 3    butalbital-acetaminophen-caffeine -40 mg (FIORICET, ESGIC) -40 mg per tablet TAKE 1 TABLET BY MOUTH 3 (THREE) TIMES DAILY AS NEEDED FOR HEADACHES. (MAXIMUM OF 15 TABLETS PER MONTH) 30 tablet 3    calcium-vitamin D 600 mg-10 mcg (400 unit) Tab Take 2 tablets by mouth once daily. 180 tablet 4    cetirizine (ZYRTEC) 10 MG tablet  Take 1 tablet (10 mg total) by mouth once daily. For sinus congestion 90 tablet 4    chlorthalidone (HYGROTEN) 25 MG Tab TAKE 1 TABLET TWICE DAILY 180 tablet 0    cilostazoL (PLETAL) 50 MG Tab TAKE 1 TABLET TWICE DAILY BEFORE MEALS 180 tablet 0    EPINEPHrine (EPIPEN) 0.3 mg/0.3 mL AtIn INJECT INTO THE MUSCLE ONE TIME FOR 1 DOSE, AS DIRECTED 2 each 3    ezetimibe (ZETIA) 10 mg tablet TAKE 1 TABLET EVERY DAY 90 tablet 0    famotidine (PEPCID) 40 MG tablet Take by mouth.      furosemide (LASIX) 20 MG tablet Take 1 tablet (20 mg total) by mouth once daily. (Patient taking differently: Take 20 mg by mouth daily as needed.) 90 tablet 3    ipratropium (ATROVENT) 21 mcg (0.03 %) nasal spray USE 2 SPRAYS IN EACH NOSTRIL THREE TIMES DAILY 90 mL 0    LEXISCAN 0.4 mg/5 mL Syrg       olopatadine (PATANOL) 0.1 % ophthalmic solution Place 1 drop into both eyes 2 (two) times daily. 5 mL 2    ondansetron (ZOFRAN-ODT) 4 MG TbDL 1-2 tablets PO every 6 hours as needed for nausea/vomiting while completing bowel prep 4 tablet 0    pantoprazole (PROTONIX) 40 MG tablet TAKE 1 TABLET EVERY DAY 90 tablet 0    potassium chloride SA (K-DUR,KLOR-CON) 20 MEQ tablet TAKE 1 TABLET EVERY DAY 90 tablet 0    rosuvastatin (CRESTOR) 20 MG tablet TAKE 1 TABLET EVERY EVENING 90 tablet 0    semaglutide (OZEMPIC) 0.25 mg or 0.5 mg (2 mg/3 mL) pen injector Inject 0.25 mg into the skin every 7 days. 4.5 mL 3    sod sulf-pot chloride-mag sulf (SUTAB) 1.479-0.188- 0.225 gram tablet Take 12 tablets by mouth once daily. Take according to package instructions with indicated amount of water. 24 tablet 0    valsartan (DIOVAN) 160 MG tablet Take 160 mg by mouth.      varicella-zoster gE-AS01B, PF, (SHINGRIX) 50 mcg/0.5 mL injection Inject 0.5 mL (one dose) into muscle now; give second dose at least 2 months later 0.5 mL 1    [DISCONTINUED] albuterol (PROVENTIL/VENTOLIN HFA) 90 mcg/actuation inhaler Inhale 2 puffs into the lungs every 4 (four) hours as needed for  Wheezing or Shortness of Breath. 54 g 3    [DISCONTINUED] fexofenadine (ALLEGRA) 180 MG tablet TAKE 1 TABLET ONE TIME DAILY 90 tablet 3    [DISCONTINUED] fluticasone propionate (FLONASE) 50 mcg/actuation nasal spray USE 2 SPRAYS IN EACH NOSTRIL ONE TIME DAILY 48 g 3    [DISCONTINUED] ipratropium-albuteroL (COMBIVENT)  mcg/actuation inhaler Inhale 2 puffs into the lungs every 4 (four) hours as needed for Wheezing or Shortness of Breath. Rescue 12 g 3    [DISCONTINUED] predniSONE (DELTASONE) 20 MG tablet Prednisone 60 mg/ day for 3 days, 40 mg/day for 3 days,20 mg/ day for 3 days, (1/2 tablet )10 mg a day for 3 days. (Patient taking differently: 3 (three) times daily as needed. Prednisone 60 mg/ day for 3 days, 40 mg/day for 3 days,20 mg/ day for 3 days, (1/2 tablet )10 mg a day for 3 days.) 20 tablet 0    doxycycline (MONODOX) 100 MG capsule Take 1 capsule (100 mg total) by mouth every 12 (twelve) hours. 20 capsule 0    fluticasone propionate (FLONASE) 50 mcg/actuation nasal spray 2 sprays (100 mcg total) by Each Nostril route once daily. 48 g 3    ipratropium-albuteroL (COMBIVENT)  mcg/actuation inhaler Inhale 2 puffs into the lungs every 4 (four) hours as needed for Wheezing or Shortness of Breath. Rescue 12 g 3    levocetirizine (XYZAL) 5 MG tablet Take 1 tablet (5 mg total) by mouth every evening. 90 tablet 3    montelukast (SINGULAIR) 10 mg tablet Take 1 tablet (10 mg total) by mouth every evening. 90 tablet 3    predniSONE (DELTASONE) 20 MG tablet Prednisone 60 mg/ day for 3 days, 40 mg/day for 3 days,20 mg/ day for 3 days, (1/2 tablet )10 mg a day for 3 days. 20 tablet 0    [DISCONTINUED] chlorthalidone (HYGROTEN) 25 MG Tab Take by mouth.      [DISCONTINUED] fluticasone propionate (FLONASE) 50 mcg/actuation nasal spray 2 sprays (100 mcg total) by Each Nostril route once daily. 32 g 5    [DISCONTINUED] levocetirizine (XYZAL) 5 MG tablet Take 1 tablet (5 mg total) by mouth every evening. 30 tablet 11     [DISCONTINUED] montelukast (SINGULAIR) 10 mg tablet Take 1 tablet (10 mg total) by mouth every evening. 90 tablet 3     No facility-administered encounter medications on file as of 2024.     Plan:       Requested Prescriptions     Signed Prescriptions Disp Refills    fluticasone propionate (FLONASE) 50 mcg/actuation nasal spray 48 g 3     Si sprays (100 mcg total) by Each Nostril route once daily.    predniSONE (DELTASONE) 20 MG tablet 20 tablet 0     Sig: Prednisone 60 mg/ day for 3 days, 40 mg/day for 3 days,20 mg/ day for 3 days, (1/2 tablet )10 mg a day for 3 days.    doxycycline (MONODOX) 100 MG capsule 20 capsule 0     Sig: Take 1 capsule (100 mg total) by mouth every 12 (twelve) hours.    ipratropium-albuteroL (COMBIVENT)  mcg/actuation inhaler 12 g 3     Sig: Inhale 2 puffs into the lungs every 4 (four) hours as needed for Wheezing or Shortness of Breath. Rescue    levocetirizine (XYZAL) 5 MG tablet 90 tablet 3     Sig: Take 1 tablet (5 mg total) by mouth every evening.    montelukast (SINGULAIR) 10 mg tablet 90 tablet 3     Sig: Take 1 tablet (10 mg total) by mouth every evening.     Problem List Items Addressed This Visit       Chronic obstructive pulmonary disease (Chronic)    Relevant Medications    ipratropium-albuteroL (COMBIVENT)  mcg/actuation inhaler    Other Relevant Orders    Six Minute Walk Test to qualify for Home Oxygen    Pulmonary nodule, left - 8 mm , high risk for lung cancer (Chronic)    Non-small cell cancer of left lung - Primary    Overview     S/P robotic left lobectomy in . Did not require chemotherapy or radiation. Following with Dr. Aleman.  Pathology: 1.5 x 1.5 x 1.5cm moderately differentiated NSCLC, favor adenosquamous. Margin negative,3cm to closet margin. Levels 5,6,7,11,12= negative. pT1b N0. 1% PDL1 expression  Post-operative therapy: Surveillance         Pulmonary nodule less than 6 mm in diameter with high risk for malignant neoplasm -  RIght lower lobe    Overview     Impression:     Postsurgical change of lingulectomy, stable.     Single new subsolid 4 mm right lower lobe pulmonary nodule.  Remaining pulmonary nodules appear stable.  Clinical concerns dictate the schedule for continued surveillance.     Diffuse mural thickening along the greater curvature of the stomach, nonspecific and possibly related to nondistention.  Underlying mass lesion cannot be excluded.        Electronically signed by: Cristina Peoples  Date:                                            11/02/2021  Time:                                           19:34         Seasonal allergic rhinitis due to pollen    Relevant Medications    fluticasone propionate (FLONASE) 50 mcg/actuation nasal spray    predniSONE (DELTASONE) 20 MG tablet    doxycycline (MONODOX) 100 MG capsule    levocetirizine (XYZAL) 5 MG tablet    montelukast (SINGULAIR) 10 mg tablet     Other Visit Diagnoses       Bacterial sinusitis        Relevant Medications    doxycycline (MONODOX) 100 MG capsule    Lung nodule, multiple        Relevant Orders    CT Chest Without Contrast    Exercise hypoxemia        Relevant Orders    Six Minute Walk Test to qualify for Home Oxygen               Follow up in about 1 year (around 5/1/2025) for CT - on return visit, Follow up with NP, 6 min walk - on return.    MEDICAL DECISION MAKING: Moderate to high complexity.  Overall, the multiple problems listed are of moderate to high severity that may impact quality of life and activities of daily living. Side effects of medications, treatment plan as well as options and alternatives reviewed and discussed with patient. There was counseling of patient concerning these issues.    Total time spent in counseling and coordination of care - 40  minutes of total time spent on the encounter, which includes face to face time and non-face to face time preparing to see the patient (eg, review of tests), Obtaining and/or reviewing separately  obtained history, Documenting clinical information in the electronic or other health record, Independently interpreting results (not separately reported) and communicating results to the patient/family/caregiver, or Care coordination (not separately reported).    Time was used in discussion of prognosis, risks, benefits of treatment, instructions and compliance with regimen . Discussion with other physicians and/or health care providers - home health or for use of durable medical equipment (oxygen, nebulizers, CPAP, BiPAP) occurred.

## 2024-05-08 RX ORDER — VALSARTAN 160 MG/1
160 TABLET ORAL DAILY
Qty: 90 TABLET | Refills: 3 | Status: SHIPPED | OUTPATIENT
Start: 2024-05-08

## 2024-05-08 RX ORDER — VALSARTAN 160 MG/1
160 TABLET ORAL
Qty: 90 TABLET | Refills: 3 | OUTPATIENT
Start: 2024-05-08

## 2024-05-08 NOTE — TELEPHONE ENCOUNTER
REFILL REQUEST APPROVED.  Requested Prescriptions   Pending Prescriptions Disp Refills    valsartan (DIOVAN) 160 MG tablet 90 tablet 3     Sig: Take 1 tablet (160 mg total) by mouth once daily.   Refused Prescriptions Disp Refills    valsartan (DIOVAN) 160 MG tablet [Pharmacy Med Name: VALSARTAN 160 MG Tablet] 90 tablet 3     Sig: TAKE 1 TABLET ONE TIME DAILY     Refused By: ZELDA ADAME     Reason for Refusal: Refill not appropriate

## 2024-05-08 NOTE — TELEPHONE ENCOUNTER
REFILL NOT APPROVED.  REASON: Allergies include losartan (same class as valsartan).    TO MY TEAM:   Please clarify whether or not she has been actively taking valsartan and tolerating it well without adverse effect.  If so, resubmit refill request with comments to that effect.  If she indicates that valsartan is not one of her active medicines and she has not been taking that medicine (or if she recalls adverse reaction to valsartan), then please flag Valsartan for removal from med list.    Review of patient's allergies indicates:   Allergen Reactions    Iodine and iodide containing products Swelling    Losartan Itching and Swelling     Very uncomfortable - high probability of allergic reaction due to swelling and itching    Skin staples [surgical stainless steel] Swelling    Egg derived      Shortness breath, lip swelling    Fish containing products     Jardiance [empagliflozin]     Latex, natural rubber Swelling    Nickel     Pravastatin      40 mg causes nausea vomitting but 20 mg ok    Shellfish containing products Swelling

## 2024-05-09 DIAGNOSIS — I70.213 ATHEROSCLEROSIS OF NATIVE ARTERY OF BOTH LOWER EXTREMITIES WITH INTERMITTENT CLAUDICATION: Chronic | ICD-10-CM

## 2024-05-09 DIAGNOSIS — I25.10 ATHEROSCLEROSIS OF NATIVE CORONARY ARTERY OF NATIVE HEART WITHOUT ANGINA PECTORIS: ICD-10-CM

## 2024-05-09 DIAGNOSIS — I77.1 TORTUOUS AORTA: ICD-10-CM

## 2024-05-10 ENCOUNTER — PATIENT MESSAGE (OUTPATIENT)
Dept: INTERNAL MEDICINE | Facility: CLINIC | Age: 74
End: 2024-05-10
Payer: MEDICARE

## 2024-05-10 RX ORDER — ASPIRIN 81 MG/1
81 TABLET ORAL
Qty: 90 TABLET | Refills: 3 | OUTPATIENT
Start: 2024-05-10

## 2024-05-10 NOTE — TELEPHONE ENCOUNTER
Refill Routing Note   Refill Routing Note   Medication(s) are not appropriate for processing by [facility] for the following reason(s):        Outside of protocol    [facility] action(s):  Route             Appointments  past 12m or future 3m with PCP    Date Provider   Last Visit   4/19/2023 ZELDA Sandoval MD   Next Visit   7/12/2024 ZELDA Sandoval MD   ED visits in past 90 days: 0        Note composed:8:05 AM 05/10/2024

## 2024-05-13 DIAGNOSIS — J30.1 SEASONAL ALLERGIC RHINITIS DUE TO POLLEN: ICD-10-CM

## 2024-05-13 NOTE — TELEPHONE ENCOUNTER
No care due was identified.  [facility] Embedded Care Due Messages. Reference number: [id number].   5/13/2024 4:02:46 PM CDT

## 2024-05-14 RX ORDER — IPRATROPIUM BROMIDE 21 UG/1
2 SPRAY, METERED NASAL 3 TIMES DAILY
Qty: 90 ML | Refills: 0 | Status: SHIPPED | OUTPATIENT
Start: 2024-05-14

## 2024-05-14 NOTE — TELEPHONE ENCOUNTER
Refill Decision Note   Lucia Barlow  is requesting a refill authorization.  Brief Assessment and Rationale for Refill:  Approve     Medication Therapy Plan:         Comments:     Note composed:2:40 AM 05/14/2024

## 2024-05-23 DIAGNOSIS — I25.10 ATHEROSCLEROSIS OF NATIVE CORONARY ARTERY OF NATIVE HEART WITHOUT ANGINA PECTORIS: ICD-10-CM

## 2024-05-23 DIAGNOSIS — E78.5 DYSLIPIDEMIA ASSOCIATED WITH TYPE 2 DIABETES MELLITUS: Chronic | ICD-10-CM

## 2024-05-23 DIAGNOSIS — I77.1 TORTUOUS AORTA: ICD-10-CM

## 2024-05-23 DIAGNOSIS — E11.69 DYSLIPIDEMIA ASSOCIATED WITH TYPE 2 DIABETES MELLITUS: Chronic | ICD-10-CM

## 2024-05-23 DIAGNOSIS — I70.213 ATHEROSCLEROSIS OF NATIVE ARTERY OF BOTH LOWER EXTREMITIES WITH INTERMITTENT CLAUDICATION: Chronic | ICD-10-CM

## 2024-05-23 RX ORDER — ROSUVASTATIN CALCIUM 20 MG/1
20 TABLET, COATED ORAL NIGHTLY
Qty: 90 TABLET | Refills: 0 | Status: SHIPPED | OUTPATIENT
Start: 2024-05-23

## 2024-05-23 NOTE — TELEPHONE ENCOUNTER
No care due was identified.  [facility] Embedded Care Due Messages. Reference number: [id number].   5/23/2024 2:21:37 AM CDT

## 2024-05-23 NOTE — TELEPHONE ENCOUNTER
Refill Decision Note   Lucia Cain  is requesting a refill authorization.  Brief Assessment and Rationale for Refill:  Approve     Medication Therapy Plan:         Alert overridden per protocol: Yes   Comments:     Note composed:10:32 AM 05/23/2024

## 2024-06-13 DIAGNOSIS — I25.10 ATHEROSCLEROSIS OF NATIVE CORONARY ARTERY OF NATIVE HEART WITHOUT ANGINA PECTORIS: ICD-10-CM

## 2024-06-13 DIAGNOSIS — R80.9 TYPE 2 DIABETES MELLITUS WITH MICROALBUMINURIA, WITHOUT LONG-TERM CURRENT USE OF INSULIN: ICD-10-CM

## 2024-06-13 DIAGNOSIS — I70.213 ATHEROSCLEROSIS OF NATIVE ARTERY OF BOTH LOWER EXTREMITIES WITH INTERMITTENT CLAUDICATION: Chronic | ICD-10-CM

## 2024-06-13 DIAGNOSIS — E11.69 DYSLIPIDEMIA ASSOCIATED WITH TYPE 2 DIABETES MELLITUS: Chronic | ICD-10-CM

## 2024-06-13 DIAGNOSIS — E11.29 TYPE 2 DIABETES MELLITUS WITH MICROALBUMINURIA, WITHOUT LONG-TERM CURRENT USE OF INSULIN: ICD-10-CM

## 2024-06-13 DIAGNOSIS — I77.1 TORTUOUS AORTA: ICD-10-CM

## 2024-06-13 DIAGNOSIS — E78.5 DYSLIPIDEMIA ASSOCIATED WITH TYPE 2 DIABETES MELLITUS: Chronic | ICD-10-CM

## 2024-06-13 DIAGNOSIS — K21.9 GASTROESOPHAGEAL REFLUX DISEASE WITHOUT ESOPHAGITIS: Chronic | ICD-10-CM

## 2024-06-13 RX ORDER — CHLORTHALIDONE 25 MG/1
25 TABLET ORAL 2 TIMES DAILY
Qty: 180 TABLET | Refills: 0 | Status: SHIPPED | OUTPATIENT
Start: 2024-06-13

## 2024-06-13 RX ORDER — POTASSIUM CHLORIDE 20 MEQ/1
20 TABLET, EXTENDED RELEASE ORAL
Qty: 90 TABLET | Refills: 0 | Status: SHIPPED | OUTPATIENT
Start: 2024-06-13

## 2024-06-13 RX ORDER — EZETIMIBE 10 MG/1
10 TABLET ORAL
Qty: 90 TABLET | Refills: 0 | Status: SHIPPED | OUTPATIENT
Start: 2024-06-13

## 2024-06-13 RX ORDER — PANTOPRAZOLE SODIUM 40 MG/1
40 TABLET, DELAYED RELEASE ORAL
Qty: 90 TABLET | Refills: 0 | Status: SHIPPED | OUTPATIENT
Start: 2024-06-13

## 2024-06-13 RX ORDER — CILOSTAZOL 50 MG/1
50 TABLET ORAL
Qty: 180 TABLET | Refills: 0 | Status: SHIPPED | OUTPATIENT
Start: 2024-06-13

## 2024-06-13 NOTE — TELEPHONE ENCOUNTER
Care Due:                  Date            Visit Type   Department     Provider  --------------------------------------------------------------------------------                                EP -                              PRIMARY      [facility] INTERNAL  Last Visit: 04-      CARE ([facility])   CECILIA Sandoval                              EP -                              PRIMARY      [facility] INTERNAL  Next Visit: 07-      CARE ([facility])   CECILIA Sandoval                                                            Last  Test          Frequency    Reason                     Performed    Due Date  --------------------------------------------------------------------------------    CMP.........  12 months..  chlorthalidone,            06- 06-                             ezetimibe, potassium,                             rosuvastatin, valsartan..    Lipid Panel.  12 months..  ezetimibe, rosuvastatin..  06- 06-    Health [facility] Embedded Care Due Messages. Reference number: [id number].   6/13/2024 1:26:19 AM CDT

## 2024-06-13 NOTE — TELEPHONE ENCOUNTER
REFILL APPROVED. Will address further refills at upcoming appointment with me listed below.  #LMRX   --------------------------------  Future Appointments   Date Time Provider Department Center   6/26/2024  7:30 AM [facility] [id number]-SCR [facility] MAMMO [facility]   7/12/2024  8:00 AM ZELDA Sandoval MD [facility]   7/17/2024  7:35 AM LABORATORY, [facility] LAB [facility]   10/23/2024  8:00 AM LABORATORY, [facility] LAB [facility]   11/1/2024  9:20 AM Amalia Rosas MD [facility]

## 2024-06-13 NOTE — TELEPHONE ENCOUNTER
Refill Routing Note   Medication(s) are not appropriate for processing by [facility] for the following reason(s):        Outside of protocol: cilostazol  Drug-drug interaction: Duplicate Therapy: famotidine, pantoprazole   New or recently adjusted medication: chlorthalidone, less than 90 days under signature or responsible physician    [facility] action(s):  Route  Defer   Requires labs : Yes             Appointments  past 12m or future 3m with PCP    Date Provider   Last Visit   4/19/2023 ZELDA Sandoval MD   Next Visit   7/12/2024 ZELDA Sandoval MD   ED visits in past 90 days: 0        Note composed:4:19 AM 06/13/2024

## 2024-06-26 ENCOUNTER — HOSPITAL ENCOUNTER (OUTPATIENT)
Dept: RADIOLOGY | Facility: HOSPITAL | Age: 74
Discharge: HOME | End: 2024-06-26
Attending: FAMILY MEDICINE
Payer: MEDICARE

## 2024-06-26 VITALS — HEIGHT: 61 IN | WEIGHT: 121.69 LBS | BODY MASS INDEX: 22.98 KG/M2

## 2024-06-26 DIAGNOSIS — Z12.31 ENCOUNTER FOR SCREENING MAMMOGRAM FOR BREAST CANCER: ICD-10-CM

## 2024-06-26 PROCEDURE — 77063 BREAST TOMOSYNTHESIS BI: CPT | Mod: 26,HCNC,, | Performed by: RADIOLOGY

## 2024-06-26 PROCEDURE — 77067 SCR MAMMO BI INCL CAD: CPT | Mod: 26,HCNC,, | Performed by: RADIOLOGY

## 2024-06-26 PROCEDURE — 77067 SCR MAMMO BI INCL CAD: CPT | Mod: TC,HCNC

## 2024-07-12 ENCOUNTER — LAB VISIT (OUTPATIENT)
Dept: LAB | Facility: HOSPITAL | Age: 74
End: 2024-07-12
Attending: FAMILY MEDICINE
Payer: MEDICARE

## 2024-07-12 ENCOUNTER — OFFICE VISIT (OUTPATIENT)
Dept: INTERNAL MEDICINE | Facility: CLINIC | Age: 74
End: 2024-07-12
Payer: MEDICARE

## 2024-07-12 VITALS
WEIGHT: 126.13 LBS | SYSTOLIC BLOOD PRESSURE: 118 MMHG | TEMPERATURE: 96 F | DIASTOLIC BLOOD PRESSURE: 72 MMHG | RESPIRATION RATE: 18 BRPM | BODY MASS INDEX: 23.81 KG/M2 | HEART RATE: 80 BPM | HEIGHT: 61 IN | OXYGEN SATURATION: 100 %

## 2024-07-12 DIAGNOSIS — I27.21 HIGH PULMONARY ARTERIAL PRESSURE: Chronic | ICD-10-CM

## 2024-07-12 DIAGNOSIS — E11.9 DIABETIC EYE EXAM: ICD-10-CM

## 2024-07-12 DIAGNOSIS — N18.31 TYPE 2 DIABETES MELLITUS WITH STAGE 3A CHRONIC KIDNEY DISEASE, WITHOUT LONG-TERM CURRENT USE OF INSULIN: Primary | Chronic | ICD-10-CM

## 2024-07-12 DIAGNOSIS — I70.213 ATHEROSCLEROSIS OF NATIVE ARTERY OF BOTH LOWER EXTREMITIES WITH INTERMITTENT CLAUDICATION: Chronic | ICD-10-CM

## 2024-07-12 DIAGNOSIS — D50.8 IRON DEFICIENCY ANEMIA SECONDARY TO INADEQUATE DIETARY IRON INTAKE: Chronic | ICD-10-CM

## 2024-07-12 DIAGNOSIS — E11.22 TYPE 2 DIABETES MELLITUS WITH STAGE 3A CHRONIC KIDNEY DISEASE, WITHOUT LONG-TERM CURRENT USE OF INSULIN: Primary | Chronic | ICD-10-CM

## 2024-07-12 DIAGNOSIS — I77.1 TORTUOUS AORTA: Chronic | ICD-10-CM

## 2024-07-12 DIAGNOSIS — I25.10 ATHEROSCLEROSIS OF NATIVE CORONARY ARTERY OF NATIVE HEART WITHOUT ANGINA PECTORIS: Chronic | ICD-10-CM

## 2024-07-12 DIAGNOSIS — K21.9 GASTROESOPHAGEAL REFLUX DISEASE WITHOUT ESOPHAGITIS: Chronic | ICD-10-CM

## 2024-07-12 DIAGNOSIS — R80.9 TYPE 2 DIABETES MELLITUS WITH MICROALBUMINURIA, WITHOUT LONG-TERM CURRENT USE OF INSULIN: ICD-10-CM

## 2024-07-12 DIAGNOSIS — E11.69 DYSLIPIDEMIA ASSOCIATED WITH TYPE 2 DIABETES MELLITUS: Chronic | ICD-10-CM

## 2024-07-12 DIAGNOSIS — I15.2 HYPERTENSION ASSOCIATED WITH DIABETES: Chronic | ICD-10-CM

## 2024-07-12 DIAGNOSIS — N18.31 TYPE 2 DIABETES MELLITUS WITH STAGE 3A CHRONIC KIDNEY DISEASE, WITHOUT LONG-TERM CURRENT USE OF INSULIN: Chronic | ICD-10-CM

## 2024-07-12 DIAGNOSIS — R80.9 TYPE 2 DIABETES MELLITUS WITH MICROALBUMINURIA, WITHOUT LONG-TERM CURRENT USE OF INSULIN: Chronic | ICD-10-CM

## 2024-07-12 DIAGNOSIS — E11.59 HYPERTENSION ASSOCIATED WITH DIABETES: Chronic | ICD-10-CM

## 2024-07-12 DIAGNOSIS — E11.22 TYPE 2 DIABETES MELLITUS WITH STAGE 3A CHRONIC KIDNEY DISEASE, WITHOUT LONG-TERM CURRENT USE OF INSULIN: Chronic | ICD-10-CM

## 2024-07-12 DIAGNOSIS — E78.5 DYSLIPIDEMIA ASSOCIATED WITH TYPE 2 DIABETES MELLITUS: Chronic | ICD-10-CM

## 2024-07-12 DIAGNOSIS — E11.51 TYPE 2 DIABETES MELLITUS WITH PERIPHERAL VASCULAR DISEASE: Chronic | ICD-10-CM

## 2024-07-12 DIAGNOSIS — E11.29 TYPE 2 DIABETES MELLITUS WITH MICROALBUMINURIA, WITHOUT LONG-TERM CURRENT USE OF INSULIN: ICD-10-CM

## 2024-07-12 DIAGNOSIS — E11.29 TYPE 2 DIABETES MELLITUS WITH MICROALBUMINURIA, WITHOUT LONG-TERM CURRENT USE OF INSULIN: Chronic | ICD-10-CM

## 2024-07-12 DIAGNOSIS — Z01.00 DIABETIC EYE EXAM: ICD-10-CM

## 2024-07-12 PROCEDURE — 82728 ASSAY OF FERRITIN: CPT | Mod: HCNC | Performed by: FAMILY MEDICINE

## 2024-07-12 PROCEDURE — 99214 OFFICE O/P EST MOD 30 MIN: CPT | Mod: HCNC,S$GLB,, | Performed by: FAMILY MEDICINE

## 2024-07-12 PROCEDURE — 1101F PT FALLS ASSESS-DOCD LE1/YR: CPT | Mod: HCNC,CPTII,S$GLB, | Performed by: FAMILY MEDICINE

## 2024-07-12 PROCEDURE — 80053 COMPREHEN METABOLIC PANEL: CPT | Mod: HCNC | Performed by: FAMILY MEDICINE

## 2024-07-12 PROCEDURE — 1126F AMNT PAIN NOTED NONE PRSNT: CPT | Mod: HCNC,CPTII,S$GLB, | Performed by: FAMILY MEDICINE

## 2024-07-12 PROCEDURE — 1159F MED LIST DOCD IN RCRD: CPT | Mod: HCNC,CPTII,S$GLB, | Performed by: FAMILY MEDICINE

## 2024-07-12 PROCEDURE — G2211 COMPLEX E/M VISIT ADD ON: HCPCS | Mod: HCNC,S$GLB,, | Performed by: FAMILY MEDICINE

## 2024-07-12 PROCEDURE — 3078F DIAST BP <80 MM HG: CPT | Mod: HCNC,CPTII,S$GLB, | Performed by: FAMILY MEDICINE

## 2024-07-12 PROCEDURE — 83036 HEMOGLOBIN GLYCOSYLATED A1C: CPT | Mod: HCNC | Performed by: FAMILY MEDICINE

## 2024-07-12 PROCEDURE — 3074F SYST BP LT 130 MM HG: CPT | Mod: HCNC,CPTII,S$GLB, | Performed by: FAMILY MEDICINE

## 2024-07-12 PROCEDURE — 85025 COMPLETE CBC W/AUTO DIFF WBC: CPT | Mod: HCNC | Performed by: FAMILY MEDICINE

## 2024-07-12 PROCEDURE — 36415 COLL VENOUS BLD VENIPUNCTURE: CPT | Mod: HCNC | Performed by: FAMILY MEDICINE

## 2024-07-12 PROCEDURE — 3288F FALL RISK ASSESSMENT DOCD: CPT | Mod: HCNC,CPTII,S$GLB, | Performed by: FAMILY MEDICINE

## 2024-07-12 PROCEDURE — 80061 LIPID PANEL: CPT | Mod: HCNC | Performed by: FAMILY MEDICINE

## 2024-07-12 PROCEDURE — 99999 PR PBB SHADOW E&M-EST. PATIENT-LVL V: CPT | Mod: PBBFAC,HCNC,, | Performed by: FAMILY MEDICINE

## 2024-07-12 PROCEDURE — 1160F RVW MEDS BY RX/DR IN RCRD: CPT | Mod: HCNC,CPTII,S$GLB, | Performed by: FAMILY MEDICINE

## 2024-07-12 PROCEDURE — 84466 ASSAY OF TRANSFERRIN: CPT | Mod: HCNC | Performed by: FAMILY MEDICINE

## 2024-07-12 PROCEDURE — 3072F LOW RISK FOR RETINOPATHY: CPT | Mod: HCNC,CPTII,S$GLB, | Performed by: FAMILY MEDICINE

## 2024-07-12 RX ORDER — CHLORTHALIDONE 25 MG/1
25 TABLET ORAL 2 TIMES DAILY
Qty: 180 TABLET | Refills: 3 | Status: SHIPPED | OUTPATIENT
Start: 2024-07-12

## 2024-07-12 RX ORDER — PANTOPRAZOLE SODIUM 40 MG/1
40 TABLET, DELAYED RELEASE ORAL EVERY MORNING
Qty: 90 TABLET | Refills: 3 | Status: SHIPPED | OUTPATIENT
Start: 2024-07-12

## 2024-07-12 RX ORDER — POTASSIUM CHLORIDE 20 MEQ/1
20 TABLET, EXTENDED RELEASE ORAL DAILY
Qty: 90 TABLET | Refills: 3 | Status: SHIPPED | OUTPATIENT
Start: 2024-07-12

## 2024-07-12 RX ORDER — SEMAGLUTIDE 0.68 MG/ML
0.25 INJECTION, SOLUTION SUBCUTANEOUS
Qty: 3 ML | Refills: 3 | Status: SHIPPED | OUTPATIENT
Start: 2024-07-12

## 2024-07-12 RX ORDER — ROSUVASTATIN CALCIUM 20 MG/1
20 TABLET, COATED ORAL NIGHTLY
Qty: 90 TABLET | Refills: 3 | Status: SHIPPED | OUTPATIENT
Start: 2024-07-12

## 2024-07-12 RX ORDER — VALSARTAN 160 MG/1
160 TABLET ORAL DAILY
Qty: 90 TABLET | Refills: 3 | Status: SHIPPED | OUTPATIENT
Start: 2024-07-12

## 2024-07-12 RX ORDER — CILOSTAZOL 50 MG/1
50 TABLET ORAL
Qty: 180 TABLET | Refills: 3 | Status: SHIPPED | OUTPATIENT
Start: 2024-07-12

## 2024-07-12 RX ORDER — EZETIMIBE 10 MG/1
10 TABLET ORAL NIGHTLY
Qty: 90 TABLET | Refills: 3 | Status: SHIPPED | OUTPATIENT
Start: 2024-07-12

## 2024-07-12 RX ORDER — AMLODIPINE BESYLATE 10 MG/1
10 TABLET ORAL DAILY
Qty: 90 TABLET | Refills: 3 | Status: SHIPPED | OUTPATIENT
Start: 2024-07-12 | End: 2025-07-12

## 2024-07-12 NOTE — PROGRESS NOTES
"OFFICE VISIT 7/12/24  8:00 AM CDT    History of Present Illness    Lucia presents today for follow up on chronic health conditions, especially diabetes.    She reports previous use of Jardiance which caused persistent diarrhea and recurrent yeast infections, leading to discontinuation. After stopping, her blood sugars increased. Currently, she takes Ozempic injections once a week which effectively controls her blood sugar but significantly suppresses her appetite. She eats smaller portions than a child, sometimes only 2-3 spoonfuls at a time, and can go 2-3 days without eating. Her blood sugar ranges from 80 to 130 and she denies any hypoglycemic episodes. She recognizes high blood sugar by the onset of a slight headache, at which point she reduces food intake and increases water consumption to manage her levels.    She has lost over 40 lbs in the last two years, with her weight peaking at 146 lbs and now at 126 lbs. The majority of the weight loss occurred last year while taking Jardiance. Her current BMI is 23.8, which is within the healthy range. Her family members have commented that she appears to be "melting away" in her face. She denies intentionally trying to lose more weight and attributes her reduced appetite to Ozempic.    Chlorthalidone and potassium for blood pressure, Cilostazol for peripheral artery disease and circulation in legs, Ezetimibe and rosuvastatin for cholesterol, Pantoprazole for acid reflux, Valsartan for blood pressure and heart, Amlodipine for blood pressure. She also takes an OTC iron supplement which has improved her leg cramps and walking ability. The dose was reduced from two tablets to one daily as her iron levels improved.    Her last eye exam was over a year ago in June when she was diagnosed with cataracts that did not require removal at the time. She has sensitivity to sunlight, particularly in the morning, requiring sunglasses. She has significant difficulty with night " driving due to glare from headlights and has stopped driving at night, not going out after 6 PM unless someone else drives her. She is dissatisfied with seeing different doctors at each eye appointment.       Review of Systems: Except as noted herein, ROS is otherwise negative.     Assessment & Plan     Patient is doing well overall with current diabetes management on Ozempic, controlling blood sugars well. Continue current dose despite some appetite suppression, as patient is in a healthy weight range with BMI 23.8 and further weight loss is not desired.   Patient's sensation in feet is normal on exam today, though pedal pulses are diminished, consistent with known peripheral artery disease. Continue current management.   Patient's last eye exam was over 1 year ago with Dr. Michelle, who diagnosed cataracts but did not recommend surgery at that time. Patient is having increased difficulty with glare and has stopped driving at night due to this. Refer back to ophthalmology to re-evaluate need for cataract surgery.  E11.22 TYPE 2 DIABETES MELLITUS WITH DIABETIC CHRONIC KIDNEY DISEASE:   Educated the patient that Ozempic is not insulin, but rather works on other hormones to decrease the body's production of glucose and increase glucose excretion through urine.   Continued Ozempic 0.25 mg injection once weekly as the medication is working well for diabetes management.   Ordered diabetes markers and kidney function assessment labs today.   Acknowledged the patient's good blood sugar control, with levels ranging from 80 to 130, and ability to sense high blood sugar through slight headaches.   Noted the patient's weight decrease from 146 to 126 lbs over the past 2 years, resulting in a healthy BMI of 23.8.   Ensured refills for chlorthalidone and potassium, which are used for blood pressure management and can also affect kidney function.  I10 ESSENTIAL (PRIMARY) HYPERTENSION:   Continued chlorthalidone, potassium,  valsartan (Diovan), and amlodipine (Norvasc) for blood pressure control and provided refills.   Reordered amlodipine.   Listened to the patient's heart and found it to be regular.  K21.9 GASTRO-ESOPHAGEAL REFLUX DISEASE WITHOUT ESOPHAGITIS:   Continued pantoprazole for acid reflux and provided refills.  H25.9 UNSPECIFIED AGE-RELATED CATARACT:   Referred the patient to ophthalmology for evaluation of cataracts and possible need for surgery.   Noted the patient's sensitivity to sunlight and difficulty driving at night due to headlights from other cars.   Planned to schedule an eye check appointment, as it has been over a year since the patient's last visit.   Suggested discussing cataract surgery with the ophthalmologist due to the patient's vision issues.  E66.09 OTHER OBESITY DUE TO EXCESS CALORIES:   Noted the patient's significant weight loss of over 40 lbs in the past 2 years, with current weight at 126 lbs.   Calculated the patient's BMI as 23.8, which is in the healthy range, and advised against further weight loss.   Acknowledged that Ozempic (semaglutide) 0.25mg weekly injection for blood sugar control is also suppressing the patient's appetite.  H53.8 OTHER VISUAL DISTURBANCES:   Noted the patient's sensitivity to sunlight and difficulty driving at night due to headlights from other cars.   Planned to schedule an eye check appointment, as it has been over a year since the patient's last visit.  D50.9 IRON DEFICIENCY ANEMIA, UNSPECIFIED:   Continued OTC iron supplement and vitamin C daily per recommendation of hematologist.   Ordered CBC and iron levels today.   Noted the patient's improvement in leg cramps and walking ability since starting iron supplements, initially twice daily and now daily.  E78.5 HYPERLIPIDEMIA, UNSPECIFIED:   Continued ezetimibe (Zetia) and rosuvastatin for cholesterol management and provided refills.   Ordered cholesterol markers today to recheck levels.  I73.9 PERIPHERAL VASCULAR  DISEASE, UNSPECIFIED:   Continued cilostazol (Pletal) for peripheral artery disease and provided refills.   Noted wea  k pulses in the patient's feet during exam.       1. Type 2 diabetes mellitus with stage 3a chronic kidney disease, without long-term current use of insulin  -     Hemoglobin A1C; Standing  -     Comprehensive Metabolic Panel; Standing  -     Lipid Panel; Standing  -     Microalbumin/Creatinine Ratio, Urine; Standing  -     valsartan (DIOVAN) 160 MG tablet; Take 1 tablet (160 mg total) by mouth once daily.  Dispense: 90 tablet; Refill: 3    2. Type 2 diabetes mellitus with microalbuminuria, without long-term current use of insulin  -     Hemoglobin A1C; Standing  -     Comprehensive Metabolic Panel; Standing  -     Lipid Panel; Standing  -     Microalbumin/Creatinine Ratio, Urine; Standing  -     potassium chloride SA (K-DUR,KLOR-CON) 20 MEQ tablet; Take 1 tablet (20 mEq total) by mouth once daily.  Dispense: 90 tablet; Refill: 3  -     valsartan (DIOVAN) 160 MG tablet; Take 1 tablet (160 mg total) by mouth once daily.  Dispense: 90 tablet; Refill: 3    3. Type 2 diabetes mellitus with peripheral vascular disease  -     ezetimibe (ZETIA) 10 mg tablet; Take 1 tablet (10 mg total) by mouth every evening.  Dispense: 90 tablet; Refill: 3  -     rosuvastatin (CRESTOR) 20 MG tablet; Take 1 tablet (20 mg total) by mouth every evening.  Dispense: 90 tablet; Refill: 3  -     semaglutide (OZEMPIC) 0.25 mg or 0.5 mg (2 mg/3 mL) pen injector; Inject 0.25 mg into the skin every 7 days.  Dispense: 3 mL; Refill: 3    4. Atherosclerosis of native artery of both lower extremities with intermittent claudication  Overview:  CV Ultrasound doppler arterial legs bilat 1/25/23  Meenakshi suggest mild disease bilaterally.  Bilateral moderat profunda disese in 50-75% range  Bilateral severe sfa disease in the 76-99% range  Waveforms are biphasic bilaterally    Orders:  -     cilostazoL (PLETAL) 50 MG Tab; Take 1 tablet (50 mg  total) by mouth 2 (two) times daily before meals.  Dispense: 180 tablet; Refill: 3  -     ezetimibe (ZETIA) 10 mg tablet; Take 1 tablet (10 mg total) by mouth every evening.  Dispense: 90 tablet; Refill: 3  -     rosuvastatin (CRESTOR) 20 MG tablet; Take 1 tablet (20 mg total) by mouth every evening.  Dispense: 90 tablet; Refill: 3    5. Dyslipidemia associated with type 2 diabetes mellitus  -     ezetimibe (ZETIA) 10 mg tablet; Take 1 tablet (10 mg total) by mouth every evening.  Dispense: 90 tablet; Refill: 3  -     rosuvastatin (CRESTOR) 20 MG tablet; Take 1 tablet (20 mg total) by mouth every evening.  Dispense: 90 tablet; Refill: 3    6. Tortuous aorta  Overview:  XR CHEST PA AND LATERAL 09/13/2019  FINDINGS:  Aorta minimally tortuous.      Orders:  -     ezetimibe (ZETIA) 10 mg tablet; Take 1 tablet (10 mg total) by mouth every evening.  Dispense: 90 tablet; Refill: 3  -     rosuvastatin (CRESTOR) 20 MG tablet; Take 1 tablet (20 mg total) by mouth every evening.  Dispense: 90 tablet; Refill: 3    7. Atherosclerosis of native coronary artery of native heart without angina pectoris  -     ezetimibe (ZETIA) 10 mg tablet; Take 1 tablet (10 mg total) by mouth every evening.  Dispense: 90 tablet; Refill: 3  -     rosuvastatin (CRESTOR) 20 MG tablet; Take 1 tablet (20 mg total) by mouth every evening.  Dispense: 90 tablet; Refill: 3    8. Gastroesophageal reflux disease without esophagitis  -     pantoprazole (PROTONIX) 40 MG tablet; Take 1 tablet (40 mg total) by mouth every morning.  Dispense: 90 tablet; Refill: 3    9. High pulmonary arterial pressure  Overview:  Echo 6/18/21  · Concentric remodeling and normal systolic function.  · The estimated ejection fraction is 60%.  · Grade I left ventricular diastolic dysfunction.  · With normal right ventricular systolic function.  · Mild tricuspid regurgitation.  · There is mild aortic valve stenosis.  · Aortic valve area is 1.29 cm2; peak velocity is 2.51 m/s; mean  "gradient is 15 mmHg.  · Normal central venous pressure (3 mmHg).  · The estimated PA systolic pressure is 38 mmHg.       Orders:  -     valsartan (DIOVAN) 160 MG tablet; Take 1 tablet (160 mg total) by mouth once daily.  Dispense: 90 tablet; Refill: 3    10. Hypertension associated with diabetes  -     chlorthalidone (HYGROTEN) 25 MG Tab; Take 1 tablet (25 mg total) by mouth 2 (two) times daily.  Dispense: 180 tablet; Refill: 3  -     valsartan (DIOVAN) 160 MG tablet; Take 1 tablet (160 mg total) by mouth once daily.  Dispense: 90 tablet; Refill: 3  -     amLODIPine (NORVASC) 10 MG tablet; Take 1 tablet (10 mg total) by mouth once daily.  Dispense: 90 tablet; Refill: 3    11. Iron deficiency anemia secondary to inadequate dietary iron intake  -     CBC Auto Differential; Future; Expected date: 07/12/2024  -     Ferritin; Future; Expected date: 07/12/2024  -     Iron and TIBC; Future; Expected date: 07/12/2024    12. Diabetic eye exam  -     Ambulatory referral/consult to Ophthalmology; Future; Expected date: 07/19/2024    No other significant complaints or concerns were reported.    Today's visit involved the intricate management of episodic problem(s) and the ongoing care for the patient's serious or complex condition(s) listed above, reflecting the inherent complexity of providing longitudinal, comprehensive evaluation and management as the central hub for the patient's primary care services.  Vitals:    07/12/24 0753   BP: 118/72   BP Location: Left arm   Patient Position: Sitting   BP Method: Small (Manual)   Pulse: 80   Resp: 18   Temp: 96.2 °F (35.7 °C)   SpO2: 100%   Weight: 57.2 kg (126 lb 1.7 oz)   Height: 5' 1" (1.549 m)   Physical Exam  Vitals reviewed.   Constitutional:       General: She is not in acute distress.     Appearance: Normal appearance. She is not ill-appearing or diaphoretic.   Neck:      Vascular: No carotid bruit.   Cardiovascular:      Rate and Rhythm: Normal rate and regular rhythm. " "  Pulmonary:      Effort: Pulmonary effort is normal.      Breath sounds: Normal breath sounds.   Skin:     General: Skin is warm and dry.   Neurological:      Mental Status: She is alert and oriented to person, place, and time. Mental status is at baseline.   Psychiatric:         Mood and Affect: Mood normal.         Behavior: Behavior normal.         Judgment: Judgment normal.       DIABETIC FOOT EXAM:  Protective Sensation (w/ 10 gram monofilament):  Right: Intact  Left: Intact    Visual Inspection:  Normal -  Bilateral    Pedal Pulses:   Right: Diminished  Left: Diminished    Posterior Tibialis Pulses:   Right:Diminished  Left: Diminished       This note was generated with the assistance of ambient listening technology. Verbal consent was obtained by the patient and accompanying visitor(s) for the recording of patient appointment to facilitate this note. I attest to having reviewed and edited the generated note for accuracy, though some syntax or spelling errors may persist. Please contact the author of this note for any clarification.    Documentation entered by me for this encounter may have been done in part using speech-recognition technology. Although I have made an effort to ensure accuracy, "sound like" errors may exist and should be interpreted in context.   "

## 2024-07-13 LAB
ALBUMIN SERPL BCP-MCNC: 3.8 G/DL (ref 3.5–5.2)
ALP SERPL-CCNC: 103 U/L (ref 55–135)
ALT SERPL W/O P-5'-P-CCNC: 9 U/L (ref 10–44)
ANION GAP SERPL CALC-SCNC: 11 MMOL/L (ref 8–16)
AST SERPL-CCNC: 19 U/L (ref 10–40)
BASOPHILS # BLD AUTO: 0.03 K/UL (ref 0–0.2)
BASOPHILS NFR BLD: 0.6 % (ref 0–1.9)
BILIRUB SERPL-MCNC: 0.3 MG/DL (ref 0.1–1)
BUN SERPL-MCNC: 26 MG/DL (ref 8–23)
CALCIUM SERPL-MCNC: 9.9 MG/DL (ref 8.7–10.5)
CHLORIDE SERPL-SCNC: 104 MMOL/L (ref 95–110)
CHOLEST SERPL-MCNC: 175 MG/DL (ref 120–199)
CHOLEST/HDLC SERPL: 2.5 {RATIO} (ref 2–5)
CO2 SERPL-SCNC: 26 MMOL/L (ref 23–29)
CREAT SERPL-MCNC: 1.2 MG/DL (ref 0.5–1.4)
DIFFERENTIAL METHOD BLD: ABNORMAL
EOSINOPHIL # BLD AUTO: 0.1 K/UL (ref 0–0.5)
EOSINOPHIL NFR BLD: 1.9 % (ref 0–8)
ERYTHROCYTE [DISTWIDTH] IN BLOOD BY AUTOMATED COUNT: 14.4 % (ref 11.5–14.5)
EST. GFR  (NO RACE VARIABLE): 46.9 ML/MIN/1.73 M^2
ESTIMATED AVG GLUCOSE: 108 MG/DL (ref 68–131)
FERRITIN SERPL-MCNC: 88 NG/ML (ref 20–300)
GLUCOSE SERPL-MCNC: 81 MG/DL (ref 70–110)
HBA1C MFR BLD: 5.4 % (ref 4–5.6)
HCT VFR BLD AUTO: 47.1 % (ref 37–48.5)
HDLC SERPL-MCNC: 69 MG/DL (ref 40–75)
HDLC SERPL: 39.4 % (ref 20–50)
HGB BLD-MCNC: 14.5 G/DL (ref 12–16)
IMM GRANULOCYTES # BLD AUTO: 0.01 K/UL (ref 0–0.04)
IMM GRANULOCYTES NFR BLD AUTO: 0.2 % (ref 0–0.5)
IRON SERPL-MCNC: 71 UG/DL (ref 30–160)
LDLC SERPL CALC-MCNC: 94.2 MG/DL (ref 63–159)
LYMPHOCYTES # BLD AUTO: 1.3 K/UL (ref 1–4.8)
LYMPHOCYTES NFR BLD: 26.1 % (ref 18–48)
MCH RBC QN AUTO: 30.1 PG (ref 27–31)
MCHC RBC AUTO-ENTMCNC: 30.8 G/DL (ref 32–36)
MCV RBC AUTO: 98 FL (ref 82–98)
MONOCYTES # BLD AUTO: 0.3 K/UL (ref 0.3–1)
MONOCYTES NFR BLD: 5.6 % (ref 4–15)
NEUTROPHILS # BLD AUTO: 3.2 K/UL (ref 1.8–7.7)
NEUTROPHILS NFR BLD: 65.6 % (ref 38–73)
NONHDLC SERPL-MCNC: 106 MG/DL
NRBC BLD-RTO: 0 /100 WBC
PLATELET # BLD AUTO: 238 K/UL (ref 150–450)
PMV BLD AUTO: 11.9 FL (ref 9.2–12.9)
POTASSIUM SERPL-SCNC: 4.1 MMOL/L (ref 3.5–5.1)
PROT SERPL-MCNC: 7.5 G/DL (ref 6–8.4)
RBC # BLD AUTO: 4.82 M/UL (ref 4–5.4)
SATURATED IRON: 18 % (ref 20–50)
SODIUM SERPL-SCNC: 141 MMOL/L (ref 136–145)
TOTAL IRON BINDING CAPACITY: 404 UG/DL (ref 250–450)
TRANSFERRIN SERPL-MCNC: 273 MG/DL (ref 200–375)
TRIGL SERPL-MCNC: 59 MG/DL (ref 30–150)
WBC # BLD AUTO: 4.83 K/UL (ref 3.9–12.7)

## 2024-08-12 ENCOUNTER — PATIENT MESSAGE (OUTPATIENT)
Dept: PULMONOLOGY | Facility: CLINIC | Age: 74
End: 2024-08-12
Payer: MEDICARE

## 2024-08-12 DIAGNOSIS — J32.9 BACTERIAL SINUSITIS: ICD-10-CM

## 2024-08-12 DIAGNOSIS — J30.1 SEASONAL ALLERGIC RHINITIS DUE TO POLLEN: ICD-10-CM

## 2024-08-12 DIAGNOSIS — B96.89 BACTERIAL SINUSITIS: ICD-10-CM

## 2024-08-14 RX ORDER — PREDNISONE 20 MG/1
TABLET ORAL
Qty: 20 TABLET | Refills: 0 | Status: SHIPPED | OUTPATIENT
Start: 2024-08-14

## 2024-08-14 RX ORDER — DOXYCYCLINE 100 MG/1
100 CAPSULE ORAL EVERY 12 HOURS
Qty: 20 CAPSULE | Refills: 0 | Status: SHIPPED | OUTPATIENT
Start: 2024-08-14

## 2024-08-25 DIAGNOSIS — I25.10 ATHEROSCLEROSIS OF NATIVE CORONARY ARTERY OF NATIVE HEART WITHOUT ANGINA PECTORIS: Chronic | ICD-10-CM

## 2024-08-25 DIAGNOSIS — I77.1 TORTUOUS AORTA: Chronic | ICD-10-CM

## 2024-08-25 DIAGNOSIS — I70.213 ATHEROSCLEROSIS OF NATIVE ARTERY OF BOTH LOWER EXTREMITIES WITH INTERMITTENT CLAUDICATION: Chronic | ICD-10-CM

## 2024-08-25 DIAGNOSIS — E78.5 DYSLIPIDEMIA ASSOCIATED WITH TYPE 2 DIABETES MELLITUS: Chronic | ICD-10-CM

## 2024-08-25 DIAGNOSIS — E11.69 DYSLIPIDEMIA ASSOCIATED WITH TYPE 2 DIABETES MELLITUS: Chronic | ICD-10-CM

## 2024-08-25 DIAGNOSIS — E11.51 TYPE 2 DIABETES MELLITUS WITH PERIPHERAL VASCULAR DISEASE: Chronic | ICD-10-CM

## 2024-08-25 RX ORDER — EZETIMIBE 10 MG/1
10 TABLET ORAL
Qty: 90 TABLET | Refills: 3 | Status: SHIPPED | OUTPATIENT
Start: 2024-08-25

## 2024-08-25 NOTE — TELEPHONE ENCOUNTER
No care due was identified.  [facility] Embedded Care Due Messages. Reference number: [id number].   8/25/2024 1:40:53 AM CDT

## 2024-08-25 NOTE — TELEPHONE ENCOUNTER
Refill Decision Note   Lucia Barlow  is requesting a refill authorization.  Brief Assessment and Rationale for Refill:  Approve     Medication Therapy Plan:         Comments:     Note composed:9:41 AM 08/25/2024

## 2024-10-06 ENCOUNTER — PATIENT MESSAGE (OUTPATIENT)
Dept: ADMINISTRATIVE | Facility: OTHER | Age: 74
End: 2024-10-06
Payer: MEDICARE

## 2024-10-11 ENCOUNTER — IMMUNIZATION (OUTPATIENT)
Dept: INTERNAL MEDICINE | Facility: CLINIC | Age: 74
End: 2024-10-11
Payer: MEDICARE

## 2024-10-11 ENCOUNTER — OFFICE VISIT (OUTPATIENT)
Dept: OPHTHALMOLOGY | Facility: CLINIC | Age: 74
End: 2024-10-11
Payer: MEDICARE

## 2024-10-11 DIAGNOSIS — Z01.00 DIABETIC EYE EXAM: ICD-10-CM

## 2024-10-11 DIAGNOSIS — H52.4 ASTIGMATISM WITH PRESBYOPIA, BILATERAL: ICD-10-CM

## 2024-10-11 DIAGNOSIS — Z23 NEED FOR VACCINATION: Primary | ICD-10-CM

## 2024-10-11 DIAGNOSIS — E11.9 DIABETIC EYE EXAM: ICD-10-CM

## 2024-10-11 DIAGNOSIS — H47.323 DRUSEN OF OPTIC DISC, BILATERAL: ICD-10-CM

## 2024-10-11 DIAGNOSIS — H52.203 ASTIGMATISM WITH PRESBYOPIA, BILATERAL: ICD-10-CM

## 2024-10-11 DIAGNOSIS — H25.13 NUCLEAR SCLEROSIS, BILATERAL: ICD-10-CM

## 2024-10-11 DIAGNOSIS — H25.013 CORTICAL AGE-RELATED CATARACT OF BOTH EYES: ICD-10-CM

## 2024-10-11 DIAGNOSIS — E11.36 DIABETIC CATARACT OF BOTH EYES: ICD-10-CM

## 2024-10-11 DIAGNOSIS — E11.9 TYPE 2 DIABETES MELLITUS WITHOUT RETINOPATHY: Primary | ICD-10-CM

## 2024-10-11 PROCEDURE — 99999 PR PBB SHADOW E&M-EST. PATIENT-LVL IV: CPT | Mod: PBBFAC,HCNC,, | Performed by: OPTOMETRIST

## 2024-10-11 NOTE — PROGRESS NOTES
HPI    1. +DM 2018  2. NSC OU  4. Drusen OS *Shows on FAF     Hemoglobin A1C       Date                     Value               Ref Range             Status                07/12/2024               5.4                 4.0 - 5.6 %           Final              Vision changes since last eye exam?: Pt states her near vision has been a   little more blurry since her last eye exam her current eyeglasses. Pt   would also like to be advised on a cat eval. Pt is not using any   correction but is using Pataday OU PRN for itchy eyes.    Any eye pain today: Pt denies any eye pain.    Other ocular symptoms: Pt states her eyes have been watering, dry, and   itchy. Pt states she has been experiencing more light sensitivity.     Interested in contact lens fitting today? No.     Last edited by Aubree Mendenhall on 10/11/2024  8:08 AM.            Assessment /Plan     For exam results, see Encounter Report.    Type 2 diabetes mellitus without retinopathy  There was no diabetic retinopathy present in either eye today.   Recommended that pt continue care with PCP and/or specialists regarding diabetes.  Follow-up dilated eye exam recommended in 12 months, sooner with any vision changes or new concerns.    Nuclear sclerosis, bilateral  Cortical age-related cataract of both eyes  Diabetic cataract of both eyes  Cataract accounts for vision change. New Rx for glasses/contacts will not improve vision.   Refer to ophthalmologist for cataract evaluation.     Drusen of optic disc, bilateral  Stable   Monitor 12 months    Astigmatism with presbyopia, bilateral  Hold spec Rx      RTC next available with [facility] for cataract evaluation or PRN if any problems.   Discussed above and answered questions.

## 2024-10-16 ENCOUNTER — TELEPHONE (OUTPATIENT)
Dept: OPHTHALMOLOGY | Facility: CLINIC | Age: 74
End: 2024-10-16
Payer: MEDICARE

## 2024-10-17 ENCOUNTER — TELEPHONE (OUTPATIENT)
Dept: OPHTHALMOLOGY | Facility: CLINIC | Age: 74
End: 2024-10-17
Payer: MEDICARE

## 2024-10-17 NOTE — TELEPHONE ENCOUNTER
Left message for patient to give us a call back       ----- Message from Sariah sent at 10/17/2024  4:36 PM CDT -----  Contact: Lucia  Type:  Patient Returning Call    Who Called:Lucia  Who Left Message for Patient:Awa  Does the patient know what this is regarding?: schedule cat eval  Would the patient rather a call back or a response via [facility]?  Call back  Best Call Back Number:[phone]  Additional Information:     Thanks   Am

## 2024-10-18 ENCOUNTER — TELEPHONE (OUTPATIENT)
Dept: OPHTHALMOLOGY | Facility: CLINIC | Age: 74
End: 2024-10-18
Payer: MEDICARE

## 2024-10-18 NOTE — TELEPHONE ENCOUNTER
Called patient to inform her parish would call her back next week to set up cat eval with [facility]. Told patient it would not be until December most likely due to [facility] being out most of November.     ----- Message from Allison sent at 10/18/2024  2:58 PM CDT -----  Type:  Patient Returning Call     Who Called:.Lucia Barlow   Who Left Message for Patient:   Does the patient know what this is regarding?:yes   Would the patient rather a call back or a response via [facility]?  Call back book cataract eval   Best Call Back Number:.[phone]   Additional Information: pt states she missed an call yesterday 10/17

## 2024-10-23 ENCOUNTER — LAB VISIT (OUTPATIENT)
Dept: LAB | Facility: HOSPITAL | Age: 74
End: 2024-10-23
Attending: INTERNAL MEDICINE
Payer: MEDICARE

## 2024-10-23 DIAGNOSIS — E78.5 DYSLIPIDEMIA ASSOCIATED WITH TYPE 2 DIABETES MELLITUS: Chronic | ICD-10-CM

## 2024-10-23 DIAGNOSIS — E11.69 DYSLIPIDEMIA ASSOCIATED WITH TYPE 2 DIABETES MELLITUS: Chronic | ICD-10-CM

## 2024-10-23 DIAGNOSIS — E11.51 TYPE 2 DIABETES MELLITUS WITH PERIPHERAL VASCULAR DISEASE: Chronic | ICD-10-CM

## 2024-10-23 LAB
ALBUMIN SERPL BCP-MCNC: 3.4 G/DL (ref 3.5–5.2)
ALP SERPL-CCNC: 90 U/L (ref 40–150)
ALT SERPL W/O P-5'-P-CCNC: 9 U/L (ref 10–44)
ANION GAP SERPL CALC-SCNC: 9 MMOL/L (ref 8–16)
AST SERPL-CCNC: 17 U/L (ref 10–40)
BILIRUB SERPL-MCNC: 0.3 MG/DL (ref 0.1–1)
BUN SERPL-MCNC: 20 MG/DL (ref 8–23)
CALCIUM SERPL-MCNC: 9.1 MG/DL (ref 8.7–10.5)
CHLORIDE SERPL-SCNC: 106 MMOL/L (ref 95–110)
CHOLEST SERPL-MCNC: 146 MG/DL (ref 120–199)
CHOLEST/HDLC SERPL: 2.2 {RATIO} (ref 2–5)
CO2 SERPL-SCNC: 26 MMOL/L (ref 23–29)
CREAT SERPL-MCNC: 1 MG/DL (ref 0.5–1.4)
EST. GFR  (NO RACE VARIABLE): 58.4 ML/MIN/1.73 M^2
ESTIMATED AVG GLUCOSE: 117 MG/DL (ref 68–131)
GLUCOSE SERPL-MCNC: 90 MG/DL (ref 70–110)
HBA1C MFR BLD: 5.7 % (ref 4–5.6)
HDLC SERPL-MCNC: 66 MG/DL (ref 40–75)
HDLC SERPL: 45.2 % (ref 20–50)
LDLC SERPL CALC-MCNC: 71.6 MG/DL (ref 63–159)
NONHDLC SERPL-MCNC: 80 MG/DL
POTASSIUM SERPL-SCNC: 4.4 MMOL/L (ref 3.5–5.1)
PROT SERPL-MCNC: 7.1 G/DL (ref 6–8.4)
SODIUM SERPL-SCNC: 141 MMOL/L (ref 136–145)
TRIGL SERPL-MCNC: 42 MG/DL (ref 30–150)

## 2024-10-23 PROCEDURE — 80053 COMPREHEN METABOLIC PANEL: CPT | Mod: HCNC | Performed by: INTERNAL MEDICINE

## 2024-10-23 PROCEDURE — 83036 HEMOGLOBIN GLYCOSYLATED A1C: CPT | Mod: HCNC | Performed by: INTERNAL MEDICINE

## 2024-10-23 PROCEDURE — 80061 LIPID PANEL: CPT | Mod: HCNC | Performed by: INTERNAL MEDICINE

## 2024-10-30 DIAGNOSIS — I70.0 ATHEROSCLEROSIS OF AORTA: Primary | ICD-10-CM

## 2024-11-01 ENCOUNTER — HOSPITAL ENCOUNTER (OUTPATIENT)
Dept: CARDIOLOGY | Facility: HOSPITAL | Age: 74
Discharge: HOME | End: 2024-11-01
Attending: INTERNAL MEDICINE
Payer: MEDICARE

## 2024-11-01 ENCOUNTER — OFFICE VISIT (OUTPATIENT)
Dept: CARDIOLOGY | Facility: CLINIC | Age: 74
End: 2024-11-01
Payer: MEDICARE

## 2024-11-01 VITALS
OXYGEN SATURATION: 98 % | DIASTOLIC BLOOD PRESSURE: 60 MMHG | HEART RATE: 94 BPM | BODY MASS INDEX: 25.34 KG/M2 | HEIGHT: 61 IN | SYSTOLIC BLOOD PRESSURE: 122 MMHG | WEIGHT: 134.25 LBS

## 2024-11-01 DIAGNOSIS — Z90.2 S/P LOBECTOMY OF LUNG: ICD-10-CM

## 2024-11-01 DIAGNOSIS — E11.51 TYPE 2 DIABETES MELLITUS WITH PERIPHERAL VASCULAR DISEASE: Chronic | ICD-10-CM

## 2024-11-01 DIAGNOSIS — R80.9 TYPE 2 DIABETES MELLITUS WITH MICROALBUMINURIA, WITHOUT LONG-TERM CURRENT USE OF INSULIN: Chronic | ICD-10-CM

## 2024-11-01 DIAGNOSIS — I27.21 HIGH PULMONARY ARTERIAL PRESSURE: Chronic | ICD-10-CM

## 2024-11-01 DIAGNOSIS — G47.33 OSA (OBSTRUCTIVE SLEEP APNEA): ICD-10-CM

## 2024-11-01 DIAGNOSIS — I70.0 ATHEROSCLEROSIS OF AORTA: ICD-10-CM

## 2024-11-01 DIAGNOSIS — E11.69 DYSLIPIDEMIA ASSOCIATED WITH TYPE 2 DIABETES MELLITUS: Chronic | ICD-10-CM

## 2024-11-01 DIAGNOSIS — D50.8 IRON DEFICIENCY ANEMIA SECONDARY TO INADEQUATE DIETARY IRON INTAKE: Chronic | ICD-10-CM

## 2024-11-01 DIAGNOSIS — E11.59 HYPERTENSION ASSOCIATED WITH DIABETES: ICD-10-CM

## 2024-11-01 DIAGNOSIS — N18.31 TYPE 2 DIABETES MELLITUS WITH STAGE 3A CHRONIC KIDNEY DISEASE, WITHOUT LONG-TERM CURRENT USE OF INSULIN: Chronic | ICD-10-CM

## 2024-11-01 DIAGNOSIS — E78.5 DYSLIPIDEMIA ASSOCIATED WITH TYPE 2 DIABETES MELLITUS: Chronic | ICD-10-CM

## 2024-11-01 DIAGNOSIS — E11.29 TYPE 2 DIABETES MELLITUS WITH MICROALBUMINURIA, WITHOUT LONG-TERM CURRENT USE OF INSULIN: Chronic | ICD-10-CM

## 2024-11-01 DIAGNOSIS — I35.0 NONRHEUMATIC AORTIC VALVE STENOSIS: ICD-10-CM

## 2024-11-01 DIAGNOSIS — Z98.62 S/P PERIPHERAL ARTERY ANGIOPLASTY: ICD-10-CM

## 2024-11-01 DIAGNOSIS — J44.9 CHRONIC OBSTRUCTIVE PULMONARY DISEASE, UNSPECIFIED COPD TYPE: Chronic | ICD-10-CM

## 2024-11-01 DIAGNOSIS — R91.1 PULMONARY NODULE LESS THAN 6 MM IN DIAMETER WITH HIGH RISK FOR MALIGNANT NEOPLASM: ICD-10-CM

## 2024-11-01 DIAGNOSIS — R91.1 PULMONARY NODULE, LEFT: Chronic | ICD-10-CM

## 2024-11-01 DIAGNOSIS — R06.02 SHORTNESS OF BREATH: ICD-10-CM

## 2024-11-01 DIAGNOSIS — E11.22 TYPE 2 DIABETES MELLITUS WITH STAGE 3A CHRONIC KIDNEY DISEASE, WITHOUT LONG-TERM CURRENT USE OF INSULIN: Chronic | ICD-10-CM

## 2024-11-01 DIAGNOSIS — I15.2 HYPERTENSION ASSOCIATED WITH DIABETES: ICD-10-CM

## 2024-11-01 DIAGNOSIS — I70.213 ATHEROSCLEROSIS OF NATIVE ARTERY OF BOTH LOWER EXTREMITIES WITH INTERMITTENT CLAUDICATION: Primary | Chronic | ICD-10-CM

## 2024-11-01 DIAGNOSIS — I25.10 ATHEROSCLEROSIS OF NATIVE CORONARY ARTERY OF NATIVE HEART WITHOUT ANGINA PECTORIS: ICD-10-CM

## 2024-11-01 DIAGNOSIS — B37.31 RECURRENT CANDIDIASIS OF VAGINA: Chronic | ICD-10-CM

## 2024-11-01 DIAGNOSIS — Z91.89 PULMONARY NODULE LESS THAN 6 MM IN DIAMETER WITH HIGH RISK FOR MALIGNANT NEOPLASM: ICD-10-CM

## 2024-11-01 PROCEDURE — 93010 ELECTROCARDIOGRAM REPORT: CPT | Mod: HCNC,,, | Performed by: STUDENT IN AN ORGANIZED HEALTH CARE EDUCATION/TRAINING PROGRAM

## 2024-11-01 PROCEDURE — 99999 PR PBB SHADOW E&M-EST. PATIENT-LVL V: CPT | Mod: PBBFAC,HCNC,, | Performed by: INTERNAL MEDICINE

## 2024-11-01 PROCEDURE — 93005 ELECTROCARDIOGRAM TRACING: CPT | Mod: HCNC

## 2024-11-02 DIAGNOSIS — Z91.018 FOOD ALLERGY: ICD-10-CM

## 2024-11-02 DIAGNOSIS — I15.2 HYPERTENSION ASSOCIATED WITH DIABETES: ICD-10-CM

## 2024-11-02 DIAGNOSIS — E11.59 HYPERTENSION ASSOCIATED WITH DIABETES: ICD-10-CM

## 2024-11-02 LAB
OHS QRS DURATION: 62 MS
OHS QTC CALCULATION: 445 MS

## 2024-11-04 RX ORDER — EPINEPHRINE 0.3 MG/.3ML
INJECTION SUBCUTANEOUS
Qty: 2 EACH | Refills: 3 | Status: SHIPPED | OUTPATIENT
Start: 2024-11-04

## 2024-11-05 RX ORDER — FUROSEMIDE 20 MG/1
20 TABLET ORAL DAILY PRN
Qty: 90 TABLET | Refills: 3 | Status: SHIPPED | OUTPATIENT
Start: 2024-11-05

## 2024-11-15 ENCOUNTER — HOSPITAL ENCOUNTER (OUTPATIENT)
Dept: CARDIOLOGY | Facility: HOSPITAL | Age: 74
Discharge: HOME | End: 2024-11-15
Attending: INTERNAL MEDICINE
Payer: MEDICARE

## 2024-11-15 VITALS
DIASTOLIC BLOOD PRESSURE: 60 MMHG | SYSTOLIC BLOOD PRESSURE: 122 MMHG | BODY MASS INDEX: 25.3 KG/M2 | HEIGHT: 61 IN | WEIGHT: 134 LBS

## 2024-11-15 DIAGNOSIS — I35.0 NONRHEUMATIC AORTIC VALVE STENOSIS: ICD-10-CM

## 2024-11-15 LAB
AORTIC ROOT ANNULUS: 2.43 CM
AV INDEX (PROSTH): 0.53
AV MEAN GRADIENT: 10 MMHG
AV PEAK GRADIENT: 17.6 MMHG
AV VALVE AREA BY VELOCITY RATIO: 1.5 CM²
AV VALVE AREA: 1.5 CM²
AV VELOCITY RATIO: 0.52
BSA FOR ECHO PROCEDURE: 1.62 M2
CV ECHO LV RWT: 0.58 CM
DOP CALC AO PEAK VEL: 2.1 M/S
DOP CALC AO VTI: 47.4 CM
DOP CALC LVOT AREA: 2.8 CM2
DOP CALC LVOT DIAMETER: 1.9 CM
DOP CALC LVOT PEAK VEL: 1.1 M/S
DOP CALC LVOT STROKE VOLUME: 70.6 CM3
DOP CALC RVOT PEAK VEL: 0.6 M/S
DOP CALC RVOT VTI: 13.5 CM
DOP CALCLVOT PEAK VEL VTI: 24.9 CM
E WAVE DECELERATION TIME: 274.53 MSEC
E/A RATIO: 0.8
E/E' RATIO: 18.73 M/S
ECHO LV POSTERIOR WALL: 1.1 CM (ref 0.6–1.1)
EJECTION FRACTION: 60 %
FRACTIONAL SHORTENING: 28.9 % (ref 28–44)
INTERVENTRICULAR SEPTUM: 1 CM (ref 0.6–1.1)
IVRT: 68.51 MSEC
LA MAJOR: 5.1 CM
LA MINOR: 4.29 CM
LA WIDTH: 3.4 CM
LEFT ATRIUM AREA SYSTOLIC (APICAL 2 CHAMBER): 14.86 CM2
LEFT ATRIUM AREA SYSTOLIC (APICAL 4 CHAMBER): 20.06 CM2
LEFT ATRIUM SIZE: 3.59 CM
LEFT ATRIUM VOLUME INDEX MOD: 29.9 ML/M2
LEFT ATRIUM VOLUME INDEX: 30.4 ML/M2
LEFT ATRIUM VOLUME MOD: 47.55 ML
LEFT ATRIUM VOLUME: 48.35 CM3
LEFT INTERNAL DIMENSION IN SYSTOLE: 2.7 CM (ref 2.1–4)
LEFT VENTRICLE DIASTOLIC VOLUME INDEX: 38.29 ML/M2
LEFT VENTRICLE DIASTOLIC VOLUME: 60.88 ML
LEFT VENTRICLE END SYSTOLIC VOLUME APICAL 2 CHAMBER: 36.64 ML
LEFT VENTRICLE END SYSTOLIC VOLUME APICAL 4 CHAMBER: 57.92 ML
LEFT VENTRICLE MASS INDEX: 79.1 G/M2
LEFT VENTRICLE SYSTOLIC VOLUME INDEX: 17.3 ML/M2
LEFT VENTRICLE SYSTOLIC VOLUME: 27.51 ML
LEFT VENTRICULAR INTERNAL DIMENSION IN DIASTOLE: 3.8 CM (ref 3.5–6)
LEFT VENTRICULAR MASS: 125.8 G
LV LATERAL E/E' RATIO: 20.6 M/S
LV SEPTAL E/E' RATIO: 17.17 M/S
LVED V (TEICH): 60.88 ML
LVES V (TEICH): 27.51 ML
LVOT MG: 2.89 MMHG
LVOT MV: 0.8 CM/S
MV PEAK A VEL: 1.28 M/S
MV PEAK E VEL: 1.03 M/S
PISA TR MAX VEL: 2.98 M/S
PULM VEIN S/D RATIO: 1.72
PV MEAN GRADIENT: 1 MMHG
PV MV: 0.57 M/S
PV PEAK D VEL: 0.29 M/S
PV PEAK GRADIENT: 3 MMHG
PV PEAK S VEL: 0.5 M/S
PV PEAK VELOCITY: 0.84 M/S
RA MAJOR: 4.26 CM
RA PRESSURE ESTIMATED: 3 MMHG
RA WIDTH: 3.06 CM
RIGHT VENTRICULAR END-DIASTOLIC DIMENSION: 2.51 CM
RV TB RVSP: 6 MMHG
SINUS: 1.94 CM
STJ: 1.9 CM
TDI LATERAL: 0.05 M/S
TDI SEPTAL: 0.06 M/S
TDI: 0.06 M/S
TR MAX PG: 36 MMHG
TRICUSPID ANNULAR PLANE SYSTOLIC EXCURSION: 1.89 CM
TV REST PULMONARY ARTERY PRESSURE: 39 MMHG
Z-SCORE OF LEFT VENTRICULAR DIMENSION IN END DIASTOLE: -1.78
Z-SCORE OF LEFT VENTRICULAR DIMENSION IN END SYSTOLE: -0.33

## 2024-11-15 PROCEDURE — 93306 TTE W/DOPPLER COMPLETE: CPT | Mod: HCNC

## 2024-11-15 PROCEDURE — 93306 TTE W/DOPPLER COMPLETE: CPT | Mod: 26,HCNC,, | Performed by: INTERNAL MEDICINE

## 2024-11-21 ENCOUNTER — TELEPHONE (OUTPATIENT)
Dept: CARDIOLOGY | Facility: CLINIC | Age: 74
End: 2024-11-21
Payer: MEDICARE

## 2024-11-21 NOTE — TELEPHONE ENCOUNTER
Called [phone] and spoke with patient and advised results and she voiced understanding.       ----- Message from Amalia Rosas MD sent at 11/20/2024  5:58 PM CST -----  Heart function normal   Aortic valve mildly narrow unchanged

## 2024-12-14 DIAGNOSIS — E11.51 TYPE 2 DIABETES MELLITUS WITH PERIPHERAL VASCULAR DISEASE: Chronic | ICD-10-CM

## 2024-12-14 NOTE — TELEPHONE ENCOUNTER
No care due was identified.  [facility] Embedded Care Due Messages. Reference number: [id number].   12/14/2024 6:01:57 AM CST

## 2024-12-15 RX ORDER — SEMAGLUTIDE 0.68 MG/ML
0.25 INJECTION, SOLUTION SUBCUTANEOUS
Qty: 6 ML | Refills: 1 | Status: SHIPPED | OUTPATIENT
Start: 2024-12-15

## 2024-12-15 NOTE — TELEPHONE ENCOUNTER
Refill Decision Note   Lucia Cain  is requesting a refill authorization.  Brief Assessment and Rationale for Refill:  Approve     Medication Therapy Plan:         Comments:     Note composed:2:15 PM 12/15/2024

## 2024-12-31 ENCOUNTER — OFFICE VISIT (OUTPATIENT)
Dept: OPHTHALMOLOGY | Facility: CLINIC | Age: 74
End: 2024-12-31
Payer: MEDICARE

## 2024-12-31 ENCOUNTER — DOCUMENTATION ONLY (OUTPATIENT)
Dept: OPHTHALMOLOGY | Facility: CLINIC | Age: 74
End: 2024-12-31

## 2024-12-31 DIAGNOSIS — E11.9 TYPE 2 DIABETES MELLITUS WITHOUT RETINOPATHY: ICD-10-CM

## 2024-12-31 DIAGNOSIS — H25.12 NUCLEAR SCLEROSIS OF LEFT EYE: ICD-10-CM

## 2024-12-31 DIAGNOSIS — H25.11 NUCLEAR SCLEROSIS OF RIGHT EYE: Primary | ICD-10-CM

## 2024-12-31 PROCEDURE — 2023F DILAT RTA XM W/O RTNOPTHY: CPT | Mod: HCNC,CPTII,S$GLB, | Performed by: STUDENT IN AN ORGANIZED HEALTH CARE EDUCATION/TRAINING PROGRAM

## 2024-12-31 PROCEDURE — 99214 OFFICE O/P EST MOD 30 MIN: CPT | Mod: HCNC,S$GLB,, | Performed by: STUDENT IN AN ORGANIZED HEALTH CARE EDUCATION/TRAINING PROGRAM

## 2024-12-31 PROCEDURE — 1160F RVW MEDS BY RX/DR IN RCRD: CPT | Mod: HCNC,CPTII,S$GLB, | Performed by: STUDENT IN AN ORGANIZED HEALTH CARE EDUCATION/TRAINING PROGRAM

## 2024-12-31 PROCEDURE — 1159F MED LIST DOCD IN RCRD: CPT | Mod: HCNC,CPTII,S$GLB, | Performed by: STUDENT IN AN ORGANIZED HEALTH CARE EDUCATION/TRAINING PROGRAM

## 2024-12-31 PROCEDURE — 92136 OPHTHALMIC BIOMETRY: CPT | Mod: HCNC,RT,S$GLB, | Performed by: STUDENT IN AN ORGANIZED HEALTH CARE EDUCATION/TRAINING PROGRAM

## 2024-12-31 PROCEDURE — 99999 PR PBB SHADOW E&M-EST. PATIENT-LVL IV: CPT | Mod: PBBFAC,HCNC,, | Performed by: STUDENT IN AN ORGANIZED HEALTH CARE EDUCATION/TRAINING PROGRAM

## 2024-12-31 RX ORDER — PREDNISOLONE ACETATE 10 MG/ML
1 SUSPENSION/ DROPS OPHTHALMIC 4 TIMES DAILY
Qty: 5 ML | Refills: 1 | Status: SHIPPED | OUTPATIENT
Start: 2024-12-31 | End: 2025-12-31

## 2024-12-31 NOTE — PROGRESS NOTES
HPI     Cataract     Additional comments: Pt reports for cataract eval. Pt has been   experiencing floaters and glaring. Pt is having trouble reading.   Last A1C 5.7 (10/23/24)           Comments    1. +DM 2018  2. 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            Assessment /Plan     For exam results, see Encounter Report.    Nuclear sclerosis of right eye- Visually Significant Cataract OU  Patient reports decreased vision consistent with the clinical amount of lenticular opacity, which reaches the level of visual significance and affects activities of daily living including reading and glare. Risks, benefits, and alternatives to cataract surgery were discussed.  Discussion of risks included possibility of infection as well as permanent vision loss.The pt expressed a desire to proceed with surgery with the potential for some reasonable degree of visual improvement. Recommended regular use of artificial tears and good lid hygiene to optimize surgical outcome.     Discussed IOL options and refractive outcomes for this patient.    Phaco right eye, Topical    Additional considerations:   +/- Vision Blue    Will aim for Multifocal - Panoptix IOL    Post op gtts:   PF      Discussed that vision may be limited by:  Dense Cataract and Astigmatism >1D  *Lengthy discussion with patient if she does not correct the astigmatism she will be in glasses full time due to astigmatism     Explained that patient may need glasses after surgery.    RTC for POD#1      Nuclear sclerosis of left eye- Follow    Type 2 diabetes mellitus without retinopathy- last A1c 5.7   Strict BG control, f/u w/ PCP, and annual DFE  Stressed importance of DM control to preserve vision

## 2024-12-31 NOTE — PROGRESS NOTES
Short Stay Record    Diagnosis: Nuclear Sclerotic Cataract right    CC: Blurry Vision     HPI:  Lucia Barlow is a 74 y.o. female who presents for evaluation prior to ophthalmic surgery. No current complaints.     Past Medical History:   Diagnosis Date    Abnormal EKG 5/19/2023    Atherosclerosis of artery of both lower extremities 1/17/2014    Atherosclerosis of native coronary artery of native heart without angina pectoris 11/18/2016    Atherosclerotic PVD with intermittent claudication 1/17/2014    Chronic bronchitis     COPD (chronic obstructive pulmonary disease)     Coronary artery disease involving native coronary artery of native heart without angina pectoris 5/19/2023    Diabetes with neurologic complications     Dyslipidemia associated with type 2 diabetes mellitus 6/14/2013    Dyslipidemia associated with type 2 diabetes mellitus     Ex-smoker     Gastroesophageal reflux disease without esophagitis 1/25/2019    Hyperlipidemia     Hypertension     Iron deficiency anemia secondary to inadequate dietary iron intake 6/3/2022    NSCLC of left lung 11/19/2020    IVANIA (obstructive sleep apnea) 2/11/2021    Osteoporosis 1/16 rosita 1/18    Pneumothorax after biopsy 10/21/2020    PVD (peripheral vascular disease)     Renal manifestation of secondary diabetes mellitus     S/P peripheral artery angioplasty 2/7/2014    Seasonal allergic rhinitis due to pollen     Shortness of breath 5/19/2023    Simple chronic bronchitis     Tobacco dependence     Type 2 diabetes mellitus with microalbuminuria, without long-term current use of insulin 3/29/2019    Type 2 diabetes mellitus with peripheral vascular disease     Type 2 diabetes mellitus with stage 3 chronic kidney disease, without long-term current use of insulin 2/1/2019    Type 2 diabetes mellitus without retinopathy 2/1/2019    Urinary incontinence      Past Surgical History:   Procedure Laterality Date    ANGIOPLASTY Bilateral 01/24/2014    aortoiliac stenting      CARPAL TUNNEL RELEASE  2003    [facility]    COLECTOMY  approximate 2005    pt states 1in colon -dx with benign mass removed-states no colon cancer    COLONOSCOPY N/A 11/9/2016    Procedure: COLONOSCOPY;  Surgeon: Dmitri Sterling MD;  Location: [facility];  Service: Endoscopy;  Laterality: N/A;    COLONOSCOPY N/A 11/15/2019    Procedure: COLONOSCOPY;  Surgeon: Marko Vicente MD;  Location: [facility] [place] ENDO;  Service: Endoscopy;  Laterality: N/A;    COLONOSCOPY N/A 3/25/2022    Procedure: COLONOSCOPY;  Surgeon: Paola Feldman MD;  Location: [facility];  Service: Endoscopy;  Laterality: N/A;    ESOPHAGOGASTRODUODENOSCOPY N/A 3/25/2022    Procedure: EGD (ESOPHAGOGASTRODUODENOSCOPY);  Surgeon: Paola Feldman MD;  Location: [facility];  Service: Endoscopy;  Laterality: N/A;    HYSTERECTOMY      no cancer    INJECTION OF ANESTHETIC AGENT AROUND MULTIPLE INTERCOSTAL NERVES Left 11/19/2020    Procedure: BLOCK, NERVE, INTERCOSTAL, 2 OR MORE;  Surgeon: Amrit Flores MD;  Location: [facility] OR [address] [place] [address];  Service: Thoracic;  Laterality: Left;    INTRALUMINAL GASTROINTESTINAL TRACT IMAGING VIA CAPSULE N/A 11/8/2023    Procedure: IMAGING PROCEDURE, GI TRACT, INTRALUMINAL, VIA CAPSULE;  Surgeon: Wilton, First Available;  Location: [facility];  Service: Endoscopy;  Laterality: N/A;    OOPHORECTOMY      SURGICAL REMOVAL OF LYMPH NODE Left 11/19/2020    Procedure: EXCISION, LYMPH NODE;  Surgeon: Amrit Flores MD;  Location: [facility] OR [facility]LR;  Service: Thoracic;  Laterality: Left;  mediastinal lymph node disection    XI ROBOTIC RATS,WITH LOBECTOMY,LUNG Left 11/19/2020    Procedure: XI ROBOTIC RATS,WITH LOBECTOMY,LUNG;  Surgeon: Amrit Flores MD;  Location: [facility] OR [facility]LR;  Service: Thoracic;  Laterality: Left;  Lingulectomy.     Social History     Tobacco Use    Smoking status: Former     Current packs/day: 0.00     Average packs/day: 1 pack/day for 60.0 years (60.0 ttl pk-yrs)     Types: Cigarettes     Start date:  1960     Quit date: 1/10/2020     Years since quittin.9    Smokeless tobacco: Former   Substance Use Topics    Alcohol use: No     Alcohol/week: 0.0 standard drinks of alcohol     Family History   Problem Relation Name Age of Onset    Stroke Father      Stroke Sister      Asthma Daughter      Diabetes Daughter      Breast cancer Daughter      Asthma Son       Review of patient's allergies indicates:   Allergen Reactions    Iodine and iodide containing products Swelling    Skin staples [surgical stainless steel] Swelling    Egg derived      Shortness breath, lip swelling    Fish containing products     Jardiance [empagliflozin]     Latex, natural rubber Swelling    Nickel     Pravastatin      40 mg causes nausea vomitting but 20 mg ok    Shellfish containing products Swelling         Current Outpatient Medications:     amLODIPine (NORVASC) 10 MG tablet, Take 1 tablet (10 mg total) by mouth once daily., Disp: 90 tablet, Rfl: 3    butalbital-acetaminophen-caffeine -40 mg (FIORICET, ESGIC) -40 mg per tablet, TAKE 1 TABLET BY MOUTH 3 (THREE) TIMES DAILY AS NEEDED FOR HEADACHES. (MAXIMUM OF 15 TABLETS PER MONTH), Disp: 30 tablet, Rfl: 3    calcium-vitamin D 600 mg-10 mcg (400 unit) Tab, Take 2 tablets by mouth once daily., Disp: 180 tablet, Rfl: 4    cetirizine (ZYRTEC) 10 MG tablet, Take 1 tablet (10 mg total) by mouth once daily. For sinus congestion, Disp: 90 tablet, Rfl: 4    chlorthalidone (HYGROTEN) 25 MG Tab, Take 1 tablet (25 mg total) by mouth 2 (two) times daily., Disp: 180 tablet, Rfl: 3    cilostazoL (PLETAL) 50 MG Tab, Take 1 tablet (50 mg total) by mouth 2 (two) times daily before meals., Disp: 180 tablet, Rfl: 3    doxycycline (MONODOX) 100 MG capsule, Take 1 capsule (100 mg total) by mouth every 12 (twelve) hours., Disp: 20 capsule, Rfl: 0    EPINEPHrine (EPIPEN) 0.3 mg/0.3 mL AtIn, INJECT INTO THE MUSCLE ONE TIME FOR 1 DOSE, AS DIRECTED, Disp: 2 each, Rfl: 3    ezetimibe (ZETIA) 10 mg  tablet, TAKE 1 TABLET EVERY DAY, Disp: 90 tablet, Rfl: 3    famotidine (PEPCID) 40 MG tablet, Take by mouth., Disp: , Rfl:     fluticasone propionate (FLONASE) 50 mcg/actuation nasal spray, 2 sprays (100 mcg total) by Each Nostril route once daily., Disp: 48 g, Rfl: 3    furosemide (LASIX) 20 MG tablet, Take 1 tablet (20 mg total) by mouth daily as needed (edema)., Disp: 90 tablet, Rfl: 3    ipratropium (ATROVENT) 21 mcg (0.03 %) nasal spray, USE 2 SPRAYS IN EACH NOSTRIL THREE TIMES DAILY, Disp: 90 mL, Rfl: 3    ipratropium-albuteroL (COMBIVENT)  mcg/actuation inhaler, Inhale 2 puffs into the lungs every 4 (four) hours as needed for Wheezing or Shortness of Breath. Rescue, Disp: 12 g, Rfl: 3    levocetirizine (XYZAL) 5 MG tablet, Take 1 tablet (5 mg total) by mouth every evening., Disp: 90 tablet, Rfl: 3    LEXISCAN 0.4 mg/5 mL Syrg, , Disp: , Rfl:     montelukast (SINGULAIR) 10 mg tablet, Take 1 tablet (10 mg total) by mouth every evening., Disp: 90 tablet, Rfl: 3    olopatadine (PATANOL) 0.1 % ophthalmic solution, Place 1 drop into both eyes 2 (two) times daily., Disp: 5 mL, Rfl: 2    ondansetron (ZOFRAN-ODT) 4 MG TbDL, 1-2 tablets PO every 6 hours as needed for nausea/vomiting while completing bowel prep, Disp: 4 tablet, Rfl: 0    pantoprazole (PROTONIX) 40 MG tablet, Take 1 tablet (40 mg total) by mouth every morning., Disp: 90 tablet, Rfl: 3    potassium chloride SA (K-DUR,KLOR-CON) 20 MEQ tablet, Take 1 tablet (20 mEq total) by mouth once daily., Disp: 90 tablet, Rfl: 3    prednisoLONE acetate (PRED FORTE) 1 % DrpS, Place 1 drop into the right eye 4 (four) times daily., Disp: 5 mL, Rfl: 1    predniSONE (DELTASONE) 20 MG tablet, Prednisone 60 mg/ day for 3 days, 40 mg/day for 3 days,20 mg/ day for 3 days, (1/2 tablet )10 mg a day for 3 days., Disp: 20 tablet, Rfl: 0    rosuvastatin (CRESTOR) 20 MG tablet, Take 1 tablet (20 mg total) by mouth every evening., Disp: 90 tablet, Rfl: 3    semaglutide (OZEMPIC)  0.25 mg or 0.5 mg (2 mg/3 mL) pen injector, Inject 0.25 mg into the skin every 7 days., Disp: 6 mL, Rfl: 1    sod sulf-pot chloride-mag sulf (SUTAB) 1.479-0.188- 0.225 gram tablet, Take 12 tablets by mouth once daily. Take according to package instructions with indicated amount of water., Disp: 24 tablet, Rfl: 0    valsartan (DIOVAN) 160 MG tablet, Take 1 tablet (160 mg total) by mouth once daily., Disp: 90 tablet, Rfl: 3    varicella-zoster gE-AS01B, PF, (SHINGRIX) 50 mcg/0.5 mL injection, Inject 0.5 mL (one dose) into muscle now; give second dose at least 2 months later, Disp: 0.5 mL, Rfl: 1    Review of Systems:  10 Pt ROS negative except as stated in HPI    Physical Exam:  General Appearance:    A&Ox3, no distress, appears stated age   Head:    Normocephalic, without obvious abnormality, atraumatic   Eyes:    PERRL, EOM's intact   Back:     Symmetric, no curvature   Lungs:     respirations unlabored   Chest Wall:    No tenderness or deformity    Heart:  Abdomen:  Extremities:  Skin:    S1 and S2 present    Soft, non-tender    Extremities normal, atraumatic    Skin color, texture, turgor normal     Patient is stable for ophthalmic surgery under local and MAC.       _

## 2025-01-15 ENCOUNTER — OUTSIDE PLACE OF SERVICE (OUTPATIENT)
Dept: OPHTHALMOLOGY | Facility: CLINIC | Age: 75
End: 2025-01-15
Payer: MEDICARE

## 2025-01-15 ENCOUNTER — OFFICE VISIT (OUTPATIENT)
Dept: INTERNAL MEDICINE | Facility: CLINIC | Age: 75
End: 2025-01-15
Payer: MEDICARE

## 2025-01-15 VITALS
RESPIRATION RATE: 18 BRPM | TEMPERATURE: 98 F | HEIGHT: 61 IN | DIASTOLIC BLOOD PRESSURE: 60 MMHG | WEIGHT: 131.19 LBS | BODY MASS INDEX: 24.77 KG/M2 | OXYGEN SATURATION: 97 % | SYSTOLIC BLOOD PRESSURE: 112 MMHG | HEART RATE: 97 BPM

## 2025-01-15 DIAGNOSIS — J30.1 SEASONAL ALLERGIC RHINITIS DUE TO POLLEN: Chronic | ICD-10-CM

## 2025-01-15 DIAGNOSIS — I15.2 HYPERTENSION ASSOCIATED WITH DIABETES: Primary | Chronic | ICD-10-CM

## 2025-01-15 DIAGNOSIS — I27.21 HIGH PULMONARY ARTERIAL PRESSURE: Chronic | ICD-10-CM

## 2025-01-15 DIAGNOSIS — E11.69 DYSLIPIDEMIA ASSOCIATED WITH TYPE 2 DIABETES MELLITUS: Chronic | ICD-10-CM

## 2025-01-15 DIAGNOSIS — E11.59 HYPERTENSION ASSOCIATED WITH DIABETES: Primary | Chronic | ICD-10-CM

## 2025-01-15 DIAGNOSIS — E11.22 TYPE 2 DIABETES MELLITUS WITH STAGE 3A CHRONIC KIDNEY DISEASE, WITHOUT LONG-TERM CURRENT USE OF INSULIN: Chronic | ICD-10-CM

## 2025-01-15 DIAGNOSIS — E78.5 DYSLIPIDEMIA ASSOCIATED WITH TYPE 2 DIABETES MELLITUS: Chronic | ICD-10-CM

## 2025-01-15 DIAGNOSIS — E11.29 TYPE 2 DIABETES MELLITUS WITH MICROALBUMINURIA, WITHOUT LONG-TERM CURRENT USE OF INSULIN: Chronic | ICD-10-CM

## 2025-01-15 DIAGNOSIS — R80.9 TYPE 2 DIABETES MELLITUS WITH MICROALBUMINURIA, WITHOUT LONG-TERM CURRENT USE OF INSULIN: Chronic | ICD-10-CM

## 2025-01-15 DIAGNOSIS — I25.10 ATHEROSCLEROSIS OF NATIVE CORONARY ARTERY OF NATIVE HEART WITHOUT ANGINA PECTORIS: Chronic | ICD-10-CM

## 2025-01-15 DIAGNOSIS — M81.0 AGE-RELATED OSTEOPOROSIS WITHOUT CURRENT PATHOLOGICAL FRACTURE: Chronic | ICD-10-CM

## 2025-01-15 DIAGNOSIS — J44.9 CHRONIC OBSTRUCTIVE PULMONARY DISEASE, UNSPECIFIED COPD TYPE: Chronic | ICD-10-CM

## 2025-01-15 DIAGNOSIS — N18.31 TYPE 2 DIABETES MELLITUS WITH STAGE 3A CHRONIC KIDNEY DISEASE, WITHOUT LONG-TERM CURRENT USE OF INSULIN: Chronic | ICD-10-CM

## 2025-01-15 DIAGNOSIS — I70.213 ATHEROSCLEROSIS OF NATIVE ARTERY OF BOTH LOWER EXTREMITIES WITH INTERMITTENT CLAUDICATION: Chronic | ICD-10-CM

## 2025-01-15 DIAGNOSIS — K21.9 GASTROESOPHAGEAL REFLUX DISEASE WITHOUT ESOPHAGITIS: Chronic | ICD-10-CM

## 2025-01-15 DIAGNOSIS — E11.36 DIABETIC CATARACT OF BOTH EYES: Chronic | ICD-10-CM

## 2025-01-15 DIAGNOSIS — I77.1 TORTUOUS AORTA: Chronic | ICD-10-CM

## 2025-01-15 DIAGNOSIS — Z85.118 HISTORY OF PRIMARY NON-SMALL CELL CARCINOMA OF LEFT LUNG: Chronic | ICD-10-CM

## 2025-01-15 DIAGNOSIS — E11.51 TYPE 2 DIABETES MELLITUS WITH PERIPHERAL VASCULAR DISEASE: Chronic | ICD-10-CM

## 2025-01-15 PROCEDURE — 99214 OFFICE O/P EST MOD 30 MIN: CPT | Mod: HCNC,S$GLB,, | Performed by: FAMILY MEDICINE

## 2025-01-15 PROCEDURE — 1159F MED LIST DOCD IN RCRD: CPT | Mod: HCNC,CPTII,S$GLB, | Performed by: FAMILY MEDICINE

## 2025-01-15 PROCEDURE — 3072F LOW RISK FOR RETINOPATHY: CPT | Mod: HCNC,CPTII,S$GLB, | Performed by: FAMILY MEDICINE

## 2025-01-15 PROCEDURE — 1160F RVW MEDS BY RX/DR IN RCRD: CPT | Mod: HCNC,CPTII,S$GLB, | Performed by: FAMILY MEDICINE

## 2025-01-15 PROCEDURE — 3074F SYST BP LT 130 MM HG: CPT | Mod: HCNC,CPTII,S$GLB, | Performed by: FAMILY MEDICINE

## 2025-01-15 PROCEDURE — 3078F DIAST BP <80 MM HG: CPT | Mod: HCNC,CPTII,S$GLB, | Performed by: FAMILY MEDICINE

## 2025-01-15 PROCEDURE — 3288F FALL RISK ASSESSMENT DOCD: CPT | Mod: HCNC,CPTII,S$GLB, | Performed by: FAMILY MEDICINE

## 2025-01-15 PROCEDURE — 1126F AMNT PAIN NOTED NONE PRSNT: CPT | Mod: HCNC,CPTII,S$GLB, | Performed by: FAMILY MEDICINE

## 2025-01-15 PROCEDURE — 99999 PR PBB SHADOW E&M-EST. PATIENT-LVL V: CPT | Mod: PBBFAC,HCNC,, | Performed by: FAMILY MEDICINE

## 2025-01-15 PROCEDURE — 1101F PT FALLS ASSESS-DOCD LE1/YR: CPT | Mod: HCNC,CPTII,S$GLB, | Performed by: FAMILY MEDICINE

## 2025-01-15 PROCEDURE — G2211 COMPLEX E/M VISIT ADD ON: HCPCS | Mod: HCNC,S$GLB,, | Performed by: FAMILY MEDICINE

## 2025-01-15 RX ORDER — PANTOPRAZOLE SODIUM 40 MG/1
40 TABLET, DELAYED RELEASE ORAL EVERY MORNING
Qty: 90 TABLET | Refills: 3 | Status: SHIPPED | OUTPATIENT
Start: 2025-01-15

## 2025-01-15 RX ORDER — LEVOCETIRIZINE DIHYDROCHLORIDE 5 MG/1
5 TABLET, FILM COATED ORAL NIGHTLY
Qty: 90 TABLET | Refills: 3 | Status: SHIPPED | OUTPATIENT
Start: 2025-01-15 | End: 2026-01-15

## 2025-01-15 RX ORDER — MULTIVITAMIN
2 TABLET ORAL DAILY
Qty: 180 TABLET | Refills: 4 | Status: SHIPPED | OUTPATIENT
Start: 2025-01-15

## 2025-01-15 RX ORDER — VALSARTAN 160 MG/1
160 TABLET ORAL DAILY
Qty: 90 TABLET | Refills: 3 | Status: SHIPPED | OUTPATIENT
Start: 2025-01-15

## 2025-01-15 RX ORDER — EZETIMIBE 10 MG/1
10 TABLET ORAL DAILY
Qty: 90 TABLET | Refills: 3 | Status: SHIPPED | OUTPATIENT
Start: 2025-01-15

## 2025-01-15 RX ORDER — IPRATROPIUM BROMIDE 21 UG/1
2 SPRAY, METERED NASAL 3 TIMES DAILY
Qty: 90 ML | Refills: 3 | Status: SHIPPED | OUTPATIENT
Start: 2025-01-15

## 2025-01-15 RX ORDER — AMLODIPINE BESYLATE 10 MG/1
10 TABLET ORAL DAILY
Qty: 90 TABLET | Refills: 3 | Status: SHIPPED | OUTPATIENT
Start: 2025-01-15 | End: 2026-01-15

## 2025-01-15 RX ORDER — CHLORTHALIDONE 25 MG/1
25 TABLET ORAL 2 TIMES DAILY
Qty: 180 TABLET | Refills: 3 | Status: SHIPPED | OUTPATIENT
Start: 2025-01-15

## 2025-01-15 RX ORDER — CILOSTAZOL 50 MG/1
50 TABLET ORAL
Qty: 180 TABLET | Refills: 3 | Status: SHIPPED | OUTPATIENT
Start: 2025-01-15

## 2025-01-15 RX ORDER — POTASSIUM CHLORIDE 20 MEQ/1
20 TABLET, EXTENDED RELEASE ORAL DAILY
Qty: 90 TABLET | Refills: 3 | Status: SHIPPED | OUTPATIENT
Start: 2025-01-15

## 2025-01-15 RX ORDER — SEMAGLUTIDE 0.68 MG/ML
0.25 INJECTION, SOLUTION SUBCUTANEOUS
Qty: 6 ML | Refills: 1 | Status: SHIPPED | OUTPATIENT
Start: 2025-01-15

## 2025-01-15 RX ORDER — MONTELUKAST SODIUM 10 MG/1
10 TABLET ORAL NIGHTLY
Qty: 90 TABLET | Refills: 3 | Status: SHIPPED | OUTPATIENT
Start: 2025-01-15 | End: 2026-01-15

## 2025-01-15 RX ORDER — ROSUVASTATIN CALCIUM 20 MG/1
20 TABLET, COATED ORAL NIGHTLY
Qty: 90 TABLET | Refills: 3 | Status: SHIPPED | OUTPATIENT
Start: 2025-01-15

## 2025-01-15 NOTE — PROGRESS NOTES
OFFICE VISIT 1/15/25  8:40 AM CST    CHIEF COMPLAINT: Follow-up    She returns today for follow-up. She is scheduled for her first cataract surgery today, with the second planned next week, both related to her diabetic cataracts. Her lung cancer remains in remission according to recent imaging, and she will follow up with her pulmonologist in May for surveillance. Her hypertension (associated with diabetes) is controlled on amlodipine, chlorthalidone, and valsartan. She maintains stable bone health with calcium-vitamin D supplementation following prior alendronate therapy. Seasonal allergies are addressed with ipratropium nasal spray, levocetirizine, and montelukast. Her peripheral arterial disease in the lower extremities and atherosclerosis of the coronary arteries are managed with cilostazol, ezetimibe, and rosuvastatin. Dyslipidemia and tortuous aorta are likewise stable under the same lipid-lowering regimen. Her type 2 diabetes with microalbuminuria and stage 3a CKD remains controlled with semaglutide and valsartan, and her GERD is controlled with pantoprazole. She has mild pulmonary hypertension, but recent echocardiographic findings remain stable. Her COPD is managed as needed with ipratropium-albuterol. Overall, she reports no major changes, and all other chronic conditions appear well compensated at this time. Lucia reports that she is doing well on current medicines, she perceives no adverse side effects, and she wants to continue her current treatment.       1. Hypertension associated with diabetes  -     amLODIPine (NORVASC) 10 MG tablet; Take 1 tablet (10 mg total) by mouth once daily.  Dispense: 90 tablet; Refill: 3  -     chlorthalidone (HYGROTEN) 25 MG Tab; Take 1 tablet (25 mg total) by mouth 2 (two) times daily.  Dispense: 180 tablet; Refill: 3  -     valsartan (DIOVAN) 160 MG tablet; Take 1 tablet (160 mg total) by mouth once daily.  Dispense: 90 tablet; Refill: 3    2. Age-related osteoporosis  without current pathological fracture  Overview:  Alendronate therapy from 1/8/2014-1/25/2019.    Orders:  -     calcium-vitamin D 600 mg-10 mcg (400 unit) Tab; Take 2 tablets by mouth once daily.  Dispense: 180 tablet; Refill: 4    3. Seasonal allergic rhinitis due to pollen  -     ipratropium (ATROVENT) 21 mcg (0.03 %) nasal spray; 2 sprays by Each Nostril route 3 (three) times daily.  Dispense: 90 mL; Refill: 3  -     levocetirizine (XYZAL) 5 MG tablet; Take 1 tablet (5 mg total) by mouth every evening.  Dispense: 90 tablet; Refill: 3  -     montelukast (SINGULAIR) 10 mg tablet; Take 1 tablet (10 mg total) by mouth every evening.  Dispense: 90 tablet; Refill: 3    4. Atherosclerosis of native artery of both lower extremities with intermittent claudication  Overview:  CV Ultrasound doppler arterial legs bilat 1/25/23  Meenakshi suggest mild disease bilaterally.  Bilateral moderat profunda disese in 50-75% range  Bilateral severe sfa disease in the 76-99% range  Waveforms are biphasic bilaterally    Orders:  -     cilostazoL (PLETAL) 50 MG Tab; Take 1 tablet (50 mg total) by mouth 2 (two) times daily before meals.  Dispense: 180 tablet; Refill: 3  -     ezetimibe (ZETIA) 10 mg tablet; Take 1 tablet (10 mg total) by mouth once daily.  Dispense: 90 tablet; Refill: 3  -     rosuvastatin (CRESTOR) 20 MG tablet; Take 1 tablet (20 mg total) by mouth every evening.  Dispense: 90 tablet; Refill: 3    5. Dyslipidemia associated with type 2 diabetes mellitus  -     ezetimibe (ZETIA) 10 mg tablet; Take 1 tablet (10 mg total) by mouth once daily.  Dispense: 90 tablet; Refill: 3  -     rosuvastatin (CRESTOR) 20 MG tablet; Take 1 tablet (20 mg total) by mouth every evening.  Dispense: 90 tablet; Refill: 3    6. Tortuous aorta  Overview:  XR CHEST PA AND LATERAL 09/13/2019  FINDINGS:  Aorta minimally tortuous.      Orders:  -     ezetimibe (ZETIA) 10 mg tablet; Take 1 tablet (10 mg total) by mouth once daily.  Dispense: 90 tablet;  Refill: 3  -     rosuvastatin (CRESTOR) 20 MG tablet; Take 1 tablet (20 mg total) by mouth every evening.  Dispense: 90 tablet; Refill: 3    7. Atherosclerosis of native coronary artery of native heart without angina pectoris  -     ezetimibe (ZETIA) 10 mg tablet; Take 1 tablet (10 mg total) by mouth once daily.  Dispense: 90 tablet; Refill: 3  -     rosuvastatin (CRESTOR) 20 MG tablet; Take 1 tablet (20 mg total) by mouth every evening.  Dispense: 90 tablet; Refill: 3    8. Type 2 diabetes mellitus with peripheral vascular disease  -     ezetimibe (ZETIA) 10 mg tablet; Take 1 tablet (10 mg total) by mouth once daily.  Dispense: 90 tablet; Refill: 3  -     rosuvastatin (CRESTOR) 20 MG tablet; Take 1 tablet (20 mg total) by mouth every evening.  Dispense: 90 tablet; Refill: 3  -     semaglutide (OZEMPIC) 0.25 mg or 0.5 mg (2 mg/3 mL) pen injector; Inject 0.25 mg into the skin every 7 days.  Dispense: 6 mL; Refill: 1    9. Gastroesophageal reflux disease without esophagitis  -     pantoprazole (PROTONIX) 40 MG tablet; Take 1 tablet (40 mg total) by mouth every morning.  Dispense: 90 tablet; Refill: 3    10. Type 2 diabetes mellitus with microalbuminuria, without long-term current use of insulin  -     potassium chloride SA (K-DUR,KLOR-CON) 20 MEQ tablet; Take 1 tablet (20 mEq total) by mouth once daily.  Dispense: 90 tablet; Refill: 3  -     valsartan (DIOVAN) 160 MG tablet; Take 1 tablet (160 mg total) by mouth once daily.  Dispense: 90 tablet; Refill: 3    11. High pulmonary arterial pressure  Overview:  Echo 6/18/21  · Concentric remodeling and normal systolic function.  · The estimated ejection fraction is 60%.  · Grade I left ventricular diastolic dysfunction.  · With normal right ventricular systolic function.  · Mild tricuspid regurgitation.  · There is mild aortic valve stenosis.  · Aortic valve area is 1.29 cm2; peak velocity is 2.51 m/s; mean gradient is 15 mmHg.  · Normal central venous pressure (3  mmHg).  · The estimated PA systolic pressure is 38 mmHg.       Orders:  -     valsartan (DIOVAN) 160 MG tablet; Take 1 tablet (160 mg total) by mouth once daily.  Dispense: 90 tablet; Refill: 3    12. Type 2 diabetes mellitus with stage 3a chronic kidney disease, without long-term current use of insulin  -     valsartan (DIOVAN) 160 MG tablet; Take 1 tablet (160 mg total) by mouth once daily.  Dispense: 90 tablet; Refill: 3    13. Chronic obstructive pulmonary disease, unspecified COPD type  -     ipratropium-albuteroL (COMBIVENT)  mcg/actuation inhaler; Inhale 2 puffs into the lungs every 4 (four) hours as needed for Wheezing or Shortness of Breath. Rescue  Dispense: 12 g; Refill: 3    14. History of primary non-small cell carcinoma of left lung  Overview:  S/P robotic left lobectomy in November, 2020. Did not require chemotherapy or radiation. Following with Dr. Aleman.  Pathology: 1.5 x 1.5 x 1.5cm moderately differentiated NSCLC, favor adenosquamous. Margin negative,3cm to closet margin. Levels 5,6,7,11,12= negative. pT1b N0. 1% PDL1 expression  Post-operative therapy: Surveillance      15. Diabetic cataract of both eyes    No other significant complaints or concerns were reported.  Today's visit involved the intricate management of episodic problem(s) and the ongoing care for the patient's serious or complex condition(s) listed above, reflecting the inherent complexity of providing longitudinal, comprehensive evaluation and management as the central hub for the patient's primary care services.      Future Appointments   Date Time Provider Department Center   1/16/2025  8:45 AM [facility]   1/24/2025 11:15 AM Toya Michelle MD [facility] OPHTHAL [facility]   5/2/2025  7:30 AM LABORATORY, [facility] LAB [facility]   5/2/2025  7:40 AM SPECIMEN, [facility] SPECLAB [facility]   5/2/2025  7:55 AM LABORATORY, [facility] LAB [facility]   5/21/2025  8:00 AM Amalia Rosas MD HG  "CARDIO [facility]   5/21/2025  9:00 AM Desean Bailon, SHRUTHI HGVC IM [facility]   5/23/2025  9:00 AM Ramiro Aleman MD [facility] PUL[facility]     Except as noted herein, ROS is otherwise negative.    Vitals:    01/15/25 0806   BP: 112/60   BP Location: Left arm   Patient Position: Sitting   Pulse: 97   Resp: 18   Temp: 97.7 °F (36.5 °C)   TempSrc: Tympanic   SpO2: 97%   Weight: 59.5 kg (131 lb 2.8 oz)   Height: 5' 1" (1.549 m)   Physical Exam  Vitals reviewed.   Constitutional:       General: She is not in acute distress.     Appearance: Normal appearance. She is not ill-appearing, toxic-appearing or diaphoretic.   HENT:      Head: Normocephalic and atraumatic.   Eyes:      General: No scleral icterus.     Conjunctiva/sclera: Conjunctivae normal.   Neck:      Thyroid: No thyroid mass, thyromegaly or thyroid tenderness.      Vascular: No carotid bruit.   Cardiovascular:      Rate and Rhythm: Normal rate and regular rhythm.   Pulmonary:      Effort: Pulmonary effort is normal.      Breath sounds: Normal breath sounds.   Skin:     General: Skin is warm and dry.   Neurological:      Mental Status: She is alert and oriented to person, place, and time. Mental status is at baseline.   Psychiatric:         Mood and Affect: Mood normal.         Behavior: Behavior normal.         Judgment: Judgment normal.       Documentation entered by me for this encounter may have been done in part using speech-recognition technology. Although I have made an effort to ensure accuracy, "sound like" errors may exist and should be interpreted in context.    FOR FOLLOW-UP AT NEXT APPOINTMENT  @[email or web address]: Review lab results. Refills. Diabetic foot exam. A1c and F/U with me in 6 months.   "

## 2025-01-15 NOTE — PATIENT INSTRUCTIONS
Future Appointments   Date Time Provider Department Center   1/16/2025  8:45 AM Toya Michelle MD [facility] OPHTHAL [facility]   1/24/2025 11:15 AM Toya Michelle MD [facility] OPHTHAL [facility]   5/2/2025  7:30 AM LABORATORY, [facility] LAB [facility]   5/21/2025  8:00 AM Amalia Rosas MD  CARDIO [facility]      Lab Frequency Next Occurrence   CT Chest Without Contrast Once 05/01/2024   Comprehensive Metabolic Panel Once 05/03/2025   Lipid Panel Once 05/03/2025   Hemoglobin A1C Once 04/30/2025   Hemoglobin A1C Every 12 Weeks 3/21/2025, 6/13/2025, 9/5/2025, 11/28/2025, 2/20/2026, 5/15/2026, 8/7/2026, 10/30/2026, 1/22/2027, 4/16/2027, 7/9/2027, 10/1/2027   Comprehensive Metabolic Panel Every 12 Weeks 3/21/2025, 6/13/2025, 9/5/2025, 11/28/2025, 2/20/2026, 5/15/2026, 8/7/2026, 10/30/2026, 1/22/2027, 4/16/2027, 7/9/2027, 10/1/2027   Lipid Panel Every 6 Months 7/11/2025, 1/9/2026, 7/10/2026, 1/8/2027, 7/9/2027, 1/7/2028, 7/7/2028, 1/5/2029, 7/6/2029, 1/4/2030, 7/5/2030, 1/3/2031   Microalbumin/Creatinine Ratio, Urine Every 12 Months 7/11/2025, 7/10/2026, 7/9/2027, 7/7/2028, 7/6/2029, 7/5/2030, 7/4/2031, 7/2/2032, 7/1/2033, 6/30/2034, 6/29/2035, 6/27/2036

## 2025-01-16 ENCOUNTER — OFFICE VISIT (OUTPATIENT)
Dept: OPHTHALMOLOGY | Facility: CLINIC | Age: 75
End: 2025-01-16
Payer: MEDICARE

## 2025-01-16 DIAGNOSIS — Z98.890 POST-OPERATIVE STATE: Primary | ICD-10-CM

## 2025-01-16 DIAGNOSIS — Z98.41 CATARACT EXTRACTION STATUS OF EYE, RIGHT: ICD-10-CM

## 2025-01-16 PROCEDURE — 99999 PR PBB SHADOW E&M-EST. PATIENT-LVL II: CPT | Mod: PBBFAC,HCNC,, | Performed by: STUDENT IN AN ORGANIZED HEALTH CARE EDUCATION/TRAINING PROGRAM

## 2025-01-16 PROCEDURE — 99499 UNLISTED E&M SERVICE: CPT | Mod: CSM,HCNC,, | Performed by: STUDENT IN AN ORGANIZED HEALTH CARE EDUCATION/TRAINING PROGRAM

## 2025-01-16 PROCEDURE — 99024 POSTOP FOLLOW-UP VISIT: CPT | Mod: HCNC,,, | Performed by: STUDENT IN AN ORGANIZED HEALTH CARE EDUCATION/TRAINING PROGRAM

## 2025-01-16 PROCEDURE — 1159F MED LIST DOCD IN RCRD: CPT | Mod: HCNC,CPTII,, | Performed by: STUDENT IN AN ORGANIZED HEALTH CARE EDUCATION/TRAINING PROGRAM

## 2025-01-16 PROCEDURE — 1160F RVW MEDS BY RX/DR IN RCRD: CPT | Mod: HCNC,CPTII,, | Performed by: STUDENT IN AN ORGANIZED HEALTH CARE EDUCATION/TRAINING PROGRAM

## 2025-01-16 NOTE — PROGRESS NOTES
HPI     Post-op Evaluation     Additional comments: Pt reports for post op for Phaco OD. Pt states she   has a headache and feel like something is in her eye. Pt is 100% compliant   with gtts.          Last edited by Annette Frias on 1/16/2025  8:14 AM.            Assessment /Plan     For exam results, see Encounter Report.    Post-operative state  Cataract extraction status of eye, right- POD#1 S/P CEIOL OD Panoptix Doing well. Mild edema    Continue gtts to operative eye:  PF QID      Reinstructed in importance of compliance with post-op instructions including medications, shield at bedtime, protective glasses during the day, and limitation of activities. Patient instructed to call immediately for increased pain, redness or vision loss.     RTC 1 week. MOCT if PH worse than 20/25

## 2025-01-24 ENCOUNTER — OFFICE VISIT (OUTPATIENT)
Dept: OPHTHALMOLOGY | Facility: CLINIC | Age: 75
End: 2025-01-24
Payer: MEDICARE

## 2025-01-24 DIAGNOSIS — Z98.41 CATARACT EXTRACTION STATUS OF EYE, RIGHT: ICD-10-CM

## 2025-01-24 DIAGNOSIS — Z98.890 POST-OPERATIVE STATE: Primary | ICD-10-CM

## 2025-01-24 PROCEDURE — 1160F RVW MEDS BY RX/DR IN RCRD: CPT | Mod: HCNC,CPTII,S$GLB, | Performed by: STUDENT IN AN ORGANIZED HEALTH CARE EDUCATION/TRAINING PROGRAM

## 2025-01-24 PROCEDURE — 1159F MED LIST DOCD IN RCRD: CPT | Mod: HCNC,CPTII,S$GLB, | Performed by: STUDENT IN AN ORGANIZED HEALTH CARE EDUCATION/TRAINING PROGRAM

## 2025-01-24 PROCEDURE — 99999 PR PBB SHADOW E&M-EST. PATIENT-LVL III: CPT | Mod: PBBFAC,HCNC,, | Performed by: STUDENT IN AN ORGANIZED HEALTH CARE EDUCATION/TRAINING PROGRAM

## 2025-01-24 PROCEDURE — 99024 POSTOP FOLLOW-UP VISIT: CPT | Mod: HCNC,S$GLB,, | Performed by: STUDENT IN AN ORGANIZED HEALTH CARE EDUCATION/TRAINING PROGRAM

## 2025-01-24 RX ORDER — KETOROLAC TROMETHAMINE 5 MG/ML
1 SOLUTION OPHTHALMIC 4 TIMES DAILY
Qty: 5 ML | Refills: 1 | Status: SHIPPED | OUTPATIENT
Start: 2025-01-24 | End: 2026-01-24

## 2025-01-24 NOTE — PROGRESS NOTES
HPI     Post-op Evaluation     Additional comments: 1 wk post op for Phaco OD. Pt feels grit in her   right eye, says it feels like sand in her eye. Pt is compliant with drops.   Va stable.            Comments    1. +DM 2018  2. PCIOL OD Panoptix 1/15/25  NSC OS  4. Drusen OS *Shows on FAF    OD- Pred QID             Last edited by Zayra Martinez on 1/24/2025  7:53 AM.            Assessment /Plan     For exam results, see Encounter Report.    Post-operative state  Cataract extraction status of eye, right  - Impression/Plan  POW#1 S/P CEIOL OD : Doing well with no evidence of infection.     Continue gtts to operative eye:   Persistent inflammation. PF QID until next visit  Start Keto and JACINTO 128 QID     Pt given and instructed in one week postop instructions. Can resume normal activitites and d/c eye shield. OTC reading glasses can be used until evaluated for final MR.         RTC 1M for R/C and AC check w/ MOCT

## 2025-01-30 DIAGNOSIS — Z00.00 ENCOUNTER FOR MEDICARE ANNUAL WELLNESS EXAM: ICD-10-CM

## 2025-01-31 ENCOUNTER — OFFICE VISIT (OUTPATIENT)
Dept: PULMONOLOGY | Facility: CLINIC | Age: 75
End: 2025-01-31
Payer: MEDICARE

## 2025-01-31 VITALS
BODY MASS INDEX: 25.14 KG/M2 | DIASTOLIC BLOOD PRESSURE: 60 MMHG | WEIGHT: 133.19 LBS | RESPIRATION RATE: 20 BRPM | HEIGHT: 61 IN | HEART RATE: 73 BPM | SYSTOLIC BLOOD PRESSURE: 130 MMHG | OXYGEN SATURATION: 95 %

## 2025-01-31 DIAGNOSIS — F17.210 NICOTINE DEPENDENCE, CIGARETTES, UNCOMPLICATED: ICD-10-CM

## 2025-01-31 DIAGNOSIS — R06.02 SHORTNESS OF BREATH: ICD-10-CM

## 2025-01-31 DIAGNOSIS — F17.200 CURRENT EVERY DAY SMOKER: ICD-10-CM

## 2025-01-31 DIAGNOSIS — Z91.89 PULMONARY NODULE LESS THAN 6 MM IN DIAMETER WITH HIGH RISK FOR MALIGNANT NEOPLASM: ICD-10-CM

## 2025-01-31 DIAGNOSIS — R91.1 PULMONARY NODULE, LEFT: Chronic | ICD-10-CM

## 2025-01-31 DIAGNOSIS — J44.9 CHRONIC OBSTRUCTIVE PULMONARY DISEASE, UNSPECIFIED COPD TYPE: Chronic | ICD-10-CM

## 2025-01-31 DIAGNOSIS — R91.1 PULMONARY NODULE LESS THAN 6 MM IN DIAMETER WITH HIGH RISK FOR MALIGNANT NEOPLASM: ICD-10-CM

## 2025-01-31 DIAGNOSIS — J30.1 SEASONAL ALLERGIC RHINITIS DUE TO POLLEN: ICD-10-CM

## 2025-01-31 DIAGNOSIS — G47.33 OSA (OBSTRUCTIVE SLEEP APNEA): ICD-10-CM

## 2025-01-31 DIAGNOSIS — J44.1 COPD EXACERBATION: Primary | ICD-10-CM

## 2025-01-31 PROCEDURE — 99214 OFFICE O/P EST MOD 30 MIN: CPT | Mod: HCNC,S$GLB,, | Performed by: INTERNAL MEDICINE

## 2025-01-31 PROCEDURE — 1159F MED LIST DOCD IN RCRD: CPT | Mod: HCNC,CPTII,S$GLB, | Performed by: INTERNAL MEDICINE

## 2025-01-31 PROCEDURE — 99999 PR PBB SHADOW E&M-EST. PATIENT-LVL V: CPT | Mod: PBBFAC,HCNC,, | Performed by: INTERNAL MEDICINE

## 2025-01-31 PROCEDURE — 3288F FALL RISK ASSESSMENT DOCD: CPT | Mod: HCNC,CPTII,S$GLB, | Performed by: INTERNAL MEDICINE

## 2025-01-31 PROCEDURE — 3075F SYST BP GE 130 - 139MM HG: CPT | Mod: HCNC,CPTII,S$GLB, | Performed by: INTERNAL MEDICINE

## 2025-01-31 PROCEDURE — 3072F LOW RISK FOR RETINOPATHY: CPT | Mod: HCNC,CPTII,S$GLB, | Performed by: INTERNAL MEDICINE

## 2025-01-31 PROCEDURE — 1126F AMNT PAIN NOTED NONE PRSNT: CPT | Mod: HCNC,CPTII,S$GLB, | Performed by: INTERNAL MEDICINE

## 2025-01-31 PROCEDURE — 1101F PT FALLS ASSESS-DOCD LE1/YR: CPT | Mod: HCNC,CPTII,S$GLB, | Performed by: INTERNAL MEDICINE

## 2025-01-31 PROCEDURE — 3078F DIAST BP <80 MM HG: CPT | Mod: HCNC,CPTII,S$GLB, | Performed by: INTERNAL MEDICINE

## 2025-01-31 RX ORDER — DOXYCYCLINE 100 MG/1
100 CAPSULE ORAL EVERY 12 HOURS
Qty: 20 CAPSULE | Refills: 0 | Status: SHIPPED | OUTPATIENT
Start: 2025-01-31

## 2025-01-31 RX ORDER — PREDNISONE 20 MG/1
TABLET ORAL
Qty: 20 TABLET | Refills: 0 | Status: SHIPPED | OUTPATIENT
Start: 2025-01-31

## 2025-01-31 RX ORDER — PROMETHAZINE HYDROCHLORIDE AND DEXTROMETHORPHAN HYDROBROMIDE 6.25; 15 MG/5ML; MG/5ML
5 SYRUP ORAL EVERY 6 HOURS PRN
Qty: 240 ML | Refills: 0 | Status: SHIPPED | OUTPATIENT
Start: 2025-01-31

## 2025-01-31 RX ORDER — FLUTICASONE PROPIONATE 50 MCG
2 SPRAY, SUSPENSION (ML) NASAL DAILY
Qty: 48 G | Refills: 3 | Status: SHIPPED | OUTPATIENT
Start: 2025-01-31

## 2025-01-31 NOTE — PROGRESS NOTES
Subjective:     Patient ID: Lucia Barlow is a 75 y.o. female.    Chief Complaint:  cough and  chest congestion    HPI   75 y.o. smoker with history of lung cancer, Chronic Obstructive Pulmonary Disease and sinusitis c/o post nasal drip and sinus congestion - for 7- 10 days.  Cough and chest congestion with yellow sputum production     S/p thoracotomy for left sided nonsmall cell lung cancer in 2020  History of lung cancer:1.5 x 1.5 x 1.5cm moderately differentiated NSCLC, favor adenosquamous    Lung Nodule  She presents for evaluation and treatment of a lung mass. The patient reports that the imaging was performed to evaluate symptoms of dyspnea on exertion, productive cough, shortness of breath and wheezing which have been present for 3 months and are unchanged. Symptoms are exacerbated by walking and relieved by rest. The patient denies other associated symptoms. She has a history of 60 pack years. The patient has no known exposure to tuberculosis. The patient does have a history of cancer.  Sinus Pain  Patient complains of clear rhinorrhea, congestion, cough, headaches, nasal congestion, post nasal drip, and sneezing. Onset of symptoms was 7- 10 days ago. Symptoms have been gradually worsening since that time. She is drinking plenty of fluids.  Past history is significant for chronic bronchitis. Patient is smoker  - a few a day      Past Medical History:   Diagnosis Date    Abnormal EKG 5/19/2023    Atherosclerosis of artery of both lower extremities 1/17/2014    Atherosclerosis of native coronary artery of native heart without angina pectoris 11/18/2016    Atherosclerotic PVD with intermittent claudication 1/17/2014    Chronic bronchitis     COPD (chronic obstructive pulmonary disease)     Coronary artery disease involving native coronary artery of native heart without angina pectoris 5/19/2023    Diabetes with neurologic complications     Dyslipidemia associated with type 2 diabetes mellitus 6/14/2013     Dyslipidemia associated with type 2 diabetes mellitus     Ex-smoker     Gastroesophageal reflux disease without esophagitis 1/25/2019    Hyperlipidemia     Hypertension     Iron deficiency anemia secondary to inadequate dietary iron intake 6/3/2022    NSCLC of left lung 11/19/2020    IVANIA (obstructive sleep apnea) 2/11/2021    Osteoporosis 1/16 rosita 1/18    Pneumothorax after biopsy 10/21/2020    PVD (peripheral vascular disease)     Renal manifestation of secondary diabetes mellitus     S/P peripheral artery angioplasty 2/7/2014    Seasonal allergic rhinitis due to pollen     Shortness of breath 5/19/2023    Simple chronic bronchitis     Tobacco dependence     Type 2 diabetes mellitus with microalbuminuria, without long-term current use of insulin 3/29/2019    Type 2 diabetes mellitus with peripheral vascular disease     Type 2 diabetes mellitus with stage 3 chronic kidney disease, without long-term current use of insulin 2/1/2019    Type 2 diabetes mellitus without retinopathy 2/1/2019    Urinary incontinence      Past Surgical History:   Procedure Laterality Date    ANGIOPLASTY Bilateral 01/24/2014    aortoiliac stenting     CARPAL TUNNEL RELEASE  2003    [facility]    COLECTOMY  approximate 2005    pt states 1in colon -dx with benign mass removed-states no colon cancer    COLONOSCOPY N/A 11/9/2016    Procedure: COLONOSCOPY;  Surgeon: Dmitri Sterling MD;  Location: [facility];  Service: Endoscopy;  Laterality: N/A;    COLONOSCOPY N/A 11/15/2019    Procedure: COLONOSCOPY;  Surgeon: Marko Vicente MD;  Location: [facility];  Service: Endoscopy;  Laterality: N/A;    COLONOSCOPY N/A 3/25/2022    Procedure: COLONOSCOPY;  Surgeon: Paola Feldman MD;  Location: [facility];  Service: Endoscopy;  Laterality: N/A;    ESOPHAGOGASTRODUODENOSCOPY N/A 3/25/2022    Procedure: EGD (ESOPHAGOGASTRODUODENOSCOPY);  Surgeon: Paola Feldman MD;  Location: [facility];  Service: Endoscopy;  Laterality: N/A;    HYSTERECTOMY      no  cancer    INJECTION OF ANESTHETIC AGENT AROUND MULTIPLE INTERCOSTAL NERVES Left 11/19/2020    Procedure: BLOCK, NERVE, INTERCOSTAL, 2 OR MORE;  Surgeon: Amrit Flores MD;  Location: [facility] OR 2ND FLR;  Service: Thoracic;  Laterality: Left;    INTRALUMINAL GASTROINTESTINAL TRACT IMAGING VIA CAPSULE N/A 11/8/2023    Procedure: IMAGING PROCEDURE, GI TRACT, INTRALUMINAL, VIA CAPSULE;  Surgeon: Arley, First Available;  Location: [facility] ENDO;  Service: Endoscopy;  Laterality: N/A;    OOPHORECTOMY      SURGICAL REMOVAL OF LYMPH NODE Left 11/19/2020    Procedure: EXCISION, LYMPH NODE;  Surgeon: Amrit Flores MD;  Location: [facility] OR 2ND FLR;  Service: Thoracic;  Laterality: Left;  mediastinal lymph node disection    XI ROBOTIC RATS,WITH LOBECTOMY,LUNG Left 11/19/2020    Procedure: XI ROBOTIC RATS,WITH LOBECTOMY,LUNG;  Surgeon: Amrit Flores MD;  Location: [facility] OR 2ND FLR;  Service: Thoracic;  Laterality: Left;  Lingulectomy.     Review of patient's allergies indicates:   Allergen Reactions    Iodine and iodide containing products Swelling    Skin staples [surgical stainless steel] Swelling    Egg derived      Shortness breath, lip swelling    Fish containing products     Jardiance [empagliflozin]     Latex, natural rubber Swelling    Nickel     Pravastatin      40 mg causes nausea vomitting but 20 mg ok    Shellfish containing products Swelling     Current Outpatient Medications on File Prior to Visit   Medication Sig Dispense Refill    amLODIPine (NORVASC) 10 MG tablet Take 1 tablet (10 mg total) by mouth once daily. 90 tablet 3    calcium-vitamin D 600 mg-10 mcg (400 unit) Tab Take 2 tablets by mouth once daily. 180 tablet 4    chlorthalidone (HYGROTEN) 25 MG Tab Take 1 tablet (25 mg total) by mouth 2 (two) times daily. 180 tablet 3    cilostazoL (PLETAL) 50 MG Tab Take 1 tablet (50 mg total) by mouth 2 (two) times daily before meals. 180 tablet 3    doxycycline (MONODOX) 100 MG capsule Take 1 capsule  (100 mg total) by mouth every 12 (twelve) hours. 20 capsule 0    EPINEPHrine (EPIPEN) 0.3 mg/0.3 mL AtIn INJECT INTO THE MUSCLE ONE TIME FOR 1 DOSE, AS DIRECTED 2 each 3    ezetimibe (ZETIA) 10 mg tablet Take 1 tablet (10 mg total) by mouth once daily. 90 tablet 3    famotidine (PEPCID) 40 MG tablet Take by mouth.      furosemide (LASIX) 20 MG tablet Take 1 tablet (20 mg total) by mouth daily as needed (edema). 90 tablet 3    ipratropium (ATROVENT) 21 mcg (0.03 %) nasal spray 2 sprays by Each Nostril route 3 (three) times daily. 90 mL 3    ipratropium-albuteroL (COMBIVENT)  mcg/actuation inhaler Inhale 2 puffs into the lungs every 4 (four) hours as needed for Wheezing or Shortness of Breath. Rescue 12 g 3    ketorolac 0.5% (ACULAR) 0.5 % Drop Place 1 drop into the right eye 4 (four) times daily. 5 mL 1    levocetirizine (XYZAL) 5 MG tablet Take 1 tablet (5 mg total) by mouth every evening. 90 tablet 3    montelukast (SINGULAIR) 10 mg tablet Take 1 tablet (10 mg total) by mouth every evening. 90 tablet 3    olopatadine (PATANOL) 0.1 % ophthalmic solution Place 1 drop into both eyes 2 (two) times daily. 5 mL 2    pantoprazole (PROTONIX) 40 MG tablet Take 1 tablet (40 mg total) by mouth every morning. 90 tablet 3    potassium chloride SA (K-DUR,KLOR-CON) 20 MEQ tablet Take 1 tablet (20 mEq total) by mouth once daily. 90 tablet 3    prednisoLONE acetate (PRED FORTE) 1 % DrpS Place 1 drop into the right eye 4 (four) times daily. 5 mL 1    predniSONE (DELTASONE) 20 MG tablet Prednisone 60 mg/ day for 3 days, 40 mg/day for 3 days,20 mg/ day for 3 days, (1/2 tablet )10 mg a day for 3 days. 20 tablet 0    rosuvastatin (CRESTOR) 20 MG tablet Take 1 tablet (20 mg total) by mouth every evening. 90 tablet 3    semaglutide (OZEMPIC) 0.25 mg or 0.5 mg (2 mg/3 mL) pen injector Inject 0.25 mg into the skin every 7 days. 6 mL 1    valsartan (DIOVAN) 160 MG tablet Take 1 tablet (160 mg total) by mouth once daily. 90 tablet 3     [DISCONTINUED] fluticasone propionate (FLONASE) 50 mcg/actuation nasal spray 2 sprays (100 mcg total) by Each Nostril route once daily. 48 g 3     No current facility-administered medications on file prior to visit.     Social History     Socioeconomic History    Marital status:     Number of children: 4   Occupational History    Occupation:    Tobacco Use    Smoking status: Former     Current packs/day: 0.00     Average packs/day: 1 pack/day for 60.0 years (60.0 ttl pk-yrs)     Types: Cigarettes     Start date: 1960     Quit date: 1/10/2020     Years since quittin.0    Smokeless tobacco: Former   Substance and Sexual Activity    Alcohol use: No     Alcohol/week: 0.0 standard drinks of alcohol    Drug use: No    Sexual activity: Never   Social History Narrative    Son smoker, no pets in household.     Social Drivers of Health     Financial Resource Strain: High Risk (2023)    Overall Financial Resource Strain (CARDIA)     Difficulty of Paying Living Expenses: Very hard   Food Insecurity: Food Insecurity Present (2023)    Hunger Vital Sign     Worried About Running Out of Food in the Last Year: Often true     Ran Out of Food in the Last Year: Often true   Transportation Needs: No Transportation Needs (2023)    PRAPARE - Transportation     Lack of Transportation (Medical): No     Lack of Transportation (Non-Medical): No   Physical Activity: Inactive (2023)    Exercise Vital Sign     Days of Exercise per Week: 0 days     Minutes of Exercise per Session: 0 min   Stress: No Stress Concern Present (2023)    [employer] - Occupational Stress Questionnaire     Feeling of Stress : Only a little   Housing Stability: Low Risk  (2023)    Housing Stability Vital Sign     Unable to Pay for Housing in the Last Year: No     Number of Places Lived in the Last Year: 1     Unstable Housing in the Last Year: No     Family History   Problem  "Relation Name Age of Onset    Stroke Father      Stroke Sister      Asthma Daughter      Diabetes Daughter      Breast cancer Daughter      Asthma Son         Review of Systems   Constitutional:  Positive for fatigue. Negative for fever.   HENT:  Positive for postnasal drip, rhinorrhea and congestion.    Eyes:  Negative for redness and itching.   Respiratory:  Positive for cough, sputum production, shortness of breath, dyspnea on extertion, use of rescue inhaler and Paroxysmal Nocturnal Dyspnea.    Cardiovascular:  Negative for chest pain, palpitations and leg swelling.   Genitourinary:  Negative for difficulty urinating and hematuria.   Endocrine:  Negative for cold intolerance and heat intolerance.    Skin:  Negative for rash.   Gastrointestinal:  Negative for nausea and abdominal pain.   Neurological:  Negative for dizziness, syncope, weakness and light-headedness.   Hematological:  Negative for adenopathy. Does not bruise/bleed easily.   Psychiatric/Behavioral:  Negative for sleep disturbance. The patient is not nervous/anxious.        Objective:      /60 (Patient Position: Sitting)   Pulse 73   Resp 20   Ht 5' 1" (1.549 m)   Wt 60.4 kg (133 lb 2.5 oz)   SpO2 95%   BMI 25.16 kg/m²   Physical Exam  Vitals and nursing note reviewed.   Constitutional:       Appearance: She is well-developed.   HENT:      Head: Normocephalic and atraumatic.      Nose: Congestion and rhinorrhea present.      Mouth/Throat:      Pharynx: No oropharyngeal exudate.   Eyes:      Conjunctiva/sclera: Conjunctivae normal.      Pupils: Pupils are equal, round, and reactive to light.   Neck:      Thyroid: No thyromegaly.      Vascular: No JVD.      Trachea: No tracheal deviation.   Cardiovascular:      Rate and Rhythm: Normal rate and regular rhythm.      Heart sounds: Normal heart sounds.   Pulmonary:      Effort: Pulmonary effort is normal. No respiratory distress.      Breath sounds: Examination of the right-lower field reveals " "wheezing. Examination of the left-lower field reveals wheezing. Decreased breath sounds and wheezing present. No rhonchi or rales.   Chest:      Chest wall: No tenderness.   Abdominal:      General: Bowel sounds are normal.      Palpations: Abdomen is soft.   Musculoskeletal:         General: Swelling present. Normal range of motion.      Cervical back: Neck supple.      Right lower leg: Edema present.      Left lower leg: Edema present.      Comments: Swelling in arms and legs   Lymphadenopathy:      Cervical: No cervical adenopathy.   Skin:     General: Skin is warm and dry.   Neurological:      Mental Status: She is alert and oriented to person, place, and time.       Personal Diagnostic Review  none pertinent        1/31/2025    10:34 AM   Pulmonary Studies Review   SpO2 95 %   Height 5' 1" (1.549 m)   Weight 60.4 kg (133 lb 2.5 oz)   BMI (Calculated) 25.2   Predicted Distance 271.84   Predicted Distance Meters (Calculated) 410.46 meters       IOL Master - OD - Right Eye  Axial lengths reviewed with good spike pattern and reliability. Lens   chosen for operative eye      Office Spirometry Results:         1/31/2025    10:34 AM 1/15/2025     8:06 AM 11/15/2024     8:41 AM 11/1/2024     9:39 AM 11/1/2024     9:35 AM 7/12/2024     7:53 AM 6/26/2024     7:38 AM   Pulmonary Function Tests   SpO2 95 % 97 %  98 % 98 % 100 %    Height 5' 1" (1.549 m) 5' 1" (1.549 m) 5' 1" (1.549 m) 5' 1" (1.549 m) 5' 1" (1.549 m) 5' 1" (1.549 m) 5' 1" (1.549 m)   Weight 60.4 kg (133 lb 2.5 oz) 59.5 kg (131 lb 2.8 oz) 60.8 kg (134 lb) 60.9 kg (134 lb 4.2 oz) 60.9 kg (134 lb 4.2 oz) 57.2 kg (126 lb 1.7 oz) 55.2 kg (121 lb 11.1 oz)   BMI (Calculated) 25.2 24.8 25.3 25.4 25.4 23.8 23         1/31/2025    10:34 AM   Pulmonary Studies Review   SpO2 95 %   Height 5' 1" (1.549 m)   Weight 60.4 kg (133 lb 2.5 oz)   BMI (Calculated) 25.2   Predicted Distance 271.84   Predicted Distance Meters (Calculated) 410.46 meters         Assessment:        "       COPD exacerbation  -     predniSONE (DELTASONE) 20 MG tablet; Prednisone 60 mg/ day for 3 days, 40 mg/day for 3 days,20 mg/ day for 3 days, (1/2 tablet )10 mg a day for 3 days.  Dispense: 20 tablet; Refill: 0  -     doxycycline (VIBRAMYCIN) 100 MG Cap; Take 1 capsule (100 mg total) by mouth every 12 (twelve) hours.  Dispense: 20 capsule; Refill: 0  -     promethazine-dextromethorphan (PROMETHAZINE-DM) 6.25-15 mg/5 mL Syrp; Take 5 mLs by mouth every 6 (six) hours as needed (cough).  Dispense: 240 mL; Refill: 0    Chronic obstructive pulmonary disease, unspecified COPD type  -     Spirometry with/without bronchodilator; Future; Expected date: 08/03/2025    IVANIA (obstructive sleep apnea)    Pulmonary nodule less than 6 mm in diameter with high risk for malignant neoplasm - RIght lower lobe    Pulmonary nodule, left - 8 mm , high risk for lung cancer  -     CT Chest Lung Screening Low Dose; Future; Expected date: 01/31/2025    Seasonal allergic rhinitis due to pollen  -     fluticasone propionate (FLONASE) 50 mcg/actuation nasal spray; 2 sprays (100 mcg total) by Each Nostril route once daily.  Dispense: 48 g; Refill: 3    Shortness of breath    Current every day smoker    Nicotine dependence, cigarettes, uncomplicated  -     CT Chest Lung Screening Low Dose; Future; Expected date: 01/31/2025            Outpatient Encounter Medications as of 1/31/2025   Medication Sig Dispense Refill    amLODIPine (NORVASC) 10 MG tablet Take 1 tablet (10 mg total) by mouth once daily. 90 tablet 3    calcium-vitamin D 600 mg-10 mcg (400 unit) Tab Take 2 tablets by mouth once daily. 180 tablet 4    chlorthalidone (HYGROTEN) 25 MG Tab Take 1 tablet (25 mg total) by mouth 2 (two) times daily. 180 tablet 3    cilostazoL (PLETAL) 50 MG Tab Take 1 tablet (50 mg total) by mouth 2 (two) times daily before meals. 180 tablet 3    doxycycline (MONODOX) 100 MG capsule Take 1 capsule (100 mg total) by mouth every 12 (twelve) hours. 20 capsule 0     doxycycline (VIBRAMYCIN) 100 MG Cap Take 1 capsule (100 mg total) by mouth every 12 (twelve) hours. 20 capsule 0    EPINEPHrine (EPIPEN) 0.3 mg/0.3 mL AtIn INJECT INTO THE MUSCLE ONE TIME FOR 1 DOSE, AS DIRECTED 2 each 3    ezetimibe (ZETIA) 10 mg tablet Take 1 tablet (10 mg total) by mouth once daily. 90 tablet 3    famotidine (PEPCID) 40 MG tablet Take by mouth.      fluticasone propionate (FLONASE) 50 mcg/actuation nasal spray 2 sprays (100 mcg total) by Each Nostril route once daily. 48 g 3    furosemide (LASIX) 20 MG tablet Take 1 tablet (20 mg total) by mouth daily as needed (edema). 90 tablet 3    ipratropium (ATROVENT) 21 mcg (0.03 %) nasal spray 2 sprays by Each Nostril route 3 (three) times daily. 90 mL 3    ipratropium-albuteroL (COMBIVENT)  mcg/actuation inhaler Inhale 2 puffs into the lungs every 4 (four) hours as needed for Wheezing or Shortness of Breath. Rescue 12 g 3    ketorolac 0.5% (ACULAR) 0.5 % Drop Place 1 drop into the right eye 4 (four) times daily. 5 mL 1    levocetirizine (XYZAL) 5 MG tablet Take 1 tablet (5 mg total) by mouth every evening. 90 tablet 3    montelukast (SINGULAIR) 10 mg tablet Take 1 tablet (10 mg total) by mouth every evening. 90 tablet 3    olopatadine (PATANOL) 0.1 % ophthalmic solution Place 1 drop into both eyes 2 (two) times daily. 5 mL 2    pantoprazole (PROTONIX) 40 MG tablet Take 1 tablet (40 mg total) by mouth every morning. 90 tablet 3    potassium chloride SA (K-DUR,KLOR-CON) 20 MEQ tablet Take 1 tablet (20 mEq total) by mouth once daily. 90 tablet 3    prednisoLONE acetate (PRED FORTE) 1 % DrpS Place 1 drop into the right eye 4 (four) times daily. 5 mL 1    predniSONE (DELTASONE) 20 MG tablet Prednisone 60 mg/ day for 3 days, 40 mg/day for 3 days,20 mg/ day for 3 days, (1/2 tablet )10 mg a day for 3 days. 20 tablet 0    predniSONE (DELTASONE) 20 MG tablet Prednisone 60 mg/ day for 3 days, 40 mg/day for 3 days,20 mg/ day for 3 days, (1/2 tablet )10 mg a  day for 3 days. 20 tablet 0    promethazine-dextromethorphan (PROMETHAZINE-DM) 6.25-15 mg/5 mL Syrp Take 5 mLs by mouth every 6 (six) hours as needed (cough). 240 mL 0    rosuvastatin (CRESTOR) 20 MG tablet Take 1 tablet (20 mg total) by mouth every evening. 90 tablet 3    semaglutide (OZEMPIC) 0.25 mg or 0.5 mg (2 mg/3 mL) pen injector Inject 0.25 mg into the skin every 7 days. 6 mL 1    valsartan (DIOVAN) 160 MG tablet Take 1 tablet (160 mg total) by mouth once daily. 90 tablet 3    [DISCONTINUED] fluticasone propionate (FLONASE) 50 mcg/actuation nasal spray 2 sprays (100 mcg total) by Each Nostril route once daily. 48 g 3     No facility-administered encounter medications on file as of 2025.     Plan:       Requested Prescriptions     Signed Prescriptions Disp Refills    predniSONE (DELTASONE) 20 MG tablet 20 tablet 0     Sig: Prednisone 60 mg/ day for 3 days, 40 mg/day for 3 days,20 mg/ day for 3 days, (1/2 tablet )10 mg a day for 3 days.    doxycycline (VIBRAMYCIN) 100 MG Cap 20 capsule 0     Sig: Take 1 capsule (100 mg total) by mouth every 12 (twelve) hours.    promethazine-dextromethorphan (PROMETHAZINE-DM) 6.25-15 mg/5 mL Syrp 240 mL 0     Sig: Take 5 mLs by mouth every 6 (six) hours as needed (cough).    fluticasone propionate (FLONASE) 50 mcg/actuation nasal spray 48 g 3     Si sprays (100 mcg total) by Each Nostril route once daily.     Problem List Items Addressed This Visit       Chronic obstructive pulmonary disease (Chronic)    Relevant Orders    Spirometry with/without bronchodilator    Pulmonary nodule, left - 8 mm , high risk for lung cancer (Chronic)    Relevant Orders    CT Chest Lung Screening Low Dose    Current every day smoker    Overview     QUIT in  (prior to lung surgery for cancer).         IVANIA (obstructive sleep apnea)    Overview     HOME SLEEP TEST 2020   PHYSICIAN INTERPRETATION AND COMMENTS: Findings are consistent with mild, non-positional obstructive  sleep  apnea (IVANIA). Overall AHI was 9.0/hr with 5.8 hours data. Spo2 sasha was 84.7%.  Refuses CPAP - Intolerant of CPAP.         Pulmonary nodule less than 6 mm in diameter with high risk for malignant neoplasm - RIght lower lobe    Overview     Impression:     Postsurgical change of lingulectomy, stable.     Single new subsolid 4 mm right lower lobe pulmonary nodule.  Remaining pulmonary nodules appear stable.  Clinical concerns dictate the schedule for continued surveillance.     Diffuse mural thickening along the greater curvature of the stomach, nonspecific and possibly related to nondistention.  Underlying mass lesion cannot be excluded.        Electronically signed by: Cristina Peoples  Date:                                            11/02/2021  Time:                                           19:34         Seasonal allergic rhinitis due to pollen    Relevant Medications    fluticasone propionate (FLONASE) 50 mcg/actuation nasal spray    Shortness of breath     Other Visit Diagnoses       COPD exacerbation    -  Primary    Relevant Medications    predniSONE (DELTASONE) 20 MG tablet    doxycycline (VIBRAMYCIN) 100 MG Cap    promethazine-dextromethorphan (PROMETHAZINE-DM) 6.25-15 mg/5 mL Syrp    Nicotine dependence, cigarettes, uncomplicated        Relevant Orders    CT Chest Lung Screening Low Dose               Follow up in about 6 months (around 7/31/2025) for CT - on return visit, rick - on return.    MEDICAL DECISION MAKING: Moderate to high complexity.  Overall, the multiple problems listed are of moderate to high severity that may impact quality of life and activities of daily living. Side effects of medications, treatment plan as well as options and alternatives reviewed and discussed with patient. There was counseling of patient concerning these issues.    Total time spent in counseling and coordination of care - 28  minutes of total time spent on the encounter, which includes face to face time and  non-face to face time preparing to see the patient (eg, review of tests), Obtaining and/or reviewing separately obtained history, Documenting clinical information in the electronic or other health record, Independently interpreting results (not separately reported) and communicating results to the patient/family/caregiver, or Care coordination (not separately reported).    Time was used in discussion of prognosis, risks, benefits of treatment, instructions and compliance with regimen . Discussion with other physicians and/or health care providers - home health or for use of durable medical equipment (oxygen, nebulizers, CPAP, BiPAP) occurred.

## 2025-02-26 ENCOUNTER — OFFICE VISIT (OUTPATIENT)
Dept: OPHTHALMOLOGY | Facility: CLINIC | Age: 75
End: 2025-02-26
Payer: MEDICARE

## 2025-02-26 DIAGNOSIS — Z98.41 CATARACT EXTRACTION STATUS OF EYE, RIGHT: ICD-10-CM

## 2025-02-26 DIAGNOSIS — Z98.890 POST-OPERATIVE STATE: Primary | ICD-10-CM

## 2025-02-26 DIAGNOSIS — H25.12 NUCLEAR SCLEROSIS OF LEFT EYE: ICD-10-CM

## 2025-02-26 PROCEDURE — 99024 POSTOP FOLLOW-UP VISIT: CPT | Mod: HCNC,S$GLB,, | Performed by: STUDENT IN AN ORGANIZED HEALTH CARE EDUCATION/TRAINING PROGRAM

## 2025-02-26 PROCEDURE — 1159F MED LIST DOCD IN RCRD: CPT | Mod: HCNC,CPTII,S$GLB, | Performed by: STUDENT IN AN ORGANIZED HEALTH CARE EDUCATION/TRAINING PROGRAM

## 2025-02-26 PROCEDURE — 1160F RVW MEDS BY RX/DR IN RCRD: CPT | Mod: HCNC,CPTII,S$GLB, | Performed by: STUDENT IN AN ORGANIZED HEALTH CARE EDUCATION/TRAINING PROGRAM

## 2025-02-26 PROCEDURE — 99999 PR PBB SHADOW E&M-EST. PATIENT-LVL III: CPT | Mod: PBBFAC,HCNC,, | Performed by: STUDENT IN AN ORGANIZED HEALTH CARE EDUCATION/TRAINING PROGRAM

## 2025-02-26 NOTE — PROGRESS NOTES
HPI     Post-op Evaluation     Additional comments: Pt reports for 1 month post op evaluation. No pain   or irritation. Va stable. Pt is compliant with drops.   Lab Results       Component                Value               Date                       HGBA1C                   5.7 (H)             10/23/2024                       Cataract     Additional comments: C/o blurred vision , glare, trouble driving at night   , difficulty reading, and seeing the television over the past several   years    Which eye is most affected? OS    Activities of daily living impacted: YES      Do you have difficulty, even with glasses, with the following activities?    Reading small print such as labels on medicine bottles, a telephone book,   or food labels.   Yes  Reading a newspaper or book?  Yes  Seeing steps, stairs, or curbs  Yes  Reading traffic signs, street signs, or store sign           Comments    1. +DM 2018  2. 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            Assessment /Plan     For exam results, see Encounter Report.    Post-operative state  Cataract extraction status of eye, right- Impression/Plan  S/P CEIOL OD : Doing well with no evidence of infection.     Continue gtts to operative eye:   Persistent inflammation. PF QID until next visit. Keto QID until next     Pt given and instructed in one week postop instructions. Can resume normal activitites and d/c eye shield. OTC reading glasses can be used until evaluated for final MR.     Nuclear sclerosis of left eye- Will repeat [place] again in 1M after dry eye treatments      RTC 1M PO and repeat argos

## 2025-03-26 ENCOUNTER — OFFICE VISIT (OUTPATIENT)
Dept: OPHTHALMOLOGY | Facility: CLINIC | Age: 75
End: 2025-03-26
Payer: MEDICARE

## 2025-03-26 DIAGNOSIS — H59.039 IRVINE-GASS SYNDROME: ICD-10-CM

## 2025-03-26 DIAGNOSIS — Z98.41 CATARACT EXTRACTION STATUS OF EYE, RIGHT: ICD-10-CM

## 2025-03-26 DIAGNOSIS — Z98.890 POST-OPERATIVE STATE: Primary | ICD-10-CM

## 2025-03-26 DIAGNOSIS — H25.12 NUCLEAR SCLEROSIS OF LEFT EYE: ICD-10-CM

## 2025-03-26 PROCEDURE — 1160F RVW MEDS BY RX/DR IN RCRD: CPT | Mod: HCNC,CPTII,S$GLB, | Performed by: STUDENT IN AN ORGANIZED HEALTH CARE EDUCATION/TRAINING PROGRAM

## 2025-03-26 PROCEDURE — 1159F MED LIST DOCD IN RCRD: CPT | Mod: HCNC,CPTII,S$GLB, | Performed by: STUDENT IN AN ORGANIZED HEALTH CARE EDUCATION/TRAINING PROGRAM

## 2025-03-26 PROCEDURE — 99999 PR PBB SHADOW E&M-EST. PATIENT-LVL III: CPT | Mod: PBBFAC,HCNC,, | Performed by: STUDENT IN AN ORGANIZED HEALTH CARE EDUCATION/TRAINING PROGRAM

## 2025-03-26 PROCEDURE — 99024 POSTOP FOLLOW-UP VISIT: CPT | Mod: HCNC,S$GLB,, | Performed by: STUDENT IN AN ORGANIZED HEALTH CARE EDUCATION/TRAINING PROGRAM

## 2025-03-26 NOTE — PROGRESS NOTES
HPI     Post-op Evaluation     Additional comments: S/p 2 month PCIOL OD. Denies pain or irritation.   Using Pred/keto QID OU. States dryness has not improved.            Comments    1. +DM 2018  2. 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            Assessment /Plan     For exam results, see Encounter Report.    Post-operative state  Cataract extraction status of eye, right- Impression/Plan  S/P CEIOL OD : Doing well with no evidence of infection.     Continue gtts to operative eye:   Persistent inflammation and tung yobany present. 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

## 2025-04-15 ENCOUNTER — PATIENT MESSAGE (OUTPATIENT)
Dept: NEUROLOGY | Facility: CLINIC | Age: 75
End: 2025-04-15
Payer: MEDICARE

## 2025-04-30 ENCOUNTER — LAB VISIT (OUTPATIENT)
Dept: LAB | Facility: HOSPITAL | Age: 75
End: 2025-04-30
Attending: INTERNAL MEDICINE
Payer: MEDICARE

## 2025-04-30 ENCOUNTER — OFFICE VISIT (OUTPATIENT)
Dept: OPHTHALMOLOGY | Facility: CLINIC | Age: 75
End: 2025-04-30
Payer: MEDICARE

## 2025-04-30 DIAGNOSIS — N18.31 TYPE 2 DIABETES MELLITUS WITH STAGE 3A CHRONIC KIDNEY DISEASE, WITHOUT LONG-TERM CURRENT USE OF INSULIN: Chronic | ICD-10-CM

## 2025-04-30 DIAGNOSIS — Z98.890 POST-OPERATIVE STATE: Primary | ICD-10-CM

## 2025-04-30 DIAGNOSIS — E11.29 TYPE 2 DIABETES MELLITUS WITH MICROALBUMINURIA, WITHOUT LONG-TERM CURRENT USE OF INSULIN: ICD-10-CM

## 2025-04-30 DIAGNOSIS — E11.69 DYSLIPIDEMIA ASSOCIATED WITH TYPE 2 DIABETES MELLITUS: Chronic | ICD-10-CM

## 2025-04-30 DIAGNOSIS — Z91.148 NON COMPLIANCE W MEDICATION REGIMEN: ICD-10-CM

## 2025-04-30 DIAGNOSIS — Z98.41 CATARACT EXTRACTION STATUS OF EYE, RIGHT: ICD-10-CM

## 2025-04-30 DIAGNOSIS — R80.9 TYPE 2 DIABETES MELLITUS WITH MICROALBUMINURIA, WITHOUT LONG-TERM CURRENT USE OF INSULIN: ICD-10-CM

## 2025-04-30 DIAGNOSIS — H59.039 IRVINE-GASS SYNDROME: ICD-10-CM

## 2025-04-30 DIAGNOSIS — E78.5 DYSLIPIDEMIA ASSOCIATED WITH TYPE 2 DIABETES MELLITUS: Chronic | ICD-10-CM

## 2025-04-30 DIAGNOSIS — E11.22 TYPE 2 DIABETES MELLITUS WITH STAGE 3A CHRONIC KIDNEY DISEASE, WITHOUT LONG-TERM CURRENT USE OF INSULIN: Chronic | ICD-10-CM

## 2025-04-30 LAB
ALBUMIN SERPL BCP-MCNC: 3.5 G/DL (ref 3.5–5.2)
ALBUMIN/CREAT UR: 24.4 UG/MG
ALP SERPL-CCNC: 93 UNIT/L (ref 40–150)
ALT SERPL W/O P-5'-P-CCNC: 8 UNIT/L (ref 10–44)
ANION GAP (OHS): 11 MMOL/L (ref 8–16)
AST SERPL-CCNC: 20 UNIT/L (ref 11–45)
BILIRUB SERPL-MCNC: 0.3 MG/DL (ref 0.1–1)
BUN SERPL-MCNC: 28 MG/DL (ref 8–23)
CALCIUM SERPL-MCNC: 9.9 MG/DL (ref 8.7–10.5)
CHLORIDE SERPL-SCNC: 103 MMOL/L (ref 95–110)
CHOLEST SERPL-MCNC: 152 MG/DL (ref 120–199)
CHOLEST/HDLC SERPL: 2.3 {RATIO} (ref 2–5)
CO2 SERPL-SCNC: 27 MMOL/L (ref 23–29)
CREAT SERPL-MCNC: 1.2 MG/DL (ref 0.5–1.4)
CREAT UR-MCNC: 86 MG/DL (ref 15–325)
EAG (OHS): 114 MG/DL (ref 68–131)
GFR SERPLBLD CREATININE-BSD FMLA CKD-EPI: 47 ML/MIN/1.73/M2
GLUCOSE SERPL-MCNC: 96 MG/DL (ref 70–110)
HBA1C MFR BLD: 5.6 % (ref 4–5.6)
HDLC SERPL-MCNC: 66 MG/DL (ref 40–75)
HDLC SERPL: 43.4 % (ref 20–50)
LDLC SERPL CALC-MCNC: 73.6 MG/DL (ref 63–159)
MICROALBUMIN UR-MCNC: 21 UG/ML (ref ?–5000)
NONHDLC SERPL-MCNC: 86 MG/DL
POTASSIUM SERPL-SCNC: 4.2 MMOL/L (ref 3.5–5.1)
PROT SERPL-MCNC: 7.3 GM/DL (ref 6–8.4)
SODIUM SERPL-SCNC: 141 MMOL/L (ref 136–145)
TRIGL SERPL-MCNC: 62 MG/DL (ref 30–150)

## 2025-04-30 PROCEDURE — 83036 HEMOGLOBIN GLYCOSYLATED A1C: CPT | Mod: HCNC

## 2025-04-30 PROCEDURE — 80053 COMPREHEN METABOLIC PANEL: CPT | Mod: HCNC

## 2025-04-30 PROCEDURE — 1159F MED LIST DOCD IN RCRD: CPT | Mod: CPTII,HCNC,S$GLB, | Performed by: STUDENT IN AN ORGANIZED HEALTH CARE EDUCATION/TRAINING PROGRAM

## 2025-04-30 PROCEDURE — 80061 LIPID PANEL: CPT | Mod: HCNC

## 2025-04-30 PROCEDURE — 1160F RVW MEDS BY RX/DR IN RCRD: CPT | Mod: CPTII,HCNC,S$GLB, | Performed by: STUDENT IN AN ORGANIZED HEALTH CARE EDUCATION/TRAINING PROGRAM

## 2025-04-30 PROCEDURE — 36415 COLL VENOUS BLD VENIPUNCTURE: CPT | Mod: HCNC

## 2025-04-30 PROCEDURE — 99999 PR PBB SHADOW E&M-EST. PATIENT-LVL III: CPT | Mod: PBBFAC,HCNC,, | Performed by: STUDENT IN AN ORGANIZED HEALTH CARE EDUCATION/TRAINING PROGRAM

## 2025-04-30 PROCEDURE — 82570 ASSAY OF URINE CREATININE: CPT | Mod: HCNC

## 2025-04-30 PROCEDURE — 99024 POSTOP FOLLOW-UP VISIT: CPT | Mod: HCNC,S$GLB,, | Performed by: STUDENT IN AN ORGANIZED HEALTH CARE EDUCATION/TRAINING PROGRAM

## 2025-04-30 RX ORDER — KETOROLAC TROMETHAMINE 5 MG/ML
1 SOLUTION OPHTHALMIC 4 TIMES DAILY
Qty: 5 ML | Refills: 0 | Status: SHIPPED | OUTPATIENT
Start: 2025-04-30 | End: 2026-04-30

## 2025-04-30 RX ORDER — PREDNISOLONE ACETATE 10 MG/ML
1 SUSPENSION/ DROPS OPHTHALMIC EVERY 4 HOURS
Qty: 5 ML | Refills: 0 | Status: SHIPPED | OUTPATIENT
Start: 2025-04-30 | End: 2026-04-30

## 2025-04-30 NOTE — PROGRESS NOTES
HPI    Patient states vision is not doing well, not using any drops & she's   having swelling around her eyes. Denies any pain or discomfort.  No other   ocular complaints   Last edited by Paul Espinal on 4/30/2025  9:13 AM.            Assessment /Plan     For exam results, see Encounter Report.    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 Lengthy discussion regarding importance of absolute compliance with treatment regimen.        RTC 1M PO w/ MOCT

## 2025-05-17 ENCOUNTER — RESULTS FOLLOW-UP (OUTPATIENT)
Dept: INTERNAL MEDICINE | Facility: CLINIC | Age: 75
End: 2025-05-17

## 2025-05-21 ENCOUNTER — OFFICE VISIT (OUTPATIENT)
Dept: CARDIOLOGY | Facility: CLINIC | Age: 75
End: 2025-05-21
Payer: MEDICARE

## 2025-05-21 VITALS
OXYGEN SATURATION: 97 % | SYSTOLIC BLOOD PRESSURE: 130 MMHG | BODY MASS INDEX: 25.03 KG/M2 | DIASTOLIC BLOOD PRESSURE: 60 MMHG | WEIGHT: 132.5 LBS | HEART RATE: 100 BPM

## 2025-05-21 DIAGNOSIS — E11.29 TYPE 2 DIABETES MELLITUS WITH MICROALBUMINURIA, WITHOUT LONG-TERM CURRENT USE OF INSULIN: Chronic | ICD-10-CM

## 2025-05-21 DIAGNOSIS — Z98.62 S/P PERIPHERAL ARTERY ANGIOPLASTY: ICD-10-CM

## 2025-05-21 DIAGNOSIS — G47.33 OSA (OBSTRUCTIVE SLEEP APNEA): ICD-10-CM

## 2025-05-21 DIAGNOSIS — I25.10 ATHEROSCLEROSIS OF NATIVE CORONARY ARTERY OF NATIVE HEART WITHOUT ANGINA PECTORIS: ICD-10-CM

## 2025-05-21 DIAGNOSIS — E78.5 DYSLIPIDEMIA ASSOCIATED WITH TYPE 2 DIABETES MELLITUS: Chronic | ICD-10-CM

## 2025-05-21 DIAGNOSIS — Z85.118 HISTORY OF PRIMARY NON-SMALL CELL CARCINOMA OF LEFT LUNG: Chronic | ICD-10-CM

## 2025-05-21 DIAGNOSIS — R91.1 PULMONARY NODULE LESS THAN 6 MM IN DIAMETER WITH HIGH RISK FOR MALIGNANT NEOPLASM: ICD-10-CM

## 2025-05-21 DIAGNOSIS — I70.213 ATHEROSCLEROSIS OF NATIVE ARTERY OF BOTH LOWER EXTREMITIES WITH INTERMITTENT CLAUDICATION: Chronic | ICD-10-CM

## 2025-05-21 DIAGNOSIS — R06.02 SHORTNESS OF BREATH: ICD-10-CM

## 2025-05-21 DIAGNOSIS — N18.31 TYPE 2 DIABETES MELLITUS WITH STAGE 3A CHRONIC KIDNEY DISEASE, WITHOUT LONG-TERM CURRENT USE OF INSULIN: Chronic | ICD-10-CM

## 2025-05-21 DIAGNOSIS — R91.1 PULMONARY NODULE, LEFT: Chronic | ICD-10-CM

## 2025-05-21 DIAGNOSIS — I27.21 HIGH PULMONARY ARTERIAL PRESSURE: Chronic | ICD-10-CM

## 2025-05-21 DIAGNOSIS — E11.69 DYSLIPIDEMIA ASSOCIATED WITH TYPE 2 DIABETES MELLITUS: Chronic | ICD-10-CM

## 2025-05-21 DIAGNOSIS — J44.9 CHRONIC OBSTRUCTIVE PULMONARY DISEASE, UNSPECIFIED COPD TYPE: Chronic | ICD-10-CM

## 2025-05-21 DIAGNOSIS — I35.0 NONRHEUMATIC AORTIC VALVE STENOSIS: ICD-10-CM

## 2025-05-21 DIAGNOSIS — Z91.89 PULMONARY NODULE LESS THAN 6 MM IN DIAMETER WITH HIGH RISK FOR MALIGNANT NEOPLASM: ICD-10-CM

## 2025-05-21 DIAGNOSIS — R94.31 ABNORMAL EKG: ICD-10-CM

## 2025-05-21 DIAGNOSIS — E11.59 HYPERTENSION ASSOCIATED WITH DIABETES: ICD-10-CM

## 2025-05-21 DIAGNOSIS — Z90.2 S/P LOBECTOMY OF LUNG: ICD-10-CM

## 2025-05-21 DIAGNOSIS — R80.9 TYPE 2 DIABETES MELLITUS WITH MICROALBUMINURIA, WITHOUT LONG-TERM CURRENT USE OF INSULIN: Chronic | ICD-10-CM

## 2025-05-21 DIAGNOSIS — I70.0 ATHEROSCLEROSIS OF AORTA: ICD-10-CM

## 2025-05-21 DIAGNOSIS — E11.51 TYPE 2 DIABETES MELLITUS WITH PERIPHERAL VASCULAR DISEASE: Chronic | ICD-10-CM

## 2025-05-21 DIAGNOSIS — E11.22 TYPE 2 DIABETES MELLITUS WITH STAGE 3A CHRONIC KIDNEY DISEASE, WITHOUT LONG-TERM CURRENT USE OF INSULIN: Chronic | ICD-10-CM

## 2025-05-21 DIAGNOSIS — I15.2 HYPERTENSION ASSOCIATED WITH DIABETES: ICD-10-CM

## 2025-05-21 DIAGNOSIS — D50.8 IRON DEFICIENCY ANEMIA SECONDARY TO INADEQUATE DIETARY IRON INTAKE: Chronic | ICD-10-CM

## 2025-05-21 DIAGNOSIS — I25.10 CORONARY ARTERY CALCIFICATION: Primary | ICD-10-CM

## 2025-05-21 PROCEDURE — 99999 PR PBB SHADOW E&M-EST. PATIENT-LVL V: CPT | Mod: PBBFAC,HCNC,, | Performed by: INTERNAL MEDICINE

## 2025-05-21 PROCEDURE — 99214 OFFICE O/P EST MOD 30 MIN: CPT | Mod: HCNC,S$GLB,, | Performed by: INTERNAL MEDICINE

## 2025-05-21 PROCEDURE — 1159F MED LIST DOCD IN RCRD: CPT | Mod: CPTII,HCNC,S$GLB, | Performed by: INTERNAL MEDICINE

## 2025-05-21 PROCEDURE — 3072F LOW RISK FOR RETINOPATHY: CPT | Mod: CPTII,HCNC,S$GLB, | Performed by: INTERNAL MEDICINE

## 2025-05-21 PROCEDURE — 3288F FALL RISK ASSESSMENT DOCD: CPT | Mod: CPTII,HCNC,S$GLB, | Performed by: INTERNAL MEDICINE

## 2025-05-21 PROCEDURE — 3075F SYST BP GE 130 - 139MM HG: CPT | Mod: CPTII,HCNC,S$GLB, | Performed by: INTERNAL MEDICINE

## 2025-05-21 PROCEDURE — 1125F AMNT PAIN NOTED PAIN PRSNT: CPT | Mod: CPTII,HCNC,S$GLB, | Performed by: INTERNAL MEDICINE

## 2025-05-21 PROCEDURE — 1101F PT FALLS ASSESS-DOCD LE1/YR: CPT | Mod: CPTII,HCNC,S$GLB, | Performed by: INTERNAL MEDICINE

## 2025-05-21 PROCEDURE — 3078F DIAST BP <80 MM HG: CPT | Mod: CPTII,HCNC,S$GLB, | Performed by: INTERNAL MEDICINE

## 2025-05-21 NOTE — PROGRESS NOTES
Subjective:   Patient ID:  Lucia Barlow is a 75 y.o. female who presents for follow up of Follow-up      HPI  2020  A 69 yo female with pvd s/p pta copd smoker copd diabetes (stopped metformin due to gi side effects and lost weight) cad htn hlp ckd is here for f/u decreased smoking but has not stopped no claudication no chest pain or shortness of breath no chest pain has heart burn has been eating a lot of cheese/. Has no leg swelling. Tomatoes triggers heart burn.her a1c is in order however her lipids have increased has been eating a lot of cheese.      3/3/2021  Here for f/u had lung surgery for lung ca in November still has lefts  pain improving not smoking anymore still eating cheese compliant with meds has no claudication  Symptoms has rt hip pain that appears musculoskeletal. Has no angina tia chf . No syncope near syncope compliant with meds. Slat intake could improve.      2021  Has lingulectomy due  Lung cancer still has anterior left chest pain has seen  thoracic surgery has no angina she has now return of hip claudication over the past 5 months bilateral. She has to stop at 50 feet she can barely make it no discoloration or weakness in leg  Has no tia no chf has shortness of breath times  Her ekg is unchanged. She is not using cpap she returnesd her machine .     12/3/2021   Here for f/u she is cancer free in remission after he rlung surgery not smoking has no leg pain starting in her hips  All the way down posteriorely to the calves. She  Feels like tightness and cramps has to rest for relief this is only exertional. She is trying to be compliant with diet and emds. Has no nocturnal symptoms no discoloration in feet.      2022   here fro f/u not smoking has no limiting claudication taking pletal no bleeding issues compliant with meds lipids on target.     2023  Has left hip pain down the leg at rest and when walking. Her claudication is stable tolerated pletal well not smoking  tolerated meds well her rf are well modified. Has no chest pain or shortness of breath.        5/19/2023  Here frof /u early multiple complaints she is short of breath has swelling in face in legs hands and has sinus pressures  has sinus drip she was p[laced on steroids and duuretics. This has been on going for 1 month. She thinks jardiance is the reason she has been on jardiance for 1 year. She has been swollen since then has yeast infection . She is short of breath with exertion has no orthopnea pnd .SHE uses her inhaler for relief.   She ahs progression of her claudication she is barely able to walk 50 feet and that is very limiting to her her antionette with exercise she walked 1 minute her abui dropped significantly and required  10 minutes to go to baseline.   Her resting antionette dropped has bilateral dfa and sfa and cfa significant stenosis.      7/13/2023  Continues with significant exertional dyspnea very limiting as well as very limiting claudication her leg swelling improved using inhalers help her shortness of breath .stopping jardiance has helped uti. Not using cpap.her bnp is normal.     12/20/2023  HERE FOR F/U   Still smoking   Htn labile has not taken her meds this morning that is why her bp is elevated.    Denies any new symptoms. Her anemia was addressed her claudication improved,.   Has lost family member she is grieving.  Ladi PIKE 1/24/2024  Ms. Barlow is a 73 year old female patient whose current medical conditions include COPD, PVD s/p PTA, DM type II, CAD, HTN, hyperlipidemia, and CKD who presents today for follow-up. Patient previously seen by Dr. Rosas and noted to have elevated BP. She returns today and states she is doing well overall. No change in CV status. Chronic SOB, unchanged. No recent bouts of exertional heaviness or tightness. Occasional GERD. Claudication symptoms improved since her iron has been repleted, able to ambulate and has even been able to ride her bike. No LH, dizziness,  near syncope, or syncope. Does suffer from migraines/sinus headaches, worse with weather changes. No s/s of TIA/CVA. BP elevated today but patient did not have any  medications. Smokes every now and then.      4/26/2024   Here for f/u has been less upset about the family member loss her htn is controlled denies shortness of breath claudication. She is asymptomatic otherwise.      11/1/2024   Here for f/u still juliet of breath unchanged still smokes intermittently she is able to wlak do her activity of daily living no limiting claudication good energy level but no appetite. No discoloration in fett no chest pain  5/21/2025   History of Present Illness    Ms. Barlow presents today for follow up of PVD, HTN, and aortic valve disease. She currently smokes 10 cigarettes (half pack) per day. She attempts to exercise but has difficulty maintaining consistent physical activity. She follows a low-sodium diet, avoiding added salt and fried foods. She reports liking sweets but is reducing consumption. She is not using CPAP due to experiencing tongue adhesion to the roof of mouth while using the device. She experiences leg stiffness after prolonged sitting, particularly when getting up. Previous back pain from moving furniture resolved. She reports compliance with all prescribed medications.      ROS:  General: -fever, -chills, -fatigue, -weight gain, -weight loss  Eyes: -vision changes, -redness, -discharge  ENT: -ear pain, -nasal congestion, -sore throat, +dry mouth  Cardiovascular: -chest pain, -palpitations, -lower extremity edema  Respiratory: -cough, -shortness of breath  Gastrointestinal: -abdominal pain, -nausea, -vomiting, -diarrhea, -constipation, -blood in stool  Genitourinary: -dysuria, -hematuria, -frequency  Musculoskeletal: -joint pain, -muscle pain, +muscle stiffness  Skin: -rash, -lesion  Neurological: -headache, -dizziness, -numbness, -tingling  Psychiatric: -anxiety, -depression, -sleep difficulty             Past Medical History:   Diagnosis Date    Abnormal EKG 5/19/2023    Atherosclerosis of artery of both lower extremities 1/17/2014    Atherosclerosis of native coronary artery of native heart without angina pectoris 11/18/2016    Atherosclerotic PVD with intermittent claudication 1/17/2014    Chronic bronchitis     COPD (chronic obstructive pulmonary disease)     Coronary artery disease involving native coronary artery of native heart without angina pectoris 5/19/2023    Diabetes with neurologic complications     Dyslipidemia associated with type 2 diabetes mellitus 6/14/2013    Dyslipidemia associated with type 2 diabetes mellitus     Ex-smoker     Gastroesophageal reflux disease without esophagitis 1/25/2019    Hyperlipidemia     Hypertension     Iron deficiency anemia secondary to inadequate dietary iron intake 6/3/2022    NSCLC of left lung 11/19/2020    IVANIA (obstructive sleep apnea) 2/11/2021    Osteoporosis 1/16 rosita 1/18    Pneumothorax after biopsy 10/21/2020    PVD (peripheral vascular disease)     Renal manifestation of secondary diabetes mellitus     S/P peripheral artery angioplasty 2/7/2014    Seasonal allergic rhinitis due to pollen     Shortness of breath 5/19/2023    Simple chronic bronchitis     Tobacco dependence     Type 2 diabetes mellitus with microalbuminuria, without long-term current use of insulin 3/29/2019    Type 2 diabetes mellitus with peripheral vascular disease     Type 2 diabetes mellitus with stage 3 chronic kidney disease, without long-term current use of insulin 2/1/2019    Type 2 diabetes mellitus without retinopathy 2/1/2019    Urinary incontinence        Past Surgical History:   Procedure Laterality Date    ANGIOPLASTY Bilateral 01/24/2014    aortoiliac stenting     CARPAL TUNNEL RELEASE  2003    [facility]    COLECTOMY  approximate 2005    pt states 1in colon -dx with benign mass removed-states no colon cancer    COLONOSCOPY N/A 11/9/2016    Procedure: COLONOSCOPY;  Surgeon:  Dmitri Sterling MD;  Location: [facility];  Service: Endoscopy;  Laterality: N/A;    COLONOSCOPY N/A 11/15/2019    Procedure: COLONOSCOPY;  Surgeon: Marko Vicente MD;  Location: [facility] ENDO;  Service: Endoscopy;  Laterality: N/A;    COLONOSCOPY N/A 3/25/2022    Procedure: COLONOSCOPY;  Surgeon: Paola Feldman MD;  Location: [facility] ENDO;  Service: Endoscopy;  Laterality: N/A;    ESOPHAGOGASTRODUODENOSCOPY N/A 3/25/2022    Procedure: EGD (ESOPHAGOGASTRODUODENOSCOPY);  Surgeon: Paola Feldman MD;  Location: [facility];  Service: Endoscopy;  Laterality: N/A;    HYSTERECTOMY      no cancer    INJECTION OF ANESTHETIC AGENT AROUND MULTIPLE INTERCOSTAL NERVES Left 11/19/2020    Procedure: BLOCK, NERVE, INTERCOSTAL, 2 OR MORE;  Surgeon: Amrit Flores MD;  Location: [facility] OR [address] [place] [address];  Service: Thoracic;  Laterality: Left;    INTRALUMINAL GASTROINTESTINAL TRACT IMAGING VIA CAPSULE N/A 11/8/2023    Procedure: IMAGING PROCEDURE, GI TRACT, INTRALUMINAL, VIA CAPSULE;  Surgeon: Columbia, First Available;  Location: [facility];  Service: Endoscopy;  Laterality: N/A;    OOPHORECTOMY      SURGICAL REMOVAL OF LYMPH NODE Left 11/19/2020    Procedure: EXCISION, LYMPH NODE;  Surgeon: Amrit Flores MD;  [facility];  Service: Thoracic;  Laterality: Left;  mediastinal lymph node disection    XI ROBOTIC RATS,WITH LOBECTOMY,LUNG Left 11/19/2020    Procedure: XI ROBOTIC RATS,WITH LOBECTOMY,LUNG;  Surgeon: Amrit Flores MD;  [facility];  Service: Thoracic;  Laterality: Left;  Lingulectomy.       Social History[1]    Family History   Problem Relation Name Age of Onset    Stroke Father      Stroke Sister      Asthma Daughter      Diabetes Daughter      Breast cancer Daughter      Asthma Son         Current Outpatient Medications   Medication Sig    amLODIPine (NORVASC) 10 MG tablet Take 1 tablet (10 mg total) by mouth once daily.    calcium-vitamin D 600 mg-10 mcg (400 unit) Tab Take 2  tablets by mouth once daily.    chlorthalidone (HYGROTEN) 25 MG Tab Take 1 tablet (25 mg total) by mouth 2 (two) times daily.    cilostazoL (PLETAL) 50 MG Tab Take 1 tablet (50 mg total) by mouth 2 (two) times daily before meals.    doxycycline (VIBRAMYCIN) 100 MG Cap Take 1 capsule (100 mg total) by mouth every 12 (twelve) hours.    EPINEPHrine (EPIPEN) 0.3 mg/0.3 mL AtIn INJECT INTO THE MUSCLE ONE TIME FOR 1 DOSE, AS DIRECTED    ezetimibe (ZETIA) 10 mg tablet Take 1 tablet (10 mg total) by mouth once daily.    famotidine (PEPCID) 40 MG tablet Take by mouth.    fluticasone propionate (FLONASE) 50 mcg/actuation nasal spray 2 sprays (100 mcg total) by Each Nostril route once daily.    furosemide (LASIX) 20 MG tablet Take 1 tablet (20 mg total) by mouth daily as needed (edema).    ipratropium (ATROVENT) 21 mcg (0.03 %) nasal spray 2 sprays by Each Nostril route 3 (three) times daily.    ipratropium-albuteroL (COMBIVENT)  mcg/actuation inhaler Inhale 2 puffs into the lungs every 4 (four) hours as needed for Wheezing or Shortness of Breath. Rescue    ketorolac 0.5% (ACULAR) 0.5 % Drop Place 1 drop into the right eye 4 (four) times daily.    levocetirizine (XYZAL) 5 MG tablet Take 1 tablet (5 mg total) by mouth every evening.    montelukast (SINGULAIR) 10 mg tablet Take 1 tablet (10 mg total) by mouth every evening.    pantoprazole (PROTONIX) 40 MG tablet Take 1 tablet (40 mg total) by mouth every morning.    potassium chloride SA (K-DUR,KLOR-CON) 20 MEQ tablet Take 1 tablet (20 mEq total) by mouth once daily.    prednisoLONE acetate (PRED FORTE) 1 % DrpS Place 1 drop into the right eye every 4 (four) hours.    promethazine-dextromethorphan (PROMETHAZINE-DM) 6.25-15 mg/5 mL Syrp Take 5 mLs by mouth every 6 (six) hours as needed (cough).    rosuvastatin (CRESTOR) 20 MG tablet Take 1 tablet (20 mg total) by mouth every evening.    semaglutide (OZEMPIC) 0.25 mg or 0.5 mg (2 mg/3 mL) pen injector Inject 0.25 mg into  the skin every 7 days.    valsartan (DIOVAN) 160 MG tablet Take 1 tablet (160 mg total) by mouth once daily.    doxycycline (MONODOX) 100 MG capsule Take 1 capsule (100 mg total) by mouth every 12 (twelve) hours.    olopatadine (PATANOL) 0.1 % ophthalmic solution Place 1 drop into both eyes 2 (two) times daily.    predniSONE (DELTASONE) 20 MG tablet Prednisone 60 mg/ day for 3 days, 40 mg/day for 3 days,20 mg/ day for 3 days, (1/2 tablet )10 mg a day for 3 days. (Patient not taking: Reported on 5/21/2025)    predniSONE (DELTASONE) 20 MG tablet Take 3 tablets (60mg) by mouth daily for 3 days THEN take 2 tablets (40mg) by mouth daily for 3 days, THEN take 1 tablet (20mg) by mouth daily for 3 days THEN take ½ tablet (10mg) by mouth daily for 3 days (Patient not taking: Reported on 5/21/2025)     No current facility-administered medications for this visit.     Current Outpatient Medications on File Prior to Visit   Medication Sig    amLODIPine (NORVASC) 10 MG tablet Take 1 tablet (10 mg total) by mouth once daily.    calcium-vitamin D 600 mg-10 mcg (400 unit) Tab Take 2 tablets by mouth once daily.    chlorthalidone (HYGROTEN) 25 MG Tab Take 1 tablet (25 mg total) by mouth 2 (two) times daily.    cilostazoL (PLETAL) 50 MG Tab Take 1 tablet (50 mg total) by mouth 2 (two) times daily before meals.    doxycycline (VIBRAMYCIN) 100 MG Cap Take 1 capsule (100 mg total) by mouth every 12 (twelve) hours.    EPINEPHrine (EPIPEN) 0.3 mg/0.3 mL AtIn INJECT INTO THE MUSCLE ONE TIME FOR 1 DOSE, AS DIRECTED    ezetimibe (ZETIA) 10 mg tablet Take 1 tablet (10 mg total) by mouth once daily.    famotidine (PEPCID) 40 MG tablet Take by mouth.    fluticasone propionate (FLONASE) 50 mcg/actuation nasal spray 2 sprays (100 mcg total) by Each Nostril route once daily.    furosemide (LASIX) 20 MG tablet Take 1 tablet (20 mg total) by mouth daily as needed (edema).    ipratropium (ATROVENT) 21 mcg (0.03 %) nasal spray 2 sprays by Each Nostril  route 3 (three) times daily.    ipratropium-albuteroL (COMBIVENT)  mcg/actuation inhaler Inhale 2 puffs into the lungs every 4 (four) hours as needed for Wheezing or Shortness of Breath. Rescue    ketorolac 0.5% (ACULAR) 0.5 % Drop Place 1 drop into the right eye 4 (four) times daily.    levocetirizine (XYZAL) 5 MG tablet Take 1 tablet (5 mg total) by mouth every evening.    montelukast (SINGULAIR) 10 mg tablet Take 1 tablet (10 mg total) by mouth every evening.    pantoprazole (PROTONIX) 40 MG tablet Take 1 tablet (40 mg total) by mouth every morning.    potassium chloride SA (K-DUR,KLOR-CON) 20 MEQ tablet Take 1 tablet (20 mEq total) by mouth once daily.    prednisoLONE acetate (PRED FORTE) 1 % DrpS Place 1 drop into the right eye every 4 (four) hours.    promethazine-dextromethorphan (PROMETHAZINE-DM) 6.25-15 mg/5 mL Syrp Take 5 mLs by mouth every 6 (six) hours as needed (cough).    rosuvastatin (CRESTOR) 20 MG tablet Take 1 tablet (20 mg total) by mouth every evening.    semaglutide (OZEMPIC) 0.25 mg or 0.5 mg (2 mg/3 mL) pen injector Inject 0.25 mg into the skin every 7 days.    valsartan (DIOVAN) 160 MG tablet Take 1 tablet (160 mg total) by mouth once daily.    doxycycline (MONODOX) 100 MG capsule Take 1 capsule (100 mg total) by mouth every 12 (twelve) hours.    olopatadine (PATANOL) 0.1 % ophthalmic solution Place 1 drop into both eyes 2 (two) times daily.    predniSONE (DELTASONE) 20 MG tablet Prednisone 60 mg/ day for 3 days, 40 mg/day for 3 days,20 mg/ day for 3 days, (1/2 tablet )10 mg a day for 3 days. (Patient not taking: Reported on 5/21/2025)    predniSONE (DELTASONE) 20 MG tablet Take 3 tablets (60mg) by mouth daily for 3 days THEN take 2 tablets (40mg) by mouth daily for 3 days, THEN take 1 tablet (20mg) by mouth daily for 3 days THEN take ½ tablet (10mg) by mouth daily for 3 days (Patient not taking: Reported on 5/21/2025)     No current facility-administered medications on file prior to  visit.     Review of patient's allergies indicates:   Allergen Reactions    Iodine and iodide containing products Swelling    Skin staples [surgical stainless steel] Swelling    Egg derived      Shortness breath, lip swelling    Fish containing products     Jardiance [empagliflozin]     Latex, natural rubber Swelling    Nickel     Pravastatin      40 mg causes nausea vomitting but 20 mg ok    Shellfish containing products Swelling            Objective:     Vitals:    05/21/25 0747 05/21/25 0749   BP: (!) 120/50 130/60   BP Location: Right arm Left arm   Patient Position: Sitting Sitting   Pulse: 100    SpO2: 97%    Weight: 60.1 kg (132 lb 7.9 oz)      Body mass index is 25.03 kg/m².    Physical Exam  Constitutional:       General: She is not in acute distress.     Appearance: Normal appearance. She is well-developed. She is not ill-appearing.   HENT:      Head: Normocephalic and atraumatic.   Eyes:      General: No scleral icterus.     Pupils: Pupils are equal, round, and reactive to light.   Neck:      Thyroid: No thyromegaly.      Vascular: Normal carotid pulses. No carotid bruit, hepatojugular reflux or JVD.      Trachea: No tracheal deviation.   Cardiovascular:      Rate and Rhythm: Normal rate and regular rhythm.      Pulses:           Carotid pulses are 2+ on the right side and 2+ on the left side.       Radial pulses are 2+ on the right side and 2+ on the left side.        Femoral pulses are 2+ on the right side with bruit and 2+ on the left side with bruit.       Popliteal pulses are 1+ on the right side and 1+ on the left side.        Dorsalis pedis pulses are 1+ on the right side and 1+ on the left side.        Posterior tibial pulses are 1+ on the right side and 1+ on the left side.      Heart sounds: Murmur heard.      Systolic murmur is present with a grade of 2/6.      High-pitched blowing decrescendo early diastolic murmur is present with a grade of 1/4 at the upper right sternal border radiating to  the apex.      No friction rub. No gallop.   Pulmonary:      Effort: Pulmonary effort is normal. No respiratory distress.      Breath sounds: Normal breath sounds. No wheezing, rhonchi or rales.   Chest:      Chest wall: No tenderness.   Abdominal:      General: Bowel sounds are normal. There is no abdominal bruit.      Palpations: Abdomen is soft. There is no hepatomegaly or pulsatile mass.      Tenderness: There is no abdominal tenderness.   Musculoskeletal:      Right shoulder: No deformity.      Cervical back: Normal range of motion and neck supple.   Skin:     General: Skin is warm and dry.      Findings: No erythema or rash.      Nails: There is no clubbing.   Neurological:      Mental Status: She is alert and oriented to person, place, and time.      Cranial Nerves: No cranial nerve deficit.      Coordination: Coordination normal.   Psychiatric:         Speech: Speech normal.         Behavior: Behavior normal.       Lab Results   Component Value Date    CHOL 152 04/30/2025    CHOL 146 10/23/2024    CHOL 175 07/12/2024     Lab Results   Component Value Date    HGBA1C 5.6 04/30/2025      BMP  Lab Results   Component Value Date     04/30/2025    K 4.2 04/30/2025     04/30/2025    CO2 27 04/30/2025    BUN 28 (H) 04/30/2025    CREATININE 1.2 04/30/2025    CALCIUM 9.9 04/30/2025    ANIONGAP 11 04/30/2025    EGFRNORACEVR 47 (L) 04/30/2025      Lab Results   Component Value Date    HDL 66 04/30/2025    HDL 66 10/23/2024    HDL 69 07/12/2024     Lab Results   Component Value Date    LDLCALC 73.6 04/30/2025    LDLCALC 71.6 10/23/2024     Lab Results   Component Value Date    TRIG 62 04/30/2025    TRIG 42 10/23/2024    TRIG 59 07/12/2024     Lab Results   Component Value Date    CHOLHDL 43.4 04/30/2025    CHOLHDL 45.2 10/23/2024    CHOLHDL 39.4 07/12/2024       Chemistry        Component Value Date/Time     04/30/2025 0759     10/23/2024 0747    K 4.2 04/30/2025 0759    K 4.4 10/23/2024 0747      04/30/2025 0759     10/23/2024 0747    CO2 27 04/30/2025 0759    CO2 26 10/23/2024 0747    BUN 28 (H) 04/30/2025 0759    CREATININE 1.2 04/30/2025 0759    GLU 96 04/30/2025 0759    GLU 90 10/23/2024 0747        Component Value Date/Time    CALCIUM 9.9 04/30/2025 0759    CALCIUM 9.1 10/23/2024 0747    ALKPHOS 93 04/30/2025 0759    ALKPHOS 90 10/23/2024 0747    AST 20 04/30/2025 0759    AST 17 10/23/2024 0747    ALT 8 (L) 04/30/2025 0759    ALT 9 (L) 10/23/2024 0747    BILITOT 0.3 04/30/2025 0759    BILITOT 0.3 10/23/2024 0747    ESTGFRAFRICA 51.4 (A) 05/27/2022 0711    ESTGFRAFRICA 51.4 (A) 05/27/2022 0711    EGFRNONAA 44.6 (A) 05/27/2022 0711    EGFRNONAA 44.6 (A) 05/27/2022 0711          Lab Results   Component Value Date    TSH 0.404 12/04/2020     Lab Results   Component Value Date    INR 1.0 07/14/2023    INR 0.9 10/21/2020    INR 0.9 08/10/2019     Lab Results   Component Value Date    WBC 4.83 07/12/2024    HGB 14.5 07/12/2024    HCT 47.1 07/12/2024    MCV 98 07/12/2024     07/12/2024     BMP  Sodium   Date Value Ref Range Status   04/30/2025 141 136 - 145 mmol/L Final   10/23/2024 141 136 - 145 mmol/L Final     Potassium   Date Value Ref Range Status   04/30/2025 4.2 3.5 - 5.1 mmol/L Final   10/23/2024 4.4 3.5 - 5.1 mmol/L Final     Chloride   Date Value Ref Range Status   04/30/2025 103 95 - 110 mmol/L Final   10/23/2024 106 95 - 110 mmol/L Final     CO2   Date Value Ref Range Status   04/30/2025 27 23 - 29 mmol/L Final   10/23/2024 26 23 - 29 mmol/L Final     BUN   Date Value Ref Range Status   04/30/2025 28 (H) 8 - 23 mg/dL Final     Creatinine   Date Value Ref Range Status   04/30/2025 1.2 0.5 - 1.4 mg/dL Final     Calcium   Date Value Ref Range Status   04/30/2025 9.9 8.7 - 10.5 mg/dL Final   10/23/2024 9.1 8.7 - 10.5 mg/dL Final     Anion Gap   Date Value Ref Range Status   04/30/2025 11 8 - 16 mmol/L Final     eGFR if    Date Value Ref Range Status   05/27/2022 51.4  (A) >60 mL/min/1.73 m^2 Final   05/27/2022 51.4 (A) >60 mL/min/1.73 m^2 Final     eGFR if non    Date Value Ref Range Status   05/27/2022 44.6 (A) >60 mL/min/1.73 m^2 Final     Comment:     Calculation used to obtain the estimated glomerular filtration  rate (eGFR) is the CKD-EPI equation.      05/27/2022 44.6 (A) >60 mL/min/1.73 m^2 Final     Comment:     Calculation used to obtain the estimated glomerular filtration  rate (eGFR) is the CKD-EPI equation.        CrCl cannot be calculated (Patient's most recent lab result is older than the maximum 7 days allowed.).  Summary  Show Result Comparison     Left Ventricle: The left ventricle is normal in size. Normal wall thickness. There is concentric remodeling. There is normal systolic function. Ejection fraction is approximately 60%. Grade II diastolic dysfunction.    Right Ventricle: Normal right ventricular cavity size. Wall thickness is normal. Systolic function is normal.    Aortic Valve: There is mild aortic valve sclerosis. There is mild stenosis. Aortic valve area by VTI is 1.5 cm². Aortic valve peak velocity is 2.1 m/s. Mean gradient is 10.0 mmHg. The dimensionless index is 0.53.    Tricuspid Valve: There is mild to moderate regurgitation with a centrally directed jet.    Pulmonary Artery: The estimated pulmonary artery systolic pressure is 39 mmHg.    IVC/SVC: Normal venous pressure at 3 mmHg.     Assessment:     1. Coronary artery calcification    2. Dyslipidemia associated with type 2 diabetes mellitus    3. Atherosclerosis of native artery of both lower extremities with intermittent claudication    4. Type 2 diabetes mellitus with peripheral vascular disease    5. Type 2 diabetes mellitus with stage 3a chronic kidney disease, without long-term current use of insulin    6. Type 2 diabetes mellitus with microalbuminuria, without long-term current use of insulin    7. Chronic obstructive pulmonary disease, unspecified COPD type    8. History of  primary non-small cell carcinoma of left lung    9. Iron deficiency anemia secondary to inadequate dietary iron intake    10. Pulmonary nodule, left - 8 mm , high risk for lung cancer    11. High pulmonary arterial pressure    12. Hypertension associated with diabetes    13. S/P peripheral artery angioplasty    14. Nonrheumatic aortic valve stenosis    15. Atherosclerosis of aorta    16. IVANIA (obstructive sleep apnea)    17. Pulmonary nodule less than 6 mm in diameter with high risk for malignant neoplasm - RIght lower lobe    18. S/P lobectomy of lung-  Hx robotic lung lobectomy 11/19/2020    19. Shortness of breath    20. Atherosclerosis of native coronary artery of native heart without angina pectoris    21. Abnormal EKG        Plan:     Assessment & Plan    I73.9 Peripheral vascular disease, unspecified  I10 Essential (primary) hypertension  I35.9 Nonrheumatic aortic valve disorder, unspecified  G47.33 Obstructive sleep apnea (adult) (pediatric)  F17.210 Nicotine dependence, cigarettes, uncomplicated  M62.838 Other muscle spasm  Z91.198 Patient's noncompliance with other medical treatment and regimen for other reason    IMPRESSION:  HTN, diabetes, and cholesterol are currently under control.  COPD is stable.  Aortic valve disease is mild, not requiring intervention at this time.  Previous lung cancer appears to be in remission.  Clarified LDL cholesterol target (should be less than 70 or 73).    PLAN SUMMARY:  - Ms. Barlow to quit smoking  - Increase physical activity and exercise  - Continue avoiding salt in diet  - Further reduce intake of sweets  - Maintain avoidance of fried foods  - Continue efforts to control sugar intake  - Follow up in 6 months  - Contact office if any changes occur before next visit    CARDIOVASCULAR HEALTH:  - Educated on the importance of smoking cessation for heart, brain, kidney, and leg health.    PERIPHERAL VASCULAR DISEASE:  - Educated on the importance of smoking cessation for  heart, brain, kidney, and leg health.    HYPERTENSION:  - Ms. Barlow to continue avoiding salt in diet, further reduce intake of sweets, maintain avoidance of fried foods, and continue efforts to control sugar intake.    NICOTINE DEPENDENCE:  - Educated on the importance of smoking cessation for heart, brain, kidney, and leg health.  - Ms. Barlow to quit smoking.    MUSCLE HEALTH:  - Discussed the benefits of exercise for improving muscle mass and overall health.  - Ms. Barlow to increase physical activity and exercise.    FOLLOW-UP:  - Follow up in 6 months.  - Contact the office if any changes occur before next visit.        Aovalve disease asymptomatic will monitor.    Hlp attempt to bring ldl less than 70    Diabetes on target continue same meds diet compliance.    This note was generated with the assistance of ambient listening technology. Verbal consent was obtained by the patient and accompanying visitor(s) for the recording of patient appointment to facilitate this note. I attest to having reviewed and edited the generated note for accuracy, though some syntax or spelling errors may persist. Please contact the author of this note for any clarification.            [1]   Social History  Tobacco Use    Smoking status: Former     Current packs/day: 0.00     Average packs/day: 1 pack/day for 60.0 years (60.0 ttl pk-yrs)     Types: Cigarettes     Start date: 1960     Quit date: 1/10/2020     Years since quittin.3    Smokeless tobacco: Former   Substance Use Topics    Alcohol use: No     Alcohol/week: 0.0 standard drinks of alcohol    Drug use: No

## 2025-06-06 ENCOUNTER — OFFICE VISIT (OUTPATIENT)
Dept: OPHTHALMOLOGY | Facility: CLINIC | Age: 75
End: 2025-06-06
Payer: MEDICARE

## 2025-06-06 DIAGNOSIS — H59.039 IRVINE-GASS SYNDROME: ICD-10-CM

## 2025-06-06 DIAGNOSIS — Z98.41 CATARACT EXTRACTION STATUS OF EYE, RIGHT: Primary | ICD-10-CM

## 2025-06-06 PROCEDURE — 99999 PR PBB SHADOW E&M-EST. PATIENT-LVL IV: CPT | Mod: PBBFAC,HCNC,, | Performed by: STUDENT IN AN ORGANIZED HEALTH CARE EDUCATION/TRAINING PROGRAM

## 2025-06-30 DIAGNOSIS — E11.51 TYPE 2 DIABETES MELLITUS WITH PERIPHERAL VASCULAR DISEASE: Chronic | ICD-10-CM

## 2025-06-30 RX ORDER — SEMAGLUTIDE 0.68 MG/ML
INJECTION, SOLUTION SUBCUTANEOUS
Qty: 6 ML | Refills: 1 | Status: SHIPPED | OUTPATIENT
Start: 2025-06-30

## 2025-06-30 NOTE — TELEPHONE ENCOUNTER
Refill Decision Note   Lucia Barlow  is requesting a refill authorization.  Brief Assessment and Rationale for Refill:  Approve     Medication Therapy Plan:         Comments:     Note composed:6:41 PM 06/30/2025             Appointments     Last Visit   1/15/2025 ZELDA Sandoval MD   Next Visit   Visit date not found ZELDA Sandoval MD

## 2025-06-30 NOTE — TELEPHONE ENCOUNTER
No care due was identified.  [facility] Embedded Care Due Messages. Reference number: [id number].   6/30/2025 8:31:15 AM CDT

## 2025-07-02 DIAGNOSIS — Z78.0 MENOPAUSE: ICD-10-CM

## 2025-07-21 ENCOUNTER — OFFICE VISIT (OUTPATIENT)
Dept: PULMONOLOGY | Facility: CLINIC | Age: 75
End: 2025-07-21
Attending: INTERNAL MEDICINE
Payer: MEDICARE

## 2025-07-21 ENCOUNTER — HOSPITAL ENCOUNTER (OUTPATIENT)
Dept: RADIOLOGY | Facility: HOSPITAL | Age: 75
Discharge: HOME | End: 2025-07-21
Attending: FAMILY MEDICINE
Payer: MEDICARE

## 2025-07-21 ENCOUNTER — HOSPITAL ENCOUNTER (OUTPATIENT)
Dept: RADIOLOGY | Facility: HOSPITAL | Age: 75
Discharge: HOME | End: 2025-07-21
Attending: INTERNAL MEDICINE
Payer: MEDICARE

## 2025-07-21 VITALS
HEIGHT: 61 IN | WEIGHT: 130 LBS | SYSTOLIC BLOOD PRESSURE: 118 MMHG | DIASTOLIC BLOOD PRESSURE: 54 MMHG | RESPIRATION RATE: 19 BRPM | OXYGEN SATURATION: 94 % | HEART RATE: 88 BPM | BODY MASS INDEX: 24.55 KG/M2

## 2025-07-21 DIAGNOSIS — F17.210 NICOTINE DEPENDENCE, CIGARETTES, UNCOMPLICATED: ICD-10-CM

## 2025-07-21 DIAGNOSIS — Z85.118 HISTORY OF PRIMARY NON-SMALL CELL CARCINOMA OF LEFT LUNG: Chronic | ICD-10-CM

## 2025-07-21 DIAGNOSIS — R91.1 PULMONARY NODULE LESS THAN 6 MM IN DIAMETER WITH HIGH RISK FOR MALIGNANT NEOPLASM: ICD-10-CM

## 2025-07-21 DIAGNOSIS — Z12.31 ENCOUNTER FOR SCREENING MAMMOGRAM FOR BREAST CANCER: ICD-10-CM

## 2025-07-21 DIAGNOSIS — R91.1 PULMONARY NODULE, LEFT: Chronic | ICD-10-CM

## 2025-07-21 DIAGNOSIS — Z91.89 PULMONARY NODULE LESS THAN 6 MM IN DIAMETER WITH HIGH RISK FOR MALIGNANT NEOPLASM: ICD-10-CM

## 2025-07-21 DIAGNOSIS — J41.0 SIMPLE CHRONIC BRONCHITIS: Chronic | ICD-10-CM

## 2025-07-21 DIAGNOSIS — J44.9 CHRONIC OBSTRUCTIVE PULMONARY DISEASE, UNSPECIFIED COPD TYPE: Chronic | ICD-10-CM

## 2025-07-21 DIAGNOSIS — G47.33 OSA (OBSTRUCTIVE SLEEP APNEA): ICD-10-CM

## 2025-07-21 DIAGNOSIS — J30.1 SEASONAL ALLERGIC RHINITIS DUE TO POLLEN: ICD-10-CM

## 2025-07-21 DIAGNOSIS — R91.1 NODULE OF UPPER LOBE OF RIGHT LUNG: Primary | ICD-10-CM

## 2025-07-21 PROCEDURE — 99999 PR PBB SHADOW E&M-EST. PATIENT-LVL I: CPT | Mod: PBBFAC,HCNC,,

## 2025-07-21 PROCEDURE — 99214 OFFICE O/P EST MOD 30 MIN: CPT | Mod: 25,HCNC,S$GLB, | Performed by: INTERNAL MEDICINE

## 2025-07-21 PROCEDURE — 1101F PT FALLS ASSESS-DOCD LE1/YR: CPT | Mod: CPTII,HCNC,S$GLB, | Performed by: INTERNAL MEDICINE

## 2025-07-21 PROCEDURE — 3074F SYST BP LT 130 MM HG: CPT | Mod: CPTII,HCNC,S$GLB, | Performed by: INTERNAL MEDICINE

## 2025-07-21 PROCEDURE — 94060 EVALUATION OF WHEEZING: CPT | Mod: HCNC,S$GLB,, | Performed by: INTERNAL MEDICINE

## 2025-07-21 PROCEDURE — 3072F LOW RISK FOR RETINOPATHY: CPT | Mod: CPTII,HCNC,S$GLB, | Performed by: INTERNAL MEDICINE

## 2025-07-21 PROCEDURE — 71271 CT THORAX LUNG CANCER SCR C-: CPT | Mod: 26,HCNC,, | Performed by: STUDENT IN AN ORGANIZED HEALTH CARE EDUCATION/TRAINING PROGRAM

## 2025-07-21 PROCEDURE — 1126F AMNT PAIN NOTED NONE PRSNT: CPT | Mod: CPTII,HCNC,S$GLB, | Performed by: INTERNAL MEDICINE

## 2025-07-21 PROCEDURE — 3288F FALL RISK ASSESSMENT DOCD: CPT | Mod: CPTII,HCNC,S$GLB, | Performed by: INTERNAL MEDICINE

## 2025-07-21 PROCEDURE — 1159F MED LIST DOCD IN RCRD: CPT | Mod: CPTII,HCNC,S$GLB, | Performed by: INTERNAL MEDICINE

## 2025-07-21 PROCEDURE — 3078F DIAST BP <80 MM HG: CPT | Mod: CPTII,HCNC,S$GLB, | Performed by: INTERNAL MEDICINE

## 2025-07-21 PROCEDURE — 71271 CT THORAX LUNG CANCER SCR C-: CPT | Mod: TC,HCNC

## 2025-07-21 PROCEDURE — 77063 BREAST TOMOSYNTHESIS BI: CPT | Mod: TC,HCNC

## 2025-07-21 PROCEDURE — 99999 PR PBB SHADOW E&M-EST. PATIENT-LVL V: CPT | Mod: PBBFAC,HCNC,, | Performed by: INTERNAL MEDICINE

## 2025-07-21 PROCEDURE — 77063 BREAST TOMOSYNTHESIS BI: CPT | Mod: 26,HCNC,, | Performed by: STUDENT IN AN ORGANIZED HEALTH CARE EDUCATION/TRAINING PROGRAM

## 2025-07-21 PROCEDURE — 77067 SCR MAMMO BI INCL CAD: CPT | Mod: 26,HCNC,, | Performed by: STUDENT IN AN ORGANIZED HEALTH CARE EDUCATION/TRAINING PROGRAM

## 2025-07-21 PROCEDURE — 1160F RVW MEDS BY RX/DR IN RCRD: CPT | Mod: CPTII,HCNC,S$GLB, | Performed by: INTERNAL MEDICINE

## 2025-07-21 NOTE — PROGRESS NOTES
Subjective:     Patient ID: Lucia Barlow is a 75 y.o. female.    Chief Complaint:  CT  scan review :Follow up for lung nodules. Still smoking 1/3 ppd    HPI   75 y.o. history of lung cancer, Chronic Obstructive Pulmonary Disease and sinusitis c/o post nasal drip and sinus congestion . Her to review CT of chest.  History of  left lower lobe nodule stabel on PET scan in past. now to review new CT of chest  S/p thoracotomy for left sided nonsmall cell lung cancer - surgery in 11/19/2020  History of lung cancer:1.5 x 1.5 x 1.5cm moderately differentiated NSCLC, favor adenosquamous    Lung Nodule  She presents for evaluation and treatment of a lung nodule. The patient reports that the imaging was performed to evaluate symptoms of dyspnea on exertion, productive cough, shortness of breath and wheezing which have been present for many months and are unchanged. Symptoms are exacerbated by walking and relieved by rest. The patient denies other associated symptoms. She has a history of 60+ pack years - still smoking. The patient has no known exposure to tuberculosis. The patient does have a history of cancer.  Sinus Pain  Patient complains of clear rhinorrhea, congestion, cough, headaches, nasal congestion, post nasal drip, and sneezing. Onset of symptoms was many years ago - persistent.  Symptoms have been stable since that time. She is drinking plenty of fluids.  Past history is significant for chronic bronchitis. Patient is a smoker, tried to quit on numerous occasions    History of Obstructive Sleep Apnea - noncompliant, has lost weight 40 lbs - symptoms have improved      Past Medical History:   Diagnosis Date    Abnormal EKG 5/19/2023    Atherosclerosis of artery of both lower extremities 1/17/2014    Atherosclerosis of native coronary artery of native heart without angina pectoris 11/18/2016    Atherosclerotic PVD with intermittent claudication 1/17/2014    Chronic bronchitis     COPD (chronic obstructive pulmonary  disease)     Coronary artery disease involving native coronary artery of native heart without angina pectoris 5/19/2023    Diabetes with neurologic complications     Dyslipidemia associated with type 2 diabetes mellitus 6/14/2013    Dyslipidemia associated with type 2 diabetes mellitus     Ex-smoker     Gastroesophageal reflux disease without esophagitis 1/25/2019    Hyperlipidemia     Hypertension     Iron deficiency anemia secondary to inadequate dietary iron intake 6/3/2022    NSCLC of left lung 11/19/2020    IVANIA (obstructive sleep apnea) 2/11/2021    Osteoporosis 1/16 rosita 1/18    Pneumothorax after biopsy 10/21/2020    PVD (peripheral vascular disease)     Renal manifestation of secondary diabetes mellitus     S/P peripheral artery angioplasty 2/7/2014    Seasonal allergic rhinitis due to pollen     Shortness of breath 5/19/2023    Simple chronic bronchitis     Tobacco dependence     Type 2 diabetes mellitus with microalbuminuria, without long-term current use of insulin 3/29/2019    Type 2 diabetes mellitus with peripheral vascular disease     Type 2 diabetes mellitus with stage 3 chronic kidney disease, without long-term current use of insulin 2/1/2019    Type 2 diabetes mellitus without retinopathy 2/1/2019    Urinary incontinence      Past Surgical History:   Procedure Laterality Date    ANGIOPLASTY Bilateral 01/24/2014    aortoiliac stenting     CARPAL TUNNEL RELEASE  2003    [facility]    CATARACT EXTRACTION Right     COLECTOMY  approximate 2005    pt states 1in colon -dx with benign mass removed-states no colon cancer    COLONOSCOPY N/A 11/09/2016    Procedure: COLONOSCOPY;  Surgeon: Dmitri Sterling MD;  Location: [facility] ENDO;  Service: Endoscopy;  Laterality: N/A;    COLONOSCOPY N/A 11/15/2019    Procedure: COLONOSCOPY;  Surgeon: Marko Vicente MD;  Location: [facility] ENDO;  Service: Endoscopy;  Laterality: N/A;    COLONOSCOPY N/A 03/25/2022    Procedure: COLONOSCOPY;  Surgeon: Paola Feldman MD;   Location: [facility];  Service: Endoscopy;  Laterality: N/A;    ESOPHAGOGASTRODUODENOSCOPY N/A 03/25/2022    Procedure: EGD (ESOPHAGOGASTRODUODENOSCOPY);  Surgeon: Paola Feldman MD;  Location: [facility];  Service: Endoscopy;  Laterality: N/A;    HYSTERECTOMY      no cancer    INJECTION OF ANESTHETIC AGENT AROUND MULTIPLE INTERCOSTAL NERVES Left 11/19/2020    Procedure: BLOCK, NERVE, INTERCOSTAL, 2 OR MORE;  Surgeon: Amrit Flores MD;  [facility];  Service: Thoracic;  Laterality: Left;    INTRALUMINAL GASTROINTESTINAL TRACT IMAGING VIA CAPSULE N/A 11/08/2023    Procedure: IMAGING PROCEDURE, GI TRACT, INTRALUMINAL, VIA CAPSULE;  Surgeon: Milton, First Available;  Location: [facility];  Service: Endoscopy;  Laterality: N/A;    OOPHORECTOMY      SURGICAL REMOVAL OF LYMPH NODE Left 11/19/2020    Procedure: EXCISION, LYMPH NODE;  Surgeon: Amrit Flores MD;  [facility];  Service: Thoracic;  Laterality: Left;  mediastinal lymph node disection    XI ROBOTIC RATS,WITH LOBECTOMY,LUNG Left 11/19/2020    Procedure: XI ROBOTIC RATS,WITH LOBECTOMY,LUNG;  Surgeon: Amrit Flores MD;  [facility]: [facility];  Service: Thoracic;  Laterality: Left;  Lingulectomy.     Review of patient's allergies indicates:   Allergen Reactions    Iodine and iodide containing products Swelling    Skin staples [surgical stainless steel] Swelling    Egg derived      Shortness breath, lip swelling    Fish containing products     Jardiance [empagliflozin]     Latex, natural rubber Swelling    Nickel     Pravastatin      40 mg causes nausea vomitting but 20 mg ok    Shellfish containing products Swelling     Current Outpatient Medications on File Prior to Visit   Medication Sig Dispense Refill    amLODIPine (NORVASC) 10 MG tablet Take 1 tablet (10 mg total) by mouth once daily. 90 tablet 3    calcium-vitamin D 600 mg-10 mcg (400 unit) Tab Take 2 tablets by mouth once daily. 180 tablet 4     chlorthalidone (HYGROTEN) 25 MG Tab Take 1 tablet (25 mg total) by mouth 2 (two) times daily. 180 tablet 3    cilostazoL (PLETAL) 50 MG Tab Take 1 tablet (50 mg total) by mouth 2 (two) times daily before meals. 180 tablet 3    doxycycline (MONODOX) 100 MG capsule Take 1 capsule (100 mg total) by mouth every 12 (twelve) hours. 20 capsule 0    doxycycline (VIBRAMYCIN) 100 MG Cap Take 1 capsule (100 mg total) by mouth every 12 (twelve) hours. 20 capsule 0    EPINEPHrine (EPIPEN) 0.3 mg/0.3 mL AtIn INJECT INTO THE MUSCLE ONE TIME FOR 1 DOSE, AS DIRECTED 2 each 3    ezetimibe (ZETIA) 10 mg tablet Take 1 tablet (10 mg total) by mouth once daily. 90 tablet 3    famotidine (PEPCID) 40 MG tablet Take by mouth.      fluticasone propionate (FLONASE) 50 mcg/actuation nasal spray 2 sprays (100 mcg total) by Each Nostril route once daily. 48 g 3    furosemide (LASIX) 20 MG tablet Take 1 tablet (20 mg total) by mouth daily as needed (edema). 90 tablet 3    ipratropium (ATROVENT) 21 mcg (0.03 %) nasal spray 2 sprays by Each Nostril route 3 (three) times daily. 90 mL 3    ketorolac 0.5% (ACULAR) 0.5 % Drop Place 1 drop into the right eye 4 (four) times daily. 5 mL 0    levocetirizine (XYZAL) 5 MG tablet Take 1 tablet (5 mg total) by mouth every evening. 90 tablet 3    montelukast (SINGULAIR) 10 mg tablet Take 1 tablet (10 mg total) by mouth every evening. 90 tablet 3    olopatadine (PATANOL) 0.1 % ophthalmic solution Place 1 drop into both eyes 2 (two) times daily. 5 mL 2    OZEMPIC 0.25 mg or 0.5 mg (2 mg/3 mL) pen injector INJECT 0.25MG UNDER THE SKIN ONE TIME WEEKLY - EVERY 7 DAYS (DISCARD PEN 56 DAYS AFTER OPENING) 6 mL 1    pantoprazole (PROTONIX) 40 MG tablet Take 1 tablet (40 mg total) by mouth every morning. 90 tablet 3    potassium chloride SA (K-DUR,KLOR-CON) 20 MEQ tablet Take 1 tablet (20 mEq total) by mouth once daily. 90 tablet 3    prednisoLONE acetate (PRED FORTE) 1 % DrpS Place 1 drop into the right eye every 4  (four) hours. 5 mL 0    promethazine-dextromethorphan (PROMETHAZINE-DM) 6.25-15 mg/5 mL Syrp Take 5 mLs by mouth every 6 (six) hours as needed (cough). 240 mL 0    rosuvastatin (CRESTOR) 20 MG tablet Take 1 tablet (20 mg total) by mouth every evening. 90 tablet 3    valsartan (DIOVAN) 160 MG tablet Take 1 tablet (160 mg total) by mouth once daily. 90 tablet 3    [DISCONTINUED] ipratropium-albuteroL (COMBIVENT)  mcg/actuation inhaler Inhale 2 puffs into the lungs every 4 (four) hours as needed for Wheezing or Shortness of Breath. Rescue 12 g 3    [DISCONTINUED] predniSONE (DELTASONE) 20 MG tablet Prednisone 60 mg/ day for 3 days, 40 mg/day for 3 days,20 mg/ day for 3 days, (1/2 tablet )10 mg a day for 3 days. (Patient not taking: Reported on 5/21/2025) 20 tablet 0    [DISCONTINUED] predniSONE (DELTASONE) 20 MG tablet Take 3 tablets (60mg) by mouth daily for 3 days THEN take 2 tablets (40mg) by mouth daily for 3 days, THEN take 1 tablet (20mg) by mouth daily for 3 days THEN take ½ tablet (10mg) by mouth daily for 3 days (Patient not taking: Reported on 5/21/2025) 20 tablet 0     No current facility-administered medications on file prior to visit.     Social History     Socioeconomic History    Marital status:     Number of children: 4   Occupational History    Occupation:    Tobacco Use    Smoking status: Every Day     Current packs/day: 0.25     Average packs/day: 0.9 packs/day for 65.6 years (61.4 ttl pk-yrs)     Types: Cigarettes     Start date: 1/1/1960    Smokeless tobacco: Former   Substance and Sexual Activity    Alcohol use: No     Alcohol/week: 0.0 standard drinks of alcohol    Drug use: No    Sexual activity: Never   Social History Narrative    Son smoker, no pets in household.     Social Drivers of Health     Financial Resource Strain: High Risk (12/19/2023)    Overall Financial Resource Strain (CARDIA)     Difficulty of Paying Living Expenses: Very hard   Food Insecurity: Food  Insecurity Present (12/19/2023)    Hunger Vital Sign     Worried About Running Out of Food in the Last Year: Often true     Ran Out of Food in the Last Year: Often true   Transportation Needs: No Transportation Needs (12/19/2023)    PRAPARE - Transportation     Lack of Transportation (Medical): No     Lack of Transportation (Non-Medical): No   Physical Activity: Inactive (12/19/2023)    Exercise Vital Sign     Days of Exercise per Week: 0 days     Minutes of Exercise per Session: 0 min   Stress: No Stress Concern Present (12/19/2023)    [employer] - Occupational Stress Questionnaire     Feeling of Stress : Only a little   Housing Stability: Low Risk  (12/19/2023)    Housing Stability Vital Sign     Unable to Pay for Housing in the Last Year: No     Number of Places Lived in the Last Year: 1     Unstable Housing in the Last Year: No     Family History   Problem Relation Name Age of Onset    Stroke Father      Stroke Sister      Asthma Daughter      Diabetes Daughter      Breast cancer Daughter      Asthma Son         Review of Systems   Constitutional:  Positive for fatigue. Negative for fever.   HENT:  Positive for postnasal drip, rhinorrhea and congestion.    Eyes:  Negative for redness and itching.   Respiratory:  Positive for cough, sputum production, shortness of breath, dyspnea on extertion, use of rescue inhaler and Paroxysmal Nocturnal Dyspnea.    Cardiovascular:  Negative for chest pain, palpitations and leg swelling.   Genitourinary:  Negative for difficulty urinating and hematuria.   Endocrine:  Negative for cold intolerance and heat intolerance.    Skin:  Negative for rash.   Gastrointestinal:  Negative for nausea and abdominal pain.   Neurological:  Negative for dizziness, syncope, weakness and light-headedness.   Hematological:  Negative for adenopathy. Does not bruise/bleed easily.   Psychiatric/Behavioral:  Negative for sleep disturbance. The patient is not  "nervous/anxious.        Objective:      BP (!) 118/54   Pulse 88   Resp 19   Ht 5' 1" (1.549 m)   Wt 59 kg (130 lb)   SpO2 (!) 94%   BMI 24.56 kg/m²   Physical Exam  Vitals and nursing note reviewed.   Constitutional:       Appearance: She is well-developed.   HENT:      Head: Normocephalic and atraumatic.      Nose: Congestion and rhinorrhea present.      Mouth/Throat:      Pharynx: No oropharyngeal exudate.   Eyes:      Conjunctiva/sclera: Conjunctivae normal.      Pupils: Pupils are equal, round, and reactive to light.   Neck:      Thyroid: No thyromegaly.      Vascular: No JVD.      Trachea: No tracheal deviation.   Cardiovascular:      Rate and Rhythm: Normal rate and regular rhythm.      Heart sounds: Normal heart sounds.   Pulmonary:      Effort: Pulmonary effort is normal. No respiratory distress.      Breath sounds: Examination of the right-lower field reveals wheezing. Examination of the left-lower field reveals wheezing. Decreased breath sounds and wheezing present. No rhonchi or rales.   Chest:      Chest wall: No tenderness.   Abdominal:      General: Bowel sounds are normal.      Palpations: Abdomen is soft.   Musculoskeletal:         General: No swelling. Normal range of motion.      Cervical back: Neck supple.      Left lower leg: No edema.      Comments: Swelling in arms and legs   Lymphadenopathy:      Cervical: No cervical adenopathy.   Skin:     General: Skin is warm and dry.   Neurological:      Mental Status: She is alert and oriented to person, place, and time.       Personal Diagnostic Review  none pertinent        7/21/2025     9:19 AM   Pulmonary Studies Review   SpO2 94 %   Height 5' 1" (1.549 m)   Weight 59 kg (130 lb)   BMI (Calculated) 24.6   Predicted Distance 275.59   Predicted Distance Meters (Calculated) 413.74 meters       CT Chest Lung Screening Low Dose  Narrative: EXAMINATION:  CT CHEST LUNG SCREENING LOW DOSE    CLINICAL HISTORY:  Lung cancer screening, >= 30 pk-yr " "current smoker (Age 55-80y); Solitary pulmonary nodule    TECHNIQUE:  CT of the thorax was performed with low dose, lung screening protocol.  No contrast was administered.  Sagittal and coronal reconstructions were obtained.    COMPARISON:  CT chest from 05/01/2024    FINDINGS:  Lungs: The largest opacity in the left lung appears solid and measures 7 mm on series 4, image 240-stable compared to the prior study.  There is a new 7.5 mm series 603, image 83 solid nodule in the right upper lobe series 4, image 103.  Bilateral emphysematous changes in the lung apices.  Postoperative changes in the left hemithorax.    Pleura:   No effusion..    Heart and pericardium: Normal size without effusion.    Aorta and vasculature: Atherosclerosis including coronary arteries.    Chest wall and skeletal structures: Unremarkable except age-appropriate degenerative changes.    Upper abdomen: 1 cm nodule involving the left adrenal gland unchanged since 2023-likely adenoma.  Impression: Lung-RADS Category: 4A - Suspicious - consultation advised - possible next steps 3 month LDCT -in some scenarios PET/CT.    Clinically or potentially clinically significant non lung cancer finding:  None.    Prior Lung Cancer Modifier:  C - Positive history of prior lung cancer    Electronically signed by: Dmitri Mcgraw  Date:    07/21/2025  Time:    07:57      Office Spirometry Results:         7/21/2025     9:19 AM 5/21/2025     7:47 AM 1/31/2025    10:34 AM 1/15/2025     8:06 AM 11/15/2024     8:41 AM 11/1/2024     9:39 AM 11/1/2024     9:35 AM   Pulmonary Function Tests   SpO2 94 % 97 % 95 % 97 %  98 % 98 %   Height 5' 1" (1.549 m)  5' 1" (1.549 m) 5' 1" (1.549 m) 5' 1" (1.549 m) 5' 1" (1.549 m) 5' 1" (1.549 m)   Weight 59 kg (130 lb) 60.1 kg (132 lb 7.9 oz) 60.4 kg (133 lb 2.5 oz) 59.5 kg (131 lb 2.8 oz) 60.8 kg (134 lb) 60.9 kg (134 lb 4.2 oz) 60.9 kg (134 lb 4.2 oz)   BMI (Calculated) 24.6  25.2 24.8 25.3 25.4 25.4         7/21/2025     9:19 AM " "  Pulmonary Studies Review   SpO2 94 %   Height 5' 1" (1.549 m)   Weight 59 kg (130 lb)   BMI (Calculated) 24.6   Predicted Distance 275.59   Predicted Distance Meters (Calculated) 413.74 meters         Assessment:              Nodule of upper lobe of right lung  -     CT Chest Lung Screening Low Dose; Future; Expected date: 07/21/2025    History of primary non-small cell carcinoma of left lung    Nicotine dependence, cigarettes, uncomplicated  -     CT Chest Lung Screening Low Dose; Future; Expected date: 07/21/2025    Simple chronic bronchitis    Seasonal allergic rhinitis due to pollen    IVANIA (obstructive sleep apnea)    Pulmonary nodule less than 6 mm in diameter with high risk for malignant neoplasm - RIght lower lobe    Pulmonary nodule, left - 8 mm , high risk for lung cancer    Chronic obstructive pulmonary disease, unspecified COPD type  -     ipratropium-albuteroL (COMBIVENT)  mcg/actuation inhaler; Inhale 2 puffs into the lungs every 4 (four) hours as needed for Wheezing or Shortness of Breath. Rescue  Dispense: 12 g; Refill: 3            Outpatient Encounter Medications as of 7/21/2025   Medication Sig Dispense Refill    amLODIPine (NORVASC) 10 MG tablet Take 1 tablet (10 mg total) by mouth once daily. 90 tablet 3    calcium-vitamin D 600 mg-10 mcg (400 unit) Tab Take 2 tablets by mouth once daily. 180 tablet 4    chlorthalidone (HYGROTEN) 25 MG Tab Take 1 tablet (25 mg total) by mouth 2 (two) times daily. 180 tablet 3    cilostazoL (PLETAL) 50 MG Tab Take 1 tablet (50 mg total) by mouth 2 (two) times daily before meals. 180 tablet 3    doxycycline (MONODOX) 100 MG capsule Take 1 capsule (100 mg total) by mouth every 12 (twelve) hours. 20 capsule 0    doxycycline (VIBRAMYCIN) 100 MG Cap Take 1 capsule (100 mg total) by mouth every 12 (twelve) hours. 20 capsule 0    EPINEPHrine (EPIPEN) 0.3 mg/0.3 mL AtIn INJECT INTO THE MUSCLE ONE TIME FOR 1 DOSE, AS DIRECTED 2 each 3    ezetimibe (ZETIA) 10 mg " tablet Take 1 tablet (10 mg total) by mouth once daily. 90 tablet 3    famotidine (PEPCID) 40 MG tablet Take by mouth.      fluticasone propionate (FLONASE) 50 mcg/actuation nasal spray 2 sprays (100 mcg total) by Each Nostril route once daily. 48 g 3    furosemide (LASIX) 20 MG tablet Take 1 tablet (20 mg total) by mouth daily as needed (edema). 90 tablet 3    ipratropium (ATROVENT) 21 mcg (0.03 %) nasal spray 2 sprays by Each Nostril route 3 (three) times daily. 90 mL 3    ipratropium-albuteroL (COMBIVENT)  mcg/actuation inhaler Inhale 2 puffs into the lungs every 4 (four) hours as needed for Wheezing or Shortness of Breath. Rescue 12 g 3    ketorolac 0.5% (ACULAR) 0.5 % Drop Place 1 drop into the right eye 4 (four) times daily. 5 mL 0    levocetirizine (XYZAL) 5 MG tablet Take 1 tablet (5 mg total) by mouth every evening. 90 tablet 3    montelukast (SINGULAIR) 10 mg tablet Take 1 tablet (10 mg total) by mouth every evening. 90 tablet 3    olopatadine (PATANOL) 0.1 % ophthalmic solution Place 1 drop into both eyes 2 (two) times daily. 5 mL 2    OZEMPIC 0.25 mg or 0.5 mg (2 mg/3 mL) pen injector INJECT 0.25MG UNDER THE SKIN ONE TIME WEEKLY - EVERY 7 DAYS (DISCARD PEN 56 DAYS AFTER OPENING) 6 mL 1    pantoprazole (PROTONIX) 40 MG tablet Take 1 tablet (40 mg total) by mouth every morning. 90 tablet 3    potassium chloride SA (K-DUR,KLOR-CON) 20 MEQ tablet Take 1 tablet (20 mEq total) by mouth once daily. 90 tablet 3    prednisoLONE acetate (PRED FORTE) 1 % DrpS Place 1 drop into the right eye every 4 (four) hours. 5 mL 0    promethazine-dextromethorphan (PROMETHAZINE-DM) 6.25-15 mg/5 mL Syrp Take 5 mLs by mouth every 6 (six) hours as needed (cough). 240 mL 0    rosuvastatin (CRESTOR) 20 MG tablet Take 1 tablet (20 mg total) by mouth every evening. 90 tablet 3    valsartan (DIOVAN) 160 MG tablet Take 1 tablet (160 mg total) by mouth once daily. 90 tablet 3    [DISCONTINUED] ipratropium-albuteroL (COMBIVENT)   mcg/actuation inhaler Inhale 2 puffs into the lungs every 4 (four) hours as needed for Wheezing or Shortness of Breath. Rescue 12 g 3    [DISCONTINUED] predniSONE (DELTASONE) 20 MG tablet Prednisone 60 mg/ day for 3 days, 40 mg/day for 3 days,20 mg/ day for 3 days, (1/2 tablet )10 mg a day for 3 days. (Patient not taking: Reported on 5/21/2025) 20 tablet 0    [DISCONTINUED] predniSONE (DELTASONE) 20 MG tablet Take 3 tablets (60mg) by mouth daily for 3 days THEN take 2 tablets (40mg) by mouth daily for 3 days, THEN take 1 tablet (20mg) by mouth daily for 3 days THEN take ½ tablet (10mg) by mouth daily for 3 days (Patient not taking: Reported on 5/21/2025) 20 tablet 0    [DISCONTINUED] semaglutide (OZEMPIC) 0.25 mg or 0.5 mg (2 mg/3 mL) pen injector Inject 0.25 mg into the skin every 7 days. 6 mL 1     No facility-administered encounter medications on file as of 7/21/2025.     Plan:       Requested Prescriptions     Signed Prescriptions Disp Refills    ipratropium-albuteroL (COMBIVENT)  mcg/actuation inhaler 12 g 3     Sig: Inhale 2 puffs into the lungs every 4 (four) hours as needed for Wheezing or Shortness of Breath. Rescue     Problem List Items Addressed This Visit       Chronic obstructive pulmonary disease (Chronic)    Relevant Medications    ipratropium-albuteroL (COMBIVENT)  mcg/actuation inhaler    History of primary non-small cell carcinoma of left lung (Chronic)    Overview   S/P robotic left lobectomy in November, 2020. Did not require chemotherapy or radiation. Following with Dr. Aleman.  Pathology: 1.5 x 1.5 x 1.5cm moderately differentiated NSCLC, favor adenosquamous. Margin negative,3cm to closet margin. Levels 5,6,7,11,12= negative. pT1b N0. 1% PDL1 expression  Post-operative therapy: Surveillance         Pulmonary nodule, left - 8 mm , high risk for lung cancer (Chronic)    Nicotine dependence, cigarettes, uncomplicated    Relevant Orders    CT Chest Lung Screening Low Dose    IVANIA  (obstructive sleep apnea)    Overview   HOME SLEEP TEST 9/14/2020   PHYSICIAN INTERPRETATION AND COMMENTS: Findings are consistent with mild, non-positional obstructive sleep  apnea (IVANIA). Overall AHI was 9.0/hr with 5.8 hours data. Spo2 sasha was 84.7%.  Refuses CPAP - Intolerant of CPAP.         Pulmonary nodule less than 6 mm in diameter with high risk for malignant neoplasm - RIght lower lobe    Overview   Impression:     Postsurgical change of lingulectomy, stable.     Single new subsolid 4 mm right lower lobe pulmonary nodule.  Remaining pulmonary nodules appear stable.  Clinical concerns dictate the schedule for continued surveillance.     Diffuse mural thickening along the greater curvature of the stomach, nonspecific and possibly related to nondistention.  Underlying mass lesion cannot be excluded.        Electronically signed by: Cristina Peoples  Date:                                            11/02/2021  Time:                                           19:34         Seasonal allergic rhinitis due to pollen     Other Visit Diagnoses         Nodule of upper lobe of right lung    -  Primary    Relevant Orders    CT Chest Lung Screening Low Dose               Follow up in about 15 weeks (around 11/3/2025) for CT - on return visit.    MEDICAL DECISION MAKING: Moderate to high complexity.  Overall, the multiple problems listed are of moderate to high severity that may impact quality of life and activities of daily living. Side effects of medications, treatment plan as well as options and alternatives reviewed and discussed with patient. There was counseling of patient concerning these issues.    Total time spent in counseling and coordination of care - 32  minutes of total time spent on the encounter, which includes face to face time and non-face to face time preparing to see the patient (eg, review of tests), Obtaining and/or reviewing separately obtained history, Documenting clinical information in the  electronic or other health record, Independently interpreting results (not separately reported) and communicating results to the patient/family/caregiver, or Care coordination (not separately reported).    Time was used in discussion of prognosis, risks, benefits of treatment, instructions and compliance with regimen . Discussion with other physicians and/or health care providers - home health or for use of durable medical equipment (oxygen, nebulizers, CPAP, BiPAP) occurred.

## 2025-08-01 ENCOUNTER — OFFICE VISIT (OUTPATIENT)
Dept: INTERNAL MEDICINE | Facility: CLINIC | Age: 75
End: 2025-08-01
Payer: MEDICARE

## 2025-08-01 VITALS
BODY MASS INDEX: 24.44 KG/M2 | HEIGHT: 61 IN | SYSTOLIC BLOOD PRESSURE: 120 MMHG | WEIGHT: 129.44 LBS | OXYGEN SATURATION: 96 % | TEMPERATURE: 97 F | HEART RATE: 85 BPM | DIASTOLIC BLOOD PRESSURE: 60 MMHG

## 2025-08-01 DIAGNOSIS — D50.8 IRON DEFICIENCY ANEMIA SECONDARY TO INADEQUATE DIETARY IRON INTAKE: Primary | Chronic | ICD-10-CM

## 2025-08-01 DIAGNOSIS — E11.29 TYPE 2 DIABETES MELLITUS WITH MICROALBUMINURIA, WITHOUT LONG-TERM CURRENT USE OF INSULIN: Chronic | ICD-10-CM

## 2025-08-01 DIAGNOSIS — R80.9 TYPE 2 DIABETES MELLITUS WITH MICROALBUMINURIA, WITHOUT LONG-TERM CURRENT USE OF INSULIN: Chronic | ICD-10-CM

## 2025-08-01 DIAGNOSIS — N18.31 TYPE 2 DIABETES MELLITUS WITH STAGE 3A CHRONIC KIDNEY DISEASE, WITHOUT LONG-TERM CURRENT USE OF INSULIN: Chronic | ICD-10-CM

## 2025-08-01 DIAGNOSIS — I25.10 CORONARY ARTERY CALCIFICATION: ICD-10-CM

## 2025-08-01 DIAGNOSIS — I70.0 ATHEROSCLEROSIS OF AORTA: ICD-10-CM

## 2025-08-01 DIAGNOSIS — E11.22 TYPE 2 DIABETES MELLITUS WITH STAGE 3A CHRONIC KIDNEY DISEASE, WITHOUT LONG-TERM CURRENT USE OF INSULIN: Chronic | ICD-10-CM

## 2025-08-01 DIAGNOSIS — M81.0 OSTEOPOROSIS, UNSPECIFIED OSTEOPOROSIS TYPE, UNSPECIFIED PATHOLOGICAL FRACTURE PRESENCE: ICD-10-CM

## 2025-08-01 PROCEDURE — 99999 PR PBB SHADOW E&M-EST. PATIENT-LVL V: CPT | Mod: PBBFAC,HCNC,, | Performed by: PHYSICIAN ASSISTANT

## 2025-08-01 RX ORDER — ALENDRONATE SODIUM 70 MG/1
70 TABLET ORAL
Qty: 4 TABLET | Refills: 11 | Status: SHIPPED | OUTPATIENT
Start: 2025-08-01 | End: 2026-08-01

## 2025-08-01 NOTE — PROGRESS NOTES
Subjective:      Patient ID: Lucia Barlow is a 75 y.o. female.    Chief Complaint: review results      HPI  Patient is here today for bone density follow-up-  Osteoporosis-patient taking Calcium plus vit D 400 BID   Took Fosamax in the past:  Alendronate therapy from 1/8/2014-1/25/2019.   Agrees with resuming therapy today.    Hypertension-taking amlodipine, chlorthalidone, valsartan.  Well controlled on current regimen    Since last visit with Internal Medicine she has seen Cardiology and pulmonology.  Had cataract surgery.    Cardiology-no new intervention at this time  Pulmonology-  Updated CT:  CT Chest Lung Screening Low Dose  Narrative: EXAMINATION:  CT CHEST LUNG SCREENING LOW DOSE     CLINICAL HISTORY:  Lung cancer screening, >= 30 pk-yr current smoker (Age 55-80y); Solitary pulmonary nodule     TECHNIQUE:  CT of the thorax was performed with low dose, lung screening protocol.  No contrast was administered.  Sagittal and coronal reconstructions were obtained.     COMPARISON:  CT chest from 05/01/2024     FINDINGS:  Lungs: The largest opacity in the left lung appears solid and measures 7 mm on series 4, image 240-stable compared to the prior study.  There is a new 7.5 mm series 603, image 83 solid nodule in the right upper lobe series 4, image 103.  Bilateral emphysematous changes in the lung apices.  Postoperative changes in the left hemithorax.     Pleura:   No effusion..     Heart and pericardium: Normal size without effusion.     Aorta and vasculature: Atherosclerosis including coronary arteries.     Chest wall and skeletal structures: Unremarkable except age-appropriate degenerative changes.     Upper abdomen: 1 cm nodule involving the left adrenal gland unchanged since 2023-likely adenoma.  Impression: Lung-RADS Category: 4A - Suspicious - consultation advised - possible next steps 3 month LDCT -in some scenarios PET/CT.     Clinically or potentially clinically significant non lung cancer finding:   None.     Prior Lung Cancer Modifier:  C - Positive history of prior lung cancer     Electronically signed by:Dmitri Mcgraw  Date:                                        07/21/2025  Time:                                       07:57    -----follow-up CT was ordered for November    Review of Systems   Constitutional:  Negative for activity change, appetite change, chills, fatigue and fever.   HENT:  Negative for nasal congestion, facial swelling and rhinorrhea.    Eyes:  Negative for pain and visual disturbance.   Respiratory:  Negative for cough, chest tightness and shortness of breath.    Cardiovascular:  Negative for chest pain, palpitations, leg swelling and claudication.   Gastrointestinal:  Negative for abdominal distention, abdominal pain, anal bleeding, blood in stool, constipation, diarrhea, nausea, vomiting and reflux.   Endocrine: Negative for polydipsia and polyuria.   Genitourinary:  Negative for difficulty urinating, flank pain and frequency.   Musculoskeletal:  Negative for arthralgias and back pain.   Neurological:  Negative for dizziness, weakness, light-headedness, numbness and headaches.   Psychiatric/Behavioral:  Negative for self-injury, sleep disturbance and suicidal ideas. The patient is not nervous/anxious.        Medication List with Changes/Refills   New Medications    ALENDRONATE (FOSAMAX) 70 MG TABLET    Take 1 tablet (70 mg total) by mouth every 7 days.   Current Medications    AMLODIPINE (NORVASC) 10 MG TABLET    Take 1 tablet (10 mg total) by mouth once daily.    CALCIUM-VITAMIN D 600 MG-10 MCG (400 UNIT) TAB    Take 2 tablets by mouth once daily.    CHLORTHALIDONE (HYGROTEN) 25 MG TAB    Take 1 tablet (25 mg total) by mouth 2 (two) times daily.    CILOSTAZOL (PLETAL) 50 MG TAB    Take 1 tablet (50 mg total) by mouth 2 (two) times daily before meals.    EPINEPHRINE (EPIPEN) 0.3 MG/0.3 ML ATIN    INJECT INTO THE MUSCLE ONE TIME FOR 1 DOSE, AS DIRECTED    EZETIMIBE (ZETIA) 10 MG TABLET     Take 1 tablet (10 mg total) by mouth once daily.    FAMOTIDINE (PEPCID) 40 MG TABLET    Take by mouth.    FLUTICASONE PROPIONATE (FLONASE) 50 MCG/ACTUATION NASAL SPRAY    2 sprays (100 mcg total) by Each Nostril route once daily.    FUROSEMIDE (LASIX) 20 MG TABLET    Take 1 tablet (20 mg total) by mouth daily as needed (edema).    IPRATROPIUM (ATROVENT) 21 MCG (0.03 %) NASAL SPRAY    2 sprays by Each Nostril route 3 (three) times daily.    IPRATROPIUM-ALBUTEROL (COMBIVENT)  MCG/ACTUATION INHALER    Inhale 2 puffs into the lungs every 4 (four) hours as needed for Wheezing or Shortness of Breath. Rescue    KETOROLAC 0.5% (ACULAR) 0.5 % DROP    Place 1 drop into the right eye 4 (four) times daily.    LEVOCETIRIZINE (XYZAL) 5 MG TABLET    Take 1 tablet (5 mg total) by mouth every evening.    MONTELUKAST (SINGULAIR) 10 MG TABLET    Take 1 tablet (10 mg total) by mouth every evening.    OLOPATADINE (PATANOL) 0.1 % OPHTHALMIC SOLUTION    Place 1 drop into both eyes 2 (two) times daily.    OZEMPIC 0.25 MG OR 0.5 MG (2 MG/3 ML) PEN INJECTOR    INJECT 0.25MG UNDER THE SKIN ONE TIME WEEKLY - EVERY 7 DAYS (DISCARD PEN 56 DAYS AFTER OPENING)    PANTOPRAZOLE (PROTONIX) 40 MG TABLET    Take 1 tablet (40 mg total) by mouth every morning.    POTASSIUM CHLORIDE SA (K-DUR,KLOR-CON) 20 MEQ TABLET    Take 1 tablet (20 mEq total) by mouth once daily.    PREDNISOLONE ACETATE (PRED FORTE) 1 % DRPS    Place 1 drop into the right eye every 4 (four) hours.    PROMETHAZINE-DEXTROMETHORPHAN (PROMETHAZINE-DM) 6.25-15 MG/5 ML SYRP    Take 5 mLs by mouth every 6 (six) hours as needed (cough).    ROSUVASTATIN (CRESTOR) 20 MG TABLET    Take 1 tablet (20 mg total) by mouth every evening.    VALSARTAN (DIOVAN) 160 MG TABLET    Take 1 tablet (160 mg total) by mouth once daily.   Discontinued Medications    DOXYCYCLINE (MONODOX) 100 MG CAPSULE    Take 1 capsule (100 mg total) by mouth every 12 (twelve) hours.    DOXYCYCLINE (VIBRAMYCIN) 100 MG CAP  "   Take 1 capsule (100 mg total) by mouth every 12 (twelve) hours.        Objective:     Vitals:    08/01/25 0854   BP: 120/60   Pulse: 85   Temp: 97.2 °F (36.2 °C)   TempSrc: Tympanic   SpO2: 96%   Weight: 58.7 kg (129 lb 6.6 oz)   Height: 5' 1" (1.549 m)        Physical Exam  Constitutional:       Appearance: Normal appearance. She is normal weight.   HENT:      Head: Normocephalic and atraumatic.      Nose: Nose normal.   Eyes:      Conjunctiva/sclera: Conjunctivae normal.      Comments: Eyes tracking normal on exam    Cardiovascular:      Rate and Rhythm: Normal rate and regular rhythm.      Pulses: Normal pulses.      Heart sounds: Normal heart sounds. No murmur heard.     No friction rub. No gallop.   Pulmonary:      Effort: Pulmonary effort is normal. No respiratory distress.      Breath sounds: Normal breath sounds. No stridor. No wheezing, rhonchi or rales.   Musculoskeletal:      Right lower leg: No edema.      Left lower leg: No edema.      Comments: Moves all extremities well and with good control  Normal gait observed      Skin:     General: Skin is warm and dry.      Capillary Refill: Capillary refill takes less than 2 seconds.   Neurological:      Mental Status: She is alert and oriented to person, place, and time.   Psychiatric:         Mood and Affect: Mood normal.         Behavior: Behavior normal.         Thought Content: Thought content normal.         Judgment: Judgment normal.            Assessment & Plan:     1. Iron deficiency anemia secondary to inadequate dietary iron intake  -     CBC Auto Differential; Future; Expected date: 10/30/2025    2. Type 2 diabetes mellitus with microalbuminuria, without long-term current use of insulin  -     Lipid Panel; Future; Expected date: 10/30/2025  -     Comprehensive Metabolic Panel; Future; Expected date: 10/30/2025  -     Hemoglobin A1C; Future; Expected date: 10/30/2025    3. Atherosclerosis of aorta  Overview:  CT CHEST WITHOUT CONTRAST " 11/27/2018  FINDINGS: Scattered atherosclerotic plaque noted involving the thoracic aorta and aortic branch vessels, including the coronary arteries.      4. Coronary artery calcification  Overview:  CT CHEST WITHOUT CONTRAST 11/27/2018  FINDINGS: Scattered atherosclerotic plaque noted involving the thoracic aorta and aortic branch vessels, including the coronary arteries.      5. Type 2 diabetes mellitus with stage 3a chronic kidney disease, without long-term current use of insulin  -     Lipid Panel; Future; Expected date: 10/30/2025  -     Comprehensive Metabolic Panel; Future; Expected date: 10/30/2025  -     Hemoglobin A1C; Future; Expected date: 10/30/2025    6. Osteoporosis, unspecified osteoporosis type, unspecified pathological fracture presence  Overview:  Alendronate therapy from 1/8/2014-1/25/2019.    Orders:  -     alendronate (FOSAMAX) 70 MG tablet; Take 1 tablet (70 mg total) by mouth every 7 days.  Dispense: 4 tablet; Refill: 11  -     Vitamin D; Future; Expected date: 08/01/2025         For follow up at next appointment-patient recently started Fosamax, being followed for lung nodule by pulmonology has next appointment with palm prior to visit with Internal Medicine      -Patient instructed that our office calls back on all labs and imaging within 1 week of receiving the results. Patient instructed to reach out to our office if they have not heard from us so that we can request the results from the lab/imaging center           Anuradha Reed PA-C

## 2025-08-01 NOTE — Clinical Note
I added a vitamin-D to her labs, can you please link those to the ones she is having done prior to her next appointment

## 2025-08-05 PROBLEM — E11.51 DIABETIC PERIPHERAL VASCULAR DISEASE: Status: ACTIVE | Noted: 2018-08-22

## 2025-08-06 ENCOUNTER — OFFICE VISIT (OUTPATIENT)
Dept: INTERNAL MEDICINE | Facility: CLINIC | Age: 75
End: 2025-08-06
Payer: MEDICARE

## 2025-08-06 ENCOUNTER — OFFICE VISIT (OUTPATIENT)
Dept: OPHTHALMOLOGY | Facility: CLINIC | Age: 75
End: 2025-08-06
Payer: MEDICARE

## 2025-08-06 VITALS — HEIGHT: 61 IN | RESPIRATION RATE: 20 BRPM | BODY MASS INDEX: 24.44 KG/M2 | WEIGHT: 129.44 LBS

## 2025-08-06 DIAGNOSIS — E11.29 TYPE 2 DIABETES MELLITUS WITH MICROALBUMINURIA, WITHOUT LONG-TERM CURRENT USE OF INSULIN: Chronic | ICD-10-CM

## 2025-08-06 DIAGNOSIS — H25.12 NUCLEAR SCLEROSIS OF LEFT EYE: ICD-10-CM

## 2025-08-06 DIAGNOSIS — E11.36 DIABETIC CATARACT OF BOTH EYES: ICD-10-CM

## 2025-08-06 DIAGNOSIS — I27.21 HIGH PULMONARY ARTERIAL PRESSURE: Chronic | ICD-10-CM

## 2025-08-06 DIAGNOSIS — E11.9 TYPE 2 DIABETES MELLITUS WITHOUT RETINOPATHY: ICD-10-CM

## 2025-08-06 DIAGNOSIS — I15.2 HYPERTENSION ASSOCIATED WITH DIABETES: ICD-10-CM

## 2025-08-06 DIAGNOSIS — E11.69 DYSLIPIDEMIA ASSOCIATED WITH TYPE 2 DIABETES MELLITUS: Chronic | ICD-10-CM

## 2025-08-06 DIAGNOSIS — Z86.0101 HISTORY OF ADENOMATOUS POLYP OF COLON: ICD-10-CM

## 2025-08-06 DIAGNOSIS — F17.200 CURRENT EVERY DAY SMOKER: ICD-10-CM

## 2025-08-06 DIAGNOSIS — K21.9 GASTROESOPHAGEAL REFLUX DISEASE WITHOUT ESOPHAGITIS: Chronic | ICD-10-CM

## 2025-08-06 DIAGNOSIS — E11.51 DIABETES MELLITUS WITH PERIPHERAL VASCULAR DISEASE: ICD-10-CM

## 2025-08-06 DIAGNOSIS — Z85.118 HISTORY OF PRIMARY NON-SMALL CELL CARCINOMA OF LEFT LUNG: Chronic | ICD-10-CM

## 2025-08-06 DIAGNOSIS — M81.0 OSTEOPOROSIS, UNSPECIFIED OSTEOPOROSIS TYPE, UNSPECIFIED PATHOLOGICAL FRACTURE PRESENCE: ICD-10-CM

## 2025-08-06 DIAGNOSIS — I77.1 TORTUOUS AORTA: ICD-10-CM

## 2025-08-06 DIAGNOSIS — J30.1 SEASONAL ALLERGIC RHINITIS DUE TO POLLEN: ICD-10-CM

## 2025-08-06 DIAGNOSIS — G47.33 OSA (OBSTRUCTIVE SLEEP APNEA): ICD-10-CM

## 2025-08-06 DIAGNOSIS — I25.10 CORONARY ARTERY CALCIFICATION: ICD-10-CM

## 2025-08-06 DIAGNOSIS — Z98.62 S/P PERIPHERAL ARTERY ANGIOPLASTY: ICD-10-CM

## 2025-08-06 DIAGNOSIS — R91.1 PULMONARY NODULE LESS THAN 6 MM IN DIAMETER WITH HIGH RISK FOR MALIGNANT NEOPLASM: ICD-10-CM

## 2025-08-06 DIAGNOSIS — I25.10 ATHEROSCLEROSIS OF NATIVE CORONARY ARTERY OF NATIVE HEART WITHOUT ANGINA PECTORIS: ICD-10-CM

## 2025-08-06 DIAGNOSIS — H59.039 IRVINE-GASS SYNDROME: ICD-10-CM

## 2025-08-06 DIAGNOSIS — Z90.2 S/P LOBECTOMY OF LUNG: ICD-10-CM

## 2025-08-06 DIAGNOSIS — F17.210 NICOTINE DEPENDENCE, CIGARETTES, UNCOMPLICATED: ICD-10-CM

## 2025-08-06 DIAGNOSIS — G44.209 TENSION HEADACHE: ICD-10-CM

## 2025-08-06 DIAGNOSIS — Z96.1 PSEUDOPHAKIA, RIGHT EYE: Primary | ICD-10-CM

## 2025-08-06 DIAGNOSIS — R06.02 SHORTNESS OF BREATH: ICD-10-CM

## 2025-08-06 DIAGNOSIS — B37.31 RECURRENT CANDIDIASIS OF VAGINA: Chronic | ICD-10-CM

## 2025-08-06 DIAGNOSIS — I35.0 NONRHEUMATIC AORTIC VALVE STENOSIS: ICD-10-CM

## 2025-08-06 DIAGNOSIS — D50.8 IRON DEFICIENCY ANEMIA SECONDARY TO INADEQUATE DIETARY IRON INTAKE: Chronic | ICD-10-CM

## 2025-08-06 DIAGNOSIS — J41.0 SIMPLE CHRONIC BRONCHITIS: Chronic | ICD-10-CM

## 2025-08-06 DIAGNOSIS — Z00.00 ENCOUNTER FOR MEDICARE ANNUAL WELLNESS EXAM: Primary | ICD-10-CM

## 2025-08-06 DIAGNOSIS — R80.9 TYPE 2 DIABETES MELLITUS WITH MICROALBUMINURIA, WITHOUT LONG-TERM CURRENT USE OF INSULIN: Chronic | ICD-10-CM

## 2025-08-06 DIAGNOSIS — E11.59 HYPERTENSION ASSOCIATED WITH DIABETES: ICD-10-CM

## 2025-08-06 DIAGNOSIS — Z91.89 PULMONARY NODULE LESS THAN 6 MM IN DIAMETER WITH HIGH RISK FOR MALIGNANT NEOPLASM: ICD-10-CM

## 2025-08-06 DIAGNOSIS — I70.0 ATHEROSCLEROSIS OF AORTA: ICD-10-CM

## 2025-08-06 DIAGNOSIS — N18.31 TYPE 2 DIABETES MELLITUS WITH STAGE 3A CHRONIC KIDNEY DISEASE, WITHOUT LONG-TERM CURRENT USE OF INSULIN: Chronic | ICD-10-CM

## 2025-08-06 DIAGNOSIS — E11.22 TYPE 2 DIABETES MELLITUS WITH STAGE 3A CHRONIC KIDNEY DISEASE, WITHOUT LONG-TERM CURRENT USE OF INSULIN: Chronic | ICD-10-CM

## 2025-08-06 DIAGNOSIS — K57.90 DIVERTICULOSIS: ICD-10-CM

## 2025-08-06 DIAGNOSIS — I70.213 ATHEROSCLEROSIS OF NATIVE ARTERY OF BOTH LOWER EXTREMITIES WITH INTERMITTENT CLAUDICATION: Chronic | ICD-10-CM

## 2025-08-06 DIAGNOSIS — E78.5 DYSLIPIDEMIA ASSOCIATED WITH TYPE 2 DIABETES MELLITUS: Chronic | ICD-10-CM

## 2025-08-06 DIAGNOSIS — R91.1 PULMONARY NODULE, LEFT: Chronic | ICD-10-CM

## 2025-08-06 PROBLEM — Z74.09 OTHER REDUCED MOBILITY: Status: RESOLVED | Noted: 2023-02-01 | Resolved: 2025-08-06

## 2025-08-06 PROCEDURE — 3072F LOW RISK FOR RETINOPATHY: CPT | Mod: CPTII,HCNC,S$GLB, | Performed by: NURSE PRACTITIONER

## 2025-08-06 PROCEDURE — G0439 PPPS, SUBSEQ VISIT: HCPCS | Mod: HCNC,S$GLB,, | Performed by: NURSE PRACTITIONER

## 2025-08-06 PROCEDURE — 3288F FALL RISK ASSESSMENT DOCD: CPT | Mod: CPTII,HCNC,S$GLB, | Performed by: NURSE PRACTITIONER

## 2025-08-06 PROCEDURE — 1101F PT FALLS ASSESS-DOCD LE1/YR: CPT | Mod: CPTII,HCNC,S$GLB, | Performed by: NURSE PRACTITIONER

## 2025-08-06 PROCEDURE — 1126F AMNT PAIN NOTED NONE PRSNT: CPT | Mod: CPTII,HCNC,S$GLB, | Performed by: NURSE PRACTITIONER

## 2025-08-06 PROCEDURE — G9919 SCRN ND POS ND PROV OF REC: HCPCS | Mod: CPTII,HCNC,S$GLB, | Performed by: NURSE PRACTITIONER

## 2025-08-06 PROCEDURE — 1160F RVW MEDS BY RX/DR IN RCRD: CPT | Mod: CPTII,HCNC,S$GLB, | Performed by: NURSE PRACTITIONER

## 2025-08-06 PROCEDURE — 1159F MED LIST DOCD IN RCRD: CPT | Mod: CPTII,HCNC,S$GLB, | Performed by: NURSE PRACTITIONER

## 2025-08-06 PROCEDURE — 99999 PR PBB SHADOW E&M-EST. PATIENT-LVL V: CPT | Mod: PBBFAC,HCNC,, | Performed by: NURSE PRACTITIONER

## 2025-08-06 PROCEDURE — 1158F ADVNC CARE PLAN TLK DOCD: CPT | Mod: CPTII,HCNC,S$GLB, | Performed by: NURSE PRACTITIONER

## 2025-08-06 PROCEDURE — 99999 PR PBB SHADOW E&M-EST. PATIENT-LVL III: CPT | Mod: PBBFAC,HCNC,, | Performed by: STUDENT IN AN ORGANIZED HEALTH CARE EDUCATION/TRAINING PROGRAM

## 2025-08-06 PROCEDURE — 1170F FXNL STATUS ASSESSED: CPT | Mod: CPTII,HCNC,S$GLB, | Performed by: NURSE PRACTITIONER

## 2025-08-06 NOTE — PATIENT INSTRUCTIONS
Counseling and Referral of Other Preventative  (Italic type indicates deductible and co-insurance are waived)    Patient Name: Lucia Barlow  Today's Date: 8/6/2025    Health Maintenance       Date Due Completion Date    Aspirin/Antiplatelet Therapy Never done ---    Shingles Vaccine (2 of 2) 01/08/2020 11/13/2019    RSV Vaccine (Age 60+ and Pregnant patients) (1 - 1-dose 75+ series) Never done ---    COVID-19 Vaccine (6 - 2024-25 season) 09/01/2024 11/16/2022    Influenza Vaccine (1) 09/01/2025 10/11/2024    Override on 1/30/2018: Declined    Override on 11/18/2016: Declined (pt states the last 3 shot make her sick)    Override on 12/16/2015: Declined    Override on 10/2/2009: Done    Hemoglobin A1c 10/30/2025 4/30/2025    Diabetic Eye Exam 12/31/2025 12/31/2024    Override on 12/28/2022: Done    Override on 2/1/2019: Done    Diabetes Urine Screening 04/30/2026 4/30/2025    Lipid Panel 04/30/2026 4/30/2025    Colonoscopy 03/25/2027 3/25/2022    DEXA Scan 07/21/2027 7/21/2025    TETANUS VACCINE 08/16/2029 8/16/2019        No orders of the defined types were placed in this encounter.    The following information is provided to all patients.  This information is to help you find resources for any of the problems found today that may be affecting your health:                  Living healthy guide: www.[facility].[place].gov      Understanding Diabetes: www.diabetes.org      Eating healthy: www.cdc.gov/healthyweight      CDC home safety checklist: www.cdc.gov/steadi/patient.html      Agency on Aging: www.goea.[place].gov      Alcoholics anonymous (AA): www.aa.org      Physical Activity: www.elmer.nih.gov/gj4ieku      Tobacco use: www.quitwithusla.org

## 2025-08-06 NOTE — PROGRESS NOTES
"  Lucia Barlow presented for a  Medicare AWV and comprehensive Health Risk Assessment today. The following components were reviewed and updated:    Medical history  Family History  Social history  Allergies and Current Medications  Health Risk Assessment  Health Maintenance  Care Team     Patient screened moderate and/or high risk for one or more social determinants of health (SDOH). Patient connected to community resources through the ED Navigator.      ** See Completed Assessments for Annual Wellness Visit within the encounter summary.**         The following assessments were completed:  Living Situation  CAGE  Depression Screening  Timed Get Up and Go  Whisper Test  Cognitive Function Screening  Nutrition Screening  ADL Screening  PAQ Screening      Opioid documentation:      Patient does not have a current opioid prescription.        Vitals:    08/06/25 0721   Resp: 20   Weight: 58.7 kg (129 lb 6.6 oz)   Height: 5' 1" (1.549 m)     Body mass index is 24.45 kg/m².  Physical Exam  Constitutional:       General: She is not in acute distress.     Appearance: She is not ill-appearing or diaphoretic.   HENT:      Mouth/Throat:      Mouth: Mucous membranes are moist.   Pulmonary:      Effort: Pulmonary effort is normal. No respiratory distress.   Skin:     General: Skin is warm and dry.   Neurological:      Mental Status: She is alert and oriented to person, place, and time. Mental status is at baseline.   Psychiatric:         Mood and Affect: Mood normal.         Behavior: Behavior normal.         Thought Content: Thought content normal.         Judgment: Judgment normal.           Diagnoses and health risks identified today and associated recommendations/orders:    1. Encounter for Medicare annual wellness exam  I offered to discuss advanced care planning, including how to pick a person who would make decisions for you if you were unable to make them for yourself, called a health care power of , and what " kind of decisions you might make such as use of life sustaining treatments such as ventilators and tube feeding when faced with a life limiting illness recorded on a living will that they will need to know. (How you want to be cared for as you near the end of your natural life)     X Patient is interested in learning more about how to make advanced directives.  I provided them paperwork and offered to discuss this with them.   - Referral to Enhanced Annual Wellness Visit (eAWV) W+1    2. Hypertension associated with diabetes  Monitor  Stable  Continue home norvasc, hygroten, lasix, potassium, diovan    3. High pulmonary arterial pressure, Pulmonary nodule, left - 8 mm , high risk for lung cancer, Pulmonary nodule less than 6 mm in diameter with high risk for malignant neoplasm - RIght lower lobe, History of primary non-small cell carcinoma of left lung,  S/P lobectomy of lung-  Hx robotic lung lobectomy 11/19/2020, IVANIA (obstructive sleep apnea), Current every day smoker, Nicotine dependence, cigarettes, uncomplicated, Simple chronic bronchitis, Seasonal allergic rhinitis due to pollen, Shortness of breath   Monitor  Patient reports she smokes 1 pack of cigarettes q 2 days. She is aware of the dangers of smoking and is not interested in quitting. She declined a referral to the Smoking Cessation Program.   Follow up with Pulmonology as directed  Continue home flonase, atrovent nasal, combivent, xyzal, singulair, promethazine DM    4. Atherosclerosis of aorta,Tortuous aorta, Diabetes mellitus with peripheral vascular disease, Atherosclerosis of native coronary artery of native heart without angina pectoris, Coronary artery calcification, Atherosclerosis of native artery of both lower extremities with intermittent claudication, Dyslipidemia associated with type 2 diabetes mellitus, S/P peripheral artery angioplasty   Monitor  Follow up as directed   Continue home pletal, zetia, crestor  Blood pressure control    5.  Tension headache  Monitor  Follow up as needed, directed      6. Type 2 diabetes mellitus with stage 3a chronic kidney disease, without long-term current use of insulin, Type 2 diabetes mellitus with microalbuminuria, without long-term current use of insulin  Monitor  Follow up as directed    Continue home ozempic  Discussed with patient ongoing weight loss and she is to discuss ozempic use with her PCP- BMI with clothes on currently 24.45     7. Diabetic cataract of both eyes  Monitor  Follow up with Ophtho as directed    Continue home acular, patanol, pred forte    8. Nonrheumatic aortic valve stenosis  Monitor  Follow up as directed       9. Gastroesophageal reflux disease without esophagitis, Diverticulosis, History of adenomatous polyp of colon  Monitor  Follow up with GI as needed, directed      Continue home pepcid    10. Osteoporosis, unspecified osteoporosis type, unspecified pathological fracture presence  Monitor  Follow up with PCP  Continue home calcium with vitamin D    11.  Iron deficiency anemia secondary to inadequate dietary iron intake  Monitor  Stable  Follow up with PCP     12.  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  Monitor  Follow up as needed, directed                                                  Provided Lucia with a 5-10 year written screening schedule and personal prevention plan. Recommendations were developed using the USPSTF age appropriate recommendations. Education, counseling, and referrals were provided as needed. After Visit Summary printed and given to patient which includes a list of additional screenings\tests needed.    Follow up in about 1 year (around 8/6/2026) for Annual wellness visit.    ORI Alfaro

## 2025-08-06 NOTE — PROGRESS NOTES
"  Lucia Barlow presented for a  Medicare AWV and comprehensive Health Risk Assessment today. The following components were reviewed and updated:    Medical history  Family History  Social history  Allergies and Current Medications  Health Risk Assessment  Health Maintenance  Care Team     Patient screened moderate and/or high risk for one or more social determinants of health (SDOH). Patient connected to community resources through the ED Navigator.      ** See Completed Assessments for Annual Wellness Visit within the encounter summary.**         The following assessments were completed:  Living Situation  CAGE  Depression Screening  Timed Get Up and Go  Whisper Test  Cognitive Function Screening  Nutrition Screening  ADL Screening  PAQ Screening      Opioid documentation:      Patient {does/does not have a current opioid prescription:98272}     Vitals:    08/06/25 0721   Resp: 20   Weight: 58.7 kg (129 lb 6.6 oz)   Height: 5' 1" (1.549 m)     Body mass index is 24.45 kg/m².  Physical Exam          Diagnoses and health risks identified today and associated recommendations/orders:    1. Encounter for Medicare annual wellness exam  ***  - Referral to Enhanced Annual Wellness Visit (eAWV) W+1    2. Hypertension associated with diabetes  ***    3. High pulmonary arterial pressure  ***    4. Atherosclerosis of aorta  ***    5. Simple chronic bronchitis  ***    6. Type 2 diabetes mellitus with stage 3a chronic kidney disease, without long-term current use of insulin  ***    7. Diabetic cataract of both eyes  ***    8. Tortuous aorta  ***    9. Dyslipidemia associated with type 2 diabetes mellitus  ***    10. Type 2 diabetes mellitus with microalbuminuria, without long-term current use of insulin  ***    11. Diabetes mellitus with peripheral vascular disease  ***    12. Seasonal allergic rhinitis due to pollen  ***    13. S/P lobectomy of lung-  Hx robotic lung lobectomy 11/19/2020  ***    14. Pulmonary nodule, left - 8 " mm , high risk for lung cancer  ***    15. Pulmonary nodule less than 6 mm in diameter with high risk for malignant neoplasm - RIght lower lobe  ***    16. S/P peripheral artery angioplasty  ***    17. Nonrheumatic aortic valve stenosis  ***    18. Coronary artery calcification  ***    19. Atherosclerosis of native coronary artery of native heart without angina pectoris  ***    20. Atherosclerosis of native artery of both lower extremities with intermittent claudication  ***    21. 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  ***    22. Iron deficiency anemia secondary to inadequate dietary iron intake  ***    23. History of primary non-small cell carcinoma of left lung  ***    24. Osteoporosis, unspecified osteoporosis type, unspecified pathological fracture presence  ***    25. History of adenomatous polyp of colon  ***    26. Diverticulosis  ***    27. Gastroesophageal reflux disease without esophagitis  ***    28. Other reduced mobility  ***    29. IVANIA (obstructive sleep apnea)  ***    30. Current every day smoker  ***    31. Nicotine dependence, cigarettes, uncomplicated  ***    32. Shortness of breath  ***    33. Tension headache  ***      Provided Lucia with a 5-10 year written screening schedule and personal prevention plan. Recommendations were developed using the USPSTF age appropriate recommendations. Education, counseling, and referrals were provided as needed. After Visit Summary printed and given to patient which includes a list of additional screenings\tests needed.    Follow up in about 1 year (around 8/6/2026) for Annual wellness visit.    Loren Mabry, ORI

## 2025-08-06 NOTE — PROGRESS NOTES
HPI    Patient states vision is doing well and is using AT drops. Complains of   tearing and dry eyes.  No other ocular complaints     1. +DM 2018  2. 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            Assessment /Plan     For exam results, see Encounter Report.    Pseudophakia, right eye    Nuclear sclerosis of left eye- Patient wishes to follow    Type 2 diabetes mellitus without retinopathy- last A1c 5.6   Strict BG control, f/u w/ PCP, and annual DFE  Stressed importance of DM control to preserve vision      [place] syndrome- resolved   Continue taper BID x2 weeks, then daily x2 weeks then stop       RTC 1 year DFE or PRN

## 2025-08-06 NOTE — PROGRESS NOTES
"  Lucia Barlow presented for a  Medicare AWV and comprehensive Health Risk Assessment today. The following components were reviewed and updated:    Medical history  Family History  Social history  Allergies and Current Medications  Health Risk Assessment  Health Maintenance  Care Team     Patient screened moderate and/or high risk for one or more social determinants of health (SDOH). Patient connected to community resources through the ED Navigator.      ** See Completed Assessments for Annual Wellness Visit within the encounter summary.**         The following assessments were completed:  Living Situation  CAGE  Depression Screening  Timed Get Up and Go  Whisper Test  Cognitive Function Screening  Nutrition Screening  ADL Screening  PAQ Screening      Opioid documentation:      Patient {does/does not have a current opioid prescription:45192}     Vitals:    08/06/25 0721   Resp: 20   Weight: 58.7 kg (129 lb 6.6 oz)   Height: 5' 1" (1.549 m)     Body mass index is 24.45 kg/m².  Physical Exam          Diagnoses and health risks identified today and associated recommendations/orders:    1. Encounter for Medicare annual wellness exam  ***  - Referral to Enhanced Annual Wellness Visit (eAWV) W+1    2. Hypertension associated with diabetes  ***    3. High pulmonary arterial pressure  ***    4. Atherosclerosis of aorta  ***    5. Simple chronic bronchitis  ***    6. Type 2 diabetes mellitus with stage 3a chronic kidney disease, without long-term current use of insulin  ***    7. Diabetic cataract of both eyes  ***    8. Tortuous aorta  ***    9. Dyslipidemia associated with type 2 diabetes mellitus  ***    10. Type 2 diabetes mellitus with microalbuminuria, without long-term current use of insulin  ***    11. Diabetes mellitus with peripheral vascular disease  ***    12. Seasonal allergic rhinitis due to pollen  ***    13. S/P lobectomy of lung-  Hx robotic lung lobectomy 11/19/2020  ***    14. Pulmonary nodule, left - 8 " mm , high risk for lung cancer  ***    15. Pulmonary nodule less than 6 mm in diameter with high risk for malignant neoplasm - RIght lower lobe  ***    16. S/P peripheral artery angioplasty  ***    17. Nonrheumatic aortic valve stenosis  ***    18. Coronary artery calcification  ***    19. Atherosclerosis of native coronary artery of native heart without angina pectoris  ***    20. Atherosclerosis of native artery of both lower extremities with intermittent claudication  ***    21. 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  ***    22. Iron deficiency anemia secondary to inadequate dietary iron intake  ***    23. History of primary non-small cell carcinoma of left lung  ***    24. Osteoporosis, unspecified osteoporosis type, unspecified pathological fracture presence  ***    25. History of adenomatous polyp of colon  ***    26. Diverticulosis  ***    27. Gastroesophageal reflux disease without esophagitis  ***    28. Other reduced mobility  ***    29. IVANIA (obstructive sleep apnea)  ***    30. Current every day smoker  ***    31. Nicotine dependence, cigarettes, uncomplicated  ***    32. Shortness of breath  ***    33. Tension headache  ***      Provided Lucia with a 5-10 year written screening schedule and personal prevention plan. Recommendations were developed using the USPSTF age appropriate recommendations. Education, counseling, and referrals were provided as needed. After Visit Summary printed and given to patient which includes a list of additional screenings\tests needed.    Follow up in about 1 year (around 8/6/2026) for Annual wellness visit.    Loren Mabry, ORI

## 2025-08-09 DIAGNOSIS — I77.1 TORTUOUS AORTA: Chronic | ICD-10-CM

## 2025-08-09 DIAGNOSIS — E11.51 TYPE 2 DIABETES MELLITUS WITH PERIPHERAL VASCULAR DISEASE: Chronic | ICD-10-CM

## 2025-08-09 DIAGNOSIS — I70.213 ATHEROSCLEROSIS OF NATIVE ARTERY OF BOTH LOWER EXTREMITIES WITH INTERMITTENT CLAUDICATION: Chronic | ICD-10-CM

## 2025-08-09 DIAGNOSIS — I25.10 ATHEROSCLEROSIS OF NATIVE CORONARY ARTERY OF NATIVE HEART WITHOUT ANGINA PECTORIS: Chronic | ICD-10-CM

## 2025-08-09 DIAGNOSIS — E78.5 DYSLIPIDEMIA ASSOCIATED WITH TYPE 2 DIABETES MELLITUS: Chronic | ICD-10-CM

## 2025-08-09 DIAGNOSIS — E11.69 DYSLIPIDEMIA ASSOCIATED WITH TYPE 2 DIABETES MELLITUS: Chronic | ICD-10-CM

## 2025-08-09 RX ORDER — EZETIMIBE 10 MG/1
10 TABLET ORAL NIGHTLY
Qty: 90 TABLET | Refills: 2 | Status: SHIPPED | OUTPATIENT
Start: 2025-08-09

## 2025-08-09 NOTE — TELEPHONE ENCOUNTER
Refill Decision Note   Lucia Barlow  is requesting a refill authorization.  Brief Assessment and Rationale for Refill:  Approve     Medication Therapy Plan:       Medication Reconciliation Completed: No   Comments:     No Care Gaps recommended.     Note composed:8:38 AM 08/09/2025

## (undated) DEVICE — STAPLER ECHELON FLEX GST 45MM

## (undated) DEVICE — STAPLER SUREFORM SGL USE 45

## (undated) DEVICE — RELOAD SUREFORM 45 2.5 WHT 6R

## (undated) DEVICE — DRAPE COLUMN DAVINCI XI

## (undated) DEVICE — PORT AIRSEAL 12/120MM LPI

## (undated) DEVICE — CANNULA REDUCER 12-8MM

## (undated) DEVICE — SUT SILK 0 BLK BR FSL 18 IN

## (undated) DEVICE — HEMOSTAT SURGICEL 4X8IN

## (undated) DEVICE — ELECTRODE BLADE INSULATED 1 IN

## (undated) DEVICE — DRAPE ABDOMINAL TIBURON 14X11

## (undated) DEVICE — GLOVE BIOGEL SKINSENSE PI 7.5

## (undated) DEVICE — DRESSING TRANS 4X4 TEGADERM

## (undated) DEVICE — ELECTRODE REM PLYHSV RETURN 9

## (undated) DEVICE — GOWN SMART IMP BREATHABLE XXLG

## (undated) DEVICE — TAPE SILK 3IN

## (undated) DEVICE — SYR SLIP TIP 20CC

## (undated) DEVICE — SUT VICRYL 3-0 27 SH

## (undated) DEVICE — DRESSING TRANS 2X2 TEGADERM

## (undated) DEVICE — SOL WATER STRL IRR 1000ML

## (undated) DEVICE — DRESSING SURGICAL 1X3

## (undated) DEVICE — KIT ANTIFOG

## (undated) DEVICE — KIT VUETIP TROCAR SWAB

## (undated) DEVICE — RELOAD ECHELON ENDOPATH 45MM

## (undated) DEVICE — RELOAD SUREFORM 45 3.5 BLU 6R

## (undated) DEVICE — TRAY MINOR GEN SURG

## (undated) DEVICE — CONTAINER SPECIMEN STRL 4OZ

## (undated) DEVICE — COVER LIGHT HANDLE

## (undated) DEVICE — SEE MEDLINE ITEM 146313

## (undated) DEVICE — CANNULA SEAL 12MM

## (undated) DEVICE — SET TRI-LUMEN FILTERED TUBE

## (undated) DEVICE — DRAPE SCOPE PILLOW WARMER

## (undated) DEVICE — SYR ONLY LUER LOCK 20CC

## (undated) DEVICE — RELOAD SUREFORM 45 4.3 GRN 6R

## (undated) DEVICE — DRAPE ARM DAVINCI XI

## (undated) DEVICE — SPONGE IV DRAIN 4X4 STERILE

## (undated) DEVICE — NDL SPINAL 18GX3.5 SPINOCAN

## (undated) DEVICE — SUT MCRYL PLUS 4-0 PS2 27IN

## (undated) DEVICE — SUT SILK 0 STRANDS 30IN BLK

## (undated) DEVICE — DRAPE INCISE IOBAN 2 23X17IN

## (undated) DEVICE — SEAL UNIVERSAL 5MM-8MM XI